# Patient Record
Sex: MALE | Race: WHITE | NOT HISPANIC OR LATINO | Employment: FULL TIME | URBAN - METROPOLITAN AREA
[De-identification: names, ages, dates, MRNs, and addresses within clinical notes are randomized per-mention and may not be internally consistent; named-entity substitution may affect disease eponyms.]

---

## 2017-01-05 ENCOUNTER — HOSPITAL ENCOUNTER (EMERGENCY)
Facility: HOSPITAL | Age: 52
Discharge: HOME | End: 2017-01-05
Attending: EMERGENCY MEDICINE
Payer: SUBSIDIZED

## 2017-01-05 VITALS
HEART RATE: 88 BPM | TEMPERATURE: 97.7 F | DIASTOLIC BLOOD PRESSURE: 88 MMHG | WEIGHT: 175 LBS | RESPIRATION RATE: 18 BRPM | HEIGHT: 75 IN | OXYGEN SATURATION: 99 % | BODY MASS INDEX: 21.76 KG/M2 | SYSTOLIC BLOOD PRESSURE: 160 MMHG

## 2017-01-05 DIAGNOSIS — F10.920 ALCOHOL INTOXICATION, UNCOMPLICATED (HCC): Primary | ICD-10-CM

## 2017-01-05 LAB
ALBUMIN SERPL BCP-MCNC: 4 G/DL (ref 3.5–5)
ALP SERPL-CCNC: 72 U/L (ref 46–116)
ALT SERPL W P-5'-P-CCNC: 27 U/L (ref 12–78)
AMPHETAMINES SERPL QL SCN: NEGATIVE
ANION GAP SERPL CALCULATED.3IONS-SCNC: 11 MMOL/L (ref 4–13)
AST SERPL W P-5'-P-CCNC: 23 U/L (ref 5–45)
BARBITURATES UR QL: NEGATIVE
BASOPHILS # BLD AUTO: 0 THOUSANDS/ΜL (ref 0–0.1)
BASOPHILS NFR BLD AUTO: 0 % (ref 0–1)
BENZODIAZ UR QL: NEGATIVE
BILIRUB SERPL-MCNC: 0.3 MG/DL (ref 0.2–1)
BUN SERPL-MCNC: 5 MG/DL (ref 5–25)
CALCIUM SERPL-MCNC: 9.2 MG/DL (ref 8.3–10.1)
CHLORIDE SERPL-SCNC: 105 MMOL/L (ref 100–108)
CO2 SERPL-SCNC: 31 MMOL/L (ref 21–32)
COCAINE UR QL: NEGATIVE
CREAT SERPL-MCNC: 0.72 MG/DL (ref 0.6–1.3)
EOSINOPHIL # BLD AUTO: 0 THOUSAND/ΜL (ref 0–0.61)
EOSINOPHIL NFR BLD AUTO: 1 % (ref 0–6)
ERYTHROCYTE [DISTWIDTH] IN BLOOD BY AUTOMATED COUNT: 14 % (ref 11.6–15.1)
ETHANOL SERPL-MCNC: 256 MG/DL (ref 0–3)
GFR SERPL CREATININE-BSD FRML MDRD: >60 ML/MIN/1.73SQ M
GLUCOSE SERPL-MCNC: 87 MG/DL (ref 65–140)
HCT VFR BLD AUTO: 44.1 % (ref 42–52)
HGB BLD-MCNC: 14.7 G/DL (ref 14–18)
LYMPHOCYTES # BLD AUTO: 2.2 THOUSANDS/ΜL (ref 0.6–4.47)
LYMPHOCYTES NFR BLD AUTO: 26 % (ref 14–44)
MCH RBC QN AUTO: 33.6 PG (ref 27–31)
MCHC RBC AUTO-ENTMCNC: 33.4 G/DL (ref 31.4–37.4)
MCV RBC AUTO: 101 FL (ref 82–98)
METHADONE UR QL: NEGATIVE
MONOCYTES # BLD AUTO: 0.9 THOUSAND/ΜL (ref 0.17–1.22)
MONOCYTES NFR BLD AUTO: 10 % (ref 4–12)
NEUTROPHILS # BLD AUTO: 5.5 THOUSANDS/ΜL (ref 1.85–7.62)
NEUTS SEG NFR BLD AUTO: 64 % (ref 43–75)
NRBC BLD AUTO-RTO: 0 /100 WBCS
OPIATES UR QL SCN: NEGATIVE
PCP UR QL: NEGATIVE
PLATELET # BLD AUTO: 291 THOUSANDS/UL (ref 130–400)
PMV BLD AUTO: 7.7 FL (ref 8.9–12.7)
POTASSIUM SERPL-SCNC: 3.9 MMOL/L (ref 3.5–5.3)
PROT SERPL-MCNC: 7.9 G/DL (ref 6.4–8.2)
RBC # BLD AUTO: 4.39 MILLION/UL (ref 4.7–6.1)
SODIUM SERPL-SCNC: 147 MMOL/L (ref 136–145)
THC UR QL: NEGATIVE
WBC # BLD AUTO: 8.6 THOUSAND/UL (ref 4.8–10.8)

## 2017-01-05 PROCEDURE — 85025 COMPLETE CBC W/AUTO DIFF WBC: CPT | Performed by: EMERGENCY MEDICINE

## 2017-01-05 PROCEDURE — 36415 COLL VENOUS BLD VENIPUNCTURE: CPT | Performed by: EMERGENCY MEDICINE

## 2017-01-05 PROCEDURE — 96365 THER/PROPH/DIAG IV INF INIT: CPT

## 2017-01-05 PROCEDURE — 80307 DRUG TEST PRSMV CHEM ANLYZR: CPT | Performed by: EMERGENCY MEDICINE

## 2017-01-05 PROCEDURE — 80320 DRUG SCREEN QUANTALCOHOLS: CPT | Performed by: EMERGENCY MEDICINE

## 2017-01-05 PROCEDURE — 96361 HYDRATE IV INFUSION ADD-ON: CPT

## 2017-01-05 PROCEDURE — 80053 COMPREHEN METABOLIC PANEL: CPT | Performed by: EMERGENCY MEDICINE

## 2017-01-05 PROCEDURE — 93005 ELECTROCARDIOGRAM TRACING: CPT | Performed by: EMERGENCY MEDICINE

## 2017-01-05 PROCEDURE — 99283 EMERGENCY DEPT VISIT LOW MDM: CPT

## 2017-01-05 RX ORDER — THIAMINE MONONITRATE (VIT B1) 100 MG
100 TABLET ORAL ONCE
Status: COMPLETED | OUTPATIENT
Start: 2017-01-05 | End: 2017-01-05

## 2017-01-05 RX ORDER — CHLORDIAZEPOXIDE HYDROCHLORIDE 5 MG/1
10 CAPSULE, GELATIN COATED ORAL ONCE
Status: COMPLETED | OUTPATIENT
Start: 2017-01-05 | End: 2017-01-05

## 2017-01-05 RX ADMIN — SODIUM CHLORIDE 1000 ML: 0.9 INJECTION, SOLUTION INTRAVENOUS at 17:19

## 2017-01-05 RX ADMIN — Medication 100 MG: at 17:51

## 2017-01-05 RX ADMIN — FOLIC ACID 1 MG: 5 INJECTION, SOLUTION INTRAMUSCULAR; INTRAVENOUS; SUBCUTANEOUS at 17:51

## 2017-01-05 RX ADMIN — CHLORDIAZEPOXIDE HYDROCHLORIDE 10 MG: 5 CAPSULE ORAL at 17:51

## 2017-01-09 LAB
ATRIAL RATE: 78 BPM
P AXIS: 60 DEGREES
PR INTERVAL: 124 MS
QRS AXIS: 69 DEGREES
QRSD INTERVAL: 84 MS
QT INTERVAL: 400 MS
QTC INTERVAL: 456 MS
T WAVE AXIS: 63 DEGREES
VENTRICULAR RATE: 78 BPM

## 2018-01-12 NOTE — PROCEDURES
Procedures by Aura Epstein MD at  12/11/2016  7:14 PM      Author:  Aura Epstein MD Service:  Trauma Author Type:  Resident    Filed:  12/11/2016  7:17 PM Date of Service:  12/11/2016  7:14 PM Status:  Signed    : Aura Epstein MD (Resident)  Cosigner:  Ivan Hughes MD at 12/11/2016  9:39 PM      Procedures:       1  LACERATION REPAIR [id number] (Custom)]                   3 cm laceration on L forehead/scalp junction  Horizontal/vertical orientation  Laceration extended to skull  Irrigated w/500 cc normal saline and terminated irrigation at pts demand  3 sutures of  5-0 vicryl subcutaneous  Running 5-0 ethalon sutures  On wiping off dried blood small laceration was discovered at L lateral eye  1 5 cm horizontal laceration closed w/5-0 ethalon running sutures               Received for:Provider  EPIC   Dec 11 2016  9:39PM [facility] Standard Time

## 2019-06-12 ENCOUNTER — HOSPITAL ENCOUNTER (EMERGENCY)
Facility: HOSPITAL | Age: 54
Discharge: HOME | End: 2019-06-12
Attending: EMERGENCY MEDICINE | Admitting: EMERGENCY MEDICINE

## 2019-06-12 VITALS
TEMPERATURE: 97.7 F | DIASTOLIC BLOOD PRESSURE: 85 MMHG | OXYGEN SATURATION: 97 % | RESPIRATION RATE: 16 BRPM | HEART RATE: 83 BPM | SYSTOLIC BLOOD PRESSURE: 130 MMHG

## 2019-06-12 DIAGNOSIS — S05.00XA CORNEAL ABRASION: Primary | ICD-10-CM

## 2019-06-12 DIAGNOSIS — H10.9 CONJUNCTIVITIS, LEFT EYE: ICD-10-CM

## 2019-06-12 DIAGNOSIS — H10.9 CONJUNCTIVITIS, RIGHT EYE: ICD-10-CM

## 2019-06-12 PROCEDURE — 99283 EMERGENCY DEPT VISIT LOW MDM: CPT

## 2019-06-12 RX ORDER — OFLOXACIN 3 MG/ML
1 SOLUTION/ DROPS OPHTHALMIC
Qty: 10 ML | Refills: 0 | Status: SHIPPED | OUTPATIENT
Start: 2019-06-12 | End: 2019-06-18

## 2019-06-12 RX ORDER — TOBRAMYCIN 3 MG/ML
1 SOLUTION/ DROPS OPHTHALMIC ONCE
Status: COMPLETED | OUTPATIENT
Start: 2019-06-12 | End: 2019-06-12

## 2019-06-12 RX ORDER — TETRACAINE HYDROCHLORIDE 5 MG/ML
2 SOLUTION OPHTHALMIC ONCE
Status: COMPLETED | OUTPATIENT
Start: 2019-06-12 | End: 2019-06-12

## 2019-06-12 RX ADMIN — TOBRAMYCIN 1 DROP: 3 SOLUTION OPHTHALMIC at 08:03

## 2019-06-12 RX ADMIN — FLUORESCEIN SODIUM 1 STRIP: 1 STRIP OPHTHALMIC at 06:34

## 2019-06-12 RX ADMIN — TETRACAINE HYDROCHLORIDE 2 DROP: 5 SOLUTION OPHTHALMIC at 06:34

## 2019-06-18 ENCOUNTER — HOSPITAL ENCOUNTER (EMERGENCY)
Facility: HOSPITAL | Age: 54
Discharge: HOME | End: 2019-06-18
Attending: EMERGENCY MEDICINE | Admitting: EMERGENCY MEDICINE

## 2019-06-18 VITALS
DIASTOLIC BLOOD PRESSURE: 71 MMHG | WEIGHT: 147 LBS | RESPIRATION RATE: 18 BRPM | TEMPERATURE: 96.9 F | OXYGEN SATURATION: 99 % | HEART RATE: 71 BPM | SYSTOLIC BLOOD PRESSURE: 114 MMHG | BODY MASS INDEX: 18.37 KG/M2

## 2019-06-18 DIAGNOSIS — S05.02XA ABRASION OF LEFT CORNEA, INITIAL ENCOUNTER: Primary | ICD-10-CM

## 2019-06-18 PROCEDURE — 99283 EMERGENCY DEPT VISIT LOW MDM: CPT

## 2019-06-18 RX ORDER — TETRACAINE HYDROCHLORIDE 5 MG/ML
1 SOLUTION OPHTHALMIC ONCE
Status: COMPLETED | OUTPATIENT
Start: 2019-06-18 | End: 2019-06-18

## 2019-06-18 RX ADMIN — TETRACAINE HYDROCHLORIDE 1 DROP: 5 SOLUTION OPHTHALMIC at 07:17

## 2019-06-18 RX ADMIN — FLUORESCEIN SODIUM 1 STRIP: 1 STRIP OPHTHALMIC at 07:17

## 2021-12-10 ENCOUNTER — IMMUNIZATIONS (OUTPATIENT)
Dept: FAMILY MEDICINE CLINIC | Facility: HOSPITAL | Age: 56
End: 2021-12-10

## 2021-12-10 DIAGNOSIS — Z23 ENCOUNTER FOR IMMUNIZATION: Primary | ICD-10-CM

## 2021-12-10 PROCEDURE — 91306 COVID-19 MODERNA VACC 0.25 ML BOOSTER: CPT

## 2021-12-10 PROCEDURE — 0064A COVID-19 MODERNA VACC 0.25 ML BOOSTER: CPT

## 2023-02-22 ENCOUNTER — OFFICE VISIT (OUTPATIENT)
Dept: FAMILY MEDICINE CLINIC | Facility: CLINIC | Age: 58
End: 2023-02-22

## 2023-02-22 VITALS
SYSTOLIC BLOOD PRESSURE: 152 MMHG | RESPIRATION RATE: 16 BRPM | BODY MASS INDEX: 19.37 KG/M2 | HEART RATE: 100 BPM | DIASTOLIC BLOOD PRESSURE: 94 MMHG | WEIGHT: 155 LBS | TEMPERATURE: 98.7 F | OXYGEN SATURATION: 97 %

## 2023-02-22 DIAGNOSIS — R59.0 CERVICAL LYMPHADENOPATHY: ICD-10-CM

## 2023-02-22 DIAGNOSIS — K02.9 DENTAL CARIES: Primary | ICD-10-CM

## 2023-02-22 RX ORDER — AMOXICILLIN AND CLAVULANATE POTASSIUM 875; 125 MG/1; MG/1
1 TABLET, FILM COATED ORAL EVERY 12 HOURS SCHEDULED
Qty: 20 TABLET | Refills: 0 | Status: SHIPPED | OUTPATIENT
Start: 2023-02-22 | End: 2023-03-04

## 2023-02-22 RX ORDER — DEXAMETHASONE 6 MG/1
6 TABLET ORAL
Qty: 5 TABLET | Refills: 0 | Status: SHIPPED | OUTPATIENT
Start: 2023-02-22

## 2023-02-22 NOTE — ASSESSMENT & PLAN NOTE
Patient believes it is new and started about 2 weeks ago  Non tender  Possibly secondary to dental infection  Recommend warm compresses  ER if symptoms worsen or any difficulty swallowing or fever starts

## 2023-02-22 NOTE — ASSESSMENT & PLAN NOTE
Will cover with course of Augmentin  Finish course completely  Recommend probiotic  States he can not take Tylenol, Motrin, or Aleve as it upsets his stomach  Will give 5 day course of Decadron for symptomatic relief  Take with food  Reviewed oral hygiene  Patient states he has an appointment with a dentist 4/18, unsure where  Discussed calling other dentists to see where he may be able to get in more quickly  ER precautions given  Advised patient to schedule an establish care visit/physical to establish with a PCP here  Patient agreed

## 2023-02-22 NOTE — PROGRESS NOTES
Name: Ge Sheppard      : 1965      MRN: [id number]  Encounter Provider: Adriana Vasquez  Encounter Date: 2023   Encounter department: [facility]     1  Dental caries  Assessment & Plan: Will cover with course of Augmentin  Finish course completely  Recommend probiotic  States he can not take Tylenol, Motrin, or Aleve as it upsets his stomach  Will give 5 day course of Decadron for symptomatic relief  Take with food  Reviewed oral hygiene  Patient states he has an appointment with a dentist , unsure where  Discussed calling other dentists to see where he may be able to get in more quickly  ER precautions given  Advised patient to schedule an establish care visit/physical to establish with a PCP here  Patient agreed  Orders:  -     amoxicillin-clavulanate (Augmentin) 875-125 mg per tablet; Take 1 tablet by mouth every 12 (twelve) hours for 10 days  -     dexamethasone (DECADRON) 6 mg tablet; Take 1 tablet (6 mg total) by mouth daily with breakfast    2  Cervical lymphadenopathy  Assessment & Plan:  Patient believes it is new and started about 2 weeks ago  Non tender  Possibly secondary to dental infection  Recommend warm compresses  ER if symptoms worsen or any difficulty swallowing or fever starts  Subjective      Patient is a 59year old male presenting for a same day appointment  States he is having teeth and mouth pain for 2 months  States it is all of them, no specific location  He denies fever  Admits to headaches  States it is from the pain, it radiates into his head and ears  States he does have a difficult time hearing  Difficult to get a full history as patient keeps going back to the severe pain  He states he is "drinking again" when asked if he consumes alcohol  Would not say how much or what he drinks       Review of Systems   Constitutional: Negative for appetite change, chills, diaphoresis, fatigue, fever and unexpected weight change  HENT: Positive for dental problem and hearing loss  Negative for congestion, sore throat, tinnitus and trouble swallowing  Gastrointestinal: Negative for abdominal pain, diarrhea, nausea and vomiting  Skin: Negative for rash  Neurological: Positive for headaches  Negative for dizziness and light-headedness  Hematological: Negative for adenopathy  No current outpatient medications on file prior to visit  Objective     /94   Pulse 100   Temp 98 7 °F (37 1 °C) (Temporal)   Resp 16   Wt 70 3 kg (155 lb)   SpO2 97%   BMI 19 37 kg/m²     Physical Exam  Vitals and nursing note reviewed  Constitutional:       General: He is awake  He is not in acute distress  Appearance: Normal appearance  He is well-developed, well-groomed and normal weight  He is not ill-appearing  HENT:      Head: Normocephalic and atraumatic  Right Ear: Tympanic membrane, ear canal and external ear normal  Decreased hearing noted  Left Ear: Tympanic membrane, ear canal and external ear normal  Decreased hearing noted  Nose: Nose normal       Mouth/Throat:      Mouth: Mucous membranes are moist       Dentition: Abnormal dentition  Does not have dentures  Dental tenderness and dental caries present  No gingival swelling, dental abscesses or gum lesions  Pharynx: Oropharynx is clear  No oropharyngeal exudate or posterior oropharyngeal erythema  Tonsils: No tonsillar exudate or tonsillar abscesses  Comments: Missing teeth  Eyes:      General: No scleral icterus  Conjunctiva/sclera: Conjunctivae normal    Neck:     Cardiovascular:      Rate and Rhythm: Normal rate and regular rhythm  Pulses: Normal pulses  Radial pulses are 2+ on the right side and 2+ on the left side  Heart sounds: Normal heart sounds  No murmur heard  Pulmonary:      Effort: Pulmonary effort is normal  No respiratory distress        Breath sounds: Normal breath sounds and air entry  No decreased air movement  No decreased breath sounds, wheezing, rhonchi or rales  Musculoskeletal:         General: Normal range of motion  Cervical back: Neck supple  Lymphadenopathy:      Cervical: Cervical adenopathy present  Skin:     General: Skin is warm  Coloration: Skin is not jaundiced  Findings: No rash  Neurological:      General: No focal deficit present  Mental Status: He is alert and oriented to person, place, and time  Mental status is at baseline  Motor: Motor function is intact  Coordination: Coordination is intact  Gait: Gait is intact  Psychiatric:         Attention and Perception: Attention normal          Mood and Affect: Mood and affect normal          Speech: Speech normal          Behavior: Behavior normal  Behavior is cooperative           Cognition and Memory: Cognition normal        Marcela Merrill PA-C

## 2023-02-24 ENCOUNTER — OFFICE VISIT (OUTPATIENT)
Dept: DENTISTRY | Facility: CLINIC | Age: 58
End: 2023-02-24

## 2023-02-24 VITALS — DIASTOLIC BLOOD PRESSURE: 91 MMHG | SYSTOLIC BLOOD PRESSURE: 141 MMHG | HEART RATE: 88 BPM | TEMPERATURE: 97.6 F

## 2023-02-24 DIAGNOSIS — Z01.20 ENCOUNTER FOR DENTAL EXAMINATION: Primary | ICD-10-CM

## 2023-02-24 PROBLEM — K05.30 PERIODONTITIS: Status: ACTIVE | Noted: 2023-02-24

## 2023-02-24 NOTE — DENTAL PROCEDURE DETAILS
Cynthiathomas Nickerson presents for a Comprehensive exam  Verbal consent for treatment given in addition to the forms  CC:  pt in a lot of pain - has been on antibiotics twice - wanted a stronger antibiotic but has stomach issues with taking the Amoxicillin  Explained to pt it will get worse with a stronger antibiotic  Explained to pt he has a big infection in his UR molar  ---Dr Hector Babin gave 0 7 Carp of 4 % Articaine PSA, MSA block to help w/ the pain temporarily  Pt left satisfied and ambulatory  Reviewed health history - Patient is ASA II  Consents signed: Yes     Perio: Generalized, Moderate bleeding, and Gingivitis; Stage 4, Grade C  Pain Scale: 8  Caries Assessment: High  Radiographs: Panorex  Periapical teeth #20  24  29     Oral Hygiene instruction reviewed and given  OHI:  Poor  ---Heavy plaque and calc  ---Severe bone loss generalized,  Mobility    Treatment Plan:  1  Extract all remaining upper and lower teeth w/ alveoplasty  2    CUD and CLD to be made   3  Case Difficulty Type 3     Referrals needed: OS - will try to do the extractions here but gave referral also just in case  Exam:  Dr Hector Babin and Dr Luzma Campbell    NV1:  Ext's 2, 3, 4, 5 and alveoplasty - 75 min  NV2;  Ext's 14,15, 16 and alveoplasty - 75 min  NV3:  Ext's 17, 18, 20, 21, 22, 23, 24, w/ alveoplasty- 75 min  NV4:  Ext's 25, 26, 27, 29, 32 w/ alveoplasty - 75 min

## 2023-03-05 ENCOUNTER — HOSPITAL ENCOUNTER (EMERGENCY)
Facility: HOSPITAL | Age: 58
Discharge: HOME | End: 2023-03-05
Attending: INTERNAL MEDICINE

## 2023-03-05 VITALS
DIASTOLIC BLOOD PRESSURE: 89 MMHG | SYSTOLIC BLOOD PRESSURE: 139 MMHG | RESPIRATION RATE: 20 BRPM | WEIGHT: 157.19 LBS | BODY MASS INDEX: 19.65 KG/M2 | TEMPERATURE: 97.1 F | HEART RATE: 92 BPM | OXYGEN SATURATION: 98 %

## 2023-03-05 DIAGNOSIS — K04.7 DENTAL INFECTION: Primary | ICD-10-CM

## 2023-03-05 DIAGNOSIS — K08.89 DENTALGIA: ICD-10-CM

## 2023-03-05 RX ORDER — CHLORHEXIDINE GLUCONATE 0.12 MG/ML
15 RINSE ORAL 2 TIMES DAILY
Qty: 120 ML | Refills: 0 | Status: SHIPPED | OUTPATIENT
Start: 2023-03-05

## 2023-03-05 RX ORDER — SENNOSIDES 8.6 MG
650 CAPSULE ORAL EVERY 8 HOURS PRN
Qty: 30 TABLET | Refills: 0 | Status: SHIPPED | OUTPATIENT
Start: 2023-03-05

## 2023-03-05 RX ORDER — PENICILLIN V POTASSIUM 500 MG/1
500 TABLET ORAL 4 TIMES DAILY
Qty: 40 TABLET | Refills: 0 | Status: SHIPPED | OUTPATIENT
Start: 2023-03-05 | End: 2023-03-15

## 2023-03-05 RX ORDER — BENZOCAINE 200 MG/G
30 GEL ORAL 2 TIMES DAILY PRN
Qty: 30 G | Refills: 0 | Status: SHIPPED | OUTPATIENT
Start: 2023-03-05

## 2023-03-05 NOTE — ED PROVIDER NOTES
History  Chief Complaint   Patient presents with   • Dental Pain     Pt reports all over dental pain, states that he has appt 4/18/23 for multiple extractions and dentures  Pt with unsteady gait during triage, stated, "I'm not drunk"  Pt later admitted to having 1 beer about an hr ago  HPI  76-year-old man presents to the ER for evaluation of dentalgia  Patient reports dentalgia for the last few months  He reports he has been taking amoxicillin but stopped immediatly as it caused diarrhea  He states he will not take Tylenol or Motrin because "they will destroy my stomach "  He has not tried any gels or mouth rinses  He is requesting "strong pain medicine" and a new antibiotic  We discussed that we should try Tylenol first  We discussed the new antibiotic could also cause diarrhea, he states "I don't care, anything but amoxicillin "  Patient has many complaints regarding the various medications for dental pain and infection  He has an appointment on April 18 for full extraction of his teeth with plan for future dentures  Patient denies headache, visual changes, dizziness, fevers, chills, shortness of breath, chest pain, palpitations, abdominal pain  He denies difficulty tolerating secretions or difficulty eating  Patient reports drinking alcohol prior to ER visit  He states his girlfriend is waiting in the waiting room to get home  Prior to Admission Medications   Prescriptions Last Dose Informant Patient Reported? Taking?   amoxicillin-clavulanate (Augmentin) 875-125 mg per tablet   No No   Sig: Take 1 tablet by mouth every 12 (twelve) hours for 10 days   dexamethasone (DECADRON) 6 mg tablet   No No   Sig: Take 1 tablet (6 mg total) by mouth daily with breakfast      Facility-Administered Medications: None       Past Medical History:   Diagnosis Date   • Hypertension        History reviewed  No pertinent surgical history  History reviewed  No pertinent family history    I have reviewed and agree with the history as documented  E-Cigarette/Vaping     E-Cigarette/Vaping Substances     Social History     Tobacco Use   • Smoking status: Every Day     Packs/day: 2 00     Types: Cigarettes   • Smokeless tobacco: Never   Substance Use Topics   • Alcohol use: Yes     Comment: vodka and beer daily, alcoholic   • Drug use: No       Review of Systems   All other systems reviewed and are negative  Physical Exam  Physical Exam  HENT:      Head: Normocephalic  Jaw: No trismus or swelling  Right Ear: Ear canal normal       Nose: Nose normal       Mouth/Throat:      Mouth: Mucous membranes are moist       Dentition: Abnormal dentition  Dental tenderness and dental caries present  No gingival swelling, dental abscesses or gum lesions  Tongue: Tongue does not deviate from midline  Pharynx: Oropharynx is clear  Comments: Extremely poor dentition, several missing teeth, several necrotic teeth  Eyes:      Extraocular Movements: Extraocular movements intact  Cardiovascular:      Rate and Rhythm: Normal rate  Pulses: Normal pulses  Pulmonary:      Effort: Pulmonary effort is normal    Abdominal:      General: Abdomen is flat             Vital Signs  ED Triage Vitals [03/05/23 1709]   Temperature Pulse Respirations Blood Pressure SpO2   (!) 97 1 °F (36 2 °C) 92 20 139/89 98 %      Temp Source Heart Rate Source Patient Position - Orthostatic VS BP Location FiO2 (%)   Tympanic Monitor Sitting Left arm --      Pain Score       --           Vitals:    03/05/23 1709   BP: 139/89   Pulse: 92   Patient Position - Orthostatic VS: Sitting         Visual Acuity      ED Medications  Medications - No data to display    Diagnostic Studies  Results Reviewed     None                 No orders to display              Procedures  Procedures         ED Course                               SBIRT 20yo+    Flowsheet Row Most Recent Value   SBIRT (23 yo +)    In order to provide better care to our patients, we are screening all of our patients for alcohol and drug use  Would it be okay to ask you these screening questions? No Filed at: 03/05/2023 0587                    Medical Decision Making  He appears intoxicated and appears distracted, he has many questions about my personal life and made many inappropriate comments  When I try to steer the conversation to his dental complaint, he appears agitated  Exam is concerning for abnormal dentition, dental tenderness throughout, several dental caries and missing teeth and necrotic teeth  No signs of abscess, severe dental infection or Bashir's angina  We will try penicillin as patient used to take amoxicillin for more than a day  We will also prescribe chlorhexidine, Tylenol and oral gel to the patient  A list of dental clinics were given to the patient including walk-in clinics  Patient was advised to go to these if he continues to have dental pain or discomfort  After patient was discharged home, the nurse was called back to the waiting room to talk to the patient's girlfriend  Patient's girlfriend wanted to know which antibiotics he could drink with  The nurse discussed that daily drinking is not healthy and that antibiotics and alcohol were not recommended  The plan was discussed with the girlfriend and she reported understanding  Risk  OTC drugs  Prescription drug management  Disposition  Final diagnoses:   Dental infection   Dentalgia     Time reflects when diagnosis was documented in both MDM as applicable and the Disposition within this note     Time User Action Codes Description Comment    3/5/2023  5:25 PM Gulshan Miller Add [K04 7] Dental infection     3/5/2023  5:26  [facility] [K08 89] KRIS SIDDIQUI  [facility]       ED Disposition     ED Disposition   Discharge    Condition   Stable    Date/Time   Sun Mar 5, 2023  5:25 PM    [address] [place] [address] discharge to home/self care                 Follow-up Information    None         Discharge Medication List as of 3/5/2023  5:32 PM      START taking these medications    Details   acetaminophen (TYLENOL) 650 mg CR tablet Take 1 tablet (650 mg total) by mouth every 8 (eight) hours as needed for mild pain, Starting Sun 3/5/2023, Normal      benzocaine (Instant Oral Pain Relief Max) 20 % 30 application  by Mucosal route 2 (two) times a day as needed for mucositis, Starting Sun 3/5/2023, Normal      chlorhexidine (PERIDEX) 0 12 % solution Apply 15 mL to the mouth or throat 2 (two) times a day, Starting Sun 3/5/2023, Normal      penicillin V potassium (VEETID) 500 mg tablet Take 1 tablet (500 mg total) by mouth 4 (four) times a day for 10 days, Starting Sun 3/5/2023, Until Wed 3/15/2023, Normal         CONTINUE these medications which have NOT CHANGED    Details   dexamethasone (DECADRON) 6 mg tablet Take 1 tablet (6 mg total) by mouth daily with breakfast, Starting Wed 2/22/2023, Normal         STOP taking these medications       amoxicillin-clavulanate (Augmentin) 875-125 mg per tablet Comments:   Reason for Stopping:               No discharge procedures on file      PDMP Review     None          ED Provider  Electronically Signed by           Sharee Medel MD  03/05/23 1921

## 2023-03-05 NOTE — DISCHARGE INSTRUCTIONS
[address] [facility]  [facility], [address] [facility]    Walk in house M-F [address]  [address], [address] floor  OS, [address] [facility]        Dr Ferny Green [address], [address]  Takes adults & children on a waiting list      [facility] SPECIALTY [facility] [address], [address] [facility]    Walk in scheduled only M-F 8a-noon & 1p-4p  Uninsured received 40% discount & payments  Payment must be made upfront before the service  Emergency [address] [place] [address]  [address] [facility], [address] [facility]  [address] [facility]  [address], [place] [address]  [address] [facility] in [facility], [place]

## 2023-03-05 NOTE — ED NOTES
Dr Yolande Christensen at bedside  Pt started to become agitated when asked questions about dental pain  Pt asked if used medication at home for pain before coming to the ER  Pt stated that he does not take Tylenol because it bothers his stomach  Pt was told that Motrin causes GI discomfort, not Tylenol by Dr Yolande Christensen  Pt became argumentative  When asked if he is taking antibiotics that were prescribed he stated he stopped taking them due to diarrhea  Pt was asked if he would like to try a different antibiotic  Pt asked Dr Yolande Christensen "Why do you give people medication if they cause so many side effects " Pt education given by Dr Yolande Christensen regarding diarrhea as a side effect of the antibiotic  Pt questioned Dr Yolande Christensen on whether she is a doctor  Pt was told that she is a physician  Pt asked Dr Yolande Christensen her age  When told her age pt stated "Doctors your age are usually bitches  You are pretty  Are you ?" Pt was redirected back to talking about dental pain  Pt reported that he did drink before coming to the ER but he has a ride home        Aamir Stone RN  03/05/23 2757

## 2023-03-27 ENCOUNTER — HOSPITAL ENCOUNTER (EMERGENCY)
Facility: HOSPITAL | Age: 58
End: 2023-03-27
Attending: EMERGENCY MEDICINE

## 2023-03-27 ENCOUNTER — HOSPITAL ENCOUNTER (INPATIENT)
Facility: HOSPITAL | Age: 58
LOS: 3 days | Discharge: HOME | End: 2023-03-30
Attending: EMERGENCY MEDICINE | Admitting: EMERGENCY MEDICINE

## 2023-03-27 VITALS
RESPIRATION RATE: 18 BRPM | DIASTOLIC BLOOD PRESSURE: 61 MMHG | TEMPERATURE: 97.4 F | OXYGEN SATURATION: 95 % | SYSTOLIC BLOOD PRESSURE: 102 MMHG | HEART RATE: 84 BPM

## 2023-03-27 DIAGNOSIS — E83.42 HYPOMAGNESEMIA: ICD-10-CM

## 2023-03-27 DIAGNOSIS — E87.1 HYPONATREMIA: ICD-10-CM

## 2023-03-27 DIAGNOSIS — E87.6 HYPOKALEMIA: ICD-10-CM

## 2023-03-27 DIAGNOSIS — F10.20 ALCOHOL USE DISORDER, SEVERE, DEPENDENCE (HCC): Primary | ICD-10-CM

## 2023-03-27 DIAGNOSIS — F10.929 ALCOHOL INTOXICATION (HCC): Primary | ICD-10-CM

## 2023-03-27 DIAGNOSIS — I10 HTN (HYPERTENSION): ICD-10-CM

## 2023-03-27 PROBLEM — F10.939 ALCOHOL WITHDRAWAL SYNDROME WITH COMPLICATION (HCC): Status: ACTIVE | Noted: 2023-03-27

## 2023-03-27 LAB
ANION GAP SERPL CALCULATED.3IONS-SCNC: 14 MMOL/L (ref 4–13)
BASOPHILS # BLD AUTO: 0.05 THOUSANDS/ÂΜL (ref 0–0.1)
BASOPHILS NFR BLD AUTO: 1 % (ref 0–1)
BUN SERPL-MCNC: 6 MG/DL (ref 5–25)
CALCIUM SERPL-MCNC: 9 MG/DL (ref 8.4–10.2)
CHLORIDE SERPL-SCNC: 96 MMOL/L (ref 96–108)
CO2 SERPL-SCNC: 22 MMOL/L (ref 21–32)
CREAT SERPL-MCNC: 0.79 MG/DL (ref 0.6–1.3)
EOSINOPHIL # BLD AUTO: 0.04 THOUSAND/ÂΜL (ref 0–0.61)
EOSINOPHIL NFR BLD AUTO: 1 % (ref 0–6)
ERYTHROCYTE [DISTWIDTH] IN BLOOD BY AUTOMATED COUNT: 13.1 % (ref 11.6–15.1)
ETHANOL EXG-MCNC: 0.31 MG/DL
GFR SERPL CREATININE-BSD FRML MDRD: 98 ML/MIN/1.73SQ M
GLUCOSE SERPL-MCNC: 108 MG/DL (ref 65–140)
HCT VFR BLD AUTO: 40.5 % (ref 36.5–49.3)
HGB BLD-MCNC: 14.3 G/DL (ref 12–17)
IMM GRANULOCYTES # BLD AUTO: 0.03 THOUSAND/UL (ref 0–0.2)
IMM GRANULOCYTES NFR BLD AUTO: 1 % (ref 0–2)
LYMPHOCYTES # BLD AUTO: 2.03 THOUSANDS/ÂΜL (ref 0.6–4.47)
LYMPHOCYTES NFR BLD AUTO: 40 % (ref 14–44)
MAGNESIUM SERPL-MCNC: 1.9 MG/DL (ref 1.9–2.7)
MCH RBC QN AUTO: 33.1 PG (ref 26.8–34.3)
MCHC RBC AUTO-ENTMCNC: 35.3 G/DL (ref 31.4–37.4)
MCV RBC AUTO: 94 FL (ref 82–98)
MONOCYTES # BLD AUTO: 0.62 THOUSAND/ÂΜL (ref 0.17–1.22)
MONOCYTES NFR BLD AUTO: 12 % (ref 4–12)
NEUTROPHILS # BLD AUTO: 2.33 THOUSANDS/ÂΜL (ref 1.85–7.62)
NEUTS SEG NFR BLD AUTO: 45 % (ref 43–75)
NRBC BLD AUTO-RTO: 0 /100 WBCS
PLATELET # BLD AUTO: 182 THOUSANDS/UL (ref 149–390)
PMV BLD AUTO: 8.9 FL (ref 8.9–12.7)
POTASSIUM SERPL-SCNC: 3.6 MMOL/L (ref 3.5–5.3)
RBC # BLD AUTO: 4.32 MILLION/UL (ref 3.88–5.62)
SODIUM SERPL-SCNC: 132 MMOL/L (ref 135–147)
WBC # BLD AUTO: 5.1 THOUSAND/UL (ref 4.31–10.16)

## 2023-03-27 PROCEDURE — HZ2ZZZZ DETOXIFICATION SERVICES FOR SUBSTANCE ABUSE TREATMENT: ICD-10-PCS | Performed by: EMERGENCY MEDICINE

## 2023-03-27 RX ORDER — NICOTINE 21 MG/24HR
1 PATCH, TRANSDERMAL 24 HOURS TRANSDERMAL DAILY
Status: DISCONTINUED | OUTPATIENT
Start: 2023-03-28 | End: 2023-03-30 | Stop reason: HOSPADM

## 2023-03-27 RX ORDER — ACETAMINOPHEN 325 MG/1
650 TABLET ORAL EVERY 6 HOURS PRN
Status: DISCONTINUED | OUTPATIENT
Start: 2023-03-27 | End: 2023-03-30 | Stop reason: HOSPADM

## 2023-03-27 RX ORDER — ONDANSETRON 4 MG/1
4 TABLET, ORALLY DISINTEGRATING ORAL EVERY 6 HOURS PRN
Status: DISCONTINUED | OUTPATIENT
Start: 2023-03-27 | End: 2023-03-30 | Stop reason: HOSPADM

## 2023-03-27 RX ORDER — ENOXAPARIN SODIUM 100 MG/ML
40 INJECTION SUBCUTANEOUS DAILY
Status: DISCONTINUED | OUTPATIENT
Start: 2023-03-28 | End: 2023-03-30 | Stop reason: HOSPADM

## 2023-03-27 RX ORDER — FOLIC ACID 1 MG/1
1 TABLET ORAL ONCE
Status: COMPLETED | OUTPATIENT
Start: 2023-03-27 | End: 2023-03-27

## 2023-03-27 RX ORDER — LANOLIN ALCOHOL/MO/W.PET/CERES
100 CREAM (GRAM) TOPICAL ONCE
Status: COMPLETED | OUTPATIENT
Start: 2023-03-27 | End: 2023-03-27

## 2023-03-27 RX ORDER — SODIUM CHLORIDE 9 MG/ML
75 INJECTION, SOLUTION INTRAVENOUS CONTINUOUS
Status: DISCONTINUED | OUTPATIENT
Start: 2023-03-27 | End: 2023-03-29

## 2023-03-27 RX ORDER — CHLORHEXIDINE GLUCONATE 0.12 MG/ML
15 RINSE ORAL 2 TIMES DAILY
Status: DISCONTINUED | OUTPATIENT
Start: 2023-03-27 | End: 2023-03-30 | Stop reason: HOSPADM

## 2023-03-27 RX ADMIN — THIAMINE HYDROCHLORIDE 500 MG: 100 INJECTION, SOLUTION INTRAMUSCULAR; INTRAVENOUS at 23:03

## 2023-03-27 RX ADMIN — SODIUM CHLORIDE 1000 ML: 0.9 INJECTION, SOLUTION INTRAVENOUS at 16:04

## 2023-03-27 RX ADMIN — SODIUM CHLORIDE 75 ML/HR: 0.9 INJECTION, SOLUTION INTRAVENOUS at 20:47

## 2023-03-27 RX ADMIN — THIAMINE HCL TAB 100 MG 100 MG: 100 TAB at 15:31

## 2023-03-27 RX ADMIN — CHLORHEXIDINE GLUCONATE 0.12% ORAL RINSE 15 ML: 1.2 LIQUID ORAL at 22:18

## 2023-03-27 RX ADMIN — FOLIC ACID 1 MG: 1 TABLET ORAL at 15:31

## 2023-03-27 NOTE — ASSESSMENT & PLAN NOTE
Patient with h/o chronic alcohol use  Currently consumes 12-14 beers (24 oz) daily  Last drink earlier today  Breath alcohol level 0 312 (3/27/2023 1538)  Manage withdrawal as above   Initiate daily thiamine and folic acid   Consult case management for assistance with disposition planning

## 2023-03-27 NOTE — ED PROVIDER NOTES
History  Chief Complaint   Patient presents with   • Detox Evaluation     Pt presents from [facility] for detox evaluation  Pt states drinks daily, last drink moments ago  Pt personal water bottle confiscated during triage  59-year-old male with history of alcohol abuse presents to the emergency department requesting detox from alcohol  Patient reports that he has a long history of alcohol abuse and drinks approximately a sixpack of beer per day  States that he has been to a detox facility in the past most recently 3 years ago  Denies prior history of withdrawal seizures  States that his last drink was just prior to arrival   Per nursing the patient stated that he brought beer with him here in a thermos which was poured down the drain  Patient denies having any other complaints at this time  Denies SI, HI and AVH  Prior to Admission Medications   Prescriptions Last Dose Informant Patient Reported? Taking?   acetaminophen (TYLENOL) 650 mg CR tablet   No No   Sig: Take 1 tablet (650 mg total) by mouth every 8 (eight) hours as needed for mild pain   benzocaine (Instant Oral Pain Relief Max) 20 %   No No   Si application  by Mucosal route 2 (two) times a day as needed for mucositis   chlorhexidine (PERIDEX) 0 12 % solution   No No   Sig: Apply 15 mL to the mouth or throat 2 (two) times a day   dexamethasone (DECADRON) 6 mg tablet   No No   Sig: Take 1 tablet (6 mg total) by mouth daily with breakfast      Facility-Administered Medications: None       Past Medical History:   Diagnosis Date   • Hypertension        History reviewed  No pertinent surgical history  History reviewed  No pertinent family history  I have reviewed and agree with the history as documented  E-Cigarette/Vaping     E-Cigarette/Vaping Substances     Social History     Tobacco Use   • Smoking status: Every Day     Packs/day: 2 00     Types: Cigarettes   • Smokeless tobacco: Never   Substance Use Topics   • Alcohol use:  Yes Comment: vodka and beer daily, alcoholic   • Drug use: No       Review of Systems   Constitutional: Negative for chills and fever  HENT: Negative for ear pain and sore throat  Eyes: Negative for pain and visual disturbance  Respiratory: Negative for cough and shortness of breath  Cardiovascular: Negative for chest pain and palpitations  Gastrointestinal: Negative for abdominal pain and vomiting  Genitourinary: Negative for dysuria and hematuria  Musculoskeletal: Negative for arthralgias and back pain  Skin: Negative for color change and rash  Neurological: Negative for seizures and syncope  Psychiatric/Behavioral: Negative for suicidal ideas  All other systems reviewed and are negative  Physical Exam  Physical Exam  Vitals and nursing note reviewed  Constitutional:       General: He is not in acute distress  Appearance: He is well-developed  HENT:      Head: Normocephalic and atraumatic  Right Ear: External ear normal       Left Ear: External ear normal       Nose: Nose normal    Eyes:      Conjunctiva/sclera: Conjunctivae normal    Cardiovascular:      Rate and Rhythm: Normal rate and regular rhythm  Heart sounds: No murmur heard  Pulmonary:      Effort: Pulmonary effort is normal  No respiratory distress  Breath sounds: Normal breath sounds  Abdominal:      Palpations: Abdomen is soft  Tenderness: There is no abdominal tenderness  Musculoskeletal:         General: No swelling  Cervical back: Normal range of motion and neck supple  Skin:     General: Skin is warm and dry  Capillary Refill: Capillary refill takes less than 2 seconds  Neurological:      Mental Status: He is alert and oriented to person, place, and time  Sensory: Sensation is intact  Motor: Motor function is intact  Gait: Gait is intact     Psychiatric:         Mood and Affect: Mood normal          Vital Signs  ED Triage Vitals   Temperature Pulse Respirations Blood Pressure SpO2   03/27/23 1457 03/27/23 1454 03/27/23 1454 03/27/23 1454 03/27/23 1454   (!) 97 4 °F (36 3 °C) 91 20 155/93 98 %      Temp Source Heart Rate Source Patient Position - Orthostatic VS BP Location FiO2 (%)   03/27/23 1457 03/27/23 1454 03/27/23 1454 03/27/23 1454 --   Oral Monitor Lying Right arm       Pain Score       --                  Vitals:    03/27/23 1454 03/27/23 1749   BP: 155/93 102/61   Pulse: 91 84   Patient Position - Orthostatic VS: Lying Lying         Visual Acuity      ED Medications  Medications   folic acid (FOLVITE) tablet 1 mg (1 mg Oral Given 3/27/23 1531)   thiamine tablet 100 mg (100 mg Oral Given 3/27/23 1531)   sodium chloride 0 9 % bolus 1,000 mL (0 mL Intravenous Stopped 3/27/23 1749)       Diagnostic Studies  Results Reviewed     Procedure Component Value Units Date/Time    Basic metabolic panel [[id number]]  (Abnormal) Collected: 03/27/23 1529    Lab Status: Final result Specimen: Blood from Arm, Right Updated: 03/27/23 1550     Sodium 132 mmol/L      Potassium 3 6 mmol/L      Chloride 96 mmol/L      CO2 22 mmol/L      ANION GAP 14 mmol/L      BUN 6 mg/dL      Creatinine 0 79 mg/dL      Glucose 108 mg/dL      Calcium 9 0 mg/dL      eGFR 98 ml/min/1 73sq m     Narrative:      [place] guidelines for Chronic Kidney Disease (CKD):   •  Stage 1 with normal or high GFR (GFR > 90 mL/min/1 73 square meters)  •  Stage 2 Mild CKD (GFR = 60-89 mL/min/1 73 square meters)  •  Stage 3A Moderate CKD (GFR = 45-59 mL/min/1 73 square meters)  •  Stage 3B Moderate CKD (GFR = 30-44 mL/min/1 73 square meters)  •  Stage 4 Severe CKD (GFR = 15-29 mL/min/1 73 square meters)  •  Stage 5 End Stage CKD (GFR <15 mL/min/1 73 square meters)  Note: GFR calculation is accurate only with a steady state creatinine    Magnesium [[id number]]  (Normal) Collected: 03/27/23 1529    Lab Status: Final result Specimen: Blood from Arm, Right Updated: 03/27/23 1550     Magnesium 1 9 mg/dL     POCT alcohol breath test [[id number]]  (Normal) Resulted: 03/27/23 1538    Lab Status: Final result Updated: 03/27/23 1538     EXTBreath Alcohol 0 312    CBC and differential [[id number]] Collected: 03/27/23 1529    Lab Status: Final result Specimen: Blood from Arm, Right Updated: 03/27/23 1535     WBC 5 10 Thousand/uL      RBC 4 32 Million/uL      Hemoglobin 14 3 g/dL      Hematocrit 40 5 %      MCV 94 fL      MCH 33 1 pg      MCHC 35 3 g/dL      RDW 13 1 %      MPV 8 9 fL      Platelets 605 Thousands/uL      nRBC 0 /100 WBCs      Neutrophils Relative 45 %      Immat GRANS % 1 %      Lymphocytes Relative 40 %      Monocytes Relative 12 %      Eosinophils Relative 1 %      Basophils Relative 1 %      Neutrophils Absolute 2 33 Thousands/µL      Immature Grans Absolute 0 03 Thousand/uL      Lymphocytes Absolute 2 03 Thousands/µL      Monocytes Absolute 0 62 Thousand/µL      Eosinophils Absolute 0 04 Thousand/µL      Basophils Absolute 0 05 Thousands/µL                  No orders to display              Procedures  ECG 12 Lead Documentation Only    Date/Time: 3/27/2023 3:58 PM  Performed by: Sarah Jones MD  Authorized by: Sarah Jones MD     ECG reviewed by me, the ED Provider: yes    Patient location:  ED  Previous ECG:     Previous ECG:  Unavailable    Comparison to cardiac monitor: Yes    Interpretation:     Interpretation: normal    Rate:     ECG rate assessment: normal    Rhythm:     Rhythm: sinus rhythm    Ectopy:     Ectopy: none    QRS:     QRS axis:  Normal  Conduction:     Conduction: normal    ST segments:     ST segments:  Normal  T waves:     T waves: normal    Other findings:     Other findings: LVH               ED Course                               SBIRT 20yo+    Flowsheet Row Most Recent Value   SBIRT (25 yo +)    In order to provide better care to our patients, we are screening all of our patients for alcohol and drug use   Would it be okay to ask you these screening questions? No Filed at: 03/27/2023 1455                    Medical Decision Making  53-year-old male presented to the emergency department requesting detox from alcohol  On arrival patient was awake, alert, oriented and in no acute distress  Initial vital signs within normal limits  Patient had no other medical complaints  Work-up showed that the patient had an elevated alcohol level and was also mildly hyponatremic  The patient was treated with vitamins and a fluid bolus  The case was discussed with the ANNETTE on-call for the detox unit who accepted the patient for transfer  The patient was updated and was agreeable with the plan for transfer to the detox unit  Patient was signed out at change of shift to a different provider pending the patient's transfer  Alcohol intoxication ([facility] [address] ): acute illness or injury  Amount and/or Complexity of Data Reviewed  Labs: ordered  ECG/medicine tests: ordered and independent interpretation performed  Discussion of management or test interpretation with external provider(s): Discussed the case with the on-call AP from the detox unit  Risk  OTC drugs  Decision regarding hospitalization  Disposition  Final diagnoses:   Alcohol intoxication ([facility] [address] )     Time reflects when diagnosis was documented in both MDM as applicable and the Disposition within this note     Time User Action Codes Description Comment    3/27/2023  4:11 PM Anish Manriquez Add [F10 929] Alcohol intoxication [facility])       ED Disposition     ED Disposition   Transfer to Another Facility-In Network    Condition   --    Date/Time   Mon Mar 27, 2023  4:11 PM    946 Kobe Silva should be transferred out to [facility]             MD Documentation    Karri Click Most Recent Value   Patient Condition The patient has been stabilized such that within reasonable medical probability, no material deterioration of the patient condition or the condition of the unborn child(anna) is likely to result from the transfer   Reason for Transfer Level of Care needed not available at this facility   Benefits of Transfer Specialized equipment and/or services available at the receiving facility (Include comment)________________________  [Detox]   Accepting Physician Dr Akilah Smallwood Name, [place], [place]   Sending MD Dr Sumaya Sanchez   Provider Certification General risk, such as traffic hazards, adverse weather conditions, rough terrain or turbulence, possible failure of equipment (including vehicle or aircraft), or consequences of actions of persons outside the control of the transport personnel, Unanticipated needs of medical equipment and personnel during transport, The possibility of a transport vehicle being unavailable, Risk of worsening condition      RN Documentation    72 Amira Robbins Name, [place], [place]      Follow-up Information    None         Patient's Medications   Discharge Prescriptions    No medications on file       No discharge procedures on file      PDMP Review     None          ED Provider  Electronically Signed by           Sheree Dick MD  03/27/23 0113

## 2023-03-27 NOTE — ED CARE HANDOFF
Emergency Department Sign Out Note        Sign out and transfer of care from Dr Rickie Olivares  See Separate Emergency Department note  The patient, Naida Lai, was evaluated by the previous provider for alcohol abuse      Workup Completed:  Results Reviewed     Procedure Component Value Units Date/Time    Basic metabolic panel [[id number]]  (Abnormal) Collected: 03/27/23 1529    Lab Status: Final result Specimen: Blood from Arm, Right Updated: 03/27/23 1550     Sodium 132 mmol/L      Potassium 3 6 mmol/L      Chloride 96 mmol/L      CO2 22 mmol/L      ANION GAP 14 mmol/L      BUN 6 mg/dL      Creatinine 0 79 mg/dL      Glucose 108 mg/dL      Calcium 9 0 mg/dL      eGFR 98 ml/min/1 73sq m     Narrative:      [place] guidelines for Chronic Kidney Disease (CKD):   •  Stage 1 with normal or high GFR (GFR > 90 mL/min/1 73 square meters)  •  Stage 2 Mild CKD (GFR = 60-89 mL/min/1 73 square meters)  •  Stage 3A Moderate CKD (GFR = 45-59 mL/min/1 73 square meters)  •  Stage 3B Moderate CKD (GFR = 30-44 mL/min/1 73 square meters)  •  Stage 4 Severe CKD (GFR = 15-29 mL/min/1 73 square meters)  •  Stage 5 End Stage CKD (GFR <15 mL/min/1 73 square meters)  Note: GFR calculation is accurate only with a steady state creatinine    Magnesium [[id number]]  (Normal) Collected: 03/27/23 1529    Lab Status: Final result Specimen: Blood from Arm, Right Updated: 03/27/23 1550     Magnesium 1 9 mg/dL     POCT alcohol breath test [[id number]]  (Normal) Resulted: 03/27/23 1538    Lab Status: Final result Updated: 03/27/23 1538     EXTBreath Alcohol 0 312    CBC and differential [[id number]] Collected: 03/27/23 1529    Lab Status: Final result Specimen: Blood from Arm, Right Updated: 03/27/23 1535     WBC 5 10 Thousand/uL      RBC 4 32 Million/uL      Hemoglobin 14 3 g/dL      Hematocrit 40 5 %      MCV 94 fL      MCH 33 1 pg      MCHC 35 3 g/dL      RDW 13 1 %      MPV 8 9 fL      Platelets 607 Thousands/uL nRBC 0 /100 WBCs      Neutrophils Relative 45 %      Immat GRANS % 1 %      Lymphocytes Relative 40 %      Monocytes Relative 12 %      Eosinophils Relative 1 %      Basophils Relative 1 %      Neutrophils Absolute 2 33 Thousands/µL      Immature Grans Absolute 0 03 Thousand/uL      Lymphocytes Absolute 2 03 Thousands/µL      Monocytes Absolute 0 62 Thousand/µL      Eosinophils Absolute 0 04 Thousand/µL      Basophils Absolute 0 05 Thousands/µL           ED Course / Workup Pending (followup):        ED Course as of 03/27/23 1858   Mon Mar 27, 2023   1619 SO: transfer to [address] [place] [address] to detox     Procedures  Medical Decision Making  78-year-old male who was previously evaluated for alcohol abuse  Patient desiring detox and accepted for transfer to [facility] detox unit  Signed out to me pending transport  Patient remained stable on the emergency department with no acute issues  Patient transported in stable condition  Alcohol intoxication ([facility] [address] ): acute illness or injury  Amount and/or Complexity of Data Reviewed  Labs: ordered  Risk  OTC drugs  Disposition  Final diagnoses:   Alcohol intoxication ([address] )     Time reflects when diagnosis was documented in both MDM as applicable and the Disposition within this note     Time User Action Codes Description Comment    3/27/2023  4:11 PM Ursula Warren Add [F10 929] Alcohol intoxication [facility])       ED Disposition     ED Disposition   Transfer to Another Facility-In Network    Condition   --    Date/Time   Mon Mar 27, 2023  4:11 PM    Jinny6 Kobe Silva should be transferred out to [facility]             MD Sabiha Pastor Most Recent Value   Patient Condition The patient has been stabilized such that within reasonable medical probability, no material deterioration of the patient condition or the condition of the unborn child(anna) is likely to result from the transfer   Reason for Transfer Level of Care needed not available at this facility   Benefits of Transfer Specialized equipment and/or services available at the receiving facility (Include comment)________________________  [Detox]   Accepting Physician Dr Luis Cole Name, Meghna Frias   Sending MD Dr Kory Magallon   Provider Certification General risk, such as traffic hazards, adverse weather conditions, rough terrain or turbulence, possible failure of equipment (including vehicle or aircraft), or consequences of actions of persons outside the control of the transport personnel, Unanticipated needs of medical equipment and personnel during transport, The possibility of a transport vehicle being unavailable, Risk of worsening condition      RN Documentation    72 Amira Robbins Name, Meghna Frias      Follow-up Information    None       Discharge Medication List as of 3/27/2023  6:53 PM      CONTINUE these medications which have NOT CHANGED    Details   acetaminophen (TYLENOL) 650 mg CR tablet Take 1 tablet (650 mg total) by mouth every 8 (eight) hours as needed for mild pain, Starting Sun 3/5/2023, Normal      benzocaine (Instant Oral Pain Relief Max) 20 % 30 application  by Mucosal route 2 (two) times a day as needed for mucositis, Starting Sun 3/5/2023, Normal      chlorhexidine (PERIDEX) 0 12 % solution Apply 15 mL to the mouth or throat 2 (two) times a day, Starting Sun 3/5/2023, Normal      dexamethasone (DECADRON) 6 mg tablet Take 1 tablet (6 mg total) by mouth daily with breakfast, Starting Wed 2/22/2023, Normal           No discharge procedures on file         ED Provider  Electronically Signed by     Britney Dover MD  03/27/23 5866

## 2023-03-27 NOTE — EMTALA/ACUTE CARE TRANSFER
[facility] EMERGENCY DEPARTMENT  [address] [facility] [address]  Dept: [phone]      EMTALA TRANSFER CONSENT    NAME Perla Olivo                                         1965                              MRN [id number]    I have been informed of my rights regarding examination, treatment, and transfer   by Dr Juliet White MD    Benefits: Specialized equipment and/or services available at the receiving facility (Include comment)________________________ (Detox)    Risks:        Consent for Transfer:  I acknowledge that my medical condition has been evaluated and explained to me by the emergency department physician or other qualified medical person and/or my attending physician, who has recommended that I be transferred to the service of  Accepting Physician: Dr Ericka Viveros at [address] Name, [address] : [place], [place]  The above potential benefits of such transfer, the potential risks associated with such transfer, and the probable risks of not being transferred have been explained to me, and I fully understand them  The doctor has explained that, in my case, the benefits of transfer outweigh the risks  I agree to be transferred  I authorize the performance of emergency medical procedures and treatments upon me in both transit and upon arrival at the receiving facility  Additionally, I authorize the release of any and all medical records to the receiving facility and request they be transported with me, if possible  I understand that the safest mode of transportation during a medical emergency is an ambulance and that the Hospital advocates the use of this mode of transport  Risks of traveling to the receiving facility by car, including absence of medical control, life sustaining equipment, such as oxygen, and medical personnel has been explained to me and I fully understand them      (EVIN CORRECT BOX BELOW)  [  ]  I consent to the stated transfer and to be transported by ambulance/helicopter  [  ]  I consent to the stated transfer, but refuse transportation by ambulance and accept full responsibility for my transportation by car  I understand the risks of non-ambulance transfers and I exonerate the Hospital and its staff from any deterioration in my condition that results from this refusal     X___________________________________________    DATE  23  TIME________  Signature of patient or legally responsible individual signing on patient behalf           RELATIONSHIP TO PATIENT_________________________          Provider JULITO [address]                                         1965                              MRN [id number]    A medical screening exam was performed on the above named patient  Based on the examination:    Condition Necessitating Transfer The encounter diagnosis was Alcohol intoxication ([address] )  Patient Condition: The patient has been stabilized such that within reasonable medical probability, no material deterioration of the patient condition or the condition of the unborn child(anna) is likely to result from the transfer    Reason for Transfer: Level of Care needed not available at this facility    Transfer Requirements: [id number] [facility], [facility], [address] [place] [address]   · Space available and qualified personnel available for treatment as acknowledged by    · Agreed to accept transfer and to provide appropriate medical treatment as acknowledged by       Dr Paramjit Daniel  · Appropriate medical records of the examination and treatment of the patient are provided at the time of transfer   [address], [address] _______  · Transfer will be performed by qualified personnel from    and appropriate transfer equipment as required, including the use of necessary and appropriate life support measures      Provider Certification: I have examined the patient and explained the following risks and benefits of being transferred/refusing transfer to the patient/family:  General risk, such as traffic hazards, adverse weather conditions, rough terrain or turbulence, possible failure of equipment (including vehicle or aircraft), or consequences of actions of persons outside the control of the transport personnel, Unanticipated needs of medical equipment and personnel during transport, The possibility of a transport vehicle being unavailable, Risk of worsening condition      Based on these reasonable risks and benefits to the patient and/or the unborn child(anna), and based upon the information available at the time of the patient’s examination, I certify that the medical benefits reasonably to be expected from the provision of appropriate medical treatments at another medical facility outweigh the increasing risks, if any, to the individual’s medical condition, and in the case of labor to the unborn child, from effecting the transfer      X____________________________________________ DATE 03/27/23        TIME_______      ORIGINAL - SEND TO MEDICAL RECORDS   COPY - SEND WITH PATIENT DURING TRANSFER

## 2023-03-27 NOTE — ASSESSMENT & PLAN NOTE
· H/o HTN per chart review  · No current home meds  · On admission: Blood Pressure: 123/87    · Continue to monitor vitals, BP  · Consider initiation of anti-hypertensive if BP elevated once acute withdrawal resolved

## 2023-03-27 NOTE — ASSESSMENT & PLAN NOTE
· H/o chronic daily alcohol consumption, denies h/o withdrawal seizures  · [facility]  · Current symptoms: none as pt presents intoxicated  · Holding phenobarbital at this time  · Continue monitoring under protocol and administer phenobarbital as indicated  · Telemetry and continuous pulse-ox monitoring

## 2023-03-27 NOTE — H&P
"[facility]  H&P  Name: Antoni Fuentes  MRN: [id number]  Unit/Bed#: 5T DETOX 136-39 I Date of Admission: 3/27/2023   Date of Service: 3/27/2023 I Hospital Day: 0    HISTORY & PHYSICAL EXAM  DEPARTMENT OF MEDICAL TOXICOLOGY  LEVEL 4 MEDICAL DETOX UNIT  Jonah Beebe 59 y o  male MRN: [id number]  Unit/Bed#: 5T DETOX 506-01 Encounter: [id number]      Reason for Admission/Principal Problem: Ethanol withdrawal, Ethanol use disorder  Admitting Provider: Zina Del Rosario PA-C  Attending Provider: Oral Roldan MD   3/27/2023  7:27 PM        * Alcohol withdrawal syndrome with complication [facility])  Assessment & Plan  · H/o chronic daily alcohol consumption, denies h/o withdrawal seizures  · Follow [facility] protocol for medically-assisted alcohol withdrawal  · Current symptoms: none as pt presents intoxicated  · Holding phenobarbital at this time  · Continue monitoring under protocol and administer phenobarbital as indicated  · Telemetry and continuous pulse-ox monitoring     Alcohol use disorder, severe, dependence ([facility] [address] )  Assessment & Plan  Patient with h/o chronic alcohol use  Currently consumes 12-14 beers (24 oz) daily  Last drink earlier today  Breath alcohol level 0 312 (3/27/2023 1538)  Manage withdrawal as above   Initiate daily thiamine and folic acid   Consult case management for assistance with disposition planning    Hypertension  Assessment & Plan  · H/o HTN per chart review  · No current home meds  · On admission: Blood Pressure: 123/87    · Continue to monitor vitals, BP  · Consider initiation of anti-hypertensive if BP elevated once acute withdrawal resolved     Periodontitis  Assessment & Plan  · Pt with a h/o poor dentition, dental caries, and periodontitis - states he is scheduled to have his remaining upper teeth removed   · Endorses ongoing issues with dental pain, R-sided hearing loss and \"vision problems\" 2/2 dental infection which pt reports has \"spread into my " "blood\"  · Home meds: chlorhexidine, PRN benzocaine, Tylenol  · No current evidence of sepsis, pt afebrile, VSS; poor dentition on exam  · Continue home medications and monitoring VS  · Recommend continued OP f/u with dentistry/OMFS    Cigarette nicotine dependence without complication  Assessment & Plan  Current every day smoker  Encourage cessation  Offer nicotine replacement    Hyponatremia  Assessment & Plan  · Mild, Na 132 on admission  · Likely 2/2 ongoing alcohol use, possibly beer potomania  · No focal neurologic deficits on exam  · Gentle IVF hydration with NS at 75 mL/hr  · Continue monitoring CMP  · May consider Nephrology consult if sodium decreases to <130             VTE Prophylaxis: Enoxaparin (Lovenox)  / sequential compression device   Code Status: full code      Anticipated Length of Stay:  Patient will be admitted on an Inpatient basis with an anticipated length of stay of  >2  midnights  Justification for Hospital Stay: alcohol withdrawal, alcohol use disorder     For any questions or concerns, please Tiger Text the advanced practitioner in the role of [facility]-DETOX-AP On Call      This patient qualifies for Level IV medically managed intensive inpatient services under the criteria set by the American Society of Addiction Medicine, including dimensions 1-3  The patient is in withdrawal (or is intoxicated with high risk of withdrawal), with severe and unstable medical and/or psychiatric (dual diagnosis) problems, requiring requires 24-hour medical and nursing care and the full resources of a [address] patient to medical detox unit and continue supportive care and stabilization of acute ethanol withdrawal per medical toxicology/detox treatment pathway  Monitor ethanol withdrawal severity via the Severity of Ethanol Withdrawal Scale (SEWS) Q4 hours and then hourly if/when SEWS > 6   Treat withdrawal per pathway and reassess " Q30-60 minutes  Mild SEWS Score 1-6  Administer medications* (IV or PO; PO preferred):  • If initial SEWS score: diazepam 10mg PO/IV x 1 AND phenobarbital 65 mg PO/IV x 1  • If repeat SEWS score 1-6: phenobarbital 65 mg PO/IV q1 hour x 5 doses maximum   Reassessment:   • SEWS q1 hour after each dose until SEWS 0 x 2 hours  • VS q1 hours (until SEWS 0, then q4 hours)  • Notify provider for bedside evaluation if 5-dose maximum is reached, RASS of -3 to -5, or SEWS score escalates to moderate or severe  Moderate SEWS Score 7-12  Administer medications* (IV):  • If initial SEWS score: diazepam 10mg IV x 1 AND phenobarbital 260 mg IV x 1  • If repeat SEWS score 7-12 or score escalated from mild: phenobarbital 130 mg IV q30 minutes x 5 doses maximum   Reassessment:  • SEWS q30 minutes after each dose until SEWS < 7 (then hourly until SEWS 0 x 2 hours)  • VS q30 minutes until SEWS < 7 (then hourly until SEWS 0, then q4 hours)  • Notify provider for bedside evaluation if 5-dose maximum is reached, RASS of -3 to -5, or SEWS score escalates to severe  Severe SEWS Score ? 13  Administer medications* (IV):  • If initial SEWS score: Diazepam 10 mg IV x 1 AND phenobarbital 650 mg IV piggyback x 1 over 15-30 minutes  • If repeat SEWS score ? 13 or score escalated from mild or moderate: phenobarbital 130 mg IV q30 minutes x 5 doses maximum   Reassessment:  • SEWS q30 minutes after each dose until SEWS < 7 (then hourly until SEWS 0 x 2 hours)   • VS q30 minutes until SEWS < 7 (then hourly until SEWS 0, then q4 hours)  • Notify provider for bedside evaluation if 5-dose maximum is reached or RASS of -3 to -5   *Hold medications and notify provider if CNS depression, respirations < 10/min, or RASS of -3 to -5           Medications to be administered adjunctively if more than 2 grams of phenobarbital is needed for stabilization of withdrawal; require attending approval    • Dexmedetomidine infusion 0 1-1mcg/kg/hr IV infusion, titratable to reduced agitation (Goal: RASS -2)  • Ketamine   o Acute agitated delirium: 1-2 mg/kg IV or 4-5 mg/kg IM  o Refractory withdrawal: 0 1-1mg/kg/hr IV infusion, titratable to reduced agitation (Goal: RASS -2)    Further evaluation, screening and treatment:  Evaluate complete metabolic panel, transaminases, INR, and lipase  Assess hepatic ultrasound for any sign of alcoholic liver disease or cirrhosis, and ultimately refer for further hepatic evaluation and care as/if indicated  Additional medications for ethanol associated malnutrition: Thiamine 100 mg IV daily, increase to 500 mg TID for signs/symptoms of Wernicke's Encephalopathy or Wernicke Korsakoff Syndrome   Folic acid 1 mg IV daily   Multivitamin PO daily      Will offer first monthly injection of Naltrexone 380 mg IM, once patient is stabilized, as it has been shown to assist in decreasing cravings for ethanol  Evaluate and treat for coexisting substance use, such as opioids and nicotine  Discuss risk factors for infectious disease, such as history of intravenous drug abuse, and offer hepatitis and HIV screening if indicated  Case management consultation to assist with coordination of subsequent treatment after discharge  Hx and PE limited by: pt largely uncooperative with hx/exam    HPI: Fox Bailey is a 59y o  year old male with a PMH of AUD, HTN, and periodontitis/dental caries who presents with alcohol withdrawal seeking detoxification  Patient initially presented to the [facility] ED requesting alcohol detox and was subsequently transferred to the [facility] medical detox unit for medical management of alcohol withdrawal  Pt reports drinking 12-14 beers (24 oz) daily and his last drink was earlier this evening  Breath alcohol was 0 312 in the ED at 1538  He denies current alcohol withdrawal sx  Patient has no known h/o of withdrawal seizures    He denies any other Co-substance use or prior MAT for alcohol use "disorder  Patient reports interest solely in inpatient detox, declines inpatient rehabilitation after discharge  Shows little motivation for change as he is repeatedly asking for cigarettes and beer despite education  Preferred alcoholic beverage(s): beer  Quantity and frequency of alcohol intake: 12-14 beers (24 oz) daily  Use of any ethanol substitutes (toxic alcohols): no  Date/Time of last alcohol intake: earlier today  Current signs and symptoms of ethanol withdrawal: none - pt reports feeling \"a little shaky\" with no objective tremor on exam  Denies other sx  SEWS score: 0 (3/27/23, 2000)      Ethanol Withdrawal History  Previous ethanol withdrawal? yes  Prior inpatient treatment for ethanol withdrawal? Yes, prior inpatient detox  Prior outpatient treatment for ethanol withdrawal? no  History of seizures with prior ethanol withdrawal? no  Prior treatment with naltrexone (Vivitrol)? no  Current treatment with naltrexone (Vivitrol)? no  Other current treatment for ethanol use disorder? no  Co-existing substance use? no    Review of PDMP: yes, 1 prescription for Percocet 5-325 mg filled 1/15/23 (7 tabs/3 days)    Social History     Substance and Sexual Activity   Alcohol Use Yes    Comment: 12+ beers/day 24 oz beer     Social History     Substance and Sexual Activity   Drug Use No     Social History     Tobacco Use   Smoking Status Every Day   • Packs/day: 0 50   • Types: Cigarettes   Smokeless Tobacco Never       Review of Systems   Reason unable to perform ROS: limited ROS as pt uncooperative  Constitutional: Negative for appetite change, chills, diaphoresis and fever  HENT: Positive for dental problem (chronic pain 2/2 poor dentition/infx, pt reports unchanged) and hearing loss (ongoing, 2/2 dental infection, worse in R ear compared to L ear)  Negative for ear pain      Eyes: Positive for visual disturbance (pt reports vague, chronic vision changes with dental infection which pt states spread into " his bloodstream)  Respiratory: Negative for shortness of breath  Cardiovascular: Negative for chest pain  Gastrointestinal: Negative for abdominal pain, nausea and vomiting  Musculoskeletal: Negative for arthralgias and myalgias  Neurological: Positive for tremors (pt reports shakiness)  Psychiatric/Behavioral: Negative for hallucinations and suicidal ideas  The patient is not nervous/anxious  Historical Information   Past Medical History:   Diagnosis Date   • Hypertension      History reviewed  No pertinent surgical history  History reviewed  No pertinent family history  Social History   Marital Status: Single   Occupation: full-time   Patient Pre-hospital Living Situation: lives with SO  Patient Pre-hospital Level of Mobility: independent  Patient Pre-hospital Diet Restrictions: none    Allergies   Allergen Reactions   • Bee Venom Hives   • Other      bees       Prior to Admission medications    Medication Sig Start Date End Date Taking? Authorizing Provider   benzocaine (Instant Oral Pain Relief Max) 20 % 30 application  by Mucosal route 2 (two) times a day as needed for mucositis 3/5/23  Yes Cliff Nicholas MD   chlorhexidine (PERIDEX) 0 12 % solution Apply 15 mL to the mouth or throat 2 (two) times a day 3/5/23  Yes Cliff Nicholas MD   acetaminophen (TYLENOL) 650 mg CR tablet Take 1 tablet (650 mg total) by mouth every 8 (eight) hours as needed for mild pain  Patient not taking: Reported on 3/27/2023 3/5/23   Cliff Nicholas MD   dexamethasone (DECADRON) 6 mg tablet Take 1 tablet (6 mg total) by mouth daily with breakfast  Patient not taking: Reported on 3/27/2023 2/22/23   Umberto Bergeron PA-C       Current Facility-Administered Medications   Medication Dose Route Frequency   • acetaminophen (TYLENOL) tablet 650 mg  650 mg Oral Q6H PRN   • benzocaine (HURRICAINE) 20 % mucosal gel 30 application  30 application   Mucosal BID PRN   • chlorhexidine (PERIDEX) 0 12 % oral rinse 15 mL  15 mL Mouth/Throat BID   • [START ON 3/28/2023] enoxaparin (LOVENOX) subcutaneous injection 40 mg  40 mg Subcutaneous Daily   • [START ON 5/59/8521] folic acid 1 mg, thiamine (VITAMIN B1) 100 mg in sodium chloride 0 9 % 100 mL IV piggyback   Intravenous Daily   • [START ON 3/28/2023] multivitamin-minerals (CENTRUM) tablet 1 tablet  1 tablet Oral Daily   • [START ON 3/28/2023] nicotine (NICODERM CQ) 21 mg/24 hr TD 24 hr patch 1 patch  1 patch Transdermal Daily   • nicotine polacrilex (NICORETTE) gum 4 mg  4 mg Oral Q2H PRN   • ondansetron (ZOFRAN-ODT) dispersible tablet 4 mg  4 mg Oral Q6H PRN   • sodium chloride 0 9 % infusion  75 mL/hr Intravenous Continuous   • thiamine (VITAMIN B1) 500 mg in sodium chloride 0 9 % 50 mL IVPB  500 mg Intravenous Once       Continuous Infusions:sodium chloride, 75 mL/hr, Last Rate: 75 mL/hr (03/27/23 2047)             Objective     No intake or output data in the 24 hours ending 03/27/23 2113    Invasive Devices:   Peripheral IV 03/27/23 Right Forearm (Active)   Site Assessment WDL; Clean;Dry; Intact 03/27/23 1530   Dressing Type Transparent 03/27/23 1530   Line Status Blood return noted; Flushed 03/27/23 1530   Dressing Status Clean;Dry; Intact 03/27/23 1530       Vitals   Vitals:    03/27/23 2000   BP: 123/87   TempSrc: Temporal   Pulse: 78   Resp: 18   Patient Position - Orthostatic VS: Lying   Temp: (!) 97 2 °F (36 2 °C)       Physical Exam  Vitals and nursing note reviewed  Constitutional:       General: He is not in acute distress  Appearance: He is not diaphoretic  HENT:      Head: Normocephalic and atraumatic  Right Ear: External ear normal       Left Ear: External ear normal       Ears:      Comments: Grossly Decreased hearing b/l, worse in R ear compared to L ear     Nose: Nose normal       Mouth/Throat:      Mouth: Mucous membranes are moist       Comments: No tongue fasciculations present    Eyes:      Extraocular Movements: Extraocular movements intact  Right eye: No nystagmus  Left eye: No nystagmus  Conjunctiva/sclera: Conjunctivae normal       Pupils: Pupils are equal, round, and reactive to light  Cardiovascular:      Rate and Rhythm: Normal rate and regular rhythm  Pulses:           Dorsalis pedis pulses are 1+ on the right side and 1+ on the left side  Posterior tibial pulses are 2+ on the right side and 2+ on the left side  Heart sounds: Normal heart sounds  No murmur heard  No friction rub  No gallop  Pulmonary:      Effort: Pulmonary effort is normal  No respiratory distress  Breath sounds: Decreased breath sounds (b/l) present  No wheezing, rhonchi or rales  Abdominal:      General: Bowel sounds are normal  There is no distension  Palpations: Abdomen is soft  Tenderness: There is abdominal tenderness (LUQ, suprapubic)  There is no guarding or rebound  Musculoskeletal:         General: No swelling  Cervical back: Neck supple  Right lower leg: No edema  Left lower leg: No edema  Skin:     General: Skin is warm and dry  Findings: No rash  Neurological:      General: No focal deficit present  Mental Status: He is alert and oriented to person, place, and time  GCS: GCS eye subscore is 4  GCS verbal subscore is 5  GCS motor subscore is 6  Cranial Nerves: No dysarthria or facial asymmetry  Motor: No tremor  Coordination: Coordination normal       Comments: No BUE intention tremor present  Psychiatric:         Attention and Perception: Attention normal  He does not perceive auditory or visual hallucinations  Mood and Affect: Mood is not anxious  Speech: Speech normal          Behavior: Behavior is uncooperative  Thought Content: Thought content does not include homicidal or suicidal ideation  Thought content does not include homicidal or suicidal plan               Data:    EKG, Pathology, and Other Studies: I have personally reviewed pertinent reports  EKG: NSR at 82 bpm, minimal voltage criteria for LVH, no acute ST segment/T wave changes, normal axis and intervals, QTc 450       Lab Results:  CBC ETOH     Lab Results   Component Value Date    WBC 5 10 03/27/2023    RBC 4 32 03/27/2023    HGB 14 3 03/27/2023    HCT 40 5 03/27/2023    MCV 94 03/27/2023    MCH 33 1 03/27/2023    MCHC 35 3 03/27/2023    RDW 13 1 03/27/2023     03/27/2023    MPV 8 9 03/27/2023      No results found for: LACTICACID   CMP UA         Component Value Date/Time    K 3 6 03/27/2023 1529    CL 96 03/27/2023 1529    CO2 22 03/27/2023 1529    CO2 30 12/11/2016 1740    BUN 6 03/27/2023 1529    CREATININE 0 79 03/27/2023 1529         Component Value Date/Time    CALCIUM 9 0 03/27/2023 1529    ALKPHOS 72 01/05/2017 1708    AST 23 01/05/2017 1708    ALT 27 01/05/2017 1708      No results found for: CLARITYU, COLORU, SPECGRAV, PHUR, GLUCOSEU, KETONESU, BLOODU, PROTEIN UA, NITRITE, BILIRUBINUR, UROBILINOGEN, LEUKOCYTESUR, WBCUA, RBCUA, HYALINE, BACTERIA, EPIS     Liver Function Test: ASA     Lab Results   Component Value Date    TBILI 0 30 01/05/2017    ALKPHOS 72 01/05/2017    AST 23 01/05/2017    ALT 27 01/05/2017    TP 7 9 01/05/2017    ALB 4 0 01/05/2017      No results found for: SALICYLATE   Troponin APAP     No results found for: TROPONINI   No results found for: ACTMNPHEN   VBG HCG     No results found for: PHVEN, RRN4XJS, PO2VEN, GKI4JXA, BEVEN, Q2INLYRDV, W3PPYHJ   No results found for: HCGQUANT   ABG Urine Drug Screen     No results found for: PHART, VVZ9QQN, PO2ART, SIF1URY, BEART, [id number], O2HGB, SOURC, JOSE, VTAC, ACRATE, INSPIREDAIR, PEEP   Lab Results   Component Value Date    AMPMETHUR Negative 01/05/2017    BARBTUR Negative 01/05/2017    BDZUR Negative 01/05/2017    COCAINEUR Negative 01/05/2017    METHADONEUR Negative 01/05/2017    OPIATEUR Negative 01/05/2017    PCPUR Negative 01/05/2017    THCUR Negative 01/05/2017      Lactate INR     No results found for: LACTICACID   No results found for: INR   PTT Protime     No results found for: PTT     No results found for: PROTIME           Imaging Studies: I have personally reviewed pertinent reports  Counseling / Coordination of Care  Total floor / unit time spent today 50 minutes  Greater than 50% of total time was spent with the patient and / or family counseling and / or coordination of care  Minutes of critical care time 28  -Critical care time was exclusive of separately billable procedures and teaching time    -Critical care was necessary to treat or prevent imminent or life-threatening deterioration of the following condition: CNS failure/compromise, toxidrome (ethanol withdrawal),  toxidrome, withdrawal and CNS failure/compromise  -Critical care time was spent personally by me on the following activities as well as the above as per the course and rest of chart: obtaining history from patient/surrogate, development of a treatment plan, discussions with referring provider(s), evaluation of patient's response to the treatment, examination of the patient, performing treatments and interventions, re-evaluation of the patient's condition, review of old charts, ordering/interpreting laboratory studies, ordering/interpreting of radiographic studies  ** Please Note: This note has been constructed using a voice recognition system   **

## 2023-03-28 LAB
ALBUMIN SERPL BCP-MCNC: 4 G/DL (ref 3.5–5)
ALP SERPL-CCNC: 67 U/L (ref 34–104)
ALT SERPL W P-5'-P-CCNC: 73 U/L (ref 7–52)
ANION GAP SERPL CALCULATED.3IONS-SCNC: 11 MMOL/L (ref 4–13)
AST SERPL W P-5'-P-CCNC: 79 U/L (ref 13–39)
BILIRUB SERPL-MCNC: 0.36 MG/DL (ref 0.2–1)
BUN SERPL-MCNC: 5 MG/DL (ref 5–25)
CALCIUM SERPL-MCNC: 8.5 MG/DL (ref 8.4–10.2)
CHLORIDE SERPL-SCNC: 105 MMOL/L (ref 96–108)
CO2 SERPL-SCNC: 25 MMOL/L (ref 21–32)
CREAT SERPL-MCNC: 0.8 MG/DL (ref 0.6–1.3)
ERYTHROCYTE [DISTWIDTH] IN BLOOD BY AUTOMATED COUNT: 13.3 % (ref 11.6–15.1)
GFR SERPL CREATININE-BSD FRML MDRD: 97 ML/MIN/1.73SQ M
GLUCOSE SERPL-MCNC: 78 MG/DL (ref 65–140)
HCT VFR BLD AUTO: 43.6 % (ref 36.5–49.3)
HGB BLD-MCNC: 14.6 G/DL (ref 12–17)
MAGNESIUM SERPL-MCNC: 2 MG/DL (ref 1.9–2.7)
MCH RBC QN AUTO: 32.4 PG (ref 26.8–34.3)
MCHC RBC AUTO-ENTMCNC: 33.5 G/DL (ref 31.4–37.4)
MCV RBC AUTO: 97 FL (ref 82–98)
PLATELET # BLD AUTO: 183 THOUSANDS/UL (ref 149–390)
PMV BLD AUTO: 9.1 FL (ref 8.9–12.7)
POTASSIUM SERPL-SCNC: 4.1 MMOL/L (ref 3.5–5.3)
PROT SERPL-MCNC: 6.8 G/DL (ref 6.4–8.4)
RBC # BLD AUTO: 4.5 MILLION/UL (ref 3.88–5.62)
SODIUM SERPL-SCNC: 141 MMOL/L (ref 135–147)
WBC # BLD AUTO: 4.71 THOUSAND/UL (ref 4.31–10.16)

## 2023-03-28 RX ORDER — PHENOBARBITAL 64.8 MG/1
64.8 TABLET ORAL ONCE
Status: COMPLETED | OUTPATIENT
Start: 2023-03-28 | End: 2023-03-28

## 2023-03-28 RX ORDER — PHENOBARBITAL SODIUM 130 MG/ML
260 INJECTION INTRAMUSCULAR ONCE
Status: COMPLETED | OUTPATIENT
Start: 2023-03-28 | End: 2023-03-28

## 2023-03-28 RX ORDER — IBUPROFEN 600 MG/1
600 TABLET ORAL EVERY 6 HOURS PRN
Status: DISCONTINUED | OUTPATIENT
Start: 2023-03-28 | End: 2023-03-30 | Stop reason: HOSPADM

## 2023-03-28 RX ADMIN — FOLIC ACID: 5 INJECTION, SOLUTION INTRAMUSCULAR; INTRAVENOUS; SUBCUTANEOUS at 09:25

## 2023-03-28 RX ADMIN — NICOTINE POLACRILEX 4 MG: 4 GUM, CHEWING BUCCAL at 06:07

## 2023-03-28 RX ADMIN — ENOXAPARIN SODIUM 40 MG: 100 INJECTION SUBCUTANEOUS at 09:24

## 2023-03-28 RX ADMIN — SODIUM CHLORIDE 75 ML/HR: 0.9 INJECTION, SOLUTION INTRAVENOUS at 06:12

## 2023-03-28 RX ADMIN — Medication 650 MG: at 16:56

## 2023-03-28 RX ADMIN — IBUPROFEN 600 MG: 600 TABLET, FILM COATED ORAL at 21:56

## 2023-03-28 RX ADMIN — SODIUM CHLORIDE 75 ML/HR: 0.9 INJECTION, SOLUTION INTRAVENOUS at 05:02

## 2023-03-28 RX ADMIN — PHENOBARBITAL SODIUM 260 MG: 130 INJECTION INTRAMUSCULAR at 09:25

## 2023-03-28 RX ADMIN — PHENOBARBITAL SODIUM 260 MG: 130 INJECTION INTRAMUSCULAR at 12:13

## 2023-03-28 RX ADMIN — MULTIPLE VITAMINS W/ MINERALS TAB 1 TABLET: TAB ORAL at 09:43

## 2023-03-28 RX ADMIN — ACETAMINOPHEN 650 MG: 325 TABLET ORAL at 17:05

## 2023-03-28 RX ADMIN — PHENOBARBITAL 64.8 MG: 64.8 TABLET ORAL at 21:56

## 2023-03-28 RX ADMIN — SODIUM CHLORIDE 75 ML/HR: 0.9 INJECTION, SOLUTION INTRAVENOUS at 20:50

## 2023-03-28 RX ADMIN — NICOTINE 1 PATCH: 21 PATCH, EXTENDED RELEASE TRANSDERMAL at 09:26

## 2023-03-28 NOTE — UTILIZATION REVIEW
Initial Clinical Review    Pt initially presented to [address] [place] [address] ED  Pt was transferred by EMS to [address] [place] [address] for its Level IV medically managed intensive inpatient detox unit, not available at [facility]  Admission: Date/Time/Statement:   Admission Orders (From admission, onward)     Ordered        03/27/23 2021  Inpatient Admission  Once                      Orders Placed This Encounter   Procedures   • Inpatient Admission     Standing Status:   Standing     Number of Occurrences:   1     Order Specific Question:   Level of Care     Answer:   Med Surg [16]     Order Specific Question:   Estimated length of stay     Answer:   More than 2 Midnights     Order Specific Question:   Certification     Answer:   I certify that inpatient services are medically necessary for this patient for a duration of greater than two midnights  See H&P and MD Progress Notes for additional information about the patient's course of treatment  Initial Presentation: 59 y o  male who presented to medical detox  Inpatient admission for evaluation and treatment of alcohol withdrawal syndrome  Presented w/ need for detox from alcohol  Breath alcohol: 0 312  Reports 12-14 24 oz beers daily, last drink on 3/27  Has prior rehab treatment for withdrawal  Reports no hx of withdrawal seizures  On exam, decreased breath sounds, LUQ and suprapubic tenderness, uncooperative, appears acutely intoxicated  Plan: SEWS monitoring w/ phenobarbital management, IV thiamine/folic acid supplement, IVF, telemetry, continuous pulse ox, continue PTA meds not on any anti-HTN, trend labs, replete electrolytes as needed  Date: 03/28/23       Day 2: Pt reports he believed he was going to a rehab facility not detox  On exam, unremarkable   SEWS 11  Plan: continue SEWS monitoring w/ phenobarbital management, IV thiamine/folic acid supplement, telemetry, continuous pulse ox, continue PTA meds, trend labs, replete electrolytes as needed  ED Triage Vitals   Temperature Pulse Respirations Blood Pressure SpO2   03/27/23 2000 03/27/23 2000 03/27/23 2000 03/27/23 2000 03/27/23 2000   (!) 97 2 °F (36 2 °C) 78 18 123/87 93 %      Temp Source Heart Rate Source Patient Position - Orthostatic VS BP Location FiO2 (%)   03/27/23 2000 03/27/23 2000 03/27/23 2000 03/27/23 2000 --   Temporal Monitor Lying Right arm       Pain Score       03/27/23 1929       7          Wt Readings from Last 1 Encounters:   03/28/23 70 3 kg (155 lb)     Vital Signs:   Date/Time Temp Pulse Resp BP MAP (mmHg) SpO2 O2 Device   03/28/23 1124 98 7 °F (37 1 °C) 101 16 195/109 Abnormal  137 96 % None (Room air)   03/28/23 0745 98 2 °F (36 8 °C) 84 16 155/97 -- 95 % None (Room air)   03/28/23 0608 98 7 °F (37 1 °C) 77 18 143/90 -- -- --   03/27/23 2000 97 2 °F (36 2 °C) 78 18 123/87 -- 93 % None (Room air)       Severity of Ethanol Withdrawal Scale (SEWS):    03/28/23 1141 03/28/23 1100 03/28/23 0807     Severity of Ethanol Withdrawal Scale (SEWS)   ANXIETY: Do you feel that something bad is about to happen to you right now? 3  -KELLI --  -KELLI 3  -KELLI     NAUSEA and DRY HEAVES or VOMITING? 0  -KELLI --  -KELLI 0  -KELLI     SWEATING: (includes moist palms, sweating now)? Score 0 or 2 0  -KELLI --  -KELLI 0  -KELLI     TREMOR: with arms extended eyes closed? 2  -KELLI --  -KELLI 2  -KELLI     AGITATION: Fidgety, restless, pacing?  3  -KELLI --  -KELLI 3  -KELLI     DISORIENTATION: 0  -KELLI --  -KELLI 0  -KELLI     HALLUCINATIONS: 0  -KELLI --  -KELLI 0  -KELLI     VITAL SIGNS: ANY (Pulse >245, Diastolic BP >48, Temp >50 9) 3  -KELLI --  -KELLI 3  -KELLI     SEWS Total Score 11  -KELLI --  -KELLI 11  -KELLI           Pertinent Labs/Diagnostic Test Results:   Results from last 7 days   Lab Units 03/28/23  0609 03/27/23  1529   WBC Thousand/uL 4 71 5 10   HEMOGLOBIN g/dL 14 6 14 3   HEMATOCRIT % 43 6 40 5   PLATELETS Thousands/uL 183 182   NEUTROS ABS Thousands/µL  --  2 33         Results from last 7 days   Lab Units 03/28/23  0609 03/27/23  1529 SODIUM mmol/L 141 132*   POTASSIUM mmol/L 4 1 3 6   CHLORIDE mmol/L 105 96   CO2 mmol/L 25 22   ANION GAP mmol/L 11 14*   BUN mg/dL 5 6   CREATININE mg/dL 0 80 0 79   EGFR ml/min/1 73sq m 97 98   CALCIUM mg/dL 8 5 9 0   MAGNESIUM mg/dL 2 0 1 9     Results from last 7 days   Lab Units 03/28/23  0609   AST U/L 79*   ALT U/L 73*   ALK PHOS U/L 67   TOTAL PROTEIN g/dL 6 8   ALBUMIN g/dL 4 0   TOTAL BILIRUBIN mg/dL 0 36         Results from last 7 days   Lab Units 03/28/23  0609 03/27/23  1529   GLUCOSE RANDOM mg/dL 78 108         Past Medical History:   Diagnosis Date   • Hypertension      Present on Admission:  • Alcohol use disorder, severe, dependence (HCC)  • Cigarette nicotine dependence without complication  • Periodontitis  • Hyponatremia      Admitting Diagnosis: ETOH abuse  Age/Sex: 59 y o  male  Admission Orders:  Regular Diet  SCDs  Fall & Seizure Precautions  SEWS monitoring  Telemetry & Continuous Pulse Ox  Scheduled Medications:  chlorhexidine, 15 mL, Mouth/Throat, BID  enoxaparin, 40 mg, Subcutaneous, Daily  folic acid 1 mg, thiamine 100 mg in 0 9% sodium chloride 100 mL IVPB, , Intravenous, Daily  multivitamin-minerals, 1 tablet, Oral, Daily  nicotine, 1 patch, Transdermal, Daily    Continuous IV Infusions:  sodium chloride, 75 mL/hr, Intravenous, Continuous    PRN Meds:  acetaminophen, 650 mg, Oral, Q6H PRN  benzocaine, 30 application  , Mucosal, BID PRN  nicotine polacrilex, 4 mg, Oral, Q2H PRN; 3/28 x1  ondansetron, 4 mg, Oral, Q6H PRN  phenobarbital, 260 mg, Intravenous; 3/28 x2        IP CONSULT TO CASE MANAGEMENT    Network Utilization Review Department  ATTENTION: Please call with any questions or concerns to [phone] and carefully listen to the prompts so that you are directed to the right person   All voicemails are confidential   Page Cower all requests for admission clinical reviews, approved or denied determinations and any other requests to dedicated fax number below belonging to the [place] where the patient is receiving treatment   List of dedicated fax numbers for the Facilities:  [address] [place] [address] DENIALS (Administrative/Medical Necessity) [phone]   [address]  (Maternity/NICU/Pediatrics) [phone]   [address] [phone]   [address] [phone]   [address] [address] [place] [address] [phone]   [facility] [address] 8[address] [facility] [phone]

## 2023-03-28 NOTE — ASSESSMENT & PLAN NOTE
Patient with h/o chronic alcohol use  Currently consumes 8-14 beers (24 oz) daily  Last drink evening of 3/27/23  Breath alcohol level 0 312 (3/27/2023 1538)  Manage withdrawal as above   Continue daily thiamine and folic acid   Naltrexone started on discharge   Consult case management for assistance with disposition planning- resources provided on d/c

## 2023-03-28 NOTE — DISCHARGE INSTR - OTHER ORDERS
Drug and Alcohol Resources in [facility]    If you have health insurance, including medical assistance, there should be a phone number on your insurance card that you can call to find out how to access services  The card may say, “For [address] or “For Drug and Alcohol Services” or “For Substance Abuse Services” call the number provided  Or contact [phone]    [facility] ([facility])  [facility] provides care management for adults with Opioid Use Disorder  [facility] has a team of care managers who will help individuals get into treatment, coordinate behavioral and physical health care, and provide recovery support and guidance  Care managers will also provide information about Medication-Assisted Treatment  Contact [phone]    [facility] Drug and Alcohol Administration [phone] For additional information, contact the Department of Human Services Information and Referral Unit at [phone] between the hours of 8:30 a m  and 4:30 p m , Monday through Friday  Recovery Centers  These centers offer a safe, sober environment to those in recovery  A variety of programming including 12-Step Meetings, Dorean , Life Skills Workshops, etc  is offered at each location  Recovery Centers  These centers offer a safe, sober environment to those in recovery  A variety of programming including 12-Step Meetings, Dorean , Life Skills Workshops, etc  is offered at each location  [address]  [phone]  [email or web address]  cleanslatebangor  org Change on Main  Lucio Ye, [address] [facility]  [phone]  klbdca-ua-wdhn  Jamari Crowell [address], [address] [facility]  [phone]   [address] [place] [address]  [phone]  www  [facility], [address] [place] [address]  [phone]  [facility]  MILI AGUERO [facility] is [phone] [facility] [place]  Open [place], Paco Shi, Thursday from 1pm-5pm and Friday, Saturday from 11am-3pm    [employer]  Confidential free help, from public health agencies, to find substance use treatment and information  [phone]    Link for Zoom Codes for Virtual 12 step Meetings Lux cano  aspx    [facility]  If you feel you have a problem with alcohol, or if you simply want to know more about AA, call our 24-hour hotline at: [address]: [address] Meetings can be found at http://www sanchez-wood biz/  MARYANN Meeting list [email or web address]

## 2023-03-28 NOTE — ASSESSMENT & PLAN NOTE
"· Pt with a h/o poor dentition, dental caries, and periodontitis - states he is scheduled to have his remaining upper teeth removed   · Endorses ongoing issues with dental pain, R-sided hearing loss and \"vision problems\" 2/2 dental infection which pt reports has \"spread into my blood\"  · Home meds: chlorhexidine, PRN benzocaine, Tylenol  · No current evidence of sepsis, pt afebrile, VSS; poor dentition on exam  · Continue home medications and monitoring VS  · Recommend continued OP f/u with dentistry/OMFS  "

## 2023-03-28 NOTE — NURSING NOTE
"Pt states that his last drink of beer was 15 mins ago  States he \"drank in here  It was in my stuff  \"  Of note, pt was never left with his personal belongings in room  In fact, at present, pt's belongings are sitting behind the   RN made decision not to leave belongings in room as pt mentioned several times that he would figure out how to get to his cigarettes  Pt is very condescending during questioning  After every question asked by RN or Terri Sher PA-C pt states 'come on, man\" or \"you should know this already  \"  Pt reminded frequently that these are standard admission questions asked of every pt  Pt repeatedly tells FRANCESCO that she's \"just a kid\" and repeatedly questions credentials/experience  Pt complains about every aspect of care  Pt annoyed that he cannot go outside to have cigarettes, cannot have visitors, has to change into hospital clothing  Pt annoyed that we do not have activity room, cannot \"hang out with people  \"  Pt frequently asks RN if she can just take him outside to have just one cigarette    "

## 2023-03-28 NOTE — ASSESSMENT & PLAN NOTE
· H/o chronic daily alcohol consumption, denies h/o withdrawal seizures  · SEWS protocol with symptom-triggered phenobarbital followed for medically-assisted alcohol withdrawal  · Received 1884 8 mg total phenobarbital, last dose administered 3/29/2023 0944  · Patient reports he still feels shaky this morning, states he feels Librium is better for withdrawal management   · Appears stable overall- VSS, subtle head jason noted on exam however no intention tremors or tongue fasciculations noted; ROS otherwise negative   · Educated patient regarding role of phenobarbital in withdrawal management and that his acute withdrawal symptoms have been stabilized   · Acute withdrawal resolved

## 2023-03-28 NOTE — NURSING NOTE
"Pt provided with food upon request   Pt dissatisfied with food available on unit  Pt states \"I might just have to leave to walk and get something then come back  \"  Pt reminded for the fourth time that no outside food or drink is allowed on the unit  Pt educated that if he leaves the hospital he would have to go back to an ED to seek admission and that readmission is not guaranteed    "

## 2023-03-28 NOTE — ASSESSMENT & PLAN NOTE
· Mild- sodium 131 this AM, was 132 on admission; overall stable  · Likely 2/2 ongoing alcohol use, possibly beer potomania  · No focal neurologic deficits on exam  · Encourage adequate PO intake and alcohol cessation  · Routine outpatient monitoring and outpatient f/u PCP

## 2023-03-28 NOTE — PROGRESS NOTES
03/28/23 1130   Referral Data   Referral Source Patient   Referral Name [address] [place] [address] ED   Referral Reason Drug/Alcohol [place] [address]   Readmission Root Cause   30 Day Readmission No   Patient Information   Mental Status Alert   Primary Caregiver Self   Support System Immediate family; Other (Comment)  (girlfriend)   Rastafarian/Cultural Requests n/a   Legal Information   Legal Issues pt denies   Activities of Daily Living Prior to Admission   Functional Status Independent   Assistive Device No device needed   Living Arrangement Lives with someone; Apartment   Ambulation Independent   Access to Firearms   Access to Firearms Yes   Describe Access to Weapons pt owns a pistol and reports the weapon is secured   Is there someone that can secure the firearms? No   Income Information   Income Source Employed   [employer] [facility] [place]: License Suspended/Revoked     Pt is a 59year old male who was admitted to detox for alcohol withdrawal  Pt had presented to the [address] [place] [address] ED requesting detox  Pt's name, date of birth, home address, and telephone number were verified  Pt was informed of case management role and the purpose of the completion of intake with case management  Pt presented as cooperative during intake  Pt reports daily alcohol use of 12-14 25 oz beers daily for the last 3 years  Pt reports first use at age 4 and last use on 3/27/2022 pf 8- 25 oz beers and 5 shots of [address] liquor  Pt reports daily nicotine use of  5-1 PPD cigarette smoking  Cm offered referral to smoking/nicotine cessation program  Pt states no interest in stopping cigarette smoking  Pt reports a period of abstinence of 1 year starting about 4 years ago after an inpatient [facility] rehab  Pt reports at least 3 previous IP [facility] tx, last 4 years ago, but denied any hx of outpatient [facility] treatment  Pt reports attending 1- 12 step meeting    Pt reports current withdrawal symptoms of tremors  Pt denied hx of withdrawal seizures, hallucinations, or Dt's  Pt denied black out alcohol use  Pt denied a family hx of alcohol or Substance abuse  AUDIT: no score  PAWSS: 2  Breath alcohol in ED: 0 312  UDS: not completed    Pt denied any mental health diagnosis or treatment  Pt denied any SI or HI, denied any AH/VH hallucinations, denied any hx of abuse or trauma, and denied any recent losses  Pt reports he has a firearm and states this firearm is secured  Pt stated he did not have anyone to contact to ensure this firearm is secured  Pt denied a family hx of mental health needs  Pt has current medical conditions of hypertension, peridontitis, and hearing loss  Pt has current PCP of [address], TRAVIS signed, but states he had only attended an appointment for a referral to dental  Pt reports he has an upcoming dental appointment at this location on 4/18/2023  Pt has current medical insurance of [address] for [employer], TRAVIS signed, and preferred pharmacy of [address]  Pt denied any legal issues  Pt reports he is employed full time  Pt reports graduating high school  Pt reports his license is suspended but he will drive his [employer] vehicle as needed  Pt denied any  service and denied any religous or cultural request     Pt reports he resides with his SO and his [de-identified] adult son  Pt stated he can return to this home and will walk home upon discharge  Pt denied any housing or food insecurities  Pt stated he did not want to sign any TRAVIS's for supportive contacts  Pt stated he was not interested in completing a relapse prevention plan and intended an stopping his alcohol use for a period of time but not maintaining this abstinence  Cm discussed with pt the importance of support to maintain some abstinence  Pt and cm discussed MAT treatment  Pt stated he was receptive to considering naltrexone  Pt stated he did not want referral to SHARE program to continue this natrexone    Pt's clinical presentation indicates pt struggles with understanding his relapse potential and severity of his use  Pt informed pt that he felt his friends drinking was worse than his because he normally drank beer and they drank vodka  Pt's barriers to treatment appear to be pt's lack of community support and difficulty with accepting his alcohol use problem  Pt's goals for detox are to successfully complete medical withdrawal and to develop a discharge plan that includes addiction education and relapse prevention  Pt presents in the pre-contemplation stage of change

## 2023-03-28 NOTE — ASSESSMENT & PLAN NOTE
· Mild, Na 132 on admission  · Likely 2/2 ongoing alcohol use, possibly beer potomania  · No focal neurologic deficits on exam  · Gentle IVF hydration with NS at 75 mL/hr  · Continue monitoring CMP  · May consider Nephrology consult if sodium decreases to <130

## 2023-03-28 NOTE — NURSING NOTE
1140 Provider aware of vs; orders rec'd for repeat pheno 260 ivp; medicated with same, see mar; continuing to monitor; call bell in reach

## 2023-03-28 NOTE — PROGRESS NOTES
"PROGRESS NOTE  DEPARTMENT OF MEDICAL TOXICOLOGY  LEVEL 4 MEDICAL DETOX UNIT  Mandi Hoffman 59 y o  male MRN: [id number]  Unit/Bed#: 5T DETOX 506-01 Encounter: [id number]      Reason for Admission/Principal Problem: Alcohol Withdrawal  Rounding Provider: Olivier Campos MD  Attending Provider: Jovani Drummond MD   3/27/2023  7:27 PM           * Alcohol withdrawal syndrome with complication [facility])  Assessment & Plan  · H/o chronic daily alcohol consumption, denies h/o withdrawal seizures  · Follow SEWS protocol for medically-assisted alcohol withdrawal  · Current symptoms: endorses feeling slightly shaky, SEWS- 5  · Phenobarbitol 260 mg given 0925  · Continue monitoring under protocol and administer phenobarbital as indicated  · Telemetry and continuous pulse-ox monitoring     Alcohol use disorder, severe, dependence ([facility] [address] )  Assessment & Plan  Patient with h/o chronic alcohol use  Currently consumes 8-14 beers (24 oz) daily  Last drink evening of 3/27/23  Breath alcohol level 0 312 (3/27/2023 1538)  Manage withdrawal as above   Initiate daily thiamine and folic acid   Consult case management for assistance with disposition planning, thought he was going to a rehab facility      Periodontitis  Assessment & Plan  · Pt with a h/o poor dentition, dental caries, and periodontitis - states he is scheduled to have his remaining upper teeth removed 4/18/23  · Endorses ongoing issues with dental pain, R-sided hearing loss and \"vision problems\" 2/2 dental infection which pt reports has \"spread into my blood\"  · Home meds: chlorhexidine, PRN benzocaine, Tylenol  · No current evidence of sepsis, pt afebrile, VSS; poor dentition on exam  · Continue home medications and monitoring VS  · Recommend continued OP f/u with dentistry/OMFS    Hyponatremia  Assessment & Plan  · Mild, Na 132 on admission, currently 141, resolved  · Likely 2/2 ongoing alcohol use, possibly beer potomania  · No focal neurologic deficits on exam  · Gentle IVF " hydration with NS at 75 mL/hr  · Continue monitoring CMP  · May consider Nephrology consult if sodium decreases to <130    Cigarette nicotine dependence without complication  Assessment & Plan  Currently endorsing cravings, did not like the gum, Pt given patch  Current every day smoker  Encourage cessation  Offer nicotine replacement    Hypertension  Assessment & Plan  · H/o HTN per chart review  · No current home meds  ·  Blood Pressure: 143/90   · Continue to monitor vitals, BP  · Consider initiation of anti-hypertensive if BP elevated once acute withdrawal resolved           VTE Pharmacologic Prophylaxis:   Pharmacologic: Enoxaparin (Lovenox)  Mechanical VTE Prophylaxis in Place: no    Code Status: Level 1 - Full Code    Patient Centered Rounds: I have performed bedside rounds with nursing staff today  Time Spent for Care: 15 minutes  More than 50% of total time spent on counseling and coordination of care as described above  Current Length of Stay: 1 day(s)    Current Patient Status: Inpatient     Certification Statement: The patient will continue to require additional inpatient hospital stay due to alcohol withdrawal and medical management  Discharge Plan: Inpatient    Subjective:   Pt 59 yoM presenting [facility] for alcohol intoxication and concern for withdrawal   Pt endorses drinking 8-14 24 oz beers a day and denies every having seizures after not drinking  Pt was still intoxicated on arrival to the detox unit and states he thought he was going to a rehab facility not detox  Pt also endorses oral pain for which he is scheduled to have his top teeth removed on 4/18/23  Currently pt only endorses feeling slightly shaky and craving a cigarette  He did not like the nicorette gum he was provided  Currently has not craving for alcohol  Denies fever, chills, abd pain, headache, N/V, diarrhea      Objective:     Clinical Opiate Withdrawal Scale  Pulse: 84    SEWS Total Score: 11 (3/28/2023  8:07 AM)        Last 24 Hours Medication List:   Current Facility-Administered Medications   Medication Dose Route Frequency Provider Last Rate   • acetaminophen  650 mg Oral Q6H PRN Marilynn Chu PA-C     • benzocaine  30 application  Mucosal BID PRN Marilynn Chu PA-C     • chlorhexidine  15 mL Mouth/Throat BID Marilynn Chu PA-C     • enoxaparin  40 mg Subcutaneous Daily Phuong Anthony PA-C     • folic acid 1 mg, thiamine 100 mg in 0 9% sodium chloride 100 mL IVPB   Intravenous Daily Phuong FRANCESCO Anthony     • multivitamin-minerals  1 tablet Oral Daily Phuong FRANCESCO Anthony     • nicotine  1 patch Transdermal Daily Phuong Anthony PA-C     • nicotine polacrilex  4 mg Oral Q2H PRN Marilynn Chu PA-C     • ondansetron  4 mg Oral Q6H PRN Marilynn Chu PA-C     • sodium chloride  75 mL/hr Intravenous Continuous Phuong Anthony PA-C 75 mL/hr (23 0612)         Vitals:   Temp (24hrs), Av 9 °F (36 6 °C), Min:97 2 °F (36 2 °C), Max:98 7 °F (37 1 °C)    Temp:  [97 2 °F (36 2 °C)-98 7 °F (37 1 °C)] 98 2 °F (36 8 °C)  HR:  [77-91] 84  Resp:  [16-20] 16  BP: (102-155)/(61-97) 155/97  SpO2:  [93 %-98 %] 95 %  Body mass index is 19 37 kg/m²  Input and Output Summary (last 24 hours):No intake or output data in the 24 hours ending 23 1000    Physical Exam:   Physical Exam  Vitals and nursing note reviewed  Constitutional:       General: He is not in acute distress  Appearance: He is well-developed  HENT:      Head: Normocephalic and atraumatic  Nose: Nose normal  No congestion  Mouth/Throat:      Pharynx: Oropharynx is clear  Eyes:      Extraocular Movements: Extraocular movements intact  Conjunctiva/sclera: Conjunctivae normal       Pupils: Pupils are equal, round, and reactive to light  Cardiovascular:      Rate and Rhythm: Normal rate and regular rhythm  Pulses: Normal pulses        Heart sounds: Normal heart sounds  No murmur heard  Pulmonary:      Effort: Pulmonary effort is normal  No respiratory distress  Breath sounds: Normal breath sounds  Chest:      Chest wall: No tenderness  Abdominal:      General: Abdomen is flat  Bowel sounds are normal  There is no distension  Palpations: Abdomen is soft  Tenderness: There is no abdominal tenderness  There is no right CVA tenderness or left CVA tenderness  Musculoskeletal:         General: No tenderness, deformity or signs of injury  Normal range of motion  Cervical back: Normal range of motion and neck supple  No rigidity or tenderness  Skin:     General: Skin is warm and dry  Findings: No bruising or rash  Neurological:      General: No focal deficit present  Mental Status: He is alert and oriented to person, place, and time  Additional Data:     Labs:   Results from last 7 days   Lab Units 03/28/23  0609 03/27/23  1529   WBC Thousand/uL 4 71 5 10   HEMOGLOBIN g/dL 14 6 14 3   HEMATOCRIT % 43 6 40 5   PLATELETS Thousands/uL 183 182   NEUTROS PCT %  --  45   LYMPHS PCT %  --  40   MONOS PCT %  --  12   EOS PCT %  --  1      Results from last 7 days   Lab Units 03/28/23  0609   SODIUM mmol/L 141   POTASSIUM mmol/L 4 1   CHLORIDE mmol/L 105   CO2 mmol/L 25   BUN mg/dL 5   CREATININE mg/dL 0 80   ANION GAP mmol/L 11   CALCIUM mg/dL 8 5   ALBUMIN g/dL 4 0   TOTAL BILIRUBIN mg/dL 0 36   ALK PHOS U/L 67   ALT U/L 73*   AST U/L 79*   GLUCOSE RANDOM mg/dL 78                              * I Have Reviewed All Lab Data Listed Above  * Additional Pertinent Lab Tests Reviewed: [address] Admission Reviewed      Imaging Studies: I have personally reviewed pertinent reports  Recent Cultures (last 7 days): Today, Patient Was Seen By: Sally Coello MD    ** Please Note: Dictation voice to text software may have been used in the creation of this document   **

## 2023-03-28 NOTE — ASSESSMENT & PLAN NOTE
· Pt with a h/o poor dentition, dental caries, and periodontitis - states he is scheduled to have his remaining upper teeth removed 4/18/23  · Home meds: chlorhexidine, PRN benzocaine, Tylenol  · No current evidence of sepsis, pt afebrile, VSS; poor dentition on exam  · Continue home medications  · Recommend continued OP f/u with dentistry/OMFS

## 2023-03-28 NOTE — ASSESSMENT & PLAN NOTE
· H/o HTN per chart review  · No current home meds  · Most recent Blood Pressure: 143/89 ; preceding 24 hr range 136//107  · Will initiate amlodipine 5 mg daily  · Recommend outpatient f/u PCP

## 2023-03-29 LAB
ALBUMIN SERPL BCP-MCNC: 4 G/DL (ref 3.5–5)
ALP SERPL-CCNC: 70 U/L (ref 34–104)
ALT SERPL W P-5'-P-CCNC: 53 U/L (ref 7–52)
ANION GAP SERPL CALCULATED.3IONS-SCNC: 11 MMOL/L (ref 4–13)
AST SERPL W P-5'-P-CCNC: 44 U/L (ref 13–39)
BILIRUB SERPL-MCNC: 0.85 MG/DL (ref 0.2–1)
BUN SERPL-MCNC: 6 MG/DL (ref 5–25)
CALCIUM SERPL-MCNC: 9 MG/DL (ref 8.4–10.2)
CHLORIDE SERPL-SCNC: 94 MMOL/L (ref 96–108)
CO2 SERPL-SCNC: 28 MMOL/L (ref 21–32)
CREAT SERPL-MCNC: 0.74 MG/DL (ref 0.6–1.3)
GFR SERPL CREATININE-BSD FRML MDRD: 100 ML/MIN/1.73SQ M
GLUCOSE SERPL-MCNC: 106 MG/DL (ref 65–140)
POTASSIUM SERPL-SCNC: 3.5 MMOL/L (ref 3.5–5.3)
PROT SERPL-MCNC: 6.9 G/DL (ref 6.4–8.4)
SODIUM SERPL-SCNC: 133 MMOL/L (ref 135–147)

## 2023-03-29 RX ORDER — ACETAMINOPHEN 325 MG/1
975 TABLET ORAL ONCE
Status: COMPLETED | OUTPATIENT
Start: 2023-03-29 | End: 2023-03-29

## 2023-03-29 RX ORDER — NALTREXONE HYDROCHLORIDE 50 MG/1
50 TABLET, FILM COATED ORAL DAILY
Status: DISCONTINUED | OUTPATIENT
Start: 2023-03-29 | End: 2023-03-30 | Stop reason: HOSPADM

## 2023-03-29 RX ADMIN — ACETAMINOPHEN 975 MG: 325 TABLET ORAL at 09:39

## 2023-03-29 RX ADMIN — FOLIC ACID: 5 INJECTION, SOLUTION INTRAMUSCULAR; INTRAVENOUS; SUBCUTANEOUS at 10:49

## 2023-03-29 RX ADMIN — CHLORHEXIDINE GLUCONATE 0.12% ORAL RINSE 15 ML: 1.2 LIQUID ORAL at 17:26

## 2023-03-29 RX ADMIN — IBUPROFEN 600 MG: 600 TABLET, FILM COATED ORAL at 17:50

## 2023-03-29 RX ADMIN — NICOTINE 1 PATCH: 21 PATCH, EXTENDED RELEASE TRANSDERMAL at 09:40

## 2023-03-29 RX ADMIN — ENOXAPARIN SODIUM 40 MG: 100 INJECTION SUBCUTANEOUS at 08:55

## 2023-03-29 RX ADMIN — CHLORHEXIDINE GLUCONATE 0.12% ORAL RINSE 15 ML: 1.2 LIQUID ORAL at 08:55

## 2023-03-29 RX ADMIN — NALTREXONE HYDROCHLORIDE 50 MG: 50 TABLET, FILM COATED ORAL at 09:39

## 2023-03-29 RX ADMIN — MULTIPLE VITAMINS W/ MINERALS TAB 1 TABLET: TAB ORAL at 08:55

## 2023-03-29 RX ADMIN — Medication 650 MG: at 09:44

## 2023-03-29 NOTE — PROGRESS NOTES
"PROGRESS NOTE  DEPARTMENT OF MEDICAL TOXICOLOGY  LEVEL 4 MEDICAL DETOX UNIT  Patricio Salvage 59 y o  male MRN: [id number]  Unit/Bed#: 5T DETOX 506-01 Encounter: [id number]      Reason for Admission/Principal Problem: Alcohol Withdrawal  Rounding Provider: Laura Gorman MD  Attending Provider: More Bernal MD   3/27/2023  7:27 PM           * Alcohol withdrawal syndrome with complication [facility])  Assessment & Plan  · H/o chronic daily alcohol consumption, denies h/o withdrawal seizures  · Follow SEWS protocol for medically-assisted alcohol withdrawal  · Current symptoms: endorses feeling slightly shaky, headache and dizziness  SEWS- 5  · Phenobarbitol 64 8 mg given 2200 3/28/23  · Continue monitoring under protocol and administer phenobarbital as indicated  · Telemetry and continuous pulse-ox monitoring   · Naltrexone- pt feels indifferent about starting naltrexone  Will start 50 mg today, pt tolerated previously  · Rehab- pt does not think he needs any rehab after leaving here  Alcohol use disorder, severe, dependence ([facility] [address] )  Assessment & Plan  Patient with h/o chronic alcohol use  Currently consumes 8-14 beers (24 oz) daily  Last drink evening of 3/27/23  Breath alcohol level 0 312 (3/27/2023 1538)  Manage withdrawal as above   Continue daily thiamine and folic acid   Consult case management for assistance with disposition planning  Pt believes that he will be able to stop drinking again when he leaves Detox  States 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 drink so he thinks he will be able to quit again  Pt endorses stopping for a year and a half but started again when his wife       Periodontitis  Assessment & Plan  · Pt with a h/o poor dentition, dental caries, and periodontitis - states he is scheduled to have his remaining upper teeth removed 23  · Endorses ongoing issues with dental pain, R-sided hearing loss and \"vision problems\" 2/2 dental infection which pt reports has \"spread into my " "blood\"  · Home meds: chlorhexidine, PRN benzocaine, Tylenol  · No current evidence of sepsis, pt afebrile, VSS; poor dentition on exam  · Continue home medications and monitoring VS  · Recommend continued OP f/u with dentistry/OMFS    Hyponatremia  Assessment & Plan  · Mild, Na 132 on admission, currently 141, resolved  · Likely 2/2 ongoing alcohol use, possibly beer potomania  · No focal neurologic deficits on exam  · Gentle IVF hydration with NS at 75 mL/hr  · Continue monitoring CMP  · May consider Nephrology consult if sodium decreases to <130    Cigarette nicotine dependence without complication  Assessment & Plan  Currently endorsing cravings, pt given patch  Current every day smoker  Encourage cessation  Offer nicotine replacement    Hypertension  Assessment & Plan  · H/o HTN per chart review  · No current home meds  ·  Blood Pressure: 152/92   · Continue to monitor vitals, BP have been high over the last 24 hours  · Consider initiation of anti-hypertensive if BP elevated once acute withdrawal resolved           VTE Pharmacologic Prophylaxis:   Pharmacologic: Enoxaparin (Lovenox)  Mechanical VTE Prophylaxis in Place: no    Code Status: Level 1 - Full Code    Patient Centered Rounds: I have performed bedside rounds with nursing staff today  Education and Discussions with Family / Patient: Discussed rehab after detox, pt states he does not think he will need it after current treatment  Time Spent for Care: 15 minutes  More than 50% of total time spent on counseling and coordination of care as described above      Current Length of Stay: 2 day(s)    Current Patient Status: Inpatient     Certification Statement: The patient will continue to require additional inpatient hospital stay due to medical management of alcohol withdrawal  Discharge Plan: Inpatient        Subjective:   Pt 59 yoM presenting [facility] for alcohol intoxication and concern for withdrawal   Pt endorses drinking 8-14 24 oz beers a " day and denies every having seizures after not drinking  Pt was still intoxicated on arrival to the detox unit and states he thought he was going to a rehab facility not detox  Pt also endorses oral pain for which he is scheduled to have his top teeth removed on 23      Currently pt endorses feeling shaky, headache and dizziness  Currently has no craving for alcohol  Denies fever, chills, abd pain, N/V, diarrhea  Pt also states he has not been eating here because he does not like the food, not because he has no appetite  Pt does not think he needs further treatment in detox or rehab after current treatment  States he was alcohol free for 1 5 yrs but relapsed when his wife  but is ready to stop drinking again  Pt seems open to possibly starting naltrexone      Objective:     Clinical Opiate Withdrawal Scale  Pulse: 75  Resting Pulse Rate: Measured After Patient is Sitting or Lying for One Minute: Pulse rate 80 or below  GI Upset: Over Last Half Hour: No GI symptoms  Sweating: Over Past Half Hour Not Accounted for by Room Temperature of Patient Activity: No report of chills or flushing  Tremor: Observation of Outstretched Hands: No tremor  Restlessness: Observation During Assessment: Able to sit still  Yawning: Observation During Assessment: No yawning  Pupil Size: Pupils pinned or normal size for room light  Anxiety and Irritability: None  Bone or Joint Aches: If Patient was Having Pain Previously, Only the Additional Component Attributed to Opiate Withdrawal is Scored: Not present  Gooseflesh Skin: Skin is smooth  Runny Nose or Tearing: Not Accounted for by Cold Symptoms or Allergies: Not present  Clinical Opiate Withdrawal Scale Total Score: 0    SEWS Total Score: 5 (3/29/2023  5:08 AM)        Last 24 Hours Medication List:   Current Facility-Administered Medications   Medication Dose Route Frequency Provider Last Rate   • acetaminophen  650 mg Oral Q6H PRN Marilynn Singleton PA-C     • acetaminophen  975 mg Oral Once Karma Velásquez MD     • benzocaine  30 application  Mucosal BID PRN Daniel Nunez PA-C     • chlorhexidine  15 mL Mouth/Throat BID Marilynn Singleton PA-C     • enoxaparin  40 mg Subcutaneous Daily Phuong Anthony PA-C     • folic acid 1 mg, thiamine 100 mg in 0 9% sodium chloride 100 mL IVPB   Intravenous Daily Phuong Anthony PA-C     • ibuprofen  600 mg Oral Q6H PRN Marilynn Singleton PA-C     • multivitamin-minerals  1 tablet Oral Daily Phuong Anthony PA-C     • naltrexone  50 mg Oral Daily Karma Velásquez MD     • nicotine  1 patch Transdermal Daily Phuong Anthony PA-C     • nicotine polacrilex  4 mg Oral Q2H PRN Marilynn Singleton PA-C     • ondansetron  4 mg Oral Q6H PRN Marilynn Singleton PA-C     • sodium chloride  75 mL/hr Intravenous Continuous Phuong Anthony PA-C 75 mL/hr (23)         Vitals:   Temp (24hrs), Av 1 °F (36 7 °C), Min:97 2 °F (36 2 °C), Max:99 °F (37 2 °C)    Temp:  [97 2 °F (36 2 °C)-99 °F (37 2 °C)] 97 2 °F (36 2 °C)  HR:  [] 75  Resp:  [16-18] 18  BP: (152-195)/() 152/92  SpO2:  [96 %-100 %] 99 %  Body mass index is 19 37 kg/m²  Input and Output Summary (last 24 hours):     Intake/Output Summary (Last 24 hours) at 3/29/2023 0855  Last data filed at 3/28/2023 1800  Gross per 24 hour   Intake 1440 ml   Output --   Net 1440 ml       Physical Exam:   Physical Exam    Additional Data:     Labs:   Results from last 7 days   Lab Units 23  0609 23  1529   WBC Thousand/uL 4 71 5 10   HEMOGLOBIN g/dL 14 6 14 3   HEMATOCRIT % 43 6 40 5   PLATELETS Thousands/uL 183 182   NEUTROS PCT %  --  45   LYMPHS PCT %  --  40   MONOS PCT %  --  12   EOS PCT %  --  1      Results from last 7 days   Lab Units 23  0513   SODIUM mmol/L 133*   POTASSIUM mmol/L 3 5   CHLORIDE mmol/L 94*   CO2 mmol/L 28   BUN mg/dL 6   CREATININE mg/dL 0 74   ANION GAP mmol/L 11   CALCIUM mg/dL 9 0 ALBUMIN g/dL 4 0   TOTAL BILIRUBIN mg/dL 0 85   ALK PHOS U/L 70   ALT U/L 53*   AST U/L 44*   GLUCOSE RANDOM mg/dL 106                              * I Have Reviewed All Lab Data Listed Above  * Additional Pertinent Lab Tests Reviewed: [address] Admission Reviewed      Imaging Studies: I have personally reviewed pertinent reports  Recent Cultures (last 7 days): Today, Patient Was Seen By: Tawanda Pérez MD    ** Please Note: Dictation voice to text software may have been used in the creation of this document   **

## 2023-03-30 ENCOUNTER — HOSPITAL ENCOUNTER (EMERGENCY)
Facility: HOSPITAL | Age: 58
Discharge: HOME | End: 2023-03-30
Attending: EMERGENCY MEDICINE

## 2023-03-30 VITALS
HEART RATE: 71 BPM | OXYGEN SATURATION: 99 % | BODY MASS INDEX: 19.27 KG/M2 | TEMPERATURE: 97.8 F | SYSTOLIC BLOOD PRESSURE: 143 MMHG | WEIGHT: 155 LBS | HEIGHT: 75 IN | RESPIRATION RATE: 18 BRPM | DIASTOLIC BLOOD PRESSURE: 89 MMHG

## 2023-03-30 VITALS
RESPIRATION RATE: 18 BRPM | HEART RATE: 88 BPM | OXYGEN SATURATION: 96 % | SYSTOLIC BLOOD PRESSURE: 143 MMHG | DIASTOLIC BLOOD PRESSURE: 99 MMHG | TEMPERATURE: 97.8 F

## 2023-03-30 DIAGNOSIS — F10.10 ALCOHOL ABUSE: Primary | ICD-10-CM

## 2023-03-30 PROBLEM — E83.42 HYPOMAGNESEMIA: Status: ACTIVE | Noted: 2023-03-30

## 2023-03-30 PROBLEM — F10.939 ALCOHOL WITHDRAWAL SYNDROME WITH COMPLICATION (HCC): Status: RESOLVED | Noted: 2023-03-27 | Resolved: 2023-03-30

## 2023-03-30 PROBLEM — E87.6 HYPOKALEMIA: Status: ACTIVE | Noted: 2023-03-30

## 2023-03-30 LAB
ALBUMIN SERPL BCP-MCNC: 3.9 G/DL (ref 3.5–5)
ALP SERPL-CCNC: 64 U/L (ref 34–104)
ALT SERPL W P-5'-P-CCNC: 40 U/L (ref 7–52)
ANION GAP SERPL CALCULATED.3IONS-SCNC: 9 MMOL/L (ref 4–13)
AST SERPL W P-5'-P-CCNC: 27 U/L (ref 13–39)
BILIRUB SERPL-MCNC: 0.62 MG/DL (ref 0.2–1)
BUN SERPL-MCNC: 7 MG/DL (ref 5–25)
CALCIUM SERPL-MCNC: 9.3 MG/DL (ref 8.4–10.2)
CHLORIDE SERPL-SCNC: 94 MMOL/L (ref 96–108)
CO2 SERPL-SCNC: 28 MMOL/L (ref 21–32)
CREAT SERPL-MCNC: 0.8 MG/DL (ref 0.6–1.3)
ERYTHROCYTE [DISTWIDTH] IN BLOOD BY AUTOMATED COUNT: 12.6 % (ref 11.6–15.1)
ETHANOL EXG-MCNC: 0.1 MG/DL
GFR SERPL CREATININE-BSD FRML MDRD: 97 ML/MIN/1.73SQ M
GLUCOSE SERPL-MCNC: 132 MG/DL (ref 65–140)
GLUCOSE SERPL-MCNC: 95 MG/DL (ref 65–140)
HCT VFR BLD AUTO: 45.2 % (ref 36.5–49.3)
HGB BLD-MCNC: 15.3 G/DL (ref 12–17)
MAGNESIUM SERPL-MCNC: 1.6 MG/DL (ref 1.9–2.7)
MCH RBC QN AUTO: 32.2 PG (ref 26.8–34.3)
MCHC RBC AUTO-ENTMCNC: 33.8 G/DL (ref 31.4–37.4)
MCV RBC AUTO: 95 FL (ref 82–98)
PLATELET # BLD AUTO: 169 THOUSANDS/UL (ref 149–390)
PMV BLD AUTO: 10.1 FL (ref 8.9–12.7)
POTASSIUM SERPL-SCNC: 3.2 MMOL/L (ref 3.5–5.3)
PROT SERPL-MCNC: 6.8 G/DL (ref 6.4–8.4)
RBC # BLD AUTO: 4.75 MILLION/UL (ref 3.88–5.62)
SODIUM SERPL-SCNC: 131 MMOL/L (ref 135–147)
WBC # BLD AUTO: 4.66 THOUSAND/UL (ref 4.31–10.16)

## 2023-03-30 RX ORDER — UREA 10 %
500 LOTION (ML) TOPICAL DAILY
Qty: 7 TABLET | Refills: 0 | Status: SHIPPED | OUTPATIENT
Start: 2023-03-30 | End: 2023-04-06

## 2023-03-30 RX ORDER — POTASSIUM CHLORIDE 20 MEQ/1
40 TABLET, EXTENDED RELEASE ORAL ONCE
Status: COMPLETED | OUTPATIENT
Start: 2023-03-30 | End: 2023-03-30

## 2023-03-30 RX ORDER — UREA 10 %
500 LOTION (ML) TOPICAL ONCE
Status: DISCONTINUED | OUTPATIENT
Start: 2023-03-30 | End: 2023-03-30 | Stop reason: HOSPADM

## 2023-03-30 RX ORDER — MAGNESIUM SULFATE HEPTAHYDRATE 40 MG/ML
2 INJECTION, SOLUTION INTRAVENOUS ONCE
Status: DISCONTINUED | OUTPATIENT
Start: 2023-03-30 | End: 2023-03-30

## 2023-03-30 RX ORDER — LANOLIN ALCOHOL/MO/W.PET/CERES
100 CREAM (GRAM) TOPICAL DAILY
Status: DISCONTINUED | OUTPATIENT
Start: 2023-03-31 | End: 2023-03-30 | Stop reason: HOSPADM

## 2023-03-30 RX ORDER — FOLIC ACID 1 MG/1
1 TABLET ORAL DAILY
Status: DISCONTINUED | OUTPATIENT
Start: 2023-03-31 | End: 2023-03-30 | Stop reason: HOSPADM

## 2023-03-30 RX ORDER — FOLIC ACID 1 MG/1
1 TABLET ORAL DAILY
Refills: 0
Start: 2023-03-31

## 2023-03-30 RX ORDER — POTASSIUM CHLORIDE 20 MEQ/1
20 TABLET, EXTENDED RELEASE ORAL DAILY
Qty: 7 TABLET | Refills: 0 | Status: SHIPPED | OUTPATIENT
Start: 2023-03-30 | End: 2023-04-06

## 2023-03-30 RX ORDER — LANOLIN ALCOHOL/MO/W.PET/CERES
100 CREAM (GRAM) TOPICAL DAILY
Refills: 0
Start: 2023-03-31

## 2023-03-30 RX ORDER — AMLODIPINE BESYLATE 5 MG/1
5 TABLET ORAL DAILY
Qty: 30 TABLET | Refills: 0 | Status: SHIPPED | OUTPATIENT
Start: 2023-03-30

## 2023-03-30 RX ORDER — NALTREXONE HYDROCHLORIDE 50 MG/1
50 TABLET, FILM COATED ORAL DAILY
Qty: 30 TABLET | Refills: 0 | Status: SHIPPED | OUTPATIENT
Start: 2023-03-30

## 2023-03-30 RX ADMIN — MULTIPLE VITAMINS W/ MINERALS TAB 1 TABLET: TAB ORAL at 08:55

## 2023-03-30 RX ADMIN — POTASSIUM CHLORIDE 40 MEQ: 1500 TABLET, EXTENDED RELEASE ORAL at 08:54

## 2023-03-30 RX ADMIN — NALTREXONE HYDROCHLORIDE 50 MG: 50 TABLET, FILM COATED ORAL at 08:55

## 2023-03-30 NOTE — NURSING NOTE
Discharge instructions given both written and verbally with details regarding medications, next dose due, and where he can  medications  Reminded patient to f/u with dental for his tooth  Security returned envelopes with RN present  IV was d/c earlier  Dressed in street clothes and escorted to the lobby   Denied questions regarding AVS

## 2023-03-30 NOTE — NURSING NOTE
RN received phone call from woman identifying herself as Jacinta Trujillo  RN verbally confirmed with pt that RN may speak to Karine  RN confirmed that pt is here for [place]  Karine states that pt has been calling her, leaving her voicemails, and harassing her  Karine states that she has reported the pt several times in the past for harassment  RN advised that if pt is harassing her, she should contact her local PD  Karine states that she lives in fear of this pt  RN again advised that she should contact her local PD

## 2023-03-30 NOTE — ASSESSMENT & PLAN NOTE
· Mild   · Administer supplementation  · Continue daily supplementation x 7 days on discharge  · Recommend ongoing outpatient monitoring- provided with lab script for outpatient magnesium

## 2023-03-30 NOTE — PROGRESS NOTES
Renuka Harrington PA-C notified of elevated BP via face-to-face conversation  Per FRANCESCO, we will not use elevated BP to score for SEWS

## 2023-03-30 NOTE — DISCHARGE SUMMARY
MEDICAL DETOX UNIT, LEVEL 4  Department of Medical Toxicology  Reason for Admission/Principal Problem: Alcohol withdrawal   Admitting provider: [facility] FRANCESCO Anthony DO  3/27/2023  7:27 PM     Discharging Physician / Practitioner: Miquel Granger PA-C  PCP: No primary care provider on file    Admission Date:   Admission Orders (From admission, onward)     Ordered        03/27/23 2021  Inpatient Admission  Once                      Discharge Date: 03/30/23    Medical Problems     Resolved Problems  Date Reviewed: 3/27/2023          Resolved    * (Principal) Alcohol withdrawal syndrome with complication ([facility] [address] ) 9/68/5913     Resolved by  [facility] FRANCESCO Anthony          * Alcohol withdrawal syndrome with complication (HCC)-resolved as of 3/30/2023  Assessment & Plan  · H/o chronic daily alcohol consumption, denies h/o withdrawal seizures  · SEWS protocol with symptom-triggered phenobarbital followed for medically-assisted alcohol withdrawal  · Received 1884 8 mg total phenobarbital, last dose administered 3/29/2023 0944  · Patient reports he still feels shaky this morning, states he feels Librium is better for withdrawal management   · Appears stable overall- VSS, subtle head jason noted on exam however no intention tremors or tongue fasciculations noted; ROS otherwise negative   · Educated patient regarding role of phenobarbital in withdrawal management and that his acute withdrawal symptoms have been stabilized   · Acute withdrawal resolved     Hyponatremia  Assessment & Plan  · Mild- sodium 131 this AM, was 132 on admission; overall stable  · Likely 2/2 ongoing alcohol use, possibly beer potomania  · No focal neurologic deficits on exam  · Encourage adequate PO intake and alcohol cessation  · Routine outpatient monitoring and outpatient f/u PCP    Hypomagnesemia  Assessment & Plan  · Mild   · Administer supplementation  · Continue daily supplementation x 7 days on discharge  · Recommend ongoing outpatient monitoring- provided with lab script for outpatient magnesium    Hypokalemia  Assessment & Plan  · Mild   · Administer supplementation  · Continue daily supplementation x 7 days on discharge  · Recommend ongoing outpatient monitoring- provided with lab script for outpatient BMP     Alcohol use disorder, severe, dependence ([facility] [address] )  Assessment & Plan  Patient with h/o chronic alcohol use  Currently consumes 8-14 beers (24 oz) daily  Last drink evening of 3/27/23  Breath alcohol level 0 312 (3/27/2023 1538)  Manage withdrawal as above   Continue daily thiamine and folic acid   Naltrexone started on discharge   Consult case management for assistance with disposition planning- resources provided on d/c    Cigarette nicotine dependence without complication  Assessment & Plan  · Current every day smoker  Encourage cessation  Offer nicotine replacement    Hypertension  Assessment & Plan  · H/o HTN per chart review  · No current home meds  · Most recent Blood Pressure: 143/89 ; preceding 24 hr range 136//107  · Will initiate amlodipine 5 mg daily  · Recommend outpatient f/u PCP    Periodontitis  Assessment & Plan  · Pt with a h/o poor dentition, dental caries, and periodontitis - states he is scheduled to have his remaining upper teeth removed 4/18/23  · Home meds: chlorhexidine, PRN benzocaine, Tylenol  · No current evidence of sepsis, pt afebrile, VSS; poor dentition on exam  · Continue home medications  · Recommend continued OP f/u with dentistry/OMFS      Consultations During Hospital Stay:  · Case Management     Procedures Performed:   · None     Significant Findings / Test Results:   · Hyponatremia- stable   · Hypokalemia  · Hypomagnesemia   · Transaminitis- resolved   · Alcoholic ketoacidosis- resolved     Incidental Findings:   · None      Test Results Pending at Discharge (will require follow up):    · None     Outpatient Tests/Follow-ups Requested:  · PCP follow up   · Dentistry follow up "    Complications:  None    Reason for Admission: Alcohol withdrawal     Hospital Course:     Sameer Arias is a 59 y o  male patient PMH AUD, periodontitis who originally presented to the hospital on 3/27/2023 due to alcohol withdrawal  Patient initially presented to the [place] [address] ED 3/27/2023 requesting detoxification from alcohol  Patient was admitted to the [facility] medical detox unit under [facility] protocol for medically assisted alcohol withdrawal and received a total of 1885 mg phenobarbital without complication  Patient's alcohol withdrawal symptoms subsequently resolved, and he has remained without objective evidence of alcohol withdrawal at this time  During this hospitalization, patient was found to have hyponatremia, which was stable on discharge  Patient was also noted to have residual HTN despite acute withdrawal resolution, thus amlodipine was initiated  Naltrexone started on discharge  Case management was consulted for assistance with aftercare resources, and patient will be discharged to home with outpatient resources  Please see above list of diagnoses and related plan for additional information  Condition at Discharge: good     Discharge Day Visit / Exam:     Subjective:  Patient seen and examined bedside this morning  Patient states he wants to leave ASA, noting that he still feels \"shaky\" this morning and state that Librium works better for his withdrawal symptoms and states he does not like phenobarbital  Also notes displeasure with food options  Currently denies headaches, lightheadedness/dizziness, coughing/sneezing/congestion/rhinorrhea, chest pain, SOB/dyspnea, abdominal pain, N/V/C/D, dysuria/hematuria, hallucinations       Vitals: Blood Pressure: 143/89 (03/30/23 0706)  Pulse: 71 (03/30/23 0706)  Temperature: 97 8 °F (36 6 °C) (03/30/23 0706)  Temp Source: Temporal (03/30/23 0706)  Respirations: 18 (03/30/23 0706)  Height: 6' 3\" (190 5 cm) (03/27/23 2000)  Weight - Scale: 70 3 kg (155 lb) " (03/28/23 5697)  SpO2: 99 % (03/30/23 0706)  Exam:   Physical Exam  Vitals and nursing note reviewed  Constitutional:       General: He is not in acute distress  Appearance: Normal appearance  He is well-developed and normal weight  He is not ill-appearing or diaphoretic  HENT:      Head: Normocephalic and atraumatic  Eyes:      General: No scleral icterus  Extraocular Movements: Extraocular movements intact  Conjunctiva/sclera: Conjunctivae normal       Pupils: Pupils are equal, round, and reactive to light  Cardiovascular:      Rate and Rhythm: Normal rate and regular rhythm  Pulses: Normal pulses  Heart sounds: Normal heart sounds  No murmur heard  No friction rub  No gallop  Pulmonary:      Effort: Pulmonary effort is normal  No respiratory distress  Breath sounds: Normal breath sounds  No wheezing, rhonchi or rales  Abdominal:      General: Abdomen is flat  Bowel sounds are normal  There is no distension  Palpations: Abdomen is soft  Tenderness: There is no abdominal tenderness  There is no guarding  Musculoskeletal:         General: No swelling  Normal range of motion  Cervical back: Normal range of motion  Right lower leg: No edema  Left lower leg: No edema  Skin:     General: Skin is warm and dry  Neurological:      General: No focal deficit present  Mental Status: He is alert and oriented to person, place, and time  Mental status is at baseline  Motor: No tremor (no intention tremor noted)  Coordination: Finger-Nose-Finger Test normal       Gait: Gait normal       Comments: Mild head jason at rest; baseline? No tongue fasciculations    Psychiatric:         Attention and Perception: Attention normal          Mood and Affect: Mood normal  Affect is blunt and angry  Speech: Speech normal          Behavior: Behavior is cooperative       Discussion with Family: Spoke with patient regarding treatment plan     Discharge instructions/Information to patient and family:   See after visit summary for information provided to patient and family  Provisions for Follow-Up Care:  See after visit summary for information related to follow-up care and any pertinent home health orders  Disposition:     Home    For Discharges to [facility] SNF:   · Not Applicable to this Patient - Not Applicable to this Patient    Planned Readmission: None     Discharge Statement:  I spent 37 minutes discharging the patient  This time was spent on the day of discharge  I had direct contact with the patient on the day of discharge  Greater than 50% of the total time was spent examining patient, answering all patient questions, arranging and discussing plan of care with patient as well as directly providing post-discharge instructions  Additional time then spent on discharge activities  Discharge Medications:  See after visit summary for reconciled discharge medications provided to patient and family        ** Please Note: This note has been constructed using a voice recognition system **

## 2023-03-30 NOTE — ED PROVIDER NOTES
"History  Chief Complaint   Patient presents with   • Detox Evaluation     Pt presents after d/c from [facility] detox this morning, pt states \"they gave me calcium chloride or something, you don't give that for what I have, I need librium  \" Pt states he had 2 beers and would like detox     PMH: Alcohol withdrawal syndrome tx with phenobarbital, chronic daily alcohol consumption, denies h/o withdrawal seizures, consumes 8-14 beers (24 oz) daily, HTN, periodontitis, just dc from detox TODAY, folic acid, thiamine, Naltrexone    Pt presents to ED c/o \"shakes\", trying to get back into detox, looking for librium, bc that's what helped him before  Pt was just dc from our detox until today, got all his meds this am, didn't  Rxs and didn't make any FU plans yet  Pt states he drove home, parked and walked few blocks to his apartment  Rested, then walked to his friends  Then took a bus to [place], then a bus here bc it was closer for him to come to [place] then [facility]  Pt admits to drinking \"2 beers about 2 hours ago\"  Pt denies other physical complaints, only c/o \"shakes\", denies headaches, cp, sob, cough/sneezing/congestion/rhinorrhea, abdominal pain, dysuria/hematuria, hallucinations  Offered food, pt states he doesn't want any food in ED, hospital food is terrible, offered pudding, juice, pt states he's going to meet up with his friend and go have some pizza             Prior to Admission Medications   Prescriptions Last Dose Informant Patient Reported? Taking?   acetaminophen (TYLENOL) 650 mg CR tablet   No No   Sig: Take 1 tablet (650 mg total) by mouth every 8 (eight) hours as needed for mild pain   amLODIPine (NORVASC) 5 mg tablet   No No   Sig: Take 1 tablet (5 mg total) by mouth daily   benzocaine (Instant Oral Pain Relief Max) 20 %   No No   Si application   by Mucosal route 2 (two) times a day as needed for mucositis   chlorhexidine (PERIDEX) 0 12 % solution   No No   Sig: Apply 15 mL to " the mouth or throat 2 (two) times a day   folic acid (FOLVITE) 1 mg tablet   No No   Sig: Take 1 tablet (1 mg total) by mouth daily Do not start before March 31, 2023  magnesium gluconate (MAGONATE) 500 mg tablet   No No   Sig: Take 1 tablet (500 mg total) by mouth in the morning for 7 days   naltrexone (REVIA) 50 mg tablet   No No   Sig: Take 1 tablet (50 mg total) by mouth daily   potassium chloride (K-DUR,KLOR-CON) 20 mEq tablet   No No   Sig: Take 1 tablet (20 mEq total) by mouth daily for 7 doses   thiamine 100 MG tablet   No No   Sig: Take 1 tablet (100 mg total) by mouth daily Do not start before March 31, 2023  Facility-Administered Medications: None       Past Medical History:   Diagnosis Date   • Hypertension        History reviewed  No pertinent surgical history  History reviewed  No pertinent family history  I have reviewed and agree with the history as documented  E-Cigarette/Vaping     E-Cigarette/Vaping Substances     Social History     Tobacco Use   • Smoking status: Every Day     Packs/day: 0 50     Types: Cigarettes   • Smokeless tobacco: Never   Substance Use Topics   • Alcohol use: Yes     Comment: 12+ beers/day 24 oz beer   • Drug use: No       Review of Systems   Constitutional: Negative for fever  HENT: Negative for trouble swallowing  Respiratory: Negative for shortness of breath  Cardiovascular: Negative for chest pain  Gastrointestinal: Negative for diarrhea and vomiting  Musculoskeletal: Negative for myalgias  Skin: Negative for wound  Neurological: Negative for headaches  All other systems reviewed and are negative  Physical Exam  Physical Exam  Vitals and nursing note reviewed  Constitutional:       General: He is not in acute distress  Appearance: He is well-developed  HENT:      Head: Normocephalic and atraumatic        Right Ear: External ear normal       Left Ear: External ear normal       Nose: Nose normal       Mouth/Throat:      Mouth: Mucous membranes are moist       Pharynx: Oropharynx is clear  Eyes:      Conjunctiva/sclera: Conjunctivae normal    Cardiovascular:      Rate and Rhythm: Normal rate and regular rhythm  Pulmonary:      Effort: Pulmonary effort is normal       Breath sounds: Normal breath sounds  Abdominal:      General: Bowel sounds are normal       Palpations: Abdomen is soft  Tenderness: There is no abdominal tenderness  Musculoskeletal:         General: No tenderness or signs of injury  Normal range of motion  Cervical back: Normal range of motion  Skin:     General: Skin is warm and dry  Neurological:      General: No focal deficit present  Mental Status: He is alert and oriented to person, place, and time  Mental status is at baseline  Cranial Nerves: No cranial nerve deficit        Gait: Gait normal       Comments: Resting tremors   Psychiatric:         Behavior: Behavior normal          Vital Signs  ED Triage Vitals [03/30/23 1843]   Temperature Pulse Respirations Blood Pressure SpO2   97 8 °F (36 6 °C) 88 18 143/99 96 %      Temp Source Heart Rate Source Patient Position - Orthostatic VS BP Location FiO2 (%)   Oral Monitor Lying Right arm --      Pain Score       --           Vitals:    03/30/23 1843   BP: 143/99   Pulse: 88   Patient Position - Orthostatic VS: Lying         Visual Acuity      ED Medications  Medications - No data to display    Diagnostic Studies  Results Reviewed     Procedure Component Value Units Date/Time    Fingerstick Glucose (POCT) [[id number]]  (Normal) Collected: 03/30/23 1904    Lab Status: Final result Updated: 03/30/23 1906     POC Glucose 95 mg/dl     POCT alcohol breath test [[id number]]  (Abnormal) Resulted: 03/30/23 1905    Lab Status: Final result Updated: 03/30/23 1905     EXTBreath Alcohol 0 096                 No orders to display              Procedures  Procedures         ED Course  ED Course as of 03/30/23 1926   Thu Mar 30, 2023   1913 POC Glucose: 95 1913 EXTBreath Alcohol: 0 096                               SBIRT 20yo+    Flowsheet Row Most Recent Value   SBIRT (23 yo +)    In order to provide better care to our patients, we are screening all of our patients for alcohol and drug use  Would it be okay to ask you these screening questions? No Filed at: 03/30/2023 1856                    Medical Decision Making  Patient states he lost his discharge paperwork from earlier, so did not know where to go  I was able to print out the discharge paperwork from earlier today,  highlighting the resources for patient including sober living facilities, hotlines to call, prescriptions that he needs to  tomorrow at his pharmacy as he has gotten all of his medications for today  Patient refusing rehab states he has to work , does not have the money for rehab and understands that he was just released from detox today so needs to continue his sober living  Pt ambulatory in ED without assistance    Amount and/or Complexity of Data Reviewed  External Data Reviewed: labs and notes  Details: Admitted from detox 3/27-3/30, dc today  Labs: ordered  Decision-making details documented in ED Course  Disposition  Final diagnoses:   Alcohol abuse     Time reflects when diagnosis was documented in both MDM as applicable and the Disposition within this note     Time User Action Codes Description Comment    3/30/2023  7:18 PM Pat Bang Add [F10 10] Alcohol abuse       ED Disposition     ED Disposition   Discharge    Condition   Stable    Date/Time   u Mar 30, 2023  7:18 PM    Jinny6 Kobe Silva discharge to home/self care                 Follow-up Information    None         Discharge Medication List as of 3/30/2023  7:20 PM      CONTINUE these medications which have NOT CHANGED    Details   acetaminophen (TYLENOL) 650 mg CR tablet Take 1 tablet (650 mg total) by mouth every 8 (eight) hours as needed for mild pain, Starting Sun 3/5/2023, Normal amLODIPine (NORVASC) 5 mg tablet Take 1 tablet (5 mg total) by mouth daily, Starting Thu 3/30/2023, Normal      benzocaine (Instant Oral Pain Relief Max) 20 % 30 application  by Mucosal route 2 (two) times a day as needed for mucositis, Starting Sun 3/5/2023, Normal      chlorhexidine (PERIDEX) 0 12 % solution Apply 15 mL to the mouth or throat 2 (two) times a day, Starting Sun 4/5/9855, Normal      folic acid (FOLVITE) 1 mg tablet Take 1 tablet (1 mg total) by mouth daily Do not start before March 31, 2023 , Starting Fri 3/31/2023, No Print      magnesium gluconate (MAGONATE) 500 mg tablet Take 1 tablet (500 mg total) by mouth in the morning for 7 days, Starting Thu 3/30/2023, Until Thu 4/6/2023, Normal      naltrexone (REVIA) 50 mg tablet Take 1 tablet (50 mg total) by mouth daily, Starting Thu 3/30/2023, Normal      potassium chloride (K-DUR,KLOR-CON) 20 mEq tablet Take 1 tablet (20 mEq total) by mouth daily for 7 doses, Starting Thu 3/30/2023, Until u 4/6/2023, Normal      thiamine 100 MG tablet Take 1 tablet (100 mg total) by mouth daily Do not start before March 31, 2023 , Starting Fri 3/31/2023, No Print             No discharge procedures on file      PDMP Review       Value Time User    PDMP Reviewed  Yes 3/27/2023  8:08 PM Sotero Vo PA-C          ED Provider  Electronically Signed by           Papa Olivas PA-C  03/30/23 1926

## 2023-03-30 NOTE — CASE MANAGEMENT
Case Management Discharge Planning Note    Patient name Saritha Swan  Location 5T DETOX 506/5T DETOX 50* MRN [id number]  : 1965 Date 3/30/2023       Current Admission Date: 3/27/2023  Current Admission Diagnosis:Alcohol withdrawal syndrome with complication [facility])   Patient Active Problem List    Diagnosis Date Noted   • Alcohol use disorder, severe, dependence ([facility] [address] ) 2023   • [facility]    • Hyponatremia 2023   • Hypertension    • Cigarette nicotine dependence without complication    • Periodontitis 2023   • Dental caries 2023   • Cervical lymphadenopathy 2023      LOS (days): 3  Geometric Mean LOS (GMLOS) (days):   Days to GMLOS:     OBJECTIVE:  Risk of Unplanned Readmission Score: 6 98         Current admission status: Inpatient   Preferred Pharmacy:   [id number], [address]  [address],[place] [address]  Phone: [phone] Fax: [phone]    Primary Care Provider: No primary care provider on file  Primary Insurance: [employer] FOR YOU  Secondary Insurance:     DISCHARGE DETAILS: Cm informed by medical staff pt discharging today  Cm met with pt and pt stated he would drive himself home after his SO left his car in the parking garage  Pt maintained he did not want any follow up scheduled  Resources were placed on AVS and pt PCP will be notified of pt discharge  Pt discharging home today      Discharge planning discussed with[de-identified] patient  Freedom of Choice: Yes                   Contacts  Patient Contacts: [facility]  Relationship to Patient[de-identified] Treatment Provider  Reason/Outcome: Continuity of Care, Discharge Planning              Other Referral/Resources/Interventions Provided:  Referrals Provided[de-identified] Support Group, Therapist, IOP, Crisis Hotline    Would you like to participate in our [address] service program?  : No - Declined Family notified[de-identified] No supportive TRAVIS's signed

## 2023-03-30 NOTE — ASSESSMENT & PLAN NOTE
· Mild   · Administer supplementation  · Continue daily supplementation x 7 days on discharge  · Recommend ongoing outpatient monitoring- provided with lab script for outpatient BMP

## 2023-03-31 NOTE — UTILIZATION REVIEW
NOTIFICATION OF ADMISSION DISCHARGE   This is a Notification of Discharge from [facility]  Please be advised that this patient has been discharge from our facility  Below you will find the admission and discharge date and time including the patient’s disposition  UTILIZATION REVIEW CONTACT:  Bernerd Boast, MA  Utilization   Network Utilization Review Department  Phone: [phone] x carefully listen to the prompts  All voicemails are confidential   Email: [email or web address]     ADMISSION INFORMATION  PRESENTATION DATE: 3/27/2023  7:27 PM  OBERVATION ADMISSION DATE:   INPATIENT ADMISSION DATE: 3/27/23  7:27 PM   DISCHARGE DATE: 3/30/2023  9:19 AM   DISPOSITION:Home/Self Care    IMPORTANT INFORMATION:  Send all requests for admission clinical reviews, approved or denied determinations and any other requests to dedicated fax number below belonging to the campus where the patient is receiving treatment   List of dedicated fax numbers:  [address] DENIALS (Administrative/Medical Necessity) [phone]   [address] (Maternity/NICU/Pediatrics) [phone]   [facility] [phone]   [place] [address] [phone]   [address] [phone]   220 [facility] [phone] 90 [facility] [phone]   [address] [place] [address] 119 [phone]   [facility]  [phone] 405[address] [facility] [phone]   [address] [facility] [address] [facility] [phone]

## 2023-05-16 ENCOUNTER — OFFICE VISIT (OUTPATIENT)
Dept: DENTISTRY | Facility: CLINIC | Age: 58
End: 2023-05-16

## 2023-05-16 ENCOUNTER — TELEPHONE (OUTPATIENT)
Dept: HEMATOLOGY ONCOLOGY | Facility: CLINIC | Age: 58
End: 2023-05-16

## 2023-05-16 VITALS — SYSTOLIC BLOOD PRESSURE: 132 MMHG | TEMPERATURE: 97.8 F | DIASTOLIC BLOOD PRESSURE: 87 MMHG | HEART RATE: 79 BPM

## 2023-05-16 DIAGNOSIS — R46.5: ICD-10-CM

## 2023-05-16 DIAGNOSIS — K04.4 ACUTE APICAL PERIODONTITIS OF PULPAL ORIGIN: Primary | ICD-10-CM

## 2023-05-16 RX ORDER — AMOXICILLIN 500 MG/1
500 CAPSULE ORAL EVERY 8 HOURS SCHEDULED
Qty: 21 CAPSULE | Refills: 0 | Status: SHIPPED | OUTPATIENT
Start: 2023-05-16 | End: 2023-05-23

## 2023-05-16 NOTE — TELEPHONE ENCOUNTER
I called Elice[facility] to schedule a new patient consultation with [facility] in response to a referral sent to our department  I left a voicemail making patient aware of their referral and instructing them to call [facility] at [phone] to schedule  Another attempt will be made to schedule patient

## 2023-05-16 NOTE — PROGRESS NOTES
"Oral Surgery of UR side and oncology referral    Bibiana Handley presents for Ext #2,3,4 and 5  [address]  Currently he is not taking any of his medications \"because he ran out\"  He has smoked for approximately 45 years  ASA Type 2 significant for alcoholism and smoking  Pt is states he has pain everywhere but the lower right side is worst      Clinical exam showed large erythematous, abnormal, open sore under tongue involving floor of the mouth  Lesion bleeds when touched  Borders undefined and are crusty, (See photo attached)  Enlarged, palpable, tender, non-movable swollen submandibular and cervical lymph-nodes of right side  Submandibular and cervical lymph-nodes of left side are palpable but not enlarged  Explained findings with patient and let him know we were going to refer him to oncology for a biopsy  Patient consented to have teeth 2,3,4 and 5 extracted today  Radiograph (pano) is current from 2/24/23  #2 root tip with grade 3 mobility  #3 grade 3 mobility  #4 grade 2 mobility  #5 root tip   Dx: Periapical abscess around teeth 2,3,4 and 5  Obtained a direct and personal consent  Risks and complications were explained  Pt agreed and consented  Consent scanned in [facility]  [place] Protocol    Other Assisting Provider: Yes, Lindsey (assistant)  Verbal consent obtained? YES  Written consent obtained? YES  Risks, benefits and alternatives discussed?: YES  Consent given by: Patient Rachel Neri)    Time Out  Immediately prior to the procedure a time out was called: YES  Time Out: 10 am   A time out verifies correct patient, procedure, equipment, support staff and site/side marked as required    Patient states understanding of procedure being performed: YES  Patient's understanding of procedure matches consent: YES  Procedure consent matches procedure scheduled: YES  Test results available and properly labeled: N/A  Site  Verified with the patient  YES  Radiology Images displayed and " confirmed  If images not available, report reviewed:  YES  Required items - Required blood products, implants, devices and special equipment available: YES  Patient identity confirmed:  YES      Pre-Op BP WNL  Administered 1 carpule of 2 % Lidocaine w/ 1:100,000 epi via PSA of right side and 0 5 carpules of 4% septocaine w/ 1:100,000 epi via buccal and palatal infiltration  Adequate anesthesia obtained, #2 was extracted with forceps, elevated 3, and 4 and extracted with forceps  Used periosteal elevator around #5 root tip, elevated with spade elevator and extracted with forceps  Socket irrigated with sterile sailine, and 4 0 chromic gut sutures placed over sites 4 and 5  Upon dismissal, patient received POI, ice, gauze, and RX: Amoxicillin 500 mg TID for 7 days  Oncology/ hematology STAT refferal was given to him today with phone number on how to make an appointment       NV: Extractions of #14,15 and 16

## 2023-05-19 ENCOUNTER — TELEPHONE (OUTPATIENT)
Dept: HEMATOLOGY ONCOLOGY | Facility: CLINIC | Age: 58
End: 2023-05-19

## 2023-05-19 NOTE — TELEPHONE ENCOUNTER
I called [facility] to schedule a new patient consultation with [facility] in response to a referral sent to our department       I left a voicemail making patient aware of their referral and instructing them to call [facility] at [phone] to schedule      Another attempt will be made to schedule patient

## 2023-05-23 ENCOUNTER — CONSULT (OUTPATIENT)
Dept: HEMATOLOGY ONCOLOGY | Facility: CLINIC | Age: 58
End: 2023-05-23

## 2023-05-23 VITALS
DIASTOLIC BLOOD PRESSURE: 78 MMHG | OXYGEN SATURATION: 98 % | SYSTOLIC BLOOD PRESSURE: 128 MMHG | WEIGHT: 146 LBS | HEART RATE: 75 BPM | RESPIRATION RATE: 16 BRPM | TEMPERATURE: 98.7 F | HEIGHT: 75 IN | BODY MASS INDEX: 18.15 KG/M2

## 2023-05-23 DIAGNOSIS — R59.0 CERVICAL LYMPHADENOPATHY: Primary | ICD-10-CM

## 2023-05-23 DIAGNOSIS — R46.5: ICD-10-CM

## 2023-05-23 DIAGNOSIS — C06.9 MALIGNANT NEOPLASM OF ORAL CAVITY (HCC): ICD-10-CM

## 2023-05-23 NOTE — PROGRESS NOTES
Hematology Outpatient Office Note    Date of Service: 5/23/2023    [facility] HEMATOLOGY/MEDICAL ONCOLOGY [facility]  Kip Carmencraig   [phone]    Reason for Consultation:   Chief Complaint   Patient presents with   • Consult       Referral Physician: Osman López*    Primary Care Physician:  No primary care provider on file  ASSESSMENT & PLAN      Diagnosis ICD-10-CM Associated Orders   1  Cervical lymphadenopathy  R59 0       2  Suspiciousness and marked evasiveness  R46 5 Ambulatory Referral to Hematology / Oncology      3  Malignant neoplasm of oral cavity (HCC)  C06 9 Ambulatory Referral to Otolaryngology     NM PET CT skull base to mid thigh            This is a 59 y o  c PMHx notable for HTN, nicotine abuse, alcohol abuse being seen in consultation for an oral mouth lesion detected by his general dentist     6/2/22022 CT Head w/o c: No acute hemorrhage, mass or ischemia identified  Neck w/o c: Scattered cervical chain lymph nodes  None of the visualized lymph nodes appear enlarged in size  No suspicious mass visualized on noncontrast imaging  · Discussion of decision making    I personally reviewed the following lab results, the image studies, pathology, other specialty/physicians consult notes and recommendations, and outside medical records  I had a lengthy discussion with the patient and shared the work-up findings  We discussed the diagnosis and management plan as below  I spent 46 minutes reviewing the records (labs, clinician notes, outside records, medical history, ordering medicine/tests/procedures, interpreting the imaging/labs previously done) and coordination of care as well as direct time with the patient today, of which greater than 50% of the time was spent in counseling and coordination of care with the patient/family      · Plan/Labs  · ENT referral for biopsy of his tongue mass and further work-up  · I am getting a PET/CT for full staging        Follow Up: 8 weeks    All questions were answered to the patient's satisfaction during this encounter  The patient knows the contact information for our office and knows to reach out for any relevant concerns related to this encounter  They are to call for any temperature 100 4 or higher, new symptoms including but not restricted to shaking chills, decreased appetite, nausea, vomiting, diarrhea, increased fatigue, shortness of breath or chest pain, confusion, and not feeling the strength to come to the clinic  For all other listed problems and medical diagnosis in their chart - they are managed by PCP and/or other specialists, which the patient acknowledges  Thank you very much for your consultation and making us a part of this patient's care  We are continuing to follow closely with you  Please do not hesitate to reach out to me with any additional questions or concerns  Roosevelt Fernandes MD  Hematology & Medical Oncology Staff Physician             Disclaimer: This document was prepared using [employer] Direct technology  If a word or phrase is confusing, or does not make sense, this is likely due to recognition error which was not discovered during this clinician's review  If you believe an error has occurred, please contact me through [address] [place] line for yamini? cation  ONCOLOGIC HISTORY OF PRESENT ILLNESS     His general dentist saw him on May 16 and appreciated a large oral cavity lesion which was concerning for malignancy and he is thus referred to us for further management      Clotting History None   Bleeding History None   Cancer History Oral cavity suspected   Family Cancer History Dad (skin), Mom (breast)   H/O Blood/Plt Transfusion None   Tobacco/etoh/drug abuse 1 PPD x  45 years, beer (6 pack a day), no rec drug use       Cancer Screening history No colon cancer screening   Occupation Landscape work         SUBJECTIVE  (INTERVAL HISTORY)        I have reviewed the relevant past medical, surgical, social and family history  I have also reviewed allergies and medications for this patient  Review of Systems    Baseline weight: 145-150 lbs    Denies F/C, N/V, SOB, CP, LH, HA, rash, itching, gen weakness, melena, hematuria, hematochezia, falls, diarrhea, or constipation       A 10-point review of system was performed, pertinent positive and negative were detailed as above  Otherwise, the 10-point review of system was negative  Past Medical History:   Diagnosis Date   • Hypertension        No past surgical history on file  No family history on file      Social History     Socioeconomic History   • Marital status: Single     Spouse name: Not on file   • Number of children: Not on file   • Years of education: Not on file   • Highest education level: Not on file   Occupational History   • Not on file   Tobacco Use   • Smoking status: Every Day     Packs/day: 0 50     Types: Cigarettes   • Smokeless tobacco: Never   Substance and Sexual Activity   • Alcohol use: Yes     Comment: 12+ beers/day 24 oz beer   • Drug use: No   • Sexual activity: Not on file   Other Topics Concern   • Not on file   Social History Narrative   • Not on file     Social Determinants of Health     Financial Resource Strain: Not on file   Food Insecurity: Not on file   Transportation Needs: Not on file   Physical Activity: Not on file   Stress: Not on file   Social Connections: Not on file   Intimate Partner Violence: Not on file   Housing Stability: Not on file       Allergies   Allergen Reactions   • Bee Venom Hives   • Other      bees       Current Outpatient Medications   Medication Sig Dispense Refill   • acetaminophen (TYLENOL) 650 mg CR tablet Take 1 tablet (650 mg total) by mouth every 8 (eight) hours as needed for mild pain 30 tablet 0   • amLODIPine (NORVASC) 5 mg tablet Take 1 tablet (5 mg total) by mouth daily 30 tablet 0   • amoxicillin (AMOXIL) 500 mg capsule Take 1 capsule (500 mg total) by mouth every 8 (eight) hours for 7 days 21 capsule 0   • benzocaine (Instant Oral Pain Relief Max) 20 % 30 application  by Mucosal route 2 (two) times a day as needed for mucositis 30 g 0   • chlorhexidine (PERIDEX) 0 12 % solution Apply 15 mL to the mouth or throat 2 (two) times a day 330 mL 0   • folic acid (FOLVITE) 1 mg tablet Take 1 tablet (1 mg total) by mouth daily Do not start before March 31, 2023   0   • magnesium gluconate (MAGONATE) 500 mg tablet Take 1 tablet (500 mg total) by mouth in the morning for 7 days 7 tablet 0   • naltrexone (REVIA) 50 mg tablet Take 1 tablet (50 mg total) by mouth daily 30 tablet 0   • potassium chloride (K-DUR,KLOR-CON) 20 mEq tablet Take 1 tablet (20 mEq total) by mouth daily for 7 doses 7 tablet 0   • thiamine 100 MG tablet Take 1 tablet (100 mg total) by mouth daily Do not start before March 31, 2023   0     No current facility-administered medications for this visit  (Not in a hospital admission)        Objective:     24 Hour Vitals Assessment:     Vitals:    05/23/23 0951   BP: 128/78   Pulse: 75   Resp: 16   Temp: 98 7 °F (37 1 °C)   SpO2: 98%       PHYSICIAN EXAM:    General: Appearance: alert, cooperative, no distress  HEENT: Normocephalic, atraumatic  No scleral icterus  conjunctivae clear  EOMI  Posterior tongue mass noted, poor dentition  Chest: No tenderness to palpation  No open wound noted  Lungs: Clear to auscultation bilaterally, Respirations unlabored  Cardiac: Regular rate and rhythm, +S1and S2  Abdomen: Soft, non-tender, non-distended  Bowel sounds are normal  Extremities:  No edema, cyanosis, clubbing  Skin: Skin color, turgor are normal  No rashes  Lymphatics: no palpable axillary, or inguinal adenopathy, palpable R sub-mandibular LN noted  Neurologic: Awake, Alert, and oriented, no gross focal deficits noted b/l         DATA REVIEW:    Pathology Result:    No results found for: [facility]       Image Results:   Image result are reviewed and documented in Hematology/Oncology history  I personally reviewed these images  XR chest pa & lateral  1 2 [id number] [id number] 1 [id number] 260 [id number] 2      LABS:  [facility]  Lab Results   Component Value Date    HGB 15 3 03/30/2023    HCT 45 2 03/30/2023    MCV 95 03/30/2023     03/30/2023    WBC 4 66 03/30/2023    NRBC 0 03/27/2023     Lab Results   Component Value Date    K 3 2 (L) 03/30/2023    CL 94 (L) 03/30/2023    CO2 28 03/30/2023    BUN 7 03/30/2023    CREATININE 0 80 03/30/2023    GLUCOSE 93 12/11/2016    CALCIUM 9 3 03/30/2023    AST 27 03/30/2023    ALT 40 03/30/2023    ALKPHOS 64 03/30/2023    EGFR 97 03/30/2023       No results found for: IRON, TIBC, FERRITIN    No results found for: [id number]    No results for input(s): WBC, CREAT in the last 72 hours      Invalid input(s):  PLT     By:  Regulo Martinez MD, 5/23/2023, 10:01 AM

## 2023-06-19 ENCOUNTER — HOSPITAL ENCOUNTER (OUTPATIENT)
Dept: NUCLEAR MEDICINE | Facility: HOSPITAL | Age: 58
Discharge: HOME | End: 2023-06-19
Attending: INTERNAL MEDICINE
Payer: COMMERCIAL

## 2023-06-19 DIAGNOSIS — C06.9 MALIGNANT NEOPLASM OF ORAL CAVITY (HCC): ICD-10-CM

## 2023-06-19 LAB — GLUCOSE SERPL-MCNC: 82 MG/DL (ref 65–140)

## 2023-06-19 PROCEDURE — G1004 CDSM NDSC: HCPCS

## 2023-06-19 PROCEDURE — 78815 PET IMAGE W/CT SKULL-THIGH: CPT

## 2023-06-19 PROCEDURE — 82948 REAGENT STRIP/BLOOD GLUCOSE: CPT

## 2023-06-19 PROCEDURE — A9552 F18 FDG: HCPCS

## 2023-06-22 ENCOUNTER — PATIENT OUTREACH (OUTPATIENT)
Dept: HEMATOLOGY ONCOLOGY | Facility: CLINIC | Age: 58
End: 2023-06-22

## 2023-06-22 DIAGNOSIS — K02.61 DENTAL CARIES ON SMOOTH SURFACE LIMITED TO ENAMEL: Primary | ICD-10-CM

## 2023-06-22 DIAGNOSIS — S02.5XXA BROKEN TEETH: ICD-10-CM

## 2023-06-22 NOTE — PROGRESS NOTES
Vijay Whalen RN sent to Michael Hubbard RN  Cc: Francine Barragan MD; Nestor Garcia  The [facility] team will work on getting him seen ASAP            Previous Messages       ----- Message -----   From: Michael Hubbard RN   Sent: 6/21/2023   8:19 AM EDT   To: Vijay Whalen RN; Francine Barragan MD     Good morning,      Dr Violetta Ambrose is recommending to see if you are able to assist with having this patient scheduled ASAP with ENT?      Thank you   [place] RN   ----- Message -----   From: Francine Barragan MD   Sent: 6/21/2023   8:13 AM EDT   To: SAQIB Cano please tag the ENT nurse navigator as a 5/23/2023 ENT referral was placed and pt has still not been scheduled with them!!! Urgent

## 2023-06-22 NOTE — PROGRESS NOTES
I obtained an appnt with Dr Donovan Degree at his [place] office   6/26/23 at [address] [place] [address]     I contacted [facility] and provided this information including the direct phone number and address to this office  He was appreciative

## 2023-06-27 ENCOUNTER — PATIENT OUTREACH (OUTPATIENT)
Dept: HEMATOLOGY ONCOLOGY | Facility: CLINIC | Age: 58
End: 2023-06-27

## 2023-06-27 NOTE — PROGRESS NOTES
I reached out to [place] after he missed his consult with the surgeon , yesterday 6/26/23, to assist with any needs rescheduling and any barriers that may exist to help him obtain the care he needs  I left VM with reason for my call including my direct phone number

## 2023-06-27 NOTE — PROGRESS NOTES
After verification that Karine David is listed on [facility] communication consent I reached out and left VM  I asked that they have Dexter Suazo contact the office to reschedule the appnt he missed at [facility] [phone]

## 2023-06-28 ENCOUNTER — PATIENT OUTREACH (OUTPATIENT)
Dept: HEMATOLOGY ONCOLOGY | Facility: CLINIC | Age: 58
End: 2023-06-28

## 2023-06-28 NOTE — PROGRESS NOTES
2nd attempt to reach [facility] to assist with missed surgical consultation appnt and offer assistance with any barriers to care  My call was declined x2

## 2023-06-29 ENCOUNTER — PATIENT OUTREACH (OUTPATIENT)
Dept: HEMATOLOGY ONCOLOGY | Facility: CLINIC | Age: 58
End: 2023-06-29

## 2023-06-29 NOTE — PROGRESS NOTES
3rd attempt to outreach [place] regarding his missed surgical consult to offer assistance with any barriers to care  No answer, no VM available  Previously outreached his father Annabelle Chi as well with no response  I have previously provided [place] and his father with my direct phone number  I will remain available if they return my call

## 2023-07-18 ENCOUNTER — TELEMEDICINE (OUTPATIENT)
Dept: HEMATOLOGY ONCOLOGY | Facility: CLINIC | Age: 58
End: 2023-07-18
Payer: COMMERCIAL

## 2023-07-18 ENCOUNTER — PATIENT OUTREACH (OUTPATIENT)
Dept: HEMATOLOGY ONCOLOGY | Facility: CLINIC | Age: 58
End: 2023-07-18

## 2023-07-18 DIAGNOSIS — C06.9 MALIGNANT NEOPLASM OF ORAL CAVITY (HCC): Primary | ICD-10-CM

## 2023-07-18 PROCEDURE — 99443 PR PHYS/QHP TELEPHONE EVALUATION 21-30 MIN: CPT | Performed by: INTERNAL MEDICINE

## 2023-07-18 NOTE — PROGRESS NOTES
Telemedicine consent    Patient: Bryan Winter  Provider: Marco Valencia MD  Provider located at [address] [place] [id number]    The patient was identified by name and date of birth. Bryan Winter was informed that this is a telemedicine visit and that the visit is being conducted through Telephone. My office door was closed. No one else was in the room. He acknowledged consent and understanding of privacy and security of the video platform. The patient has agreed to participate and understands they can discontinue the visit at any time. Patient is aware this is a billable service. Hematology Outpatient Office Note    Date of Service: 7/18/2023    [facility]EMATOLOG[facility]COL[facility] [address]  [phone]    Reason for Consultation:   Chief Complaint   Patient presents with   • Follow-up       Referral Physician: No ref. provider found    Primary Care Physician:  No primary care provider on file. ASSESSMENT & PLAN      Diagnosis ICD-10-CM Associated Orders   1. 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 c PMHx notable for HTN, nicotine abuse, alcohol abuse being seen in consultation for an oral mouth lesion detected by his general dentist     6/2/22022 CT Head w/o c: No acute hemorrhage, mass or ischemia identified  Neck w/o c: Scattered cervical chain lymph nodes. None of the visualized lymph nodes appear enlarged in size. No suspicious mass visualized on noncontrast imaging. 6/19/2023 PET/CT: Hypermetabolic right anterior oral cavity mass most concerning for malignancy. Bilateral hypermetabolic cervical adenopathy compatible with metastases. No hypermetabolic metastases in the chest abdomen pelvis. Multiple healing left rib fractures.       · Discussion of decision making    I personally reviewed the following lab results, the image studies, pathology, other specialty/physicians consult notes and recommendations, and outside medical records. I had a lengthy discussion with the patient and shared the work-up findings. We discussed the diagnosis and management plan as below. I spent 34 minutes reviewing the records (labs, clinician notes, outside records, medical history, ordering medicine/tests/procedures, interpreting the imaging/labs previously done) and coordination of care as well as direct time with the patient today, of which greater than 50% of the time was spent in counseling and coordination of care with the patient/family. · Plan/Labs  · ENT referral for biopsy of his tongue mass and further work- made and he no showed  · I emphasized that he needs to answer the phone call and get the above scheduled. He is in agreement and will do his best          Follow Up: 4 weeks    All questions were answered to the patient's satisfaction during this encounter. The patient knows the contact information for our office and knows to reach out for any relevant concerns related to this encounter. They are to call for any temperature 100.4 or higher, new symptoms including but not restricted to shaking chills, decreased appetite, nausea, vomiting, diarrhea, increased fatigue, shortness of breath or chest pain, confusion, and not feeling the strength to come to the clinic. For all other listed problems and medical diagnosis in their chart - they are managed by PCP and/or other specialists, which the patient acknowledges. Thank you very much for your consultation and making us a part of this patient's care. We are continuing to follow closely with you. Please do not hesitate to reach out to me with any additional questions or concerns. Roosevelt Bishop MD  Hematology & Medical Oncology Staff Physician             Disclaimer: This document was prepared using [employer] technology.  If a word or phrase is confusing, or does not make sense, this is likely due to recognition error which was not discovered during this clinician's review. If you believe an error has occurred, please contact me through [address] line for leslie mcrae. ONCOLOGIC HISTORY OF PRESENT ILLNESS     His general dentist saw him on May 16 and appreciated a large oral cavity lesion which was concerning for malignancy and he is thus referred to us for further management. Clotting History None   Bleeding History None   Cancer History Oral cavity suspected   Family Cancer History Dad (skin), Mom (breast)   H/O Blood/Plt Transfusion None   Tobacco/etoh/drug abuse 1 PPD x  45 years, beer (6 pack a day), no rec drug use       Cancer Screening history No colon cancer screening   Occupation Landscape work         SUBJECTIVE  (INTERVAL HISTORY)        I have reviewed the relevant past medical, surgical, social and family history. I have also reviewed allergies and medications for this patient. No new changes, doing fine. Wants to get seen now to get his cancer care under control. Review of Systems    Baseline weight: 145-150 lbs    Denies F/C, N/V, SOB, CP, LH, HA, rash, itching, gen weakness, melena, hematuria, hematochezia, falls, diarrhea, or constipation       A 10-point review of system was performed, pertinent positive and negative were detailed as above. Otherwise, the 10-point review of system was negative. Past Medical History:   Diagnosis Date   • Hypertension        No past surgical history on file. No family history on file.     Social History     Socioeconomic History   • Marital status: Single     Spouse name: Not on file   • Number of children: Not on file   • Years of education: Not on file   • Highest education level: Not on file   Occupational History   • Not on file   Tobacco Use   • Smoking status: Every Day     Packs/day: 0.50     Types: Cigarettes   • Smokeless tobacco: Never   Substance and Sexual Activity   • Alcohol use: Yes     Comment: 12+ beers/day 24 oz beer   • Drug use: No   • Sexual activity: Not on file   Other Topics Concern   • Not on file   Social History Narrative   • Not on file     Social Determinants of Health     Financial Resource Strain: Not on file   Food Insecurity: Not on file   Transportation Needs: Not on file   Physical Activity: Not on file   Stress: Not on file   Social Connections: Not on file   Intimate Partner Violence: Not on file   Housing Stability: Not on file       Allergies   Allergen Reactions   • Bee Venom Hives   • Other      bees       Current Outpatient Medications   Medication Sig Dispense Refill   • acetaminophen (TYLENOL) 650 mg CR tablet Take 1 tablet (650 mg total) by mouth every 8 (eight) hours as needed for mild pain 30 tablet 0   • amLODIPine (NORVASC) 5 mg tablet Take 1 tablet (5 mg total) by mouth daily 30 tablet 0   • benzocaine (Instant Oral Pain Relief Max) 20 % 30 application. by Mucosal route 2 (two) times a day as needed for mucositis 30 g 0   • chlorhexidine (PERIDEX) 0.12 % solution Apply 15 mL to the mouth or throat 2 (two) times a day 593 mL 0   • folic acid (FOLVITE) 1 mg tablet Take 1 tablet (1 mg total) by mouth daily Do not start before March 31, 2023.  0   • magnesium gluconate (MAGONATE) 500 mg tablet Take 1 tablet (500 mg total) by mouth in the morning for 7 days 7 tablet 0   • naltrexone (REVIA) 50 mg tablet Take 1 tablet (50 mg total) by mouth daily 30 tablet 0   • potassium chloride (K-DUR,KLOR-CON) 20 mEq tablet Take 1 tablet (20 mEq total) by mouth daily for 7 doses 7 tablet 0   • thiamine 100 MG tablet Take 1 tablet (100 mg total) by mouth daily Do not start before March 31, 2023.  0     No current facility-administered medications for this visit. (Not in a hospital admission)        Objective:     24 Hour Vitals Assessment:     There were no vitals filed for this visit.      Below not updated as this was a tele visit    PHYSICIAN EXAM:    General: Appearance: alert, cooperative, no distress. HEENT: Normocephalic, atraumatic. No scleral icterus. conjunctivae clear. EOMI. Posterior tongue mass noted, poor dentition  Chest: No tenderness to palpation. No open wound noted. Lungs: Clear to auscultation bilaterally, Respirations unlabored. Cardiac: Regular rate and rhythm, +S1and S2  Abdomen: Soft, non-tender, non-distended. Bowel sounds are normal  Extremities:  No edema, cyanosis, clubbing. Skin: Skin color, turgor are normal. No rashes. Lymphatics: no palpable axillary, or inguinal adenopathy, palpable R sub-mandibular LN noted  Neurologic: Awake, Alert, and oriented, no gross focal deficits noted b/l. DATA REVIEW:    Pathology Result:    No results found for: "FINALDX"       Image Results:   Image result are reviewed and documented in Hematology/Oncology history. I personally reviewed these images. NM PET CT skull base to mid thigh  Narrative: WHOLE-BODY PET/CT SCAN    INDICATION: C06.9: Malignant neoplasm of mouth, unspecified, tongue mass    MODIFIER: PI    COMPARISON: CT 3/3/2020 and priors    CELL TYPE: None    TECHNIQUE:  Following the intravenous administration of 8.4 mCi F-18-FDG, dedicated head and neck images were obtained from the skull vertex to the thoracic inlet with the patient's arms at their side 60 minutes post injection. Subsequently, whole body images were   obtained from the thoracic inlet through the proximal femurs with the patients arms above their head. Multiplanar attenuation corrected and non attenuation corrected PET images are available for interpretation. Contiguous, low dose, axial CT sections   were also obtained from the head through the thoracic inlet and from the thoracic inlet through the proximal femurs. Intravenous contrast material was not utilized. Additional coronal and sagittal images are available as well as fused PET/CT images.     This examination, like all CT scans performed in the [facility], was performed utilizing techniques to minimize radiation dose exposure, including the use of iterative reconstruction and automated exposure control. Fasting serum glucose: 82 mg/dl    FINDINGS:    VISUALIZED BRAIN:  No acute abnormalities are seen. HEAD/NECK:  There is a hypermetabolic mass in the right anterior oral cavity, SUV 25, most compatible with malignancy. This measures approximately 3.2 x 2.1 x 1.7 cm based on the PET images. Involvement of the adjacent anterior right mandible is not excluded. Additional bilateral oral cavity FDG activity may be physiologic, though this may be correlated clinically. There are multiple hypermetabolic bilateral cervical nodes, compatible with metastases. On the right, these extend from just below the level of the hyoid to the level of the parotid. On the left, hypermetabolic nodes are visualized anterior and posterior   to the submandibular gland. Example right upper cervical node posterior to the submandibular gland has an SUV of 16. This measures 2.2 x 1.6 cm on image 9/57. There is central photopenia compatible with necrosis. Right cervical node along the anterior sternocleidomastoid muscle at the level of the hyoid image 9/63 measures 1.1 x 1.1 cm, SUV 15. Hypermetabolic node anterior to the left submandibular gland image 9/61 measures 1.5 x 0.9 cm, SUV 19. CT images: Otherwise unremarkable. CHEST:    Calcified mediastinal and right hilar nodes with mild FDG activity are likely reactive/inflammatory. Right hilar SUV measures 4.1. Left hilar SUV measures 3.2. Example subcentimeter precarinal nodes have an SUV of 3.6. Small AP window nodes have an SUV of 2.7. CT images: Coronary atherosclerosis. Severe emphysema. Right apical pleural-parenchymal scarring. ABDOMEN:  No FDG avid soft tissue lesions are seen. CT images: Splenic calcifications/granulomas. Parapelvic right renal cysts. Hepatic granuloma. PELVIS:  No FDG avid soft tissue lesions are seen. CT images: Unremarkable. OSSEOUS STRUCTURES:  There are multiple healing left lateral rib fractures, including the left third, sixth, seventh, eighth, and ninth ribs. Increased [facility] is likely inflammatory. CT images: Otherwise unremarkable. Impression: 1. Hypermetabolic right anterior oral cavity mass most concerning for malignancy. 2. Bilateral hypermetabolic cervical adenopathy compatible with metastases. 3. No hypermetabolic metastases in the chest abdomen pelvis. 4. Multiple healing left rib fractures. Workstation performed: [id number]      LABS:  Lab data are reviewed and documented in HemOn history. Lab Results   Component Value Date    HGB 15.3 03/30/2023    HCT 45.2 03/30/2023    MCV 95 03/30/2023     03/30/2023    WBC 4.66 03/30/2023    NRBC 0 03/27/2023     Lab Results   Component Value Date    K 3.2 (L) 03/30/2023    CL 94 (L) 03/30/2023    CO2 28 03/30/2023    BUN 7 03/30/2023    CREATININE 0.80 03/30/2023    GLUCOSE 93 12/11/2016    CALCIUM 9.3 03/30/2023    AST 27 03/30/2023    ALT 40 03/30/2023    ALKPHOS 64 03/30/2023    EGFR 97 03/30/2023       No results found for: "IRON", "TIBC", "FERRITIN"    No results found for: "Jacinta Elke"    No results for input(s): "WBC", "CREAT", "PLT" in the last 72 hours.      By:  Slime Pleitez MD, 7/18/2023, 1:42 PM

## 2023-07-18 NOTE — Clinical Note
Miriam Steve is able to be reached now at his cell number and is hoping to get set up with ENT now. He needs a biopsy.   meir

## 2023-07-18 NOTE — PROGRESS NOTES
Initial outreach. Called and spoke to patient. Introduced myself and explained the role of a Nurse Navigator. Reviewed upcoming appointments including date, time and location. Assessed for barriers of care. Patient missed his appointment with Zana Fitzpatrick on 6/26/23. I informed patient I was asked to help with rescheduling. Patient stated he received a call today to reschedule but he was unavailable to take the call. He said he tried to call back but the number was disconnected. I asked the patient if he would like me to assist with getting rescheduled. He stated he would appreciate my help. Called and spoke to surgery scheduler with ENT. Patient now scheduled with Dr. Zana Fitzpatrick on Tuesday, 7/25/23 at 5:30 in [facility]. Patient made aware of new appointment. Provided date, time and address. Patient denies transportation issues at this time. He has cut back to smoking a half pack/day but is not interested in quitting completely. He notes pain in his bottom teeth. Patient stated he was told they need to be pulled but he has not heard from anyone about this. He denies weight loss but stated he is eating softer foods. I provided my direct contact information for questions or concerns. I informed him either myself or Oncology Care Coordinator, Parrish Cowart would be reaching out periodically.

## 2023-07-20 ENCOUNTER — TELEPHONE (OUTPATIENT)
Dept: NUTRITION | Facility: CLINIC | Age: 58
End: 2023-07-20

## 2023-07-20 ENCOUNTER — PATIENT OUTREACH (OUTPATIENT)
Dept: CASE MANAGEMENT | Facility: HOSPITAL | Age: 58
End: 2023-07-20

## 2023-07-20 NOTE — TELEPHONE ENCOUNTER
Received notification by GILDA Keita., on 7/18/23 that pt has triggered for oncology nutrition care (reason for referral: HNC dx and Malnutrition Screening Tool (MST) Triggers: scored a 0 indicating No recent wt loss and is not eating poorly due to a decreased appetite. (Date of MST: 7/18/23)). [facility] today to establish care. No answer. Left voice message with the reason for today's call and this RD’s contact information asking that Colin call back as able/desired.

## 2023-07-20 NOTE — PROGRESS NOTES
OSW received referral for patient. Patient saw Dr. Makenna Vaca for consult on 5/23/23 and follow up on 7/18/23. Patient had scheduled appointment with ENT for biopsy however missed his appointments. Appointment has been rescheduled for 7/25/23, LSW will follow for pathology.

## 2023-07-26 ENCOUNTER — PATIENT OUTREACH (OUTPATIENT)
Dept: HEMATOLOGY ONCOLOGY | Facility: CLINIC | Age: 58
End: 2023-07-26

## 2023-07-26 ENCOUNTER — PATIENT OUTREACH (OUTPATIENT)
Dept: CASE MANAGEMENT | Facility: HOSPITAL | Age: 58
End: 2023-07-26

## 2023-07-26 NOTE — PROGRESS NOTES
OSW completed chart review. Patient appointment with ENT on 7/25/23 needed to be rescheduled. New appointment not set up at this time. LSW will continue to follow.

## 2023-07-26 NOTE — PROGRESS NOTES
Received notification that Andrae Beltran "no showed" his appnt to consult with ENT surgeon, to obtain tissue for pathology, that was rescheduled for yesterday 7/25/23 . The original appnt was scheduled 6/26/23 which he also "no showed". The office attempted to contact him to reschedule these appnts. I reached out to Andrae Beltran today to assess for barriers and offer assistance as needed to obtain surgical consult. I left  requesting return call to discuss.

## 2023-07-31 ENCOUNTER — PATIENT OUTREACH (OUTPATIENT)
Dept: CASE MANAGEMENT | Facility: HOSPITAL | Age: 58
End: 2023-07-31

## 2023-07-31 NOTE — PROGRESS NOTES
OSW completed chart review for patient. Patient has appointment scheduled with Dr. Jacklyn Donnelly on 8/15/23. LSW will follow.

## 2023-08-01 ENCOUNTER — PATIENT OUTREACH (OUTPATIENT)
Dept: HEMATOLOGY ONCOLOGY | Facility: CLINIC | Age: 58
End: 2023-08-01

## 2023-08-01 NOTE — PROGRESS NOTES
I reached out as second attempt. Pt "no showed" ENT consult for surgical eval x2. This is not yet rescheduled. I left  for [facility] requesting a return call so that I can assist in rescheduling and offer any assistance he may need.

## 2023-08-02 ENCOUNTER — PATIENT OUTREACH (OUTPATIENT)
Dept: HEMATOLOGY ONCOLOGY | Facility: CLINIC | Age: 58
End: 2023-08-02

## 2023-08-02 NOTE — PROGRESS NOTES
Third attempt made to reach Dixie regarding his second missed appnt (no show")  for consult with ENT surgery. I left  urging him to contact [facility] where he was previously scheduled and provided their direct number. I included my direct number as well if he has questions or needs assistance.

## 2023-08-04 ENCOUNTER — APPOINTMENT (EMERGENCY)
Dept: CT IMAGING | Facility: HOSPITAL | Age: 58
End: 2023-08-04
Payer: COMMERCIAL

## 2023-08-04 ENCOUNTER — HOSPITAL ENCOUNTER (EMERGENCY)
Facility: HOSPITAL | Age: 58
Discharge: HOME | End: 2023-08-04
Attending: EMERGENCY MEDICINE
Payer: COMMERCIAL

## 2023-08-04 VITALS
TEMPERATURE: 97.6 F | OXYGEN SATURATION: 99 % | RESPIRATION RATE: 16 BRPM | HEART RATE: 74 BPM | HEIGHT: 75 IN | WEIGHT: 160 LBS | DIASTOLIC BLOOD PRESSURE: 87 MMHG | SYSTOLIC BLOOD PRESSURE: 124 MMHG | BODY MASS INDEX: 19.89 KG/M2

## 2023-08-04 DIAGNOSIS — F10.929 ALCOHOL INTOXICATION (HCC): Primary | ICD-10-CM

## 2023-08-04 DIAGNOSIS — R42 LIGHTHEADEDNESS: ICD-10-CM

## 2023-08-04 LAB
2HR DELTA HS TROPONIN: 1 NG/L
ANION GAP SERPL CALCULATED.3IONS-SCNC: 10 MMOL/L
ATRIAL RATE: 73 BPM
B BURGDOR IGG SERPL QL IA: NEGATIVE
B BURGDOR IGG+IGM SER QL IA: POSITIVE
B BURGDOR IGM SERPL QL IA: NEGATIVE
BASOPHILS # BLD AUTO: 0.05 THOUSANDS/ÂΜL (ref 0–0.1)
BASOPHILS NFR BLD AUTO: 1 % (ref 0–1)
BUN SERPL-MCNC: 6 MG/DL (ref 5–25)
CALCIUM SERPL-MCNC: 9.3 MG/DL (ref 8.4–10.2)
CARDIAC TROPONIN I PNL SERPL HS: 8 NG/L
CARDIAC TROPONIN I PNL SERPL HS: 9 NG/L
CHLORIDE SERPL-SCNC: 92 MMOL/L (ref 96–108)
CO2 SERPL-SCNC: 27 MMOL/L (ref 21–32)
CREAT SERPL-MCNC: 0.73 MG/DL (ref 0.6–1.3)
EOSINOPHIL # BLD AUTO: 0.07 THOUSAND/ÂΜL (ref 0–0.61)
EOSINOPHIL NFR BLD AUTO: 1 % (ref 0–6)
ERYTHROCYTE [DISTWIDTH] IN BLOOD BY AUTOMATED COUNT: 12.2 % (ref 11.6–15.1)
ETHANOL SERPL-MCNC: 433 MG/DL
GFR SERPL CREATININE-BSD FRML MDRD: 100 ML/MIN/1.73SQ M
GLUCOSE SERPL-MCNC: 96 MG/DL (ref 65–140)
HCT VFR BLD AUTO: 41.1 % (ref 36.5–49.3)
HGB BLD-MCNC: 14.5 G/DL (ref 12–17)
IMM GRANULOCYTES # BLD AUTO: 0.02 THOUSAND/UL (ref 0–0.2)
IMM GRANULOCYTES NFR BLD AUTO: 0 % (ref 0–2)
LYMPHOCYTES # BLD AUTO: 2.36 THOUSANDS/ÂΜL (ref 0.6–4.47)
LYMPHOCYTES NFR BLD AUTO: 36 % (ref 14–44)
MAGNESIUM SERPL-MCNC: 1.8 MG/DL (ref 1.9–2.7)
MCH RBC QN AUTO: 33.6 PG (ref 26.8–34.3)
MCHC RBC AUTO-ENTMCNC: 35.3 G/DL (ref 31.4–37.4)
MCV RBC AUTO: 95 FL (ref 82–98)
MONOCYTES # BLD AUTO: 0.9 THOUSAND/ÂΜL (ref 0.17–1.22)
MONOCYTES NFR BLD AUTO: 14 % (ref 4–12)
NEUTROPHILS # BLD AUTO: 3.17 THOUSANDS/ÂΜL (ref 1.85–7.62)
NEUTS SEG NFR BLD AUTO: 48 % (ref 43–75)
NRBC BLD AUTO-RTO: 0 /100 WBCS
P AXIS: 79 DEGREES
PLATELET # BLD AUTO: 256 THOUSANDS/UL (ref 149–390)
PMV BLD AUTO: 9 FL (ref 8.9–12.7)
POTASSIUM SERPL-SCNC: 3.8 MMOL/L (ref 3.5–5.3)
PR INTERVAL: 138 MS
QRS AXIS: 76 DEGREES
QRSD INTERVAL: 94 MS
QT INTERVAL: 404 MS
QTC INTERVAL: 445 MS
RBC # BLD AUTO: 4.32 MILLION/UL (ref 3.88–5.62)
SODIUM SERPL-SCNC: 129 MMOL/L (ref 135–147)
T WAVE AXIS: 76 DEGREES
VENTRICULAR RATE: 73 BPM
WBC # BLD AUTO: 6.57 THOUSAND/UL (ref 4.31–10.16)

## 2023-08-04 PROCEDURE — 93010 ELECTROCARDIOGRAM REPORT: CPT | Performed by: INTERNAL MEDICINE

## 2023-08-04 PROCEDURE — 99285 EMERGENCY DEPT VISIT HI MDM: CPT | Performed by: EMERGENCY MEDICINE

## 2023-08-04 PROCEDURE — 86618 LYME DISEASE ANTIBODY: CPT | Performed by: EMERGENCY MEDICINE

## 2023-08-04 PROCEDURE — 86617 LYME DISEASE ANTIBODY: CPT | Performed by: EMERGENCY MEDICINE

## 2023-08-04 PROCEDURE — 82077 ASSAY SPEC XCP UR&BREATH IA: CPT | Performed by: EMERGENCY MEDICINE

## 2023-08-04 PROCEDURE — G1004 CDSM NDSC: HCPCS

## 2023-08-04 PROCEDURE — 93005 ELECTROCARDIOGRAM TRACING: CPT

## 2023-08-04 PROCEDURE — 84484 ASSAY OF TROPONIN QUANT: CPT | Performed by: EMERGENCY MEDICINE

## 2023-08-04 PROCEDURE — 36415 COLL VENOUS BLD VENIPUNCTURE: CPT | Performed by: EMERGENCY MEDICINE

## 2023-08-04 PROCEDURE — 80048 BASIC METABOLIC PNL TOTAL CA: CPT | Performed by: EMERGENCY MEDICINE

## 2023-08-04 PROCEDURE — 96361 HYDRATE IV INFUSION ADD-ON: CPT

## 2023-08-04 PROCEDURE — 85025 COMPLETE CBC W/AUTO DIFF WBC: CPT | Performed by: EMERGENCY MEDICINE

## 2023-08-04 PROCEDURE — 83735 ASSAY OF MAGNESIUM: CPT | Performed by: EMERGENCY MEDICINE

## 2023-08-04 PROCEDURE — 70450 CT HEAD/BRAIN W/O DYE: CPT

## 2023-08-04 PROCEDURE — 96360 HYDRATION IV INFUSION INIT: CPT

## 2023-08-04 PROCEDURE — 99284 EMERGENCY DEPT VISIT MOD MDM: CPT

## 2023-08-04 RX ORDER — FOLIC ACID 1 MG/1
1 TABLET ORAL ONCE
Status: COMPLETED | OUTPATIENT
Start: 2023-08-04 | End: 2023-08-04

## 2023-08-04 RX ORDER — LANOLIN ALCOHOL/MO/W.PET/CERES
100 CREAM (GRAM) TOPICAL ONCE
Status: COMPLETED | OUTPATIENT
Start: 2023-08-04 | End: 2023-08-04

## 2023-08-04 RX ADMIN — SODIUM CHLORIDE 1000 ML: 0.9 INJECTION, SOLUTION INTRAVENOUS at 11:47

## 2023-08-04 RX ADMIN — THIAMINE HCL TAB 100 MG 100 MG: 100 TAB at 11:52

## 2023-08-04 RX ADMIN — FOLIC ACID 1 MG: 1 TABLET ORAL at 11:52

## 2023-08-04 NOTE — ED NOTES
Pt requesting something to eat and his IV taken out. When told no, pt requesting to leave. Educated on inability to leave at this time d/t ethanol. Pt verbalized understanding. MD aware.       Ori Blevins RN  08/04/23 [id number]

## 2023-08-04 NOTE — ED NOTES
Patient transported to [address] [place] [address], [address] [place] [address]  08/04/23 [id number]

## 2023-08-04 NOTE — ED NOTES
Pt observed resting with eyes closed in the bed with rhythmic breathing.       Daphne Qureshi RN  08/04/23 7865

## 2023-08-04 NOTE — ED NOTES
Pt requesting to leave, denies having any sober ride home. Pt educated on hospital policy and requirement to stay until sober. Pt agitated, de-escalated and reduced stimulation. Pt educated on result of attempt to elope.       Randy Howe RN  08/04/23 8147

## 2023-08-04 NOTE — ED NOTES
Problem: Pain:  Goal: Pain level will decrease  Description: Pain level will decrease  11/25/2021 1016 by Beau Miranda RN  Outcome: Completed  11/25/2021 0237 by Wilner Lopez RN  Outcome: Ongoing  Goal: Control of acute pain  Description: Control of acute pain  11/25/2021 1016 by Beau Miranda RN  Outcome: Completed  11/25/2021 0237 by Wilner Lopez RN  Outcome: Ongoing  Goal: Control of chronic pain  Description: Control of chronic pain  11/25/2021 1016 by Beau Miranda RN  Outcome: Completed  11/25/2021 0237 by Wilner Lopez RN  Outcome: Ongoing  Goal: Patient's pain/discomfort is manageable  Description: Patient's pain/discomfort is manageable  11/25/2021 1016 by Beau Miranda RN  Outcome: Completed  11/25/2021 0237 by Wilner Lopez RN  Outcome: Ongoing     Problem: Infection:  Goal: Will remain free from infection  Description: Will remain free from infection  11/25/2021 1016 by Beau Miranda RN  Outcome: Completed  11/25/2021 0237 by Wilner Lopez RN  Outcome: Ongoing     Problem: Safety:  Goal: Free from accidental physical injury  Description: Free from accidental physical injury  11/25/2021 1016 by Beau Miranda RN  Outcome: Completed  11/25/2021 0237 by Wilner Lopez RN  Outcome: Ongoing  Goal: Free from intentional harm  Description: Free from intentional harm  11/25/2021 1016 by Beau Miranda RN  Outcome: Completed  11/25/2021 0237 by Wilner Lopez RN  Outcome: Ongoing     Problem: Daily Care:  Goal: Daily care needs are met  Description: Daily care needs are met  11/25/2021 1016 by Beau Miranda RN  Outcome: Completed  11/25/2021 0237 by Wilner Lopez RN  Outcome: Ongoing     Problem: Skin Integrity:  Goal: Skin integrity will stabilize  Description: Skin integrity will stabilize  11/25/2021 1016 by Beau Miranda RN  Outcome: Completed  11/25/2021 0237 by Wilner Lopez RN  Outcome: Ongoing     Problem: Discharge Planning:  Goal: Patients continuum of care needs are met  Description: Patients continuum of care Pt awake and angry that they are still in the ED. Asked if the pt has means of a ride home, pt "Yeah, I am going to take the bus. I need food and I need to get out of here." Offered the pt a boxed lunch, pt "I aint eating that shit". Updated the attending on the pt's desire to leave.       David West RN  08/04/23 6213 needs are met  11/25/2021 1016 by Marlyn Howard RN  Outcome: Completed  11/25/2021 0237 by Anh Lynch RN  Outcome: Ongoing

## 2023-08-04 NOTE — ED NOTES
Attending at the bedside speaking with the pt. PT provided with papers by the attending.  Pt left with all personal belongings and ambulated without difficulty      Dax Keen RN  08/04/23 4116

## 2023-08-06 NOTE — PROGRESS NOTES
Telemedicine consent    Patient: Lien Mederos  Provider: Shagufta Bermeo MD  Provider located at [facility] [address] [address]    The patient was identified by name and date of birth. Lien Mederos was informed that this is a telemedicine visit and that the visit is being conducted through Telephone. My office door was closed. No one else was in the room. He acknowledged consent and understanding of privacy and security of the video platform. The patient has agreed to participate and understands they can discontinue the visit at any time. Hematology Outpatient Office Note    Date of Service: 8/15/2023    [facility] HEMATOLOGY/MEDICAL ONCOLOGY SPECIALISTS [place] [address]  [phone]    Reason for Consultation:   No chief complaint on file. Referral Physician: No ref. provider found    Primary Care Physician:  No primary care provider on file. ASSESSMENT & PLAN      Diagnosis ICD-10-CM Associated Orders   1. 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 c PMHx notable for HTN, nicotine abuse, alcohol abuse being seen in consultation for an oral mouth lesion detected by his general dentist     6/2/22022 CT Head w/o c: No acute hemorrhage, mass or ischemia identified  Neck w/o c: Scattered cervical chain lymph nodes. None of the visualized lymph nodes appear enlarged in size. No suspicious mass visualized on noncontrast imaging. 6/19/2023 PET/CT: Hypermetabolic right anterior oral cavity mass most concerning for malignancy. Bilateral hypermetabolic cervical adenopathy compatible with metastases. No hypermetabolic metastases in the chest abdomen pelvis. Multiple healing left rib fractures.       · Discussion of decision making    I personally reviewed the following lab results, the image studies, pathology, other specialty/physicians consult notes and recommendations, and outside medical records. I had a lengthy discussion with the patient and shared the work-up findings. We discussed the diagnosis and management plan as below. I spent 34 minutes reviewing the records (labs, clinician notes, outside records, medical history, ordering medicine/tests/procedures, interpreting the imaging/labs previously done) and coordination of care as well as direct time with the patient today, of which greater than 50% of the time was spent in counseling and coordination of care with the patient/family. · Plan/Labs  · ENT referral for biopsy of his tongue mass and further work- made and he no showed  · I emphasized that he needs to answer the phone call and get the above scheduled. He is in agreement and will do his best          Follow Up: 6 weeks    All questions were answered to the patient's satisfaction during this encounter. The patient knows the contact information for our office and knows to reach out for any relevant concerns related to this encounter. They are to call for any temperature 100.4 or higher, new symptoms including but not restricted to shaking chills, decreased appetite, nausea, vomiting, diarrhea, increased fatigue, shortness of breath or chest pain, confusion, and not feeling the strength to come to the clinic. For all other listed problems and medical diagnosis in their chart - they are managed by PCP and/or other specialists, which the patient acknowledges. Thank you very much for your consultation and making us a part of this patient's care. We are continuing to follow closely with you. Please do not hesitate to reach out to me with any additional questions or concerns. Roosevelt Baum MD  Hematology & Medical Oncology Staff Physician             Disclaimer: This document was prepared using [employer] Fluency Direct technology.  If a word or phrase is confusing, or does not make sense, this is likely due to recognition error which was not discovered during this clinician's review. If you believe an error has occurred, please contact me through [address] line for [email or web address]. ONCOLOGIC HISTORY OF PRESENT ILLNESS     His general dentist saw him on May 16 and appreciated a large oral cavity lesion which was concerning for malignancy and he is thus referred to us for further management. Clotting History None   Bleeding History None   Cancer History Oral cavity suspected   Family Cancer History Dad (skin), Mom (breast)   H/O Blood/Plt Transfusion None   Tobacco/etoh/drug abuse 1 PPD x  45 years, beer (6 pack a day), no rec drug use       Cancer Screening history No colon cancer screening   Occupation Landscape work         SUBJECTIVE  (INTERVAL HISTORY)        I have reviewed the relevant past medical, surgical, social and family history. I have also reviewed allergies and medications for this patient. No new changes, doing fine. Wants to get seen now to get his cancer care under control. He has been working so much that he has been missing appts. Review of Systems    Baseline weight: 145-150 lbs    Denies F/C, N/V, SOB, CP, LH, HA, rash, itching, gen weakness, melena, hematuria, hematochezia, falls, diarrhea, or constipation       A 10-point review of system was performed, pertinent positive and negative were detailed as above. Otherwise, the 10-point review of system was negative. Past Medical History:   Diagnosis Date   • Alcohol abuse    • Hypertension        No past surgical history on file. No family history on file.     Social History     Socioeconomic History   • Marital status: Single     Spouse name: Not on file   • Number of children: Not on file   • Years of education: Not on file   • Highest education level: Not on file   Occupational History   • Not on file   Tobacco Use   • Smoking status: Every Day     Packs/day: 0.50     Types: Cigarettes   • Smokeless tobacco: Never   Vaping Use • Vaping Use: Never used   Substance and Sexual Activity   • Alcohol use: Yes     Comment: 12+ beers/day 24 oz beer   • Drug use: No   • Sexual activity: Not on file   Other Topics Concern   • Not on file   Social History Narrative   • Not on file     Social Determinants of Health     Financial Resource Strain: Not on file   Food Insecurity: Not on file   Transportation Needs: Not on file   Physical Activity: Not on file   Stress: Not on file   Social Connections: Not on file   Intimate Partner Violence: Not on file   Housing Stability: Not on file       Allergies   Allergen Reactions   • Bee Venom Hives   • Other      bees       Current Outpatient Medications   Medication Sig Dispense Refill   • acetaminophen (TYLENOL) 650 mg CR tablet Take 1 tablet (650 mg total) by mouth every 8 (eight) hours as needed for mild pain 30 tablet 0   • amLODIPine (NORVASC) 5 mg tablet Take 1 tablet (5 mg total) by mouth daily (Patient not taking: Reported on 8/4/2023) 30 tablet 0   • benzocaine (Instant Oral Pain Relief Max) 20 % 30 application. by Mucosal route 2 (two) times a day as needed for mucositis 30 g 0   • chlorhexidine (PERIDEX) 0.12 % solution Apply 15 mL to the mouth or throat 2 (two) times a day 206 mL 0   • folic acid (FOLVITE) 1 mg tablet Take 1 tablet (1 mg total) by mouth daily Do not start before March 31, 2023.  0   • magnesium gluconate (MAGONATE) 500 mg tablet Take 1 tablet (500 mg total) by mouth in the morning for 7 days 7 tablet 0   • naltrexone (REVIA) 50 mg tablet Take 1 tablet (50 mg total) by mouth daily 30 tablet 0   • potassium chloride (K-DUR,KLOR-CON) 20 mEq tablet Take 1 tablet (20 mEq total) by mouth daily for 7 doses 7 tablet 0   • thiamine 100 MG tablet Take 1 tablet (100 mg total) by mouth daily Do not start before March 31, 2023.  0     No current facility-administered medications for this visit.        (Not in a hospital admission)        Objective:     24 Hour Vitals Assessment:     There were no vitals filed for this visit. Below not updated as this was a tele visit    PHYSICIAN EXAM:    General: Appearance: alert, cooperative, no distress. HEENT: Normocephalic, atraumatic. No scleral icterus. conjunctivae clear. EOMI. Posterior tongue mass noted, poor dentition  Chest: No tenderness to palpation. No open wound noted. Lungs: Clear to auscultation bilaterally, Respirations unlabored. Cardiac: Regular rate and rhythm, +S1and S2  Abdomen: Soft, non-tender, non-distended. Bowel sounds are normal  Extremities:  No edema, cyanosis, clubbing. Skin: Skin color, turgor are normal. No rashes. Lymphatics: no palpable axillary, or inguinal adenopathy, palpable R sub-mandibular LN noted  Neurologic: Awake, Alert, and oriented, no gross focal deficits noted b/l. DATA REVIEW:    Pathology Result:    No results found for: "FINALDX"       Image Results:   Image result are reviewed and documented in Hematology/Oncology history. I personally reviewed these images. CT head without contrast  Narrative: CT BRAIN - WITHOUT CONTRAST    INDICATION:   Mental status change, alcohol/drug use  AMS, alchohol intoxication, dizziness. COMPARISON: CT head 3/16/2018    TECHNIQUE:  CT examination of the brain was performed. Multiplanar 2D reformatted images were created from the source data. Radiation dose length product (DLP) for this visit:  724 mGy-cm . This examination, like all CT scans performed in the [facility], was performed utilizing techniques to minimize radiation dose exposure, including the use of iterative   reconstruction and automated exposure control. IMAGE QUALITY:  Diagnostic. FINDINGS:    PARENCHYMA:  No intracranial mass, mass effect or midline shift. No CT signs of acute infarction. No acute parenchymal hemorrhage. VENTRICLES AND EXTRA-AXIAL SPACES:  Normal for the patient's age. VISUALIZED ORBITS: Normal visualized orbits.     PARANASAL SINUSES: Normal visualized paranasal sinuses. CALVARIUM AND EXTRACRANIAL SOFT TISSUES:  Normal.  Impression: No acute intracranial abnormality. Workstation performed: [id number]      LABS:  Lab data are reviewed and documented in HemOn history. Lab Results   Component Value Date    HGB 14.5 08/04/2023    HCT 41.1 08/04/2023    MCV 95 08/04/2023     08/04/2023    WBC 6.57 08/04/2023    NRBC 0 08/04/2023     Lab Results   Component Value Date    K 3.8 08/04/2023    CL 92 (L) 08/04/2023    CO2 27 08/04/2023    BUN 6 08/04/2023    CREATININE 0.73 08/04/2023    GLUCOSE 93 12/11/2016    CALCIUM 9.3 08/04/2023    AST 27 03/30/2023    ALT 40 03/30/2023    ALKPHOS 64 03/30/2023    EGFR 100 08/04/2023       No results found for: "IRON", "TIBC", "FERRITIN"    No results found for: "Janna Slate"    No results for input(s): "WBC", "CREAT", "PLT" in the last 72 hours.      By:  Сергей Maher MD, 8/15/2023, 1:37 PM

## 2023-08-15 ENCOUNTER — PATIENT OUTREACH (OUTPATIENT)
Dept: HEMATOLOGY ONCOLOGY | Facility: CLINIC | Age: 58
End: 2023-08-15

## 2023-08-15 ENCOUNTER — TELEMEDICINE (OUTPATIENT)
Dept: HEMATOLOGY ONCOLOGY | Facility: CLINIC | Age: 58
End: 2023-08-15
Payer: COMMERCIAL

## 2023-08-15 DIAGNOSIS — C06.9 MALIGNANT NEOPLASM OF ORAL CAVITY (HCC): Primary | ICD-10-CM

## 2023-08-15 PROCEDURE — G2012 BRIEF CHECK IN BY MD/QHP: HCPCS | Performed by: INTERNAL MEDICINE

## 2023-08-15 NOTE — PROGRESS NOTES
Received message from Dr. Jesus Bradford stating he met with the patient today via virtual appointment and emphasized with the patient that he needs to keep his appointments.   He stated the patient promises to now take a day off from work in order to meet with the ENT team.

## 2023-08-15 NOTE — PROGRESS NOTES
Called and spoke to the patient. Informed him I was asked by Dr. Stevie Donnelly to assist with getting him scheduled with ENT. Patient stated he needs at least a weeks notice in order to take the day off. I reviewed with the patient this will be the third consult with ENT and he needs to keep it as he no showed the other two. He voiced understanding. Informed the patient I would call him back with an ENT appointment. He was appreciative.

## 2023-08-15 NOTE — Clinical Note
Darell Carvajal I met with this patient today via virtual appointment.   I emphasized he needs to keep his appointments and he promises to now take a day off from work in order to meet with the ENT team.  Would they be willing to meet with him and schedule an appointment again?  meir

## 2023-08-17 ENCOUNTER — PATIENT OUTREACH (OUTPATIENT)
Dept: HEMATOLOGY ONCOLOGY | Facility: CLINIC | Age: 58
End: 2023-08-17

## 2023-08-17 ENCOUNTER — PATIENT OUTREACH (OUTPATIENT)
Dept: CASE MANAGEMENT | Facility: HOSPITAL | Age: 58
End: 2023-08-17

## 2023-08-17 NOTE — PROGRESS NOTES
OSW completed chart review. Patient has appointment with ENT on 8/29/23. LSW will follow. Patient does not have dx at this time.

## 2023-08-17 NOTE — PROGRESS NOTES
Called patient and informed him he is scheduled with Dr. Elaine Tavera on Tuesday, 8/29/23 at 10:00 in [facility]. He confirmed he will be able to make the appointment.

## 2023-08-28 ENCOUNTER — PATIENT OUTREACH (OUTPATIENT)
Dept: HEMATOLOGY ONCOLOGY | Facility: CLINIC | Age: 58
End: 2023-08-28

## 2023-08-28 NOTE — PROGRESS NOTES
Received message from patient requesting call back. Returned call and spoke to patient. Provided the address and directions for Dr. Johnny Bryant office in [facility]. Informed him I would call later this week to check in following his consult with Dr. Garcia Sharif. He was appreciative.

## 2023-08-29 ENCOUNTER — DOCUMENTATION (OUTPATIENT)
Dept: HEMATOLOGY ONCOLOGY | Facility: CLINIC | Age: 58
End: 2023-08-29

## 2023-08-29 ENCOUNTER — PATIENT OUTREACH (OUTPATIENT)
Dept: HEMATOLOGY ONCOLOGY | Facility: CLINIC | Age: 58
End: 2023-08-29

## 2023-08-29 PROCEDURE — 88305 TISSUE EXAM BY PATHOLOGIST: CPT | Performed by: PATHOLOGY

## 2023-08-29 PROCEDURE — 88342 IMHCHEM/IMCYTCHM 1ST ANTB: CPT | Performed by: PATHOLOGY

## 2023-08-29 PROCEDURE — 88365 INSITU HYBRIDIZATION (FISH): CPT | Performed by: PATHOLOGY

## 2023-08-29 NOTE — PROGRESS NOTES
Patient called stating he saw Dr. Zhou Almazan today and was ordered medication. He stated he told them the [facility] closed so they should send the scripts to the [address] [place] [address].  Patient stated he has been trying to get through to the office to see where his script was sent. I informed him I would look into it and call him back.

## 2023-08-29 NOTE — PROGRESS NOTES
Received In-basket request for oncology care coordination from Dr. Zhou Almazan, ENT. Updated CT neck and chest 9/09/23 1pm  Med/onc F/U rescheduled sooner 9/13/23  Rad/onc 9/14/23  Palliative care 9/14/23    NN will be contacting [facility] tomorrow 8/30 to review new updated schedule.

## 2023-08-29 NOTE — PROGRESS NOTES
Called the [facility] and spoke to the pharmacy department. They stated they will be closing 9/27/23 and confirmed they received the patient's scripts and both were available for .

## 2023-08-29 NOTE — PROGRESS NOTES
In-basket message received from Dr. Mandi Boss to add patient to head and neck [address] [place] [address] Loop on 9/11/2023. Chart reviewed and prep completed.

## 2023-08-29 NOTE — PROGRESS NOTES
Called patient and informed him his medication was available for  at the [address] [place] [address].  Patient stated he received a call from someone giving him appointment dates. He said he didn't understand what the caller was saying. I asked him if he would like to go over his scheduled today or if I should call him back tomorrow. He requested a call tomorrow. Informed him I would call tomorrow to review upcoming appointments and answer any questions. He was appreciative.

## 2023-08-30 ENCOUNTER — PATIENT OUTREACH (OUTPATIENT)
Dept: CASE MANAGEMENT | Facility: HOSPITAL | Age: 58
End: 2023-08-30

## 2023-08-30 NOTE — PROGRESS NOTES
OSW completed chart review. Patient had biopsy yesterday, waiting on results of pathology. Patient was scheduled for     CT neck and Chest on 9/9/23  Med/onc 9/13  Rad on 9/14  [facility] 9/14      LSW will follow.

## 2023-08-31 ENCOUNTER — PATIENT OUTREACH (OUTPATIENT)
Dept: HEMATOLOGY ONCOLOGY | Facility: CLINIC | Age: 58
End: 2023-08-31

## 2023-08-31 NOTE — PROGRESS NOTES
Called and left message on patient's answering machine requesting call back to discuss upcoming appointments.

## 2023-09-01 ENCOUNTER — DOCUMENTATION (OUTPATIENT)
Dept: HEMATOLOGY ONCOLOGY | Facility: CLINIC | Age: 58
End: 2023-09-01

## 2023-09-01 ENCOUNTER — PATIENT OUTREACH (OUTPATIENT)
Dept: HEMATOLOGY ONCOLOGY | Facility: CLINIC | Age: 58
End: 2023-09-01

## 2023-09-01 PROCEDURE — 88342 IMHCHEM/IMCYTCHM 1ST ANTB: CPT | Performed by: PATHOLOGY

## 2023-09-01 PROCEDURE — 88305 TISSUE EXAM BY PATHOLOGIST: CPT | Performed by: PATHOLOGY

## 2023-09-01 PROCEDURE — 88365 INSITU HYBRIDIZATION (FISH): CPT | Performed by: PATHOLOGY

## 2023-09-01 NOTE — PROGRESS NOTES
Chart reviewed in preparation of Multidisciplinary Head and Neck Tumor Conference presentation by Dr. Gerry Marie on 9/11/23.

## 2023-09-01 NOTE — PROGRESS NOTES
Called and left message on patient's answering machine informing him I would text him his upcoming appointments. Requested he confirm he received them.

## 2023-09-04 NOTE — PROGRESS NOTES
Telemedicine consent    Patient: Teetee Zacarias  Provider: Issac Holter, MD  Provider located at [facility] [address] [id number]    The patient was identified by name and date of birth. Teetee Zacarias was informed that this is a telemedicine visit and that the visit is being conducted through Telephone. My office door was closed. No one else was in the room. He acknowledged consent and understanding of privacy and security of the video platform. The patient has agreed to participate and understands they can discontinue the visit at any time. Hematology Outpatient Office Note    Date of Service: 9/13/2023    [facility] HEMATOLOGY/MEDICAL ONCOLOGY SPECIALISTS [address]  [phone]    Reason for Consultation:   Chief Complaint   Patient presents with   • Follow-up     Stage of cancer: CHETAN  Treatment goal is cure    Referral Physician: No ref. provider found    Primary Care Physician:  No primary care provider on file. ASSESSMENT & PLAN      Diagnosis ICD-10-CM Associated Orders   1. Cervical lymphadenopathy  R59.0       2.  Oral cancer ([address])  C06.9 Infusion Calculated Appointment Request     CBC and differential     Comprehensive metabolic panel     Magnesium     Infusion Calculated Appointment Request     CBC and differential     Comprehensive metabolic panel     Magnesium     Infusion Calculated Appointment Request     CBC and differential     Comprehensive metabolic panel     Magnesium     Infusion Calculated Appointment Request     CBC and differential     Comprehensive metabolic panel     Magnesium     Infusion Calculated Appointment Request     CBC and differential     Comprehensive metabolic panel     Magnesium     Infusion Calculated Appointment Request     CBC and differential     Comprehensive metabolic panel     Magnesium     Infusion Calculated Appointment Request     CBC and differential     Comprehensive metabolic panel     Magnesium            This is a 59 y.o. 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 None of the visualized lymph nodes appear enlarged in size. No suspicious mass visualized on noncontrast imaging. 6/19/2023 PET/CT: Hypermetabolic right anterior oral cavity mass most concerning for malignancy. Bilateral hypermetabolic cervical adenopathy compatible with metastases. No hypermetabolic metastases in the chest abdomen pelvis. Multiple healing left rib fractures. · Discussion of decision making    I personally reviewed the following lab results, the image studies, pathology, other specialty/physicians consult notes and recommendations, and outside medical records. I had a lengthy discussion with the patient and shared the work-up findings. We discussed the diagnosis and management plan as below. I spent 34 minutes reviewing the records (labs, clinician notes, outside records, medical history, ordering medicine/tests/procedures, interpreting the imaging/labs previously done) and coordination of care as well as direct time with the patient today, of which greater than 50% of the time was spent in counseling and coordination of care with the patient/family. · Plan/Labs  · F/u ENT, surgery not being pursued upfront  · F/u Rad Onc  · IR referral port  · Zofran 8mg ODT  · EMLA cream  · Infusion chairs for weekly Cisplatin 40mg/m2, C1 coming up in 2 weeks (7 treatments planned)          Follow Up: q week for systemic chemo    All questions were answered to the patient's satisfaction during this encounter.  The patient knows the contact information for our office and knows to reach out for any relevant concerns related to this encounter. They are to call for any temperature 100.4 or higher, new symptoms including but not restricted to shaking chills, decreased appetite, nausea, vomiting, diarrhea, increased fatigue, shortness of breath or chest pain, confusion, and not feeling the strength to come to the clinic. For all other listed problems and medical diagnosis in their chart - they are managed by PCP and/or other specialists, which the patient acknowledges. Thank you very much for your consultation and making us a part of this patient's care. We are continuing to follow closely with you. Please do not hesitate to reach out to me with any additional questions or concerns. Roosevelt Goode MD  Hematology & Medical Oncology Staff Physician             Disclaimer: This document was prepared using [employer] Direct technology. If a word or phrase is confusing, or does not make sense, this is likely due to recognition error which was not discovered during this clinician's review. If you believe an error has occurred, please contact me through [phone] line for yamini? cation. ONCOLOGIC HISTORY OF PRESENT ILLNESS     His general dentist saw him on May 16 and appreciated a large oral cavity lesion which was concerning for malignancy and he is thus referred to us for further management. Clotting History None   Bleeding History None   Cancer History Oral cavity suspected   Family Cancer History Dad (skin), Mom (breast)   H/O Blood/Plt Transfusion None   Tobacco/etoh/drug abuse 1 PPD x  45 years, beer (6 pack a day), no rec drug use       Cancer Screening history No colon cancer screening   Occupation Landscape work         SUBJECTIVE  (INTERVAL HISTORY)        I have reviewed the relevant past medical, surgical, social and family history. I have also reviewed allergies and medications for this patient. No new changes, doing fine. Wants to get seen now to get his cancer care under control. He has been working a lot. Review of Systems    Baseline weight: 145-150 lbs    Denies F/C, N/V, SOB, CP, LH, HA, rash, itching, gen weakness, melena, hematuria, hematochezia, falls, diarrhea, or constipation       A 10-point review of system was performed, pertinent positive and negative were detailed as above. Otherwise, the 10-point review of system was negative. Past Medical History:   Diagnosis Date   • Alcohol abuse    • Hypertension        No past surgical history on file. No family history on file.     Social History     Socioeconomic History   • Marital status: Single     Spouse name: Not on file   • Number of children: Not on file   • Years of education: Not on file   • Highest education level: Not on file   Occupational History   • Not on file   Tobacco Use   • Smoking status: Every Day     Packs/day: 0.50     Types: Cigarettes   • Smokeless tobacco: Never   Vaping Use   • Vaping Use: Every day   • Substances: Nicotine   Substance and Sexual Activity   • Alcohol use: Yes     Comment: 12+ beers/day 24 oz beer   • Drug use: No   • Sexual activity: Not on file   Other Topics Concern   • Not on file   Social History Narrative   • Not on file     Social Determinants of Health     Financial Resource Strain: Not on file   Food Insecurity: Not on file   Transportation Needs: Not on file   Physical Activity: Not on file   Stress: Not on file   Social Connections: Not on file   Intimate Partner Violence: Not on file   Housing Stability: Not on file       Allergies   Allergen Reactions   • Bee Venom Hives   • Other      bees       Current Outpatient Medications   Medication Sig Dispense Refill   • gabapentin (Neurontin) 100 mg capsule Take 1 capsule (100 mg total) by mouth 3 (three) times a day 90 capsule 2   • oxyCODONE (Roxicodone) 5 immediate release tablet Take 1 tablet (5 mg total) by mouth every 6 (six) hours as needed for severe pain Max Daily Amount: 20 mg 20 tablet 0   • acetaminophen (TYLENOL) 650 mg CR tablet Take 1 tablet (650 mg total) by mouth every 8 (eight) hours as needed for mild pain (Patient not taking: Reported on 8/29/2023) 30 tablet 0   • amLODIPine (NORVASC) 5 mg tablet Take 1 tablet (5 mg total) by mouth daily (Patient not taking: Reported on 8/4/2023) 30 tablet 0   • benzocaine (Instant Oral Pain Relief Max) 20 % 30 application. by Mucosal route 2 (two) times a day as needed for mucositis (Patient not taking: Reported on 8/29/2023) 30 g 0   • chlorhexidine (PERIDEX) 0.12 % solution Apply 15 mL to the mouth or throat 2 (two) times a day (Patient not taking: Reported on 8/29/2023) 709 mL 0   • folic acid (FOLVITE) 1 mg tablet Take 1 tablet (1 mg total) by mouth daily Do not start before March 31, 2023. (Patient not taking: Reported on 8/29/2023)  0   • magnesium gluconate (MAGONATE) 500 mg tablet Take 1 tablet (500 mg total) by mouth in the morning for 7 days 7 tablet 0   • naltrexone (REVIA) 50 mg tablet Take 1 tablet (50 mg total) by mouth daily (Patient not taking: Reported on 8/29/2023) 30 tablet 0   • potassium chloride (K-DUR,KLOR-CON) 20 mEq tablet Take 1 tablet (20 mEq total) by mouth daily for 7 doses 7 tablet 0   • thiamine 100 MG tablet Take 1 tablet (100 mg total) by mouth daily Do not start before March 31, 2023. (Patient not taking: Reported on 8/29/2023)  0     No current facility-administered medications for this visit. (Not in a hospital admission)        Objective:     24 Hour Vitals Assessment:     Vitals:        Below not updated as this was a tele visit    PHYSICIAN EXAM:    General: Appearance: alert, cooperative, no distress. HEENT: Normocephalic, atraumatic. No scleral icterus. conjunctivae clear. EOMI. Posterior tongue mass noted, poor dentition  Chest: No tenderness to palpation. No open wound noted. Lungs: Clear to auscultation bilaterally, Respirations unlabored.   Cardiac: Regular rate and rhythm, +S1and S2  Abdomen: Soft, non-tender, non-distended. Bowel sounds are normal  Extremities:  No edema, cyanosis, clubbing. Skin: Skin color, turgor are normal. No rashes. Lymphatics: no palpable axillary, or inguinal adenopathy, palpable R sub-mandibular LN noted  Neurologic: Awake, Alert, and oriented, no gross focal deficits noted b/l. DATA REVIEW:    Pathology Result:    Final Diagnosis   Date Value Ref Range Status   08/29/2023   Final    A. 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, ulcerated  - Carcinoma present at tissue edges    Comment:  - HPV CISH and p16 ordered.   - Dr. Diana Butler was notified of the diagnosis via Epic messaging on 9/1/23 at 9:02 am.     Interpretation performed at [facility], [address] [place] [address], [facility], [address] [place] [address]              Image Results:   Image result are reviewed and documented in Hematology/Oncology history. I personally reviewed these images. CT chest w contrast  Narrative: CT CHEST WITH IV CONTRAST    INDICATION:   C06.9: Malignant neoplasm of mouth, unspecified. Oral cavity cancer. COMPARISON: PET/CT 6/19/2023, CXR 12/11/2016, chest CT 7/2/2008. TECHNIQUE: Chest CT with intravenous contrast.  Axial, sagittal, coronal 2D reformats and coronal MIPS from source data. Radiation dose length product (DLP):  324 mGy-cm . Radiation dose exposure minimized using iterative reconstruction and automated exposure control. IV Contrast:  85 mL of iohexol (OMNIPAQUE)    FINDINGS:    LUNGS: No metastases. Severe upper lobe predominant panlobular emphysema. Stable benign calcified right greater than left bilateral upper lobe scar since 2008. Benign calcified granulomas. AIRWAYS: No significant filling defects. PLEURA:  Unremarkable. HEART/GREAT VESSELS: Normal heart size. Mild coronary artery calcification indicating atherosclerotic heart disease.     MEDIASTINUM AND COLE: Calcified mediastinal and right hilar nodes indicating benign granulomatous disease. CHEST WALL AND LOWER NECK: Unremarkable. UPPER ABDOMEN: Right renal parapelvic cysts. Mild bilateral adrenal hyperplasia. Benign punctate calcified granuloma in the liver. Benign nodular calcification in the spleen. OSSEOUS STRUCTURES: Mild degenerative disease in the spine. Healed bilateral rib fractures. Mild compression deformity of T3. Impression: No metastases. Emphysema. Benign granulomatous disease. Workstation performed: [id number]  CT soft tissue neck with contrast  Narrative: CT NECK WITH CONTRAST    INDICATION:   C06.9: Malignant neoplasm of mouth, unspecified. COMPARISON: CT cervical spine dated 12/11/2016. PET/CT dated 6/19/2020. TECHNIQUE:  Axial, sagittal, and coronal 2D reformatted images were created from the axial source data and submitted for interpretation. Radiation dose length product (DLP) for this visit:  706 mGy-cm . This examination, like all CT scans performed in the [facility], was performed utilizing techniques to minimize radiation dose exposure, including the use of iterative   reconstruction and automated exposure control. IV Contrast:  85 mL of iohexol (OMNIPAQUE)    IMAGE QUALITY:  Diagnostic. FINDINGS:    VISUALIZED BRAIN PARENCHYMA:  No acute intracranial pathology of the visualized brain parenchyma. VISUALIZED ORBITS: Normal visualized orbits. PARANASAL SINUSES: Normal visualized paranasal sinuses. NASAL CAVITY AND NASOPHARYNX:  Normal.    SUPRAGLOTTIC NECK: There is an ill-defined enhancing mass identified within the anterior aspect of the tongue, primarily right-sided but extending across the midline into the left anterior tongue. This measures approximately 3 cm in maximum diameter. Additional smaller nodule noted within the right posterior lateral tongue on image 25. Tongue is enlarged superiorly projecting into the oral cavity.     Unremarkable lingual tonsils, vallecula and epiglottis. Normal aryepiglottic folds. Normal prevertebral space. Mild asymmetry of the palatine tonsils, right slightly larger than left without a discrete tonsillar mass. INFRAGLOTTIC NECK: Normal glottis and subglottic airway. THYROID GLAND:  Unremarkable. PAROTID AND SUBMANDIBULAR GLANDS: Normal parotid glands. Normal left submandibular gland. The right submandibular gland is difficult to visualize and appears decreased in density with mild heterogeneity possibly a result of tumor infiltration. LYMPH NODES: Multiple mildly enlarged, pathologic heterogeneously enhancing right-sided lymph nodes are identified within the neck. Right-sided neck nodes are seen within the level 1B, level 2 and level 3 guru regions. Small posterior jugular chain   nodes are seen, less than 1 cm in size. There is a pathologic node identified within the left level 1B guru region anterior to the submandibular gland. Small pathologic node within the subcutaneous fat anterior to the strap muscles just below the level   of the hyoid bone. VASCULAR STRUCTURES: Mild atherosclerotic disease of the aortic arch and proximal great vessels. There is atherosclerotic disease of both carotid bifurcations the proximal internal carotid artery, left greater than right. The right jugular vein is   dominant and markedly compressed compressed by the extensive right neck adenopathy but patent. THORACIC INLET: Several small mediastinal nodes are present, the largest of which are in the precarinal region. Several of these demonstrate punctate calcifications. Severe emphysema within the lung apices. Multiple calcified nodules are identified   within the upper lobes. BONY STRUCTURES: Osteomalacia of the maxilla and mandible with poor dentition. There are cavities present within maxillary and mandibular molars. Mild cervical degenerative change.   Impression: There is na approximately 3 cm mass within the anterior roof of the tongue primarily right-sided but extending across the midline to the anterior left tongue. There is an additional smaller nodule identified within the right posterior lateral tongue. Multiple pathologic lymph nodes are identified within the neck primarily right-sided involving levels 1B, 2 and 3. Marked compression of the dominant right jugular vein at the level of the level 2 lymph nodes. The vein does remain patent. Workstation performed: [id number]      LABS:  Lab data are reviewed and documented in HemOn history. Lab Results   Component Value Date    HGB 14.5 08/04/2023    HCT 41.1 08/04/2023    MCV 95 08/04/2023     08/04/2023    WBC 6.57 08/04/2023    NRBC 0 08/04/2023     Lab Results   Component Value Date    K 3.8 08/04/2023    CL 92 (L) 08/04/2023    CO2 27 08/04/2023    BUN 6 08/04/2023    CREATININE 0.73 08/04/2023    GLUCOSE 93 12/11/2016    CALCIUM 9.3 08/04/2023    AST 27 03/30/2023    ALT 40 03/30/2023    ALKPHOS 64 03/30/2023    EGFR 100 08/04/2023       No results found for: "IRON", "TIBC", "FERRITIN"    No results found for: "Allean Chaparro"    No results for input(s): "WBC", "CREAT", "PLT" in the last 72 hours.      By:  Leif Doherty MD, 9/13/2023, 10:49 AM

## 2023-09-08 ENCOUNTER — PATIENT OUTREACH (OUTPATIENT)
Dept: HEMATOLOGY ONCOLOGY | Facility: CLINIC | Age: 58
End: 2023-09-08

## 2023-09-08 NOTE — PROGRESS NOTES
Called patient to remind him of his CT tomorrow in [facility]. Patient confirmed he will be going to the appointment. Reviewed time and address. Informed him I would call next week to check in.

## 2023-09-09 ENCOUNTER — HOSPITAL ENCOUNTER (OUTPATIENT)
Dept: CT IMAGING | Facility: HOSPITAL | Age: 58
Discharge: HOME | End: 2023-09-09
Attending: OTOLARYNGOLOGY
Payer: COMMERCIAL

## 2023-09-09 DIAGNOSIS — C06.9 CANCER OF ORAL CAVITY (HCC): ICD-10-CM

## 2023-09-09 PROCEDURE — G1004 CDSM NDSC: HCPCS

## 2023-09-09 PROCEDURE — 70491 CT SOFT TISSUE NECK W/DYE: CPT

## 2023-09-09 PROCEDURE — 71260 CT THORAX DX C+: CPT

## 2023-09-09 RX ADMIN — IOHEXOL 85 ML: 350 INJECTION, SOLUTION INTRAVENOUS at 13:27

## 2023-09-11 ENCOUNTER — DOCUMENTATION (OUTPATIENT)
Dept: HEMATOLOGY ONCOLOGY | Facility: CLINIC | Age: 58
End: 2023-09-11

## 2023-09-11 ENCOUNTER — PATIENT OUTREACH (OUTPATIENT)
Dept: HEMATOLOGY ONCOLOGY | Facility: CLINIC | Age: 58
End: 2023-09-11

## 2023-09-11 NOTE — PROGRESS NOTES
HEAD AND NECK MULTIDISCIPLINARY CASE REVIEW    DATE:  9/11/23      PRESENTING DOCTOR:  Dr. Bi Cole    DIAGNOSIS:  Squamous cell carcinoma of oral cavity    STAGING:     Brendan Bhardwaj is a 59 y.o. male who was presented at the [facility] today. He has a history of progressively worsening oral cavity mass causing discomfort. PET/CT on 6/19/23 demonstrated a hypermetabolic right anterior oral cavity mass most concerning for malignancy and bilateral hypermetabolic cervical adenopathy compatible with metastases. He underwent oral cavity biopsy on 8/29/23 with pathology positive for HPV-associated squamous cell carcinoma. PHYSICIAN RECOMMENDED PLAN:  Definitive chemo/RT. Imaging reviewed:  9/9/23 CT soft tissue neck    6/19/23 PET/CT-Hypermetabolic right anterior oral cavity mass most concerning for malignancy. Bilateral hypermetabolic cervical adenopathy compatible with metastases. No hypermetabolic metastases in the chest abdomen pelvis. Pathology reviewed:  No     Referrals:  None needed at this time. Procedures:   N/A    Team agreed to plan. NCCN guidelines were readily available for review at this discussion    The final treatment plan will be left to the discretion of the patient and the treating physician. DISCLAIMERS:  TO THE TREATING PHYSICIAN:  This conference is a meeting of clinicians from various specialty areas who evaluate and discuss patients for whom a multidisciplinary treatment approach is being considered. Please note that the above opinion was a consensus of the conference attendees and is intended only to assist in quality care of the discussed patient. The responsibility for follow up on the input given during the conference, along with any final decisions regarding plan of care, is that of the patient and the patient's provider.  Accordingly, appointments have only been recommended based on this information and have NOT been scheduled unless otherwise noted. TO THE PATIENT:  This summary is a brief record of major aspects of your cancer treatment. You may choose to share a copy with any of your doctors or nurses. However, this is not a detailed or comprehensive record of your care.

## 2023-09-11 NOTE — PROGRESS NOTES
Patient called asking about his upcoming virtual visit with Dr. Paulina Gallo. He stated he does not have the capabilities to do a virtual visit and inquired if the visit could be done over his cell phone. Informed the patient I would reach out to Dr. Coreas Has team to make them aware. He also stated he is having a great deal of pain in his right bottom teeth. He is on a soft diet at this time. He stated he had dental extractions on the right upper side of his mouth and was told he needed further extractions and full dentures. He said he has not heard back from anyone regarding this. He requested assistance moving forward. I informed him I would see would I could find out about his former extractions and get him back in for completion work. He was appreciative.

## 2023-09-12 ENCOUNTER — PATIENT OUTREACH (OUTPATIENT)
Dept: HEMATOLOGY ONCOLOGY | Facility: CLINIC | Age: 58
End: 2023-09-12

## 2023-09-12 NOTE — PROGRESS NOTES
Received message from patient requesting call back. Returned call and left message on patient's answering machine informing him I was able to locate the dentist that performed his extractions and that I would assist with getting him back in for the additional work needed prior to starting RT.

## 2023-09-13 ENCOUNTER — TELEMEDICINE (OUTPATIENT)
Dept: HEMATOLOGY ONCOLOGY | Facility: CLINIC | Age: 58
End: 2023-09-13
Payer: COMMERCIAL

## 2023-09-13 ENCOUNTER — TELEPHONE (OUTPATIENT)
Dept: RADIATION ONCOLOGY | Facility: CLINIC | Age: 58
End: 2023-09-13

## 2023-09-13 ENCOUNTER — TELEPHONE (OUTPATIENT)
Dept: HEMATOLOGY ONCOLOGY | Facility: CLINIC | Age: 58
End: 2023-09-13

## 2023-09-13 DIAGNOSIS — C06.9 ORAL CANCER (HCC): ICD-10-CM

## 2023-09-13 DIAGNOSIS — R11.2 CHEMOTHERAPY INDUCED NAUSEA AND VOMITING: ICD-10-CM

## 2023-09-13 DIAGNOSIS — R59.0 CERVICAL LYMPHADENOPATHY: Primary | ICD-10-CM

## 2023-09-13 DIAGNOSIS — T45.1X5A CHEMOTHERAPY INDUCED NAUSEA AND VOMITING: ICD-10-CM

## 2023-09-13 DIAGNOSIS — C06.9 MALIGNANT NEOPLASM OF ORAL CAVITY (HCC): ICD-10-CM

## 2023-09-13 PROCEDURE — G2012 BRIEF CHECK IN BY MD/QHP: HCPCS | Performed by: INTERNAL MEDICINE

## 2023-09-13 RX ORDER — ONDANSETRON 8 MG/1
8 TABLET, ORALLY DISINTEGRATING ORAL EVERY 8 HOURS PRN
Qty: 20 TABLET | Refills: 0 | Status: SHIPPED | OUTPATIENT
Start: 2023-09-13

## 2023-09-13 RX ORDER — LIDOCAINE AND PRILOCAINE 25; 25 MG/G; MG/G
CREAM TOPICAL AS NEEDED
Qty: 50 G | Refills: 1 | Status: SHIPPED | OUTPATIENT
Start: 2023-09-13

## 2023-09-13 NOTE — TELEPHONE ENCOUNTER
Please schedule infusion for patient  Follow-up disposition: Return in about 2 weeks (around 9/27/2023) for Labs - See Treatment Plan, Infusion - See Treatment Plan, Office Visit.    Check out comments: I need a visit every week for 7 weeks on day of chemo please

## 2023-09-13 NOTE — TELEPHONE ENCOUNTER
Unable to schedule patient for chemotherapy due to not knowing the start date for RT. Patient has a consult tomorrow to discuss, I called patient to have preferences ready when its time to schedule. Patient requested I call back at [facility] to go over preferences. IR will be calling patient to schedule port placement.

## 2023-09-14 ENCOUNTER — TELEPHONE (OUTPATIENT)
Dept: HEMATOLOGY ONCOLOGY | Facility: CLINIC | Age: 58
End: 2023-09-14

## 2023-09-14 NOTE — TELEPHONE ENCOUNTER
Call out to patient, left vm for patient with RN teams number to further discuss treatment plan and Cisplatin.

## 2023-09-15 ENCOUNTER — TELEPHONE (OUTPATIENT)
Dept: HEMATOLOGY ONCOLOGY | Facility: CLINIC | Age: 58
End: 2023-09-15

## 2023-09-15 ENCOUNTER — PATIENT OUTREACH (OUTPATIENT)
Dept: HEMATOLOGY ONCOLOGY | Facility: CLINIC | Age: 58
End: 2023-09-15

## 2023-09-15 ENCOUNTER — PATIENT OUTREACH (OUTPATIENT)
Dept: CASE MANAGEMENT | Facility: HOSPITAL | Age: 58
End: 2023-09-15

## 2023-09-15 NOTE — PROGRESS NOTES
OSW completed chart review. Patient had his appointment with Dr. Susanna Pereira on 9/13/23, patient will be scheduled for [facility] placement and infusions. OSW will outreach for assessment and DT.

## 2023-09-15 NOTE — TELEPHONE ENCOUNTER
Call out to patient post message received from IR department reaching out in regards to no response or return call from patient to schedule Port placement. Call out to patient and left a vm in regards to above and recommended patient to call RN at teams number provided to further discuss questions and concerns. Office hours reviewed as well. Message sent to Patient Nurse Navigator.

## 2023-09-19 ENCOUNTER — PATIENT OUTREACH (OUTPATIENT)
Dept: HEMATOLOGY ONCOLOGY | Facility: CLINIC | Age: 58
End: 2023-09-19

## 2023-09-19 NOTE — PROGRESS NOTES
Patient called stating he had a Radiation Oncology consult he didn't know about and had to cancel but has not heard back about rescheduling. I confirmed the patient does plan on moving forward with chemo/RT. He stated he does have transportation issues during the week as his girlfriend works as a teacher and they share a car. We discussed [facility]. Patient would like to complete the paperwork to have on file should he need to utilize them. I told the patient I would reach out to Radiation Oncology to reschedule his appointment. I also informed him I am waiting to hear back from [facility] regarding his dental extractions. Informed the patient I would call him back with details. He was appreciative.

## 2023-09-19 NOTE — PROGRESS NOTES
Patients insurance prefers Ples Ax over Emend    Date patient scheduled: 9/27/23    Original medication ordered: Emend    New Medication ordered: Ples Ax    Is the patient scheduled within 24 hours? ? If yes, follow up with verbal telephone call. No.     Office RN to route to INF  pool. Infusion  pool routes to [facility]. Infusion tech to receive message, confirm scheduled treatment duration matches ordered treatment duration or adjust accordingly, and re-link appointment request orders. Infusion tech to notify pharmacy and finance.

## 2023-09-22 ENCOUNTER — PATIENT OUTREACH (OUTPATIENT)
Dept: HEMATOLOGY ONCOLOGY | Facility: CLINIC | Age: 58
End: 2023-09-22

## 2023-09-22 NOTE — PROGRESS NOTES
Patient returned my call. He confirmed he would be able to make his dental appointment on 9/27/23. I also provided a new Radiation Oncology consult on 10/6/23 in [facility] at 10:30. Patient confirmed he will be able to get to that appointment as well. He did request I text him the appointment information including date, time and address. Information sent to his phone on 9/22/23.

## 2023-09-22 NOTE — PROGRESS NOTES
Called [facility] [phone]. Patient is scheduled for dental extractions on Wednesday, 9/27/23 at 8:30 in [facility]. Fax number to send dental clearance forms is [phone].

## 2023-09-22 NOTE — PROGRESS NOTES
Called patient and left message on his answering machine informing him of the dental appointment. Requested he call back to confirm he received the message.

## 2023-09-26 ENCOUNTER — PATIENT OUTREACH (OUTPATIENT)
Dept: HEMATOLOGY ONCOLOGY | Facility: CLINIC | Age: 58
End: 2023-09-26

## 2023-09-26 NOTE — PROGRESS NOTES
Received message from patient requesting call back. Returned call and spoke to patient. He stated he received a message from [address] [address] stating he must call back by 2:00 today to confirm his appointment for tomorrow or it will be cancelled. He said the number he calls says it is not in service. I informed the patient I would try and reach out to [facility] to confirm his appointment.

## 2023-09-26 NOTE — PROGRESS NOTES
[address] [facility] at [phone] and spoke to [place]. Confirmed patient's appointment for tomorrow, 9/27/23 at 8:30.

## 2023-09-26 NOTE — PROGRESS NOTES
Called patient and informed him I was able to confirm his appointment with [address] [place] [address] for tomorrow. He was appreciative.

## 2023-09-27 ENCOUNTER — PATIENT OUTREACH (OUTPATIENT)
Dept: HEMATOLOGY ONCOLOGY | Facility: CLINIC | Age: 58
End: 2023-09-27

## 2023-09-27 ENCOUNTER — OFFICE VISIT (OUTPATIENT)
Dept: DENTISTRY | Facility: CLINIC | Age: 58
End: 2023-09-27

## 2023-09-27 VITALS — TEMPERATURE: 97.3 F | DIASTOLIC BLOOD PRESSURE: 91 MMHG | HEART RATE: 79 BPM | SYSTOLIC BLOOD PRESSURE: 150 MMHG

## 2023-09-27 DIAGNOSIS — C06.9 ORAL CANCER (HCC): ICD-10-CM

## 2023-09-27 DIAGNOSIS — K02.9 DENTAL CARIES: Primary | ICD-10-CM

## 2023-09-27 PROCEDURE — D0191 ASSESSMENT OF A PATIENT: HCPCS

## 2023-09-27 RX ORDER — LIDOCAINE HYDROCHLORIDE 20 MG/ML
15 SOLUTION OROPHARYNGEAL 4 TIMES DAILY PRN
Qty: 100 ML | Refills: 0 | Status: SHIPPED | OUTPATIENT
Start: 2023-09-27

## 2023-09-27 NOTE — PROGRESS NOTES
Patient was seen in [address] [facility] today. Per Dr. Lily Daley, patient requires full mouth extraction prior to clearance. He was provided a STAT referral to oral surgery and the phone number of Dental Referral Specialist, Chidi Mckay. Dr. Lily Daley requested dental clearance forms so he can complete when appropriate. The fax number is [phone].

## 2023-09-27 NOTE — LETTER
To whom it may concern,    I saw a mutual patient Meli Alvares, [id number], for dental clearance prior to oral cancer treatment. He requires full mouth extraction prior to clearance. I provided him with a STAT referral to oral surgery, gave him the phone number of our Dental Referral Specialist, Talisha Lauren, and reached out to her to help coordinate referral to oral surgery. Could you send me a copy of your dental clearance form so I can complete it when appropriate? Current guidelines after tooth extraction recommend allowing at least 14 days of healing before head and neck radiation and/or 7-10 days before myelosuppressive therapy. Please let me know if you have any question.     Sincerely,    Donya Pedraza, DMD

## 2023-09-27 NOTE — PROGRESS NOTES
Dental procedures in this visit   •  - ASSESSMENT OF A PATIENT (Completed)     Service provider: Shoaib Romo DMD     Billing provider: Shoaib Romo DMD     Subjective   Patient ID: Lore Connor is a 59 y.o. male. 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 Findings include: gross caries  Hard Tissue Exam:  Gross/rampant decay, Decay noted - see charting and treatment plan and Fractured restorations/teeth  Reference tooth chart for additional findings. Assessment/Plan   Problem List Items Addressed This Visit        Digestive    Dental caries - Primary    Relevant Medications    Lidocaine Viscous HCl (XYLOCAINE) 2 % mucosal solution    Other Relevant Orders    Ambulatory referral to Oral Maxillofacial Surgery    Oral cancer [facility])    Relevant Orders    ASSESSMENT OF A PATIENT (Completed)     Patient presented for dental clearance prior to the start of treatment for Oral Cancer. Patient has a poor dentition warranting full extraction. I provided the patient a STAT referral to oral surgery, PAN and messaged the dental referral specialist to assist with coordinating care. Patient states he is having mouth pain from the area the biopsy was taken. Upon clinical inspection, significant ulcerated neoplastic tissue present on right border of tongue. I sent an Rx for Viscous Lidocaine to the patients pharmacy.     Current guidelines after tooth extraction recommend allowing at least 14 days of healing before head and neck radiation and/or 7-10 days before myelosuppressive therapy. Dental clearance will be provided once extractions completed.     NV: STAT referral to OS for full mouth extraction

## 2023-09-29 ENCOUNTER — PATIENT OUTREACH (OUTPATIENT)
Dept: HEMATOLOGY ONCOLOGY | Facility: CLINIC | Age: 58
End: 2023-09-29

## 2023-09-29 NOTE — PROGRESS NOTES
Reached out to [facility] via [place] requesting assistance with getting patient scheduled for dental extractions.

## 2023-09-29 NOTE — PROGRESS NOTES
Called and spoke to patient. Informed him Dr. Tr Baron routed his office note to me recommending full mouth extraction prior to clearance. I inquired if the patient scheduled an appointment for the extractions. Patient stated he was frustrated that they did not extract his bottom teeth at his visit with Dr. Tr Baron and that he wasted a day off from work. I apologized for his inconvenience. He is not scheduled with anyone at this point. I informed him I would reach out to the Dental Referral Specialist, Jolynn Inman to help coordinate referral to oral surgery.

## 2023-10-02 ENCOUNTER — TELEPHONE (OUTPATIENT)
Dept: DENTISTRY | Facility: CLINIC | Age: 58
End: 2023-10-02

## 2023-10-02 ENCOUNTER — DOCUMENTATION (OUTPATIENT)
Dept: RADIATION ONCOLOGY | Facility: CLINIC | Age: 58
End: 2023-10-02

## 2023-10-02 NOTE — PROGRESS NOTES
Received dental clearance forms from Dr. Dez Wilks. Patient has not been cleared as he needs full mouth  extractions.   Has appointment with [facility] on 10/16/23 at 1140am.  New dental clearance faxed to [facility]

## 2023-10-02 NOTE — TELEPHONE ENCOUNTER
Liv Lazo is scheduled for this Friday 10-6-23 with [facility] office  @11:40 am for the consult. The  stated she will put a note stating pt needs clearance for Oncology.

## 2023-10-05 ENCOUNTER — PATIENT OUTREACH (OUTPATIENT)
Dept: HEMATOLOGY ONCOLOGY | Facility: CLINIC | Age: 58
End: 2023-10-05

## 2023-10-05 NOTE — PROGRESS NOTES
Called and left message on patient's answering machine reviewing his upcoming appointments and informing him his appointment with [facility] on 10/16 is just a consult and they will not be doing any procedures at that time. Request call back to confirm.

## 2023-10-06 ENCOUNTER — DOCUMENTATION (OUTPATIENT)
Dept: HEMATOLOGY ONCOLOGY | Facility: CLINIC | Age: 58
End: 2023-10-06

## 2023-10-06 NOTE — PROGRESS NOTES
I attempted to reach the practice supervisor of S today 10/06/23 to follow up on obtaining a sooner appnt. I then contacted the  as I had not had a return call yet. I was notified that She is out of the office until Monday 10/09/23. I will work with GILDA who has been working with the patient directly and follow up.

## 2023-10-06 NOTE — PROGRESS NOTES
10/03/23:   I was notified by GILDA that Eliceo's appnt to consult with [facility] is not on 10/06/23 but is in fact scheduled on 10/16/23 which was their first available. I reached out to Nash Nelson the practice supervisor to assist with expediting this appnt as it is necessary to have these dental extractions completed before Francisco Farrell can start radiation treatment. I left  for her with the above info requesting a return call.

## 2023-10-09 ENCOUNTER — PATIENT OUTREACH (OUTPATIENT)
Dept: HEMATOLOGY ONCOLOGY | Facility: CLINIC | Age: 58
End: 2023-10-09

## 2023-10-09 NOTE — PROGRESS NOTES
Patient returned my call and confirmed he would be able to make the appointment with [facility] tomorrow, 10/10/23. Address provided. He was appreciative.

## 2023-10-09 NOTE — PROGRESS NOTES
Received a return call from Alcira Marin at [facility]. She has a spot available tomorrow 10/10/23 at 2pm in the [facility] office. He had previously been avoidant in scheduling this consult. GILDA Rice will reach out to provide this appnt information to get him in sooner. 102.5

## 2023-10-09 NOTE — PROGRESS NOTES
Received message stating [facility] has an opening tomorrow at 2:00 in the [facility] office. Called and left a message on the patient's answering machine informing him of the appointment. Requested call back to confirm. Also texted patient the information to his cell phone including date, time and location.

## 2023-10-10 ENCOUNTER — PATIENT OUTREACH (OUTPATIENT)
Dept: HEMATOLOGY ONCOLOGY | Facility: CLINIC | Age: 58
End: 2023-10-10

## 2023-10-10 NOTE — PROGRESS NOTES
Patient called from the S waiting room agitated that the office is at lunch until 2:00. He voiced his frustration about his treatment delay and stated he should go to another network. Patient's appointment scheduled for 2:00. I questioned if the patient intended to stay for the appointment. He said he would. I requested he call me later today with the scheduled date for his extractions. He agreed to call me back.

## 2023-10-10 NOTE — PROGRESS NOTES
I was notified by  that Lincoln Garcia is scheduled for full dental extractions 10/16/23 prior to radiation Tx for H+N cancer. He requires clearance prior to full extractions but does not have a PCP. I reached out to [facility] in [address] [place] [address] and left  requesting return call for assistance in scheduling ASAP. I will follow up tomorrow 10/11/23 if I do not hear back.

## 2023-10-11 ENCOUNTER — OFFICE VISIT (OUTPATIENT)
Dept: FAMILY MEDICINE CLINIC | Facility: CLINIC | Age: 58
End: 2023-10-11

## 2023-10-11 ENCOUNTER — PATIENT OUTREACH (OUTPATIENT)
Dept: HEMATOLOGY ONCOLOGY | Facility: CLINIC | Age: 58
End: 2023-10-11

## 2023-10-11 ENCOUNTER — TELEPHONE (OUTPATIENT)
Dept: RADIATION ONCOLOGY | Facility: HOSPITAL | Age: 58
End: 2023-10-11

## 2023-10-11 VITALS
BODY MASS INDEX: 17.78 KG/M2 | HEART RATE: 99 BPM | TEMPERATURE: 98 F | DIASTOLIC BLOOD PRESSURE: 90 MMHG | SYSTOLIC BLOOD PRESSURE: 122 MMHG | WEIGHT: 143 LBS | RESPIRATION RATE: 18 BRPM | OXYGEN SATURATION: 98 % | HEIGHT: 75 IN

## 2023-10-11 DIAGNOSIS — Z01.818 PREOPERATIVE CLEARANCE: Primary | ICD-10-CM

## 2023-10-11 DIAGNOSIS — K02.9 DENTAL CARIES: ICD-10-CM

## 2023-10-11 DIAGNOSIS — K05.30 PERIODONTITIS: ICD-10-CM

## 2023-10-11 DIAGNOSIS — C06.9 ORAL CANCER (HCC): ICD-10-CM

## 2023-10-11 PROCEDURE — 99244 OFF/OP CNSLTJ NEW/EST MOD 40: CPT | Performed by: FAMILY MEDICINE

## 2023-10-11 RX ORDER — TRAMADOL HYDROCHLORIDE 50 MG/1
50 TABLET ORAL EVERY 8 HOURS PRN
Qty: 12 TABLET | Refills: 0 | Status: SHIPPED | OUTPATIENT
Start: 2023-10-11 | End: 2023-10-16

## 2023-10-11 NOTE — PROGRESS NOTES
F/U call to [facility] to obtain an appnt to establish care and clearance for dental.     Left another  requesting return call.

## 2023-10-11 NOTE — PRE-PROCEDURE INSTRUCTIONS
Pre-Surgery Instructions:   Medication Instructions    acetaminophen (TYLENOL) 650 mg CR tablet Uses PRN- OK to take day of surgery    ondansetron (ZOFRAN-ODT) 8 mg disintegrating tablet Uses PRN- OK to take day of surgery    oxyCODONE (Roxicodone) 5 immediate release tablet Uses PRN- OK to take day of surgery      Spoke with pt via phone. Medication instructions for day surgery reviewed. Please use only a sip of water to take your instructed medications. Avoid all over the counter vitamins, supplements and NSAIDS for one week prior to surgery per anesthesia guidelines. Tylenol is ok to take as needed. You will receive a call one business day prior to surgery with an arrival time and hospital directions. If your surgery is scheduled on a Monday, the hospital will be calling you on the Friday prior to your surgery. If you have not heard from anyone by 8pm, please call the hospital supervisor through the hospital  at [phone]. Wilfred Fraser [phone]). Do not eat or drink anything after midnight the night before your surgery, including candy, mints, lifesavers, or chewing gum. Do not drink alcohol 24hrs before your surgery. Try not to smoke at least 24hrs before your surgery. Follow the pre surgery showering instructions as listed in the [facility] Surgical Experience Booklet” or otherwise provided by your surgeon's office. Do not shave the surgical area 24 hours before surgery. Do not apply any lotions, creams, including makeup, cologne, deodorant, or perfumes after showering on the day of your surgery. No contact lenses, eye make-up, or artificial eyelashes. Remove nail polish, including gel polish, and any artificial, gel, or acrylic nails if possible. Remove all jewelry including rings and body piercing jewelry. Wear causal clothing that is easy to take on and off. Consider your type of surgery. Keep any valuables, jewelry, piercings at home.  Please bring any specially ordered equipment (sling, braces) if indicated. Arrange for a responsible person to drive you to and from the hospital on the day of your surgery. Visitor Guidelines discussed. Call the surgeon's office with any new illnesses, exposures, or additional questions prior to surgery. Please reference your [facility] Surgical Experience Booklet” for additional information to prepare for your upcoming surgery.

## 2023-10-11 NOTE — PROGRESS NOTES
Presurgical Evaluation    Subjective:      Patient ID: Arlyn Santiago is a 59 y.o. male. Chief Complaint   Patient presents with    New Patient Visit     Clearance for tooth pulling        Patient is a 59-year-old male here to establish care and for preop examination. 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 Patient reports significant pain after tongue biopsy. He is drinking more to alleviate the symptoms, he was drinking approximately 3 beers a day he is up to 8 beers per day. He was taking oxycodone 1 pill daily, but has run out. Patient is still smoking cigarettes, down to 5 cigarettes/day from 2 packs/day before he was diagnosed. The following portions of the patient's history were reviewed and updated as appropriate: allergies, current medications, past family history, past medical history, past social history, past surgical history, and problem list.    Procedure date: 10/16    Surgeon:  Brooke Anderson  Planned procedure:  pulling all teeth  Diagnosis for procedure:  throat cancer/jaw cancer    Prior anesthesia: Yes   General; Complications:  None / Tolerated well    CAD History: None   NOTE: Patient should see Cardiology if time available before surgery, and if appropriate. Pulmonary History: None    Renal history: None    Diabetes History:  None     Neurological History: None     On Immunosuppressant meds/biologics: No      Review of Systems   Constitutional:  Negative for fatigue and fever. Respiratory:  Negative for shortness of breath. Gastrointestinal:  Negative for abdominal pain, constipation and diarrhea. Genitourinary:  Negative for dysuria. Skin:  Negative for rash. Neurological:  Negative for dizziness.          Current Outpatient Medications   Medication Sig Dispense Refill    oxyCODONE (Roxicodone) 5 immediate release tablet Take 1 tablet (5 mg total) by mouth every 6 (six) hours as needed for severe pain Max Daily Amount: 20 mg 20 tablet 0     No current facility-administered medications for this visit. Allergies on file:   Bee venom and Other    Patient Active Problem List   Diagnosis    Dental caries    Cervical lymphadenopathy    Periodontitis    Alcohol use disorder, severe, dependence (HCC)    Hypertension    Cigarette nicotine dependence without complication    Hyponatremia    Hypokalemia    Hypomagnesemia    Oral cancer (HCC)        Past Medical History:   Diagnosis Date    Alcohol abuse     Hypertension        Past Surgical History:   Procedure Laterality Date    EGD      KNEE ARTHROSCOPY Left        No family history on file. Social History     Tobacco Use    Smoking status: Every Day     Packs/day: 0.50     Types: Cigarettes    Smokeless tobacco: Never   Vaping Use    Vaping Use: Never used   Substance Use Topics    Alcohol use: Yes     Comment: 3-4 beers/day 24 oz beer    Drug use: No       Objective:    Vitals:    10/11/23 1605   BP: 122/90   BP Location: Right arm   Patient Position: Sitting   Cuff Size: Standard   Pulse: 99   Resp: 18   Temp: 98 °F (36.7 °C)   TempSrc: Temporal   SpO2: 98%   Weight: 64.9 kg (143 lb)   Height: 6' 3" (1.905 m)        Physical Exam  Vitals reviewed. Constitutional:       Appearance: Normal appearance. HENT:      Head: Normocephalic. Nose: Nose normal.      Mouth/Throat:      Mouth: Oral lesions present. Dentition: Dental caries present. Eyes:      Extraocular Movements: Extraocular movements intact. Pupils: Pupils are equal, round, and reactive to light. Cardiovascular:      Rate and Rhythm: Normal rate. Heart sounds: Normal heart sounds. Pulmonary:      Effort: Pulmonary effort is normal. No respiratory distress. Breath sounds: Normal breath sounds. Musculoskeletal:         General: Normal range of motion. Cervical back: Normal range of motion.    Neurological: General: No focal deficit present. Mental Status: He is alert and oriented to person, place, and time. Psychiatric:         Mood and Affect: Mood normal.             Preop labs/testing available and reviewed: no               EKG no    Echo no    Stress test/cath no    PFT/Mitchell no    Functional capacity: Shovel snow                          6 Mets   Pick the highest level patient can comfortably perform   4 mets or greater for surgery    RCRI  High Risk surgery? 1 Point  CAD History:         1 Point   MI; Positive Stress Test; CP due to Mi;  Nitrate Usage to control Angina; Pathologic Q wave on EKG  CHF Active:         1 Point   Pulm Edema; Paroxysmal Nocturnal Dyspnea;  Bibasilar Rales (crackles);S3; CHF on CXR  Cerebrovascular Disease (TIA or CVA):     1 Point  DM on Insulin:        1 Point  Serum Creat >2.0 mg/dl:       1 Point          Total Points: 0     Scorin: Class I, Very Low Risk (0.4%)     1: Class II, Low risk (0.9%)     2: Class III Moderate (6.6%)     3: Class IV High (>11%)      GRANT Risk:  GFR:        For PCP:  If GFR>60, Hold ACE/ARB/Diuretic on the day of surgery, and NSAIDS 10 days before. If GFR<45, Consider PRE and POST op Nephrology Consult. If 46 <GFR> 59 : Has Patient had GRANT in last 6 Months? no   If YES: Preop Nephrology consult   If No:  [address] [facility] Nephrology consult. Assessment/Plan:    Patient is medically optimized (cleared) for the planned procedure. Further testing/evaluation is not required. Postop concerns: no    Problem List Items Addressed This Visit       Dental caries    Periodontitis    Oral cancer ([address])    Relevant Medications    traMADol (Ultram) 50 mg tablet    Preoperative clearance - Primary     Patient is low risk for low risk procedure  -patient encouraged to continue to cut back on smoking/quit, declines nicotine replacement therapy or alternative options such as Chantix at this time.   - Encourage patient to continue to cut back on alcohol consumption, patient agreeable especially with alternate pain management options             Diagnoses and all orders for this visit:    Preoperative clearance    Periodontitis    Oral cancer (HCC)  -     traMADol (Ultram) 50 mg tablet; Take 1 tablet (50 mg total) by mouth every 8 (eight) hours as needed for moderate pain for up to 4 days    Dental caries          No outpatient medications have been marked as taking for the 10/11/23 encounter (Office Visit) with Armida Dalal MD.        NOTE: Please use the above to review important meds for your specialty, the remainder "per anesthesia Guidelines."    NOTE: Please place an Inbasket message for "[facility]" pool for complicated patients.

## 2023-10-11 NOTE — TELEPHONE ENCOUNTER
Ramin Nunez from [facility] called yesterday, pt is scheduled for full mouth extractions @ [facility] on 10/16 and he will need an H & P for the anesthesia.

## 2023-10-11 NOTE — PROGRESS NOTES
3rd F/U call to [employer]. Left VM on the clinical line this time also with detailed info regarding need for office visit within the next couple of days to obtain clearance for dental extraction on Monday 10/16 in order to start radiation Tx. I provided my direct number [phone] requesting return call.

## 2023-10-11 NOTE — PROGRESS NOTES
I reached out to Dixie after obtaining an appnt for him with  [facility] for this afternoon at 2:30pm. He declined to accept stating that he was in [place] working and would not be able to make it to this appnt. I will continue to try to obtain an appnt with [facility].

## 2023-10-11 NOTE — PROGRESS NOTES
I reached out to Dixie to offer him an appnt today at 2:30pm at [facility] AT [facility]. He states that he is in [place] on a job and can not make that appnt today, he states that his boss will not accommodate. I will continue to try to work with Mary gutierrez to obtain something for tomorrow 10/12/23 as he took the day off to attend his radiation appnt.

## 2023-10-11 NOTE — PROGRESS NOTES
I reached out to the director of [employer] for assistance in obtaining an appnt tomorrow. They were able to schedule him to be seen at 10:40am after his radiation appnt. Our NN will be reaching out to him today to provide appnt info and further education.

## 2023-10-12 ENCOUNTER — PATIENT OUTREACH (OUTPATIENT)
Dept: CASE MANAGEMENT | Facility: HOSPITAL | Age: 58
End: 2023-10-12

## 2023-10-12 ENCOUNTER — RADIATION ONCOLOGY CONSULT (OUTPATIENT)
Dept: RADIATION ONCOLOGY | Facility: HOSPITAL | Age: 58
End: 2023-10-12
Attending: INTERNAL MEDICINE
Payer: COMMERCIAL

## 2023-10-12 ENCOUNTER — PATIENT OUTREACH (OUTPATIENT)
Dept: HEMATOLOGY ONCOLOGY | Facility: CLINIC | Age: 58
End: 2023-10-12

## 2023-10-12 VITALS
HEART RATE: 97 BPM | TEMPERATURE: 97.7 F | OXYGEN SATURATION: 98 % | BODY MASS INDEX: 18.37 KG/M2 | RESPIRATION RATE: 18 BRPM | SYSTOLIC BLOOD PRESSURE: 138 MMHG | WEIGHT: 147 LBS | DIASTOLIC BLOOD PRESSURE: 90 MMHG

## 2023-10-12 DIAGNOSIS — C06.9 ORAL CANCER (HCC): Primary | ICD-10-CM

## 2023-10-12 PROCEDURE — 99245 OFF/OP CONSLTJ NEW/EST HI 55: CPT | Performed by: INTERNAL MEDICINE

## 2023-10-12 PROCEDURE — 99211 OFF/OP EST MAY X REQ PHY/QHP: CPT | Performed by: INTERNAL MEDICINE

## 2023-10-12 RX ORDER — DIPHENHYDRAMINE HYDROCHLORIDE AND LIDOCAINE HYDROCHLORIDE AND ALUMINUM HYDROXIDE AND MAGNESIUM HYDRO
10 KIT EVERY 4 HOURS PRN
Qty: 237 ML | Refills: 2 | Status: SHIPPED | OUTPATIENT
Start: 2023-10-12

## 2023-10-12 NOTE — PROGRESS NOTES
Patient called stating he just received a call from [facility] informing him he needs clearance from his PCP prior to the extractions on 10/16. Patient does not have a PCP. I informed him I would see if I could find a physician that could see him before Monday to provide clearance.

## 2023-10-12 NOTE — PROGRESS NOTES
Per the  at [facility], they are able to see the patient today at 4:00. Called the patient who confirmed he would be available for the appointment. He was very grateful.

## 2023-10-12 NOTE — PROGRESS NOTES
Notified by NN that [facility] transport forms need to be completed. Patient previously had transportation set up with his girlfriend for this Monday 10/16/23 [facility] dental extractions. He notified NN today that he no longer has a ride to get there but his girlfriend will be able to pick him up post procedure as she will be finished working. I contacted Kristan Maldonado to complete the [facility] forms via phone. We reviewed all forms and I reviewed verbal consent and agreement to all. I will upload copies into media and email to [facility] dispatch requesting transport to drop him off on Monday  10/16/23 to his procedure. Kristan Maldonado does not drive but should be able to access local transport for other appnts if within the range near his home in [place] in the future. During our conversation he expressed concerns for financial needs. 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 Noted that he has met with the  and  discussed this topic. He was provided with all of these resources and instructions. He stated that he will try to get down to the social security office to apply for benefits so he can "keep his mortgage paid" when he starts his daily radiation treatment.

## 2023-10-12 NOTE — LETTER
2023     Ivan Babcockman, [address]    Patient: Marcus Rico   YOB: 1965   Date of Visit: 10/12/2023       Dear Dr. Tanner Parker:    Thank you for referring Leonora Miller to me for evaluation. Below are my notes for this consultation. If you have questions, please do not hesitate to call me. I look forward to following your patient along with you. Sincerely,        Natalia Martins MD        CC: No Recipients    Natalia Martins MD  10/12/2023 10:10 AM  Sign when Signing Visit  Consultation - Radiation Oncology      Patient Name: Marcus Rico [id number]:[id number] : 1965  Encounter: [id number]  Referring Provider: Ivan Truong*    Cancer Staging   Oral cancer [facility])  Staging form: Oral Cavity, AJCC 8th Edition  - Clinical stage from 2023: Stage CHETAN (cT4a, cN2c, cM0) - Signed by Juanita Orellana MD on 2023  Stage prefix: Initial diagnosis    ASSESSMENT & [address] R Terry Bueno is a 59 y.o. male current smoker and current alcohol user with [id number], Stage CHETAN oral cavity cancer, s/p oral biopsy on 23 with Dr. 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 Oral exam at the time of biopsy notable for limited ROM, some trismus, invasion along right mandible. Given extent of disease, he was not deemed surgically resectable and was discussed at MDT, with consensus for definitive chemoRT. PET/CT on 23 notable for a 3.2cm right anterior oral cavity mass with bilateral avid lymph nodes without evidence of distant disease. CT Neck on 23 showed a 3cm mass in the anterior tongue crossing midline with additional smaller nodule in the right posterior lateral tongue, with multiple pathologic nodes in the right level IB-III and left level IB.       I had a long and extensive discussion regarding the patient's cancer diagnosis and recommend definitive combined modality therapy. Treatment would involve 70Gy in 35 fx with IMRT dosepainting to the bilateral neck. Risks, benefits and side effects were discussed in extensive detail and the patient's questions were answered. Acute and late side effects discussed include, but are not limited to, mucositis, dermatitis, pain, xerostomia, dysgeusia, thick mucus, fatigue, hair loss, weight loss, short term or long term dysphagia ± feeding tube requirement, trismus, fibrosis of the neck muscles, hypothyroidism, xerostomia, nerve injury, risk for osteoradionecrosis, risk of dental problems, skin atrophy, lymphedema or swelling in the head and neck, and vascular cartilage injury. We reviewed that even with appropriate therapy there is a risk of recurrence. Veronica Boyd demonstrated a good understanding of our discussion and all questions were answered to the patient's apparent satisfaction. The patient agreed to proceed with radiation therapy, and informed consent was signed. -CT simulation to be scheduled at [facility] per patient request  -[facility] transport arranged today  -IV Contrast form signed  -Magic Mouthwash Prescribed  -Dental evaluation prior to RT, requires full mouth extraction on 10/16/23  -Speech & Swallow Referral placed  -Social Work referral placed, requested to be seen today if possible  -Patient no-showed for palliative care, will request reschedule  -Nutrition Referral underway  -Discussed possible need for PEG tube, he is not interested at this time. Thank you for the opportunity to participate in the care of this patient. Blaze Hannon MD  Department of [address] [place] [address] Placed This Encounter   Procedures   • Ambulatory Referral to Speech Therapy   • Ambulatory Referral to Oncology Social Worker   • Radiation Simulation Treatment     1.  Oral cancer [facility])  Ambulatory Referral to Speech Therapy    Ambulatory Referral to Oncology Social Worker diphenhydramine, lidocaine, Al/Mg hydroxide, simethicone (Magic Mouthwash) SUSP    Radiation Simulation Treatment        Total Time Spent  60 minutes spent reviewing EMR in preparation for visit, with the patient, coordination with other providers, review of radiology, and documentation. CHIEF COMPLAINT  Chief Complaint   Patient presents with   • Consult     Radiation Oncology      History of Present Illness  Presents for discussion of RT for recently diagnosed  HPV positive SCC of oral cavity. Referred by Dr. Mandi Boss. Additional medical problems include HTN, nicotine and ETOH dependence. Presented to dentist for dental extractions. On clinical exam was found to have a large erythematous open sore under tongue along with enlarged, tender, palpable lymph nodes on right side. He was referred to oncology. 5/23/23  Dr. Janelle Martines is to complete PET/CT for staging and refer to ENT for biopsy    6/19/23  PET CT  IMPRESSION:  1. Hypermetabolic right anterior oral cavity mass most concerning for malignancy. 2. Bilateral hypermetabolic cervical adenopathy compatible with metastases. 3. No hypermetabolic metastases in the chest abdomen pelvis. 4. Multiple healing left rib fractures. 8/29/23  Dr. Mandi Boss  Oral cavity:  Mild trismus, limited tongue ROM with gross invasion along root of tongue, there is extension along the entire Right FOM crossing over to the Left, there is gross invasion along the Right mandible, poor dentition  Neck: Trachea is midline; no thyroid nodules, Salivary glands symmetrical  Lymph: 3-4cm fixed firmed LAD at R level I,  Left level II LAD    8/29/23  A. 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, ulcerated  - Carcinoma present at tissue edges  Addendum   P16 is diffusely positive in tumor cells while ELIE CISH is negative. HPV CISH is pending.          Addendum 2   HR HPV CISH (performed at [facility] and interpreted by  [facility]) is: Positive. 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 9/9/23  CT Soft Tissue Neck  IMPRESSION:  There is approximately 3 cm mass within the anterior roof of the tongue primarily right-sided but extending across the midline to the anterior left tongue. There is an additional smaller nodule identified within the right posterior lateral tongue. Multiple pathologic lymph nodes are identified within the neck primarily right-sided involving levels 1B, 2 and 3. Marked compression of the dominant right jugular vein at the level of the level 2 lymph nodes. The vein does remain patent. 9/9/23  CT chest w contrast  IMPRESSION:   No metastases. Emphysema. Benign granulomatous disease. 9/11/23  Head and Neck Multidisciplinary Case Review  PHYSICIAN RECOMMENDED PLAN:  Definitive chemo/RT. 9/13/23  Dr. Susanna Pereira  F/u ENT, surgery not being pursued upfront, IR referral port placement. Weekly Cisplatin C1 coming up in 2 weeks (7 treatments planned) to be concurrent with RT. F/u with radiation oncology. 9/14/23 Palliative-No show    10/2/23 Dr. Susanna Pereira (telemedicine visit)  Plan is for 7 cycles of cisplatin. Needs f/u with ENT      Upcoming:  10/10/23 OMS dental (not cleared, needs full extractions)    Today, the patient has no dysphagia. He has right sided mouth pain, 9/10. He was given tramadol recently but hasn't picked this up yet. He presents alone today. He currently drinks due to stress. He continues smoking. Oncology History   Oral cancer ([address] )   5/23/2023 Initial Diagnosis    Oral cancer ([address] )     8/29/2023 -  Cancer Staged    Staging form: Oral Cavity, AJCC 8th Edition  - Clinical stage from 8/29/2023: Stage CHETAN (cT4a, cN2c, cM0) - Signed by Shboha Seay MD on 9/13/2023  Stage prefix: Initial diagnosis       8/29/2023 Biopsy    A.  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, p16 positive      Chemotherapy    alteplase (CATHFLO), 2 mg, Intracatheter, Every 1 Minute as needed, 0 of 7 cycles  CISplatin (PLATINOL) infusion, 40 mg/m2, Intravenous, Once, 0 of 7 cycles  aprepitant (CINVANTI) in  mL IVPB, 130 mg, Intravenous, Once, 0 of 7 cycles         Historical Information  Past Medical History:   Diagnosis Date   • Alcohol abuse    • Hypertension    • 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     Past Surgical History:   Procedure Laterality Date   • EGD     • KNEE ARTHROSCOPY Left      Family History   Problem Relation Age of Onset   • Breast cancer Mother    • Cancer Father      Social History  Social History     Substance and Sexual Activity   Alcohol Use Yes    Comment: 3-4 beers/day 24 oz beer     Social History     Substance and Sexual Activity   Drug Use No     Social History     Tobacco Use   Smoking Status Every Day   • Packs/day: 0.25   • Types: Cigarettes   Smokeless Tobacco Never     Meds/Allergies    Current Outpatient Medications:   •  diphenhydramine, lidocaine, Al/Mg hydroxide, simethicone (Magic Mouthwash) SUSP, Swish and swallow 10 mL every 4 (four) hours as needed for mouth pain or discomfort, Disp: 237 mL, Rfl: 2  •  traMADol (Ultram) 50 mg tablet, Take 1 tablet (50 mg total) by mouth every 8 (eight) hours as needed for moderate pain for up to 4 days, Disp: 12 tablet, Rfl: 0  •  oxyCODONE (Roxicodone) 5 immediate release tablet, Take 1 tablet (5 mg total) by mouth every 6 (six) hours as needed for severe pain Max Daily Amount: 20 mg (Patient not taking: Reported on 10/12/2023), Disp: 20 tablet, Rfl: 0  Allergies   Allergen Reactions   • Bee Venom Hives   • Other      bees      Pathology:  See above.     Review of Systems  Refer to nursing note    OBJECTIVE:   /90 (BP Location: Left arm)   Pulse 97   Temp 97.7 °F (36.5 °C) (Temporal)   Resp 18   Wt 66.7 kg (147 lb)   SpO2 98%   BMI 18.37 kg/m²   Pain Assessment: 9  Performance Status: ECO - Symptomatic but completely ambulatory    Physical Exam  General Appearance:  Alert, cooperative, no distress, appears stated age  HEENT: Trismus. Tongue fixed with limited ROM, with bulky tumor along right aspect crossing midline. Poor dentition with tumor involving lower right alveolar ridge. Palpable bilateral lymphadenopathy. Cardiovascular:  Extremities warm and well perfused  Lungs: Respirations unlabored, no cyanosis, able to speak in complete sentences without dyspnea. Abdomen: Non-distended  Skin: No generalized rash or dermatitis  Neurologic: ANOx3, speech and cognition intact. Portions of the record may have been created with voice recognition software. Occasional wrong word or "sound a like" substitutions may have occurred due to the inherent limitations of voice recognition software. Read the chart carefully and recognize, using context, where substitutions have occurred.

## 2023-10-12 NOTE — PROGRESS NOTES
Received call from Dr. Maria D Szymanski from [facility] during the patient's consult. She stated the patient did not know the names of any of his Oncology physicians and provided my telephone number. Dr. Stefani Turner is planning on doing total dental extractions on 10/16 but the patient informed her he was starting RT on 10/12. I explained the patient is scheduled for consult with Radiation Oncology on 10/12 but they will not start his treatment without dental clearance.

## 2023-10-12 NOTE — ASSESSMENT & PLAN NOTE
Patient is low risk for low risk procedure  -patient encouraged to continue to cut back on smoking/quit, declines nicotine replacement therapy or alternative options such as Chantix at this time.   - Encourage patient to continue to cut back on alcohol consumption, patient agreeable especially with alternate pain management options

## 2023-10-12 NOTE — PROGRESS NOTES
Called patient to inform him [facility] would be able to see him tomorrow, 10/12/23 at 10:40 after his Radiation Oncology consult. Patient stated he would not be able to be back in [facility] by the appointment time since his consult is at 9 at the [facility]. He inquired if there was anyway he could be seen today since he is on his way home from work. Informed him I would reach out to [facility].

## 2023-10-12 NOTE — PROGRESS NOTES
Consultation - Radiation Oncology      Patient Name: Tanvi Pool [id number]:[id number] : 1965  Encounter: [id number]  Referring Provider: Brayden Mcguire*    Cancer Staging   Oral cancer [facility])  Staging form: Oral Cavity, AJCC 8th Edition  - Clinical stage from 2023: Stage CHETAN (cT4a, cN2c, cM0) - Signed by Gallito Sam MD on 2023  Stage prefix: Initial diagnosis    ASSESSMENT & [address] [facility] CHEN Vaughn is a 59 y.o. male current smoker and current alcohol user with [id number], Stage CHETAN oral cavity cancer, s/p oral biopsy on 23 with Dr. 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 Oral exam at the time of biopsy notable for limited ROM, some trismus, invasion along right mandible. Given extent of disease, he was not deemed surgically resectable and was discussed at MDT, with consensus for definitive chemoRT. PET/CT on 23 notable for a 3.2cm right anterior oral cavity mass with bilateral avid lymph nodes without evidence of distant disease. CT Neck on 23 showed a 3cm mass in the anterior tongue crossing midline with additional smaller nodule in the right posterior lateral tongue, with multiple pathologic nodes in the right level IB-III and left level IB. I had a long and extensive discussion regarding the patient's cancer diagnosis and recommend definitive combined modality therapy. Treatment would involve 70Gy in 35 fx with IMRT dosepainting to the bilateral neck. Risks, benefits and side effects were discussed in extensive detail and the patient's questions were answered.  Acute and late side effects discussed include, but are not limited to, mucositis, dermatitis, pain, xerostomia, dysgeusia, thick mucus, fatigue, hair loss, weight loss, short term or long term dysphagia ± feeding tube requirement, trismus, fibrosis of the neck muscles, hypothyroidism, xerostomia, nerve injury, risk for osteoradionecrosis, risk of dental problems, skin atrophy, lymphedema or swelling in the head and neck, and vascular cartilage injury. We reviewed that even with appropriate therapy there is a risk of recurrence. Christiano Tello demonstrated a good understanding of our discussion and all questions were answered to the patient's apparent satisfaction. The patient agreed to proceed with radiation therapy, and informed consent was signed. -CT simulation to be scheduled at [facility] per patient request  -[facility] transport arranged today  -IV Contrast form signed  -Magic Mouthwash Prescribed  -Dental evaluation prior to RT, requires full mouth extraction on 10/16/23  -Speech & Swallow Referral placed  -Social Work referral placed, requested to be seen today if possible  -Patient no-showed for palliative care, will request reschedule  -Nutrition Referral underway  -Discussed possible need for PEG tube, he is not interested at this time. Thank you for the opportunity to participate in the care of this patient. Wilver Brown MD  Department of [address] [place] [address]    Orders Placed This Encounter   Procedures   • Ambulatory Referral to Speech Therapy   • Ambulatory Referral to Oncology Social Worker   • Radiation Simulation Treatment     1. Oral cancer [facility])  Ambulatory Referral to Speech Therapy    Ambulatory Referral to Oncology Social Worker    diphenhydramine, lidocaine, Al/Mg hydroxide, simethicone (Magic Mouthwash) SUSP    Radiation Simulation Treatment        Total Time Spent  60 minutes spent reviewing EMR in preparation for visit, with the patient, coordination with other providers, review of radiology, and documentation. CHIEF COMPLAINT  Chief Complaint   Patient presents with   • Consult     Radiation Oncology      History of Present Illness  Presents for discussion of RT for recently diagnosed  HPV positive SCC of oral cavity. Referred by Dr. Kadeem Winter.   Additional medical problems include HTN, nicotine and ETOH dependence. Presented to dentist for dental extractions. On clinical exam was found to have a large erythematous open sore under tongue along with enlarged, tender, palpable lymph nodes on right side. He was referred to oncology. 5/23/23  Dr. Ashley Ruffin is to complete PET/CT for staging and refer to ENT for biopsy    6/19/23  PET CT  IMPRESSION:  1. Hypermetabolic right anterior oral cavity mass most concerning for malignancy. 2. Bilateral hypermetabolic cervical adenopathy compatible with metastases. 3. No hypermetabolic metastases in the chest abdomen pelvis. 4. Multiple healing left rib fractures. 8/29/23  Dr. Yuan Robbins  Oral cavity:  Mild trismus, limited tongue ROM with gross invasion along root of tongue, there is extension along the entire Right FOM crossing over to the Left, there is gross invasion along the Right mandible, poor dentition  Neck: Trachea is midline; no thyroid nodules, Salivary glands symmetrical  Lymph: 3-4cm fixed firmed LAD at R level I,  Left level II LAD    8/29/23  A. 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, ulcerated  - Carcinoma present at tissue edges  Addendum   P16 is diffusely positive in tumor cells while ELIE CISH is negative. HPV CISH is pending. Addendum 2   HR HPV CISH (performed at [facility] and interpreted by Dr. Jamaica Arthur) is: Positive. 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 9/9/23  CT Soft Tissue Neck  IMPRESSION:  There is approximately 3 cm mass within the anterior roof of the tongue primarily right-sided but extending across the midline to the anterior left tongue. There is an additional smaller nodule identified within the right posterior lateral tongue. Multiple pathologic lymph nodes are identified within the neck primarily right-sided involving levels 1B, 2 and 3.  Marked compression of the dominant right jugular vein at the level of the level 2 lymph nodes. The vein does remain patent. 9/9/23  CT chest w contrast  IMPRESSION:   No metastases. Emphysema. Benign granulomatous disease. 9/11/23  Head and Neck Multidisciplinary Case Review  PHYSICIAN RECOMMENDED PLAN:  Definitive chemo/RT. 9/13/23  Dr. Susanna Pereira  F/u ENT, surgery not being pursued upfront, IR referral port placement. Weekly Cisplatin C1 coming up in 2 weeks (7 treatments planned) to be concurrent with RT. F/u with radiation oncology. 9/14/23 Palliative-No show    10/2/23 Dr. Susanna Pereira (telemedicine visit)  Plan is for 7 cycles of cisplatin. Needs f/u with ENT      Upcoming:  10/10/23 OMS dental (not cleared, needs full extractions)    Today, the patient has no dysphagia. He has right sided mouth pain, 9/10. He was given tramadol recently but hasn't picked this up yet. He presents alone today. He currently drinks due to stress. He continues smoking. Oncology History   Oral cancer ([address])   5/23/2023 Initial Diagnosis    Oral cancer ([address])     8/29/2023 -  Cancer Staged    Staging form: Oral Cavity, AJCC 8th Edition  - Clinical stage from 8/29/2023: Stage CHETAN (cT4a, cN2c, cM0) - Signed by Shobha Seay MD on 9/13/2023  Stage prefix: Initial diagnosis       8/29/2023 Biopsy    A.  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, p16 positive      Chemotherapy    alteplase (CATHFLO), 2 mg, Intracatheter, Every 1 Minute as needed, 0 of 7 cycles  CISplatin (PLATINOL) infusion, 40 mg/m2, Intravenous, Once, 0 of 7 cycles  aprepitant (CINVANTI) in  mL IVPB, 130 mg, Intravenous, Once, 0 of 7 cycles         Historical Information   Past Medical History:   Diagnosis Date   • Alcohol abuse    • Hypertension    • 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     Past Surgical History:   Procedure Laterality Date   • EGD     • KNEE ARTHROSCOPY Left      Family History   Problem Relation Age of Onset   • Breast cancer Mother    • Cancer Father      Social History   Social History     Substance and Sexual Activity   Alcohol Use Yes    Comment: 3-4 beers/day 24 oz beer     Social History     Substance and Sexual Activity   Drug Use No     Social History     Tobacco Use   Smoking Status Every Day   • Packs/day: 0.25   • Types: Cigarettes   Smokeless Tobacco Never     Meds/Allergies     Current Outpatient Medications:   •  diphenhydramine, lidocaine, Al/Mg hydroxide, simethicone (Magic Mouthwash) SUSP, Swish and swallow 10 mL every 4 (four) hours as needed for mouth pain or discomfort, Disp: 237 mL, Rfl: 2  •  traMADol (Ultram) 50 mg tablet, Take 1 tablet (50 mg total) by mouth every 8 (eight) hours as needed for moderate pain for up to 4 days, Disp: 12 tablet, Rfl: 0  •  oxyCODONE (Roxicodone) 5 immediate release tablet, Take 1 tablet (5 mg total) by mouth every 6 (six) hours as needed for severe pain Max Daily Amount: 20 mg (Patient not taking: Reported on 10/12/2023), Disp: 20 tablet, Rfl: 0  Allergies   Allergen Reactions   • Bee Venom Hives   • Other      bees       Pathology:  See above. Review of Systems  Refer to nursing note    OBJECTIVE:   /90 (BP Location: Left arm)   Pulse 97   Temp 97.7 °F (36.5 °C) (Temporal)   Resp 18   Wt 66.7 kg (147 lb)   SpO2 98%   BMI 18.37 kg/m²   Pain Assessment:  9  Performance Status: ECO - Symptomatic but completely ambulatory    Physical Exam  General Appearance:  Alert, cooperative, no distress, appears stated age  HEENT: Trismus. Tongue fixed with limited ROM, with bulky tumor along right aspect crossing midline. Poor dentition with tumor involving lower right alveolar ridge. Palpable bilateral lymphadenopathy. Cardiovascular:  Extremities warm and well perfused  Lungs: Respirations unlabored, no cyanosis, able to speak in complete sentences without dyspnea.   Abdomen: Non-distended  Skin: No generalized rash or dermatitis  Neurologic: ANOx3, speech and cognition intact. Portions of the record may have been created with voice recognition software. Occasional wrong word or "sound a like" substitutions may have occurred due to the inherent limitations of voice recognition software. Read the chart carefully and recognize, using context, where substitutions have occurred.

## 2023-10-12 NOTE — PROGRESS NOTES
Tayo Byrd 1965 is a 59 y.o. 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 Referred by Dr. Garcia Sharif. Additional medical problems include HTN, nicotine and ETOH dependence. Presented to dentist for dental extractions. On clinical exam was found to have a large erythematous open sore under tongue along with enlarged, tender, palpable lymph nodes on right side. He was referred to oncology. 5/23/23  Dr. Heidi Beckman is to complete PET/CT for staging and refer to ENT for biopsy    6/19/23  PET CT  IMPRESSION:  1. Hypermetabolic right anterior oral cavity mass most concerning for malignancy. 2. Bilateral hypermetabolic cervical adenopathy compatible with metastases. 3. No hypermetabolic metastases in the chest abdomen pelvis. 4. Multiple healing left rib fractures. 8/29/23  Dr. Garcia Sharif  Oral cavity:  Mild trismus, limited tongue ROM with gross invasion along root of tongue, there is extension along the entire Right FOM crossing over to the Left, there is gross invasion along the Right mandible, poor dentition  Neck: Trachea is midline; no thyroid nodules, Salivary glands symmetrical  Lymph: 3-4cm fixed firmed LAD at R level I,  Left level II LAD    8/29/23  A. 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, ulcerated  - Carcinoma present at tissue edges  Addendum   P16 is diffusely positive in tumor cells while ELIE CISH is negative. HPV CISH is pending. Addendum 2   HR HPV CISH (performed at [facility] and interpreted by Dr. Sai Mcgraw) is: Positive. 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 9/9/23  CT Soft Tissue Neck  IMPRESSION:  There is approximately 3 cm mass within the anterior roof of the tongue primarily right-sided but extending across the midline to the anterior left tongue.  There is an additional smaller nodule identified within the right posterior lateral tongue. Multiple pathologic lymph nodes are identified within the neck primarily right-sided involving levels 1B, 2 and 3. Marked compression of the dominant right jugular vein at the level of the level 2 lymph nodes. The vein does remain patent. 9/9/23  CT chest w contrast  IMPRESSION:   No metastases. Emphysema. Benign granulomatous disease. 9/11/23  Head and Neck Multidisciplinary Case Review  PHYSICIAN RECOMMENDED PLAN:  Definitive chemo/RT. 9/13/23  Dr. Lewis Benitez  F/u ENT, surgery not being pursued upfront, IR referral port placement. Weekly Cisplatin C1 coming up in 2 weeks (7 treatments planned) to be concurrent with RT. F/u with radiation oncology. 9/14/23 Palliative-No show    10/2/23 Dr. Lewis Benitez (telemedicine visit)  Plan is for 7 cycles of cisplatin. Needs f/u with ENT      10/10/23 [facility] dental (not cleared, needs full extractions)        Upcoming:  10/16/23 Dental extractions         Oncology History   Oral cancer ([address])   5/23/2023 Initial Diagnosis    Oral cancer ([address])     8/29/2023 -  Cancer Staged    Staging form: Oral Cavity, AJCC 8th Edition  - Clinical stage from 8/29/2023: Stage CHETAN (cT4a, cN2c, cM0) - Signed by Nadira Bosch MD on 9/13/2023  Stage prefix: Initial diagnosis       8/29/2023 Biopsy    A. 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, p16 positive      Chemotherapy    alteplase (CATHFLO), 2 mg, Intracatheter, Every 1 Minute as needed, 0 of 7 cycles  CISplatin (PLATINOL) infusion, 40 mg/m2, Intravenous, Once, 0 of 7 cycles  aprepitant (CINVANTI) in  mL IVPB, 130 mg, Intravenous, Once, 0 of 7 cycles           Review of Systems:  Review of Systems   Constitutional:  Positive for unexpected weight change (25 lbs over the last 4 months). Negative for appetite change and fatigue.    HENT:  Positive for dental problem (getting full extractions on 10/16) and hearing loss (right sided). Negative for sore throat and trouble swallowing. Eyes: Negative. Respiratory:  Negative for cough and shortness of breath. Smokes about 5 cigarettes a day   Cardiovascular: Negative. Gastrointestinal: Negative. Endocrine: Negative. Genitourinary: Negative. 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 Skin: Negative. Allergic/Immunologic: Negative. Neurological: Negative. Hematological: Negative. Psychiatric/Behavioral:  The patient is nervous/anxious.         Clinical Trial: no    Pain assessment: 9/10 oral/right side of mouth    PFT: n/a    Prior Radiation: no    Teaching: NCI radiation packet, H&N    MST completed     Implantable Devices (Port, pacemaker, pain stimulator): no    Hip Replacement: no    Health Maintenance   Topic Date Due    Hepatitis C Screening  Never done    Pneumococcal Vaccine: Pediatrics (0 to 5 Years) and At-Risk Patients (6 to 59 Years) (1 - PCV) Never done    HIV Screening  Never done    BMI: Followup Plan  Never done    Annual Physical  Never done    Hepatitis A Vaccine (1 of 2 - Risk 2-dose series) Never done    Colorectal Cancer Screening  Never done    COVID-19 Vaccine (4 - Pfizer series) 02/04/2022    Hepatitis B Vaccine (1 of 3 - Risk 3-dose series) Never done    Influenza Vaccine (1) Never done    BMI: Adult  10/11/2024    Depression Screening  10/12/2024    DTaP,Tdap,and Td Vaccines (2 - Td or Tdap) 04/25/2026    HIB Vaccine  Aged Out    IPV Vaccine  Aged Out    Meningococcal ACWY Vaccine  Aged Out    HPV Vaccine  Aged Out       Past Medical History:   Diagnosis Date    Alcohol abuse     Hypertension     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       Past Surgical History:   Procedure Laterality Date    EGD      KNEE ARTHROSCOPY Left        Family History   Problem Relation Age of Onset    Breast cancer Mother     Cancer Father        Social History     Tobacco Use    Smoking status: Every Day     Packs/day: 0.25     Types: Cigarettes    Smokeless tobacco: Never   Vaping Use    Vaping Use: Never used   Substance Use Topics    Alcohol use: Yes     Comment: 3-4 beers/day 24 oz beer    Drug use: No          Current Outpatient Medications:     traMADol (Ultram) 50 mg tablet, Take 1 tablet (50 mg total) by mouth every 8 (eight) hours as needed for moderate pain for up to 4 days, Disp: 12 tablet, Rfl: 0    oxyCODONE (Roxicodone) 5 immediate release tablet, Take 1 tablet (5 mg total) by mouth every 6 (six) hours as needed for severe pain Max Daily Amount: 20 mg (Patient not taking: Reported on 10/12/2023), Disp: 20 tablet, Rfl: 0    Allergies   Allergen Reactions    Bee Venom Hives    Other      bees        Vitals:    10/12/23 0837   BP: 138/90   BP Location: Left arm   Pulse: 97   Resp: 18   Temp: 97.7 °F (36.5 °C)   TempSrc: Temporal   SpO2: 98%   Weight: 66.7 kg (147 lb)

## 2023-10-13 ENCOUNTER — DOCUMENTATION (OUTPATIENT)
Dept: HEMATOLOGY ONCOLOGY | Facility: CLINIC | Age: 58
End: 2023-10-13

## 2023-10-13 ENCOUNTER — PATIENT OUTREACH (OUTPATIENT)
Dept: HEMATOLOGY ONCOLOGY | Facility: CLINIC | Age: 58
End: 2023-10-13

## 2023-10-13 ENCOUNTER — TELEPHONE (OUTPATIENT)
Dept: NUTRITION | Facility: CLINIC | Age: 58
End: 2023-10-13

## 2023-10-13 NOTE — TELEPHONE ENCOUNTER
Received notification by SAQIB Almendarez., on 7/18/23 that pt has triggered for oncology nutrition care (reason for referral: HNC dx and Malnutrition Screening Tool (MST) Triggers: scored a 0 indicating No recent wt loss and is not eating poorly due to a decreased appetite. (Date of MST: 7/18/23)). Received notification by Lolis Calero., on 10/12/23 that pt has triggered for oncology nutrition care (reason for referral: HNC dx and Malnutrition Screening Tool (MST) Triggers: scored a 3 indicating 24-33# (11-15 kg) recent wt loss and is not eating poorly due to a decreased appetite. (Date of MST: 10/12/23) MST Note: "over the last 4 months "). [facility] and introduced self and explained the reason for today's call. Spoke with Dixie today who reports that he lost lost ~30# in the past 4 months d/t difficulty chewing d/t his dentition. He says he will be having all of his teeth extracted on Monday. Discussed oncology nutrition services available (options for in-person and phone consultation) and the benefits of meeting for a consultation. Pt requested that I call him at the end of next week to set up appt and hopefully by that time he will have more appts scheduled so we can coordinate something that works for him. Provided this RD’s contact information asking that Colin reach out prn. All questions/concerns addressed at this time.

## 2023-10-13 NOTE — PROGRESS NOTES
I contacted surg scheduling at 3:20pm today to obtain an arrival time for [facility] procedure this coming Mon 10/16/23 so that I can set the time for his transport .  I left  with reason for call and return number [phone]

## 2023-10-13 NOTE — PROGRESS NOTES
Called and spoke to [address] [place] [address],[address]. They confirmed the patient needs to be there by 12:15 for his scheduled procedure. [facility] notified. [address] [place] [address],[address] will call patient with instructions.

## 2023-10-16 ENCOUNTER — ANESTHESIA (OUTPATIENT)
Dept: PERIOP | Facility: HOSPITAL | Age: 58
End: 2023-10-16
Payer: COMMERCIAL

## 2023-10-16 ENCOUNTER — PATIENT OUTREACH (OUTPATIENT)
Dept: HEMATOLOGY ONCOLOGY | Facility: CLINIC | Age: 58
End: 2023-10-16

## 2023-10-16 ENCOUNTER — ANESTHESIA EVENT (OUTPATIENT)
Dept: PERIOP | Facility: HOSPITAL | Age: 58
End: 2023-10-16
Payer: COMMERCIAL

## 2023-10-16 ENCOUNTER — HOSPITAL ENCOUNTER (OUTPATIENT)
Facility: HOSPITAL | Age: 58
Setting detail: OUTPATIENT SURGERY
Discharge: HOME | End: 2023-10-16
Attending: DENTIST | Admitting: DENTIST
Payer: COMMERCIAL

## 2023-10-16 VITALS
OXYGEN SATURATION: 97 % | WEIGHT: 147 LBS | TEMPERATURE: 97 F | BODY MASS INDEX: 18.28 KG/M2 | RESPIRATION RATE: 17 BRPM | SYSTOLIC BLOOD PRESSURE: 158 MMHG | HEART RATE: 75 BPM | HEIGHT: 75 IN | DIASTOLIC BLOOD PRESSURE: 91 MMHG

## 2023-10-16 DIAGNOSIS — K02.9 DENTAL CARIES: Primary | ICD-10-CM

## 2023-10-16 LAB
ANION GAP SERPL CALCULATED.3IONS-SCNC: 10 MMOL/L
BUN SERPL-MCNC: 8 MG/DL (ref 5–25)
CALCIUM SERPL-MCNC: 9.9 MG/DL (ref 8.4–10.2)
CHLORIDE SERPL-SCNC: 94 MMOL/L (ref 96–108)
CO2 SERPL-SCNC: 25 MMOL/L (ref 21–32)
CREAT SERPL-MCNC: 0.65 MG/DL (ref 0.6–1.3)
GFR SERPL CREATININE-BSD FRML MDRD: 105 ML/MIN/1.73SQ M
GLUCOSE P FAST SERPL-MCNC: 87 MG/DL (ref 65–99)
GLUCOSE SERPL-MCNC: 87 MG/DL (ref 65–140)
POTASSIUM SERPL-SCNC: 4.1 MMOL/L (ref 3.5–5.3)
SODIUM SERPL-SCNC: 129 MMOL/L (ref 135–147)

## 2023-10-16 PROCEDURE — 80048 BASIC METABOLIC PNL TOTAL CA: CPT | Performed by: STUDENT IN AN ORGANIZED HEALTH CARE EDUCATION/TRAINING PROGRAM

## 2023-10-16 RX ORDER — FENTANYL CITRATE 50 UG/ML
INJECTION, SOLUTION INTRAMUSCULAR; INTRAVENOUS AS NEEDED
Status: DISCONTINUED | OUTPATIENT
Start: 2023-10-16 | End: 2023-10-16

## 2023-10-16 RX ORDER — LIDOCAINE HYDROCHLORIDE 10 MG/ML
INJECTION, SOLUTION EPIDURAL; INFILTRATION; INTRACAUDAL; PERINEURAL AS NEEDED
Status: DISCONTINUED | OUTPATIENT
Start: 2023-10-16 | End: 2023-10-16

## 2023-10-16 RX ORDER — HYDROCODONE BITARTRATE AND ACETAMINOPHEN 5; 325 MG/1; MG/1
1 TABLET ORAL EVERY 6 HOURS PRN
Status: DISCONTINUED | OUTPATIENT
Start: 2023-10-16 | End: 2023-10-16 | Stop reason: HOSPADM

## 2023-10-16 RX ORDER — METOCLOPRAMIDE HYDROCHLORIDE 5 MG/ML
10 INJECTION INTRAMUSCULAR; INTRAVENOUS ONCE AS NEEDED
Status: DISCONTINUED | OUTPATIENT
Start: 2023-10-16 | End: 2023-10-16 | Stop reason: HOSPADM

## 2023-10-16 RX ORDER — ACETAMINOPHEN 10 MG/ML
1000 INJECTION, SOLUTION INTRAVENOUS ONCE
Status: COMPLETED | OUTPATIENT
Start: 2023-10-16 | End: 2023-10-16

## 2023-10-16 RX ORDER — MIDAZOLAM HYDROCHLORIDE 2 MG/2ML
INJECTION, SOLUTION INTRAMUSCULAR; INTRAVENOUS AS NEEDED
Status: DISCONTINUED | OUTPATIENT
Start: 2023-10-16 | End: 2023-10-16

## 2023-10-16 RX ORDER — BUPIVACAINE HYDROCHLORIDE 5 MG/ML
INJECTION, SOLUTION EPIDURAL; INTRACAUDAL AS NEEDED
Status: DISCONTINUED | OUTPATIENT
Start: 2023-10-16 | End: 2023-10-16 | Stop reason: HOSPADM

## 2023-10-16 RX ORDER — AMOXICILLIN 500 MG/1
500 CAPSULE ORAL EVERY 8 HOURS SCHEDULED
Qty: 15 CAPSULE | Refills: 0 | Status: SHIPPED | OUTPATIENT
Start: 2023-10-16 | End: 2023-10-21

## 2023-10-16 RX ORDER — LIDOCAINE HYDROCHLORIDE AND EPINEPHRINE 10; 10 MG/ML; UG/ML
INJECTION, SOLUTION INFILTRATION; PERINEURAL AS NEEDED
Status: DISCONTINUED | OUTPATIENT
Start: 2023-10-16 | End: 2023-10-16 | Stop reason: HOSPADM

## 2023-10-16 RX ORDER — DEXAMETHASONE SODIUM PHOSPHATE 10 MG/ML
INJECTION, SOLUTION INTRAMUSCULAR; INTRAVENOUS AS NEEDED
Status: DISCONTINUED | OUTPATIENT
Start: 2023-10-16 | End: 2023-10-16

## 2023-10-16 RX ORDER — IBUPROFEN 600 MG/1
600 TABLET ORAL EVERY 6 HOURS PRN
Qty: 30 TABLET | Refills: 0 | Status: SHIPPED | OUTPATIENT
Start: 2023-10-16

## 2023-10-16 RX ORDER — HYDROMORPHONE HCL IN WATER/PF 6 MG/30 ML
0.2 PATIENT CONTROLLED ANALGESIA SYRINGE INTRAVENOUS
Status: DISCONTINUED | OUTPATIENT
Start: 2023-10-16 | End: 2023-10-16 | Stop reason: HOSPADM

## 2023-10-16 RX ORDER — ONDANSETRON 2 MG/ML
INJECTION INTRAMUSCULAR; INTRAVENOUS AS NEEDED
Status: DISCONTINUED | OUTPATIENT
Start: 2023-10-16 | End: 2023-10-16

## 2023-10-16 RX ORDER — MORPHINE SULFATE 10 MG/ML
2 INJECTION, SOLUTION INTRAMUSCULAR; INTRAVENOUS EVERY 4 HOURS PRN
Status: DISCONTINUED | OUTPATIENT
Start: 2023-10-16 | End: 2023-10-16 | Stop reason: HOSPADM

## 2023-10-16 RX ORDER — SODIUM CHLORIDE, SODIUM LACTATE, POTASSIUM CHLORIDE, CALCIUM CHLORIDE 600; 310; 30; 20 MG/100ML; MG/100ML; MG/100ML; MG/100ML
125 INJECTION, SOLUTION INTRAVENOUS CONTINUOUS
Status: DISCONTINUED | OUTPATIENT
Start: 2023-10-16 | End: 2023-10-16 | Stop reason: HOSPADM

## 2023-10-16 RX ORDER — HYDROCODONE BITARTRATE AND ACETAMINOPHEN 5; 325 MG/1; MG/1
1 TABLET ORAL EVERY 6 HOURS PRN
Qty: 15 TABLET | Refills: 0 | Status: SHIPPED | OUTPATIENT
Start: 2023-10-16 | End: 2023-10-26

## 2023-10-16 RX ORDER — FENTANYL CITRATE/PF 50 MCG/ML
25 SYRINGE (ML) INJECTION
Status: DISCONTINUED | OUTPATIENT
Start: 2023-10-16 | End: 2023-10-16 | Stop reason: HOSPADM

## 2023-10-16 RX ORDER — ONDANSETRON 2 MG/ML
4 INJECTION INTRAMUSCULAR; INTRAVENOUS ONCE AS NEEDED
Status: DISCONTINUED | OUTPATIENT
Start: 2023-10-16 | End: 2023-10-16 | Stop reason: HOSPADM

## 2023-10-16 RX ORDER — SUCCINYLCHOLINE/SOD CL,ISO/PF 100 MG/5ML
SYRINGE (ML) INTRAVENOUS AS NEEDED
Status: DISCONTINUED | OUTPATIENT
Start: 2023-10-16 | End: 2023-10-16

## 2023-10-16 RX ORDER — PROPOFOL 10 MG/ML
INJECTION, EMULSION INTRAVENOUS AS NEEDED
Status: DISCONTINUED | OUTPATIENT
Start: 2023-10-16 | End: 2023-10-16

## 2023-10-16 RX ORDER — ALBUTEROL SULFATE 2.5 MG/3ML
2.5 SOLUTION RESPIRATORY (INHALATION) ONCE AS NEEDED
Status: DISCONTINUED | OUTPATIENT
Start: 2023-10-16 | End: 2023-10-16 | Stop reason: HOSPADM

## 2023-10-16 RX ADMIN — PROPOFOL 150 MG: 10 INJECTION, EMULSION INTRAVENOUS at 14:48

## 2023-10-16 RX ADMIN — FENTANYL CITRATE 100 MCG: 50 INJECTION, SOLUTION INTRAMUSCULAR; INTRAVENOUS at 14:46

## 2023-10-16 RX ADMIN — FENTANYL CITRATE 50 MCG: 50 INJECTION, SOLUTION INTRAMUSCULAR; INTRAVENOUS at 15:10

## 2023-10-16 RX ADMIN — HYDROCODONE BITARTRATE AND ACETAMINOPHEN 1 TABLET: 5; 325 TABLET ORAL at 19:24

## 2023-10-16 RX ADMIN — LIDOCAINE HYDROCHLORIDE 50 MG: 10 INJECTION, SOLUTION EPIDURAL; INFILTRATION; INTRACAUDAL; PERINEURAL at 14:48

## 2023-10-16 RX ADMIN — MIDAZOLAM HYDROCHLORIDE 2 MG: 2 INJECTION, SOLUTION INTRAMUSCULAR; INTRAVENOUS at 14:46

## 2023-10-16 RX ADMIN — FENTANYL CITRATE 25 MCG: 50 INJECTION INTRAMUSCULAR; INTRAVENOUS at 16:17

## 2023-10-16 RX ADMIN — Medication 100 MG: at 14:50

## 2023-10-16 RX ADMIN — SODIUM CHLORIDE, SODIUM LACTATE, POTASSIUM CHLORIDE, AND CALCIUM CHLORIDE: .6; .31; .03; .02 INJECTION, SOLUTION INTRAVENOUS at 14:41

## 2023-10-16 RX ADMIN — DEXAMETHASONE SODIUM PHOSPHATE 10 MG: 10 INJECTION, SOLUTION INTRAMUSCULAR; INTRAVENOUS at 14:55

## 2023-10-16 RX ADMIN — FENTANYL CITRATE 25 MCG: 50 INJECTION INTRAMUSCULAR; INTRAVENOUS at 15:57

## 2023-10-16 RX ADMIN — ONDANSETRON 4 MG: 2 INJECTION INTRAMUSCULAR; INTRAVENOUS at 14:55

## 2023-10-16 RX ADMIN — ACETAMINOPHEN 1000 MG: 10 INJECTION INTRAVENOUS at 15:29

## 2023-10-16 RX ADMIN — FENTANYL CITRATE 50 MCG: 50 INJECTION, SOLUTION INTRAMUSCULAR; INTRAVENOUS at 15:25

## 2023-10-16 NOTE — OP NOTE
OPERATIVE REPORT  PATIENT NAME: Natalya Lucero    :  1965  MRN: [id number]  Pt Location:  OR ROOM 08    SURGERY DATE: 10/16/2023    Surgeon(s) and Role:     Wendy Gutierrez, SKIP - Primary    Preop Diagnosis:  Dental caries, unspecified [K02.9]  Impacted teeth [K01.1]    Post-Op Diagnosis Codes:     * Dental caries, unspecified [K02.9]     * Impacted teeth [K01.1]    Procedure(s):  Removal of complete bony impacted teeth numbers 16 and 17  Surgical extraction of teeth numbers 14, 21, 21, 25, 23, 24, 25, 26, 29  Removal of root tips numbers 15, 18, 31  Alveoloplasty maxillary right and left quadrant    Specimen(s):  No specimen    Estimated Blood Loss:   Minimal    Drains:  No drains    Anesthesia Type:   General    Operative Indications:  Dental caries, unspecified [K02.9]  Impacted teeth [K01.1]      Operative Findings:  Terminal dentition secondary to caries  Healthy bleeding bone encountered at all extraction sites  What appears to be squamous cell carcinoma right tongue floor of mouth and mandible, this carcinoma is being managed by another service. I was asked to remove teeth prior to any additional radiation or chemotherapy    Complications:   None    Procedure and Technique:  The patient was greeted in the preoperative area. All the risks and benefits of the procedure were once again explained and the risks of sinus communication as well as lower chin and lip numbness were explained in detail all questions were answered. Consent had already been signed. Care was then handed back to the anesthesia team.    The patient was brought into the operating room by the anesthesia team and the patient was placed in a supine position where the patient remained for the rest of the case. Anesthesia was able to establish an orotracheal intubation without any complications.  Care was then handed back to the FS team.    Patient was draped in sterile manner timeout was performed in which the patient was correctly identified by name medical record number as well as a site of the procedure be performed. Once a timeout was completed oral cavity was thoroughly suctioned with the DailyLookkauer suction the moist  packing was used it as a throat pack. Local anesthesia per OR record. The needle was changed multiple times in order to prevent any seeding of the cancer cells. A periosteal elevator was used to separate the gingiva from the teeth. Full thickness mucoperiosteal flaps elevated at all extraction sites. Bone was removed around the teeth. The teeth were sectioned. Teeth#14, 20, 21, 22, 23, 24, 25, 26, 29 were then extracted without complication. 15 blade was used to make incision in the areas of teeth numbers 16 and 17. Bone was then removed around teeth numbers 16 and 17. These teeth were sectioned. These teeth extracted. Bone was removed around root tips numbers 15, 18, 31. These roots were then removed without complication. 15 blade was used to make an incision in the right maxilla. There were several sharp areas and undercuts that were removed. Attention was then placed to the left maxilla. Irregular contours and sharp edges were removed. It should be noted that healthy bleeding bone was encountered in all extraction sites. Surgical sites were thoroughly curetted, bone filed, and irrigated with sterile saline. Closure with 3-0 chromic gut sutures. Next the oral cavity was thoroughly irrigated with sterile saline and suctioned with the Yankauer suction. The moist throat packing was removed and the oropharynx was suctioned. I was present for the entire procedure.     Patient Disposition:  PACU , hemodynamically stable, and extubated and stable        SIGNATURE: Sergo Hernandez DMD  DATE: October 16, 2023  TIME: 3:26 PM

## 2023-10-16 NOTE — PROGRESS NOTES
Patient called stating he hasn't heard from [address] yet about what time they will be picking him up today. Informed the patient [address] is aware of when he needs to be at the hospital and will call closer to when they actually pick him up. Patient voiced understanding.

## 2023-10-16 NOTE — ANESTHESIA POSTPROCEDURE EVALUATION
Post-Op Assessment Note    CV Status:  Stable    Pain management: adequate     Mental Status:  Sleepy   Hydration Status:  Euvolemic   PONV Controlled:  Controlled   Airway Patency:  Patent      Post Op Vitals Reviewed: Yes      Staff: CRNA         No notable events documented.     BP   170/106   Temp   97   Pulse  83   Resp   16   SpO2   100%

## 2023-10-16 NOTE — ANESTHESIA PREPROCEDURE EVALUATION
Procedure:  EXTRACTION TEETH#'s 2,3,4,5,14,15,16,18,20,21,22,23,24,25,26,29,32 complete bony extraction #17 (Mouth)    Relevant Problems   CARDIO   (+) Hypertension      Digestive   (+) Oral cancer (HCC)   (+) Periodontitis      Other   (+) Alcohol use disorder, severe, dependence (HCC)   (+) Cervical lymphadenopathy   (+) Cigarette nicotine dependence without complication   (+) Hyponatremia     Normal sinus rhythm  RSR' or QR pattern in V1 suggests right ventricular conduction delay  Minimal voltage criteria for LVH, may be normal variant ( Sokolow-Campa )  When compared with ECG of 05-JAN-2017 17:37,  No significant change was found  Confirmed by Rohan Cowart ([id number]) on 8/4/2023 12:51:50 PM    CT HEAD IMPRESSION:     There is na approximately 3 cm mass within the anterior roof of the tongue primarily right-sided but extending across the midline to the anterior left tongue. There is an additional smaller nodule identified within the right posterior lateral tongue. Multiple pathologic lymph nodes are identified within the neck primarily right-sided involving levels 1B, 2 and 3. Marked compression of the dominant right jugular vein at the level of the level 2 lymph nodes. The vein does remain patent. Anesthesia Plan  ASA Score- 4     Anesthesia Type- general with ASA Monitors. Additional Monitors:     Airway Plan: ETT. Plan Factors-    Chart reviewed. EKG reviewed. Imaging results reviewed. Existing labs reviewed. Patient summary reviewed. Patient is a current smoker.           Induction-     Postoperative Plan-     Informed Consent-

## 2023-10-16 NOTE — PROGRESS NOTES
No change in history or exam since H&P    Vitals:    10/16/23 1159   BP: 154/91   Pulse: 82   Resp: 18   Temp: 99 °F (37.2 °C)   SpO2: 97%      Reviewed plan with patient

## 2023-10-20 ENCOUNTER — TELEPHONE (OUTPATIENT)
Dept: NUTRITION | Facility: CLINIC | Age: 58
End: 2023-10-20

## 2023-10-20 ENCOUNTER — PATIENT OUTREACH (OUTPATIENT)
Dept: HEMATOLOGY ONCOLOGY | Facility: CLINIC | Age: 58
End: 2023-10-20

## 2023-10-20 NOTE — PROGRESS NOTES
Called patient and provided him with the phone number for Dr. Megan Britton office if he does not hear anything from his pharmacy regarding a refill on his pain medication.

## 2023-10-20 NOTE — PROGRESS NOTES
Patient called stating he is having a great deal of pain since his dental extractions and needs a refill on his pain medication. He was requesting the phone number for the physician that preformed the operation. I informed him I would reach out to Dr. Jin James and see if she would be able to refill his script.

## 2023-10-20 NOTE — TELEPHONE ENCOUNTER
Follow up phone call placed to [facility] today. Spoke with pt who reports that he had all of his teeth pulled on Monday and he is waiting to hear about starting RT. He says he needs to call his dentist because he thinks his mouth is infected and he is in severe pain. Discussed full liquid, purred, and very soft/moist diet, which pt says he is already following. He says he was able to eat cut up ravioli last night. At this time, pt has no future appts. Stated that I will reach out to him later next week to coordinate nutrition consult. Pt agreed to this plan and will reach out prn in the meantime.

## 2023-10-20 NOTE — PROGRESS NOTES
Contacted Dr. Adron Brunner and requested a refill of the patient's pain medication. Provided the patient's pharmacy and location.

## 2023-10-24 ENCOUNTER — PATIENT OUTREACH (OUTPATIENT)
Dept: HEMATOLOGY ONCOLOGY | Facility: CLINIC | Age: 58
End: 2023-10-24

## 2023-10-24 NOTE — PROGRESS NOTES
Returned call and spoke to patient. He stated he had his post-op visit with [facility] today and he was told he needs to get back in with his Oncologists. I informed him I would reach out to the Medical Oncology and [address] [place] [address] teams and let them know he is ready to initiate treatment. Patient stated the surgeon gave him stronger pain medication to hold him over until he gets back in with his Oncologist.  I explained the role of Palliative Care and how they would be able to manage his pain medication. I informed him I would get him rescheduled since he missed his last consult. He was appreciative.

## 2023-10-25 ENCOUNTER — PATIENT OUTREACH (OUTPATIENT)
Dept: HEMATOLOGY ONCOLOGY | Facility: CLINIC | Age: 58
End: 2023-10-25

## 2023-10-25 ENCOUNTER — TELEPHONE (OUTPATIENT)
Dept: HEMATOLOGY ONCOLOGY | Facility: CLINIC | Age: 58
End: 2023-10-25

## 2023-10-25 PROCEDURE — 77263 THER RADIOLOGY TX PLNG CPLX: CPT | Performed by: INTERNAL MEDICINE

## 2023-10-25 NOTE — PROGRESS NOTES
Patient called stating he received a call from someone telling him he needs a PORT placed and blood work completed. He questioned who each one of his Oncologists are and what each one does. I informed him that Dr. Farheen Sands will be in charge of his RT and Dr. Duc Pierre will be in charge of his systemic therapy. Reviewed the need for blood work prior to each cycle of chemotherapy. Patient stated it would be more convenient for him to have his treatments at the [facility]. I informed the patient I would check on his PORT placement.

## 2023-10-25 NOTE — TELEPHONE ENCOUNTER
While we try to accommodate patient requests, our priority is to schedule treatment according to Doctor's orders and site availability. Does the Provider use the intake sheet or checkout note? What would be a preferred day of the week that would work best for your infusion appointment? Any day   Do you prefer mornings or afternoons for your appointments? Mornings   Are there any days or dates that do not work for your schedule, including any upcoming vacations? We are going to try our best to schedule you at the infusion center closest to your home. In the event that we are unable to what would be your next preferred infusion site or sites? 1. [facility]   2. [facility]     Do you have transportation to take you to all of your appointments?  [facility]   Would you like the infusion center to draw labs from your port? (disregard if patient doesn't have a port or need labs for infusion appointment) Yes

## 2023-10-25 NOTE — TELEPHONE ENCOUNTER
Phoned patient with Dr. Dale Turk recommendation to start chemotherapy around November 13th with his radiation and follow up in office every 2 weeks during treatment. Patient aware and agreeable with plan.

## 2023-10-26 NOTE — PROGRESS NOTES
Hi Dr. Alecia Olszewski, I have not been able to reach [facility], I left detailed messages with fax and phone #'s for [facility] [place], asking if they can fax the clearance form prior to his simulation next week or call with questions. I can call back tomorrow if they don't respond.  thanks

## 2023-10-26 NOTE — TELEPHONE ENCOUNTER
Spoke with patient, he states he gets text message reminders for his appointments and didn't want to review his chemo schedule with me. I did verbally confirm his first appointment at [facility] and let him know his schedule will be sent to [facility] for transportation.

## 2023-10-27 ENCOUNTER — PATIENT OUTREACH (OUTPATIENT)
Dept: HEMATOLOGY ONCOLOGY | Facility: CLINIC | Age: 58
End: 2023-10-27

## 2023-10-27 ENCOUNTER — TELEPHONE (OUTPATIENT)
Dept: NUTRITION | Facility: CLINIC | Age: 58
End: 2023-10-27

## 2023-10-27 DIAGNOSIS — K02.9 DENTAL CARIES: ICD-10-CM

## 2023-10-27 NOTE — TELEPHONE ENCOUNTER
[address] [place] [address] today to coordinate nutrition consult with his other appts that are now scheduled. Spoke with Dixie today. RD consult set up for 10/31 at 11am after pts simulation.

## 2023-10-27 NOTE — PROGRESS NOTES
Outpatient Oncology Nutrition Consultation   Type of Consult: Initial Consult  Care Location: Office Visit    Reason for referral:   Received notification by RN NavigatorMayco., on 7/18/23 that pt has triggered for oncology nutrition care (reason for referral: HNC dx and Malnutrition Screening Tool (MST) Triggers: scored a 0 indicating No recent wt loss and is not eating poorly due to a decreased appetite. (Date of MST: 7/18/23)). Received notification by Leeanna Cevallos., on 10/12/23 that pt has triggered for oncology nutrition care (reason for referral: HNC dx and Malnutrition Screening Tool (MST) Triggers: scored a 3 indicating 24-33# (11-15 kg) recent wt loss and is not eating poorly due to a decreased appetite. (Date of MST: 10/12/23) MST Note: "over the last 4 months "). Nutrition Assessment:   Oncology Diagnosis & Treatments: Oral Cancer  Planning to start concurrent chemoradiation (weekly cisplatin starting 11/17/23; RT to the oral cavity and BL necks starting ~11/14/23)   Oncology History   Oral cancer ([address])   5/23/2023 Initial Diagnosis    Oral cancer ([address])     8/29/2023 -  Cancer Staged    Staging form: Oral Cavity, AJCC 8th Edition  - Clinical stage from 8/29/2023: Stage CHETAN (cT4a, cN2c, cM0) - Signed by Gallito Sam MD on 9/13/2023  Stage prefix: Initial diagnosis       8/29/2023 Biopsy    A.  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, p16 positive      Chemotherapy    alteplase (CATHFLO), 2 mg, Intracatheter, Every 1 Minute as needed, 0 of 7 cycles  CISplatin (PLATINOL) infusion, 40 mg/m2, Intravenous, Once, 0 of 7 cycles  aprepitant (CINVANTI) in  mL IVPB, 130 mg, Intravenous, Once, 0 of 7 cycles         Past Medical & Surgical Hx:   Patient Active Problem List   Diagnosis   • Dental caries   • Cervical lymphadenopathy   • Periodontitis   • Alcohol use disorder, severe, dependence ([address])   • Hypertension   • Cigarette nicotine dependence without complication   • Hyponatremia   • Hypokalemia   • Hypomagnesemia   • Oral cancer (HCC)   • Preoperative clearance     Past Medical History:   Diagnosis Date   • Alcohol abuse    • Hypertension    • 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     Past Surgical History:   Procedure Laterality Date   • EGD     • KNEE ARTHROSCOPY Left    • MULTIPLE TOOTH EXTRACTIONS N/A 10/16/2023    Procedure: EXTRACTION OF ALL REMAINING TEETH;  Surgeon: Nestor Goldman DMD;  Location: [facility];  Service: Oral Surgery       Review of Medications:   Vitamins, Supplements and Herbals: No, pt denies taking supplements    Current Outpatient Medications:   •  diphenhydramine, lidocaine, Al/Mg hydroxide, simethicone (Magic Mouthwash) SUSP, Swish and swallow 10 mL every 4 (four) hours as needed for mouth pain or discomfort, Disp: 237 mL, Rfl: 2  •  ibuprofen (MOTRIN) 600 mg tablet, Take 1 tablet (600 mg total) by mouth every 6 (six) hours as needed for mild pain, Disp: 30 tablet, Rfl: 0  •  oxyCODONE (Roxicodone) 5 immediate release tablet, Take 1 tablet (5 mg total) by mouth every 6 (six) hours as needed for severe pain Max Daily Amount: 20 mg (Patient not taking: Reported on 10/12/2023), Disp: 20 tablet, Rfl: 0    Most Recent Lab Results:   Lab Results   Component Value Date    WBC 6.57 08/04/2023    NEUTROABS 3.17 08/04/2023    ALT 40 03/30/2023    AST 27 03/30/2023    ALB 3.9 03/30/2023    SODIUM 129 (L) 10/16/2023    SODIUM 129 (L) 08/04/2023    K 4.1 10/16/2023    K 3.8 08/04/2023    CL 94 (L) 10/16/2023    BUN 8 10/16/2023    BUN 6 08/04/2023    CREATININE 0.65 10/16/2023    CREATININE 0.73 08/04/2023    EGFR 105 10/16/2023    GLUCOSE 93 12/11/2016    POCGLU 82 06/19/2023    GLUF 87 10/16/2023    GLUC 87 10/16/2023    CALCIUM 9.9 10/16/2023    MG 1.8 (L) 08/04/2023       Anthropometric Measurements:   Height: 75"  Ht Readings from Last 1 Encounters: 10/16/23 6' 3" (1.905 m)     Wt Readings from Last 20 Encounters:   10/31/23 64 kg (141 lb)   10/16/23 66.7 kg (147 lb)   10/12/23 66.7 kg (147 lb)   10/11/23 64.9 kg (143 lb)   08/29/23 72.6 kg (160 lb)   08/04/23 72.6 kg (160 lb)   05/23/23 66.2 kg (146 lb)   03/05/23 71.3 kg (157 lb 3 oz)   02/22/23 70.3 kg (155 lb)   06/18/19 66.7 kg (147 lb)   01/05/17 79.4 kg (175 lb)   04/25/16 79.4 kg (175 lb)     Weight History:   Usual Weight: 170-175# (July 2023)  Varian: n/a    Oncology Nutrition-Anthropometrics    Flowsheet Row Nutrition from 10/31/2023 in [address] [place] [address] Oncology Dietitian Services   Patient age (years): 59 years   Patient (male) height (in): 76 in   Current weight (lbs): 141 lbs   Current weight to be used for anthropometric calculations (kg) 64.1 kg   BMI: 17.6   IBW male 196 lb   IBW (kg) male 89.1 kg   IBW % (male) 71.9 %   Adjusted BW (male): 182.3 lbs   Adjusted BW in kg (male): 82.9 kg        Nutrition-Focused Physical Findings: none observed    Food/Nutrition-Related History & Client/Social History:    Current Nutrition Impact Symptoms:  [] Nausea  [] Reduced Appetite  [] Acid Reflux    [] Vomiting  [x] Unintended Wt Loss - ~30# wt loss in the past 3 mo d/t poor dentition and oral pain per pt [] Malabsorption    [] Diarrhea  [] Unintended Wt Gain  [] Dumping Syndrome    [] Constipation  [] Thick Mucous/Secretions  [] Abdominal Pain    [] Dysgeusia (Altered Taste)  [] Xerostomia (Dry Mouth)  [] Gas    [] Dysosmia (Altered Smell)  [] Thrush  [x] Difficulty Chewing - pt reports he recently had all of his teeth removed    [] Oral Mucositis (Sore Mouth)  [] Fatigue  [] Hyperglycemia   No results found for: "HGBA1C"   [] Odynophagia  [] Esophagitis  [x] Other: oral pain   [] Dysphagia  [] Early Satiety  [] No Problems Eating      Food Allergies & Intolerances: no    Current Diet: Regular Diet, No Restrictions - room temp foods d/t oral pain  Current Nutrition Intake: Less than usual Appetite: Good  Nutrition Route: PO - not interested in feeding tube  Oral Care:  rx mouthwash BID, brushing tongue 2-3x/day; had all of teeth removed  Activity level: works in [employer] [place], has a very active job, not planning to work during tx    [address] Recall:   Breakfast: 3 containers of yogurt or 2 svg cream of wheat made with water and milk or rice krispies and whole milk  Snack: none  Lunch: 2 c beef chili with cheese, 2 sl bread with butter  Snack: none  Dinner: 2 c beef chili with cheese, 2 sl bread and butter  Snack: 6 individual servings of ice cream and 2 ice cream sandwiches    Beverages: water (not a water drinker), apple juice (8 oz x4), chocolate whole milk (8 oz x2), Gatorade (20 oz x2), beer (25 oz can x 2-3 cans)   -Averaging ~88 oz per day   -does not plan to quit drinking alcohol during tx  Supplements:   Milkshakes - once in [facility]      Oncology Nutrition-Estimated Needs    Mert Dee Nutrition from 10/31/2023 in [address] [place] [address] Oncology Dietitian Services   Weight type used IBW   Weight in kilograms (kg) used for estimated needs 89.1 kg   Energy needs formula:  30-35 kcal/kg   Energy needs based on 30 kcal/k kcal   Energy needs based on 35 kcal/k kcal   Protein needs formula: 1.2-1.5 g/kg   Protein needs based on 1.2 g/k g   Protein needs based on 1.5 g/kg 134 g   Fluid needs formula: 30-35 mL/kg   Fluid needs based on 30 mL/kg 2673 mL   Fluid needs in ounces 90 oz   Fluid needs based on 35 mL/kg 3119 mL   Fluid needs in ounces 105 oz           Discussion & Intervention:   Annemarie Maldonado was evaluated today for an initial RD consultation regarding HNC. Annemarie Maldonado is planned to undergo tx for [facility] . He reports a 30# wt loss in the past 3 months d/t poor dentition and oral pain. He is eating a regular diet, including foods and fluids that could be worsening his pain (spicy foods, acidic foods, alcohol, carbonated beverages).   He shared that he is not interested in having a feeding tube placed but he did have some questions about how to use one. He is drinking 50-75 oz of beer daily and states he has no plans for alcohol cessation. He is willing to start using Ensure, milkshakes, and smoothies to help improve his po intake and stabilize his wt. Today's discussion included MNT for wt loss, oral pain, and HNC. Reviewed 24 hour recall, which revealed an inadequate po intake, and discussed ways to increase kcal, protein, and fluid intakes and optimize nutrient intake. Also reviewed the importance of wt management throughout the tx process and the role of a high kcal/ high protein diet in managing wt and overall health.   Based on today's assessment, discussion included: MNT for: wt loss, oral pain, HNC, how to modify foods for anticipated nutrition impact symptoms pt may experience during CA tx, practicing proper oral care, a high kcal/protein diet & food choices to include at all meals & snacks (Examples of high kcal foods: cheese, full-fat dairy products, nut butter, plant-based fats, coconut oil/milk, avocado, butter, cream soup, etc. Examples of high protein foods: eggs, chicken, fish, beans/legumes, nuts/nut butters, bone broth, etc.) , fortifying foods for added kcal and protein (examples include: adding cheese to foods such as eggs, mashed potatoes, casseroles, etc.; Making oatmeal with whole milk rather than water; Making fortified mashed potatoes with cream, butter, dry milk powder, plain [place] Komal yogurt, and cheese.), adequate hydration & fluid choices, sipping on calorie containing beverages (examples include: adding whole milk or cream to coffee, oral nutrition supplements, juice, electrolyte replacement beverages, milk, etc.), eating smaller more frequent meals every 2-3 hours (5-6 small meals/day), having consistent and planned snacks between meals, starting oral nutrition supplements, utilizing oral nutrition supplements, and recipe suggestions/resources, trying new recipes, & cooking at home more often. Moving forward, Toi Aguilera was encouraged to increase kcal, protein, and fluid intakes and practice MNT for nutrition impact sx management. Materials Provided: [facility] Eating Hints book, Ensure Coupons, Oncology Milkshake &  Smoothie Recipe Packet, and Nutrition Rx & Recommendations, and Ensure Plus samples  All questions and concerns addressed during today’s visit. Toi Aguilera has RD contact information. Nutrition Diagnosis:   Inadequate Energy Intake related to physiological causes, disease state and treatment related issues as evidenced by food recall, wt loss and discussion with pt and/or family. Increased Nutrient Needs (kcal & pro) related to increased demand for nutrients and disease state as evidenced by cancer dx and pt undergoing tx for cancer. Altered GI Function related to alteration in GI tract structure and integrity as evidenced by Unintended Weight Loss and Oral Mucositis (Sore Mouth). Monitoring & Evaluation:   Goals:  weight maintenance/stabilization  adequate nutrition impact symptom management  pt to meet >/=75% estimated nutrition needs daily    Progress Towards Goals: Initiated    Nutrition Rx & Recommendations:  Diet: Very Soft/Moist, High Calorie, High Protein Diet  Small, frequent meals/snacks may be easier to tolerate than 3 large daily meals. Aim for 5-6 small meals per day (every 2-3 hours). Include protein at all meals/snacks. Stay hydrated by sipping fluids of choice/tolerance throughout the day. Liquid nutrition may be better tolerated than solids at times. Refer to Eating Hints book for other meal/snack ideas and symptom management. Avoid spicy, acidic, sharp/hard/crunchy foods & carbonated beverages as needed. Follow proper oral care; Try baking soda/salt water rinse recipe (mix 3/4 tsp salt + 1 tsp baking soda + 1 qt water; rinse with plain water after using) in Eating Hints book (pg 18).   Brush your teeth before/after meals & before bed.  For weight loss: monitor your weight at home at least 2x/week, record your weight, start by adding 250-500 extra calories per day, eat 5-6 small scheduled meals every 2-3 hrs, choose foods that are high in protein and calories (see pages 49-53 in your Eating Hints book), drink liquids with calories for example: milkshakes/smoothies/juice/soup/whole milk/chocolate milk, cook with protein fortified milk (see recipe on page 36 in your Eating Hints book), consider ready-to-drink oral nutrition supplements such as Ensure Plus, Ensure Enlive, Boost Plus, or Boost Very High Calorie, avoid "diet" and "light" foods when possible, avoid drinking too much with meals, contact your dietitian with any continued weight loss over the course of 1 week. For more info see pages 35-37 in your Eating Hints book. Weigh yourself regularly. If you notice weight loss, make an effort to increase your daily food/calorie intake. If you continue to notice loss after these efforts, reach out to your dietitian to establish a plan to stabilize weight. Try fortified mashed potatoes for more calories and protein: make mashed potatoes with whole milk/half and half/cream, extra butter, plain whole milk [place] Komal yogurt (gives a sour cream taste but with protein), cheese, and dry milk powder.   Make hot cereal with whole milk rather than water  Try whole Fairlife milk  Ensure Plus or high calorie shakes/smoothies - start 3-4 per day    Follow Up Plan:  11/15 at 9:30am  Recommend Referral to Other Providers: none at this time

## 2023-10-27 NOTE — PROGRESS NOTES
Patient called stating he is out of pain medication but does not have an appointment with a doctor until next Tuesday. I informed the patient he needs to establish with Palliative Care so that they can manage his pain medication moving forward. He voiced understanding. I told the patient I would assist with getting him scheduled with Palliative Care.

## 2023-10-30 ENCOUNTER — TELEPHONE (OUTPATIENT)
Dept: PALLIATIVE MEDICINE | Facility: CLINIC | Age: 58
End: 2023-10-30

## 2023-10-30 ENCOUNTER — PATIENT OUTREACH (OUTPATIENT)
Dept: HEMATOLOGY ONCOLOGY | Facility: CLINIC | Age: 58
End: 2023-10-30

## 2023-10-30 NOTE — TELEPHONE ENCOUNTER
Patient has an appt with Tiff tomorrow, Left a message for patient to call office back to reschedule an appointment with Palliative Care for tomorrow instead of 11/1. Awaiting call back.

## 2023-10-30 NOTE — PROGRESS NOTES
Returned call and spoke to patient. Patient stated no one called in a refill on his pain medication over the weekend. He stated he won't go to the ED because they won't give him anything. He said he is drinking to alleviate the pain. His Palliative Care consult has been moved to 11/2/23. Patient stated he needs something for pain before then. I suggested he call his oral surgeon. He questioned what Trazadone is used for. I asked him who prescribed him Trazadone. He stated no one and inquired again what it is used for. I informed him it is a sedative. He stated "I guess that is why it helps me sleep". I questioned where he got Trazadone. He said his girlfriend gave it to him. I told him he should not be drinking alcohol with sedatives. Patient is scheduled for [facility] Onc Dietitian tomorrow. He said he would take his empty pill container with him and ask Dr. Aggie Conrad to refill his pain medication. I confirmed patient will be using [facility] for transportation tomorrow. Informed patient I would call after his Palliative Care consult.

## 2023-10-31 ENCOUNTER — TELEPHONE (OUTPATIENT)
Dept: INTERVENTIONAL RADIOLOGY/VASCULAR | Facility: HOSPITAL | Age: 58
End: 2023-10-31

## 2023-10-31 ENCOUNTER — NUTRITION (OUTPATIENT)
Dept: NUTRITION | Facility: CLINIC | Age: 58
End: 2023-10-31

## 2023-10-31 ENCOUNTER — DOCUMENTATION (OUTPATIENT)
Dept: NUTRITION | Facility: CLINIC | Age: 58
End: 2023-10-31

## 2023-10-31 ENCOUNTER — PATIENT OUTREACH (OUTPATIENT)
Dept: HEMATOLOGY ONCOLOGY | Facility: CLINIC | Age: 58
End: 2023-10-31

## 2023-10-31 ENCOUNTER — RADIATION THERAPY TREATMENT (OUTPATIENT)
Dept: RADIATION ONCOLOGY | Facility: HOSPITAL | Age: 58
End: 2023-10-31
Attending: INTERNAL MEDICINE
Payer: COMMERCIAL

## 2023-10-31 ENCOUNTER — TELEPHONE (OUTPATIENT)
Dept: HEMATOLOGY ONCOLOGY | Facility: CLINIC | Age: 58
End: 2023-10-31

## 2023-10-31 VITALS — WEIGHT: 141 LBS | BODY MASS INDEX: 17.62 KG/M2

## 2023-10-31 DIAGNOSIS — Z71.3 NUTRITIONAL COUNSELING: Primary | ICD-10-CM

## 2023-10-31 PROCEDURE — 77334 RADIATION TREATMENT AID(S): CPT | Performed by: INTERNAL MEDICINE

## 2023-10-31 PROCEDURE — 77470 SPECIAL RADIATION TREATMENT: CPT | Performed by: INTERNAL MEDICINE

## 2023-10-31 RX ORDER — CEFAZOLIN SODIUM 1 G/50ML
1000 SOLUTION INTRAVENOUS ONCE
Status: CANCELLED | OUTPATIENT
Start: 2023-10-31 | End: 2023-10-31

## 2023-10-31 RX ORDER — SODIUM CHLORIDE 9 MG/ML
75 INJECTION, SOLUTION INTRAVENOUS CONTINUOUS
Status: CANCELLED | OUTPATIENT
Start: 2023-10-31

## 2023-10-31 NOTE — PROGRESS NOTES
In-basket message received from EnsogoArun, Ruben Monroe.:  "He is agreeable to receiving his chemo/RT in [facility]. I informed him his Oncology Dietitian will now be [facility]. He voiced understanding."    Will cancel upcoming follow up and notify oncology RD, [facility] KATELYNN, of same so that she may set up follow up appt with pt.

## 2023-10-31 NOTE — TELEPHONE ENCOUNTER
While we try to accommodate patient requests, our priority is to schedule treatment according to Doctor's orders and site availability. Does the Provider use the intake sheet or checkout note? What would be a preferred day of the week that would work best for your infusion appointment? Any day   Do you prefer mornings or afternoons for your appointments? Mornings   Are there any days or dates that do not work for your schedule, including any upcoming vacations? We are going to try our best to schedule you at the infusion center closest to your home. In the event that we are unable to what would be your next preferred infusion site or sites? 1. [facility] infusion   2. [facility] infusion     Do you have transportation to take you to all of your appointments?  [facility]  Would you like the infusion center to draw labs from your port? (disregard if patient doesn't have a port or need labs for infusion appointment) Yes

## 2023-10-31 NOTE — Clinical Note
Hello team, I saw Yossi Riley for the first time today. I have some concerns about him getting through treatment. He is underweight and still losing weight, does not want a feeding tube, is drinking 50-75 oz of beer daily with no plans to quit/cut back, has significant oral pain, and is eating/drinking foods that could be making his pain much worse. I did try to educate him, however, I'm not sure how receptive he was. He also told me that he specifically requested all treatment to be at [facility], but he said that [facility] told him he has to be treated close to his home [facility] [place]. So now he has appts all over the place, which may create some barriers for him and compliance with appts. I just wanted everyone to be aware of these concerns as we care for Yossi Riley.  Thanks, Lianna Rubi

## 2023-10-31 NOTE — PATIENT INSTRUCTIONS
Nutrition Rx & Recommendations:  Diet: Very Soft/Moist, High Calorie, High Protein Diet  Small, frequent meals/snacks may be easier to tolerate than 3 large daily meals. Aim for 5-6 small meals per day (every 2-3 hours). Include protein at all meals/snacks. Stay hydrated by sipping fluids of choice/tolerance throughout the day. Liquid nutrition may be better tolerated than solids at times. Refer to Eating Hints book for other meal/snack ideas and symptom management. Avoid spicy, acidic, sharp/hard/crunchy foods & carbonated beverages as needed. Follow proper oral care; Try baking soda/salt water rinse recipe (mix 3/4 tsp salt + 1 tsp baking soda + 1 qt water; rinse with plain water after using) in Eating Hints book (pg 18). Brush your teeth before/after meals & before bed. For weight loss: monitor your weight at home at least 2x/week, record your weight, start by adding 250-500 extra calories per day, eat 5-6 small scheduled meals every 2-3 hrs, choose foods that are high in protein and calories (see pages 49-53 in your Eating Hints book), drink liquids with calories for example: milkshakes/smoothies/juice/soup/whole milk/chocolate milk, cook with protein fortified milk (see recipe on page 36 in your Eating Hints book), consider ready-to-drink oral nutrition supplements such as Ensure Plus, Ensure Enlive, Boost Plus, or Boost Very High Calorie, avoid "diet" and "light" foods when possible, avoid drinking too much with meals, contact your dietitian with any continued weight loss over the course of 1 week. For more info see pages 35-37 in your Eating Hints book. Weigh yourself regularly. If you notice weight loss, make an effort to increase your daily food/calorie intake. If you continue to notice loss after these efforts, reach out to your dietitian to establish a plan to stabilize weight.   Try fortified mashed potatoes for more calories and protein: make mashed potatoes with whole milk/half and half/cream, extra butter, plain whole milk [place] Komal yogurt (gives a sour cream taste but with protein), cheese, and dry milk powder.   Make hot cereal with whole milk rather than water  Try whole Fairlife milk  Ensure Plus or high calorie shakes/smoothies - start 3-4 per day    Follow Up Plan: 11/15 at 9:30am  Recommend Referral to Other Providers: none at this time

## 2023-10-31 NOTE — TELEPHONE ENCOUNTER
Please reach out to patient to schedule chemotherapy with radiation. Radiation now scheduled for [facility], to start 11/14. Please schedule patient at [facility].

## 2023-11-01 ENCOUNTER — TELEPHONE (OUTPATIENT)
Dept: NUTRITION | Facility: CLINIC | Age: 58
End: 2023-11-01

## 2023-11-01 NOTE — TELEPHONE ENCOUNTER
Received notification from oncology RD, Tammie Sanchez., and Nurse Navigator Polina Mckeon., that Enma Chacko will now be getting his treatment at [facility]. Called Enma Chacko to establish care. Introduced self and explained reason for call. Enma Chacko states he would be open to scheduling an appt with this RD for a day he is here for treatment. RD follow up appointment scheduled for 11/15/23 after RT. Provided contact information and encouraged him to reach out as needed in the meantime.

## 2023-11-02 ENCOUNTER — CONSULT (OUTPATIENT)
Dept: PALLIATIVE MEDICINE | Facility: CLINIC | Age: 58
End: 2023-11-02

## 2023-11-02 ENCOUNTER — TELEPHONE (OUTPATIENT)
Dept: PALLIATIVE MEDICINE | Facility: CLINIC | Age: 58
End: 2023-11-02

## 2023-11-02 VITALS
HEIGHT: 75 IN | OXYGEN SATURATION: 96 % | TEMPERATURE: 96.2 F | DIASTOLIC BLOOD PRESSURE: 100 MMHG | WEIGHT: 141.8 LBS | BODY MASS INDEX: 17.63 KG/M2 | HEART RATE: 96 BPM | SYSTOLIC BLOOD PRESSURE: 180 MMHG

## 2023-11-02 DIAGNOSIS — C06.9 ORAL CANCER (HCC): ICD-10-CM

## 2023-11-02 DIAGNOSIS — Z91.89 AT RISK FOR RESPIRATORY DEPRESSION DUE TO OPIOID: ICD-10-CM

## 2023-11-02 DIAGNOSIS — K59.03 DRUG INDUCED CONSTIPATION: ICD-10-CM

## 2023-11-02 DIAGNOSIS — Z71.89 COUNSELING REGARDING ADVANCED CARE PLANNING AND GOALS OF CARE: ICD-10-CM

## 2023-11-02 DIAGNOSIS — R68.84 PAIN IN LOWER JAW: ICD-10-CM

## 2023-11-02 DIAGNOSIS — G89.3 CANCER-RELATED PAIN: ICD-10-CM

## 2023-11-02 DIAGNOSIS — C06.9 CANCER OF ORAL CAVITY (HCC): ICD-10-CM

## 2023-11-02 DIAGNOSIS — F10.20 ALCOHOL USE DISORDER, SEVERE, DEPENDENCE (HCC): Primary | ICD-10-CM

## 2023-11-02 RX ORDER — OXYCODONE HYDROCHLORIDE 10 MG/1
10 TABLET ORAL EVERY 6 HOURS PRN
Qty: 60 TABLET | Refills: 0 | Status: ON HOLD | OUTPATIENT
Start: 2023-11-02

## 2023-11-02 RX ORDER — SENNA AND DOCUSATE SODIUM 50; 8.6 MG/1; MG/1
1 TABLET, FILM COATED ORAL DAILY
Qty: 60 TABLET | Refills: 2 | Status: ON HOLD | OUTPATIENT
Start: 2023-11-02

## 2023-11-02 RX ORDER — NALOXONE HYDROCHLORIDE 4 MG/.1ML
SPRAY NASAL
Qty: 1 EACH | Refills: 1 | Status: ON HOLD | OUTPATIENT
Start: 2023-11-02 | End: 2024-11-01

## 2023-11-02 NOTE — TELEPHONE ENCOUNTER
Prior Authorization needed for Oxycodone 10mg     #[id number]  Phone#[phone]  PCN:LASHANDA Alonzo, [address]:

## 2023-11-02 NOTE — PROGRESS NOTES
Palliative and [address] 59 y.o. male [id number]    Assessment/Plan:  1. Alcohol use disorder, severe, dependence ([address])    2. Cancer of oral cavity ([address])    3. Oral cancer ([address])    4. Drug induced constipation    5. Cancer-related pain    6. At risk for respiratory depression due to opioid    7. Pain in lower jaw    8. Counseling regarding advanced care planning and goals of care      Symptoms - increasing cancer pain in the R lower jaw/neck/face area parallel to his observation that tumor is also growing; some difficulty swallowing; weight loss; poor appetite    UDS ordered  Increase oxyIR (from 5mg PO q6H prn) to 10mg PO q6H prn severe pain. Max of only 4 a day. Start tylenol 1000mg PO q8H ATC. Max of only 3000mg in 24H  Dangers of drinking alcohol with oxycodone (can be fatal) and tylenol (can damage liver) explained. Will only provide 2 week supply at a time for now until safety and compliance established  Narcan prn provided  OTC 4% lidocaine patch to the R jaw, 12H on and 12H off  Senokot-S daily to prevent OIC  Encouraged to take ensure 2-3 a day. Mix with ice cream, fruits to make it more palatable. He already has a onc nut consult    ACP/GOC/Support  He is a . No children. Lives with his girlfriend and gf's son. His father lives in [place] and sister lives in another state. [place] Act 169 explained in terms of hierarchy in surrogate medical decision making. In his case, it will be his father first, sister next, all other relatives next and then girlfriend after all his other living relatives. [place] ACP documents explained in detail. Encouraged to read and complete on his next visit. He became tearful talking about his condition. He wants to live longer so is treatment-focused. Follow up  RTO in 3 weeks, check in on symptoms once chemoRT started.      Controlled Substance Review    [place]MP or [place]  reviewed: No red flags were identified; safe to proceed with prescription. Rose Styles  Written  ID  Drug  QTY  Days  Prescriber  RX #  Dispenser  Refill  Daily Dose*  Pymt Type      10/24/2023 10/24/2023 1 Oxycodone Hcl (Ir) 5 Mg Tablet 12.00 2 Rh Thomas [id number] Pen (4386) 0 45.00 [place]   10/20/2023 10/20/2023 1 Oxycodone-Acetaminophen 5-325 10.00 3 Do Rac [id number] Pen (4386) 0 25.00 [place]   10/16/2023 10/16/2023 1 Hydrocodone-Acetamin 5-325 Mg 15.00 4 Br Gel [id number] Pen (4386) 0 18.75 [place]   10/11/2023 10/11/2023 1 Tramadol Hcl 50 Mg Tablet 12.00 4 Ge Pro [id number] Pen (4386) 0 30.00 [place]   08/29/2023 08/29/2023 1 Oxycodone Hcl (Ir) 5 Mg Tablet 20.00 5 Ja Ohl [id number] Pen (4896) 0 30.00 [place]   01/15/2023 01/15/2023 1 Oxycodone-Acetaminophen 5-325 7.00 3 Je Jabier [id number] Pen (4896) 0 17.50 [place]     Requested Prescriptions     Signed Prescriptions Disp Refills    oxyCODONE (ROXICODONE) 10 MG TABS 60 tablet 0     Sig: Take 1 tablet (10 mg total) by mouth every 6 (six) hours as needed for severe pain Max Daily Amount: 40 mg    naloxone (NARCAN) 4 mg/0.1 mL nasal spray 1 each 1     Sig: Administer 1 spray into a nostril. If no response after 2-3 minutes, give another dose in the other nostril using a new spray. senna-docusate sodium (SENOKOT-S) 8.6-50 mg per tablet 60 tablet 2     Sig: Take 1 tablet by mouth daily     Medications Discontinued During This Encounter   Medication Reason    oxyCODONE (Roxicodone) 5 immediate release tablet        Representatives have queried the patient's controlled substance dispensing history in the Prescription Drug Monitoring Program in compliance with regulations before I have prescribed any controlled substances. The prescription history is consistent with prescribed therapy and our practice policies.       60  minutes were spent face to face with Veronica Boyd with greater than 50% of the time spent in counseling or coordination of care including discussions of etiology of diagnosis, pathogenesis of diagnosis, diagnostic results, impression, and recommendations, risks and benefits of treatment, instructions for disease self management, treatment instructions, follow up requirements, risk factors and risk reduction of disease, patient and family counseling/involvement in care, compliance with treatment regimen, and advanced care planning. All of the patient's questions were answered during this discussion. No follow-ups on file. Subjective:   Chief Complaint  New consultation for:  symptom management, goal of care assessment and decisional support, disease process education and discussion of prognosis, advance care planning, emotional support in the setting of serious illness  HPI     Veronica Boyd is a 59 y.o. 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 PET-CT on 6/19/2023 showed Hypermetabolic right anterior oral cavity mass most concerning for malignancy. Bilateral hypermetabolic cervical adenopathy compatible with metastases. No hypermetabolic metastases in the chest abdomen pelvis. Multiple healing left rib fractures. He is to begin RT on 11/14, chemo on 11/15. Port to be placed on 11/7. Referred to palliative medicine for supportive cares. Met with patient. Introduced palliative medicine. He complains on going pain in the R side of the neck and R lower jaw that had been steadily worsening in the past few weeks consistent in his reporting that the tumor is also getting bigger. He now also has radiating pain to the R temple area. He does not have pain when swallowing. He does have some issues swallowing at times, with food/drinks seem to be getting stuck. His appetite is so-so. He lost 35 lbs in 3 months. He eats what he can. He drinks Ensure but does not like the chocolate flavor.      I discussed with him thoroughly the opioid agreement, specifically highlighting the use of illicit substances and alcohol. He admitted to using "everything, you name it" before but his experience with cocaine made him stop. He reported being clean since 23years old. He does drink beer regularly. He drinks 2-3 cans of beer daily, but when he ran out of pain meds, he increased his consumption to 12 cans a day to alleviate his pain. He claims he does not have a drinking problem. He does shake some when he has not a drink for a few days, but denied anything worse than that. I explained to him the dangers of taking opioids with alcohol, the combination can be fatal. He is not allowed to be on opioid when drinking. He voiced understanding. Time spent discussing etiology of pain (related to cancer) and overall management of cancer-related pain. Discussed short-term side effects - including constipation, respiratory depression, death -  and long-term side effects of opioids - including constipation, dependence, mood changes, sleep-disordered breathing, fractures. Cancer-directed therapies, ie chemotherapy, radiation therapy, immunotherapy, will make the most significant and long term improvement in cancer pain. Role of opioid at this time is for lowering pain threshold to a more tolerable level so he can continue to remain functional despite continued presence of pain. This is not meant for complete resolution of pain. Discussed future weaning of opioids when able. He voiced understanding and wished to proceed. He used to take 5mg oxy but this only lasted him 2 hours before he wakes up with severe pain again. I will increase to 10mg. He will only take as needed. Rest of recommendations as above. Narcan also explained. ACP/GOC/Support: He became tearful talking about his situation. He wants to continue to live, so is determined to proceed with recommended treatments. He is a . No children. Lives with his girlfriend and gf's son. His father lives in [facility] and sister lives in another state.  [place] 169 explained in terms of hierarchy in surrogate medical decision making. In his case, it will be his father first, sister next, all other relatives next and then girlfriend after all his other living relatives. [place] ACP documents explained in detail. Encouraged to read and complete on his next visit. He works in [employer]. The following portions of the medical history were reviewed: past medical history, problem list, medication list, and social history. Current Outpatient Medications:     ibuprofen (MOTRIN) 600 mg tablet, Take 1 tablet (600 mg total) by mouth every 6 (six) hours as needed for mild pain, Disp: 30 tablet, Rfl: 0    naloxone (NARCAN) 4 mg/0.1 mL nasal spray, Administer 1 spray into a nostril. If no response after 2-3 minutes, give another dose in the other nostril using a new spray., Disp: 1 each, Rfl: 1    oxyCODONE (ROXICODONE) 10 MG TABS, Take 1 tablet (10 mg total) by mouth every 6 (six) hours as needed for severe pain Max Daily Amount: 40 mg, Disp: 60 tablet, Rfl: 0    senna-docusate sodium (SENOKOT-S) 8.6-50 mg per tablet, Take 1 tablet by mouth daily, Disp: 60 tablet, Rfl: 2    diphenhydramine, lidocaine, Al/Mg hydroxide, simethicone (Magic Mouthwash) SUSP, Swish and swallow 10 mL every 4 (four) hours as needed for mouth pain or discomfort (Patient not taking: Reported on 11/2/2023), Disp: 237 mL, Rfl: 2  Review of Systems   Constitutional:  Positive for appetite change and unexpected weight change. Negative for activity change and fatigue. HENT:  Positive for trouble swallowing. Negative for mouth sores, sore throat and voice change. Pain in the R neck, R lower jaw radiating to R side of face/temple. Pain in the R neck gets aggravated with swallowing. He denies pain in the throat when swallowing   Respiratory:  Negative for shortness of breath. Cardiovascular:  Negative for chest pain.    Gastrointestinal:  Negative for abdominal pain, constipation, diarrhea, nausea and vomiting. Musculoskeletal:  Negative for back pain. Neurological:  Negative for weakness. Psychiatric/Behavioral:  Positive for sleep disturbance. Sleep disturbance from pain       All other systems negative    Objective:  Vital Signs  BP (!) 180/100 (BP Location: Right arm, Patient Position: Sitting, Cuff Size: Standard)   Pulse 96   Temp (!) 96.2 °F (35.7 °C) (Temporal)   Ht 6' 3" (1.905 m)   Wt 64.3 kg (141 lb 12.8 oz)   SpO2 96%   BMI 17.72 kg/m²    Physical Exam    Constitutional: Appears well-developed and well-nourished. Thin, chronically ill looking, not toxic appearing. Pleasant. Comfortable. In no acute physical or emotional distress. Head: Normocephalic and atraumatic. Eyes: EOM are normal. No ocular discharge. No scleral icterus. Neck: (+) firm rubbery slightly moveable mass in the R lower jaw, size of a [place]. (+) firm fixed mass in the R neck, below angle of jaw about 7x4cm  Respiratory: Effort normal. No stridor. No respiratory distress. Gastrointestinal: No abdominal distension. Musculoskeletal: No edema. Neurological: Alert, oriented and appropriately conversant. Skin: No diaphoresis, no rashes seen on exposed areas of skin. Psychiatric: Displays a normal mood and affect.  Behavior, judgement and thought content appear normal.     Echo Garrison MD  Palliative Medicine & Supportive Care  Internal Medicine  Available via [facility] Text  Office: [phone]  Fax: [phone]

## 2023-11-02 NOTE — TELEPHONE ENCOUNTER
Prior Authorization completed for Oxycodone 10 mg IR  via [facility]    ID#  Phone#  [facility] [employer]   [phone]:      Additional Clinical information atached. Await resonse  Requested urgent as pt with high leel pain and currently with  out pain meds.

## 2023-11-02 NOTE — PATIENT INSTRUCTIONS
For pain:    Take 1000mg acetaminophen every 8 hours SCHEDULED. Do not exceed more than 3000mg in 24H to avoid damaging your liver. Take 10mg oxycodone, 1 tablet every 6 hours ONLY AS NEEDED for severe pain. You are allowed only 4 tablets in a day. Do not take sooner than 6 hours to avoid accidental overdosing. Take 1 tablet of senokot-S every day or every other day to prevent constipation from oxycodone. Buy over the counter 4% lidocaine patch. Place on your R neck/jaw for 12 hours then remove. Do not patch for another 12 hours after. Narcan will be provided for you in case you accidentally overdose on oxycodone. Please do not take oxycodone and tylenol with alcohol.  Combination can be fatal to you

## 2023-11-03 ENCOUNTER — HOSPITAL ENCOUNTER (INPATIENT)
Facility: HOSPITAL | Age: 58
LOS: 3 days | Discharge: HOME | DRG: 045 | End: 2023-11-06
Attending: EMERGENCY MEDICINE | Admitting: INTERNAL MEDICINE
Payer: COMMERCIAL

## 2023-11-03 ENCOUNTER — APPOINTMENT (EMERGENCY)
Dept: CT IMAGING | Facility: HOSPITAL | Age: 58
DRG: 045 | End: 2023-11-03
Payer: COMMERCIAL

## 2023-11-03 DIAGNOSIS — R53.1 WEAKNESS: Primary | ICD-10-CM

## 2023-11-03 DIAGNOSIS — C06.9 CANCER OF ORAL CAVITY (HCC): ICD-10-CM

## 2023-11-03 DIAGNOSIS — R59.0 CERVICAL LYMPHADENOPATHY: ICD-10-CM

## 2023-11-03 DIAGNOSIS — G89.3 CANCER-RELATED PAIN: ICD-10-CM

## 2023-11-03 DIAGNOSIS — C06.9 ORAL CANCER (HCC): ICD-10-CM

## 2023-11-03 DIAGNOSIS — R68.84 PAIN IN LOWER JAW: ICD-10-CM

## 2023-11-03 DIAGNOSIS — F10.20 ALCOHOL USE DISORDER, SEVERE, DEPENDENCE (HCC): ICD-10-CM

## 2023-11-03 DIAGNOSIS — I63.81 RIGHT-SIDED LACUNAR STROKE (HCC): ICD-10-CM

## 2023-11-03 DIAGNOSIS — Z71.89 COUNSELING REGARDING ADVANCED CARE PLANNING AND GOALS OF CARE: ICD-10-CM

## 2023-11-03 PROBLEM — I63.9 STROKE (CEREBRUM) (HCC): Status: ACTIVE | Noted: 2023-11-03

## 2023-11-03 LAB
ANION GAP SERPL CALCULATED.3IONS-SCNC: 10 MMOL/L
APAP SERPL-MCNC: <10 UG/ML (ref 10–20)
APTT PPP: 28 SECONDS (ref 23–37)
BUN SERPL-MCNC: 10 MG/DL (ref 5–25)
CALCIUM SERPL-MCNC: 9.4 MG/DL (ref 8.4–10.2)
CARDIAC TROPONIN I PNL SERPL HS: 10 NG/L
CHLORIDE SERPL-SCNC: 95 MMOL/L (ref 96–108)
CO2 SERPL-SCNC: 23 MMOL/L (ref 21–32)
CREAT SERPL-MCNC: 0.67 MG/DL (ref 0.6–1.3)
ERYTHROCYTE [DISTWIDTH] IN BLOOD BY AUTOMATED COUNT: 12.4 % (ref 11.6–15.1)
ETHANOL SERPL-MCNC: 190 MG/DL
FLUAV RNA RESP QL NAA+PROBE: NEGATIVE
FLUBV RNA RESP QL NAA+PROBE: NEGATIVE
GFR SERPL CREATININE-BSD FRML MDRD: 104 ML/MIN/1.73SQ M
GLUCOSE SERPL-MCNC: 85 MG/DL (ref 65–140)
GLUCOSE SERPL-MCNC: 87 MG/DL (ref 65–140)
HCT VFR BLD AUTO: 40.7 % (ref 36.5–49.3)
HGB BLD-MCNC: 13.8 G/DL (ref 12–17)
INR PPP: 0.9 (ref 0.84–1.19)
MCH RBC QN AUTO: 33.3 PG (ref 26.8–34.3)
MCHC RBC AUTO-ENTMCNC: 33.9 G/DL (ref 31.4–37.4)
MCV RBC AUTO: 98 FL (ref 82–98)
PLATELET # BLD AUTO: 303 THOUSANDS/UL (ref 149–390)
PMV BLD AUTO: 9.1 FL (ref 8.9–12.7)
POTASSIUM SERPL-SCNC: 3.7 MMOL/L (ref 3.5–5.3)
PROTHROMBIN TIME: 12.7 SECONDS (ref 11.6–14.5)
RBC # BLD AUTO: 4.15 MILLION/UL (ref 3.88–5.62)
RSV RNA RESP QL NAA+PROBE: NEGATIVE
SALICYLATES SERPL-MCNC: <5 MG/DL (ref 3–20)
SARS-COV-2 RNA RESP QL NAA+PROBE: NEGATIVE
SODIUM SERPL-SCNC: 128 MMOL/L (ref 135–147)
WBC # BLD AUTO: 8.47 THOUSAND/UL (ref 4.31–10.16)

## 2023-11-03 PROCEDURE — 80143 DRUG ASSAY ACETAMINOPHEN: CPT | Performed by: EMERGENCY MEDICINE

## 2023-11-03 PROCEDURE — 80179 DRUG ASSAY SALICYLATE: CPT | Performed by: EMERGENCY MEDICINE

## 2023-11-03 PROCEDURE — 99285 EMERGENCY DEPT VISIT HI MDM: CPT

## 2023-11-03 PROCEDURE — 82077 ASSAY SPEC XCP UR&BREATH IA: CPT | Performed by: EMERGENCY MEDICINE

## 2023-11-03 PROCEDURE — 36415 COLL VENOUS BLD VENIPUNCTURE: CPT | Performed by: EMERGENCY MEDICINE

## 2023-11-03 PROCEDURE — 70498 CT ANGIOGRAPHY NECK: CPT

## 2023-11-03 PROCEDURE — 82948 REAGENT STRIP/BLOOD GLUCOSE: CPT

## 2023-11-03 PROCEDURE — 85730 THROMBOPLASTIN TIME PARTIAL: CPT | Performed by: EMERGENCY MEDICINE

## 2023-11-03 PROCEDURE — 0241U HB NFCT DS VIR RESP RNA 4 TRGT: CPT | Performed by: EMERGENCY MEDICINE

## 2023-11-03 PROCEDURE — 85610 PROTHROMBIN TIME: CPT | Performed by: EMERGENCY MEDICINE

## 2023-11-03 PROCEDURE — 85027 COMPLETE CBC AUTOMATED: CPT | Performed by: EMERGENCY MEDICINE

## 2023-11-03 PROCEDURE — 80048 BASIC METABOLIC PNL TOTAL CA: CPT | Performed by: EMERGENCY MEDICINE

## 2023-11-03 PROCEDURE — 93005 ELECTROCARDIOGRAM TRACING: CPT

## 2023-11-03 PROCEDURE — 84484 ASSAY OF TROPONIN QUANT: CPT | Performed by: EMERGENCY MEDICINE

## 2023-11-03 PROCEDURE — 70496 CT ANGIOGRAPHY HEAD: CPT

## 2023-11-03 RX ORDER — ASPIRIN 325 MG
325 TABLET ORAL ONCE
Status: COMPLETED | OUTPATIENT
Start: 2023-11-03 | End: 2023-11-03

## 2023-11-03 RX ORDER — OXYCODONE HYDROCHLORIDE 10 MG/1
10 TABLET ORAL EVERY 6 HOURS PRN
Qty: 60 TABLET | Refills: 0 | Status: CANCELLED | OUTPATIENT
Start: 2023-11-03

## 2023-11-03 RX ORDER — OXYCODONE HCL 10 MG/1
10 TABLET, FILM COATED, EXTENDED RELEASE ORAL EVERY 8 HOURS SCHEDULED
Status: DISCONTINUED | OUTPATIENT
Start: 2023-11-03 | End: 2023-11-06 | Stop reason: HOSPADM

## 2023-11-03 RX ORDER — ENOXAPARIN SODIUM 100 MG/ML
40 INJECTION SUBCUTANEOUS DAILY
Status: DISCONTINUED | OUTPATIENT
Start: 2023-11-04 | End: 2023-11-06 | Stop reason: HOSPADM

## 2023-11-03 RX ORDER — CLOPIDOGREL BISULFATE 75 MG/1
300 TABLET ORAL ONCE
Status: COMPLETED | OUTPATIENT
Start: 2023-11-03 | End: 2023-11-03

## 2023-11-03 RX ORDER — ATORVASTATIN CALCIUM 40 MG/1
40 TABLET, FILM COATED ORAL EVERY EVENING
Status: DISCONTINUED | OUTPATIENT
Start: 2023-11-03 | End: 2023-11-06 | Stop reason: HOSPADM

## 2023-11-03 RX ADMIN — ASPIRIN 325 MG ORAL TABLET 325 MG: 325 PILL ORAL at 20:52

## 2023-11-03 RX ADMIN — IOHEXOL 85 ML: 350 INJECTION, SOLUTION INTRAVENOUS at 20:02

## 2023-11-03 RX ADMIN — CLOPIDOGREL BISULFATE 300 MG: 75 TABLET ORAL at 20:53

## 2023-11-03 NOTE — TELEPHONE ENCOUNTER
Please resubmit to [facility] [address] did not have enough in stock and offered [address] Pt agreeable to same for this fill.

## 2023-11-03 NOTE — TELEPHONE ENCOUNTER
Received prior authorization approval for Oxycodone 10mg TAB through 11/02/2023-05/02/2024 . Faxed results to Pharmacy. Pharmacy has been asked to inform pt of outcome. APPROVAL SCANNED IN CHART .

## 2023-11-04 ENCOUNTER — APPOINTMENT (INPATIENT)
Dept: NON INVASIVE DIAGNOSTICS | Facility: HOSPITAL | Age: 58
DRG: 045 | End: 2023-11-04
Payer: COMMERCIAL

## 2023-11-04 PROBLEM — R29.90 STROKE-LIKE SYMPTOM: Status: ACTIVE | Noted: 2023-11-03

## 2023-11-04 PROBLEM — E44.0 MODERATE PROTEIN-CALORIE MALNUTRITION (HCC): Status: ACTIVE | Noted: 2023-11-04

## 2023-11-04 LAB
2HR DELTA HS TROPONIN: 2.3 NG/L
ALBUMIN SERPL BCP-MCNC: 3.8 G/DL (ref 3.5–5)
ALP SERPL-CCNC: 69 U/L (ref 34–104)
ALT SERPL W P-5'-P-CCNC: 10 U/L (ref 7–52)
ANION GAP SERPL CALCULATED.3IONS-SCNC: 6 MMOL/L
ANION GAP SERPL CALCULATED.3IONS-SCNC: 6 MMOL/L
ANION GAP SERPL CALCULATED.3IONS-SCNC: 7 MMOL/L
AORTIC ROOT: 3.2 CM
APICAL FOUR CHAMBER EJECTION FRACTION: 57 %
AST SERPL W P-5'-P-CCNC: 13 U/L (ref 13–39)
AV REGURGITATION PRESSURE HALF TIME: 499 MS
BILIRUB DIRECT SERPL-MCNC: 0.08 MG/DL (ref 0–0.2)
BILIRUB SERPL-MCNC: 0.28 MG/DL (ref 0.2–1)
BUN SERPL-MCNC: 11 MG/DL (ref 5–25)
BUN SERPL-MCNC: 12 MG/DL (ref 5–25)
BUN SERPL-MCNC: 14 MG/DL (ref 5–25)
CALCIUM SERPL-MCNC: 9.6 MG/DL (ref 8.4–10.2)
CALCIUM SERPL-MCNC: 9.6 MG/DL (ref 8.4–10.2)
CALCIUM SERPL-MCNC: 9.9 MG/DL (ref 8.4–10.2)
CARDIAC TROPONIN I PNL SERPL HS: 10 NG/L
CARDIAC TROPONIN I PNL SERPL HS: 12.3 NG/L
CHLORIDE SERPL-SCNC: 100 MMOL/L (ref 96–108)
CHLORIDE SERPL-SCNC: 101 MMOL/L (ref 96–108)
CHLORIDE SERPL-SCNC: 99 MMOL/L (ref 96–108)
CHOLEST SERPL-MCNC: 141 MG/DL
CO2 SERPL-SCNC: 26 MMOL/L (ref 21–32)
CO2 SERPL-SCNC: 28 MMOL/L (ref 21–32)
CO2 SERPL-SCNC: 28 MMOL/L (ref 21–32)
CREAT SERPL-MCNC: 0.78 MG/DL (ref 0.6–1.3)
CREAT SERPL-MCNC: 0.81 MG/DL (ref 0.6–1.3)
CREAT SERPL-MCNC: 0.81 MG/DL (ref 0.6–1.3)
E WAVE DECELERATION TIME: 153 MS
E/A RATIO: 1.62
EST. AVERAGE GLUCOSE BLD GHB EST-MCNC: 123 MG/DL
FRACTIONAL SHORTENING: 31 (ref 28–44)
GFR SERPL CREATININE-BSD FRML MDRD: 96 ML/MIN/1.73SQ M
GFR SERPL CREATININE-BSD FRML MDRD: 96 ML/MIN/1.73SQ M
GFR SERPL CREATININE-BSD FRML MDRD: 98 ML/MIN/1.73SQ M
GLOBAL LONGITUIDAL STRAIN: -20 %
GLUCOSE SERPL-MCNC: 120 MG/DL (ref 65–140)
GLUCOSE SERPL-MCNC: 127 MG/DL (ref 65–140)
GLUCOSE SERPL-MCNC: 129 MG/DL (ref 65–140)
GLUCOSE SERPL-MCNC: 132 MG/DL (ref 65–140)
HBA1C MFR BLD: 5.9 %
HDLC SERPL-MCNC: 59 MG/DL
INTERVENTRICULAR SEPTUM IN DIASTOLE (PARASTERNAL SHORT AXIS VIEW): 1.1 CM
INTERVENTRICULAR SEPTUM: 1.1 CM (ref 0.6–1.1)
LAAS-AP2: 20.4 CM2
LAAS-AP4: 17.2 CM2
LDLC SERPL CALC-MCNC: 67 MG/DL (ref 0–100)
LEFT ATRIUM SIZE: 3.1 CM
LEFT ATRIUM VOLUME (MOD BIPLANE): 54 ML
LEFT ATRIUM VOLUME INDEX (MOD BIPLANE): 28 ML/M2
LEFT INTERNAL DIMENSION IN SYSTOLE: 2.9 CM (ref 2.1–4)
LEFT VENTRICULAR INTERNAL DIMENSION IN DIASTOLE: 4.2 CM (ref 3.5–6)
LEFT VENTRICULAR POSTERIOR WALL IN END DIASTOLE: 1 CM
LEFT VENTRICULAR STROKE VOLUME: 44 ML
LVSV (TEICH): 44 ML
MV E'TISSUE VEL-SEP: 10 CM/S
MV PEAK A VEL: 0.39 M/S
MV PEAK E VEL: 63 CM/S
MV STENOSIS PRESSURE HALF TIME: 44 MS
MV VALVE AREA P 1/2 METHOD: 5
PLATELET # BLD AUTO: 297 THOUSANDS/UL (ref 149–390)
PMV BLD AUTO: 9.2 FL (ref 8.9–12.7)
POTASSIUM SERPL-SCNC: 3.9 MMOL/L (ref 3.5–5.3)
POTASSIUM SERPL-SCNC: 4.3 MMOL/L (ref 3.5–5.3)
POTASSIUM SERPL-SCNC: 4.4 MMOL/L (ref 3.5–5.3)
PROT SERPL-MCNC: 7.4 G/DL (ref 6.4–8.4)
RIGHT ATRIAL 2D VOLUME: 29 ML
RIGHT ATRIUM AREA SYSTOLE A4C: 13.3 CM2
RIGHT VENTRICLE ID DIMENSION: 2.8 CM
SL CV AV DECELERATION TIME RETROGRADE: 1721 MS
SL CV AV PEAK GRADIENT RETROGRADE: 103 MMHG
SL CV LEFT ATRIUM LENGTH A2C: 5.8 CM
SL CV LV EF: 60
SL CV PED ECHO LEFT VENTRICLE DIASTOLIC VOLUME (MOD BIPLANE) 2D: 77 ML
SL CV PED ECHO LEFT VENTRICLE SYSTOLIC VOLUME (MOD BIPLANE) 2D: 33 ML
SODIUM SERPL-SCNC: 133 MMOL/L (ref 135–147)
SODIUM SERPL-SCNC: 134 MMOL/L (ref 135–147)
SODIUM SERPL-SCNC: 134 MMOL/L (ref 135–147)
TRICUSPID ANNULAR PLANE SYSTOLIC EXCURSION: 2.4 CM
TRICUSPID VALVE PEAK REGURGITATION VELOCITY: 2.2 M/S
TRIGL SERPL-MCNC: 75 MG/DL

## 2023-11-04 PROCEDURE — 93356 MYOCRD STRAIN IMG SPCKL TRCK: CPT | Performed by: INTERNAL MEDICINE

## 2023-11-04 PROCEDURE — 93306 TTE W/DOPPLER COMPLETE: CPT

## 2023-11-04 PROCEDURE — 80061 LIPID PANEL: CPT

## 2023-11-04 PROCEDURE — 97166 OT EVAL MOD COMPLEX 45 MIN: CPT

## 2023-11-04 PROCEDURE — 82948 REAGENT STRIP/BLOOD GLUCOSE: CPT

## 2023-11-04 PROCEDURE — 85049 AUTOMATED PLATELET COUNT: CPT

## 2023-11-04 PROCEDURE — 99254 IP/OBS CNSLTJ NEW/EST MOD 60: CPT | Performed by: PSYCHIATRY & NEUROLOGY

## 2023-11-04 PROCEDURE — 93356 MYOCRD STRAIN IMG SPCKL TRCK: CPT

## 2023-11-04 PROCEDURE — 80076 HEPATIC FUNCTION PANEL: CPT

## 2023-11-04 PROCEDURE — 36415 COLL VENOUS BLD VENIPUNCTURE: CPT | Performed by: EMERGENCY MEDICINE

## 2023-11-04 PROCEDURE — 80048 BASIC METABOLIC PNL TOTAL CA: CPT

## 2023-11-04 PROCEDURE — 99222 1ST HOSP IP/OBS MODERATE 55: CPT | Performed by: INTERNAL MEDICINE

## 2023-11-04 PROCEDURE — 80048 BASIC METABOLIC PNL TOTAL CA: CPT | Performed by: INTERNAL MEDICINE

## 2023-11-04 PROCEDURE — 92610 EVALUATE SWALLOWING FUNCTION: CPT

## 2023-11-04 PROCEDURE — 84484 ASSAY OF TROPONIN QUANT: CPT | Performed by: EMERGENCY MEDICINE

## 2023-11-04 PROCEDURE — 83036 HEMOGLOBIN GLYCOSYLATED A1C: CPT

## 2023-11-04 PROCEDURE — 93306 TTE W/DOPPLER COMPLETE: CPT | Performed by: INTERNAL MEDICINE

## 2023-11-04 RX ORDER — FOLIC ACID 1 MG/1
1 TABLET ORAL DAILY
Status: DISCONTINUED | OUTPATIENT
Start: 2023-11-04 | End: 2023-11-06 | Stop reason: HOSPADM

## 2023-11-04 RX ORDER — SODIUM CHLORIDE 9 MG/ML
50 INJECTION, SOLUTION INTRAVENOUS CONTINUOUS
Status: DISCONTINUED | OUTPATIENT
Start: 2023-11-04 | End: 2023-11-04

## 2023-11-04 RX ORDER — LANOLIN ALCOHOL/MO/W.PET/CERES
100 CREAM (GRAM) TOPICAL DAILY
Status: DISCONTINUED | OUTPATIENT
Start: 2023-11-04 | End: 2023-11-06 | Stop reason: HOSPADM

## 2023-11-04 RX ADMIN — OXYCODONE HYDROCHLORIDE 10 MG: 10 TABLET, FILM COATED, EXTENDED RELEASE ORAL at 05:30

## 2023-11-04 RX ADMIN — MULTIPLE VITAMINS W/ MINERALS TAB 1 TABLET: TAB ORAL at 08:59

## 2023-11-04 RX ADMIN — ATORVASTATIN CALCIUM 40 MG: 40 TABLET, FILM COATED ORAL at 18:30

## 2023-11-04 RX ADMIN — Medication 100 MG: at 08:59

## 2023-11-04 RX ADMIN — FOLIC ACID 1 MG: 1 TABLET ORAL at 08:59

## 2023-11-04 RX ADMIN — SODIUM CHLORIDE 250 ML: 0.9 INJECTION, SOLUTION INTRAVENOUS at 03:22

## 2023-11-04 RX ADMIN — ATORVASTATIN CALCIUM 40 MG: 40 TABLET, FILM COATED ORAL at 00:02

## 2023-11-04 RX ADMIN — ENOXAPARIN SODIUM 40 MG: 40 INJECTION SUBCUTANEOUS at 08:59

## 2023-11-04 RX ADMIN — OXYCODONE HYDROCHLORIDE 10 MG: 10 TABLET, FILM COATED, EXTENDED RELEASE ORAL at 21:26

## 2023-11-04 RX ADMIN — OXYCODONE HYDROCHLORIDE 10 MG: 10 TABLET, FILM COATED, EXTENDED RELEASE ORAL at 15:40

## 2023-11-04 RX ADMIN — OXYCODONE HYDROCHLORIDE 10 MG: 10 TABLET, FILM COATED, EXTENDED RELEASE ORAL at 00:02

## 2023-11-04 NOTE — QUICK NOTE
Stroke alert note    Paged at 7:43pm    Responded at 7:43pm    Sudden onset of RUE an RLE weakness that started at 5pm Thursday afternoon but worsened yesterday evening so decided to get evaluated. [facility] 7    CT head negative for acute findings. Cta no LVO    Not a TNK candidate since unknown time of onset    Not a thrombectomy candidate either    Admit for stroke workup.    DAPT   Echo  MRI

## 2023-11-04 NOTE — PROGRESS NOTES
Pt has refused labwork to be done, he had been adamant about food and diet order obtained, pain meds ordered at pt request as well, he would not allow his pain meds to be sent to pharmacy only placed in med box in room, his girlfriend will pick them up in am..he has ambulated to  as a self with observation with steady gait

## 2023-11-04 NOTE — ASSESSMENT & PLAN NOTE
Episode of sudden onset weakness of RUE and RLE starting at approximately 3-4pm Thursday afternoon. EKG as of 8/4/2023: sinus rhythm at 73bpm, LVH noted  CT Brain showed questionable lacunar ischemia involving the right paramedian karla versus beam hardening artifact. CT head/neck showed no evidence of major vessel occlusion or aneurysm. Segmental severe stenosis versus occlusion of the right internal jugular vein, worsened and secondary to adjacent adenopathy. 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 CN X-XII intact, ataxia noted on finger to nose test on RUE and LUE. Plan:  MRI brain, TTE, monitor on tele  Lipid Panel, HbA1c  Atorvastatin 40 mg q.d., Aspirin 81 q.d.   Consult Neurology, PMR  PT/OT/Speech eval  Allow for permissive hypertension with -200 for 48 hours  Neuro checks per protocol

## 2023-11-04 NOTE — CONSULTS
NEUROLOGY RESIDENCY CONSULT NOTE     Name: El Jean   Age & Sex: 59 y.o. male   MRN: [id number]  Unit/Bed#: S -[facility]   Encounter: [id number]  Length of Stay: 1    ASSESSMENT & PLAN     * Stroke-like symptom  Assessment & Plan  59 y.o. male with a PMH of Alcohol abuse, Oral Cancer, HTN, hyponatremia who presented to the ED with complaints of sudden weakness. Pt states for the past few days he has been having weakness in his L leg but was able to ambulate and did not seek medical attention but came in as a  Stroke alert on 11/3/2023  7:32 PM with initial NIHSS of 7 and LKW unknown, likely 11/02 afternoon, initial Blood Pressure: 167/99. Initial presenting deficits were LUE and LLE weakness and worsened dysarthria. 11/03/2023 while at the bar with a friend having alcoholic beverages, pt fell to the floor  around 3-4 pm 11/03/2023 while attempting to stand secondary to severe weakness in LLE as well as new onset of LUE weakness. Pt also was having worsening dysarthria, pt has baseline dysarthria d/t hx of oral cancer. As a result of unclear LKW pt was determined to not be a candidate for thrombolysis (TNK). Exam on 11/04/23: dysarthria (patient doesn't feel worse than his baseline), LUE and LLE weakness 4/5, LUE drift  Current Blood Pressure: 133/77, BP over 24 hours: BP  Min: 96/58  Max: 167/99   Vascular risk factors: ETOH use, past smoking hx, HTN, and oral cancer   Home meds: NO AC/AP    Workup:  Lab Results   Component Value Date    INR 0.90 11/03/2023      CTH: Questionable lacunar ischemia involving the right paramedian karla versus beam hardening artifact. CTA: No definite evidence for high-grade stenosis, major branch vessel occlusion or vascular aneurysm of the cervical or intracranial vessels. Segmental severe stenosis versus occlusion of the right internal jugular vein, worsened and secondary to adjacent adenopathy.   Interval worsening of the right oral tongue mass as well as cervical adenopathy  MRI: pending  Echocardiogram: pending   Telemetry: no events    Pertinent scores:  - NIHSS: 7 at stroke alert   Stroke Modified Davis Score: 2 (Unable to carry out all previous activities, but able to look after own affairs without assistance)    Impression: L sided weakness w/ worsened dysarthria concerning for stroke (R pontine stroke on CTH would explain symptoms) and will need further w/u    Plan:  - Stroke pathway  - Discussed plan with neurology attending, Dr. Sancho Hernandez  - BP: Normotension of <130/80  - Obtain lipids and A1c  - atorvastatin 40mg qhs  - Maintain glucose <180, SSI for coverage if indicated  - Medical management as per primary team appreciated  - Antiplatelet agents: s/p ASA 325mg load and plavix load 300mg and can continue ASA 81mg and plavix 75mg afterwards for 21 days for now and likely ASA 81mg if MRI + for new stroke   - TTE  - MRI brain pending  - DVT ppx and SCDs  - Monitor on telemetry  - PT/OT/Speech/PM&R input appreciated  - Stroke education            Recommendations for outpatient neurological follow up have yet to be determined. This should be completed prior to removing patient from list or discharge     SUBJECTIVE     Reason for Consult / Principal Problem: weakness  Hx and PE limited by: none    HPI: Arlyn Santiago is a 59 y.o. right handed male  with a PMH of Alcohol abuse, Oral Cancer, HTN, smoker, hyponatremia who presented to the ED with complaints of sudden weakness. Pt states for the past few days he has been having weakness in his L leg but was able to ambulate and did not seek medical attention. 11/04 while at the bar with a friend having alcoholic beverages, pt fell to the floor  around 3-4 pm while attempting to stand secondary to severe weakness in LLE as well as new onset of LUE weakness and called EMS. 11/02/2023 woke up with LLE weakness and went to bed 11/01 michelle to bed in normal state of health. Patient has baseline dysarthria d/t hx of oral cancer.   Pt denies being intoxicated at the time of sudden weakness. ETOH 190   Initial NIHSS was reported to be 7 during stroke alert for facial palsy, Left arm weakness, Left Leg weakness, Limb ataxia. No TNK due to LKW unclear and admitted under stroke pathway w/ ASA and Plavix load. He denies losing consciousness or hitting his head. Pt denies any SOB, cough, chest pain, palpitations, lightheadedness, recent sick contacts. He denies any headaches, syncope, paresthesias, diplopia, visual changes, tinnitus, garbled speech, dysarthria/slurring of words,  Loss of bowel or bladder    At baseline, he has trouble swallowing due to his oral cancer (L tongue mass) and some baseline dysarthria due to the oral cancer.        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     Past Surgical History:   Procedure Laterality Date    EGD      KNEE ARTHROSCOPY Left     MULTIPLE TOOTH EXTRACTIONS N/A 10/16/2023    Procedure: EXTRACTION OF ALL REMAINING TEETH;  Surgeon: Jose Kingsley DMD;  Location: [facility];  Service: Oral Surgery     Social History   Social History     Substance and Sexual Activity   Alcohol Use Yes    Comment: 3-4 beers/day 24 oz beer     Social History     Substance and Sexual Activity   Drug Use No     E-Cigarette/Vaping    E-Cigarette Use Never User      E-Cigarette/Vaping Substances    Nicotine Yes      Social History     Tobacco Use   Smoking Status Every Day    Packs/day: 0.25    Types: Cigarettes   Smokeless Tobacco Never     Family History:   Family History   Problem Relation Age of Onset    Breast cancer Mother     Cancer Father      Meds/Allergies   all current active meds have been reviewed, current meds:   Current Facility-Administered Medications   Medication Dose Route Frequency    atorvastatin (LIPITOR) tablet 40 mg  40 mg Oral QPM    enoxaparin (LOVENOX) subcutaneous injection 40 mg  40 mg Subcutaneous Daily    folic acid (FOLVITE) tablet 1 mg  1 mg Oral Daily    multivitamin-minerals (CENTRUM) tablet 1 tablet  1 tablet Oral Daily    oxyCODONE (OxyCONTIN) 12 hr tablet 10 mg  10 mg Oral Q8H [address]    thiamine tablet 100 mg  100 mg Oral Daily   , and PTA meds:   Prior to Admission Medications   Prescriptions Last Dose Informant Patient Reported? Taking? diphenhydramine, lidocaine, Al/Mg hydroxide, simethicone (Magic Mouthwash) SUSP Not Taking  No No   Sig: Swish and swallow 10 mL every 4 (four) hours as needed for mouth pain or discomfort   Patient not taking: Reported on 11/2/2023   ibuprofen (MOTRIN) 600 mg tablet 11/3/2023  No Yes   Sig: Take 1 tablet (600 mg total) by mouth every 6 (six) hours as needed for mild pain   naloxone (NARCAN) 4 mg/0.1 mL nasal spray Past Week  No Yes   Sig: Administer 1 spray into a nostril. If no response after 2-3 minutes, give another dose in the other nostril using a new spray. oxyCODONE (ROXICODONE) 10 MG TABS Past Week  No Yes   Sig: Take 1 tablet (10 mg total) by mouth every 6 (six) hours as needed for severe pain Max Daily Amount: 40 mg   senna-docusate sodium (SENOKOT-S) 8.6-50 mg per tablet Past Week  No Yes   Sig: Take 1 tablet by mouth daily      Facility-Administered Medications: None     Allergies   Allergen Reactions    Bee Venom Hives    Other      bees       Review of previous medical records was  completed. Review of Systems   Constitutional:  Negative for chills and fever. HENT:  Negative for ear pain and sore throat. Eyes:  Negative for pain and visual disturbance.    Respiratory:  Negative for cough and shortness of breath. Cardiovascular:  Negative for chest pain and palpitations. Gastrointestinal:  Negative for abdominal pain and vomiting. Genitourinary:  Negative for dysuria and hematuria. Musculoskeletal:  Negative for arthralgias and back pain. Skin:  Negative for color change and rash. Neurological:  Positive for speech difficulty and weakness. Negative for seizures and syncope. All other systems reviewed and are negative. OBJECTIVE     Patient ID: Mulu Drummond is a 59 y.o. male. Vitals:   Vitals:    23 0516 23 0600 23 0741 23 1136   BP: 115/68 115/68 133/77 133/77   BP Location:  Right arm     Pulse:  72 68 68   Resp:  18 18    Temp:   (!) 97.4 °F (36.3 °C)    TempSrc:   Axillary    SpO2:       Weight:    66.7 kg (147 lb)   Height:    6' 3" (1.905 m)      Body mass index is 18.37 kg/m². Intake/Output Summary (Last 24 hours) at 2023 1219  Last data filed at 2023 0401  Gross per 24 hour   Intake 250 ml   Output --   Net 250 ml       Temperature:   Temp (24hrs), Av.2 °F (36.8 °C), Min:97.4 °F (36.3 °C), Max:98.6 °F (37 °C)    Temperature: (!) 97.4 °F (36.3 °C)    Invasive Devices: Invasive Devices       Peripheral Intravenous Line  Duration             Peripheral IV 23 Right;Ventral (anterior) Forearm <1 day                    Physical Exam  Eyes:      Extraocular Movements: EOM normal.      Pupils: Pupils are equal, round, and reactive to light. Neurological:      Mental Status: He is oriented to person, place, and time. Coordination: Finger-Nose-Finger Test and Heel to [facility] Test normal.      Deep Tendon Reflexes:      Reflex Scores:       Tricep reflexes are 2+ on the right side and 2+ on the left side. Bicep reflexes are 2+ on the right side and 2+ on the left side. Brachioradialis reflexes are 2+ on the right side and 2+ on the left side.        Patellar reflexes are 2+ on the right side and 2+ on the left side. Achilles reflexes are 2+ on the right side and 2+ on the left side. Psychiatric:         Speech: Speech is slurred. Neurologic Exam     Mental Status   Oriented to person, place, and time. Speech: slurred   Able to perform simple calculations. Able to name object. Able to repeat. Normal comprehension. Baseline slurred speech      Cranial Nerves     CN II   Visual fields full to confrontation. CN III, IV, VI   Pupils are equal, round, and reactive to light. Extraocular motions are normal.   CN III: no CN III palsy  CN VI: no CN VI palsy  Nystagmus: none     CN V   Facial sensation intact. CN VIII   CN VIII normal.     CN IX, X   CN IX normal.   CN X normal.     CN XI   CN XI normal.     Slight R facial droop  Noted tongue lesion on R side of tongue      Motor Exam   Right arm pronator drift: absent  Left arm pronator drift: present    Strength   Strength 5/5 except as noted. Left deltoid: 4/5  Left biceps: 4/5  Left triceps: 4/5  Left interossei: 4/5  Left iliopsoas: 4/5  Left quadriceps: 4/5  Left hamstrin/5  Left glutei: 4/5  Left anterior tibial: 4/5  Left posterior tibial: 4/5  Left peroneal: 4/5  Left gastroc: 4/5    4+ in LUE biceps/triceps/hand    4/5 in L deltoid      Sensory Exam   Light touch normal.     Gait, Coordination, and Reflexes     Coordination   Finger to nose coordination: normal  Heel to shin coordination: normal    Tremor   Resting tremor: absent  Intention tremor: absent  Action tremor: absent    Reflexes   Right brachioradialis: 2+  Left brachioradialis: 2+  Right biceps: 2+  Left biceps: 2+  Right triceps: 2+  Left triceps: 2+  Right patellar: 2+  Left patellar: 2+  Right achilles: 2+  Left achilles: 2+  Right plantar: normal  Left plantar: normal       LABORATORY DATA     Labs: I have personally reviewed pertinent reports.    and I have personally reviewed pertinent films in PACS  Results from last 7 days   Lab Units 23 WBC Thousand/uL 8.47   HEMOGLOBIN g/dL 13.8   HEMATOCRIT % 40.7   PLATELETS Thousands/uL 303      Results from last 7 days   Lab Units 11/04/23  0847 11/03/23 1948   POTASSIUM mmol/L 4.4 3.7   CHLORIDE mmol/L 99 95*   CO2 mmol/L 28 23   BUN mg/dL 11 10   CREATININE mg/dL 0.81 0.67   CALCIUM mg/dL 9.6 9.4   ALK PHOS U/L 69  --    ALT U/L 10  --    AST U/L 13  --               Results from last 7 days   Lab Units 11/03/23 1948   INR  0.90   PTT seconds 28               IMAGING & DIAGNOSTIC TESTING     Radiology Results: I have personally reviewed pertinent reports. and I have personally reviewed pertinent films in PACS  CT stroke alert brain   Final Result by Bard Barbara MD (11/03 2030)      Questionable lacunar ischemia involving the right paramedian karla versus beam hardening artifact. Correlation with MRI is recommended. I personally discussed this study with Karli Henriquez on 11/3/2023 8:12 PM.            Workstation performed: [id number]         CTA stroke alert (head/neck)   Final Result by Bard Barbara MD (11/03 2026)      No definite evidence for high-grade stenosis, major branch vessel occlusion or vascular aneurysm of the cervical or intracranial vessels. Segmental severe stenosis versus occlusion of the right internal jugular vein, worsened and secondary to adjacent adenopathy. Interval worsening of the right oral tongue mass as well as cervical adenopathy as described above. I personally discussed this study with Karli Henriquez on 11/3/2023 8:12 PM.                              Workstation performed: [id number]         MRI brain wo contrast    (Results Pending)       Other Diagnostic Testing: I have personally reviewed pertinent reports.    and I have personally reviewed pertinent films in PACS    ACTIVE MEDICATIONS     Current Facility-Administered Medications   Medication Dose Route Frequency    atorvastatin (LIPITOR) tablet 40 mg  40 mg Oral QPM    enoxaparin (LOVENOX) subcutaneous injection 40 mg  40 mg Subcutaneous Daily    folic acid (FOLVITE) tablet 1 mg  1 mg Oral Daily    multivitamin-minerals (CENTRUM) tablet 1 tablet  1 tablet Oral Daily    oxyCODONE (OxyCONTIN) 12 hr tablet 10 mg  10 mg Oral Q8H [facility]    thiamine tablet 100 mg  100 mg Oral Daily       Prior to Admission medications    Medication Sig Start Date End Date Taking? Authorizing Provider   ibuprofen (MOTRIN) 600 mg tablet Take 1 tablet (600 mg total) by mouth every 6 (six) hours as needed for mild pain 10/16/23  Yes Tessy Griggs DMD   naloxone (NARCAN) 4 mg/0.1 mL nasal spray Administer 1 spray into a nostril. If no response after 2-3 minutes, give another dose in the other nostril using a new spray.  11/2/23 11/1/24 Yes Carlee Patel MD   oxyCODONE (ROXICODONE) 10 MG TABS Take 1 tablet (10 mg total) by mouth every 6 (six) hours as needed for severe pain Max Daily Amount: 40 mg 11/2/23  Yes Carlee Patel MD   senna-docusate sodium (SENOKOT-S) 8.6-50 mg per tablet Take 1 tablet by mouth daily 11/2/23  Yes Carlee Patel MD   diphenhydramine, lidocaine, Al/Mg hydroxide, simethicone (Magic Mouthwash) SUSP Swish and swallow 10 mL every 4 (four) hours as needed for mouth pain or discomfort  Patient not taking: Reported on 11/2/2023 10/12/23   Yann Pabon MD         CODE STATUS & ADVANCED DIRECTIVES     Code Status: Level 1 - Full Code  Advance Directive and Living Will:      Power of :    POLST:        VTE Pharmacologic Prophylaxis:  as pe primary  VTE Mechanical Prophylaxis: sequential compression device    ==  [facility] [address] [facility] Neurology Residency, PGY-3

## 2023-11-04 NOTE — ASSESSMENT & PLAN NOTE
Smokes 1/2 pack of cigarettes per day  Pt is considering quitting smoking  Should be given referral to smoking cessation program at discharge and educational material on cessation of smoking  Offer NRT while admitted.

## 2023-11-04 NOTE — UTILIZATION REVIEW
Initial Clinical Review    Admission: Date/Time/Statement:   Admission Orders (From admission, onward)       Ordered        11/03/23 2121  INPATIENT ADMISSION  Once                          Orders Placed This Encounter   Procedures    INPATIENT ADMISSION     Standing Status:   Standing     Number of Occurrences:   1     Order Specific Question:   Level of Care     Answer:   Med Surg [16]     Order Specific Question:   Estimated length of stay     Answer:   More than 2 Midnights     Order Specific Question:   Certification     Answer:   I certify that inpatient services are medically necessary for this patient for a duration of greater than two midnights. See H&P and MD Progress Notes for additional information about the patient's course of treatment. ED Arrival Information       Expected   -    Arrival   11/3/2023 19:32    Acuity   Emergent              Means of arrival   Ambulance    Escorted by   [facility]    Service   Hospitalist    Admission type   Emergency              Arrival complaint   EMS-Fall / Left Sided Weakness             Chief Complaint   Patient presents with    Fall     Pt arrives to ED via EMS. Pt reports falling, -HS, -LOC, -thinners. Pt reports L-sided weakness, deficits noted. GCS 15. Initial Presentation: 59 y.o. male from home to ED via ems admitted inpatient due to Stroke like symptoms. PMH of Alcohol abuse, Oral Cancer with current evaluation,  HTN, smoker, hyponatremia. Drinks 3 to 4 beers daily. Presented due to left sided weakness starting day prior to arrival at 0500,  today unable to stand. On exam: Slurred speech, baseline secondary to tongue carcinoma. 2+ strength to left upper and lower extremity. Intact sensation. Ataxia to left upper and lower extremity. NIHSS 7. Na 128. Ethanol 190. Ct brain shoed questionable lacunar ischemia vs artifact. In the ED stroke alert. No TNK. Loaded with asa and Plavix.   Plan is neuro checks, echo, MRI brain, telemetry. Consult neurology. Permissive hypertension. CIWA. Trend NA    Date: 11/4/23    Day 2: + lightheaded when went to bathroom. Has ongoing trouble swallowing and baseline dysarthria related to oral cancer. Remains weak. On exam speech slurred. Slight right  facial droop. Tongue lesion right side of tongue. Strength 5/5 except 4+ in LUE biceps, triceps and hand . 4/5 left Deltoid. Continue neuro checks. MRI brain. Asa and Plavix. Consult oncology. CIWA    11/4/23 per neurology - patient with stroke like symptoms and concern is for stroke. .  Presented due to weakness. NIHSS 7. Not a TNK candidate. Plan is neuro checks, normotension, check lipids and A1c, atorvastatin. Continue asa and Plavix. MRI, echo. Telemetry. 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 Plan is check ct chest for metastatic disease. If none then needs radiation with cisplatin asap.        ED Triage Vitals   Temperature Pulse Respirations Blood Pressure SpO2   11/03/23 2250 11/03/23 1937 11/03/23 1937 11/03/23 1937 11/03/23 1942   98.5 °F (36.9 °C) 89 18 167/99 97 %      Temp Source Heart Rate Source Patient Position - Orthostatic VS BP Location FiO2 (%)   11/03/23 2250 11/03/23 1937 11/03/23 1937 11/03/23 1937 --   Axillary Monitor Sitting Right arm       Pain Score       11/03/23 1942       4          Wt Readings from Last 1 Encounters:   11/04/23 66.7 kg (147 lb)     Additional Vital Signs:   11/04/23 07:41:42 97.4 °F (36.3 °C) Abnormal  68 18 133/77 96 -- -- --   11/04/23 0600 -- 72 18 115/68 -- -- -- Lying   11/04/23 05:16:29 -- -- -- 115/68 84 -- -- --   11/04/23 0356 -- 76 18 104/62 -- -- -- Lying   11/04/23 03:55:33 -- -- -- 96/58 71 -- -- --   11/04/23 02:24:07 -- -- -- 107/62 77 -- -- --   11/04/23 0100 98.2 °F (36.8 °C) 80 18 102/57 72 98 % None (Room air) Lying   11/04/23 00:12:07 -- -- -- 136/83 101 -- -- --   11/04/23 0002 98.6 °F (37 °C) 78 18 165/100 -- 98 % None (Room air) Lying   11/03/23 2250 98.5 °F (36.9 °C) 77 17 140/88 106 98 % None (Room air) Sitting   11/03/23 2100 -- 79 18 141/88 -- 96 % None (Room air) Lying   11/03/23 2000 -- 80 18 141/88 -- 98 % None (Room air) L     Date and Time Eye Opening Best Verbal Response Best Motor Response [place] Coma Scale Score   11/04/23 0600 4 5 6 15   11/04/23 0357 4 5 6 15   11/04/23 0200 4 5 6 15   11/04/23 0100 4 5 6 15   11/04/23 0002 4 5 6 15   11/03/23 2300 4 5 6 15   11/03/23 2145 4 5 6 15   11/03/23 2130 4 5 6 15   11/03/23 2115 4 5 6 15   11/03/23 2100 4 5 6 15   11/03/23 2045 4 5 6 15   11/03/23 2030 4 5 6 15   11/03/23 2015 4 5 6 15   11/03/23 2000 4 5 6 15   11/03/23 1942 4 5 6 15     Pertinent Labs/Diagnostic Test Results:   MRI brain wo contrast   Final Result by Pari Duarte MD (11/05 1140)      Subcentimeter acute to subacute right pontine infarct. Mild chronic microangiopathic ischemic changes. Workstation performed: [id number]         CT stroke alert brain   Final Result by Bard Barbara MD (11/03 2030)      Questionable lacunar ischemia involving the right paramedian karla versus beam hardening artifact. Correlation with MRI is recommended. I personally discussed this study with Karli Henriquze on 11/3/2023 8:12 PM.            Workstation performed: [id number]         CTA stroke alert (head/neck)   Final Result by Bard Barbara MD (11/03 2026)      No definite evidence for high-grade stenosis, major branch vessel occlusion or vascular aneurysm of the cervical or intracranial vessels. Segmental severe stenosis versus occlusion of the right internal jugular vein, worsened and secondary to adjacent adenopathy. Interval worsening of the right oral tongue mass as well as cervical adenopathy as described above.          I personally discussed this study with Arvid Leventhal on 11/3/2023 8:12 PM.                              Workstation performed: [id number]         CT chest abdomen pelvis w contrast    (Results Pending)       11/3/23 ecg -  Interpretation: non-specific    Rate:     ECG rate:  74     ECG rate assessment: normal    Rhythm:     Rhythm: sinus rhythm    Ectopy:     Ectopy: none    QRS:     QRS axis:  Normal     QRS intervals:  Normal   Conduction:     Conduction: normal    ST segments:     ST segments:  Normal   T waves:     T waves: normal     11/4/23 echo Strain was performed to quantify interventricular dyssynchrony and evaluate components of myocardial function due to pre Chemotherapy. Results from the utilization of Strain Analysis are listed in the report below. Left Ventricle: Left ventricular cavity size is normal. Wall thickness is mildly increased. There is concentric remodeling. The left ventricular ejection fraction is 60%. Systolic function is normal. Global longitudinal strain is normal at -20%. Wall motion is normal. Diastolic function is normal.    Right Ventricle: Right ventricular cavity size is normal. Systolic function is normal.    Aortic Valve: There is mild to moderate regurgitation with a centrally directed jet. Tricuspid Valve: There is mild regurgitation.   Results from last 7 days   Lab Units 11/03/23 1948   SARS-COV-2  Negative     Results from last 7 days   Lab Units 11/04/23 2040 11/03/23 1948   WBC Thousand/uL  --  8.47   HEMOGLOBIN g/dL  --  13.8   HEMATOCRIT %  --  40.7   PLATELETS Thousands/uL 297 303     Results from last 7 days   Lab Units 11/05/23  1157 11/05/23  0615 11/04/23  2040 11/04/23  1215 11/04/23  0847   SODIUM mmol/L 133* 134* 134* 134* 133*   POTASSIUM mmol/L 4.0 4.5 3.9 4.3 4.4   CHLORIDE mmol/L 97 100 101 100 99   CO2 mmol/L 32 31 26 28 28   ANION GAP mmol/L 4 3 7 6 6   BUN mg/dL 14 12 14 12 11   CREATININE mg/dL 0.85 0.93 0.81 0.78 0.81   EGFR ml/min/1.73sq m 94 88 96 98 96   CALCIUM mg/dL 10.1 10.0 9.9 9.6 9.6     Results from last 7 days   Lab Units 11/04/23  0847   AST U/L 13   ALT U/L 10   ALK PHOS U/L 69   TOTAL PROTEIN g/dL 7.4   ALBUMIN g/dL 3.8   TOTAL BILIRUBIN mg/dL 0.28   BILIRUBIN DIRECT mg/dL 0.08     Results from last 7 days   Lab Units 11/05/23  1136 11/05/23  0810 11/04/23  1626 11/03/23  1939   POC GLUCOSE mg/dl 128 96 120 87     Results from last 7 days   Lab Units 11/05/23  1157 11/05/23  0615 11/04/23  2040 11/04/23  1215 11/04/23  0847 11/03/23  1948   GLUCOSE RANDOM mg/dL 108 102 132 129 127 85     Results from last 7 days   Lab Units 11/04/23 2040 11/04/23  0533 11/03/23  1948   HS TNI 0HR ng/L  --   --  10   HS TNI 2HR ng/L  --  12.3  --    HSTNI D2 ng/L  --  2.3  --    HS TNI 4HR ng/L 10  --   --      Results from last 7 days   Lab Units 11/03/23 1948   PROTIME seconds 12.7   INR  0.90   PTT seconds 28     Results from last 7 days   Lab Units 11/03/23 1948   INFLUENZA A PCR  Negative   INFLUENZA B PCR  Negative   RSV PCR  Negative     Results from last 7 days   Lab Units 11/03/23 1948   ETHANOL LVL mg/dL 190*   ACETAMINOPHEN LVL ug/mL <41*   SALICYLATE LVL mg/dL <5         ED Treatment:   Medication Administration from 11/03/2023 1932 to 11/03/2023 2227         Date/Time Order Dose Route Action Comments     11/03/2023 2052 EDT aspirin tablet 325 mg 325 mg Oral Given --     11/03/2023 2053 EDT clopidogrel (PLAVIX) tablet 300 mg 300 mg Oral Given --          Past Medical History:   Diagnosis Date    Alcohol abuse     Hypertension     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 male  Admission Orders:  11/3/23 2121 inpatient   Scheduled Medications:  atorvastatin, 40 mg, Oral, QPM  enoxaparin, 40 mg, Subcutaneous, Daily  folic acid, 1 mg, Oral, Daily  multivitamin-minerals, 1 tablet, Oral, Daily  oxyCODONE, 10 mg, Oral, Q8H [facility]  thiamine, 100 mg, Oral, Daily    sodium chloride 0.9 % bolus 250 mL  Dose: 250 mL  Freq: Once Route: IV  Last Dose: Stopped (11/04/23 0700)  Start: 11/04/23 0245 End: 11/04/23 0700     Continuous IV Infusions: none   sodium chloride, 100 mL/hr, Intravenous, Continuous      PRN Meds:     CIWA  Continuous pulse oximetry  Neuro check  Telemetry     IP CONSULT TO PHYSICAL MEDICINE REHAB  IP CONSULT TO NEUROLOGY      Network Utilization Review Department  ATTENTION: Please call with any questions or concerns to [phone] and carefully listen to the prompts so that you are directed to the right person. All voicemails are confidential.   For Discharge needs, contact Care Management DC Support Team at [phone] opt. 2  Send all requests for admission clinical reviews, approved or denied determinations and any other requests to dedicated fax number below belonging to the campus where the patient is receiving treatment.  List of dedicated fax numbers for the Facilities:  [place] DENIALS (Administrative/Medical Necessity) [phone]   DISCHARGE SUPPORT TEAM (NETWORK) [phone]   [facility] ([facility]) [phone]   [address] [place] [address] [phone]   [facility] 1000 [facility] [phone] 1505 [facility] 207 [facility] [address] [place] [phone]   [address] [address] [place], [place] [address]y [facility] [phone]

## 2023-11-04 NOTE — PLAN OF CARE
Problem: NEUROSENSORY - ADULT  Goal: Achieves stable or improved neurological status  Description: INTERVENTIONS  - Monitor and report changes in neurological status  - Monitor vital signs such as temperature, blood pressure, glucose, and any other labs ordered   - Initiate measures to prevent increased intracranial pressure  - Monitor for seizure activity and implement precautions if appropriate      Outcome: Progressing  Goal: Remains free of injury related to seizures activity  Description: INTERVENTIONS  - Maintain airway, patient safety  and administer oxygen as ordered  - Monitor patient for seizure activity, document and report duration and description of seizure to physician/advanced practitioner  - If seizure occurs,  ensure patient safety during seizure  - Reorient patient post seizure  - Seizure pads on all 4 side rails  - Instruct patient/family to notify RN of any seizure activity including if an aura is experienced  - Instruct patient/family to call for assistance with activity based on nursing assessment  - Administer anti-seizure medications if ordered    Outcome: Progressing  Goal: Achieves maximal functionality and self care  Description: INTERVENTIONS  - Monitor swallowing and airway patency with patient fatigue and changes in neurological status  - Encourage and assist patient to increase activity and self care.    - Encourage visually impaired, hearing impaired and aphasic patients to use assistive/communication devices  Outcome: Progressing     Problem: CARDIOVASCULAR - ADULT  Goal: Maintains optimal cardiac output and hemodynamic stability  Description: INTERVENTIONS:  - Monitor I/O, vital signs and rhythm  - Monitor for S/S and trends of decreased cardiac output  - Administer and titrate ordered vasoactive medications to optimize hemodynamic stability  - Assess quality of pulses, skin color and temperature  - Assess for signs of decreased coronary artery perfusion  - Instruct patient to report change in severity of symptoms  Outcome: Progressing  Goal: Absence of cardiac dysrhythmias or at baseline rhythm  Description: INTERVENTIONS:  - Continuous cardiac monitoring, vital signs, obtain 12 lead EKG if ordered  - Administer antiarrhythmic and heart rate control medications as ordered  - Monitor electrolytes and administer replacement therapy as ordered  Outcome: Progressing     Problem: METABOLIC, FLUID AND ELECTROLYTES - ADULT  Goal: Electrolytes maintained within normal limits  Description: INTERVENTIONS:  - Monitor labs and assess patient for signs and symptoms of electrolyte imbalances  - Administer electrolyte replacement as ordered  - Monitor response to electrolyte replacements, including repeat lab results as appropriate  - Instruct patient on fluid and nutrition as appropriate  Outcome: Progressing  Goal: Fluid balance maintained  Description: INTERVENTIONS:  - Monitor labs   - Monitor I/O and WT  - Instruct patient on fluid and nutrition as appropriate  - Assess for signs & symptoms of volume excess or deficit  Outcome: Progressing  Goal: Glucose maintained within target range  Description: INTERVENTIONS:  - Monitor Blood Glucose as ordered  - Assess for signs and symptoms of hyperglycemia and hypoglycemia  - Administer ordered medications to maintain glucose within target range  - Assess nutritional intake and initiate nutrition service referral as needed  Outcome: Progressing        Problem: HEMATOLOGIC - ADULT  Goal: Maintains hematologic stability  Description: INTERVENTIONS  - Assess for signs and symptoms of bleeding or hemorrhage  - Monitor labs  - Administer supportive blood products/factors as ordered and appropriate  Outcome: Progressing     Problem: MUSCULOSKELETAL - ADULT  Goal: Maintain or return mobility to safest level of function  Description: INTERVENTIONS:  - Assess patient's ability to carry out ADLs; assess patient's baseline for ADL function and identify physical deficits which impact ability to perform ADLs (bathing, care of mouth/teeth, toileting, grooming, dressing, etc.)  - Assess/evaluate cause of self-care deficits   - Assess range of motion  - Assess patient's mobility  - Assess patient's need for assistive devices and provide as appropriate  - Encourage maximum independence but intervene and supervise when necessary  - Involve family in performance of ADLs  - Assess for home care needs following discharge   - Consider OT consult to assist with ADL evaluation and planning for discharge  - Provide patient education as appropriate  Outcome: Progressing  Goal: Maintain proper alignment of affected body part  Description: INTERVENTIONS:  - Support, maintain and protect limb and body alignment  - Provide patient/ family with appropriate education  Outcome: Progressing     Problem: Neurological Deficit  Goal: Neurological status is stable or improving  Description: Interventions:  - Monitor and assess patient's level of consciousness, motor function, sensory function, and level of assistance needed for ADLs. - Monitor and report changes from baseline. Collaborate with interdisciplinary team to initiate plan and implement interventions as ordered. - Provide and maintain a safe environment. - Consider seizure precautions. - Consider fall precautions. - Consider aspiration precautions. - Consider bleeding precautions. Outcome: Progressing     Problem: Activity Intolerance/Impaired Mobility  Goal: Mobility/activity is maintained at optimum level for patient  Description: Interventions:  - Assess and monitor patient  barriers to mobility and need for assistive/adaptive devices. - Assess patient's emotional response to limitations. - Collaborate with interdisciplinary team and initiate plans and interventions as ordered. - Encourage independent activity per ability.  - Maintain proper body alignment. - Perform active/passive rom as tolerated/ordered.   - Plan activities to conserve energy.  - Turn patient as appropriate  Outcome: Progressing     Problem: Communication Impairment  Goal: Ability to express needs and understand communication  Description: Assess patient's communication skills and ability to understand information. Patient will demonstrate use of effective communication techniques, alternative methods of communication and understanding even if not able to speak. - Encourage communication and provide alternate methods of communication as needed. - Collaborate with case management/ for discharge needs. - Include patient/family/caregiver in decisions related to communication. Outcome: Progressing     Problem: Potential for Aspiration  Goal: Non-ventilated patient's risk of aspiration is minimized  Description: Assess and monitor vital signs, respiratory status, and labs (WBC). Monitor for signs of aspiration (tachypnea, cough, rales, wheezing, cyanosis, fever). - Assess and monitor patient's ability to swallow. - Place patient up in chair to eat if possible. - HOB up at 90 degrees to eat if unable to get patient up into chair.  - Supervise patient during oral intake. - Instruct patient/ family to take small bites. - Instruct patient/ family to take small single sips when taking liquids. - Follow patient-specific strategies generated by speech pathologist.  Outcome: Progressing     Problem: Nutrition  Goal: Nutrition/Hydration status is improving  Description: Monitor and assess patient's nutrition/hydration status for malnutrition (ex- brittle hair, bruises, dry skin, pale skin and conjunctiva, muscle wasting, smooth red tongue, and disorientation). Collaborate with interdisciplinary team and initiate plan and interventions as ordered. Monitor patient's weight and dietary intake as ordered or per policy. Utilize nutrition screening tool and intervene per policy.  Determine patient's food preferences and provide high-protein, high-caloric foods as appropriate. - Assist patient with eating.  - Allow adequate time for meals.  - Encourage patient to take dietary supplement as ordered. - Collaborate with clinical nutritionist.  - Include patient/family/caregiver in decisions related to nutrition.   Outcome: Progressing

## 2023-11-04 NOTE — ASSESSMENT & PLAN NOTE
Recent Labs     11/03/23  1948 11/04/23  0847 11/04/23  1215   SODIUM 128* 133* 134*      Pt appears asymptomatic    Plan:  Trend sodium; improving  q8h BMP  Limit correction to less than 6 mEq in 24 hours.

## 2023-11-04 NOTE — OCCUPATIONAL THERAPY NOTE
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     Past Surgical History  Past Surgical History:   Procedure Laterality Date    EGD      KNEE ARTHROSCOPY Left     MULTIPLE TOOTH EXTRACTIONS N/A 10/16/2023    Procedure: EXTRACTION OF ALL REMAINING TEETH;  Surgeon: Marylee Pintos, DMD;  Location: [facility];  Service: Oral Surgery         11/04/23 1357   OT Last Visit   OT Visit Date 11/04/23   Note Type   Note type Evaluation   Pain Assessment   Pain Assessment Tool 0-10   Pain Score 10 - Worst Possible Pain   Pain Location/Orientation Location: Face  (jaw)   Pain Onset/Description Onset: Ongoing; Descriptor: Aching   Effect of Pain on Daily Activities limits comfort, activity tolerance   Patient's Stated Pain Goal No pain   Hospital Pain Intervention(s) Repositioned; Ambulation/increased activity; Emotional support   Restrictions/Precautions   Weight Bearing Precautions Per Order No   Other Precautions Chair Alarm; Bed Alarm;Multiple lines; Fall Risk;Pain   Home Living   Type of [address] One level;Performs ADLs on one level; Able to live on main level with bedroom/bathroom  (0 REJI)   Bathroom Shower/Tub Tub/shower unit   Bathroom Toilet Standard   Bathroom Equipment   (none)   [address]   (none)   Prior Function   Level of [place] Independent with ADLs; Independent with functional mobility; Independent with IADLS   Lives With Significant other   Receives Help From Other (Comment)  (sig other)   IADLs Independent with driving; Independent with meal prep; Independent with medication management  ("If I have use of the car I do it")   Falls in the last 6 months 1 to 4  (1 fall; reson for admission)   Vocational Full time employment  ()   Comments Pt reports he is fully (I) PTA, no AD. Manual labor for work   Lifestyle   Autonomy Pt lives w/ sig other in a 1 level apartment and is fully (I) PTA, no AD, (+) falls, (+)    Reciprocal Relationships Supportive sig other   Service to Others Full-time employment as a    General   Additional Pertinent History Pt admitted w/ facial palsy, Left arm weakness, Left Leg weakness, Limb ataxia. CTH showed R pontine stroke. PMH includes: alcohol use, HTN, active smoker, oral CA   Family/Caregiver Present No   Subjective   Subjective "You wanna take me outside so I can smoke?"   ADL   Eating Assistance 7  Independent   Eating Deficit Setup; Beverage management   Grooming Assistance 5  Supervision/Setup   UB Bathing Assistance 5  Supervision/Setup   LB Bathing Assistance 5  Supervision/Setup   UB Dressing Assistance 5  Supervision/Setup   LB Dressing Assistance 5  Supervision/Setup   LB Dressing Deficit Setup;Steadying;Verbal cueing;Supervision/safety; Increased time to complete; Thread RLE into pants; Thread LLE into pants;Pull up over hips; Fasteners  (sitting EOB, VC for pacing and safety)   Toileting Assistance  5  Supervision/Setup   Bed Mobility   Supine to Sit 6  Modified independent   Additional items Bedrails   Sit to Supine 6  Modified independent   Additional items Bedrails   Additional Comments Somewhat impulsive, quickly standing with (+) dizziness. VC for pacing and safety   Transfers   Sit to Stand 5  Supervision   Additional items Assist x 1; Increased time required;Verbal cues   Stand to Sit 5  Supervision   Additional items Assist x 1; Increased time required;Verbal cues   Additional Comments x 3 STS performed.    Functional Mobility   Functional Mobility 5  Supervision   Additional Comments Short household distance w/ no AD and close S, intermittently losing balance multidirectional however able to self correct. Required VC for pacing/safety   Additional items   (no AD)   Balance   Static Sitting Good   Dynamic Sitting Fair +   Static Standing Fair   Dynamic Standing Fair -   Activity Tolerance   Activity Tolerance Patient limited by fatigue;Patient limited by pain  (dizziness)   Medical Staff Made Aware Spoke to PT [facility]   Nurse Made Aware yes, RN ok to see pt   RUE Assessment   RUE Assessment WFL  (grossly 5/5 MMT)   LUE Assessment   LUE Assessment X  (grossly 4/5 MMT)   Hand Function   Gross Motor Coordination Impaired  (finger to nose impaired LUE (75% acuracy); intact RUE)   Fine Motor Coordination Impaired  ((+) time and concentration to perform opposition L hand; WFL R hand)   Hand Function Comments R handed   Sensation   Light Touch No apparent deficits  (pt denies numbness/tingling)   Vision-Basic Assessment   Current Vision Wears glasses only for reading   Vision - Complex Assessment   Ocular Range of Motion Intact   Tracking Intact   Convergence   (intact)   Acuity Able to read clock/calendar on wall without difficulty; Able to read employee name badge without difficulty   Additional Comments Pt denies acute visual changes   Cognition   Overall Cognitive Status [facility]   Arousal/Participation Alert; Cooperative   Attention Attends with cues to redirect   Orientation Level Oriented X4   Memory Decreased recall of precautions;Decreased recall of recent events   Following Commands Follows one step commands without difficulty   Comments Agreeable to session. Somewhat frustrated, rewuried encouragement to participate. Intermittently required repetition of commands for task carryover. Questionable insight into deficits, safety, impusive at times.  Will continue to assess   Assessment   Limitation Decreased UE strength;Decreased Safe judgement during ADL;Decreased fine motor control;Decreased high-level ADLs  (pain, balance, FM coord, GM coord, and dexterity, direction following, safety awareness, insight, and pacing, use of coping skills)   Prognosis Good   Assessment Pt is a 59 y.o. male seen for OT evaluation s/p admit to [facility] on 11/3/2023 w/Stroke-like symptom. Prior to admission, pt was living with sig other in a 1st floor apartment, (I) with ADLs, (I) with IADLs, (+) falls, (+) . Personal and environmental factors affecting patient at time of evaluation include current habits and behavioral patterns, lifestyle patterns, and difficulty completing IADLs. Personal factors supporting patient at time of evaluation include age, (I) PLOF, supportive sig other, attitude towards recovery, and accessible home environment. Based upon this evaluation, pt is functioning below baseline. Pt will benefit from continued skilled inpatient OT to maximize safety, level of independence overall performance in ADLs, functional transfers, functional mobility to return to functional baseline/highest level of function. Goals   Patient Goals to go home   LTG Time Frame 10-14   Long Term Goal #1 see goals below   Plan   Treatment Interventions ADL retraining;UE strengthening/ROM; Neuromuscular reeducation; Compensatory technique education; Fine motor coordination activities;Continued evaluation; Activityengagement   Goal Expiration Date 11/14/23   OT Treatment Day 0   OT Frequency 1-2x/wk   Discharge Recommendation   Rehab Resource Intensity Level, OT III (Minimum Resource Intensity)  (OPOT for hand therapy)   Additional Comments  The patient's raw score on the AM-PAC Daily Activity inpatient short form is 24, standardized score is 57.54, greater than 39.4. From an OT standpoint, patients at this level may benefit from d/c to Home with outpatient rehabilitation.    -[facility] Daily Activity Inpatient   Lower Body Dressing 4   Bathing 4   Toileting 4   Upper Body Dressing 4   Grooming 4   Eating 4   Daily Activity Raw Score 24   Daily Activity Standardized Score (Calc for Raw Score >=11) 57.54   AM-PAC Applied Cognition Inpatient   Following a Speech/Presentation 3   Understanding Ordinary Conversation 4   Taking Medications 3   Remembering Where Things Are Placed or Put Away 4   Remembering List of 4-5 Errands 2   Taking Care of Complicated Tasks 2   Applied Cognition Raw Score 18   Applied Cognition Standardized Score 38.07   End of Consult   Patient Position at End of Consult Supine;Bed/Chair alarm activated; All needs within reach   Nurse Communication Nurse aware of consult     GOALS:    *Goals established to promote patient goal of to go home:      *Patient will verbalize 3 safety awareness/ principles to prevent falls in the home/work setting. *Participate in 10m UE therex to increase overall strength/coordination in LUE for improved performance in purposeful tasks including current full-time position as     *Patient will engage in ongoing cognitive assessment to assist with safe discharge planning/recommendations.      *Pt will consistently follow multi step directions during ADL performance w/ % accuracy to max I and safety w/ ADL performance     TrendJamal Marino, OTR/L    [place] License [id number]  [address] [place] [address] [id number]

## 2023-11-04 NOTE — MALNUTRITION/BMI
This medical record reflects one or more clinical indicators suggestive of malnutrition and/or morbid obesity. Malnutrition Findings:   Adult Malnutrition type: Chronic illness  Adult Degree of Malnutrition: Malnutrition of moderate degree  Malnutrition Characteristics: Muscle loss, Inadequate energy, Weight loss              360 Statement: Protein calorie malnutrition r/t inadequate intake as evidenced by <75% of estimated energy intake compared to estimated energy needs > 1 month, muscle wasting present to temples/clavicle, and ongoing weight loss; currently treated with diet and oral nutrition supplements    BMI Findings: Body mass index is 18.37 kg/m². See Nutrition note dated 11/4/2023  for additional details. Completed nutrition assessment is viewable in the nutrition documentation.

## 2023-11-04 NOTE — ASSESSMENT & PLAN NOTE
History of oral cancer  Pt has some dysarthria at baseline  Follows heme/onc  Hx of multiple dental caries that needed surgical intervention

## 2023-11-04 NOTE — ED PROVIDER NOTES
History  Chief Complaint   Patient presents with    Fall     Pt arrives to ED via EMS. Pt reports falling, -HS, -LOC, -thinners. Pt reports L-sided weakness, deficits noted. GCS 15. HPI    Patient arrives to the emergency department via EMS. Concern for left-sided weakness. Patient states he stood up from a standing position, she fell to the ground today. Did not strike his head or lose consciousness. Patient states he woke up yesterday morning at about 0500 with left lower extremity weakness, had been dragging while ambulating. He is unsure if it worsened today but states that tonight when he tried to stand up he was unable to do so secondary to weakness. Denies any history of same. Denies any history of stroke. Has a history of squamous cell carcinoma of the tongue for which he is currently undergoing evaluation and ultimately will be treated. Prior to Admission Medications   Prescriptions Last Dose Informant Patient Reported? Taking? diphenhydramine, lidocaine, Al/Mg hydroxide, simethicone (Magic Mouthwash) SUSP Not Taking  No No   Sig: Swish and swallow 10 mL every 4 (four) hours as needed for mouth pain or discomfort   Patient not taking: Reported on 11/2/2023   ibuprofen (MOTRIN) 600 mg tablet 11/3/2023  No Yes   Sig: Take 1 tablet (600 mg total) by mouth every 6 (six) hours as needed for mild pain   naloxone (NARCAN) 4 mg/0.1 mL nasal spray Past Week  No Yes   Sig: Administer 1 spray into a nostril. If no response after 2-3 minutes, give another dose in the other nostril using a new spray.    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 E-Cigarette/Vaping    E-Cigarette Use Never User      E-Cigarette/Vaping Substances    Nicotine Yes      Social History     Tobacco Use    Smoking status: Every Day     Packs/day: 0.25     Types: Cigarettes    Smokeless tobacco: Never   Vaping Use    Vaping Use: Never used   Substance Use Topics    Alcohol use: Yes     Comment: 3-4 beers/day 24 oz beer    Drug use: No       Review of Systems   Neurological:  Positive for weakness. All other systems reviewed and are negative. Physical Exam  Physical Exam  Vitals and nursing note reviewed. Constitutional:       General: He is not in acute distress. Appearance: He is well-developed. HENT:      Head: Normocephalic and atraumatic. Eyes:      Conjunctiva/sclera: Conjunctivae normal.   Cardiovascular:      Rate and Rhythm: Normal rate and regular rhythm. Heart sounds: No murmur heard. Pulmonary:      Effort: Pulmonary effort is normal. No respiratory distress. Breath sounds: Normal breath sounds. Abdominal:      Palpations: Abdomen is soft. Tenderness: There is no abdominal tenderness. Musculoskeletal:         General: No swelling. Cervical back: Neck supple. Skin:     General: Skin is warm and dry. Capillary Refill: Capillary refill takes less than 2 seconds. Neurological:      Mental Status: He is alert. Comments: Slurred speech, baseline secondary to tongue carcinoma. 2+ strength to left upper and lower extremity. Intact sensation.   Ataxia to left upper and lower extremity. Ambulation deferred secondary to acute stroke alert.     No facial droop identified   Psychiatric:         Mood and Affect: Mood normal.         Vital Signs  ED Triage Vitals   Temperature Pulse Respirations Blood Pressure SpO2   11/03/23 2250 11/03/23 1937 11/03/23 1937 11/03/23 1937 11/03/23 1942   98.5 °F (36.9 °C) 89 18 167/99 97 %      Temp Source Heart Rate Source Patient Position - Orthostatic VS BP Location FiO2 (%)   11/03/23 2250 11/03/23 1937 11/03/23 1937 11/03/23 1937 --   Axillary Monitor Sitting Right arm       Pain Score       11/03/23 1942       4           Vitals:    11/03/23 2145 11/03/23 2250 11/04/23 0002 11/04/23 0012   BP: 152/91 140/88 165/100 136/83   Pulse: 76 77 78    Patient Position - Orthostatic VS:  Sitting Lying          Visual Acuity  Visual Acuity      Flowsheet Row Most Recent Value   L Pupil Size (mm) 2   R Pupil Size (mm) 2            ED Medications  Medications   atorvastatin (LIPITOR) tablet 40 mg (40 mg Oral Given 11/4/23 0002)   enoxaparin (LOVENOX) subcutaneous injection 40 mg (has no administration in time range)   oxyCODONE (OxyCONTIN) 12 hr tablet 10 mg (10 mg Oral Given 11/4/23 0002)   iohexol (OMNIPAQUE) 350 MG/ML injection (MULTI-DOSE) 85 mL (85 mL Intravenous Given 11/3/23 2002)   aspirin tablet 325 mg (325 mg Oral Given 11/3/23 2052)   clopidogrel (PLAVIX) tablet 300 mg (300 mg Oral Given 11/3/23 2053)       Diagnostic Studies  Results Reviewed       Procedure Component Value Units Date/Time    Platelet count [[id number]]     Lab Status: No result Specimen: Blood     FLU/RSV/COVID - if FLU/RSV clinically relevant [[id number]]  (Normal) Collected: 11/03/23 1948    Lab Status: Final result Specimen: Nares from Nose Updated: 11/03/23 2130     SARS-CoV-2 Negative     INFLUENZA A PCR Negative     INFLUENZA B PCR Negative     RSV PCR Negative    Narrative:      FOR PEDIATRIC PATIENTS - copy/paste COVID Guidelines URL to browser: [email or web address]    SARS-CoV-2 assay is a Nucleic Acid Amplification assay intended for the  qualitative detection of nucleic acid from SARS-CoV-2 in nasopharyngeal  swabs. Results are for the presumptive identification of SARS-CoV-2 RNA. Positive results are indicative of infection with SARS-CoV-2, the virus  causing COVID-19, but do not rule out bacterial infection or co-infection  with other viruses. Laboratories within the [facility] and its  territories are required to report all positive results to the appropriate  public health authorities. Negative results do not preclude SARS-CoV-2  infection and should not be used as the sole basis for treatment or other  patient management decisions. Negative results must be combined with  clinical observations, patient history, and epidemiological information. This test has not been FDA cleared or approved. This test has been authorized by FDA under an Emergency Use Authorization  (EUA). This test is only authorized for the duration of time the  declaration that circumstances exist justifying the authorization of the  emergency use of an in vitro diagnostic tests for detection of SARS-CoV-2  virus and/or diagnosis of COVID-19 infection under section 564(b)(1) of  the Act, 21 U. S.C. 073UIJ-2(K)(7), unless the authorization is terminated  or revoked sooner. The test has been validated but independent review by FDA  and CLIA is pending. Test performed using [employer] Gene[employer]pert: This RT-PCR assay targets N2,  a region unique to SARS-CoV-2. A conserved region in the E-gene was chosen  for pan-Sarbecovirus detection which includes SARS-CoV-2. According to CMS-2020-01-R, this platform meets the definition of high-throughput technology.     Rachel Smith [[id number]]  (Normal) Collected: 11/03/23 1948    Lab Status: Final result Specimen: Blood from Arm, Right Updated: 11/03/23 2027     Protime 12.7 seconds INR 0.90    APTT [[id number]]  (Normal) Collected: 11/03/23 1948    Lab Status: Final result Specimen: Blood from Arm, Right Updated: 11/03/23 2027     PTT 28 seconds     HS Troponin I 2hr [[id number]]     Lab Status: No result Specimen: Blood     HS Troponin I 4hr [[id number]]     Lab Status: No result Specimen: Blood     HS Troponin 0hr (reflex protocol) [[id number]]  (Normal) Collected: 11/03/23 1948    Lab Status: Final result Specimen: Blood from Arm, Right Updated: 11/03/23 2025     hs TnI 0hr 10 ng/L     Salicylate level [[id number]]  (Normal) Collected: 11/03/23 1948    Lab Status: Final result Specimen: Blood from Arm, Right Updated: 85/03/00 2821     Salicylate Lvl <5 mg/dL     Acetaminophen level-If concentration is detectable, please discuss with medical  on call.  [[id number]]  (Abnormal) Collected: 11/03/23 1948    Lab Status: Final result Specimen: Blood from Arm, Right Updated: 11/03/23 2024     Acetaminophen Level <10 ug/mL     Basic metabolic panel [[id number]]  (Abnormal) Collected: 11/03/23 1948    Lab Status: Final result Specimen: Blood from Arm, Right Updated: 11/03/23 2020     Sodium 128 mmol/L      Potassium 3.7 mmol/L      Chloride 95 mmol/L      CO2 23 mmol/L      ANION GAP 10 mmol/L      BUN 10 mg/dL      Creatinine 0.67 mg/dL      Glucose 85 mg/dL      Calcium 9.4 mg/dL      eGFR 104 ml/min/1.73sq m     Narrative:      [place] guidelines for Chronic Kidney Disease (CKD):     Stage 1 with normal or high GFR (GFR > 90 mL/min/1.73 square meters)    Stage 2 Mild CKD (GFR = 60-89 mL/min/1.73 square meters)    Stage 3A Moderate CKD (GFR = 45-59 mL/min/1.73 square meters)    Stage 3B Moderate CKD (GFR = 30-44 mL/min/1.73 square meters)    Stage 4 Severe CKD (GFR = 15-29 mL/min/1.73 square meters)    Stage 5 End Stage CKD (GFR <15 mL/min/1.73 square meters)  Note: GFR calculation is accurate only with a steady state creatinine    Ethanol [[id number]]  (Abnormal) Collected: 11/03/23 1948    Lab Status: Final result Specimen: Blood from Arm, Right Updated: 11/03/23 2019     Ethanol Lvl 190 mg/dL     CBC and Platelet [[id number]]  (Normal) Collected: 11/03/23 1948    Lab Status: Final result Specimen: Blood from Arm, Right Updated: 11/03/23 1959     WBC 8.47 Thousand/uL      RBC 4.15 Million/uL      Hemoglobin 13.8 g/dL      Hematocrit 40.7 %      MCV 98 fL      MCH 33.3 pg      MCHC 33.9 g/dL      RDW 12.4 %      Platelets 240 Thousands/uL      MPV 9.1 fL     Fingerstick Glucose (POCT) [[id number]]  (Normal) Collected: 11/03/23 1939    Lab Status: Final result Updated: 11/03/23 1939     POC Glucose 87 mg/dl                    CT stroke alert brain   Final Result by Jacelyn Boeck, MD (11/03 2030)      Questionable lacunar ischemia involving the right paramedian karla versus beam hardening artifact. Correlation with MRI is recommended. I personally discussed this study with Marcy Kurtz on 11/3/2023 8:12 PM.            Workstation performed: [id number]         CTA stroke alert (head/neck)   Final Result by Jacelyn Boeck, MD (11/03 2026)      No definite evidence for high-grade stenosis, major branch vessel occlusion or vascular aneurysm of the cervical or intracranial vessels. Segmental severe stenosis versus occlusion of the right internal jugular vein, worsened and secondary to adjacent adenopathy. Interval worsening of the right oral tongue mass as well as cervical adenopathy as described above.          I personally discussed this study with Marcy Kurtz on 11/3/2023 8:12 PM.                              Workstation performed: [id number]         MRI Inpatient Order    (Results Pending)              Procedures  ECG 12 Lead Documentation Only    Date/Time: 11/3/2023 7:54 PM    Performed by: Marcy Kurtz MD  Authorized by: Marcy Kurtz MD    Indications / Diagnosis:  Stroke alert  ECG reviewed by me, the ED Provider: yes    Patient location: ED  Interpretation:     Interpretation: non-specific    Rate:     ECG rate:  74    ECG rate assessment: normal    Rhythm:     Rhythm: sinus rhythm    Ectopy:     Ectopy: none    QRS:     QRS axis:  Normal    QRS intervals:  Normal  Conduction:     Conduction: normal    ST segments:     ST segments:  Normal  T waves:     T waves: normal    CriticalCare Time    Date/Time: 11/4/2023 1:06 AM    Performed by: Maria Luisa Woods MD  Authorized by: Maria Luisa Woods MD    Critical care provider statement:     Critical care time (minutes):  45    Critical care time was exclusive of:  Separately billable procedures and treating other patients and teaching time    Critical care was necessary to treat or prevent imminent or life-threatening deterioration of the following conditions:  CNS failure or compromise    Critical care was time spent personally by me on the following activities:  Examination of patient, evaluation of patient's response to treatment, ordering and review of laboratory studies, ordering and review of radiographic studies, re-evaluation of patient's condition and discussions with consultants  Comments:      Stroke alert, requiring my immediate bedside presence, evaluation, discussion with consultant, consideration of tPA, admission.            ED Course                  Stroke Assessment       Row Name 11/03/23 1948             NIH Stroke Scale    Interval Baseline      Level of Consciousness (1a.) 0      LOC Questions (1b.) 0      LOC Commands (1c.) 0      Best Gaze (2.) 0      Visual (3.) 0      Facial Palsy (4.) 1      Motor Arm, Left (5a.) 2      Motor Arm, Right (5b.) 0      Motor Leg, Left (6a.) 2      Motor Leg, Right (6b.) 0      Limb Ataxia (7.) 2      Sensory (8.) 0      Best Language (9.) 0      Dysarthria (10.) Intubated or other physical barrier  Tongue Mass, baseline dysarthria      Extinction and Inattention (11.) (Formerly Neglect) 0      Total 7                    Flowsheet Row Most Recent Value   Thrombolytic Decision Options    Thrombolytic Decision Patient not a candidate. Patient is not a candidate options Unclear time of onset outside appropriate time window. Medical Decision Making  Stroke alert, initial timeline unclear, became clear throughout patient's stay in the emergency department. Outside of tPA window. No evidence of thrombosis on CTA head and neck. Appreciate neurology real-time consultation, review of imaging, recommended admission for aspirin, Plavix load, admission under stroke pathway. Amount and/or Complexity of Data Reviewed  Labs: ordered. Radiology: ordered. ECG/medicine tests: ordered and independent interpretation performed. Risk  OTC drugs. Prescription drug management. Decision regarding hospitalization. Disposition  Final diagnoses:   Weakness     Time reflects when diagnosis was documented in both MDM as applicable and the Disposition within this note       Time User Action Codes Description Comment    11/3/2023  7:44 PM Evelyn Seay Add [R53.1] Weakness     11/3/2023 10:19 PM Lucy Bassett Add [F10.20] Alcohol use disorder, severe, dependence ([address])     11/3/2023 10:19 PM Lucy Bassett Add [Z71.89] Counseling regarding advanced care planning and goals of care           ED Disposition       ED Disposition   Admit    Condition   Stable    Date/Time   Fri Nov 3, 2023  9:21 PM    Comment   Case was discussed with [facility] and the patient's admission status was agreed to be Admission Status: inpatient status to the service of Dr. Carolyn Harding .                Follow-up Information    None         Current Discharge Medication List        CONTINUE these medications which have NOT CHANGED    Details   ibuprofen (MOTRIN) 600 mg tablet Take 1 tablet (600 mg total) by mouth every 6 (six) hours as needed for mild pain  Qty: 30 tablet, Refills: 0    Associated Diagnoses: Dental caries      naloxone (NARCAN) 4 mg/0.1 mL nasal spray Administer 1 spray into a nostril. If no response after 2-3 minutes, give another dose in the other nostril using a new spray. Qty: 1 each, Refills: 1    Associated Diagnoses: At risk for respiratory depression due to opioid      oxyCODONE (ROXICODONE) 10 MG TABS Take 1 tablet (10 mg total) by mouth every 6 (six) hours as needed for severe pain Max Daily Amount: 40 mg  Qty: 60 tablet, Refills: 0    Comments: Worsening cancer related pain. Please fill immediately. Associated Diagnoses: Cancer of oral cavity ([address]); Cancer-related pain; Pain in lower jaw      senna-docusate sodium (SENOKOT-S) 8.6-50 mg per tablet Take 1 tablet by mouth daily  Qty: 60 tablet, Refills: 2    Associated Diagnoses: Drug induced constipation      diphenhydramine, lidocaine, Al/Mg hydroxide, simethicone (Magic Mouthwash) SUSP Swish and swallow 10 mL every 4 (four) hours as needed for mouth pain or discomfort  Qty: 237 mL, Refills: 2    Associated Diagnoses: Oral cancer ([address])             No discharge procedures on file.     PDMP Review         Value Time User    PDMP Reviewed  Yes 11/2/2023 10:28 AM Michelle Smith MD            ED Provider  Electronically Signed by             Robby Lainez MD  11/04/23 8955

## 2023-11-04 NOTE — ASSESSMENT & PLAN NOTE
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    CT C/A/P today with contrast to look for evidence of metastasis  Results pending  CM evaluation  Cessation of smoking and alcohol recommended  Hx of multiple dental caries that needed surgical intervention

## 2023-11-04 NOTE — ASSESSMENT & PLAN NOTE
59 y.o. 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 Initial /99. NIHSS 7. Patient stated that for the past few days he has been having weakness in his L leg but was able to ambulate and did not seek medical attention. As a result of unclear LKW pt was determined to not be a candidate for thrombolysis (TNK). Exam on 11/3/2023: dysarthria (patient doesn't feel worse than his baseline), LUE and LLE weakness 4/5, LUE drift  Vascular risk factors: ETOH use, past smoking hx, HTN, and oral cancer   Home meds: NO AC/AP    Workup:   A1C 5.9. LDL 67  CTH: Questionable lacunar ischemia involving the R paramedian karla vs beam hardening artifact. CTA: No LVO. Segmental severe stenosis vs occlusion of the R internal jugular vein, worsened and secondary to adjacent adenopathy. Interval worsening of the right oral tongue mass as well as cervical adenopathy  MRI: subcentimeter acute/subacute R pontine infarct  Echocardiogram: LVEF 60%.  Normal L atrium size  Telemetry: no events    Pertinent scores:  - NIHSS: 7 at stroke alert  (per Dr. Margie De La Paz note)  Stroke Modified Chamberlain Score: 2 (Unable to carry out all previous activities, but able to look after own affairs without assistance)    Impression: R pontine infarct likely small vessel etiology    Plan:  - Discussed plan with neurology attending, Dr. House Beat  - S/p ASA 325mg load and plavix load 300mg and can continue ASA 81mg and plavix 75mg afterwards for 21 days then ASA 81mg monotherapy afterward  - Continue atorvastatin 40mg qhs  - BP: Normotension of <130/80  - Maintain glucose <180, SSI for coverage if indicated  - Medical management as per primary team appreciated  - DVT ppx and SCDs  - Monitor on telemetry  - PT/OT/Speech/PM&R input appreciated  - Stroke education    No further management from Neurology.  Please call with questions/concern

## 2023-11-04 NOTE — H&P
[address] [facility]  H&P  Name: Margie See 59 y.o. male I MRN: [id number]  Unit/Bed#: S -[facility] I Date of Admission: 11/3/2023   Date of Service: 11/4/2023 I Hospital Day: 1      Assessment/Plan   * Stroke (cerebrum) [facility])  Assessment & Plan  Episode of sudden onset weakness of RUE and RLE starting at approximately 3-4pm Thursday afternoon. EKG as of 8/4/2023: sinus rhythm at 73bpm, LVH noted  CT Brain showed questionable lacunar ischemia involving the right paramedian karla versus beam hardening artifact. CT head/neck showed no evidence of major vessel occlusion or aneurysm. Segmental severe stenosis versus occlusion of the right internal jugular vein, worsened and secondary to adjacent adenopathy. 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 CN X-XII intact, ataxia noted on finger to nose test on RUE and LUE. Plan:  MRI brain, TTE, monitor on tele  Lipid Panel, HbA1c  Atorvastatin 40 mg q.d., Aspirin 81 q.d.   Consult Neurology, PMR  PT/OT/Speech eval  Allow for permissive hypertension with -200 for 48 hours  Neuro checks per protocol     Cancer-related pain  Assessment & Plan  History of oral cancer  Pt has some dysarthria at baseline  Follows heme/onc  Hx of multiple dental caries that needed surgical intervention    Alcohol use disorder, severe, dependence ([address])  Assessment & Plan  Hx of alcohol abuse, drinks 3-4 beers per day  pt denies being intoxicated on initial onset of symptoms  Medical Alcohol level of 190 on ED presentation  CIWA score-1        Hyponatremia  Assessment & Plan  Recent Labs     11/03/23  1948   SODIUM 128*        Pt appears asymptomatic  Trend sodium    Cigarette nicotine dependence without complication  Assessment & Plan  Smokes 1/2 pack of cigarettes per day  Pt is considering quitting smoking    Hypertension  Assessment & Plan  130's-160's/60's-90's  Currently not on any BP medications as per patient    Plan  Allow for permissive HTN as per stroke protocol    VTE Pharmacologic Prophylaxis: VTE Score: 8 High Risk (Score >/= 5) - Pharmacological DVT Prophylaxis Ordered: enoxaparin (Lovenox). Sequential Compression Devices Ordered. Code Status: Level 1 - Full Code   Discussion with family: Will update family with day team    Anticipated Length of Stay: Patient will be admitted on an inpatient basis with an anticipated length of stay of greater than 2 midnights secondary to weakness. Chief Complaint: weakness    History of Present Illness:  Augie Santamaria is a 59 y.o. male with a PMH of Alcohol abuse, Oral Cancer, HTN, hyponatremia who presented to the ED with complaints of sudden weakness. Pt states for the past few days he has been having weakness in his L leg but was able to ambulate and did not seek medical attention. Yesterday while at the bar with a friend having alcoholic beverages, pt fell to the floor while attempting to stand secondary to severe weakness in LLE as well as new onset of LUE weakness. Pt also was having worsening dysarthria, pt has baseline dysarthria d/t hx of oral cancer. Pt denies being intoxicated at the time of sudden weakness. He denies losing consciousness or hitting his head. Pt denies any SOB, cough, chest pain, palpitations, lightheadedness, recent sick contacts. Review of Systems:  Review of Systems   Constitutional:  Negative for chills and fever. HENT:  Positive for trouble swallowing (secondary to oral cancer). Negative for ear pain, hearing loss and sore throat. Eyes:  Negative for pain and visual disturbance. Respiratory:  Negative for cough and shortness of breath. Cardiovascular:  Negative for chest pain, palpitations and leg swelling. Gastrointestinal:  Negative for abdominal pain and vomiting.    Genitourinary: Negative for dysuria and hematuria. Musculoskeletal:  Positive for gait problem. Negative for arthralgias and back pain. Skin:  Negative for color change and rash. Neurological:  Positive for weakness (LUE, LLE). Negative for tremors, seizures, syncope, numbness and headaches. Psychiatric/Behavioral:  Negative for confusion and hallucinations. All other systems reviewed and are negative. 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 Authorizing Provider   ibuprofen (MOTRIN) 600 mg tablet Take 1 tablet (600 mg total) by mouth every 6 (six) hours as needed for mild pain 10/16/23  Yes Pavan Perez DMD   naloxone (NARCAN) 4 mg/0.1 mL nasal spray Administer 1 spray into a nostril. If no response after 2-3 minutes, give another dose in the other nostril using a new spray. 11/2/23 11/1/24 Yes Anel Villalba MD   oxyCODONE (ROXICODONE) 10 MG TABS Take 1 tablet (10 mg total) by mouth every 6 (six) hours as needed for severe pain Max Daily Amount: 40 mg 11/2/23  Yes Anel Villalba MD   senna-docusate sodium (SENOKOT-S) 8.6-50 mg per tablet Take 1 tablet by mouth daily 11/2/23  Yes Anel Villalba MD   diphenhydramine, lidocaine, Al/Mg hydroxide, simethicone (Magic Mouthwash) SUSP Swish and swallow 10 mL every 4 (four) hours as needed for mouth pain or discomfort  Patient not taking: Reported on 11/2/2023 10/12/23   Gayla Carbone MD     I have reviewed home medications with patient personally. Allergies:    Allergies Allergen Reactions    Bee Venom Hives    Other      bees       Social History:  Marital Status: Single   Occupation:  associat  Patient Pre-hospital Living Situation: Home  Patient Pre-hospital Level of Mobility: walks  Patient Pre-hospital Diet Restrictions: none  Substance Use History:   Social History     Substance and Sexual Activity   Alcohol Use Yes    Comment: 3-4 beers/day 24 oz beer     Social History     Tobacco Use   Smoking Status Every Day    Packs/day: 0.25    Types: Cigarettes   Smokeless Tobacco Never     Social History     Substance and Sexual Activity   Drug Use No       Family History:  Family History   Problem Relation Age of Onset    Breast cancer Mother     Cancer Father        Physical Exam:     Vitals:   Blood Pressure: 115/68 (11/04/23 0516)  Pulse: 76 (11/04/23 0356)  Temperature: 98.2 °F (36.8 °C) (11/04/23 0100)  Temp Source: Axillary (11/04/23 0100)  Respirations: 18 (11/04/23 0356)  Weight - Scale: 67 kg (147 lb 11.3 oz) (11/03/23 1944)  SpO2: 98 % (11/04/23 0100)    Physical Exam  Vitals and nursing note reviewed. Constitutional:       General: He is not in acute distress. Appearance: He is well-developed. HENT:      Head: Normocephalic and atraumatic. Mouth/Throat:      Comments: Tongue mass on L, limited tongue mobility secondary to mass   Eyes:      Conjunctiva/sclera: Conjunctivae normal.   Cardiovascular:      Rate and Rhythm: Normal rate and regular rhythm. Pulses: Normal pulses. Heart sounds: Normal heart sounds. No murmur heard. Pulmonary:      Effort: Pulmonary effort is normal. No respiratory distress. Breath sounds: No stridor. Comments: B chest congestion  Abdominal:      Palpations: Abdomen is soft. Tenderness: There is no abdominal tenderness. Musculoskeletal:         General: No swelling. Cervical back: Neck supple. Skin:     General: Skin is warm and dry.       Capillary Refill: Capillary refill takes less than 2 seconds. Neurological:      Mental Status: He is alert. Cranial Nerves: No cranial nerve deficit. Sensory: No sensory deficit. Motor: Weakness (4/5 gross mm weakness LUE and LLE) present. Coordination: Coordination abnormal (ataxia on finger to nose RUE and LUE). Gait: Gait abnormal.      Deep Tendon Reflexes: Reflexes normal.   Psychiatric:         Mood and Affect: Mood normal.          Additional Data:     Lab Results:  Results from last 7 days   Lab Units 11/03/23 1948   WBC Thousand/uL 8.47   HEMOGLOBIN g/dL 13.8   HEMATOCRIT % 40.7   PLATELETS Thousands/uL 303     Results from last 7 days   Lab Units 11/03/23  1948   SODIUM mmol/L 128*   POTASSIUM mmol/L 3.7   CHLORIDE mmol/L 95*   CO2 mmol/L 23   BUN mg/dL 10   CREATININE mg/dL 0.67   ANION GAP mmol/L 10   CALCIUM mg/dL 9.4   GLUCOSE RANDOM mg/dL 85     Results from last 7 days   Lab Units 11/03/23 1948   INR  0.90     Results from last 7 days   Lab Units 11/03/23  1939   POC GLUCOSE mg/dl 87               Lines/Drains:  Invasive Devices       Peripheral Intravenous Line  Duration             Peripheral IV 11/03/23 Right;Ventral (anterior) Forearm <1 day                        Imaging: Reviewed radiology reports from this admission including: CT head  CT stroke alert brain   Final Result by Jasper Hayowod MD (11/03 2030)      Questionable lacunar ischemia involving the right paramedian karla versus beam hardening artifact. Correlation with MRI is recommended. I personally discussed this study with Guicho Lozada on 11/3/2023 8:12 PM.            Workstation performed: [id number]         CTA stroke alert (head/neck)   Final Result by Jasper Haywood MD (11/03 2026)      No definite evidence for high-grade stenosis, major branch vessel occlusion or vascular aneurysm of the cervical or intracranial vessels.       Segmental severe stenosis versus occlusion of the right internal jugular vein, worsened and secondary to adjacent adenopathy. Interval worsening of the right oral tongue mass as well as cervical adenopathy as described above. I personally discussed this study with Shadi Reynolds on 11/3/2023 8:12 PM.                              Workstation performed: [id number]         MRI Inpatient Order    (Results Pending)       EKG and Other Studies Reviewed on Admission:   EKG (8/3/2023): sinus rhythm, 73 bpm    ** Please Note: This note has been constructed using a voice recognition system.  **

## 2023-11-04 NOTE — ASSESSMENT & PLAN NOTE
Hx of alcohol abuse, drinks 3-4 beers per day  pt denies being intoxicated on initial onset of symptoms  Medical Alcohol level of 190 on ED presentation  CIWA score-1    Plan:  Continue to follow CIWA protocol  Encourage cutting down and eventual cessation of alcohol drinking

## 2023-11-04 NOTE — ASSESSMENT & PLAN NOTE
130's-160's/60's-90's  Currently not on any BP medications as per patient    Plan  Allow for permissive HTN as per stroke protocol

## 2023-11-04 NOTE — SPEECH THERAPY NOTE
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     Past Surgical History  Past Surgical History:   Procedure Laterality Date    EGD      KNEE ARTHROSCOPY Left     MULTIPLE TOOTH EXTRACTIONS N/A 10/16/2023    Procedure: EXTRACTION OF ALL REMAINING TEETH;  Surgeon: Shannon Henry DMD;  Location[facility];  Service: Oral Surgery   Speech-Language Pathology Bedside Swallow Evaluation        Patient Name: Felice HOLLOWAY Date: 11/4/2023     Problem List  Principal Problem:    Stroke-like symptom  Active Problems:    Alcohol use disorder, severe, dependence ([address])    Hypertension    Cigarette nicotine dependence without complication    Hyponatremia    Cancer-related pain         Past Medical History  Past Medical History:   Diagnosis Date    Alcohol abuse     Hypertension     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 Pt presents with at least mild oral dysphagia, due to reduced lingual ROM, strength, and coordination. This results in decreased ability to manipulate/transfer bolus, and mild lingual residue that clears with liquid wash.  Pharyngeal swallows appear WFL, however suspect reduced BOT contraction due to limited lingual movement. There were no overt s/s of aspiration. Discussed with pt recommendation to consider a diet change to dysphagia 3/soft advanced. Pt would prefer to remain on a regular diet for now, and choose softer, easier to chew foods that cause the least amount of pain; "I want to to get in whatever I can". Educated pt on aspiration precautions and strategies; he verbalized and demonstrated understanding. Also educated pt on potential effects/changes in the swallow due to upcoming XRT. He stated that he has heard this from the oncologist, and there has been discussion about possible PEG if needed. Recommendations:   Diet: regular diet and thin liquids, per pt choice. Choose softer, moist foods. Meds: whole with liquid   Frequent Oral care: 2x/day  Independent for feeding  Aspiration precautions and compensatory swallowing strategies: upright posture, slow rate of feeding, small bites/sips, and alternating bites and sips  Other Recommendations/ considerations: ST to see for dysphagia. Recommend follow up with OP ST if/when dysphagia worsens due to XRT. Recommend consult to dietician for nutrition needs. Current Medical Status  Copied from admission/physician notes:  Teetee Zacarias is a 59 y.o. male with a PMH of Alcohol abuse, Oral Cancer, HTN, hyponatremia who presented to the ED with complaints of sudden weakness. Pt states for the past few days he has been having weakness in his L leg but was able to ambulate and did not seek medical attention. Yesterday while at the bar with a friend having alcoholic beverages, pt fell to the floor while attempting to stand secondary to severe weakness in LLE as well as new onset of LUE weakness. Pt also was having worsening dysarthria, pt has baseline dysarthria d/t hx of oral cancer. Pt denies being intoxicated at the time of sudden weakness. He denies losing consciousness or hitting his head.   Pt denies any SOB, cough, chest pain, palpitations, lightheadedness, recent sick contacts. Augie Santamaria 1965 is a 59 y.o. 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 Referred by Dr. Silke Doshi. Additional medical problems include HTN, nicotine and ETOH dependence. Presented to dentist for dental extractions. On clinical exam was found to have a large erythematous open sore under tongue along with enlarged, tender, palpable lymph nodes on right side. He was referred to oncology. 5/23/23  Dr. Kevin Shin is to complete PET/CT for staging and refer to ENT for biopsy     6/19/23  PET CT  IMPRESSION:  1. Hypermetabolic right anterior oral cavity mass most concerning for malignancy. 2. Bilateral hypermetabolic cervical adenopathy compatible with metastases. 3. No hypermetabolic metastases in the chest abdomen pelvis. 4. Multiple healing left rib fractures. 8/29/23  Dr. Silke Doshi  Oral cavity:  Mild trismus, limited tongue ROM with gross invasion along root of tongue, there is extension along the entire Right FOM crossing over to the Left, there is gross invasion along the Right mandible, poor dentition  Neck: Trachea is midline; no thyroid nodules, Salivary glands symmetrical  Lymph: 3-4cm fixed firmed LAD at R level I,  Left level II LAD     8/29/23  A. 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, ulcerated  - Carcinoma present at tissue edges  Addendum   P16 is diffusely positive in tumor cells while ELIE CISH is negative. HPV CISH is pending. Addendum 2   HR HPV CISH (performed at [facility] and interpreted by Dr. Delmi Chase) is: Positive. 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        9/9/23  CT Soft Tissue Neck  IMPRESSION:  There is approximately 3 cm mass within the anterior roof of the tongue primarily right-sided but extending across the midline to the anterior left tongue. There is an additional smaller nodule identified within the right posterior lateral tongue. Multiple pathologic lymph nodes are identified within the neck primarily right-sided involving levels 1B, 2 and 3. Marked compression of the dominant right jugular vein at the level of the level 2 lymph nodes. The vein does remain patent. 9/9/23  CT chest w contrast  IMPRESSION:   No metastases. Emphysema. Benign granulomatous disease. 9/11/23  Head and Neck Multidisciplinary Case Review  PHYSICIAN RECOMMENDED PLAN:  Definitive chemo/RT. 9/13/23  Dr. Susanna Pereira  F/u ENT, surgery not being pursued upfront, IR referral port placement. Weekly Cisplatin C1 coming up in 2 weeks (7 treatments planned) to be concurrent with RT. F/u with radiation oncology. 9/14/23 Palliative-No show     10/2/23 Dr. Susanna Pereira (telemedicine visit)  Plan is for 7 cycles of cisplatin. Needs f/u with ENT       10/10/23 OMS dental (not cleared, needs full extractions)           Upcoming:  10/16/23 Dental extractions                Oncology History   Oral cancer ([address])   5/23/2023 Initial Diagnosis     Oral cancer ([address])      8/29/2023 -  Cancer Staged     Staging form: Oral Cavity, AJCC 8th Edition  - Clinical stage from 8/29/2023: Stage CHETAN (cT4a, cN2c, cM0) - Signed by Shobha Seay MD on 9/13/2023  Stage prefix: Initial diagnosis         8/29/2023 Biopsy     A.  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, ulcerated  - Carcinoma present at tissue edges     HPV, p16 positive        Chemotherapy     alteplase (CATHFLO), 2 mg, Intracatheter, Every 1 Minute as needed, 0 of 7 cycles  CISplatin (PLATINOL) infusion, 40 mg/m2, Intravenous, Once, 0 of 7 cycles  aprepitant (CINVANTI) in  mL IVPB, 130 mg, Intravenous, Once, 0 of 7 cycles               Review of Systems:  Review of Systems   Constitutional:  Positive for unexpected weight change (25 lbs over the last 4 months). Negative for appetite change and fatigue. HENT:  Positive for dental problem (getting full extractions on 10/16) and hearing loss (right sided). Negative for sore throat and trouble swallowing. Eyes: Negative. Respiratory:  Negative for cough and shortness of breath. Smokes about 5 cigarettes a day   Cardiovascular: Negative. Gastrointestinal: Negative. Endocrine: Negative. Genitourinary: Negative. 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 Skin: Negative. Allergic/Immunologic: Negative. Neurological: Negative. Hematological: Negative. Psychiatric/Behavioral:  The patient is nervous/anxious. Order received and chart reviewed. Discussed with RN, who reports pt seems to be tolerating current diet, and taking meds without difficulty. Pt denies difficulty swallowing or any s/s of aspiration, but does report some difficulty chewing hard solids due to recent dental extractions, and pain from lingual tumor. Pt has lost over 25# in the last few months. Past medical history:   Please see H&P for details      Special Studies:  CT brain-  Questionable lacunar ischemia involving the right paramedian karla versus beam hardening artifact. Correlation with MRI is recommended. CTA head/neck-  IMPRESSION:     No definite evidence for high-grade stenosis, major branch vessel occlusion or vascular aneurysm of the cervical or intracranial vessels. Segmental severe stenosis versus occlusion of the right internal jugular vein, worsened and secondary to adjacent adenopathy. Interval worsening of the right oral tongue mass as well as cervical adenopathy as described above.          Social/Education/Vocational Hx:  Pt lives with family    Swallow Information   Current Risks for Dysphagia & Aspiration:  Oral cancer, possible CVA  Current Symptoms/Concerns:  limited lingual ROM due to CA  Current Diet: regular diet and thin liquids Baseline Diet: regular diet, thin liquids, and pt chooses softer foods    Baseline Assessment   Behavior/Cognition: alert  Speech/Language Status: able to participate in conversation, able to follow commands, and mild dysarthria secondary to reduced lingual ROM due to lingual tumor  Patient Positioning: upright in bed     Swallow Mechanism Exam   Facial: symmetrical  Labial: WFL  Lingual: decreased ROM, bilateral decreased strength, and decreased coordination; tumor visible on R lateral tongue  Velum: symmetrical  Mandible:  decreased ROM, able to see tumor/swelling in R mandible  Dentition: edentulous  Vocal quality:clear/adequate   Volitional Cough: strong/productive   Respiratory: RA    Consistencies Assessed and Performance   Consistencies Administered: thin liquids, puree, and soft solids    Oral Stage: Adequate bolus retrieval and draw from straw. Prompt mastication, mildly reduced bolus formation and transfer. No pocketing. Adequate lip seal. Min lingual residue cleared with liquid wash. Pharyngeal Stage: Hyolaryngeal elevation observed and palpated. Swallows appeared fairly prompt. No overt s/s of aspiration. Esophageal Concerns: none reported      Results Reviewed with: patient and RN   Dysphagia Goals:  1. Pt will tolerate softer food items from regular diet with prompt oropharyngeal swallows and no overt s/s of aspiration. 2. Pt will use compensatory strategies to improve swallow function/reduced aspiration risk during 100% of meals and snacks with min cues. 3. Pt will tolerate LRD without s/s of aspiration.    Discharge recommendation: outpt    Speech Therapy Prognosis   Prognosis: fair    Prognosis Considerations: age, medical status, and therapeutic potential

## 2023-11-04 NOTE — ASSESSMENT & PLAN NOTE
Episode of sudden onset weakness of RUE and RLE  and worsened dysarthria from baseline starting at approximately 3-4pm Thursday afternoon. EKG as of 8/4/2023: sinus rhythm at 73bpm, LVH noted  CT Brain showed questionable lacunar ischemia involving the right paramedian karla versus beam hardening artifact. CT head/neck showed no evidence of major vessel occlusion or aneurysm. Segmental severe stenosis versus occlusion of the right internal jugular vein, worsened and secondary to adjacent adenopathy. 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 CN X-XII intact, ataxia noted on finger to nose test on RUE and LUE. MRI brain shows chronic microangiopathic changes and acute/subacute right pontine infarct. TTE does not demonstrate any abnormal heart strain and EF wnl  Plan:  Monitor on tele, No acute overnight events. Lipid Panel, HbA1c  Atorvastatin 40 mg q.d., Aspirin 81 q.d.   Neurology onboard, appreciate their recommendations  PT/OT/Speech eval  Allow for permissive hypertension with -200 for 48 hours  Neuro checks per protocol

## 2023-11-04 NOTE — ASSESSMENT & PLAN NOTE
Malnutrition Findings:   Adult Malnutrition type: Chronic illness  Adult Degree of Malnutrition: Malnutrition of moderate degree  Malnutrition Characteristics: Muscle loss, Inadequate energy, Weight loss                  360 Statement: Protein calorie malnutrition r/t inadequate intake as evidenced by <75% of estimated energy intake compared to estimated energy needs > 1 month, muscle wasting present to temples/clavicle, and ongoing weight loss; currently treated with diet and oral nutrition supplements    BMI Findings: Body mass index is 18.37 kg/m².    Plan: Encourage PO intake  Nutritional supplements with ensure TID

## 2023-11-04 NOTE — PLAN OF CARE
Problem: NEUROSENSORY - ADULT  Goal: Achieves stable or improved neurological status  Description: INTERVENTIONS  - Monitor and report changes in neurological status  - Monitor vital signs such as temperature, blood pressure, glucose, and any other labs ordered   - Initiate measures to prevent increased intracranial pressure  - Monitor for seizure activity and implement precautions if appropriate      11/4/2023 0505 by Donald Santamaria RN  Outcome: Progressing  11/4/2023 0504 by Donald Santamaria RN  Outcome: Progressing  Goal: Remains free of injury related to seizures activity  Description: INTERVENTIONS  - Maintain airway, patient safety  and administer oxygen as ordered  - Monitor patient for seizure activity, document and report duration and description of seizure to physician/advanced practitioner  - If seizure occurs,  ensure patient safety during seizure  - Reorient patient post seizure  - Seizure pads on all 4 side rails  - Instruct patient/family to notify RN of any seizure activity including if an aura is experienced  - Instruct patient/family to call for assistance with activity based on nursing assessment  - Administer anti-seizure medications if ordered    11/4/2023 0505 by Donald Santamaria RN  Outcome: Progressing  11/4/2023 0504 by Donald Santamaria RN  Outcome: Progressing  Goal: Achieves maximal functionality and self care  Description: INTERVENTIONS  - Monitor swallowing and airway patency with patient fatigue and changes in neurological status  - Encourage and assist patient to increase activity and self care.    - Encourage visually impaired, hearing impaired and aphasic patients to use assistive/communication devices  11/4/2023 0505 by Donald Santamaria RN  Outcome: Progressing  11/4/2023 0504 by Donald Santamaria RN  Outcome: Progressing

## 2023-11-04 NOTE — PLAN OF CARE
Problem: OCCUPATIONAL THERAPY ADULT  Goal: Performs self-care activities at highest level of function for planned discharge setting. See evaluation for individualized goals. Description: Treatment Interventions: ADL retraining, UE strengthening/ROM, Neuromuscular reeducation, Compensatory technique education, Fine motor coordination activities, Continued evaluation, Activityengagement          See flowsheet documentation for full assessment, interventions and recommendations. Note: Limitation: Decreased UE strength, Decreased Safe judgement during ADL, Decreased fine motor control, Decreased high-level ADLs (pain, balance, FM coord, GM coord, and dexterity, direction following, safety awareness, insight, and pacing, use of coping skills)  Prognosis: Good  Assessment: Pt is a 59 y.o. male seen for OT evaluation s/p admit to [address] [place] [address] on 11/3/2023 w/Stroke-like symptom. Prior to admission, pt was living with sig other in a 1st floor apartment, (I) with ADLs, (I) with IADLs, (+) falls, (+) . Personal and environmental factors affecting patient at time of evaluation include current habits and behavioral patterns, lifestyle patterns, and difficulty completing IADLs. Personal factors supporting patient at time of evaluation include age, (I) PLOF, supportive sig other, attitude towards recovery, and accessible home environment. Based upon this evaluation, pt is functioning below baseline. Pt will benefit from continued skilled inpatient OT to maximize safety, level of independence overall performance in ADLs, functional transfers, functional mobility to return to functional baseline/highest level of function.      Rehab Resource Intensity Level, OT: III (Minimum Resource Intensity) (OPOT for hand therapy)     BOZEAN Watt, OTR/L  [place] License [id number]  [address] [place] [address] [id number]

## 2023-11-04 NOTE — ASSESSMENT & PLAN NOTE
Hx of alcohol abuse, drinks 3-4 beers per day  pt denies being intoxicated on initial onset of symptoms  Medical Alcohol level of 190 on ED presentation  [address]

## 2023-11-05 ENCOUNTER — APPOINTMENT (INPATIENT)
Dept: CT IMAGING | Facility: HOSPITAL | Age: 58
DRG: 045 | End: 2023-11-05
Payer: COMMERCIAL

## 2023-11-05 ENCOUNTER — APPOINTMENT (INPATIENT)
Dept: MRI IMAGING | Facility: HOSPITAL | Age: 58
DRG: 045 | End: 2023-11-05
Payer: COMMERCIAL

## 2023-11-05 LAB
ANION GAP SERPL CALCULATED.3IONS-SCNC: 3 MMOL/L
ANION GAP SERPL CALCULATED.3IONS-SCNC: 4 MMOL/L
BUN SERPL-MCNC: 12 MG/DL (ref 5–25)
BUN SERPL-MCNC: 14 MG/DL (ref 5–25)
CALCIUM SERPL-MCNC: 10 MG/DL (ref 8.4–10.2)
CALCIUM SERPL-MCNC: 10.1 MG/DL (ref 8.4–10.2)
CHLORIDE SERPL-SCNC: 100 MMOL/L (ref 96–108)
CHLORIDE SERPL-SCNC: 97 MMOL/L (ref 96–108)
CO2 SERPL-SCNC: 31 MMOL/L (ref 21–32)
CO2 SERPL-SCNC: 32 MMOL/L (ref 21–32)
CREAT SERPL-MCNC: 0.85 MG/DL (ref 0.6–1.3)
CREAT SERPL-MCNC: 0.93 MG/DL (ref 0.6–1.3)
GFR SERPL CREATININE-BSD FRML MDRD: 88 ML/MIN/1.73SQ M
GFR SERPL CREATININE-BSD FRML MDRD: 94 ML/MIN/1.73SQ M
GLUCOSE SERPL-MCNC: 102 MG/DL (ref 65–140)
GLUCOSE SERPL-MCNC: 108 MG/DL (ref 65–140)
GLUCOSE SERPL-MCNC: 113 MG/DL (ref 65–140)
GLUCOSE SERPL-MCNC: 128 MG/DL (ref 65–140)
GLUCOSE SERPL-MCNC: 96 MG/DL (ref 65–140)
POTASSIUM SERPL-SCNC: 4 MMOL/L (ref 3.5–5.3)
POTASSIUM SERPL-SCNC: 4.5 MMOL/L (ref 3.5–5.3)
SODIUM SERPL-SCNC: 133 MMOL/L (ref 135–147)
SODIUM SERPL-SCNC: 134 MMOL/L (ref 135–147)

## 2023-11-05 PROCEDURE — 74177 CT ABD & PELVIS W/CONTRAST: CPT

## 2023-11-05 PROCEDURE — 99232 SBSQ HOSP IP/OBS MODERATE 35: CPT | Performed by: INTERNAL MEDICINE

## 2023-11-05 PROCEDURE — 99255 IP/OBS CONSLTJ NEW/EST HI 80: CPT | Performed by: INTERNAL MEDICINE

## 2023-11-05 PROCEDURE — G1004 CDSM NDSC: HCPCS

## 2023-11-05 PROCEDURE — 71260 CT THORAX DX C+: CPT

## 2023-11-05 PROCEDURE — 80048 BASIC METABOLIC PNL TOTAL CA: CPT | Performed by: INTERNAL MEDICINE

## 2023-11-05 PROCEDURE — 70551 MRI BRAIN STEM W/O DYE: CPT

## 2023-11-05 PROCEDURE — 82948 REAGENT STRIP/BLOOD GLUCOSE: CPT

## 2023-11-05 RX ORDER — ACETAMINOPHEN 325 MG/1
975 TABLET ORAL EVERY 8 HOURS SCHEDULED
Status: DISCONTINUED | OUTPATIENT
Start: 2023-11-05 | End: 2023-11-06 | Stop reason: HOSPADM

## 2023-11-05 RX ORDER — SODIUM CHLORIDE 9 MG/ML
100 INJECTION, SOLUTION INTRAVENOUS CONTINUOUS
Status: DISCONTINUED | OUTPATIENT
Start: 2023-11-05 | End: 2023-11-05

## 2023-11-05 RX ORDER — ACETAMINOPHEN 325 MG/1
975 TABLET ORAL EVERY 6 HOURS PRN
Status: DISCONTINUED | OUTPATIENT
Start: 2023-11-05 | End: 2023-11-05

## 2023-11-05 RX ADMIN — NICOTINE 7 MG: 7 PATCH, EXTENDED RELEASE TRANSDERMAL at 13:32

## 2023-11-05 RX ADMIN — MULTIPLE VITAMINS W/ MINERALS TAB 1 TABLET: TAB ORAL at 08:53

## 2023-11-05 RX ADMIN — OXYCODONE HYDROCHLORIDE 10 MG: 10 TABLET, FILM COATED, EXTENDED RELEASE ORAL at 06:06

## 2023-11-05 RX ADMIN — ACETAMINOPHEN 975 MG: 325 TABLET, FILM COATED ORAL at 21:23

## 2023-11-05 RX ADMIN — FOLIC ACID 1 MG: 1 TABLET ORAL at 08:53

## 2023-11-05 RX ADMIN — SODIUM CHLORIDE 100 ML/HR: 0.9 INJECTION, SOLUTION INTRAVENOUS at 11:06

## 2023-11-05 RX ADMIN — ENOXAPARIN SODIUM 40 MG: 40 INJECTION SUBCUTANEOUS at 08:54

## 2023-11-05 RX ADMIN — ACETAMINOPHEN 975 MG: 325 TABLET, FILM COATED ORAL at 08:53

## 2023-11-05 RX ADMIN — IOHEXOL 85 ML: 350 INJECTION, SOLUTION INTRAVENOUS at 10:43

## 2023-11-05 RX ADMIN — ATORVASTATIN CALCIUM 40 MG: 40 TABLET, FILM COATED ORAL at 17:38

## 2023-11-05 RX ADMIN — OXYCODONE HYDROCHLORIDE 10 MG: 10 TABLET, FILM COATED, EXTENDED RELEASE ORAL at 21:23

## 2023-11-05 RX ADMIN — Medication 100 MG: at 08:53

## 2023-11-05 RX ADMIN — ACETAMINOPHEN 975 MG: 325 TABLET, FILM COATED ORAL at 13:32

## 2023-11-05 RX ADMIN — OXYCODONE HYDROCHLORIDE 10 MG: 10 TABLET, FILM COATED, EXTENDED RELEASE ORAL at 13:32

## 2023-11-05 NOTE — PROGRESS NOTES
[facility]  Progress Note  Name: Dominique Coyle  MRN: [id number]  Unit/Bed#: S -01 I Date of Admission: 11/3/2023   Date of Service: 11/5/2023 I Hospital Day: 2    Assessment/Plan   * Stroke-like symptom  Assessment & Plan  Episode of sudden onset weakness of RUE and RLE  and worsened dysarthria from baseline starting at approximately 3-4pm Thursday afternoon. EKG as of 8/4/2023: sinus rhythm at 73bpm, LVH noted  CT Brain showed questionable lacunar ischemia involving the right paramedian karla versus beam hardening artifact. CT head/neck showed no evidence of major vessel occlusion or aneurysm. Segmental severe stenosis versus occlusion of the right internal jugular vein, worsened and secondary to adjacent adenopathy. 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 CN X-XII intact, ataxia noted on finger to nose test on RUE and LUE. MRI brain shows chronic microangiopathic changes and acute/subacute right pontine infarct. TTE does not demonstrate any abnormal heart strain and EF wnl  Plan:  Monitor on tele, No acute overnight events. Lipid Panel, HbA1c  Atorvastatin 40 mg q.d., Aspirin 81 q.d. Neurology onboard, appreciate their recommendations  PT/OT/Speech eval  Allow for permissive hypertension with -200 for 48 hours  Neuro checks per protocol     Cancer-related pain  Assessment & Plan  Has oral cancer-   Biopsy 8/29/2023 with [facility]  Plan was for cisplatin and radiation therapies  Worsening adenopathy and 20lb weight loss  Pt has some dysarthria at baseline  Follows heme/onc  Medical Oncology consulted and recommendations are appreciated.    CT C/A/P today with contrast to look for evidence of metastasis  Results pending  CM evaluation  Cessation of smoking and alcohol recommended  Hx of multiple dental caries that needed surgical intervention    Moderate protein-calorie malnutrition ([address])  Assessment & Plan  Malnutrition Findings:   Adult Malnutrition type: Chronic illness  Adult Degree of Malnutrition: Malnutrition of moderate degree  Malnutrition Characteristics: Muscle loss, Inadequate energy, Weight loss                  360 Statement: Protein calorie malnutrition r/t inadequate intake as evidenced by <75% of estimated energy intake compared to estimated energy needs > 1 month, muscle wasting present to temples/clavicle, and ongoing weight loss; currently treated with diet and oral nutrition supplements    BMI Findings: Body mass index is 18.37 kg/m². Plan: Encourage PO intake  Nutritional supplements with ensure TID     Hyponatremia  Assessment & Plan  Recent Labs     11/03/23  1948 11/04/23  0847 11/04/23  1215   SODIUM 128* 133* 134*      Pt appears asymptomatic    Plan:  Trend sodium; improving  q8h BMP  Limit correction to less than 6 mEq in 24 hours. Cigarette nicotine dependence without complication  Assessment & Plan  Smokes 1/2 pack of cigarettes per day  Pt is considering quitting smoking  Should be given referral to smoking cessation program at discharge and educational material on cessation of smoking  Offer NRT while admitted.     Hypertension  Assessment & Plan  130's-160's/60's-90's  Currently not on any BP medications as per patient    Plan  Allow for permissive HTN as per stroke protocol    Alcohol use disorder, severe, dependence ([address])  Assessment & Plan  Hx of alcohol abuse, drinks 3-4 beers per day  pt denies being intoxicated on initial onset of symptoms  Medical Alcohol level of 190 on ED presentation  CIWA score-1    Plan:  Continue to follow CIWA protocol  Encourage cutting down and eventual cessation of alcohol drinking                  VTE Pharmacologic Prophylaxis:   VTE Score: 8 High Risk (Score >/= 5) - Pharmacological DVT Prophylaxis Ordered: Enoxaparin (Lovenox). Sequential Compression Devices Ordered. Mechanical VTE Prophylaxis in Place: Yes    Patient Centered Rounds: I have performed bedside rounds with nursing staff today. Discussions with Specialists or Other Care Team Provider: IM team, medical oncology    Education and Discussions with Family / Patient: Patient declined call to . Current Length of Stay: 2 day(s)    Current Patient Status: Inpatient     Discharge Plan / Estimated Discharge Date: Anticipate discharge in 24-48 hrs to discharge location to be determined pending rehab evaluations. Code Status: Level 1 - Full Code      Subjective: This AM, Clarence Mata is resting in bed. He reports that he is still having weakness in his left leg and arm  and also more trouble speaking than he usually does at baseline. He denies worsening of his symptoms. He does have some pain and it is mainly along his jaw which he where he typically has pain. Objective:     Vitals:   Temp (24hrs), Av.8 °F (36.6 °C), Min:97.3 °F (36.3 °C), Max:98.6 °F (37 °C)    Temp:  [97.3 °F (36.3 °C)-98.6 °F (37 °C)] 97.3 °F (36.3 °C)  HR:  [70] 70  Resp:  [16-17] 17  BP: (115-169)/(79-86) 137/86  SpO2:  [98 %] 98 %  Body mass index is 18.37 kg/m². Input and Output Summary (last 24 hours):     No intake or output data in the 24 hours ending 23 1221    Physical Exam:     Physical Exam  Vitals and nursing note reviewed. Constitutional:       General: He is not in acute distress. Appearance: He is well-developed and underweight. He is ill-appearing (chronically ill appearing). HENT:      Head: Normocephalic and atraumatic. Mouth/Throat:      Mouth: Mucous membranes are moist.      Pharynx: Oropharynx is clear. Comments: Left tongue mass  Right sided Adenopathy   Eyes:      Conjunctiva/sclera: Conjunctivae normal.      Pupils: Pupils are equal, round, and reactive to light.    Cardiovascular:      Rate and Rhythm: Normal rate and regular rhythm. Pulses: Normal pulses. Heart sounds: Normal heart sounds. No murmur heard. Pulmonary:      Effort: Pulmonary effort is normal. No respiratory distress. Breath sounds: Normal breath sounds. Abdominal:      General: Bowel sounds are normal.      Palpations: Abdomen is soft. Tenderness: There is no abdominal tenderness. Musculoskeletal:         General: No swelling. Cervical back: Neck supple. Right lower leg: No edema. Left lower leg: No edema. Lymphadenopathy:      Cervical: Cervical adenopathy present. Skin:     General: Skin is warm and dry. Capillary Refill: Capillary refill takes less than 2 seconds. Neurological:      Mental Status: He is alert and oriented to person, place, and time. Cranial Nerves: No cranial nerve deficit. Motor: Weakness (LUE and LLE) present. Psychiatric:         Mood and Affect: Mood normal.          Additional Data:     Labs:  Results from last 7 days   Lab Units 11/04/23 2040 11/03/23 1948   WBC Thousand/uL  --  8.47   HEMOGLOBIN g/dL  --  13.8   HEMATOCRIT %  --  40.7   PLATELETS Thousands/uL 297 303     Results from last 7 days   Lab Units 11/05/23  0615 11/04/23  1215 11/04/23  0847   SODIUM mmol/L 134*   < > 133*   POTASSIUM mmol/L 4.5   < > 4.4   CHLORIDE mmol/L 100   < > 99   CO2 mmol/L 31   < > 28   BUN mg/dL 12   < > 11   CREATININE mg/dL 0.93   < > 0.81   ANION GAP mmol/L 3   < > 6   CALCIUM mg/dL 10.0   < > 9.6   ALBUMIN g/dL  --   --  3.8   TOTAL BILIRUBIN mg/dL  --   --  0.28   ALK PHOS U/L  --   --  69   ALT U/L  --   --  10   AST U/L  --   --  13   GLUCOSE RANDOM mg/dL 102   < > 127    < > = values in this interval not displayed.      Results from last 7 days   Lab Units 11/03/23  1948   INR  0.90     Results from last 7 days   Lab Units 11/05/23  1136 11/05/23  0810 11/04/23  1626 11/03/23  1939   POC GLUCOSE mg/dl 128 96 120 87     Results from last 7 days   Lab Units 11/04/23  0533 HEMOGLOBIN A1C % 5.9*           Imaging: Reviewed radiology reports from this admission including: CT head and MRI brain            Lines/Drains:  Invasive Devices       Peripheral Intravenous Line  Duration             Peripheral IV 11/03/23 Right;Ventral (anterior) Forearm 1 day                    Telemetry:   Telemetry Orders (From admission, onward)               24 Hour Telemetry Monitoring  Continuous x 24 Hours (Telem)        Question:  Reason for 24 Hour Telemetry  Answer:  TIA/Suspected CVA/ Confirmed CVA                        Last 24 Hours Medication List:   Current Facility-Administered Medications   Medication Dose Route Frequency Provider Last Rate    acetaminophen  975 mg Oral Q8H [address] Zca Sims DO      atorvastatin  40 mg Oral QPM Radha Foster MD      enoxaparin  40 mg Subcutaneous Daily Radha Foster MD      folic acid  1 mg Oral Daily Sheyla Manley MD      multivitamin-minerals  1 tablet Oral Daily Sheyla Manley MD      nicotine  7 mg Transdermal Daily Zac Sims DO      oxyCODONE  10 mg Oral [facility] Radha Foster MD      sodium chloride  100 mL/hr Intravenous Continuous Zac Sims  mL/hr (11/05/23 1106)    thiamine  100 mg Oral Daily Sheyla Manley MD          Today, Patient Was Seen By: Zac Sims DO    ** Please Note: This note has been constructed using a voice recognition system.  **

## 2023-11-05 NOTE — PLAN OF CARE
Problem: NEUROSENSORY - ADULT  Goal: Achieves stable or improved neurological status  Description: INTERVENTIONS  - Monitor and report changes in neurological status  - Monitor vital signs such as temperature, blood pressure, glucose, and any other labs ordered   - Initiate measures to prevent increased intracranial pressure  - Monitor for seizure activity and implement precautions if appropriate      Outcome: Progressing     Problem: CARDIOVASCULAR - ADULT  Goal: Absence of cardiac dysrhythmias or at baseline rhythm  Description: INTERVENTIONS:  - Continuous cardiac monitoring, vital signs, obtain 12 lead EKG if ordered  - Administer antiarrhythmic and heart rate control medications as ordered  - Monitor electrolytes and administer replacement therapy as ordered  Outcome: Progressing     Problem: METABOLIC, FLUID AND ELECTROLYTES - ADULT  Goal: Electrolytes maintained within normal limits  Description: INTERVENTIONS:  - Monitor labs and assess patient for signs and symptoms of electrolyte imbalances  - Administer electrolyte replacement as ordered  - Monitor response to electrolyte replacements, including repeat lab results as appropriate  - Instruct patient on fluid and nutrition as appropriate  Outcome: Progressing     Problem: HEMATOLOGIC - ADULT  Goal: Maintains hematologic stability  Description: INTERVENTIONS  - Assess for signs and symptoms of bleeding or hemorrhage  - Monitor labs  - Administer supportive blood products/factors as ordered and appropriate  Outcome: Progressing     Problem: Neurological Deficit  Goal: Neurological status is stable or improving  Description: Interventions:  - Monitor and assess patient's level of consciousness, motor function, sensory function, and level of assistance needed for ADLs. - Monitor and report changes from baseline. Collaborate with interdisciplinary team to initiate plan and implement interventions as ordered. - Provide and maintain a safe environment.   - Consider seizure precautions. - Consider fall precautions. - Consider aspiration precautions. - Consider bleeding precautions.   Outcome: Progressing

## 2023-11-05 NOTE — CASE MANAGEMENT
Case Management Assessment & Discharge Planning Note    Patient name Rhonda Vivas  Location S [address] [facility] 315/S -33 MRN [id number]  : 1965 Date 2023       Current Admission Date: 11/3/2023  Current Admission Diagnosis:Stroke-like symptom   Patient Active Problem List    Diagnosis Date Noted    Moderate protein-calorie malnutrition ([address]) 2023    Stroke-like symptom 2023    Drug induced constipation 2023    Cancer-related pain 2023    At risk for respiratory depression due to opioid 2023    Pain in lower jaw 2023    Counseling regarding advanced care planning and goals of care 2023    Preoperative clearance 10/11/2023    Oral cancer ([address]) 2023    Hypokalemia 2023    Hypomagnesemia 2023    Alcohol use disorder, severe, dependence ([address]) 2023    Hyponatremia 2023    Hypertension     Cigarette nicotine dependence without complication     Periodontitis 2023    Dental caries 2023    Cervical lymphadenopathy 2023      LOS (days): 2  Geometric Mean LOS (GMLOS) (days):   Days to GMLOS:     OBJECTIVE:    Risk of Unplanned Readmission Score: 9.16         Current admission status: Inpatient  Referral Reason: Stroke    Preferred Pharmacy:   [id number], [address] [facility] [address] [place] [address]  Phone: [phone] Fax: [address] [facility], [address] [place] [id number] [place] [address]  Phone: [phone] Fax: [phone]    [facility] [address] [place] [facility] - [place] [place] - [address] [place] [address]  Phone: [phone] Fax: [phone] [facility] [address]  [address] [facility] [address]  Phone: [phone] Fax: [phone]    [facility] [address] [facility]  [address] [place] [address]  Phone: [phone] Fax: [phone]    Primary Care Provider: Ann Lucio MD    Primary Insurance: [facility] [address]  Secondary Insurance:     ASSESSMENT:  Readmission Root Cause  30 Day Readmission: No    Patient Information  Admitted from[de-identified] Home  Mental Status: Alert  During Assessment patient was accompanied by: Not accompanied during assessment  Assessment information provided by[de-identified] Patient  Primary Caregiver: Self  Support Systems: Self, Parent  [place] of Residence: [address] [place] [address] do you live in?: [address] [facility] entry access options.  Select all that apply.: No steps to enter home  Type of Current Residence: Apartment  Floor Level: 1  Upon entering residence, is there a bedroom on the main floor (no further steps)?: Yes  Upon entering residence, is there a bathroom on the main floor (no further steps)?: Yes  In the last 12 months, was there a time when you were not able to pay the mortgage or rent on time?: No  In the last 12 months, how many places have you lived?: 1  In the last 12 months, was there a time when you did not have a steady place to sleep or slept in a shelter (including now)?: No  Homeless/housing insecurity resource given?: No  Living Arrangements: Lives w/ Spouse/significant other  Is patient a ?: No    Activities of Daily Living Prior to Admission  Functional Status: Independent  Completes ADLs independently?: Yes  Ambulates independently?: Yes  Does patient use assisted devices?: No  Does patient currently own DME?: No  Does patient have a history of Outpatient Therapy (PT/OT)?: No  Does the patient have a history of Short-Term Rehab?: No  Does patient have a history of HHC?: No  Does patient currently have [address]?: No    Patient Information Continued  Income Source: Employed  Does patient have prescription coverage?: Yes  Within the past 12 months, you worried that your food would run out before you got the money to buy more.: Never true  Within the past 12 months, the food you bought just didn't last and you didn't have money to get more.: Never true  Food insecurity resource given?: No  Does patient receive dialysis treatments?: No  Does patient have a history of substance abuse?: Yes  Historical substance use preference: Alcohol/ETOH  History of Withdrawal Symptoms: Denies past symptoms  Is patient currently in treatment for substance abuse?: No. Patient declined treatment information.   Does patient have a history of Mental Health Diagnosis?: No    PHQ 2/9 Screening   Reviewed PHQ 2/9 Depression Screening Score?: No    Means of Transportation  Means of Transport to Appts[de-identified] Drives Self  In the past 12 months, has lack of transportation kept you from medical appointments or from getting medications?: No  In the past 12 months, has lack of transportation kept you from meetings, work, or from getting things needed for daily living?: No  Was application for public transport provided?: No    DISCHARGE DETAILS:    Discharge planning discussed with[de-identified] Patient  Freedom of Choice: Yes     CM contacted family/caregiver?: Yes (VM left for EC (Father))  Were Treatment Team discharge recommendations reviewed with patient/caregiver?: Yes  Did patient/caregiver verbalize understanding of patient care needs?: Yes  Were patient/caregiver advised of the risks associated with not following Treatment Team discharge recommendations?: Yes    [address]         Is the patient interested in [address] at discharge?: No    DME Referral Provided  Referral made for DME?: No    Other Referral/Resources/Interventions Provided:  Interventions: None Indicated    Would you like to participate in our [address] service program?  : No - Declined    Treatment Team Recommendation: Home  Discharge Destination Plan[de-identified] Home  Transport at Discharge : Family

## 2023-11-05 NOTE — H&P (VIEW-ONLY)
Oncology Consult Note  Liang Albright 59 y.o. 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 provider found   22-year-old  male with past medical history for hypertension, nicotine abuse, alcohol abuse, he was noticed to have right oral mouth lesion by his dentist, CT scan of the head on 6/2/2023 showed scattered cervical lymphadenopathy    PET scan showed hypermetabolic right anterior oral cavity mass concerning for malignancy bilateral hypermetabolic cervical lymphadenopathy, no hypermetabolic metastasis in the chest abdomen and pelvis, multiple healing left rib fractures    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 negative for ELIE, he supposed to start concurrent radiation therapy with weekly cisplatin at 40 mg per metered square, pending Port-A-Cath placement    He presented with right-sided weakness, significant pain in the right mandibular area    CT stroke alert of the brain on 11/3/2023 showed no evidence of stroke however 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    Worsening adenopathy with 4.1 x 2.9 cm necrotic ill-defined right-sided level 2 adenopathy, right oral tongue cancer crossing the midline worse from prior examination with 3.7 x 2.5 cm with erosive changes of the anterior right mandibular body and symphysis    Upper chest area showed significant emphysematous changes with possible spiculated mass in the right upper lobe    He has been losing weight about 20 pounds    Denied any headache blurred vision diplopia he reported exertional dyspnea and right cervical pain  Review of Systems - As stated in the HPI otherwise the fourteen point review of systems was negative.     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       Social History     Socioeconomic History    Marital status: Single     Spouse name: Not on file    Number of children: Not on file    Years of education: Not on file    Highest education level: Not on file   Occupational History    Not on file   Tobacco Use    Smoking status: Every Day     Packs/day: 0.25     Types: Cigarettes    Smokeless tobacco: Never   Vaping Use    Vaping Use: Never used   Substance and Sexual Activity    Alcohol use: Yes     Comment: 3-4 beers/day 24 oz beer    Drug use: No    Sexual activity: Not on file   Other Topics Concern    Not on file   Social History Narrative    Not on file     Social Determinants of Health     Financial Resource Strain: Not on file   Food Insecurity: Not on file   Transportation Needs: Not on file   Physical Activity: Not on file   Stress: Not on file   Social Connections: Not on file   Intimate Partner Violence: Not on file   Housing Stability: Not on file       Family History   Problem Relation Age of Onset    Breast cancer Mother     Cancer Father        Allergies   Allergen Reactions    Bee Venom Hives    Other      bees         Current Facility-Administered Medications:     acetaminophen (TYLENOL) tablet 975 mg, 975 mg, Oral, Q8H [address], [place] No DO, 975 mg at 11/05/23 0853    atorvastatin (LIPITOR) tablet 40 mg, 40 mg, Oral, QPM, Vale Horn MD, 40 mg at 11/04/23 1830    enoxaparin (LOVENOX) subcutaneous injection 40 mg, 40 mg, Subcutaneous, Daily, Vale Horn MD, 40 mg at 00/01/39 3340    folic acid (FOLVITE) tablet 1 mg, 1 mg, Oral, Daily, Jose Ramon MD, 1 mg at 11/05/23 0853    multivitamin-minerals (CENTRUM) tablet 1 tablet, 1 tablet, Oral, Daily, Jose Ramon MD, 1 tablet at 11/05/23 0853    oxyCODONE (OxyCONTIN) 12 hr tablet 10 mg, 10 mg, Oral, Q8H [address], Vale Horn MD, 10 mg at 11/05/23 0606    sodium chloride 0.9 % infusion, 100 mL/hr, Intravenous, Charito, Darlene Olmedo DO    thiamine tablet 100 mg, 100 mg, Oral, Daily, Korin Velasco MD, 100 mg at 11/05/23 0853      /86   Pulse 70   Temp (!) 97.3 °F (36.3 °C)   Resp 17   Ht 6' 3" (1.905 m)   Wt 66.7 kg (147 lb)   SpO2 98%   BMI 18.37 kg/m²       General Appearance:    Alert, oriented, cachectic, tremor       Eyes:    PERRL   Ears:    Normal external ear canals, both ears   Nose:   Nares normal, septum midline   Throat:   Mucosa moist. Pharynx without injection. Neck:   Supple       Lungs:   Distant breath sounds bilaterally   Chest Wall:    No tenderness or deformity    Heart:    Regular rate and rhythm       Abdomen:     Soft, non-tender, bowel sounds +, no organomegaly           Extremities:   Extremities no cyanosis or edema       Skin:   no rash or icterus.     Lymph nodes: Large right cervical lymph nodes measuring up to 5 x 5 cm also in the submandibular area however, supraclavicular, and axillary nodes normal, enlarged right jugular vein   Neurologic:   CNII-XII intact, normal strength, sensation and reflexes     Throughout               Recent Results (from the past 48 hour(s))   Fingerstick Glucose (POCT)    Collection Time: 11/03/23  7:39 PM   Result Value Ref Range    POC Glucose 87 65 - 140 mg/dl   Basic metabolic panel    Collection Time: 11/03/23  7:48 PM   Result Value Ref Range    Sodium 128 (L) 135 - 147 mmol/L    Potassium 3.7 3.5 - 5.3 mmol/L    Chloride 95 (L) 96 - 108 mmol/L    CO2 23 21 - 32 mmol/L    ANION GAP 10 mmol/L    BUN 10 5 - 25 mg/dL    Creatinine 0.67 0.60 - 1.30 mg/dL    Glucose 85 65 - 140 mg/dL    Calcium 9.4 8.4 - 10.2 mg/dL    eGFR 104 ml/min/1.73sq m   CBC and Platelet    Collection Time: 11/03/23  7:48 PM   Result Value Ref Range    WBC 8.47 4.31 - 10.16 Thousand/uL    RBC 4.15 3.88 - 5.62 Million/uL    Hemoglobin 13.8 12.0 - 17.0 g/dL    Hematocrit 40.7 36.5 - 49.3 %    MCV 98 82 - 98 fL    MCH 33.3 26.8 - 34.3 pg    MCHC 33.9 31.4 - 37.4 g/dL    RDW 12.4 11.6 - 15.1 %    Platelets 070 228 - 579 Thousands/uL    MPV 9.1 8.9 - 12.7 fL   Protime-INR    Collection Time: 11/03/23  7:48 PM   Result Value Ref Range    Protime 12.7 11.6 - 14.5 seconds    INR 0.90 0.84 - 1.19   APTT    Collection Time: 11/03/23  7:48 PM   Result Value Ref Range    PTT 28 23 - 37 seconds   HS Troponin 0hr (reflex protocol)    Collection Time: 11/03/23  7:48 PM   Result Value Ref Range    hs TnI 0hr 10 "Refer to ACS Flowchart"- see link ng/L   FLU/RSV/COVID - if FLU/RSV clinically relevant    Collection Time: 11/03/23  7:48 PM    Specimen: Nose; Nares   Result Value Ref Range    SARS-CoV-2 Negative Negative    INFLUENZA A PCR Negative Negative    INFLUENZA B PCR Negative Negative    RSV PCR Negative Negative   Ethanol    Collection Time: 11/03/23  7:48 PM   Result Value Ref Range    Ethanol Lvl 190 (H) <71 mg/dL   Salicylate level    Collection Time: 11/03/23  7:48 PM   Result Value Ref Range    Salicylate Lvl <5 3 - 20 mg/dL   Acetaminophen level-If concentration is detectable, please discuss with medical  on call. Collection Time: 11/03/23  7:48 PM   Result Value Ref Range    Acetaminophen Level <10 (L) 10 - 20 ug/mL   HS Troponin I 2hr    Collection Time: 11/04/23  5:33 AM   Result Value Ref Range    hs TnI 2hr 12.3 "Refer to ACS Flowchart"- see link ng/L    Delta 2hr hsTnI 2.3 <20 ng/L   Lipid Panel with Direct LDL reflex    Collection Time: 11/04/23  5:33 AM   Result Value Ref Range    Cholesterol 141 See Comment mg/dL    Triglycerides 75 See Comment mg/dL    HDL, Direct 59 >=40 mg/dL    LDL Calculated 67 0 - 100 mg/dL   Hemoglobin A1c w/EAG Estimation    Collection Time: 11/04/23  5:33 AM   Result Value Ref Range    Hemoglobin A1C 5.9 (H) Normal 4.0-5.6%; PreDiabetic 5.7-6.4%;  Diabetic >=6.5%; Glycemic control for adults with diabetes <7.0% %     mg/dl   Basic metabolic panel    Collection Time: 11/04/23  8:47 AM   Result Value Ref Range    Sodium 133 (L) 135 - 147 mmol/L    Potassium 4.4 3.5 - 5.3 mmol/L    Chloride 99 96 - 108 mmol/L    CO2 28 21 - 32 mmol/L    ANION GAP 6 mmol/L    BUN 11 5 - 25 mg/dL    Creatinine 0.81 0.60 - 1.30 mg/dL    Glucose 127 65 - 140 mg/dL    Calcium 9.6 8.4 - 10.2 mg/dL    eGFR 96 ml/min/1.73sq m   Hepatic function panel    Collection Time: 11/04/23  8:47 AM   Result Value Ref Range    Total Bilirubin 0.28 0.20 - 1.00 mg/dL    Bilirubin, Direct 0.08 0.00 - 0.20 mg/dL    Alkaline Phosphatase 69 34 - 104 U/L    AST 13 13 - 39 U/L    ALT 10 7 - 52 U/L    Total Protein 7.4 6.4 - 8.4 g/dL    Albumin 3.8 3.5 - 5.0 g/dL   Echo complete w/ contrast if indicated    Collection Time: 11/04/23 11:37 AM   Result Value Ref Range    A4C EF 57 %    LVIDd 4.20 cm    LVIDS 2.90 cm    IVSd 1.10 cm    LVPWd 1.00 cm    FS 31 28 - 44    MV E' Tissue Velocity Septal 10 cm/s    LA Volume Index (BP) 28.0 mL/m2    E/A ratio 1.62     E wave deceleration time 153 ms    MV Peak E Almas 63 cm/s    MV Peak A Almas 0.39 m/s    RVID d 2.8 cm    Tricuspid annular plane systolic excursion 1.76 cm    LA size 3.1 cm    LA length (A2C) 5.80 cm    LA volume (BP) 54 mL    RA 2D Volume 29.0 mL    RAA A4C 13.3 cm2    AV Deceleration Time 1,721 ms    AV regurgitation pressure 1/2 time 499 ms    MV stenosis pressure 1/2 time 44 ms    MV valve area p 1/2 method 5.00     Ao root 3.20 cm    AV peak gradient 103 mmHg    Tricuspid valve peak regurgitation velocity 2.20 m/s    Left ventricular stroke volume (2D) 44.00 mL    IVS 1.1 cm    LEFT VENTRICLE SYSTOLIC VOLUME (MOD BIPLANE) 2D 33 mL    LV DIASTOLIC VOLUME (MOD BIPLANE) 2D 77 mL    Left Atrium Area-systolic Four Chamber 60.1 cm2    Left Atrium Area-systolic Apical Two Chamber 20.4 cm2    LVSV, 2D 44 mL    LV EF 60     GLS -20 %   Basic metabolic panel    Collection Time: 11/04/23 12:15 PM   Result Value Ref Range    Sodium 134 (L) 135 - 147 mmol/L    Potassium 4.3 3.5 - 5.3 mmol/L    Chloride 100 96 - 108 mmol/L    CO2 28 21 - 32 mmol/L ANION GAP 6 mmol/L    BUN 12 5 - 25 mg/dL    Creatinine 0.78 0.60 - 1.30 mg/dL    Glucose 129 65 - 140 mg/dL    Calcium 9.6 8.4 - 10.2 mg/dL    eGFR 98 ml/min/1.73sq m   Fingerstick Glucose (POCT)    Collection Time: 11/04/23  4:26 PM   Result Value Ref Range    POC Glucose 120 65 - 140 mg/dl   HS Troponin I 4hr    Collection Time: 11/04/23  8:40 PM   Result Value Ref Range    hs TnI 4hr 10 "Refer to ACS Flowchart"- see link ng/L    Delta 4hr hsTnI     Platelet count    Collection Time: 11/04/23  8:40 PM   Result Value Ref Range    Platelets 020 765 - 190 Thousands/uL    MPV 9.2 8.9 - 12.7 fL   Basic metabolic panel    Collection Time: 11/04/23  8:40 PM   Result Value Ref Range    Sodium 134 (L) 135 - 147 mmol/L    Potassium 3.9 3.5 - 5.3 mmol/L    Chloride 101 96 - 108 mmol/L    CO2 26 21 - 32 mmol/L    ANION GAP 7 mmol/L    BUN 14 5 - 25 mg/dL    Creatinine 0.81 0.60 - 1.30 mg/dL    Glucose 132 65 - 140 mg/dL    Calcium 9.9 8.4 - 10.2 mg/dL    eGFR 96 ml/min/1.73sq m   Basic metabolic panel    Collection Time: 11/05/23  6:15 AM   Result Value Ref Range    Sodium 134 (L) 135 - 147 mmol/L    Potassium 4.5 3.5 - 5.3 mmol/L    Chloride 100 96 - 108 mmol/L    CO2 31 21 - 32 mmol/L    ANION GAP 3 mmol/L    BUN 12 5 - 25 mg/dL    Creatinine 0.93 0.60 - 1.30 mg/dL    Glucose 102 65 - 140 mg/dL    Calcium 10.0 8.4 - 10.2 mg/dL    eGFR 88 ml/min/1.73sq m   Fingerstick Glucose (POCT)    Collection Time: 11/05/23  8:10 AM   Result Value Ref Range    POC Glucose 96 65 - 140 mg/dl         Echo complete w/ contrast if indicated    Result Date: 11/4/2023  Narrative:   Left Ventricle: Left ventricular cavity size is normal. Wall thickness is mildly increased. There is concentric remodeling. The left ventricular ejection fraction is 60%. Systolic function is normal. Global longitudinal strain is normal at -20%.  Wall motion is normal. Diastolic function is normal.   Right Ventricle: Right ventricular cavity size is normal. Systolic function is normal.   Aortic Valve: There is mild to moderate regurgitation with a centrally directed jet. Tricuspid Valve: There is mild regurgitation. Strain was performed to quantify interventricular dyssynchrony and evaluate components of myocardial function due to pre Chemotherapy. Results from the utilization of Strain Analysis are listed in the report below. CT stroke alert brain    Result Date: 11/3/2023  Narrative: CT BRAIN - STROKE ALERT PROTOCOL INDICATION:   Stroke Alert. COMPARISON: 8/4/2023 TECHNIQUE:  CT examination of the brain was performed. In addition to axial images, coronal reformatted images were created and submitted for interpretation. Radiation dose length product (DLP) for this visit:  1022 mGy-cm . This examination, like all CT scans performed in the [facility], was performed utilizing techniques to minimize radiation dose exposure, including the use of iterative reconstruction and automated exposure control. IMAGE QUALITY:  Diagnostic. FINDINGS: PARENCHYMA: Questionable low-attenuation involving the right paramedian karla versus beam hardening artifact. Age-related involutional change. Mild chronic microvascular ischemic change. Atherosclerotic intracranial calcification. Limited evaluation of the karla secondary to beam hardening artifact. Normal intracranial vasculature. VENTRICLES AND EXTRA-AXIAL SPACES:  Normal for the patient's age. VISUALIZED ORBITS: Normal visualized orbits. PARANASAL SINUSES: Normal visualized paranasal sinuses. CALVARIUM AND EXTRACRANIAL SOFT TISSUES:   Normal.     Impression: Questionable lacunar ischemia involving the right paramedian karla versus beam hardening artifact. Correlation with MRI is recommended.  I personally discussed this study with Micheal Richards on 11/3/2023 8:12 PM. Workstation performed: [id number]     CTA stroke alert (head/neck)    Result Date: 11/3/2023  Narrative: CTA NECK AND BRAIN WITH CONTRAST INDICATION: Stroke Alert COMPARISON:   9/9/2023 TECHNIQUE:   Post contrast imaging was performed after administration of iodinated contrast through the neck and brain. Post contrast axial 0.625 mm images timed to opacify the arterial system. 3D rendering was performed on an independent workstation. MIP reconstructions performed. Coronal reconstructions were performed of the noncontrast portion of the brain. Radiation dose length product (DLP) for this visit:  1228 mGy-cm . This examination, like all CT scans performed in the [facility], was performed utilizing techniques to minimize radiation dose exposure, including the use of iterative reconstruction and automated exposure control. IV Contrast:  85 mL of iohexol (OMNIPAQUE) IMAGE QUALITY:   Diagnostic FINDINGS: CERVICAL VASCULATURE AORTIC ARCH AND GREAT VESSELS:  Mild atherosclerotic disease of the arch, proximal great vessels and visualized subclavian vessels. No significant stenosis. RIGHT VERTEBRAL ARTERY CERVICAL SEGMENT: Atherosclerotic plaque at the origin on the right with severe stenosis. The vessel is normal in caliber throughout the neck. LEFT VERTEBRAL ARTERY CERVICAL SEGMENT: Mild atherosclerotic plaque at the origin. No evidence for high-grade stenosis or focal occlusion. The vessel is normal in caliber throughout the neck. RIGHT EXTRACRANIAL CAROTID SEGMENT: Moderate atherosclerotic disease of the bifurcation. LEFT EXTRACRANIAL CAROTID SEGMENT: Moderate atherosclerotic disease of the bifurcation. NASCET criteria was used to determine the degree of internal carotid artery diameter stenosis. INTRACRANIAL VASCULATURE INTERNAL CAROTID ARTERIES: Atherosclerotic plaque along the cavernous and supraclinoid segments of the internal carotid arteries. No evidence for high-grade stenosis or focal occlusion. ANTERIOR CIRCULATION:  Symmetric A1 segments and anterior cerebral arteries with normal enhancement.   Normal anterior communicating artery. MIDDLE CEREBRAL ARTERY CIRCULATION:  M1 segment and middle cerebral artery branches demonstrate normal enhancement bilaterally. DISTAL VERTEBRAL ARTERIES:  Normal distal vertebral arteries. Posterior inferior cerebellar artery origins are normal. Normal vertebral basilar junction. BASILAR ARTERY:  Basilar artery is normal in caliber. Normal superior cerebellar arteries. POSTERIOR CEREBRAL ARTERIES: Both posterior cerebral arteries arises from the basilar tip. Both arteries demonstrate normal enhancement. There is a right-sided posterior communicating artery. VENOUS STRUCTURES: There is segmental severe compression versus occlusion of the right internal jugular vein in the region of adenopathy described below. NON VASCULAR ANATOMY BONY STRUCTURES:  No acute osseous abnormality. SOFT TISSUES OF THE NECK: The right oral tongue cancer crossing the midline appears worse since the prior examination measuring approximately 2.5 x 3.7 cm. There is erosive change of the anterior right mandibular body and symphysis men type which may be secondary to tumoral erosion or posttreatment in nature. Patient is edentulous. There is worsening adenopathy described below on series 304: - 4.1 x 2.9 cm necrotic ill-defined right-sided level 2 adenopathy on image 121. Previously this measured 3.5 x 2.4 cm. - Right level 1B adenopathy measures 2.3 x 1.9 cm on image 119 and previously measured 1.6 x 0.9 cm. - 1.7 x 1.7 cm left level 1B adenopathy previously measured 1.7 x 1.2 cm. - Right level 2/3 ill-defined adenopathy on image 108 measures 2.1 x 1.5 cm and previously measured 1.6 x 1.4 cm. THORACIC INLET: There is emphysema. Impression: No definite evidence for high-grade stenosis, major branch vessel occlusion or vascular aneurysm of the cervical or intracranial vessels. Segmental severe stenosis versus occlusion of the right internal jugular vein, worsened and secondary to adjacent adenopathy.  Interval worsening of the right oral tongue mass as well as cervical adenopathy as described above. I personally discussed this study with Daniel Fernández on 11/3/2023 8:12 PM. Workstation performed: [id number]     ECOG :1      Assessment and plan: Moderately differentiated squamous cell carcinoma of the right tongue with bilateral cervical lymphadenopathy at least stage Eric with erosion into the mandibular bone    He presented with right-sided weakness, CT scan stroke protocol showed no evidence of masses or hemorrhage    CT scan of the neck showed enlarging right tongue lesion, with erosion into the mandibular, enlarged bilateral cervical lymphadenopathy especially in the right side measuring up to 4.3 cm    Upper CT scan of the chest showed extensive emphysematous changes with spiculated right upper lobe lung mass    1. We will do CAT scan of the chest abdomen pelvis with IV contrast to determine if he has metastatic disease  2. If none patient needs to be treated with concurrent radiation therapy and cisplatin at 40 mg per metered square weekly as soon as possible  3. Mild hypercalcemia secondary to squamous cell carcinoma  4. He needs  evaluation  5.   Alcohol abuse/nicotine abuse

## 2023-11-05 NOTE — CONSULTS
Oncology Consult Note  Anh Steven 59 y.o. 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 provider found   78-year-old  male with past medical history for hypertension, nicotine abuse, alcohol abuse, he was noticed to have right oral mouth lesion by his dentist, CT scan of the head on 6/2/2023 showed scattered cervical lymphadenopathy    PET scan showed hypermetabolic right anterior oral cavity mass concerning for malignancy bilateral hypermetabolic cervical lymphadenopathy, no hypermetabolic metastasis in the chest abdomen and pelvis, multiple healing left rib fractures    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 negative for ELIE, he supposed to start concurrent radiation therapy with weekly cisplatin at 40 mg per metered square, pending Port-A-Cath placement    He presented with right-sided weakness, significant pain in the right mandibular area    CT stroke alert of the brain on 11/3/2023 showed no evidence of stroke however 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    Worsening adenopathy with 4.1 x 2.9 cm necrotic ill-defined right-sided level 2 adenopathy, right oral tongue cancer crossing the midline worse from prior examination with 3.7 x 2.5 cm with erosive changes of the anterior right mandibular body and symphysis    Upper chest area showed significant emphysematous changes with possible spiculated mass in the right upper lobe    He has been losing weight about 20 pounds    Denied any headache blurred vision diplopia he reported exertional dyspnea and right cervical pain  Review of Systems - As stated in the HPI otherwise the fourteen point review of systems was negative.     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       Social History     Socioeconomic History    Marital status: Single     Spouse name: Not on file    Number of children: Not on file    Years of education: Not on file    Highest education level: Not on file   Occupational History    Not on file   Tobacco Use    Smoking status: Every Day     Packs/day: 0.25     Types: Cigarettes    Smokeless tobacco: Never   Vaping Use    Vaping Use: Never used   Substance and Sexual Activity    Alcohol use: Yes     Comment: 3-4 beers/day 24 oz beer    Drug use: No    Sexual activity: Not on file   Other Topics Concern    Not on file   Social History Narrative    Not on file     Social Determinants of Health     Financial Resource Strain: Not on file   Food Insecurity: Not on file   Transportation Needs: Not on file   Physical Activity: Not on file   Stress: Not on file   Social Connections: Not on file   Intimate Partner Violence: Not on file   Housing Stability: Not on file       Family History   Problem Relation Age of Onset    Breast cancer Mother     Cancer Father        Allergies   Allergen Reactions    Bee Venom Hives    Other      bees         Current Facility-Administered Medications:     acetaminophen (TYLENOL) tablet 975 mg, 975 mg, Oral, Q8H [address], [place] No DO, 975 mg at 11/05/23 0853    atorvastatin (LIPITOR) tablet 40 mg, 40 mg, Oral, QPM, Vale Horn MD, 40 mg at 11/04/23 1830    enoxaparin (LOVENOX) subcutaneous injection 40 mg, 40 mg, Subcutaneous, Daily, Vale Horn MD, 40 mg at 41/90/40 0306    folic acid (FOLVITE) tablet 1 mg, 1 mg, Oral, Daily, Jose Ramon MD, 1 mg at 11/05/23 0853    multivitamin-minerals (CENTRUM) tablet 1 tablet, 1 tablet, Oral, Daily, Jose Ramon MD, 1 tablet at 11/05/23 0853    oxyCODONE (OxyCONTIN) 12 hr tablet 10 mg, 10 mg, Oral, Q8H [address], Vale Horn MD, 10 mg at 11/05/23 0606    sodium chloride 0.9 % infusion, 100 mL/hr, Intravenous, Continuous, Domonique Rubio DO    thiamine tablet 100 mg, 100 mg, Oral, Daily, Shivani Dan MD, 100 mg at 11/05/23 0853      /86   Pulse 70   Temp (!) 97.3 °F (36.3 °C)   Resp 17   Ht 6' 3" (1.905 m)   Wt 66.7 kg (147 lb)   SpO2 98%   BMI 18.37 kg/m²       General Appearance:    Alert, oriented, cachectic, tremor       Eyes:    PERRL   Ears:    Normal external ear canals, both ears   Nose:   Nares normal, septum midline   Throat:   Mucosa moist. Pharynx without injection. Neck:   Supple       Lungs:   Distant breath sounds bilaterally   Chest Wall:    No tenderness or deformity    Heart:    Regular rate and rhythm       Abdomen:     Soft, non-tender, bowel sounds +, no organomegaly           Extremities:   Extremities no cyanosis or edema       Skin:   no rash or icterus.     Lymph nodes: Large right cervical lymph nodes measuring up to 5 x 5 cm also in the submandibular area however, supraclavicular, and axillary nodes normal, enlarged right jugular vein   Neurologic:   CNII-XII intact, normal strength, sensation and reflexes     Throughout               Recent Results (from the past 48 hour(s))   Fingerstick Glucose (POCT)    Collection Time: 11/03/23  7:39 PM   Result Value Ref Range    POC Glucose 87 65 - 140 mg/dl   Basic metabolic panel    Collection Time: 11/03/23  7:48 PM   Result Value Ref Range    Sodium 128 (L) 135 - 147 mmol/L    Potassium 3.7 3.5 - 5.3 mmol/L    Chloride 95 (L) 96 - 108 mmol/L    CO2 23 21 - 32 mmol/L    ANION GAP 10 mmol/L    BUN 10 5 - 25 mg/dL    Creatinine 0.67 0.60 - 1.30 mg/dL    Glucose 85 65 - 140 mg/dL    Calcium 9.4 8.4 - 10.2 mg/dL    eGFR 104 ml/min/1.73sq m   CBC and Platelet    Collection Time: 11/03/23  7:48 PM   Result Value Ref Range    WBC 8.47 4.31 - 10.16 Thousand/uL    RBC 4.15 3.88 - 5.62 Million/uL    Hemoglobin 13.8 12.0 - 17.0 g/dL    Hematocrit 40.7 36.5 - 49.3 %    MCV 98 82 - 98 fL    MCH 33.3 26.8 - 34.3 pg    MCHC 33.9 31.4 - 37.4 g/dL    RDW 12.4 11.6 - 15.1 %    Platelets 297 449 - 320 Thousands/uL    MPV 9.1 8.9 - 12.7 fL   Protime-INR    Collection Time: 11/03/23  7:48 PM   Result Value Ref Range    Protime 12.7 11.6 - 14.5 seconds    INR 0.90 0.84 - 1.19   APTT    Collection Time: 11/03/23  7:48 PM   Result Value Ref Range    PTT 28 23 - 37 seconds   HS Troponin 0hr (reflex protocol)    Collection Time: 11/03/23  7:48 PM   Result Value Ref Range    hs TnI 0hr 10 "Refer to ACS Flowchart"- see link ng/L   FLU/RSV/COVID - if FLU/RSV clinically relevant    Collection Time: 11/03/23  7:48 PM    Specimen: Nose; Nares   Result Value Ref Range    SARS-CoV-2 Negative Negative    INFLUENZA A PCR Negative Negative    INFLUENZA B PCR Negative Negative    RSV PCR Negative Negative   Ethanol    Collection Time: 11/03/23  7:48 PM   Result Value Ref Range    Ethanol Lvl 190 (H) <65 mg/dL   Salicylate level    Collection Time: 11/03/23  7:48 PM   Result Value Ref Range    Salicylate Lvl <5 3 - 20 mg/dL   Acetaminophen level-If concentration is detectable, please discuss with medical  on call. Collection Time: 11/03/23  7:48 PM   Result Value Ref Range    Acetaminophen Level <10 (L) 10 - 20 ug/mL   HS Troponin I 2hr    Collection Time: 11/04/23  5:33 AM   Result Value Ref Range    hs TnI 2hr 12.3 "Refer to ACS Flowchart"- see link ng/L    Delta 2hr hsTnI 2.3 <20 ng/L   Lipid Panel with Direct LDL reflex    Collection Time: 11/04/23  5:33 AM   Result Value Ref Range    Cholesterol 141 See Comment mg/dL    Triglycerides 75 See Comment mg/dL    HDL, Direct 59 >=40 mg/dL    LDL Calculated 67 0 - 100 mg/dL   Hemoglobin A1c w/EAG Estimation    Collection Time: 11/04/23  5:33 AM   Result Value Ref Range    Hemoglobin A1C 5.9 (H) Normal 4.0-5.6%; PreDiabetic 5.7-6.4%;  Diabetic >=6.5%; Glycemic control for adults with diabetes <7.0% %     mg/dl   Basic metabolic panel    Collection Time: 11/04/23  8:47 AM   Result Value Ref Range    Sodium 133 (L) 135 - 147 mmol/L    Potassium 4.4 3.5 - 5.3 mmol/L    Chloride 99 96 - 108 mmol/L    CO2 28 21 - 32 mmol/L    ANION GAP 6 mmol/L    BUN 11 5 - 25 mg/dL    Creatinine 0.81 0.60 - 1.30 mg/dL    Glucose 127 65 - 140 mg/dL    Calcium 9.6 8.4 - 10.2 mg/dL    eGFR 96 ml/min/1.73sq m   Hepatic function panel    Collection Time: 11/04/23  8:47 AM   Result Value Ref Range    Total Bilirubin 0.28 0.20 - 1.00 mg/dL    Bilirubin, Direct 0.08 0.00 - 0.20 mg/dL    Alkaline Phosphatase 69 34 - 104 U/L    AST 13 13 - 39 U/L    ALT 10 7 - 52 U/L    Total Protein 7.4 6.4 - 8.4 g/dL    Albumin 3.8 3.5 - 5.0 g/dL   Echo complete w/ contrast if indicated    Collection Time: 11/04/23 11:37 AM   Result Value Ref Range    A4C EF 57 %    LVIDd 4.20 cm    LVIDS 2.90 cm    IVSd 1.10 cm    LVPWd 1.00 cm    FS 31 28 - 44    MV E' Tissue Velocity Septal 10 cm/s    LA Volume Index (BP) 28.0 mL/m2    E/A ratio 1.62     E wave deceleration time 153 ms    MV Peak E Almas 63 cm/s    MV Peak A Almas 0.39 m/s    RVID d 2.8 cm    Tricuspid annular plane systolic excursion 8.62 cm    LA size 3.1 cm    LA length (A2C) 5.80 cm    LA volume (BP) 54 mL    RA 2D Volume 29.0 mL    RAA A4C 13.3 cm2    AV Deceleration Time 1,721 ms    AV regurgitation pressure 1/2 time 499 ms    MV stenosis pressure 1/2 time 44 ms    MV valve area p 1/2 method 5.00     Ao root 3.20 cm    AV peak gradient 103 mmHg    Tricuspid valve peak regurgitation velocity 2.20 m/s    Left ventricular stroke volume (2D) 44.00 mL    IVS 1.1 cm    LEFT VENTRICLE SYSTOLIC VOLUME (MOD BIPLANE) 2D 33 mL    LV DIASTOLIC VOLUME (MOD BIPLANE) 2D 77 mL    Left Atrium Area-systolic Four Chamber 87.6 cm2    Left Atrium Area-systolic Apical Two Chamber 20.4 cm2    LVSV, 2D 44 mL    LV EF 60     GLS -20 %   Basic metabolic panel    Collection Time: 11/04/23 12:15 PM   Result Value Ref Range    Sodium 134 (L) 135 - 147 mmol/L    Potassium 4.3 3.5 - 5.3 mmol/L    Chloride 100 96 - 108 mmol/L    CO2 28 21 - 32 mmol/L ANION GAP 6 mmol/L    BUN 12 5 - 25 mg/dL    Creatinine 0.78 0.60 - 1.30 mg/dL    Glucose 129 65 - 140 mg/dL    Calcium 9.6 8.4 - 10.2 mg/dL    eGFR 98 ml/min/1.73sq m   Fingerstick Glucose (POCT)    Collection Time: 11/04/23  4:26 PM   Result Value Ref Range    POC Glucose 120 65 - 140 mg/dl   HS Troponin I 4hr    Collection Time: 11/04/23  8:40 PM   Result Value Ref Range    hs TnI 4hr 10 "Refer to ACS Flowchart"- see link ng/L    Delta 4hr hsTnI     Platelet count    Collection Time: 11/04/23  8:40 PM   Result Value Ref Range    Platelets 828 342 - 278 Thousands/uL    MPV 9.2 8.9 - 12.7 fL   Basic metabolic panel    Collection Time: 11/04/23  8:40 PM   Result Value Ref Range    Sodium 134 (L) 135 - 147 mmol/L    Potassium 3.9 3.5 - 5.3 mmol/L    Chloride 101 96 - 108 mmol/L    CO2 26 21 - 32 mmol/L    ANION GAP 7 mmol/L    BUN 14 5 - 25 mg/dL    Creatinine 0.81 0.60 - 1.30 mg/dL    Glucose 132 65 - 140 mg/dL    Calcium 9.9 8.4 - 10.2 mg/dL    eGFR 96 ml/min/1.73sq m   Basic metabolic panel    Collection Time: 11/05/23  6:15 AM   Result Value Ref Range    Sodium 134 (L) 135 - 147 mmol/L    Potassium 4.5 3.5 - 5.3 mmol/L    Chloride 100 96 - 108 mmol/L    CO2 31 21 - 32 mmol/L    ANION GAP 3 mmol/L    BUN 12 5 - 25 mg/dL    Creatinine 0.93 0.60 - 1.30 mg/dL    Glucose 102 65 - 140 mg/dL    Calcium 10.0 8.4 - 10.2 mg/dL    eGFR 88 ml/min/1.73sq m   Fingerstick Glucose (POCT)    Collection Time: 11/05/23  8:10 AM   Result Value Ref Range    POC Glucose 96 65 - 140 mg/dl         Echo complete w/ contrast if indicated    Result Date: 11/4/2023  Narrative:   Left Ventricle: Left ventricular cavity size is normal. Wall thickness is mildly increased. There is concentric remodeling. The left ventricular ejection fraction is 60%. Systolic function is normal. Global longitudinal strain is normal at -20%.  Wall motion is normal. Diastolic function is normal.   Right Ventricle: Right ventricular cavity size is normal. Systolic function is normal.   Aortic Valve: There is mild to moderate regurgitation with a centrally directed jet. Tricuspid Valve: There is mild regurgitation. Strain was performed to quantify interventricular dyssynchrony and evaluate components of myocardial function due to pre Chemotherapy. Results from the utilization of Strain Analysis are listed in the report below. CT stroke alert brain    Result Date: 11/3/2023  Narrative: CT BRAIN - STROKE ALERT PROTOCOL INDICATION:   Stroke Alert. COMPARISON: 8/4/2023 TECHNIQUE:  CT examination of the brain was performed. In addition to axial images, coronal reformatted images were created and submitted for interpretation. Radiation dose length product (DLP) for this visit:  1022 mGy-cm . This examination, like all CT scans performed in the [facility], was performed utilizing techniques to minimize radiation dose exposure, including the use of iterative reconstruction and automated exposure control. IMAGE QUALITY:  Diagnostic. FINDINGS: PARENCHYMA: Questionable low-attenuation involving the right paramedian karla versus beam hardening artifact. Age-related involutional change. Mild chronic microvascular ischemic change. Atherosclerotic intracranial calcification. Limited evaluation of the karla secondary to beam hardening artifact. Normal intracranial vasculature. VENTRICLES AND EXTRA-AXIAL SPACES:  Normal for the patient's age. VISUALIZED ORBITS: Normal visualized orbits. PARANASAL SINUSES: Normal visualized paranasal sinuses. CALVARIUM AND EXTRACRANIAL SOFT TISSUES:   Normal.     Impression: Questionable lacunar ischemia involving the right paramedian karla versus beam hardening artifact. Correlation with MRI is recommended.  I personally discussed this study with Cleo Brandt on 11/3/2023 8:12 PM. Workstation performed: [id number]     CTA stroke alert (head/neck)    Result Date: 11/3/2023  Narrative: CTA NECK AND BRAIN WITH CONTRAST INDICATION: Stroke Alert COMPARISON:   9/9/2023 TECHNIQUE:   Post contrast imaging was performed after administration of iodinated contrast through the neck and brain. Post contrast axial 0.625 mm images timed to opacify the arterial system. 3D rendering was performed on an independent workstation. MIP reconstructions performed. Coronal reconstructions were performed of the noncontrast portion of the brain. Radiation dose length product (DLP) for this visit:  1228 mGy-cm . This examination, like all CT scans performed in the [facility], was performed utilizing techniques to minimize radiation dose exposure, including the use of iterative reconstruction and automated exposure control. IV Contrast:  85 mL of iohexol (OMNIPAQUE) IMAGE QUALITY:   Diagnostic FINDINGS: CERVICAL VASCULATURE AORTIC ARCH AND GREAT VESSELS:  Mild atherosclerotic disease of the arch, proximal great vessels and visualized subclavian vessels. No significant stenosis. RIGHT VERTEBRAL ARTERY CERVICAL SEGMENT: Atherosclerotic plaque at the origin on the right with severe stenosis. The vessel is normal in caliber throughout the neck. LEFT VERTEBRAL ARTERY CERVICAL SEGMENT: Mild atherosclerotic plaque at the origin. No evidence for high-grade stenosis or focal occlusion. The vessel is normal in caliber throughout the neck. RIGHT EXTRACRANIAL CAROTID SEGMENT: Moderate atherosclerotic disease of the bifurcation. LEFT EXTRACRANIAL CAROTID SEGMENT: Moderate atherosclerotic disease of the bifurcation. NASCET criteria was used to determine the degree of internal carotid artery diameter stenosis. INTRACRANIAL VASCULATURE INTERNAL CAROTID ARTERIES: Atherosclerotic plaque along the cavernous and supraclinoid segments of the internal carotid arteries. No evidence for high-grade stenosis or focal occlusion. ANTERIOR CIRCULATION:  Symmetric A1 segments and anterior cerebral arteries with normal enhancement.   Normal anterior communicating artery. MIDDLE CEREBRAL ARTERY CIRCULATION:  M1 segment and middle cerebral artery branches demonstrate normal enhancement bilaterally. DISTAL VERTEBRAL ARTERIES:  Normal distal vertebral arteries. Posterior inferior cerebellar artery origins are normal. Normal vertebral basilar junction. BASILAR ARTERY:  Basilar artery is normal in caliber. Normal superior cerebellar arteries. POSTERIOR CEREBRAL ARTERIES: Both posterior cerebral arteries arises from the basilar tip. Both arteries demonstrate normal enhancement. There is a right-sided posterior communicating artery. VENOUS STRUCTURES: There is segmental severe compression versus occlusion of the right internal jugular vein in the region of adenopathy described below. NON VASCULAR ANATOMY BONY STRUCTURES:  No acute osseous abnormality. SOFT TISSUES OF THE NECK: The right oral tongue cancer crossing the midline appears worse since the prior examination measuring approximately 2.5 x 3.7 cm. There is erosive change of the anterior right mandibular body and symphysis men type which may be secondary to tumoral erosion or posttreatment in nature. Patient is edentulous. There is worsening adenopathy described below on series 304: - 4.1 x 2.9 cm necrotic ill-defined right-sided level 2 adenopathy on image 121. Previously this measured 3.5 x 2.4 cm. - Right level 1B adenopathy measures 2.3 x 1.9 cm on image 119 and previously measured 1.6 x 0.9 cm. - 1.7 x 1.7 cm left level 1B adenopathy previously measured 1.7 x 1.2 cm. - Right level 2/3 ill-defined adenopathy on image 108 measures 2.1 x 1.5 cm and previously measured 1.6 x 1.4 cm. THORACIC INLET: There is emphysema. Impression: No definite evidence for high-grade stenosis, major branch vessel occlusion or vascular aneurysm of the cervical or intracranial vessels. Segmental severe stenosis versus occlusion of the right internal jugular vein, worsened and secondary to adjacent adenopathy.  Interval worsening of the right oral tongue mass as well as cervical adenopathy as described above. I personally discussed this study with Deisy Boyd on 11/3/2023 8:12 PM. Workstation performed: [id number]     ECOG :1      Assessment and plan: Moderately differentiated squamous cell carcinoma of the right tongue with bilateral cervical lymphadenopathy at least stage Eric with erosion into the mandibular bone    He presented with right-sided weakness, CT scan stroke protocol showed no evidence of masses or hemorrhage    CT scan of the neck showed enlarging right tongue lesion, with erosion into the mandibular, enlarged bilateral cervical lymphadenopathy especially in the right side measuring up to 4.3 cm    Upper CT scan of the chest showed extensive emphysematous changes with spiculated right upper lobe lung mass    1. We will do CAT scan of the chest abdomen pelvis with IV contrast to determine if he has metastatic disease  2. If none patient needs to be treated with concurrent radiation therapy and cisplatin at 40 mg per metered square weekly as soon as possible  3. Mild hypercalcemia secondary to squamous cell carcinoma  4. He needs  evaluation  5.   Alcohol abuse/nicotine abuse

## 2023-11-06 ENCOUNTER — PATIENT OUTREACH (OUTPATIENT)
Dept: HEMATOLOGY ONCOLOGY | Facility: CLINIC | Age: 58
End: 2023-11-06

## 2023-11-06 PROBLEM — I63.81 RIGHT-SIDED LACUNAR STROKE (HCC): Status: ACTIVE | Noted: 2023-11-03

## 2023-11-06 LAB
ANION GAP SERPL CALCULATED.3IONS-SCNC: 5 MMOL/L
ANION GAP SERPL CALCULATED.3IONS-SCNC: 5 MMOL/L
ATRIAL RATE: 74 BPM
BASOPHILS # BLD MANUAL: 0.09 THOUSAND/UL (ref 0–0.1)
BASOPHILS NFR MAR MANUAL: 1 % (ref 0–1)
BUN SERPL-MCNC: 13 MG/DL (ref 5–25)
BUN SERPL-MCNC: 13 MG/DL (ref 5–25)
CALCIUM SERPL-MCNC: 9.4 MG/DL (ref 8.4–10.2)
CALCIUM SERPL-MCNC: 9.4 MG/DL (ref 8.4–10.2)
CHLORIDE SERPL-SCNC: 102 MMOL/L (ref 96–108)
CHLORIDE SERPL-SCNC: 99 MMOL/L (ref 96–108)
CO2 SERPL-SCNC: 28 MMOL/L (ref 21–32)
CO2 SERPL-SCNC: 30 MMOL/L (ref 21–32)
CREAT SERPL-MCNC: 0.75 MG/DL (ref 0.6–1.3)
CREAT SERPL-MCNC: 0.81 MG/DL (ref 0.6–1.3)
EOSINOPHIL # BLD MANUAL: 0.27 THOUSAND/UL (ref 0–0.4)
EOSINOPHIL NFR BLD MANUAL: 3 % (ref 0–6)
ERYTHROCYTE [DISTWIDTH] IN BLOOD BY AUTOMATED COUNT: 12.3 % (ref 11.6–15.1)
GFR SERPL CREATININE-BSD FRML MDRD: 96 ML/MIN/1.73SQ M
GFR SERPL CREATININE-BSD FRML MDRD: 99 ML/MIN/1.73SQ M
GLUCOSE SERPL-MCNC: 126 MG/DL (ref 65–140)
GLUCOSE SERPL-MCNC: 93 MG/DL (ref 65–140)
GLUCOSE SERPL-MCNC: 97 MG/DL (ref 65–140)
GLUCOSE SERPL-MCNC: 97 MG/DL (ref 65–140)
HCT VFR BLD AUTO: 40.6 % (ref 36.5–49.3)
HGB BLD-MCNC: 13.6 G/DL (ref 12–17)
LYMPHOCYTES # BLD AUTO: 1.97 THOUSAND/UL (ref 0.6–4.47)
LYMPHOCYTES # BLD AUTO: 22 % (ref 14–44)
MCH RBC QN AUTO: 33.7 PG (ref 26.8–34.3)
MCHC RBC AUTO-ENTMCNC: 33.5 G/DL (ref 31.4–37.4)
MCV RBC AUTO: 101 FL (ref 82–98)
MONOCYTES # BLD AUTO: 0.99 THOUSAND/UL (ref 0–1.22)
MONOCYTES NFR BLD: 11 % (ref 4–12)
NEUTROPHILS # BLD MANUAL: 5.65 THOUSAND/UL (ref 1.85–7.62)
NEUTS SEG NFR BLD AUTO: 63 % (ref 43–75)
P AXIS: 76 DEGREES
PLATELET # BLD AUTO: 302 THOUSANDS/UL (ref 149–390)
PLATELET BLD QL SMEAR: ADEQUATE
PMV BLD AUTO: 9.1 FL (ref 8.9–12.7)
POTASSIUM SERPL-SCNC: 3.8 MMOL/L (ref 3.5–5.3)
POTASSIUM SERPL-SCNC: 4.2 MMOL/L (ref 3.5–5.3)
PR INTERVAL: 120 MS
QRS AXIS: 76 DEGREES
QRSD INTERVAL: 90 MS
QT INTERVAL: 388 MS
QTC INTERVAL: 430 MS
RBC # BLD AUTO: 4.04 MILLION/UL (ref 3.88–5.62)
RBC MORPH BLD: NORMAL
SODIUM SERPL-SCNC: 134 MMOL/L (ref 135–147)
SODIUM SERPL-SCNC: 135 MMOL/L (ref 135–147)
T WAVE AXIS: 75 DEGREES
VENTRICULAR RATE: 74 BPM
WBC # BLD AUTO: 8.97 THOUSAND/UL (ref 4.31–10.16)

## 2023-11-06 PROCEDURE — 85007 BL SMEAR W/DIFF WBC COUNT: CPT

## 2023-11-06 PROCEDURE — 80048 BASIC METABOLIC PNL TOTAL CA: CPT | Performed by: INTERNAL MEDICINE

## 2023-11-06 PROCEDURE — 99239 HOSP IP/OBS DSCHRG MGMT >30: CPT | Performed by: INTERNAL MEDICINE

## 2023-11-06 PROCEDURE — 93010 ELECTROCARDIOGRAM REPORT: CPT | Performed by: INTERNAL MEDICINE

## 2023-11-06 PROCEDURE — 85027 COMPLETE CBC AUTOMATED: CPT

## 2023-11-06 PROCEDURE — 82948 REAGENT STRIP/BLOOD GLUCOSE: CPT

## 2023-11-06 PROCEDURE — 99232 SBSQ HOSP IP/OBS MODERATE 35: CPT | Performed by: PSYCHIATRY & NEUROLOGY

## 2023-11-06 PROCEDURE — 99232 SBSQ HOSP IP/OBS MODERATE 35: CPT | Performed by: INTERNAL MEDICINE

## 2023-11-06 RX ORDER — CLOPIDOGREL BISULFATE 75 MG/1
75 TABLET ORAL DAILY
Qty: 21 TABLET | Refills: 0 | Status: SHIPPED | OUTPATIENT
Start: 2023-11-06 | End: 2023-11-06 | Stop reason: SDUPTHER

## 2023-11-06 RX ORDER — CLOPIDOGREL BISULFATE 75 MG/1
75 TABLET ORAL DAILY
Qty: 21 TABLET | Refills: 0 | Status: SHIPPED | OUTPATIENT
Start: 2023-11-06 | End: 2023-11-27

## 2023-11-06 RX ORDER — ASPIRIN 81 MG/1
81 TABLET, CHEWABLE ORAL DAILY
Qty: 90 TABLET | Refills: 0 | Status: SHIPPED | OUTPATIENT
Start: 2023-11-06 | End: 2023-11-06 | Stop reason: SDUPTHER

## 2023-11-06 RX ORDER — ATORVASTATIN CALCIUM 40 MG/1
40 TABLET, FILM COATED ORAL EVERY EVENING
Qty: 30 TABLET | Refills: 0 | Status: SHIPPED | OUTPATIENT
Start: 2023-11-06 | End: 2023-12-06

## 2023-11-06 RX ORDER — DIPHENHYDRAMINE HYDROCHLORIDE AND LIDOCAINE HYDROCHLORIDE AND ALUMINUM HYDROXIDE AND MAGNESIUM HYDRO
10 KIT EVERY 4 HOURS PRN
Qty: 237 ML | Refills: 0 | Status: SHIPPED | OUTPATIENT
Start: 2023-11-06 | End: 2023-11-16 | Stop reason: SDUPTHER

## 2023-11-06 RX ORDER — ASPIRIN 81 MG/1
81 TABLET, CHEWABLE ORAL DAILY
Qty: 90 TABLET | Refills: 0 | Status: SHIPPED | OUTPATIENT
Start: 2023-11-06 | End: 2024-02-04

## 2023-11-06 RX ORDER — AMOXICILLIN 250 MG
1 CAPSULE ORAL 2 TIMES DAILY
Status: DISCONTINUED | OUTPATIENT
Start: 2023-11-06 | End: 2023-11-06 | Stop reason: HOSPADM

## 2023-11-06 RX ORDER — POLYETHYLENE GLYCOL 3350 17 G/17G
17 POWDER, FOR SOLUTION ORAL DAILY
Status: DISCONTINUED | OUTPATIENT
Start: 2023-11-06 | End: 2023-11-06 | Stop reason: HOSPADM

## 2023-11-06 RX ORDER — ATORVASTATIN CALCIUM 40 MG/1
40 TABLET, FILM COATED ORAL EVERY EVENING
Qty: 30 TABLET | Refills: 0 | Status: SHIPPED | OUTPATIENT
Start: 2023-11-06 | End: 2023-11-06 | Stop reason: SDUPTHER

## 2023-11-06 RX ADMIN — OXYCODONE HYDROCHLORIDE 10 MG: 10 TABLET, FILM COATED, EXTENDED RELEASE ORAL at 06:37

## 2023-11-06 RX ADMIN — Medication 100 MG: at 08:25

## 2023-11-06 RX ADMIN — FOLIC ACID 1 MG: 1 TABLET ORAL at 08:25

## 2023-11-06 RX ADMIN — ACETAMINOPHEN 975 MG: 325 TABLET, FILM COATED ORAL at 06:37

## 2023-11-06 RX ADMIN — MULTIPLE VITAMINS W/ MINERALS TAB 1 TABLET: TAB ORAL at 08:25

## 2023-11-06 NOTE — PROGRESS NOTES
NEUROLOGY RESIDENCY PROGRESS NOTE     Name: Christiano Tello   Age & Sex: 59 y.o. male   MRN: [id number]  Unit/Bed#: S -01   Encounter: [id number]    Christiano Tello will need follow up in 4-6 weeks with neurovascular attending/resident or AP . He will not require outpatient neurological testing. Pending for discharge: None from neurology standpoint. Will defer to primary team    ASSESSMENT & PLAN     * Right-sided lacunar stroke [facility])  Assessment & Plan  59 y.o. 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 Initial /99. NIHSS 7. Patient stated that for the past few days he has been having weakness in his L leg but was able to ambulate and did not seek medical attention. As a result of unclear LKW pt was determined to not be a candidate for thrombolysis (TNK). Exam on 11/3/2023: dysarthria (patient doesn't feel worse than his baseline), LUE and LLE weakness 4/5, LUE drift  Vascular risk factors: ETOH use, past smoking hx, HTN, and oral cancer   Home meds: NO AC/AP    Workup:   A1C 5.9. LDL 67  CTH: Questionable lacunar ischemia involving the R paramedian karla vs beam hardening artifact. CTA: No LVO. Segmental severe stenosis vs occlusion of the R internal jugular vein, worsened and secondary to adjacent adenopathy. Interval worsening of the right oral tongue mass as well as cervical adenopathy  MRI: subcentimeter acute/subacute R pontine infarct  Echocardiogram: LVEF 60%.  Normal L atrium size  Telemetry: no events    Pertinent scores:  - NIHSS: 7 at stroke alert  (per Dr. Vesna Talley note)  Stroke Modified Haywood Score: 2 (Unable to carry out all previous activities, but able to look after own affairs without assistance)    Impression: R pontine infarct likely small vessel etiology    Plan:  - Discussed plan with neurology attending, Dr. Beto Guardado  - S/p ASA 325mg load and plavix load 300mg and can continue ASA 81mg and plavix 75mg afterwards for 21 days then ASA 81mg monotherapy afterward  - Continue atorvastatin 40mg qhs  - BP: Normotension of <130/80  - Maintain glucose <180, SSI for coverage if indicated  - Medical management as per primary team appreciated  - DVT ppx and SCDs  - Monitor on telemetry  - PT/OT/Speech/PM&R input appreciated  - Stroke education    No further management from Neurology. Please call with questions/concern          SUBJECTIVE     Patient was seen and examined. No acute events overnight. Patient is agitated this AM since he is asked to do neurological exam. He follows commands and answers questions appropriately. No new neurological symptoms or deficits. Review of Systems   Constitutional:  Negative for chills and fever. HENT:  Negative for ear pain and sore throat. Eyes:  Negative for pain and visual disturbance. Respiratory:  Negative for cough and shortness of breath. Cardiovascular:  Negative for chest pain and palpitations. Gastrointestinal:  Negative for abdominal pain and vomiting. Genitourinary:  Negative for dysuria and hematuria. Musculoskeletal:  Negative for arthralgias and back pain. Skin:  Negative for color change and rash. Neurological:  Negative for seizures and syncope. All other systems reviewed and are negative. OBJECTIVE     Patient ID: Ansley Gary is a 59 y.o. male.     Vitals:    23 0708 23 0858 23 1539 23 0700   BP: 122/83 137/86 137/87 159/81   BP Location:   Left arm    Pulse:   59 (!) 54   Resp: 17  18 18   Temp: (!) 97.3 °F (36.3 °C)   (!) 97.4 °F (36.3 °C)   TempSrc:       SpO2:   100% 95%   Weight:       Height:          Temperature:   Temp (24hrs), Av.4 °F (36.3 °C), Min:97.4 °F (36.3 °C), Max:97.4 °F (36.3 °C)    Temperature: (!) 97.4 °F (36.3 °C)      Physical Exam  Vitals and nursing note reviewed. Constitutional:       General: He is not in acute distress. Appearance: He is well-developed. Comments: Agitated and irritated from being asked to do exam   HENT:      Head: Normocephalic. Comments: Head tilted to the right     Mouth/Throat:      Comments: right oral tongue cancer  Eyes:      Extraocular Movements: Extraocular movements intact. Pupils: Pupils are equal, round, and reactive to light. Cardiovascular:      Rate and Rhythm: Normal rate. Pulmonary:      Effort: Pulmonary effort is normal. No respiratory distress. Musculoskeletal:         General: Normal range of motion. Skin:     General: Skin is warm. Neurological:      Mental Status: He is alert and oriented to person, place, and time. Coordination: Finger-Nose-Finger Test normal.      Gait: Gait is intact. Deep Tendon Reflexes:      Reflex Scores:       Tricep reflexes are 3+ on the left side. Bicep reflexes are 3+ on the left side. Brachioradialis reflexes are 3+ on the left side. Psychiatric:         Speech: Speech is slurred. Neurologic Exam     Mental Status   Oriented to person, place, and time. Speech: slurred (Due to right oral tongue cancer)  Level of consciousness: alert    Cranial Nerves     CN II   Visual fields full to confrontation. CN III, IV, VI   Pupils are equal, round, and reactive to light. CN V   Facial sensation intact. CN VII   Facial expression full, symmetric. CN VIII   CN VIII normal.     CN IX, X   CN IX normal.   CN X normal.     CN XI   CN XI normal.     CN XII   CN XII normal.     Motor Exam   Muscle bulk: normal  Overall muscle tone: normal    Strength   Strength 5/5 except as noted.  LUE: 5-/5  L iliopsoas 4+/5     Sensory Exam   Light touch normal.     Gait, Coordination, and Reflexes     Gait  Gait: normal    Coordination   Finger to nose coordination: normal    Reflexes Reflexes 2+ except as noted. Left brachioradialis: 3+  Left biceps: 3+  Left triceps: 3+  Right Nunez: absent  Left Nunez: absentGrip strength normal and equal bilaterally        LABORATORY DATA     Labs: I have personally reviewed pertinent reports. Results from last 7 days   Lab Units 11/06/23 0644 11/04/23 2040 11/03/23 1948   WBC Thousand/uL 8.97  --  8.47   HEMOGLOBIN g/dL 13.6  --  13.8   HEMATOCRIT % 40.6  --  40.7   PLATELETS Thousands/uL 302 297 303   MONO PCT % 11  --   --    EOS PCT % 3  --   --       Results from last 7 days   Lab Units 11/06/23  1056 11/06/23  0644 11/05/23  1157 11/04/23  1215 11/04/23  0847   SODIUM mmol/L 134* 135 133*   < > 133*   POTASSIUM mmol/L 3.8 4.2 4.0   < > 4.4   CHLORIDE mmol/L 99 102 97   < > 99   CO2 mmol/L 30 28 32   < > 28   BUN mg/dL 13 13 14   < > 11   CREATININE mg/dL 0.81 0.75 0.85   < > 0.81   CALCIUM mg/dL 9.4 9.4 10.1   < > 9.6   ALK PHOS U/L  --   --   --   --  69   ALT U/L  --   --   --   --  10   AST U/L  --   --   --   --  13    < > = values in this interval not displayed. Results from last 7 days   Lab Units 11/03/23 1948   INR  0.90   PTT seconds 28               IMAGING & DIAGNOSTIC TESTING     Radiology Results: I have personally reviewed pertinent reports. MRI brain wo contrast   Final Result by Mile Cardoza MD (11/05 1140)      Subcentimeter acute to subacute right pontine infarct. Mild chronic microangiopathic ischemic changes. Workstation performed: [id number]         CT stroke alert brain   Final Result by Jasper Haywood MD (11/03 2030)      Questionable lacunar ischemia involving the right paramedian karla versus beam hardening artifact. Correlation with MRI is recommended.          I personally discussed this study with Guicho Lozada on 11/3/2023 8:12 PM.            Workstation performed: [id number]         CTA stroke alert (head/neck)   Final Result by Jasper Haywood MD (11/03 2026)      No definite evidence for high-grade stenosis, major branch vessel occlusion or vascular aneurysm of the cervical or intracranial vessels. Segmental severe stenosis versus occlusion of the right internal jugular vein, worsened and secondary to adjacent adenopathy. Interval worsening of the right oral tongue mass as well as cervical adenopathy as described above. I personally discussed this study with Blayne Garner on 11/3/2023 8:12 PM.                              Workstation performed: [id number]         CT chest abdomen pelvis w contrast    (Results Pending)       Other Diagnostic Testing: I have personally reviewed pertinent reports. ACTIVE MEDICATIONS     Current Facility-Administered Medications   Medication Dose Route Frequency    acetaminophen (TYLENOL) tablet 975 mg  975 mg Oral Q8H [facility]    atorvastatin (LIPITOR) tablet 40 mg  40 mg Oral QPM    enoxaparin (LOVENOX) subcutaneous injection 40 mg  40 mg Subcutaneous Daily    folic acid (FOLVITE) tablet 1 mg  1 mg Oral Daily    multivitamin-minerals (CENTRUM) tablet 1 tablet  1 tablet Oral Daily    nicotine (NICODERM CQ) 7 mg/24hr TD 24 hr patch 7 mg  7 mg Transdermal Daily    oxyCODONE (OxyCONTIN) 12 hr tablet 10 mg  10 mg Oral Q8H [facility]    polyethylene glycol (MIRALAX) packet 17 g  17 g Oral Daily    senna-docusate sodium (SENOKOT S) 8.6-50 mg per tablet 1 tablet  1 tablet Oral BID    thiamine tablet 100 mg  100 mg Oral Daily       Prior to Admission medications    Medication Sig Start Date End Date Taking? Authorizing Provider   ibuprofen (MOTRIN) 600 mg tablet Take 1 tablet (600 mg total) by mouth every 6 (six) hours as needed for mild pain 10/16/23  Yes Shannon Henry DMD   naloxone (NARCAN) 4 mg/0.1 mL nasal spray Administer 1 spray into a nostril. If no response after 2-3 minutes, give another dose in the other nostril using a new spray.  11/2/23 11/1/24 Yes Keyla Lawson MD   oxyCODONE (ROXICODONE) 10 MG TABS Take 1 tablet (10 mg total) by mouth every 6 (six) hours as needed for severe pain Max Daily Amount: 40 mg 11/2/23  Yes Sami Gong MD   senna-docusate sodium (SENOKOT-S) 8.6-50 mg per tablet Take 1 tablet by mouth daily 11/2/23  Yes Sami Gong MD   diphenhydramine, lidocaine, Al/Mg hydroxide, simethicone (Magic Mouthwash) SUSP Swish and swallow 10 mL every 4 (four) hours as needed for mouth pain or discomfort  Patient not taking: Reported on 11/2/2023 10/12/23   Richard Richard MD         VTE Pharmacologic Prophylaxis: Enoxaparin (Lovenox)  VTE Mechanical Prophylaxis: sequential compression device    ======    I have discussed the patient's history, physical exam findings, assessment, and plan in detail with attending, Dr. Janell Booker    Thank you for allowing me to participate in the care of your patient, Jackson Pain.     Luis Olivo MD  [facility] Neurology Residency, PGY-III

## 2023-11-06 NOTE — DISCHARGE INSTR - AVS FIRST PAGE
Dear Homer Smith,     It was our pleasure to care for you here at [address] [place] [address] [employer]. It is our hope that we were always able to exceed the expected standards for your care during your stay. You were hospitalized due to a stroke. You were cared for on the 3rd floor by Madiah Franco under the service of Walter Mcgregor MD with the [facility] who covers for your primary care physician (PCP), Rowena Draper MD, while you were hospitalized. If you have any questions or concerns related to this hospitalization, you may contact us at [phone]. For follow up as well as any medication refills, we recommend that you follow up with your primary care physician. A registered nurse will reach out to you by phone within a few days after your discharge to answer any additional questions that you may have after going home. However, at this time we provide for you here, the most important instructions / recommendations at discharge:     Notable Medication Adjustments -   Please begin taking aspirin 81 mg daily  Please begin taking Plavix 75 mg daily for 21 days  Please begin taking Lipitor 40 mg daily  Testing Required after Discharge -   None unless directed by your PCP or neurologist  Important follow up information -   Please follow-up with your PCP within 1 week  Please call your neurologist and schedule a follow-up appointment. A referral has been provided to you.   Please see them for follow-up within 6 to 8 weeks  Please stay in close contact with your oncology team  Other Instructions -   Please make every attempt to abstain from alcohol and tobacco usage  Please follow-up with your appointment tomorrow in [facility] for port placement  Following that, please attend your first session of chemotherapy on 11/15  Please stay in close contact with your oncology team  Please review this entire after visit summary as additional general instructions including medication list, appointments, activity, diet, any pertinent wound care, and other additional recommendations from your care team that may be provided for you.       Sincerely,     Sheyla Manley

## 2023-11-06 NOTE — ASSESSMENT & PLAN NOTE
17-year-old  male with past medical history for hypertension, nicotine abuse, alcohol abuse, he was noticed to have right oral mouth lesion by his dentist, CT scan of the head on 6/2/2023 showed scattered cervical lymphadenopathy.     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 negative for ELIE     CT scan this admission:   Worsening adenopathy with 4.1 x 2.9 cm necrotic ill-defined right-sided level 2 adenopathy, right oral tongue cancer crossing the midline worse from prior examination with 3.7 x 2.5 cm with erosive changes of the anterior right mandibular body and symphysis     Plan:  CT CAP w/ contrast to assess for possible distal mets   If none, begin radiotherapy and Cisplatin as outlined in prior oncology notes    evaluation for needs   Ethanol/tobacco cessation   Patient has appointment for 11/7 in [facility] for port installation  Following that, patient has appointment 11/15 for initiation of chemotherapy

## 2023-11-06 NOTE — ASSESSMENT & PLAN NOTE
Recent Labs     11/04/23  2040 11/05/23  0615 11/05/23  1157   SODIUM 134* 134* 133*        Pt appears asymptomatic    Plan:  Trend sodium; improving  q8h BMP  Limit correction to less than 6 mEq in 24 hours.

## 2023-11-06 NOTE — QUICK NOTE
Per nursing patient was adamantly refusing blood draws for BMP and IV fluids by pushing away the pump. IV fluids were discontinued and plan to reattempt BMP check in the morning.

## 2023-11-06 NOTE — DISCHARGE SUMMARY
[address] [facility]  Discharge- Brand Carlosyme 1965, 59 y.o. male MRN: [id number]  Unit/Bed#: S -Tayler Encounter: [id number]  Primary Care Provider: Toya Gonzales MD   Date and time admitted to hospital: 11/3/2023  7:32 PM    * Right-sided lacunar stroke [facility])  Assessment & Plan  Episode of sudden onset weakness of RUE and RLE  and worsened dysarthria from baseline starting at approximately 3-4pm Thursday afternoon. EKG as of 8/4/2023: sinus rhythm at 73bpm, LVH noted  CT Brain showed questionable lacunar ischemia involving the right paramedian karla versus beam hardening artifact. CT head/neck showed no evidence of major vessel occlusion or aneurysm. Segmental severe stenosis versus occlusion of the right internal jugular vein, worsened and secondary to adjacent adenopathy. 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 CN X-XII intact, ataxia noted on finger to nose test on RUE and LUE. MRI brain shows chronic microangiopathic changes and acute/subacute right pontine infarct. TTE does not demonstrate any abnormal heart strain and EF wnl  Plan:  Monitor on tele, No acute overnight events. Lipid Panel, HbA1c  Atorvastatin 40 mg q.d., Aspirin 81 q.d. Neurology onboard, appreciate their recommendations  PT/OT/Speech eval  Allow for permissive hypertension with -200 for 48 hours  Neuro checks per protocol     Cancer-related pain  Assessment & Plan  Has oral cancer-   Biopsy 8/29/2023 with [facility]  Plan was for cisplatin and radiation therapies  Worsening adenopathy and 20lb weight loss  Pt has some dysarthria at baseline  Follows heme/onc  Medical Oncology consulted and recommendations are appreciated.    CT C/A/P today with contrast to look for evidence of metastasis  Results pending  CM evaluation  Cessation of smoking and alcohol recommended  Hx of multiple dental caries that needed surgical intervention    Oral cancer [facility])  Assessment & Plan  59-year-old  male with past medical history for hypertension, nicotine abuse, alcohol abuse, he was noticed to have right oral mouth lesion by his dentist, CT scan of the head on 6/2/2023 showed scattered cervical lymphadenopathy. 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 negative for ELIE     CT scan this admission:   Worsening adenopathy with 4.1 x 2.9 cm necrotic ill-defined right-sided level 2 adenopathy, right oral tongue cancer crossing the midline worse from prior examination with 3.7 x 2.5 cm with erosive changes of the anterior right mandibular body and symphysis     Plan:  CT CAP w/ contrast to assess for possible distal mets   If none, begin radiotherapy and Cisplatin as outlined in prior oncology notes    evaluation for needs   Ethanol/tobacco cessation     Moderate protein-calorie malnutrition ([address])  Assessment & Plan  Malnutrition Findings:   Adult Malnutrition type: Chronic illness  Adult Degree of Malnutrition: Malnutrition of moderate degree  Malnutrition Characteristics: Muscle loss, Inadequate energy, Weight loss                  360 Statement: Protein calorie malnutrition r/t inadequate intake as evidenced by <75% of estimated energy intake compared to estimated energy needs > 1 month, muscle wasting present to temples/clavicle, and ongoing weight loss; currently treated with diet and oral nutrition supplements    BMI Findings: Body mass index is 18.37 kg/m².    Plan: Encourage PO intake  Nutritional supplements with ensure TID     Hyponatremia  Assessment & Plan  Recent Labs     11/04/23  2040 11/05/23  0615 11/05/23  1157   SODIUM 134* 134* 133*        Pt appears asymptomatic    Plan:  Trend sodium; improving  q8h BMP  Limit correction to less than 6 mEq in 24 hours.    Cigarette nicotine dependence without complication  Assessment & Plan  Smokes 1/2 pack of cigarettes per day  Pt is considering quitting smoking  Should be given referral to smoking cessation program at discharge and educational material on cessation of smoking  Offer NRT while admitted. Hypertension  Assessment & Plan  130's-160's/60's-90's  Currently not on any BP medications as per patient    Plan  Allow for permissive HTN as per stroke protocol    Alcohol use disorder, severe, dependence ([address])  Assessment & Plan  Hx of alcohol abuse, drinks 3-4 beers per day  pt denies being intoxicated on initial onset of symptoms  Medical Alcohol level of 190 on ED presentation  CIWA score-1    Plan:  Continue to follow CIWA protocol  Encourage cutting down and eventual cessation of alcohol drinking         Medical Problems       Resolved Problems  Date Reviewed: 11/3/2023   None       Discharging Resident: Asya Hall MD  Discharging Attending: Emmie Eubanks MD  PCP: Rickie Stewart MD  Admission Date:   Admission Orders (From admission, onward)       Ordered        11/03/23 2219  Inpatient Admission  Once            11/03/23 2121  INPATIENT ADMISSION  Once                          Discharge Date: 11/06/23    Consultations During Hospital Stay:  Neurology  Oncology    Procedures Performed:   CT head  CTA head neck  MRI brain  CT chest abdomen pelvis    Significant Findings / Test Results:   CT chest abdomen pelvis w contrast    Result Date: 11/6/2023  Impression: Emphysema is again demonstrated. Mild mediastinal adenopathy without change. No new mass or adenopathy in the abdomen and pelvis. Some gastric distention is present and should be correlated with any GI symptomatology. Workstation performed: [id number]     MRI brain wo contrast    Result Date: 11/5/2023  Impression: Subcentimeter acute to subacute right pontine infarct. Mild chronic microangiopathic ischemic changes.  Workstation performed: [id number] CT stroke alert brain    Result Date: 11/3/2023  Impression: Questionable lacunar ischemia involving the right paramedian karla versus beam hardening artifact. Correlation with MRI is recommended. I personally discussed this study with Mymichelmomo Colungaory on 11/3/2023 8:12 PM. Workstation performed: [id number]     CTA stroke alert (head/neck)    Result Date: 11/3/2023  Impression: No definite evidence for high-grade stenosis, major branch vessel occlusion or vascular aneurysm of the cervical or intracranial vessels. Segmental severe stenosis versus occlusion of the right internal jugular vein, worsened and secondary to adjacent adenopathy. Interval worsening of the right oral tongue mass as well as cervical adenopathy as described above. I personally discussed this study with Blayne Garner on 11/3/2023 8:12 PM. Workstation performed: [id number]       No Chest XR results available for this patient. Incidental Findings:   None    Test Results Pending at Discharge (will require follow up): None     Outpatient Tests Requested:  None unless directed by PCP, neurologist, oncologist    Complications: None    Reason for Admission: Left-sided weakness    Hospital Course:   Phuong Lockwood is a 59 y.o. male with a PMH of Alcohol abuse, Oral Cancer, HTN, hyponatremia who presented to the ED with complaints of sudden weakness. Pt states for the past few days he has been having weakness in his L leg but was able to ambulate and did not seek medical attention. Yesterday while at the bar with a friend having alcoholic beverages, pt fell to the floor while attempting to stand secondary to severe weakness in LLE as well as new onset of LUE weakness. Pt also was having worsening dysarthria, pt has baseline dysarthria d/t hx of oral cancer. Patient presented to ED for assessment of above listed problems. In ED, vitals were assessed and patient was found to be moderately hypertensive.   Labs were drawn which demonstrated mild hyponatremia, elevated blood ethanol level of 190, rest of labs within normal limits. CT head demonstrated questionable lacunar ischemia involving the right paramedian karla. Following this, patient was admitted to inpatient for management of suspected CVA    While inpatient, patient was allowed permissive hypertension for 48 hours as per stroke pathway. Patient was initiated on dual antiplatelet with aspirin and Plavix, as well as Lipitor. MRI brain performed while inpatient demonstrated subcentimeter acute to subacute right pontine infarct. Neurology was consulted while inpatient, who recommended dual antiplatelet therapy for 21 days, followed by aspirin monotherapy as well as atorvastatin and outpatient follow-up with neurology team. Oncology team additionally saw patient while inpatient, and recommended CT chest abdomen pelvis while inpatient, followed by continuation of outpatient plan of port installation and cisplatin chemotherapy. Following completion of these tests and exams, patient was stable for discharge to home with outpatient neurology, oncology, PCP follow-up. Please see above list of diagnoses and related plan for additional information. Condition at Discharge: fair    Discharge Day Visit / Exam:   Subjective:    Patient seen and examined at bedside on day of discharge. Patient stated he was moderately agitated, and wished to leave the hospital urgently. Patient was advised that his discharge be prioritized, and was advised that as necessary outpatient follow-ups with oncology, neurology, and family medicine.   Patient verbalized understanding    Vitals: Blood Pressure: 159/81 (11/06/23 0700)  Pulse: (!) 54 (11/06/23 0700)  Temperature: (!) 97.4 °F (36.3 °C) (11/06/23 0700)  Temp Source: Oral (11/04/23 2055)  Respirations: 18 (11/06/23 0700)  Height: 6' 3" (190.5 cm) (11/04/23 1136)  Weight - Scale: 66.7 kg (147 lb) (11/04/23 1136)  SpO2: 95 % (11/06/23 0700)  Exam: Physical Exam  Vitals and nursing note reviewed. Constitutional:       General: He is not in acute distress. Appearance: He is well-developed and underweight. He is not ill-appearing. HENT:      Head: Normocephalic and atraumatic. Mouth/Throat:      Mouth: Mucous membranes are moist.      Pharynx: Oropharynx is clear. Comments: Left tongue mass  Right sided Adenopathy   Eyes:      Conjunctiva/sclera: Conjunctivae normal.      Pupils: Pupils are equal, round, and reactive to light. Cardiovascular:      Rate and Rhythm: Normal rate and regular rhythm. Pulses: Normal pulses. Heart sounds: Normal heart sounds. No murmur heard. Pulmonary:      Effort: Pulmonary effort is normal. No respiratory distress. Breath sounds: Normal breath sounds. Abdominal:      General: Bowel sounds are normal.      Palpations: Abdomen is soft. Tenderness: There is no abdominal tenderness. Musculoskeletal:         General: No swelling. Cervical back: Neck supple. Right lower leg: No edema. Left lower leg: No edema. Lymphadenopathy:      Cervical: Cervical adenopathy present. Skin:     General: Skin is warm and dry. Capillary Refill: Capillary refill takes less than 2 seconds. Neurological:      Mental Status: He is alert and oriented to person, place, and time. Cranial Nerves: No cranial nerve deficit. Motor: Weakness (LUE and LLE) present. Psychiatric:         Mood and Affect: Mood normal.          Discussion with Family: Patient declined call to . Discharge instructions/Information to patient and family:   See after visit summary for information provided to patient and family. Provisions for Follow-Up Care:  See after visit summary for information related to follow-up care and any pertinent home health orders.        Disposition:   Home    Planned Readmission: No    Discharge Medications:  See after visit summary for reconciled discharge medications provided to patient and/or family.       **Please Note: This note may have been constructed using a voice recognition system**

## 2023-11-06 NOTE — PROGRESS NOTES
Returned call and spoke to patient. Patient was discharged from the hospital this afternoon. He inquired when his appointment is tomorrow for his PORT placement. I reviewed with the patient that his procedure is scheduled at 9:45 but they will want him there an hour earlier. He voiced understanding. I inquired if the patient was still planning to walk to the hospital or if he would need [facility] transport to get him in. Patient stated he is still very dizzy. I suggested he have [facility] transport him to the procedure. He is aware that they cannot provide transportation home as the procedure requires sedation. He stated his girlfriend Gibson  will be available to take him home.

## 2023-11-06 NOTE — PLAN OF CARE
Problem: NEUROSENSORY - ADULT  Goal: Achieves stable or improved neurological status  Description: INTERVENTIONS  - Monitor and report changes in neurological status  - Monitor vital signs such as temperature, blood pressure, glucose, and any other labs ordered   - Initiate measures to prevent increased intracranial pressure  - Monitor for seizure activity and implement precautions if appropriate      Outcome: Progressing  Goal: Remains free of injury related to seizures activity  Description: INTERVENTIONS  - Maintain airway, patient safety  and administer oxygen as ordered  - Monitor patient for seizure activity, document and report duration and description of seizure to physician/advanced practitioner  - If seizure occurs,  ensure patient safety during seizure  - Reorient patient post seizure  - Seizure pads on all 4 side rails  - Instruct patient/family to notify RN of any seizure activity including if an aura is experienced  - Instruct patient/family to call for assistance with activity based on nursing assessment  - Administer anti-seizure medications if ordered    Outcome: Progressing  Goal: Achieves maximal functionality and self care  Description: INTERVENTIONS  - Monitor swallowing and airway patency with patient fatigue and changes in neurological status  - Encourage and assist patient to increase activity and self care.    - Encourage visually impaired, hearing impaired and aphasic patients to use assistive/communication devices  Outcome: Progressing     Problem: CARDIOVASCULAR - ADULT  Goal: Maintains optimal cardiac output and hemodynamic stability  Description: INTERVENTIONS:  - Monitor I/O, vital signs and rhythm  - Monitor for S/S and trends of decreased cardiac output  - Administer and titrate ordered vasoactive medications to optimize hemodynamic stability  - Assess quality of pulses, skin color and temperature  - Assess for signs of decreased coronary artery perfusion  - Instruct patient to report change in severity of symptoms  Outcome: Progressing  Goal: Absence of cardiac dysrhythmias or at baseline rhythm  Description: INTERVENTIONS:  - Continuous cardiac monitoring, vital signs, obtain 12 lead EKG if ordered  - Administer antiarrhythmic and heart rate control medications as ordered  - Monitor electrolytes and administer replacement therapy as ordered  Outcome: Progressing     Problem: METABOLIC, FLUID AND ELECTROLYTES - ADULT  Goal: Electrolytes maintained within normal limits  Description: INTERVENTIONS:  - Monitor labs and assess patient for signs and symptoms of electrolyte imbalances  - Administer electrolyte replacement as ordered  - Monitor response to electrolyte replacements, including repeat lab results as appropriate  - Instruct patient on fluid and nutrition as appropriate  Outcome: Progressing  Goal: Fluid balance maintained  Description: INTERVENTIONS:  - Monitor labs   - Monitor I/O and WT  - Instruct patient on fluid and nutrition as appropriate  - Assess for signs & symptoms of volume excess or deficit  Outcome: Progressing  Goal: Glucose maintained within target range  Description: INTERVENTIONS:  - Monitor Blood Glucose as ordered  - Assess for signs and symptoms of hyperglycemia and hypoglycemia  - Administer ordered medications to maintain glucose within target range  - Assess nutritional intake and initiate nutrition service referral as needed  Outcome: Progressing     Problem: SKIN/TISSUE INTEGRITY - ADULT  Goal: Skin Integrity remains intact(Skin Breakdown Prevention)  Description: Assess:  -Perform Obi assessment every   -Clean and moisturize skin every   -Inspect skin when repositioning, toileting, and assisting with ADLS  -Assess under medical devices such as  every   -Assess extremities for adequate circulation and sensation     Bed Management:  -Have minimal linens on bed & keep smooth, unwrinkled  -Change linens as needed when moist or perspiring  -Avoid sitting or lying in one position for more than  hours while in bed  -Keep HOB at degrees     Toileting:  -Offer bedside commode  -Assess for incontinence every   -Use incontinent care products after each incontinent episode such as     Activity:  -Mobilize patient  times a day  -Encourage activity and walks on unit  -Encourage or provide ROM exercises   -Turn and reposition patient every  Hours  -Use appropriate equipment to lift or move patient in bed  -Instruct/ Assist with weight shifting every  when out of bed in chair  -Consider limitation of chair time  hour intervals    Skin Care:  -Avoid use of baby powder, tape, friction and shearing, hot water or constrictive clothing  -Relieve pressure over bony prominences using   -Do not massage red bony areas    Next Steps:  -Teach patient strategies to minimize risks such as    -Consider consults to  interdisciplinary teams such as   Outcome: Progressing  Goal: Incision(s), wounds(s) or drain site(s) healing without S/S of infection  Description: INTERVENTIONS  - Assess and document dressing, incision, wound bed, drain sites and surrounding tissue  - Provide patient and family education  - Perform skin care/dressing changes every   Outcome: Progressing  Goal: Pressure injury heals and does not worsen  Description: Interventions:  - Implement low air loss mattress or specialty surface (Criteria met)  - Apply silicone foam dressing  - Instruct/assist with weight shifting every  minutes when in chair   - Limit chair time to  hour intervals  - Use special pressure reducing interventions such as  when in chair   - Apply fecal or urinary incontinence containment device   - Perform passive or active ROM every   - Turn and reposition patient & offload bony prominences every  hours   - Utilize friction reducing device or surface for transfers   - Consider consults to  interdisciplinary teams such as   - Use incontinent care products after each incontinent episode such as   - Consider nutrition services referral as needed  Outcome: Progressing

## 2023-11-06 NOTE — CASE MANAGEMENT
Case Management Discharge Planning Note    Patient name Ansley Gray  Location S /S -76 MRN [id number]  : 1965 Date 2023       Current Admission Date: 11/3/2023  Current Admission Diagnosis:Right-sided lacunar stroke [facility])   Patient Active Problem List    Diagnosis Date Noted    Moderate protein-calorie malnutrition ([address]) 2023    Right-sided lacunar stroke ([address]) 2023    Drug induced constipation 2023    Cancer-related pain 2023    At risk for respiratory depression due to opioid 2023    Pain in lower jaw 2023    Counseling regarding advanced care planning and goals of care 2023    Preoperative clearance 10/11/2023    Oral cancer ([address]) 2023    Hypokalemia 2023    Hypomagnesemia 2023    Alcohol use disorder, severe, dependence ([address]) 2023    Hyponatremia 2023    Hypertension     Cigarette nicotine dependence without complication     Periodontitis 2023    Dental caries 2023    Cervical lymphadenopathy 2023      LOS (days): 3  Geometric Mean LOS (GMLOS) (days):   Days to GMLOS:     OBJECTIVE:  Risk of Unplanned Readmission Score: 9.49         Current admission status: Inpatient   Preferred Pharmacy:   [facility]pharmacy #[id number] [address]wan Medrano, [address] [address]  Phone: [phone] Fax: [address] [facility], [id number] [place] [address]  Phone: [phone] Fax: [phone]    [facility] [address] [facility] [employer] - ALEXANDREA Gould - [address] [place] [address]  Phone: [phone] Fax: [phone] [facility] [address] [facility]  [address] [facility] [address]  Phone: [phone] Fax: [phone]    [facility] [address] [address]  Phone: [phone] Fax: [phone]    Primary Care Provider: Joey Harry MD    Primary Insurance: [facility]  Secondary Insurance:     DISCHARGE DETAILS:    Lyft waiver provided to patient. Lyft transport requested for 1510 p/u through [employer] - direct RN ph: # provided for confirmation of p/u time.

## 2023-11-06 NOTE — PROGRESS NOTES
Progress Note - Medical Oncology:  Teetee Zacarias 59 y.o. male MRN: [id number]  Unit/Bed#: S -01 Encounter: [id number]    Assessment and Plan:  1. 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 Repeat CTA Neck showed interval worsening of the right tongue-lesion; with accompanying erosion into the anterior right mandibular body, and symphysis. Progressive cervical adenopathy noted, as well. CT Chest, Abdomen, and Pelvis was otherwise stable. Given that patient is clinically-stable for discharge, agree with close interval follow-up, as well as immediate initiation of systemic-chemotherapy in the outpatient setting. Patient is scheduled to receive weekly Cisplatin, beginning on Wednesday, November 15th at 09:00 in [facility]. He has outpatient follow-up with Medical Oncology Merlin Serna M.D.), thereafter, on November 22nd. Patient is in agreement. 1. Agree with immediate initiation of systemic-chemotherapy, as an outpatient:    A. Patient is scheduled for weekly Cisplatin to start on Wednesday, November 15th at 09:00.   2. Recommend close outpatient follow-up with Medical Oncology on November 22nd. Subjective: Interval Events: Patient was seen and examined on the General Medical floor. Resting comfortably in bedside chair on my arrival. No acute events overnight. Patient is feeling well; and is eager for hospital discharge. Discussed importance for close interval follow-up with Medical Oncology, as well as initiation of outpatient chemotherapy, as scheduled. Patient expressed understanding and agreement with the above-mentioned plan.     Objective:  Vital Signs:  Vitals:    11/06/23 0700   BP: 159/81   Pulse: (!) 54   Resp: 18   Temp: (!) 97.4 °F (36.3 °C)   SpO2: 95%     Physical Exam: General: Alert, and oriented; Pleasant, and conversational; Well-Appearing Male  HEENT: Atraumatic, and normocephalic; PERRLA; EOMI; Moist mucosal membranes  Neck: Trachea midline; No neck masses, or thyromegaly; Cervical Lymphadenopathy Noted on Exam  Cardiovascular: Regular rate and rhythm; No murmurs, rubs, or gallops appreciated; No peripheral edema  Respiratory: Diminished Breathsounds; No supplemental oxygen requirement  Abdomen: Soft, non-tender; Non-distended; No organomegaly appreciated; Bowel sounds present in 4-quadrants  Extremities: No obvious deformities; No peripheral edema; Moves all  Neurologic: Appropriately alert, and oriented to Person, Place, and Time; No focal neurologic deficits    Lab, Imaging and other studies: I have personally reviewed pertinent reports. Patient was seen and discussed with attending physician, JOCELYNE Guaman D.O.   Hematology-Oncology Fellow (PGY-4)

## 2023-11-06 NOTE — INCIDENTAL FINDINGS
The following findings require follow up:  Radiographic finding   Finding: MRI brain wo contrast: Subcentimeter acute to subacute right pontine infarct., Mild chronic microangiopathic ischemic changes. Follow up required: Follow-up with neurology   Follow up should be done within 6-8 week(s)    Please notify the following clinician to assist with the follow up:    Your PCP

## 2023-11-07 ENCOUNTER — HOSPITAL ENCOUNTER (OUTPATIENT)
Dept: INTERVENTIONAL RADIOLOGY/VASCULAR | Facility: HOSPITAL | Age: 58
Discharge: HOME | End: 2023-11-07
Attending: INTERNAL MEDICINE | Admitting: RADIOLOGY
Payer: COMMERCIAL

## 2023-11-07 ENCOUNTER — PATIENT OUTREACH (OUTPATIENT)
Dept: HEMATOLOGY ONCOLOGY | Facility: CLINIC | Age: 58
End: 2023-11-07

## 2023-11-07 VITALS
DIASTOLIC BLOOD PRESSURE: 90 MMHG | HEART RATE: 79 BPM | RESPIRATION RATE: 16 BRPM | TEMPERATURE: 97.8 F | OXYGEN SATURATION: 97 % | SYSTOLIC BLOOD PRESSURE: 157 MMHG

## 2023-11-07 VITALS
SYSTOLIC BLOOD PRESSURE: 86 MMHG | RESPIRATION RATE: 18 BRPM | TEMPERATURE: 98.6 F | DIASTOLIC BLOOD PRESSURE: 56 MMHG | OXYGEN SATURATION: 95 % | HEART RATE: 54 BPM | BODY MASS INDEX: 18.28 KG/M2 | HEIGHT: 75 IN | WEIGHT: 147 LBS

## 2023-11-07 DIAGNOSIS — C06.9 MALIGNANT NEOPLASM OF ORAL CAVITY (HCC): ICD-10-CM

## 2023-11-07 PROCEDURE — 36561 INSERT TUNNELED CV CATH: CPT | Performed by: RADIOLOGY

## 2023-11-07 PROCEDURE — 99152 MOD SED SAME PHYS/QHP 5/>YRS: CPT | Performed by: RADIOLOGY

## 2023-11-07 PROCEDURE — 76937 US GUIDE VASCULAR ACCESS: CPT | Performed by: RADIOLOGY

## 2023-11-07 PROCEDURE — 36561 INSERT TUNNELED CV CATH: CPT

## 2023-11-07 PROCEDURE — C1788 PORT, INDWELLING, IMP: HCPCS

## 2023-11-07 PROCEDURE — 77001 FLUOROGUIDE FOR VEIN DEVICE: CPT | Performed by: RADIOLOGY

## 2023-11-07 PROCEDURE — 99152 MOD SED SAME PHYS/QHP 5/>YRS: CPT

## 2023-11-07 PROCEDURE — C1894 INTRO/SHEATH, NON-LASER: HCPCS

## 2023-11-07 RX ORDER — SODIUM CHLORIDE 9 MG/ML
75 INJECTION, SOLUTION INTRAVENOUS CONTINUOUS
Status: DISCONTINUED | OUTPATIENT
Start: 2023-11-07 | End: 2023-11-08 | Stop reason: HOSPADM

## 2023-11-07 RX ORDER — FENTANYL CITRATE 50 UG/ML
INJECTION, SOLUTION INTRAMUSCULAR; INTRAVENOUS AS NEEDED
Status: COMPLETED | OUTPATIENT
Start: 2023-11-07 | End: 2023-11-07

## 2023-11-07 RX ORDER — MIDAZOLAM HYDROCHLORIDE 2 MG/2ML
INJECTION, SOLUTION INTRAMUSCULAR; INTRAVENOUS AS NEEDED
Status: COMPLETED | OUTPATIENT
Start: 2023-11-07 | End: 2023-11-07

## 2023-11-07 RX ORDER — CEFAZOLIN SODIUM 1 G/50ML
1000 SOLUTION INTRAVENOUS ONCE
Status: COMPLETED | OUTPATIENT
Start: 2023-11-07 | End: 2023-11-07

## 2023-11-07 RX ADMIN — FENTANYL CITRATE 50 MCG: 50 INJECTION, SOLUTION INTRAMUSCULAR; INTRAVENOUS at 10:50

## 2023-11-07 RX ADMIN — SODIUM CHLORIDE 75 ML/HR: 0.9 INJECTION, SOLUTION INTRAVENOUS at 10:31

## 2023-11-07 RX ADMIN — CEFAZOLIN SODIUM 1000 MG: 1 SOLUTION INTRAVENOUS at 10:31

## 2023-11-07 RX ADMIN — MIDAZOLAM 1 MG: 1 INJECTION INTRAMUSCULAR; INTRAVENOUS at 10:50

## 2023-11-07 RX ADMIN — Medication 20 ML: at 10:51

## 2023-11-07 NOTE — PROGRESS NOTES
Patient called and left message at 8:18 stating [facility] has not called yet about what time they will be picking him up for his 9:45 PORT placement. Returned call and spoke to patient. He stated [facility] picked him up a few minutes ago and was on the way to his procedure.

## 2023-11-07 NOTE — DISCHARGE INSTRUCTIONS
Implanted Venous Access Port     WHAT YOU NEED TO KNOW:   An implanted venous access port is a device used to give treatments and take blood. It may also be called a central venous access device (CVAD). The port is a small container that is placed under your skin, usually in your upper chest. The port is attached to a catheter that enters a large vein. DISCHARGE INSTRUCTIONS:   Resume your normal diet. Small sips of flat soda will help with mild nausea. Prevent an infection:   Wash your hands often. Use soap and water. Clean your hands before and after you care for your port. Remind everyone who cares for your port to wash their hands. Check your skin for infection every day. Look for redness, swelling, or fluid oozing from the port site. Care for your port:   1. You may shower beginning 48 hours after procedure. 2.  Leave glue in place. 3. It is normal for some bruising to occur. 4. Use Tylenol for pain. 5. Limit use of arm on the side that your port was placed. Lift nothing heavier than 5 pounds for 1 week, and then gradually increase activity as tolerated. 6. DO NOT apply ointment, lotion or cream to port site until incision is healed. Allow glue to fall off. DO NOT attempt to peel glue from skin even it it begins to flake. 7. After the port incision is healed you may swim, bathe. Notify the Interventional Radiologist if you have any of the followin. Fever above 101 F    2. Increased redness or swelling after 1st day. 3. Increased pain after 1st day. 4. Any sign of infection (drainage from port site, skin separation, hot to touch). 5. Persistent nausea or vomiting. Contact Interventional Radiology at [phone] [facility] PATIENTS: Contact Interventional Radiology at [phone]) ([address] [facility] 281: Contact Interventional Radiology at [phone]). Procedural Sedation   WHAT YOU NEED TO KNOW:   Procedural sedation is medicine used during procedures to help you feel relaxed and calm. You will remember little to none of the procedure. After sedation you may feel tired, weak, or unsteady on your feet. You may also have trouble concentrating or short-term memory loss. These symptoms should go away in 24 hours or less. DISCHARGE INSTRUCTIONS:     Call 911 or have someone else call for any of the following: You have sudden trouble breathing. You cannot be woken. Contact Interventional Radiology at [phone]   Justin PATIENTS: Contact Interventional Radiology at [phone]) Reggie Us PATIENTS: Contact Interventional Radiology at [phone]) if any of the following occur: You have a severe headache or dizziness. Your heart is beating faster than usual.    You have a fever or chills. Your skin is itchy, swollen, or you have a rash. You have nausea or are vomiting for more than 8 hours after the procedure. You have questions or concerns about your condition or care. Self-care:   Have someone stay with you for 24 hours. This person can drive you to errands and help you do things around the house. This person can also watch for problems. Rest and do quiet activities for 24 hours. Do not exercise, ride a bike, or play sports. Stand up slowly to prevent dizziness and falls. Take short walks around the house with another person. Slowly return to your usual activities the next day. Do not drive or use dangerous machines or tools for 24 hours. You may injure yourself or others. Examples include a lawnmower, saw, or drill. Do not return to work for 24 hours if you use dangerous machines or tools for work. Do not make important decisions for 24 hours. For example, do not sign important papers or invest money. Drink liquids as directed. Liquids help flush the sedation medicine out of your body. Ask how much liquid to drink each day and which liquids are best for you. Eat small, frequent meals to prevent nausea and vomiting.  Start with clear liquids such as juice or broth. If you do not vomit after clear liquids, you can eat your usual foods. Do not drink alcohol or take medicines that make you drowsy. This includes medicines that help you sleep and anxiety medicines. Ask your healthcare provider if it is safe for you to take pain medicine. Follow up with your healthcare provider as directed: Write down your questions so you remember to ask them during your visits. Procedural Sedation   WHAT YOU NEED TO KNOW:   Procedural sedation is medicine used during procedures to help you feel relaxed and calm. You will remember little to none of the procedure. After sedation you may feel tired, weak, or unsteady on your feet. You may also have trouble concentrating or short-term memory loss. These symptoms should go away in 24 hours or less. DISCHARGE INSTRUCTIONS:     Call 911 or have someone else call for any of the following: You have sudden trouble breathing. You cannot be woken. Contact Interventional Radiology at [phone]   Justin PATIENTS: Contact Interventional Radiology at [phone]) Vince Vallejo PATIENTS: Contact Interventional Radiology at [phone]) if any of the following occur: You have a severe headache or dizziness. Your heart is beating faster than usual.    You have a fever or chills. Your skin is itchy, swollen, or you have a rash. You have nausea or are vomiting for more than 8 hours after the procedure. You have questions or concerns about your condition or care. Self-care:   Have someone stay with you for 24 hours. This person can drive you to errands and help you do things around the house. This person can also watch for problems. Rest and do quiet activities for 24 hours. Do not exercise, ride a bike, or play sports. Stand up slowly to prevent dizziness and falls. Take short walks around the house with another person. Slowly return to your usual activities the next day.       Do not drive or use dangerous machines or tools for 24 hours. You may injure yourself or others. Examples include a lawnmower, saw, or drill. Do not return to work for 24 hours if you use dangerous machines or tools for work. Do not make important decisions for 24 hours. For example, do not sign important papers or invest money. Drink liquids as directed. Liquids help flush the sedation medicine out of your body. Ask how much liquid to drink each day and which liquids are best for you. Eat small, frequent meals to prevent nausea and vomiting. Start with clear liquids such as juice or broth. If you do not vomit after clear liquids, you can eat your usual foods. Do not drink alcohol or take medicines that make you drowsy. This includes medicines that help you sleep and anxiety medicines. Ask your healthcare provider if it is safe for you to take pain medicine. Follow up with your healthcare provider as directed: Write down your questions so you remember to ask them during your visits.

## 2023-11-07 NOTE — INTERVAL H&P NOTE
H&P reviewed. After examining the patient, I find no changed to the H&P since it had been written. /92   Pulse 100   Temp 98 °F (36.7 °C) (Temporal)   Resp 20   SpO2 97%     Patient re-evaluated.  Accept as history and physical.    Vamsi Wooten, DO/November 7, 2023/10:25 AM

## 2023-11-07 NOTE — BRIEF OP NOTE (RAD/CATH)
IR PORT PLACEMENT  Procedure Note    PATIENT NAME: Augie Santamaria  : 1965  MRN: [id number]     Pre-op Diagnosis:   1. Malignant neoplasm of oral cavity (HCC)      Post-op Diagnosis:   1. Malignant neoplasm of oral cavity [facility])        Surgeon:   Farida Guillaume DO  Assistants:     No qualified resident was available.     Estimated Blood Loss: None  Findings: L IJ port placement    Specimens: none    Complications:  none    Anesthesia: conscious sedation and local    Farida Guillaume DO     Date: 2023  Time: 11:11 AM

## 2023-11-07 NOTE — NURSING NOTE
Pt returned to [facility] awake,alert,no complaints,assisted OOB to BR, taking po, port a cath insertion site to  left chest wall is clean dry and intact, pt taking liquids.

## 2023-11-08 ENCOUNTER — TELEPHONE (OUTPATIENT)
Dept: FAMILY MEDICINE CLINIC | Facility: CLINIC | Age: 58
End: 2023-11-08

## 2023-11-08 NOTE — TELEPHONE ENCOUNTER
first attempt to contact patient.  left message to return my call on answering machine    If pt returns phone call, pleae assist with scheduling tcm/hosp f/u

## 2023-11-10 ENCOUNTER — PATIENT OUTREACH (OUTPATIENT)
Dept: HEMATOLOGY ONCOLOGY | Facility: CLINIC | Age: 58
End: 2023-11-10

## 2023-11-10 RX ORDER — SODIUM CHLORIDE 9 MG/ML
20 INJECTION, SOLUTION INTRAVENOUS ONCE
Status: CANCELLED | OUTPATIENT
Start: 2023-11-15

## 2023-11-10 NOTE — PROGRESS NOTES
Received a message from patient requesting call back. Called and spoke to patient. He stated he is having tongue pain and requested a refill on his chlorhexidine rinse. I asked him who ordered the original script. Upon review, the patient realized he still has one refill left on the script and will call it in.

## 2023-11-10 NOTE — PROGRESS NOTES
Received request to schedule port labs prior to chemo. I contacted [employer] and spoke to Sahil Reddy. He added [facility] on for central lab draw on Tue 11/14/23 after his radiation at 1pm. He will work on adding additional pre chemo lab draws from his port in coordination with his daily radiation schedule. [facility] uses [facility]. GILDA Adams will be returning patients call and will provide the updates to [facility].

## 2023-11-11 ENCOUNTER — HOSPITAL ENCOUNTER (OUTPATIENT)
Facility: HOSPITAL | Age: 58
Setting detail: OBSERVATION
Discharge: HOME | End: 2023-11-13
Attending: EMERGENCY MEDICINE | Admitting: INTERNAL MEDICINE
Payer: COMMERCIAL

## 2023-11-11 ENCOUNTER — APPOINTMENT (EMERGENCY)
Dept: CT IMAGING | Facility: HOSPITAL | Age: 58
End: 2023-11-11
Payer: COMMERCIAL
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
## 2023-11-11 DIAGNOSIS — K14.6 TONGUE PAIN: Primary | ICD-10-CM

## 2023-11-11 DIAGNOSIS — F10.20 ALCOHOL USE DISORDER, SEVERE, DEPENDENCE (HCC): ICD-10-CM

## 2023-11-11 DIAGNOSIS — R42 DIZZINESS: ICD-10-CM

## 2023-11-11 DIAGNOSIS — K59.03 DRUG INDUCED CONSTIPATION: ICD-10-CM

## 2023-11-11 DIAGNOSIS — Z85.819 HISTORY OF ORAL CANCER: ICD-10-CM

## 2023-11-11 DIAGNOSIS — G89.3 CANCER-RELATED PAIN: ICD-10-CM

## 2023-11-11 LAB
ALBUMIN SERPL BCP-MCNC: 4.1 G/DL (ref 3.5–5)
ALP SERPL-CCNC: 74 U/L (ref 34–104)
ALT SERPL W P-5'-P-CCNC: 11 U/L (ref 7–52)
ANION GAP SERPL CALCULATED.3IONS-SCNC: 10 MMOL/L
AST SERPL W P-5'-P-CCNC: 13 U/L (ref 13–39)
BASOPHILS # BLD MANUAL: 0 THOUSAND/UL (ref 0–0.1)
BASOPHILS NFR MAR MANUAL: 0 % (ref 0–1)
BILIRUB SERPL-MCNC: 0.29 MG/DL (ref 0.2–1)
BUN SERPL-MCNC: 8 MG/DL (ref 5–25)
CALCIUM SERPL-MCNC: 9.5 MG/DL (ref 8.4–10.2)
CHLORIDE SERPL-SCNC: 97 MMOL/L (ref 96–108)
CO2 SERPL-SCNC: 25 MMOL/L (ref 21–32)
CREAT SERPL-MCNC: 0.67 MG/DL (ref 0.6–1.3)
EOSINOPHIL # BLD MANUAL: 0.28 THOUSAND/UL (ref 0–0.4)
EOSINOPHIL NFR BLD MANUAL: 3 % (ref 0–6)
ERYTHROCYTE [DISTWIDTH] IN BLOOD BY AUTOMATED COUNT: 12 % (ref 11.6–15.1)
GFR SERPL CREATININE-BSD FRML MDRD: 104 ML/MIN/1.73SQ M
GLUCOSE SERPL-MCNC: 73 MG/DL (ref 65–140)
HCT VFR BLD AUTO: 41.5 % (ref 36.5–49.3)
HGB BLD-MCNC: 14.3 G/DL (ref 12–17)
LYMPHOCYTES # BLD AUTO: 2.49 THOUSAND/UL (ref 0.6–4.47)
LYMPHOCYTES # BLD AUTO: 27 % (ref 14–44)
MCH RBC QN AUTO: 33.3 PG (ref 26.8–34.3)
MCHC RBC AUTO-ENTMCNC: 34.5 G/DL (ref 31.4–37.4)
MCV RBC AUTO: 97 FL (ref 82–98)
MONOCYTES # BLD AUTO: 1.01 THOUSAND/UL (ref 0–1.22)
MONOCYTES NFR BLD: 11 % (ref 4–12)
NEUTROPHILS # BLD MANUAL: 5.43 THOUSAND/UL (ref 1.85–7.62)
NEUTS BAND NFR BLD MANUAL: 1 % (ref 0–8)
NEUTS SEG NFR BLD AUTO: 58 % (ref 43–75)
PLATELET # BLD AUTO: 350 THOUSANDS/UL (ref 149–390)
PLATELET BLD QL SMEAR: ADEQUATE
PMV BLD AUTO: 9.3 FL (ref 8.9–12.7)
POTASSIUM SERPL-SCNC: 3.5 MMOL/L (ref 3.5–5.3)
PROT SERPL-MCNC: 7.9 G/DL (ref 6.4–8.4)
RBC # BLD AUTO: 4.3 MILLION/UL (ref 3.88–5.62)
RBC MORPH BLD: NORMAL
SODIUM SERPL-SCNC: 132 MMOL/L (ref 135–147)
WBC # BLD AUTO: 9.21 THOUSAND/UL (ref 4.31–10.16)

## 2023-11-11 PROCEDURE — 85027 COMPLETE CBC AUTOMATED: CPT | Performed by: PHYSICIAN ASSISTANT

## 2023-11-11 PROCEDURE — 93005 ELECTROCARDIOGRAM TRACING: CPT

## 2023-11-11 PROCEDURE — 36415 COLL VENOUS BLD VENIPUNCTURE: CPT | Performed by: PHYSICIAN ASSISTANT

## 2023-11-11 PROCEDURE — 80053 COMPREHEN METABOLIC PANEL: CPT | Performed by: PHYSICIAN ASSISTANT

## 2023-11-11 PROCEDURE — G1004 CDSM NDSC: HCPCS

## 2023-11-11 PROCEDURE — 96361 HYDRATE IV INFUSION ADD-ON: CPT

## 2023-11-11 PROCEDURE — 85007 BL SMEAR W/DIFF WBC COUNT: CPT | Performed by: PHYSICIAN ASSISTANT

## 2023-11-11 PROCEDURE — 99285 EMERGENCY DEPT VISIT HI MDM: CPT | Performed by: PHYSICIAN ASSISTANT

## 2023-11-11 PROCEDURE — 99284 EMERGENCY DEPT VISIT MOD MDM: CPT

## 2023-11-11 PROCEDURE — 70496 CT ANGIOGRAPHY HEAD: CPT

## 2023-11-11 PROCEDURE — 70498 CT ANGIOGRAPHY NECK: CPT

## 2023-11-11 PROCEDURE — 96374 THER/PROPH/DIAG INJ IV PUSH: CPT

## 2023-11-11 RX ORDER — LIDOCAINE HYDROCHLORIDE 20 MG/ML
15 SOLUTION OROPHARYNGEAL ONCE
Status: COMPLETED | OUTPATIENT
Start: 2023-11-11 | End: 2023-11-11

## 2023-11-11 RX ORDER — FENTANYL CITRATE 50 UG/ML
50 INJECTION, SOLUTION INTRAMUSCULAR; INTRAVENOUS ONCE
Status: COMPLETED | OUTPATIENT
Start: 2023-11-11 | End: 2023-11-11

## 2023-11-11 RX ADMIN — SODIUM CHLORIDE 1000 ML: 0.9 INJECTION, SOLUTION INTRAVENOUS at 21:01

## 2023-11-11 RX ADMIN — LIDOCAINE HYDROCHLORIDE 15 ML: 20 SOLUTION ORAL; TOPICAL at 22:45

## 2023-11-11 RX ADMIN — FENTANYL CITRATE 50 MCG: 0.05 INJECTION, SOLUTION INTRAMUSCULAR; INTRAVENOUS at 21:03

## 2023-11-11 RX ADMIN — IOHEXOL 85 ML: 350 INJECTION, SOLUTION INTRAVENOUS at 22:40

## 2023-11-12 PROBLEM — F10.920 ALCOHOLIC INTOXICATION WITHOUT COMPLICATION (HCC): Status: ACTIVE | Noted: 2023-11-12

## 2023-11-12 PROBLEM — Z86.73 HISTORY OF CVA (CEREBROVASCULAR ACCIDENT): Status: ACTIVE | Noted: 2023-11-03

## 2023-11-12 LAB
ATRIAL RATE: 95 BPM
ETHANOL SERPL-MCNC: 39 MG/DL
P AXIS: 83 DEGREES
PR INTERVAL: 120 MS
QRS AXIS: 79 DEGREES
QRSD INTERVAL: 86 MS
QT INTERVAL: 366 MS
QTC INTERVAL: 459 MS
T WAVE AXIS: 80 DEGREES
VENTRICULAR RATE: 95 BPM

## 2023-11-12 PROCEDURE — 97163 PT EVAL HIGH COMPLEX 45 MIN: CPT

## 2023-11-12 PROCEDURE — 36415 COLL VENOUS BLD VENIPUNCTURE: CPT | Performed by: NURSE PRACTITIONER

## 2023-11-12 PROCEDURE — 93010 ELECTROCARDIOGRAM REPORT: CPT | Performed by: INTERNAL MEDICINE

## 2023-11-12 PROCEDURE — 97116 GAIT TRAINING THERAPY: CPT

## 2023-11-12 PROCEDURE — 99223 1ST HOSP IP/OBS HIGH 75: CPT | Performed by: INTERNAL MEDICINE

## 2023-11-12 PROCEDURE — 82077 ASSAY SPEC XCP UR&BREATH IA: CPT | Performed by: NURSE PRACTITIONER

## 2023-11-12 RX ORDER — ENOXAPARIN SODIUM 100 MG/ML
40 INJECTION SUBCUTANEOUS DAILY
Status: DISCONTINUED | OUTPATIENT
Start: 2023-11-12 | End: 2023-11-13 | Stop reason: HOSPADM

## 2023-11-12 RX ORDER — AMOXICILLIN 250 MG
2 CAPSULE ORAL DAILY
Status: DISCONTINUED | OUTPATIENT
Start: 2023-11-12 | End: 2023-11-13 | Stop reason: HOSPADM

## 2023-11-12 RX ORDER — ATORVASTATIN CALCIUM 40 MG/1
40 TABLET, FILM COATED ORAL
Status: DISCONTINUED | OUTPATIENT
Start: 2023-11-12 | End: 2023-11-13 | Stop reason: HOSPADM

## 2023-11-12 RX ORDER — LANOLIN ALCOHOL/MO/W.PET/CERES
100 CREAM (GRAM) TOPICAL DAILY
Status: DISCONTINUED | OUTPATIENT
Start: 2023-11-12 | End: 2023-11-13 | Stop reason: HOSPADM

## 2023-11-12 RX ORDER — CLOPIDOGREL BISULFATE 75 MG/1
75 TABLET ORAL DAILY
Status: DISCONTINUED | OUTPATIENT
Start: 2023-11-12 | End: 2023-11-13 | Stop reason: HOSPADM

## 2023-11-12 RX ORDER — OXYCODONE HYDROCHLORIDE 10 MG/1
10 TABLET ORAL EVERY 6 HOURS
Status: DISCONTINUED | OUTPATIENT
Start: 2023-11-12 | End: 2023-11-13 | Stop reason: HOSPADM

## 2023-11-12 RX ORDER — SODIUM CHLORIDE 9 MG/ML
100 INJECTION, SOLUTION INTRAVENOUS CONTINUOUS
Status: DISPENSED | OUTPATIENT
Start: 2023-11-12 | End: 2023-11-12

## 2023-11-12 RX ORDER — ASPIRIN 81 MG/1
81 TABLET, CHEWABLE ORAL DAILY
Status: DISCONTINUED | OUTPATIENT
Start: 2023-11-12 | End: 2023-11-13 | Stop reason: HOSPADM

## 2023-11-12 RX ORDER — ACETAMINOPHEN 325 MG/1
975 TABLET ORAL EVERY 8 HOURS SCHEDULED
Status: DISCONTINUED | OUTPATIENT
Start: 2023-11-12 | End: 2023-11-13 | Stop reason: HOSPADM

## 2023-11-12 RX ORDER — POLYETHYLENE GLYCOL 3350 17 G/17G
17 POWDER, FOR SOLUTION ORAL DAILY PRN
Status: DISCONTINUED | OUTPATIENT
Start: 2023-11-12 | End: 2023-11-13

## 2023-11-12 RX ORDER — ONDANSETRON 2 MG/ML
4 INJECTION INTRAMUSCULAR; INTRAVENOUS EVERY 6 HOURS PRN
Status: DISCONTINUED | OUTPATIENT
Start: 2023-11-12 | End: 2023-11-13 | Stop reason: HOSPADM

## 2023-11-12 RX ORDER — FOLIC ACID 1 MG/1
1 TABLET ORAL DAILY
Status: DISCONTINUED | OUTPATIENT
Start: 2023-11-12 | End: 2023-11-13 | Stop reason: HOSPADM

## 2023-11-12 RX ORDER — MAGNESIUM HYDROXIDE/ALUMINUM HYDROXICE/SIMETHICONE 120; 1200; 1200 MG/30ML; MG/30ML; MG/30ML
30 SUSPENSION ORAL EVERY 6 HOURS PRN
Status: DISCONTINUED | OUTPATIENT
Start: 2023-11-12 | End: 2023-11-13 | Stop reason: HOSPADM

## 2023-11-12 RX ADMIN — SODIUM CHLORIDE 100 ML/HR: 0.9 INJECTION, SOLUTION INTRAVENOUS at 01:59

## 2023-11-12 RX ADMIN — ENOXAPARIN SODIUM 40 MG: 40 INJECTION SUBCUTANEOUS at 08:39

## 2023-11-12 RX ADMIN — OXYCODONE HYDROCHLORIDE 10 MG: 10 TABLET ORAL at 08:39

## 2023-11-12 RX ADMIN — CLOPIDOGREL BISULFATE 75 MG: 75 TABLET ORAL at 08:39

## 2023-11-12 RX ADMIN — ASPIRIN 81 MG CHEWABLE TABLET 81 MG: 81 TABLET CHEWABLE at 08:39

## 2023-11-12 RX ADMIN — SENNOSIDES AND DOCUSATE SODIUM 2 TABLET: 8.6; 5 TABLET ORAL at 08:39

## 2023-11-12 RX ADMIN — OXYCODONE HYDROCHLORIDE 10 MG: 10 TABLET ORAL at 02:30

## 2023-11-12 RX ADMIN — Medication 1 TABLET: at 08:39

## 2023-11-12 RX ADMIN — POLYETHYLENE GLYCOL 3350 17 G: 17 POWDER, FOR SOLUTION ORAL at 14:17

## 2023-11-12 RX ADMIN — FOLIC ACID 1 MG: 1 TABLET ORAL at 08:39

## 2023-11-12 RX ADMIN — THIAMINE HCL TAB 100 MG 100 MG: 100 TAB at 08:39

## 2023-11-12 RX ADMIN — OXYCODONE HYDROCHLORIDE 10 MG: 10 TABLET ORAL at 14:17

## 2023-11-12 RX ADMIN — ACETAMINOPHEN 325MG 975 MG: 325 TABLET ORAL at 08:39

## 2023-11-12 RX ADMIN — ATORVASTATIN CALCIUM 40 MG: 40 TABLET, FILM COATED ORAL at 17:18

## 2023-11-12 RX ADMIN — OXYCODONE HYDROCHLORIDE 10 MG: 10 TABLET ORAL at 20:17

## 2023-11-12 NOTE — PHYSICAL THERAPY NOTE
PHYSICAL THERAPY EVALUATION  NAME:  Haydee Puri  DATE: 11/12/23    AGE:   59 y.o. Mrn:   [id number]  ADMIT DX:  Dizziness [R42]  Tongue pain [K14.6]  History of oral cancer [Z85.819]  Fall, initial encounter [W19. 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    Stroke [facility])      Length Of Stay: 0  Performed at least 2 patient identifiers during session: Name and Birthday    PHYSICAL THERAPY EVALUATION :    11/12/23 1007   PT Last Visit   PT Visit Date 11/12/23   Note Type   Note type Evaluation   Pain Assessment   Pain Assessment Tool 0-10   Pain Score 10 - Worst Possible Pain   Pain Location/Orientation Location: Generalized; Location: Face; Other (Comment)  (Tongue)   Pain Onset/Description Onset: Ongoing   Patient's Stated Pain Goal No pain   Restrictions/Precautions   Weight Bearing Precautions Per Order No   Home Living   Type of Home Apartment   Home Layout One level; Other (Comment); Able to live on main level with bedroom/bathroom; Performs ADLs on one level  (No stairs to enter)   Bathroom Shower/Tub Tub/shower unit   Bathroom Toilet Standard   Bathroom Equipment Other (Comment)  (None)   Home Equipment Other (Comment)  (None)   Prior Function   Level of [place] Independent with ADLs; Independent with functional mobility   Lives With Friend(s)   Receives Help From Friend(s)   IADLs Independent with driving; Independent with meal prep; Independent with medication management   Falls in the last 6 months 1 to 4  (2 falls walking with left leg buckling)   Vocational Other (Comment)  (Not currently working)   General   Family/Caregiver Present No   Cognition   Overall Cognitive Status WFL   Arousal/Participation Alert   Orientation Level Oriented X4   Vision-Basic Assessment   Current Vision Wears glasses only for reading   Coordination   Movements are Fluid and Coordinated 1   Sensation WFL  (No c/o numbness or tingling in UE or LE.)   Five Times Sit To Stand   Time (Seconds) 16 Seconds   Self Selected Walking Speed   SSWS Trial 1 (Seconds) 7 Seconds   SSWS Trial 2 (Seconds) 7 Seconds   SSWS Trial 3 (Seconds) 7 Seconds   SSWS Average Time (Seconds) 7 seconds   SSWS Average Score (m/sec) 0.7 m/sec   Bed Mobility   Rolling R 7  Independent   Rolling L 7  Independent   Supine to Sit 7  Independent   Sit to Supine 7  Independent   Transfers   Sit to Stand 7  Independent   Additional items Other  (Does report dizzines with transition to standing. With BP in supine 148/80 and in initial standing 128/75 with symptoms. Post walk /81.)   Stand to Sit 7  Independent   Ambulation/Elevation   Gait pattern Narrow JALEESA; Decreased foot clearance; Short stride   Gait Assistance 5  Supervision  (Assist with IV pole)   Assistive Device Other (Comment)  (IV pole)   Distance 250'   Stair Management Assistance Not tested   Ambulation/Elevation Additional Comments PAtient with no left knee buckle during gait trial.   Balance   Static Sitting Normal   Dynamic Sitting Normal   Static Standing Good   Dynamic Standing Fair   Ambulatory Fair   Endurance Deficit   Endurance Deficit Yes   Endurance Deficit Description Patient's BP's documented above; HR 72 at rest to 86 with activity SpO2 100% on RA. Activity Tolerance   Activity Tolerance Patient tolerated treatment well   Medical Staff Made Aware CM updated via tiger text   Nurse Made Aware Spoke with Valeriano Lucas RN for clearance and update post session. Assessment   Prognosis Good   Problem List Decreased strength;Decreased endurance;Pain   Barriers to Discharge None   Barriers to Discharge Comments Patient has accessible environment with support at home. Goals   Patient Goals To not have my leg give out. STG Expiration Date 11/18/23   PT Treatment Day 1   Plan   Treatment/Interventions LE strengthening/ROM; Therapeutic exercise; Endurance training;Patient/family training;Equipment eval/education;Gait training; Compensatory technique education;Spoke to nursing;Spoke to case management   PT Frequency 3-5x/wk   Discharge Recommendation   Rehab Resource Intensity Level, PT III (Minimum Resource Intensity)  (Patient would benefit from outpatient Neuro PT to address residual weaknesses.)   Equipment Recommended Cane  (issued during treatment session)   AM-PAC Basic Mobility Inpatient   Turning in Flat Bed Without Bedrails 4   Lying on Back to Sitting on Edge of Flat Bed Without Bedrails 4   Moving Bed to Chair 3   Standing Up From Chair Using Arms 4   Walk in Room 3   Climb 3-5 Stairs With Railing 3   Basic Mobility Inpatient Raw Score 21   Basic Mobility Standardized Score 45.55   Highest Level Of Mobility   JH-HLM Goal 6: Walk 10 steps or more   JH-HLM Achieved 8: Walk 250 feet ot more   Tinetti   Sitting Balance 1   Arises 2   Attempts to Arise 2   Immediate Standing Balance (First 5 Seconds) 2   Standing Balance 2   Nudged 2   Eyes Closed 1   Turned 360 Degrees: Steadiness 1   Turned 360 Degrees: Continuity of Steps 1   Sitting Down 2   Balance Score 16   Initiation of Gait 0   Step Height: R Swing Foot 1   Step Length: R Swing Foot 1   Step Height: L Swing Foot 1   Step Length: L Swing Foot 1   Step Symmetry 1   Step Continuity 1   Path 1   Trunk 1   Walking Time 0   Gait Score 8   Total Score 24   Additional Treatment Session   Start Time 1023   End Time 1050   Treatment Assessment Patient seen for therapy session post evaluation to provide mobility and continued monitoring of vital signs with activity to assist in d/c planning. Session primary focused on gait training with SPC, tolerance to functional activity and education surrounding the outcome measures used during the session of the 5x STS . Norms shared with the patient regarding these tools. 5x STS for his age, normal would be 8.4 seconds without the use of UE.  In his score was 16 seconds to complete the test demonstrating LE weakness which was also detected in manual testing during the evaluation in the LLE. The outcome of this assessment recommend the use of a SPC to support occasional Left knee buckling which is happening as the L quadricep’s fatigues. Patient was able to demonstrate S walking with the [facility] 250’. Continued PT during hospitalization is recommended to address LLE weakness and fall risk. Equipment Use [facility]   Exercises   Balance training  Gait training as well as education on use of the [facility]. End of Consult   Patient Position at End of Consult Supine; All needs within reach       (Please find full objective findings from PT assessment regarding body systems outlined above). Assessment: Pt is a 59 y.o. male seen for PT evaluation s/p admit to [address] on 00/29/4190 w/ Alcoholic intoxication without complication ([address] Order placed for PT. Prior to admission, pt was independent w/ all functional mobility w/ no device, ambulated community distances and elevations, lived in one floor environment, and lived with significant other . Upon evaluation: Pt requires  no assistance for bed mobility and transfers and supervision assistance for ambulation with IV pole/SPC for 250' . Pt's clinical presentation is currently unstable/unpredictable given the functional mobility deficits above, especially weakness, decreased endurance, gait deviations, pain, and decreased activity tolerance, coupled with fall risks including hx of falls and impaired balance, and combined with medical complications of hypertension , readmission to hospital, and abnormal sodium values. Pt to benefit from continued skilled PT tx while in hospital and upon DC to address deficits as defined above and maximize level of functional mobility.  From PT/mobility standpoint, recommendation at time of d/c would be home with family support, with cane, and Level 3 rehab  pending progress in order to maximize pt's functional independence and consistency w/ mobility in order to facilitate return to PLOF. Recommend trial with cane next 1-2 sessions and ther ex next 1-2 sessions. Additional Medical Information: 63yo male with pmh of tongue and throat cancer who presents to ER for evaluation of dizziness. Ongoing since last week. States he was admitted earlier this month for a stroke. States he had this dizziness then and was told it was related to the stroke. Admits to chronic unchanged left leg weakness. He told RN he fell twice today. Admits to drinking alcohol today. He was started on plavix during recent admission. Denies new weakness or paresthesias. States pain in tongue/throat is not well controlled and he is not eating well due to this. Pain now radiates from right side of mouth/jaw into right side of his head. Denies chest pain or sob. Denies N/V. Recent admission at [facility] 11/3-11/6 for right-sided lacunar stroke. Goals: Pt will in 7 days:   Perform transfers to  independently with SPC  to decrease risk for falls. Perform ambulation with SPC for 500' to  modified I  to decrease risk for falls and improve gait quality. Increase LLE strength by 1/2 grade. Increase activity tolerance to 45 minutes in order to improve endurance to functional tasks. PT for ongoing patient and family/caregiver education, DME needs and d/c planning in order to promote highest level of function in least restrictive environment    The following objective measures were performed on IE: Tinetti/ESTELITA: 24/28 (Moderate risk for falls), AM-PAC 6-Clicks: 82/11, and 5xSTS: 16 seconds . Comorbidities affecting pt's physical performance at time of assessment include: HTN, CVA, cancer history and/or treatment, and ETOH use . Personal factors affecting pt at time of IE include: hx of non-compliance and recent fall(s).      Tati Garcia, PT, DPT, GCS

## 2023-11-12 NOTE — H&P
[address] [place] [address]  H&P  Name: John Yung 59 y.o. male I MRN: [id number]  Unit/Bed#: E2 -01 I Date of Admission: 11/11/2023   Date of Service: 11/12/2023  Hospital Day: 0      Assessment/Plan   * Alcoholic intoxication without complication [facility])  Assessment & Plan  Presents with complaint of fall in setting of alcohol intoxication  Reports drinking 2-6 beers daily. 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 Seen by oncology during admission at [facility] last week and recommended concurrent radiation therapy and cisplatin at 40 mg per metered square weekly as soon as possible   S/p extraction of all teeth in Oct    Cancer-related pain  Assessment & Plan  Follows with palliative care, PTA regimen:   Oxy IR 10mg PO q6H prn severe pain. Max of only 4 a day. Tylenol 1000mg PO q8H ATC. Max of only 3000mg in 24H  OTC 4% lidocaine patch to the R jaw, 12H on and 12H off  Senokot-S daily to prevent OIC  Patient only taking oxycodone from prescribed regimen. Found with a bottle of oxycodone in his possession. Taken from patient and sent to pharmacy  Check urine drug screen  Concern for opioid use disorder.   Palliative care consult    Moderate protein-calorie malnutrition (HCC)  Assessment & Plan  Malnutrition Findings: 16.48  Muscle loss, Inadequate energy, Weight loss  360 Statement: Protein calorie malnutrition r/t inadequate intake as evidenced by <75% of estimated energy intake compared to estimated energy needs > 1 month, muscle wasting present to temples/clavicle, and ongoing weight loss; currently treated with diet and oral nutrition supplements   In setting of oral cancer  Seen by speech therapy 11/4 as part of stroke pathway, recommended regular diet with thin liquids, choose softer, moist foods  Add supplement with meals  Aspiration precautions    History of CVA (cerebrovascular accident)  Assessment & Plan  Recent admission at [facility] 11/3-11/6 for right-sided lacunar stroke. Secondary prevention with aspirin and statin    Hyponatremia  Assessment & Plan  Chronic mild hyponatremia due to beer potomania         VTE Prophylaxis: Enoxaparin (Lovenox)  / reason for no mechanical VTE prophylaxis ac    Code Status: fc  POLST: POLST is not applicable to this patient  Discussion with family:     Anticipated Length of Stay:  Patient will be admitted on an Observation basis with an anticipated length of stay of  < 2 midnights. Justification for Hospital Stay: fall in setting of ETOH intoxication    Total Time for Visit, including Counseling / Coordination of Care: 60 minutes. Greater than 50% of this total time spent on direct patient counseling and coordination of care. Chief Complaint:   "dizzy and fell"    History of Present Illness:    Ana Rowell is a 59 y.o. male who presents with c/o dizziness and fall; denies head strike, states he caught his fall. Admits to drinking beer today, unclear amount. States he drinks 2-6 bottles of beer daily. Was found with a water bottle full of beer in his belongings and a bottle of oxycodone. Patient irritable, unable to obtain HPI.     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 Authorizing Provider   aspirin 81 mg chewable tablet Chew 1 tablet (81 mg total) daily 11/6/23 2/4/24  aYmilet Ahumada MD   atorvastatin (LIPITOR) 40 mg tablet Take 1 tablet (40 mg total) by mouth every evening 11/6/23 12/6/23  Yamilet Ahumada MD   clopidogrel (Plavix) 75 mg tablet Take 1 tablet (75 mg total) by mouth daily for 21 days 11/6/23 11/27/23  Yamilet Ahumada MD   diphenhydramine, lidocaine, Al/Mg hydroxide, simethicone (Magic Mouthwash) SUSP Swish and swallow 10 mL every 4 (four) hours as needed for mouth pain or discomfort 11/6/23   Yamilet Ahumada MD   ibuprofen (MOTRIN) 600 mg tablet Take 1 tablet (600 mg total) by mouth every 6 (six) hours as needed for mild pain 10/16/23   Pavan Perez DMD   naloxone [facility]) 4 mg/0.1 mL nasal spray Administer 1 spray into a nostril. If no response after 2-3 minutes, give another dose in the other nostril using a new spray. 11/2/23 11/1/24  Anel Villalba MD   oxyCODONE (ROXICODONE) 10 MG TABS Take 1 tablet (10 mg total) by mouth every 6 (six) hours as needed for severe pain Max Daily Amount: 40 mg 11/2/23   Anel Villalba MD   senna-docusate sodium (SENOKOT-S) 8.6-50 mg per tablet Take 1 tablet by mouth daily 11/2/23   Anel Villalba MD     I have reviewed home medications with patient personally. Allergies:    Allergies   Allergen Reactions    Bee Venom Hives    Other      bees       Social History:     Marital Status: Single   Occupation: unemployed  Patient Pre-hospital Living Situation: Resides with girlfriend  Patient Pre-hospital Level of Mobility: Ambulatory  Patient Pre-hospital Diet Restrictions: Regular, thin, soft moist food  Substance Use History:   Social History     Substance and Sexual Activity   Alcohol Use Yes    Comment: 3-4 beers/day 24 oz beer     Social History     Tobacco Use   Smoking Status Every Day    Packs/day: 0.25    Types: Cigarettes   Smokeless Tobacco Never     Social History     Substance and Sexual Activity   Drug Use No       Family History:    Family History   Problem Relation Age of Onset    Breast cancer Mother     Cancer Father        Physical Exam:     Vitals:   Blood Pressure: 167/99 (11/12/23 0113)  Pulse: 80 (11/12/23 0113)  Temperature: 97.8 °F (36.6 °C) (11/12/23 0113)  Temp Source: Temporal (11/12/23 0113)  Respirations: 16 (11/12/23 0113)  Weight - Scale: 59.8 kg (131 lb 13.4 oz) (11/12/23 0113)  SpO2: 96 % (11/12/23 0113)    Physical Exam  Constitutional:       Comments: Underweight   HENT:      Head: Normocephalic and atraumatic. Mouth/Throat:      Mouth: Mucous membranes are dry. Comments: Edentulous  Eyes:      Comments: R eye closed shut although he is able to open it   Cardiovascular:      Rate and Rhythm: Normal rate and regular rhythm. Heart sounds: Normal heart sounds. Pulmonary:      Effort: Pulmonary effort is normal.      Breath sounds: Normal breath sounds. Abdominal:      Palpations: Abdomen is soft. Comments: Decreased bowel sounds   Musculoskeletal:      Right lower leg: No edema. Left lower leg: No edema. Comments: Small dry scabbed wounds /BLLE    Skin:     General: Skin is dry. Neurological:      Mental Status: He is alert and oriented to person, place, and time. Psychiatric:         Thought Content: Thought content normal.         Judgment: Judgment normal.      Comments: Blunted affect, irritable       Additional Data:     Lab Results: I have personally reviewed pertinent reports.       Results from last 7 days   Lab Units 11/11/23  2100   WBC Thousand/uL 9.21   HEMOGLOBIN g/dL 14.3   HEMATOCRIT % 41.5   PLATELETS Thousands/uL 350   BANDS PCT % 1   LYMPHO PCT % 27   MONO PCT % 11   EOS PCT % 3     Results from last 7 days   Lab Units 11/11/23  2100   SODIUM mmol/L 132*   POTASSIUM mmol/L 3.5   CHLORIDE mmol/L 97   CO2 mmol/L 25   BUN mg/dL 8   CREATININE mg/dL 0.67   ANION GAP mmol/L 10   CALCIUM mg/dL 9.5   ALBUMIN g/dL 4.1   TOTAL BILIRUBIN mg/dL 0.29   ALK PHOS U/L 74   ALT U/L 11   AST U/L 13   GLUCOSE RANDOM mg/dL 73         Results from last 7 days   Lab Units 11/06/23  1043 11/06/23  0735 11/05/23  1541 11/05/23  1136 11/05/23  0810   POC GLUCOSE mg/dl 126 93 113 128 96               Imaging: I have personally reviewed pertinent reports. CTA head and neck with and without contrast   Final Result by Malcolm Henao MD (11/11 0966)      1. No cervical or intracranial major vessel arterial occlusion, focal saccular aneurysm, acute injury/dissection, or significant flow-limiting stenosis identified. 2.  Short segment of moderate right vertebral artery luminal narrowing due to external compression by bony canal narrowing at the C5-6 level from osteophyte formation and facet hypertrophy is again seen, stable compared to the prior study. The right    cervical vertebral artery is otherwise patent and unremarkable. 3.  Short segment of mild left vertebral artery luminal narrowing due to external compression by bony canal narrowing at the C5-6 level from osteophyte formation and facet hypertrophy is again seen, stable compared to the prior study. The left cervical    vertebral artery is otherwise patent and unremarkable. 4.  Mild luminal narrowing of the bilateral carotid bulb and proximal internal carotid artery segments secondary to calcified wall plaque is again seen without interval change. Otherwise normal common/internal carotid artery caliber without significant    stenosis or dissection.    5.  Extensive submandibular/internal jugular chain metastatic lymphadenopathy again noted, right greater than left without significant interval change compared to the prior study of 11/3/2023 but worse compared to the study of 9/9/2023. Right tongue base    mass with slight compression and deviation of the oropharyngeal airways is also seen without significant interval change since 11/3/2023.   6.  There is marked severe compression upon the right internal jugular vein by pathologically enlarged metastatic lymph nodes in the right neck which becomes occluded/nonvisualized from the C1-2 to the C4-5 level. Findings appear similar compared to the    prior study of 11/3/2023.   7.  Multiple pathologically enlarged superior mediastinal lymph nodes are partially seen with the node measuring 2.1 x 1.5 cm in the precarinal region. Cannot exclude mediastinal metastatic disease. Calcifications within a few of the mediastinal lymph    nodes noted which may suggest sequela of prior granulomatous disease. 8.  Bony destruction of the left posterior maxilla and bilateral mandible could suggest tumor invasion or osseous metastatic disease. There is associated subtle nondisplaced pathological fracture involving the angle of the left mandible. 9.  Emphysema, scarring, and calcifications in the visualized lung apices. Workstation performed: [id number]             EKG, Pathology, and Other Studies Reviewed on Admission:   cta    Allscripts / Epic Records Reviewed: Yes     ** Please Note: This note has been constructed using a voice recognition system.  **

## 2023-11-12 NOTE — PLAN OF CARE
Problem: PHYSICAL THERAPY ADULT  Goal: Performs mobility at highest level of function for planned discharge setting. See evaluation for individualized goals. Description: Treatment/Interventions: LE strengthening/ROM, Therapeutic exercise, Endurance training, Patient/family training, Equipment eval/education, Gait training, Compensatory technique education, Spoke to nursing, Spoke to case management  Equipment Recommended: Jewel Gar (issued during treatment session)       See flowsheet documentation for full assessment, interventions and recommendations. Note: Prognosis: Good  Problem List: Decreased strength, Decreased endurance, Pain  Assessment: Pt is a 59 y.o. male seen for PT evaluation s/p admit to [facility] on 10/78/8452 w/ Alcoholic intoxication without complication ([address] Order placed for PT. Prior to admission, pt was independent w/ all functional mobility w/ no device, ambulated community distances and elevations, lived in one floor environment, and lived with significant other . Upon evaluation: Pt requires  no assistance for bed mobility and transfers and supervision assistance for ambulation with IV pole/SPC for 250' . Pt's clinical presentation is currently unstable/unpredictable given the functional mobility deficits above, especially weakness, decreased endurance, gait deviations, pain, and decreased activity tolerance, coupled with fall risks including hx of falls and impaired balance, and combined with medical complications of hypertension , readmission to hospital, and abnormal sodium values. Pt to benefit from continued skilled PT tx while in hospital and upon DC to address deficits as defined above and maximize level of functional mobility.  From PT/mobility standpoint, recommendation at time of d/c would be home with family support, with cane, and Level 3 rehab  pending progress in order to maximize pt's functional independence and consistency w/ mobility in order to facilitate return to PLOF. Recommend trial with cane next 1-2 sessions and ther ex next 1-2 sessions. Barriers to Discharge: None  Barriers to Discharge Comments: Patient has accessible environment with support at home. Rehab Resource Intensity Level, PT: III (Minimum Resource Intensity) (Patient would benefit from outpatient Neuro PT to address residual weaknesses.)    See flowsheet documentation for full assessment.

## 2023-11-12 NOTE — ED PROVIDER NOTES
History  Chief Complaint   Patient presents with    Dizziness     Pt reports dizziness (lightheaded) that started last Monday when he was discharged from hospital. 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 Reports this caused a stroke. Denies deficits from stroke. Reports two falls today, denies head strike, reports recently started taking plavix. GCS 15. Reports 4 tall beers daily, last drink 1 hour ago. 61yo male with pmh of tongue and throat cancer who presents to ER for evaluation of dizziness. Ongoing since last week. States he was admitted earlier this month for a stroke. States he had this dizziness then and was told it was related to the stroke. Admits to chronic unchanged left leg weakness. He told RN he fell twice today. Admits to drinking alcohol today. He was started on plavix during recent admission. Denies new weakness or paresthesias. States pain in tongue/throat is not well controlled and he is not eating well due to this. Pain now radiates from right side of mouth/jaw into right side of his head. Denies chest pain or sob. Denies N/V. History provided by:  Patient  Dizziness  Associated symptoms: no chest pain, no nausea, no shortness of breath and no vomiting        Prior to Admission Medications   Prescriptions Last Dose Informant Patient Reported?  Taking?   aspirin 81 mg chewable tablet   No No   Sig: Chew 1 tablet (81 mg total) daily   atorvastatin (LIPITOR) 40 mg tablet   No No   Sig: Take 1 tablet (40 mg total) by mouth every evening   clopidogrel (Plavix) 75 mg tablet   No No   Sig: Take 1 tablet (75 mg total) by mouth daily for 21 days   diphenhydramine, lidocaine, Al/Mg hydroxide, simethicone (Magic Mouthwash) SUSP   No No   Sig: Swish and swallow 10 mL every 4 (four) hours as needed for mouth pain or discomfort   ibuprofen (MOTRIN) 600 mg tablet   No No   Sig: Take 1 tablet (600 mg total) by mouth every 6 (six) hours as needed for mild pain naloxone (NARCAN) 4 mg/0.1 mL nasal spray   No No   Sig: Administer 1 spray into a nostril. If no response after 2-3 minutes, give another dose in the other nostril using a new spray. 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)        Past Surgical History:   Procedure Laterality Date    EGD      IR PORT PLACEMENT  11/7/2023    KNEE ARTHROSCOPY Left     MULTIPLE TOOTH EXTRACTIONS N/A 10/16/2023    Procedure: EXTRACTION OF ALL REMAINING TEETH;  Surgeon: Verona Mackay DMD;  Location: [facility];  Service: Oral Surgery       Family History   Problem Relation Age of Onset    Breast cancer Mother     Cancer Father      I have reviewed and agree with the history as documented. E-Cigarette/Vaping    E-Cigarette Use Never User      E-Cigarette/Vaping Substances    Nicotine Yes      Social History     Tobacco Use    Smoking status: Every Day     Packs/day: 0.25     Types: Cigarettes    Smokeless tobacco: Never   Vaping Use    Vaping Use: Never used   Substance Use Topics    Alcohol use: Yes     Comment: 3-4 beers/day 24 oz beer    Drug use: No       Review of Systems   Constitutional:  Negative for chills and fever. Respiratory:  Negative for shortness of breath. Cardiovascular:  Negative for chest pain. Gastrointestinal:  Negative for nausea and vomiting. Musculoskeletal:  Negative for arthralgias. Skin:  Negative for rash. Neurological:  Positive for dizziness and light-headedness. All other systems reviewed and are negative.       Physical Exam  Physical Exam  Vitals and nursing note reviewed. Constitutional:       Appearance: Normal appearance. He is well-developed. Comments: He is irritable and frustrated   HENT:      Head: Atraumatic. Right Ear: External ear normal.      Left Ear: External ear normal.      Nose: Nose normal.      Mouth/Throat:      Mouth: Mucous membranes are moist.      Pharynx: Oropharyngeal exudate and posterior oropharyngeal erythema present. Comments: Tongue is swollen and red  Eyes:      General: No scleral icterus. Extraocular Movements: Extraocular movements intact. Conjunctiva/sclera: Conjunctivae normal.      Pupils: Pupils are equal, round, and reactive to light. Cardiovascular:      Rate and Rhythm: Normal rate and regular rhythm. Heart sounds: Normal heart sounds. Pulmonary:      Effort: Pulmonary effort is normal.      Breath sounds: Normal breath sounds. Abdominal:      General: Bowel sounds are normal. There is no distension. Palpations: Abdomen is soft. Tenderness: There is no abdominal tenderness. Musculoskeletal:         General: No swelling, tenderness or deformity. Normal range of motion. Cervical back: Neck supple. Skin:     General: Skin is warm and dry. Findings: No rash. Neurological:      General: No focal deficit present. Mental Status: He is alert and oriented to person, place, and time. Cranial Nerves: No cranial nerve deficit. Comments: Patient refuses to complete finger to nose test   Psychiatric:         Attention and Perception: Attention normal.         Behavior: Behavior is agitated. Thought Content:  Thought content normal.         Vital Signs  ED Triage Vitals   Temperature Pulse Respirations Blood Pressure SpO2   11/11/23 2036 11/11/23 2035 11/11/23 2035 11/11/23 2035 11/11/23 2035   97.9 °F (36.6 °C) 96 18 135/72 98 %      Temp Source Heart Rate Source Patient Position - Orthostatic VS BP Location FiO2 (%)   11/11/23 2036 11/11/23 2035 11/11/23 2035 11/11/23 2035 --   Oral Monitor Sitting Right arm       Pain Score       11/11/23 2035       No Pain           Vitals:    11/11/23 2035 11/11/23 2045 11/11/23 2200   BP: 135/72 126/70 135/79   Pulse: 96 91 75   Patient Position - Orthostatic VS: Sitting  Lying         Visual Acuity  Visual Acuity      Flowsheet Row Most Recent Value   L Pupil Size (mm) 3   R Pupil Size (mm) 3            ED Medications  Medications   sodium chloride 0.9 % bolus 1,000 mL (0 mL Intravenous Stopped 11/11/23 2258)   fentanyl citrate (PF) 100 MCG/2ML 50 mcg (50 mcg Intravenous Given 11/11/23 2103)   Lidocaine Viscous HCl (XYLOCAINE) 2 % mucosal solution 15 mL (15 mL Swish & Spit Given 11/11/23 2245)   iohexol (OMNIPAQUE) 350 MG/ML injection (MULTI-DOSE) 85 mL (85 mL Intravenous Given 11/11/23 2240)       Diagnostic Studies  Results Reviewed       Procedure Component Value Units Date/Time    Manual Differential(PHLEBS Do Not Order) [[id number]] Collected: 11/11/23 2100    Lab Status: Final result Specimen: Blood from Arm, Right Updated: 11/11/23 2132     Segmented % 58 %      Bands % 1 %      Lymphocytes % 27 %      Monocytes % 11 %      Eosinophils, % 3 %      Basophils % 0 %      Absolute Neutrophils 5.43 Thousand/uL      Lymphocytes Absolute 2.49 Thousand/uL      Monocytes Absolute 1.01 Thousand/uL      Eosinophils Absolute 0.28 Thousand/uL      Basophils Absolute 0.00 Thousand/uL      Total Counted --     RBC Morphology Normal     Platelet Estimate Adequate    Comprehensive metabolic panel [[id number]]  (Abnormal) Collected: 11/11/23 2100    Lab Status: Final result Specimen: Blood from Arm, Right Updated: 11/11/23 2122     Sodium 132 mmol/L      Potassium 3.5 mmol/L      Chloride 97 mmol/L      CO2 25 mmol/L      ANION GAP 10 mmol/L      BUN 8 mg/dL      Creatinine 0.67 mg/dL      Glucose 73 mg/dL      Calcium 9.5 mg/dL      AST 13 U/L      ALT 11 U/L      Alkaline Phosphatase 74 U/L      Total Protein 7.9 g/dL      Albumin 4.1 g/dL      Total Bilirubin 0.29 mg/dL      eGFR 104 ml/min/1.73sq m     Narrative:      [place] guidelines for Chronic Kidney Disease (CKD):     Stage 1 with normal or high GFR (GFR > 90 mL/min/1.73 square meters)    Stage 2 Mild CKD (GFR = 60-89 mL/min/1.73 square meters)    Stage 3A Moderate CKD (GFR = 45-59 mL/min/1.73 square meters)    Stage 3B Moderate CKD (GFR = 30-44 mL/min/1.73 square meters)    Stage 4 Severe CKD (GFR = 15-29 mL/min/1.73 square meters)    Stage 5 End Stage CKD (GFR <15 mL/min/1.73 square meters)  Note: GFR calculation is accurate only with a steady state creatinine    CBC and differential [[id number]]  (Normal) Collected: 11/11/23 2100    Lab Status: Final result Specimen: Blood from Arm, Right Updated: 11/11/23 2109     WBC 9.21 Thousand/uL      RBC 4.30 Million/uL      Hemoglobin 14.3 g/dL      Hematocrit 41.5 %      MCV 97 fL      MCH 33.3 pg      MCHC 34.5 g/dL      RDW 12.0 %      MPV 9.3 fL      Platelets 543 Thousands/uL     Narrative: This is an appended report. These results have been appended to a previously verified report. CTA head and neck with and without contrast   Final Result by Haydee Dahl MD (11/11 4101)      1. No cervical or intracranial major vessel arterial occlusion, focal saccular aneurysm, acute injury/dissection, or significant flow-limiting stenosis identified. 2.  Short segment of moderate right vertebral artery luminal narrowing due to external compression by bony canal narrowing at the C5-6 level from osteophyte formation and facet hypertrophy is again seen, stable compared to the prior study. The right    cervical vertebral artery is otherwise patent and unremarkable.    3.  Short segment of mild left vertebral artery luminal narrowing due to external compression by bony canal narrowing at the C5-6 level from osteophyte formation and facet hypertrophy is again seen, stable compared to the prior study. The left cervical    vertebral artery is otherwise patent and unremarkable. 4.  Mild luminal narrowing of the bilateral carotid bulb and proximal internal carotid artery segments secondary to calcified wall plaque is again seen without interval change. Otherwise normal common/internal carotid artery caliber without significant    stenosis or dissection. 5.  Extensive submandibular/internal jugular chain metastatic lymphadenopathy again noted, right greater than left without significant interval change compared to the prior study of 11/3/2023 but worse compared to the study of 9/9/2023. Right tongue base    mass with slight compression and deviation of the oropharyngeal airways is also seen without significant interval change since 11/3/2023.   6.  There is marked severe compression upon the right internal jugular vein by pathologically enlarged metastatic lymph nodes in the right neck which becomes occluded/nonvisualized from the C1-2 to the C4-5 level. Findings appear similar compared to the    prior study of 11/3/2023.   7.  Multiple pathologically enlarged superior mediastinal lymph nodes are partially seen with the node measuring 2.1 x 1.5 cm in the precarinal region. Cannot exclude mediastinal metastatic disease. Calcifications within a few of the mediastinal lymph    nodes noted which may suggest sequela of prior granulomatous disease. 8.  Bony destruction of the left posterior maxilla and bilateral mandible could suggest tumor invasion or osseous metastatic disease. There is associated subtle nondisplaced pathological fracture involving the angle of the left mandible. 9.  Emphysema, scarring, and calcifications in the visualized lung apices.          Workstation performed: [id number]                    Procedures  ECG 12 Lead Documentation Only    Date/Time: 11/11/2023 9:23 PM    Performed by: Tita Malcolm PA-C  Authorized by: Tita Malcolm PA-C    Indications / Diagnosis: Dizzy  ECG reviewed by me, the ED Provider: yes    Patient location:  ED  Previous ECG:     Previous ECG:  Compared to current    Similarity:  No change  Interpretation:     Interpretation: abnormal    Rate:     ECG rate:  95    ECG rate assessment: normal    Rhythm:     Rhythm: sinus rhythm    Ectopy:     Ectopy: none    QRS:     QRS axis:  Normal  Conduction:     Conduction: normal    ST segments:     ST segments:  Normal  T waves:     T waves: normal    Other findings:     Other findings: LVH             ED Course  ED Course as of 11/12/23 0016   Sat Nov 11, 2023 2148 Pain improved, however states his tongue still bothers him, will try viscous lido swish   Sun Nov 12, 2023   0006 Reviewed results with patient, discussed admission, [facility] paged for admission and they accepted                               SBIRT 22yo+      Flowsheet Row Most Recent Value   Initial Alcohol Screen: US AUDIT-C     1. How often do you have a drink containing alcohol? 0 Filed at: 11/11/2023 2121   2. How many drinks containing alcohol do you have on a typical day you are drinking? 0 Filed at: 11/11/2023 2121   3a. Male UNDER 65: How often do you have five or more drinks on one occasion? 0 Filed at: 11/11/2023 2121   Audit-C Score 0 Filed at: 11/11/2023 2121   CHHAYA: How many times in the past year have you. .. Used an illegal drug or used a prescription medication for non-medical reasons? Never Filed at: 11/11/2023 2121                      Medical Decision Making  Differential diagnosis includes but is not limited to: intracranial hemorrhage or ischemia, vertebral artery dissection, ambulatory dysfunction, malnutrition, anemia, valeriy, electrolyte imbalance. Amount and/or Complexity of Data Reviewed  External Data Reviewed: notes. Details: discharge summary from admission 11/3-6  palliative care consult for pain control  Labs: ordered. Details: reviewed  Radiology: ordered.      Details: result reviewed  ECG/medicine tests: ordered and independent interpretation performed. Risk  Prescription drug management. Decision regarding hospitalization. Disposition  Final diagnoses:   Tongue pain   Dizziness   Fall, initial encounter   History of oral cancer     Time reflects when diagnosis was documented in both MDM as applicable and the Disposition within this note       Time User Action Codes Description Comment    11/12/2023 12:11 AM Ellwood Gong L Add [K14.6] Tongue pain     11/12/2023 12:11 AM Ellwood Gong L Add [R42] Dizziness     11/12/2023 12:11 AM Efe Furry Add [D40. IHWC] Fall, initial encounter     11/12/2023 12:12 AM Efe Furry Add [G84.181] History of oral cancer           ED Disposition       ED Disposition   Admit    Condition   Stable    Date/Time   Sun Nov 12, 2023 0011    Comment   Case was discussed with Raven Mo PA-C and the patient's admission status was agreed to be Admission Status: observation status to the service of Dr. Baron Turner . Follow-up Information    None         Patient's Medications   Discharge Prescriptions    No medications on file       No discharge procedures on file.     PDMP Review         Value Time User    PDMP Reviewed  Yes 11/11/2023 11:54 PM Gt Nina PA-C            ED Provider  Electronically Signed by             Gt Nina PA-C  11/12/23 0016

## 2023-11-12 NOTE — CASE MANAGEMENT
Case Management Assessment & Discharge Planning Note    Patient name Simeon Gabriel  Location [facility] /E2 -* MRN [id number]  : 1965 Date 2023       Current Admission Date: 2023  Current Admission Diagnosis:Alcoholic intoxication without complication [facility])   Patient Active Problem List    Diagnosis Date Noted    Alcoholic intoxication without complication ([address])     Moderate protein-calorie malnutrition ([address]) 2023    History of CVA (cerebrovascular accident) 2023    Drug induced constipation 2023    Cancer-related pain 2023    At risk for respiratory depression due to opioid 2023    Pain in lower jaw 2023    Counseling regarding advanced care planning and goals of care 2023    Preoperative clearance 10/11/2023    Oral cancer ([address]) 2023    Hypokalemia 2023    Hypomagnesemia 2023    Alcohol use disorder, severe, dependence ([address]) 2023    Hyponatremia 2023    Hypertension     Cigarette nicotine dependence without complication     Periodontitis 2023    Dental caries 2023    Cervical lymphadenopathy 2023      LOS (days): 0  Geometric Mean LOS (GMLOS) (days):   Days to GMLOS:     OBJECTIVE:              Current admission status: Observation       Preferred Pharmacy:   [id number], [address] [place] [address]  Phone: [phone] Fax: [address] [facility], [address] [address],[place] [address]  Phone: [phone] Fax: [phone]    [facility] [address] [facility] [employer] - ALEXANDREA Dill - [address] [place] [address]  Phone: [phone] Fax: [phone] [address] [address]  Phone: [phone] Fax: [phone]    [id number] [address] [place] [address] [address]  [address]  [address] [address]  Phone: [phone] Fax: [phone]    Primary Care Provider: Ashley Oquendo MD    Primary Insurance: [address]:     ASSESSMENT:  [address], [address] Representative - Father   Primary Phone: [phone] (Mobile)                 Advance Directives  Primary Contact: Kadi Chung (father) [phone]              Patient Information  Admitted from[de-identified] Home  Mental Status: Alert  During Assessment patient was accompanied by: Not accompanied during assessment  Assessment information provided by[de-identified] Patient  Primary Caregiver: Self  Support Systems: Family members  [place] of Residence: [address] [place] [address] do you live in?: [facility]  Type of Current Residence: Apartment  Living Arrangements: Lives w/ Spouse/significant other    Activities of Daily Living Prior to Admission  Functional Status: Independent  Completes ADLs independently?: Yes  Ambulates independently?: Yes  Does patient use assisted devices?: No  Does patient have a history of Outpatient Therapy (PT/OT)?: No  Does the patient have a history of Short-Term Rehab?: No  Does patient have a history of HHC?: No  Does patient currently have [facility]?: No         Patient Information Continued  Does patient receive dialysis treatments?: No  Does patient have a history of substance abuse?: Yes  Historical substance use preference: Alcohol/ETOH  Is patient currently in treatment for substance abuse?: No. Patient declined treatment information.   Does patient have a history of Mental Health Diagnosis?: No         Means of Transportation  Means of Transport to Appts[de-identified] Drives Self        DISCHARGE DETAILS:    Discharge planning discussed with[de-identified] Patient        CM contacted family/caregiver?: No- see comments (declined need at this time)                                                                                   Additional Comments: CM met with pt at bedside to complete assessment. Pt reported he was d/c on the 6th last week and went to [facility] to  meds but they werent ready. He went back on Tuesday and was able to pick them up. Other than that, no issues with medications. PT/OT saw pt and provided him with cane. Recommending OPPT. CM offered HOST but pt declined. Pt may need Lyft home if significant other is working. Will continue to follow.

## 2023-11-12 NOTE — PLAN OF CARE
Problem: PAIN - ADULT  Goal: Verbalizes/displays adequate comfort level or baseline comfort level  Description: Interventions:  - Encourage patient to monitor pain and request assistance  - Assess pain using appropriate pain scale  - Administer analgesics based on type and severity of pain and evaluate response  - Implement non-pharmacological measures as appropriate and evaluate response  - Consider cultural and social influences on pain and pain management  - Notify physician/advanced practitioner if interventions unsuccessful or patient reports new pain  Outcome: Progressing     Problem: SAFETY ADULT  Goal: Patient will remain free of falls  Description: INTERVENTIONS:  - Educate patient/family on patient safety including physical limitations  - Instruct patient to call for assistance with activity   - Consult OT/PT to assist with strengthening/mobility   - Keep Call bell within reach  - Keep bed low and locked with side rails adjusted as appropriate  - Keep care items and personal belongings within reach  - Initiate and maintain comfort rounds  - Make Fall Risk Sign visible to staff  - Offer Toileting every 2 Hours, in advance of need  - Obtain necessary fall risk management equipment: gripper socks, call bell  - Apply yellow socks and bracelet for high fall risk patients  - Consider moving patient to room near nurses station  Outcome: Progressing  Goal: Maintain or return to baseline ADL function  Description: INTERVENTIONS:  -  Assess patient's ability to carry out ADLs; assess patient's baseline for ADL function and identify physical deficits which impact ability to perform ADLs (bathing, care of mouth/teeth, toileting, grooming, dressing, etc.)  - Assess/evaluate cause of self-care deficits   - Assess range of motion  - Assess patient's mobility; develop plan if impaired  - Assess patient's need for assistive devices and provide as appropriate  - Encourage maximum independence but intervene and supervise when necessary  - Involve family in performance of ADLs  - Assess for home care needs following discharge   - Consider OT consult to assist with ADL evaluation and planning for discharge  - Provide patient education as appropriate  Outcome: Progressing

## 2023-11-12 NOTE — ASSESSMENT & PLAN NOTE
Recent admission at [facility] 11/3-11/6 for right-sided lacunar stroke.   Secondary prevention with aspirin and statin

## 2023-11-12 NOTE — ED NOTES
Patient transported to [facility], [address] [place] [address]  11/11/23 8289

## 2023-11-12 NOTE — ED NOTES
Patient had pocket knife, locked away with security.  Tag number [id number]     Trevon La RN  11/11/23 2040

## 2023-11-12 NOTE — ASSESSMENT & PLAN NOTE
Follows with palliative care, PTA regimen:   Oxy IR 10mg PO q6H prn severe pain. Max of only 4 a day. Tylenol 1000mg PO q8H ATC. Max of only 3000mg in 24H  OTC 4% lidocaine patch to the R jaw, 12H on and 12H off  Senokot-S daily to prevent OIC  Patient only taking oxycodone from prescribed regimen. Found with a bottle of oxycodone in his possession. Taken from patient and sent to pharmacy  Check urine drug screen  Concern for opioid use disorder.   Palliative care consult

## 2023-11-12 NOTE — ASSESSMENT & PLAN NOTE
Presents with complaint of fall in setting of alcohol intoxication  Reports drinking 2-6 beers daily.   Found with a water bottle full of beer and bottle of home oxycodone when arrived to unit  Will check serum ethanol level and UDS  Add CIWA protocol and vitamin supplements  Monitor lytes and replete  HOST referral

## 2023-11-12 NOTE — ASSESSMENT & PLAN NOTE
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   Seen by oncology during [facility] week and recommended concurrent radiation therapy and cisplatin at 40 mg per metered square weekly as soon as possible   S/p extraction of all teeth in Oct

## 2023-11-12 NOTE — ASSESSMENT & PLAN NOTE
Malnutrition Findings: 16.48  Muscle loss, Inadequate energy, Weight loss  360 Statement: Protein calorie malnutrition r/t inadequate intake as evidenced by <75% of estimated energy intake compared to estimated energy needs > 1 month, muscle wasting present to temples/clavicle, and ongoing weight loss; currently treated with diet and oral nutrition supplements   In setting of oral cancer  Seen by speech therapy 11/4 as part of stroke pathway, recommended regular diet with thin liquids, choose softer, moist foods  Add supplement with meals  Aspiration precautions

## 2023-11-12 NOTE — PLAN OF CARE
Problem: Potential for Falls  Goal: Patient will remain free of falls  Description: INTERVENTIONS:  - Educate patient/family on patient safety including physical limitations  - Instruct patient to call for assistance with activity   - Consult OT/PT to assist with strengthening/mobility   - Keep Call bell within reach  - Keep bed low and locked with side rails adjusted as appropriate  - Keep care items and personal belongings within reach  - Initiate and maintain comfort rounds  - Make Fall Risk Sign visible to staff  - Offer Toileting every 2 Hours, in advance of need  - Initiate/Maintain bed alarm  - Obtain necessary fall risk management equipment  - Apply yellow socks and bracelet for high fall risk patients  - Consider moving patient to room near nurses station  Outcome: Progressing     Problem: PAIN - ADULT  Goal: Verbalizes/displays adequate comfort level or baseline comfort level  Description: Interventions:  - Encourage patient to monitor pain and request assistance  - Assess pain using appropriate pain scale  - Administer analgesics based on type and severity of pain and evaluate response  - Implement non-pharmacological measures as appropriate and evaluate response  - Consider cultural and social influences on pain and pain management  - Notify physician/advanced practitioner if interventions unsuccessful or patient reports new pain  Outcome: Progressing     Problem: SAFETY ADULT  Goal: Patient will remain free of falls  Description: INTERVENTIONS:  - Educate patient/family on patient safety including physical limitations  - Instruct patient to call for assistance with activity   - Consult OT/PT to assist with strengthening/mobility   - Keep Call bell within reach  - Keep bed low and locked with side rails adjusted as appropriate  - Keep care items and personal belongings within reach  - Initiate and maintain comfort rounds  - Make Fall Risk Sign visible to staff  - Offer Toileting every 2 Hours, in advance of need  - Initiate/Maintain bed alarm  - Obtain necessary fall risk management equipment  - Apply yellow socks and bracelet for high fall risk patients  - Consider moving patient to room near nurses station  Outcome: Progressing  Goal: Maintain or return to baseline ADL function  Description: INTERVENTIONS:  -  Assess patient's ability to carry out ADLs; assess patient's baseline for ADL function and identify physical deficits which impact ability to perform ADLs (bathing, care of mouth/teeth, toileting, grooming, dressing, etc.)  - Assess/evaluate cause of self-care deficits   - Assess range of motion  - Assess patient's mobility; develop plan if impaired  - Assess patient's need for assistive devices and provide as appropriate  - Encourage maximum independence but intervene and supervise when necessary  - Involve family in performance of ADLs  - Assess for home care needs following discharge   - Consider OT consult to assist with ADL evaluation and planning for discharge  - Provide patient education as appropriate  Outcome: Progressing  Goal: Maintains/Returns to pre admission functional level  Description: INTERVENTIONS:  - Perform BMAT or MOVE assessment daily.   - Set and communicate daily mobility goal to care team and patient/family/caregiver. - Collaborate with rehabilitation services on mobility goals if consulted  - Perform Range of Motion 3 times a day. - Reposition patient every 2 hours.   - Dangle patient 3 times a day  - Stand patient 3 times a day  - Ambulate patient 3 times a day  - Out of bed to chair 3 times a day   - Out of bed for meals 3 times a day  - Out of bed for toileting  - Record patient progress and toleration of activity level   Outcome: Progressing

## 2023-11-13 ENCOUNTER — TELEPHONE (OUTPATIENT)
Dept: NEUROLOGY | Facility: CLINIC | Age: 58
End: 2023-11-13

## 2023-11-13 ENCOUNTER — APPOINTMENT (OUTPATIENT)
Dept: RADIATION ONCOLOGY | Facility: CLINIC | Age: 58
End: 2023-11-13
Payer: COMMERCIAL

## 2023-11-13 VITALS
OXYGEN SATURATION: 98 % | TEMPERATURE: 98.5 F | SYSTOLIC BLOOD PRESSURE: 133 MMHG | HEART RATE: 76 BPM | RESPIRATION RATE: 18 BRPM | BODY MASS INDEX: 16.48 KG/M2 | WEIGHT: 131.84 LBS | DIASTOLIC BLOOD PRESSURE: 83 MMHG

## 2023-11-13 PROBLEM — Z95.828 PORT-A-CATH IN PLACE: Status: ACTIVE | Noted: 2023-11-13

## 2023-11-13 LAB
AMPHETAMINES SERPL QL SCN: NEGATIVE
ANION GAP SERPL CALCULATED.3IONS-SCNC: 7 MMOL/L
BARBITURATES UR QL: NEGATIVE
BENZODIAZ UR QL: NEGATIVE
BUN SERPL-MCNC: 10 MG/DL (ref 5–25)
CALCIUM SERPL-MCNC: 9.6 MG/DL (ref 8.4–10.2)
CHLORIDE SERPL-SCNC: 95 MMOL/L (ref 96–108)
CO2 SERPL-SCNC: 29 MMOL/L (ref 21–32)
COCAINE UR QL: NEGATIVE
CREAT SERPL-MCNC: 0.74 MG/DL (ref 0.6–1.3)
ERYTHROCYTE [DISTWIDTH] IN BLOOD BY AUTOMATED COUNT: 12 % (ref 11.6–15.1)
GFR SERPL CREATININE-BSD FRML MDRD: 100 ML/MIN/1.73SQ M
GLUCOSE SERPL-MCNC: 116 MG/DL (ref 65–140)
HCT VFR BLD AUTO: 40 % (ref 36.5–49.3)
HGB BLD-MCNC: 13.6 G/DL (ref 12–17)
MAGNESIUM SERPL-MCNC: 1.7 MG/DL (ref 1.9–2.7)
MCH RBC QN AUTO: 33.3 PG (ref 26.8–34.3)
MCHC RBC AUTO-ENTMCNC: 34 G/DL (ref 31.4–37.4)
MCV RBC AUTO: 98 FL (ref 82–98)
OPIATES UR QL SCN: POSITIVE
OXYCODONE+OXYMORPHONE UR QL SCN: POSITIVE
PCP UR QL: NEGATIVE
PLATELET # BLD AUTO: 301 THOUSANDS/UL (ref 149–390)
PMV BLD AUTO: 9.1 FL (ref 8.9–12.7)
POTASSIUM SERPL-SCNC: 3.8 MMOL/L (ref 3.5–5.3)
RBC # BLD AUTO: 4.09 MILLION/UL (ref 3.88–5.62)
SODIUM SERPL-SCNC: 131 MMOL/L (ref 135–147)
THC UR QL: NEGATIVE
WBC # BLD AUTO: 11.26 THOUSAND/UL (ref 4.31–10.16)

## 2023-11-13 PROCEDURE — 77300 RADIATION THERAPY DOSE PLAN: CPT | Performed by: INTERNAL MEDICINE

## 2023-11-13 PROCEDURE — 85027 COMPLETE CBC AUTOMATED: CPT | Performed by: INTERNAL MEDICINE

## 2023-11-13 PROCEDURE — 77338 DESIGN MLC DEVICE FOR IMRT: CPT | Performed by: INTERNAL MEDICINE

## 2023-11-13 PROCEDURE — 97166 OT EVAL MOD COMPLEX 45 MIN: CPT

## 2023-11-13 PROCEDURE — 80307 DRUG TEST PRSMV CHEM ANLYZR: CPT | Performed by: NURSE PRACTITIONER

## 2023-11-13 PROCEDURE — 80048 BASIC METABOLIC PNL TOTAL CA: CPT | Performed by: INTERNAL MEDICINE

## 2023-11-13 PROCEDURE — 99254 IP/OBS CNSLTJ NEW/EST MOD 60: CPT | Performed by: INTERNAL MEDICINE

## 2023-11-13 PROCEDURE — 77301 RADIOTHERAPY DOSE PLAN IMRT: CPT | Performed by: INTERNAL MEDICINE

## 2023-11-13 PROCEDURE — 83735 ASSAY OF MAGNESIUM: CPT | Performed by: INTERNAL MEDICINE

## 2023-11-13 PROCEDURE — 99239 HOSP IP/OBS DSCHRG MGMT >30: CPT | Performed by: INTERNAL MEDICINE

## 2023-11-13 RX ORDER — OXYCODONE HYDROCHLORIDE 10 MG/1
10 TABLET ORAL EVERY 6 HOURS PRN
Status: DISCONTINUED | OUTPATIENT
Start: 2023-11-13 | End: 2023-11-13

## 2023-11-13 RX ORDER — POLYETHYLENE GLYCOL 3350 17 G/17G
17 POWDER, FOR SOLUTION ORAL ONCE
Status: COMPLETED | OUTPATIENT
Start: 2023-11-13 | End: 2023-11-13

## 2023-11-13 RX ORDER — POLYETHYLENE GLYCOL 3350 17 G/17G
17 POWDER, FOR SOLUTION ORAL DAILY
Qty: 30 EACH | Refills: 0 | Status: SHIPPED | OUTPATIENT
Start: 2023-11-13

## 2023-11-13 RX ORDER — OXYCODONE HYDROCHLORIDE 5 MG/1
5 TABLET ORAL EVERY 6 HOURS PRN
Status: DISCONTINUED | OUTPATIENT
Start: 2023-11-13 | End: 2023-11-13

## 2023-11-13 RX ORDER — POLYETHYLENE GLYCOL 3350 17 G/17G
17 POWDER, FOR SOLUTION ORAL 2 TIMES DAILY
Status: DISCONTINUED | OUTPATIENT
Start: 2023-11-13 | End: 2023-11-13 | Stop reason: HOSPADM

## 2023-11-13 RX ADMIN — OXYCODONE HYDROCHLORIDE 10 MG: 10 TABLET ORAL at 01:05

## 2023-11-13 RX ADMIN — POLYETHYLENE GLYCOL 3350 17 G: 17 POWDER, FOR SOLUTION ORAL at 08:55

## 2023-11-13 RX ADMIN — OXYCODONE HYDROCHLORIDE 10 MG: 10 TABLET ORAL at 12:53

## 2023-11-13 RX ADMIN — ENOXAPARIN SODIUM 40 MG: 40 INJECTION SUBCUTANEOUS at 08:24

## 2023-11-13 RX ADMIN — POLYETHYLENE GLYCOL 3350 17 G: 17 POWDER, FOR SOLUTION ORAL at 14:25

## 2023-11-13 RX ADMIN — Medication 1 TABLET: at 08:24

## 2023-11-13 RX ADMIN — SENNOSIDES AND DOCUSATE SODIUM 2 TABLET: 8.6; 5 TABLET ORAL at 08:24

## 2023-11-13 RX ADMIN — ACETAMINOPHEN 325MG 975 MG: 325 TABLET ORAL at 08:55

## 2023-11-13 RX ADMIN — FOLIC ACID 1 MG: 1 TABLET ORAL at 08:23

## 2023-11-13 RX ADMIN — ATORVASTATIN CALCIUM 40 MG: 40 TABLET, FILM COATED ORAL at 16:42

## 2023-11-13 RX ADMIN — OXYCODONE HYDROCHLORIDE 10 MG: 10 TABLET ORAL at 08:24

## 2023-11-13 RX ADMIN — CLOPIDOGREL BISULFATE 75 MG: 75 TABLET ORAL at 08:24

## 2023-11-13 RX ADMIN — THIAMINE HCL TAB 100 MG 100 MG: 100 TAB at 08:24

## 2023-11-13 RX ADMIN — ASPIRIN 81 MG CHEWABLE TABLET 81 MG: 81 TABLET CHEWABLE at 08:24

## 2023-11-13 NOTE — DISCHARGE SUMMARY
[facility]  Discharge- Haydee Khushi 1965, 59 y.o. male MRN: [id number]  Unit/Bed#: E2 -01 Encounter: [id number]  Primary Care Provider: Rodger Eisenmenger, MD   Date and time admitted to hospital: 11/11/2023  8:31 PM    Admitting Provider:  Ro Nunez MD  Discharge Provider:  Matthew Meyer DO  Admission Date: 11/11/2023       Discharge Date: 11/13/23   LOS: 0  Primary Care Physician at Discharge: Rodger Eisenmenger, [phone]    DISCHARGE DIAGNOSES  * Alcoholic intoxication without complication [facility])  Assessment & Plan  Recent hospitalization for stroke and squamous cell cancer of tongue who presents with fall secondary to alcohol intoxication  No orthostasis. Hydrated with IV fluids. Seen by palliative care. Patient continues to complain of tongue pain. Advised to go to outpatient XRT appointment tomorrow and chemo Wednesday. No withdrawal symptoms    History of CVA (cerebrovascular accident)  Assessment & Plan  Recent hospitalization for stroke. Continue DAPT and atorvastatin. Cancer-related pain  Assessment & Plan  Cancer pain follows with palliative care. Continue oxycodone 10 mg as needed. Patient reports having enough supply. Drug induced constipation  Assessment & Plan  Given senna/docusate and MiraLAX during hospitalization. Prescription for MiraLAX sent to pharmacy. 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   Advised discharge so he can go to these appointments    Hyponatremia  Assessment & Plan  Hyponatremia likely secondary to chronic beer consumption    Results from last 7 days   Lab Units 11/13/23  0854 11/11/23  2100   SODIUM mmol/L 131* 132*         REASON FOR ADMISSION  Dizziness (Pt reports dizziness (lightheaded) that started last Monday when he was discharged from hospital. 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 Reports this caused a stroke. Denies deficits from stroke. Reports two falls today, denies head strike, reports recently started taking plavix. GCS 15. Reports 4 tall beers daily, last drink 1 hour ago. )    HOSPITAL COURSE:  Augie Santamaria is a 59 y.o. male with a recent diagnosis of stroke and squamous cell cancer who presented to the hospital for with lightheadedness and fall after drinking some beers he was monitored on observation. He received IV fluids and orthostatic vitals was negative. He still having a lot of tongue pain. He was seen by palliative care. He needs to go to his XRT and chemotherapy appointments tomorrow and the next day    Please see problem list listed above. CONSULTING PROVIDERS   IP CONSULT TO PALLIATIVE CARE    PROCEDURES PERFORMED  * No surgery found *    RADIOLOGY RESULTS  CTA head and neck with and without contrast    Result Date: 11/11/2023  Impression: 1. No cervical or intracranial major vessel arterial occlusion, focal saccular aneurysm, acute injury/dissection, or significant flow-limiting stenosis identified. 2.  Short segment of moderate right vertebral artery luminal narrowing due to external compression by bony canal narrowing at the C5-6 level from osteophyte formation and facet hypertrophy is again seen, stable compared to the prior study. The right cervical vertebral artery is otherwise patent and unremarkable. 3.  Short segment of mild left vertebral artery luminal narrowing due to external compression by bony canal narrowing at the C5-6 level from osteophyte formation and facet hypertrophy is again seen, stable compared to the prior study. The left cervical vertebral artery is otherwise patent and unremarkable. 4.  Mild luminal narrowing of the bilateral carotid bulb and proximal internal carotid artery segments secondary to calcified wall plaque is again seen without interval change.  Otherwise normal common/internal carotid artery caliber without significant stenosis or dissection. 5.  Extensive submandibular/internal jugular chain metastatic lymphadenopathy again noted, right greater than left without significant interval change compared to the prior study of 11/3/2023 but worse compared to the study of 9/9/2023. Right tongue base  mass with slight compression and deviation of the oropharyngeal airways is also seen without significant interval change since 11/3/2023. 6.  There is marked severe compression upon the right internal jugular vein by pathologically enlarged metastatic lymph nodes in the right neck which becomes occluded/nonvisualized from the C1-2 to the C4-5 level. Findings appear similar compared to the prior study of 11/3/2023. 7.  Multiple pathologically enlarged superior mediastinal lymph nodes are partially seen with the node measuring 2.1 x 1.5 cm in the precarinal region. Cannot exclude mediastinal metastatic disease. Calcifications within a few of the mediastinal lymph nodes noted which may suggest sequela of prior granulomatous disease. 8.  Bony destruction of the left posterior maxilla and bilateral mandible could suggest tumor invasion or osseous metastatic disease. There is associated subtle nondisplaced pathological fracture involving the angle of the left mandible. 9.  Emphysema, scarring, and calcifications in the visualized lung apices.  Workstation performed: [id number]       LABS  Results from last 7 days   Lab Units 11/13/23  0854 11/11/23  2100   WBC Thousand/uL 11.26* 9.21   HEMOGLOBIN g/dL 13.6 14.3   HEMATOCRIT % 40.0 41.5   MCV fL 98 97   TOTAL NEUT ABS Thousand/uL  --  5.43   BANDS PCT %  --  1   PLATELETS Thousands/uL 301 350     Results from last 7 days   Lab Units 11/13/23  0854 11/11/23  2100   SODIUM mmol/L 131* 132*   POTASSIUM mmol/L 3.8 3.5   CHLORIDE mmol/L 95* 97   CO2 mmol/L 29 25   BUN mg/dL 10 8   CREATININE mg/dL 0.74 0.67   CALCIUM mg/dL 9.6 9.5   ALBUMIN g/dL  --  4.1   TOTAL BILIRUBIN mg/dL --  0.29   ALK PHOS U/L  --  74   ALT U/L  --  11   AST U/L  --  13   EGFR ml/min/1.73sq m 100 104   GLUCOSE RANDOM mg/dL 116 73         DISCHARGE DAY VISIT AND PHYSICAL EXAM:  Subjective: Patient seen and examined still having tongue pain. Less dizzy able to go to bathroom without difficulty    Vitals:   Blood Pressure: 120/73 (11/13/23 1138)  Pulse: 66 (11/13/23 1138)  Temperature: (!) 96.9 °F (36.1 °C) (11/13/23 1138)  Temp Source: Temporal (11/13/23 1138)  Respirations: 14 (11/13/23 1138)  Weight - Scale: 59.8 kg (131 lb 13.4 oz) (11/12/23 0113)  SpO2: 99 % (11/13/23 1138)    Physical Exam  Vitals reviewed. Constitutional:       General: He is not in acute distress. HENT:      Mouth/Throat:      Comments: Abnormal tongue  Cardiovascular:      Rate and Rhythm: Regular rhythm. Pulmonary:      Effort: Pulmonary effort is normal.      Breath sounds: Decreased breath sounds present. No wheezing. Abdominal:      General: Bowel sounds are normal.      Palpations: Abdomen is soft. Tenderness: There is no abdominal tenderness. There is no rebound. Musculoskeletal:         General: No swelling or tenderness. Skin:     General: Skin is warm and dry. Neurological:      General: No focal deficit present. Mental Status: He is alert. Cranial Nerves: No cranial nerve deficit. Psychiatric:         Mood and Affect: Mood normal.       Planned Re-admission: No  Discharge Disposition: Home/Self Care    Test Results Pending at Discharge:   Incidental findings:   Short segment of moderate right vertebral artery luminal narrowing due to external compression by bony canal narrowing at the C5-6 level from osteophyte formation and facet hypertrophy is again seen, stable compared to the prior study. The right cervical vertebral artery is otherwise patent and unremarkable.    Short segment of mild left vertebral artery luminal narrowing due to external compression by bony canal narrowing at the C5-6 level from osteophyte formation and facet hypertrophy is again seen, stable compared to the prior study. The left cervical vertebral artery is otherwise patent and unremarkable. Mild luminal narrowing of the bilateral carotid bulb and proximal internal carotid artery segments secondary to calcified wall plaque is again seen without interval change. Otherwise normal common/internal carotid artery caliber without significant stenosis or dissection. Extensive submandibular/internal jugular chain metastatic lymphadenopathy again noted, right greater than left without significant interval change compared to the prior study of 11/3/2023 but worse compared to the study of 9/9/2023. Right tongue base  mass with slight compression and deviation of the oropharyngeal airways is also seen without significant interval change since 11/3/2023. There is marked severe compression upon the right internal jugular vein by pathologically enlarged metastatic lymph nodes in the right neck which becomes occluded/nonvisualized from the C1-2 to the C4-5 level. Findings appear similar compared to the prior study of 11/3/2023. Multiple pathologically enlarged superior mediastinal lymph nodes are partially seen with the node measuring 2.1 x 1.5 cm in the precarinal region. Cannot exclude mediastinal metastatic disease. Calcifications within a few of the mediastinal lymph nodes noted which may suggest sequela of prior granulomatous disease. Bony destruction of the left posterior maxilla and bilateral mandible could suggest tumor invasion or osseous metastatic disease. There is associated subtle nondisplaced pathological fracture involving the angle of the left mandible. Medications   Discharge Medication List: See after visit summary for reconciled discharge medications.      Diet restrictions: Regular diet  Activity restrictions: No strenuous activity  Discharge Condition: stable    Outpatient Follow-Up and Discharge Instructions  See after visit summary section titled Discharge Instructions for information provided to patient and family. Code Status: Level 1 - Full Code  Discharge Statement   I spent 35 minutes discharging the patient. This time was spent on the day of discharge. Greater than 50% of total time was spent with the patient and / or family counseling and / or coordination of care. ** Please Note: This note has been constructed using a voice recognition system.  **

## 2023-11-13 NOTE — PLAN OF CARE
Problem: SAFETY ADULT  Goal: Patient will remain free of falls  Description: INTERVENTIONS:  - Educate patient/family on patient safety including physical limitations  - Instruct patient to call for assistance with activity   - Consult OT/PT to assist with strengthening/mobility   - Keep Call bell within reach  - Keep bed low and locked with side rails adjusted as appropriate  - Keep care items and personal belongings within reach  - Initiate and maintain comfort rounds  - Apply yellow socks and bracelet for high fall risk patients  - Consider moving patient to room near nurses station  11/13/2023 1616 by Elyse Tellez RN  Outcome: Adequate for Discharge  11/13/2023 1609 by Elyse Tellez RN  Outcome: Progressing  Goal: Maintain or return to baseline ADL function  Description: INTERVENTIONS:  -  Assess patient's ability to carry out ADLs; assess patient's baseline for ADL function and identify physical deficits which impact ability to perform ADLs (bathing, care of mouth/teeth, toileting, grooming, dressing, etc.)  - Assess/evaluate cause of self-care deficits   - Assess range of motion  - Assess patient's mobility; develop plan if impaired  - Assess patient's need for assistive devices and provide as appropriate  - Encourage maximum independence but intervene and supervise when necessary  - Involve family in performance of ADLs  - Assess for home care needs following discharge   - Consider OT consult to assist with ADL evaluation and planning for discharge  - Provide patient education as appropriate  11/13/2023 1616 by Elyse Tellez RN  Outcome: Adequate for Discharge  11/13/2023 1609 by Elyse Tellez RN  Outcome: Progressing  Goal: Maintains/Returns to pre admission functional level  Description: INTERVENTIONS:  - Perform BMAT or MOVE assessment daily.   - Set and communicate daily mobility goal to care team and patient/family/caregiver.    - Collaborate with rehabilitation services on mobility goals if consulted  - Out of bed for toileting  - Record patient progress and toleration of activity level   11/13/2023 1616 by Abraham Tapia RN  Outcome: Adequate for Discharge  11/13/2023 1609 by Abraham Tapia RN  Outcome: Progressing

## 2023-11-13 NOTE — CONSULTS
Consultation - [address] [facility] R Jose  59 y.o.  male  Luma Teran 2 /E2 MS 80-*   MRN: [id number]  Encounter: [id number]    ASSESSMENT:    Patient Active Problem List   Diagnosis    Dental caries    Cervical lymphadenopathy    Periodontitis    Alcohol use disorder, severe, dependence ([address])    Hypertension    Cigarette nicotine dependence without complication    Hyponatremia    Hypokalemia    Hypomagnesemia    Oral cancer (HCC)    Preoperative clearance    Drug induced constipation    Cancer-related pain    At risk for respiratory depression due to opioid    Pain in lower jaw    Counseling regarding advanced care planning and goals of care    History of CVA (cerebrovascular accident)    Moderate protein-calorie malnutrition ([address])    Alcoholic intoxication without complication ([address])     Active problems addressed:  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 Goals:   Treatment-focused, level 1    Code status: Level 1 - Full Code   Decisional apparatus:  Patient does have capacity to make medical decisions on my exam today. If such capacity is lost, patient's substitute decision maker would default to father by [place] Act 169. Advance Directive / Living Will / POLST:  none on file    2. Symptom management:  He reports drinking beer maybe 1-2 a day but not as much as he used to drink  I again explained the dangers of being on opioids and alcohol. The combination can be fatal. He is aware and willing to take the risk and he is agreeable to plan of action in the outpatient setting of limited supply of oxycodone per script to better monitor usage  It should be noted that his tumors are growing compared to the last time I saw him in the office. He has not started treatments yet.  He does have legitimate cancer-related pain that at this time can only be managed effectively by opioids  Agree with ATC oxyIR 10mg PO q6H - will add hold parameters  Add narcan prn  Continue senokot-S 2 tabd daily  Add miralax BID daily  D/w Dr. Josef Mendoza    Controlled Substance Review    [place] PDMP or [address]  reviewed: No red flags were identified; safe to proceed with prescription. Paolo Mcdaniels  Written  ID  Drug  QTY  Days  Prescriber  RX #  Dispenser  Refill  Daily Dose*  Pymt Type      11/03/2023 11/02/2023 1 Oxycodone Hcl (Ir) 10 Mg Tab 60.00 15 Ti Lobo [id number] Pen ([id number]) 0 60.00 [place]   10/24/2023 10/24/2023 1 Oxycodone Hcl (Ir) 5 Mg Tablet 12.00 2 Rh Thomas [id number] Pen ([id number]) 0 45.00 [place]   10/20/2023 10/20/2023 1 Oxycodone-Acetaminophen 5-325 10.00 3 Do Rac [id number] Pen (4386) 0 25.00 [place]   10/16/2023 10/16/2023 1 Hydrocodone-Acetamin 5-325 Mg 15.00 4 Br Gel [id number] Pen (4386) 0 18.75 [place]     I have reviewed the patient's controlled substance dispensing history in the Prescription Drug Monitoring Program in compliance with the [facility] regulations before prescribing any controlled substances. We appreciate the opportunity to participate in this patient's care. We will continue to follow. Please do not hesitate to contact our on-call provider through our clinic answering service at [phone] should you have acute symptom control concerns. IDENTIFICATION:  Inpatient consult to Palliative Care  Consult performed by: Yovana Rush MD  Consult ordered by: Brian Nolasco, [address] [place] [address]        Reason for Consult / Principal Problem: symptoms    HISTORY OF PRESENT ILLNESS:    Lainey Dougherty is a 59 y.o. 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 PET-CT on 6/19/2023 showed Hypermetabolic right anterior oral cavity mass most concerning for malignancy.  Bilateral hypermetabolic cervical adenopathy compatible with metastases. No hypermetabolic metastases in the chest abdomen pelvis. Multiple healing left rib fractures. He was admitted in the hospital on 11/3 to 11/6 because of an acute R pontine infarct. He is to begin RT on 11/14, chemo on 11/15. He is again admitted on 11/11 due to lightheadedness thought related to possible alcohol intoxicataion +/- opioids for cancer pain. He is known to palliative medicine referred by his oncologist for assistance with cancer pain. Palliative medicine consulted inpatient for the same. Met with patient. He was seen AAO x 3. Discussed [facility] [place] [facility] [place] and this hospital stay. We also discussed his growing tumors in the oral cavity and both side of jaw. He is supposed to begin treatments tomorrow. In the interim, he continues to have pain and now more pain with chewing. He finds relief from home regimen of oxy. We again discussed candidly about the safety issues of being on opioids and drinking alcohol. Discussed how this can be fatal. He is adamant that he is no longer drinking as much as he used to. He is still drinking 1-2 beers a day. He feels that things get blown out of proportion. I validated his feelings but I also emphasized that we are all only concerned for his safety. He even cut back on smoking from 1ppd to only two sticks a day. His alcohol levels can support what he is saying as it is still trending down. Nonetheless, I discussed with him that I will only prescribe him a small supply of oxycodone per script to better monitor use and ensure safety. He nodded in agreement. He has not moved his bowels in 2 weeks. No abdominal distention, he is passing gas and tolerating food/water. He was only taking senokot 1 tab daily. I will increase his bowel regimen. Interview and exam limited by: none    Review of Systems   Constitutional:  Positive for activity change, appetite change and unexpected weight change.    HENT:  Positive for trouble swallowing. R > L sided oral pain, pain in the R > L jaw/below jaw in area of visible tumors   Respiratory:  Negative for shortness of breath. Cardiovascular:  Negative for chest pain. Gastrointestinal:  Positive for constipation. Negative for abdominal pain, diarrhea, nausea and vomiting. Musculoskeletal:  Negative for back pain. Psychiatric/Behavioral:  Negative for sleep disturbance. The patient is not nervous/anxious. All other systems reviewed and are negative.       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    Stroke [facility])      Past Surgical History:   Procedure Laterality Date    EGD      IR PORT PLACEMENT  11/7/2023    KNEE ARTHROSCOPY Left     MULTIPLE TOOTH EXTRACTIONS N/A 10/16/2023    Procedure: EXTRACTION OF ALL REMAINING TEETH;  Surgeon: Marylee Pintos, DMD;  Location: [facility];  Service: Oral Surgery     Social History     Socioeconomic History    Marital status: Single     Spouse name: Not on file    Number of children: Not on file    Years of education: Not on file    Highest education level: Not on file   Occupational History    Not on file   Tobacco Use    Smoking status: Every Day     Packs/day: 0.25     Types: Cigarettes    Smokeless tobacco: Never   Vaping Use    Vaping Use: Never used   Substance and Sexual Activity    Alcohol use: Yes     Comment: 3-4 beers/day 24 oz beer    Drug use: No    Sexual activity: Not on file   Other Topics Concern    Not on file   Social History Narrative    Not on file     Social Determinants of Health     Financial Resource Strain: Not on file   Food Insecurity: No Food Insecurity (11/5/2023)    Hunger Vital Sign     Worried About Running Out of Food in the Last Year: Never true     Ran Out of Food in the Last Year: Never true   Transportation Needs: No Transportation Needs (11/5/2023)    Cheryl Colmenares Transportation     Lack of Transportation (Medical): No     Lack of Transportation (Non-Medical): No   Physical Activity: Not on file   Stress: Not on file   Social Connections: Not on file   Intimate Partner Violence: Not on file   Housing Stability: Low Risk  (11/5/2023)    Housing Stability Vital Sign     Unable to Pay for Housing in the Last Year: No     Number of Places Lived in the Last Year: 1     Unstable Housing in the Last Year: No     Family History   Problem Relation Age of Onset    Breast cancer Mother     Cancer Father        MEDICATIONS / ALLERGIES:  all current active meds have been reviewed    Allergies   Allergen Reactions    Bee Venom Hives    Other      bees       OBJECTIVE:  /84 (BP Location: Left arm)   Pulse 69   Temp (!) 96.7 °F (35.9 °C) (Temporal)   Resp 18   Wt 59.8 kg (131 lb 13.4 oz)   SpO2 99%   BMI 16.48 kg/m²   Physical Exam:  Constitutional: Appears well-developed and well-nourished. Appears thin, frail looking, chronically ill looking. Comfortable. In no acute physical or emotional distress. Head: Normocephalic and atraumatic. Eyes: EOM are normal. No ocular discharge. No scleral icterus. Oral cavity: growing mass in the R side of the cheek, gum, and R side of the tongue  Neck: (+) masses below the R and L lower jaw  Respiratory: Effort normal. No stridor. No respiratory distress. Gastrointestinal: No abdominal distension. Musculoskeletal: No edema. Neurological: Alert, oriented and appropriately conversant. Skin: Dry, no diaphoresis. Psychiatric: Displays a normal mood and affect. Behavior, judgment and thought content appear normal.     Lab Results: I have personally reviewed pertinent labs. Imaging Studies: I have personally reviewed pertinent reports. EKG, Pathology, and Other Studies: I have personally reviewed pertinent reports.       Counseling / Coordination of Care:  Counseling / Coordination of Care  Total floor / unit time spent today 45 minutes. Greater than 50% of total time was spent with the patient and / or family counseling and / or coordination of care. A description of the counseling / coordination of care: provided medical updates, discussed palliative care, determined competency, determined goals of care, determined POA, determined social/family support, discussed plans of care, discussed symptom management, provided psychosocial support.      Saravanan Brown MD  [address] [place] [address] Dr Bates and [facility]  [phone]

## 2023-11-13 NOTE — UTILIZATION REVIEW
Initial Clinical Review    Admission: Date/Time/Statement:   Admission Orders (From admission, onward)       Ordered        11/12/23 0015  Place in Observation  Once                          Orders Placed This Encounter   Procedures    Place in Observation     Standing Status:   Standing     Number of Occurrences:   1     Order Specific Question:   Level of Care     Answer:   Med Surg [16]     ED Arrival Information       Expected   -    Arrival   11/11/2023 20:31    Acuity   Urgent              Means of arrival   Ambulance    Escorted by   [facility] ([address] [place] [address])    Service   Hospitalist    Admission type   Emergency              Arrival complaint   dizziness             Chief Complaint   Patient presents with    Dizziness     Pt reports dizziness (lightheaded) that started last Monday when he was discharged from hospital. 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 Reports this caused a stroke. Denies deficits from stroke. Reports two falls today, denies head strike, reports recently started taking plavix. GCS 15. Reports 4 tall beers daily, last drink 1 hour ago. Initial Presentation: 59 y.o. male presents to ED via  EMS  from home after a fall, dizziness, denies head strike. Admits to drinking beer,    unclear amount. Drinks  2-6  bottles beer daily. Belongings  revealed a bottle of oxycodone  and a full beer bottle. Irritable,  unable to provide PMH. Per records, PMH  oral cancer,  cancer related pain,  and  CVA. CTA head and neck negative. BAL  39. Admit  Observation with  Alcohol intoxication,  Lightheadedness, S/P fall  and recent  acute/subacute  right pontine infartc, 11/3/23 and plan is  CIWA scores, monitor labs, pain control  and palliative care consult.         ED Triage Vitals   Temperature Pulse Respirations Blood Pressure SpO2   11/11/23 2036 11/11/23 2035 11/11/23 2035 11/11/23 2035 11/11/23 2035   97.9 °F (36.6 °C) 96 18 135/72 98 % Temp Source Heart Rate Source Patient Position - Orthostatic VS BP Location FiO2 (%)   11/11/23 2036 11/11/23 2035 11/11/23 2035 11/11/23 2035 --   Oral Monitor Sitting Right arm       Pain Score       11/11/23 2035       No Pain          Wt Readings from Last 1 Encounters:   11/12/23 59.8 kg (131 lb 13.4 oz)     Additional Vital Signs:     68 18 130/76 84 97 % None (Room air) Lying     11/12/23 2018 -- -- -- -- -- -- None (Room air) --   11/12/23 1934 -- 86 -- 160/94 114 -- -- Lying   11/12/23 1933 100.6 °F (38.1 °C) Abnormal  86 18 161/93 109 96 % None (Room air) Lying   11/12/23 1528 97.5 °F (36.4 °C) 65 18 144/88 97 99 % None (Room air) Lying   11/12/23 1150 96.7 °F (35.9 °C) Abnormal  74 18 116/80 88 98 % None (Room air) Lying   11/12/23 0747 96.8 °F (36 °C) Abnormal  83 14 148/85 101 99 % None (Room air) Lying   11/12/23 0113 97.8 °F (36.6 °C) 80 16 167/99 128 96 % -- Lying   11/11/23 2200 -- 75 16 135/79 101 99 % None (Room air) Lying   11/11/23 2045 -- 91 18 126/70 91 99 % None (Room air) --   11/11/23 2037 -- -- -- -- -- -- None (Room air) --   11/11/23 2036 97.9 °F (36.6 °C) -- -- -- -- -- -- --   11/11/23 2035 -- 96 18 135/72 -- 98 % None (Room air) Sitting     Pertinent Labs/Diagnostic Test Results:   CTA head and neck with and without contrast   Final Result by Dalila Valenzuela MD (11/11 8105)      1. No cervical or intracranial major vessel arterial occlusion, focal saccular aneurysm, acute injury/dissection, or significant flow-limiting stenosis identified. 2.  Short segment of moderate right vertebral artery luminal narrowing due to external compression by bony canal narrowing at the C5-6 level from osteophyte formation and facet hypertrophy is again seen, stable compared to the prior study. The right    cervical vertebral artery is otherwise patent and unremarkable.    3.  Short segment of mild left vertebral artery luminal narrowing due to external compression by bony canal narrowing at the C5-6 level from osteophyte formation and facet hypertrophy is again seen, stable compared to the prior study. The left cervical    vertebral artery is otherwise patent and unremarkable. 4.  Mild luminal narrowing of the bilateral carotid bulb and proximal internal carotid artery segments secondary to calcified wall plaque is again seen without interval change. Otherwise normal common/internal carotid artery caliber without significant    stenosis or dissection. 5.  Extensive submandibular/internal jugular chain metastatic lymphadenopathy again noted, right greater than left without significant interval change compared to the prior study of 11/3/2023 but worse compared to the study of 9/9/2023. Right tongue base    mass with slight compression and deviation of the oropharyngeal airways is also seen without significant interval change since 11/3/2023.   6.  There is marked severe compression upon the right internal jugular vein by pathologically enlarged metastatic lymph nodes in the right neck which becomes occluded/nonvisualized from the C1-2 to the C4-5 level. Findings appear similar compared to the    prior study of 11/3/2023.   7.  Multiple pathologically enlarged superior mediastinal lymph nodes are partially seen with the node measuring 2.1 x 1.5 cm in the precarinal region. Cannot exclude mediastinal metastatic disease. Calcifications within a few of the mediastinal lymph    nodes noted which may suggest sequela of prior granulomatous disease. 8.  Bony destruction of the left posterior maxilla and bilateral mandible could suggest tumor invasion or osseous metastatic disease. There is associated subtle nondisplaced pathological fracture involving the angle of the left mandible. 9.  Emphysema, scarring, and calcifications in the visualized lung apices.          Workstation performed: [id number]               Results from last 7 days   Lab Units 11/11/23  2100   WBC Thousand/uL 9.21   HEMOGLOBIN g/dL 14.3 HEMATOCRIT % 41.5   PLATELETS Thousands/uL 350   BANDS PCT % 1         Results from last 7 days   Lab Units 11/11/23  2100 11/06/23  1056   SODIUM mmol/L 132* 134*   POTASSIUM mmol/L 3.5 3.8   CHLORIDE mmol/L 97 99   CO2 mmol/L 25 30   ANION GAP mmol/L 10 5   BUN mg/dL 8 13   CREATININE mg/dL 0.67 0.81   EGFR ml/min/1.73sq m 104 96   CALCIUM mg/dL 9.5 9.4     Results from last 7 days   Lab Units 11/11/23 2100   AST U/L 13   ALT U/L 11   ALK PHOS U/L 74   TOTAL PROTEIN g/dL 7.9   ALBUMIN g/dL 4.1   TOTAL BILIRUBIN mg/dL 0.29     Results from last 7 days   Lab Units 11/06/23  1043   POC GLUCOSE mg/dl 126     Results from last 7 days   Lab Units 11/11/23 2100 11/06/23  1056   GLUCOSE RANDOM mg/dL 73 97           Results from last 7 days   Lab Units 11/12/23  0036   ETHANOL LVL mg/dL 39*         ED Treatment:   Medication Administration from 11/11/2023 2031 to 11/12/2023 0055         Date/Time Order Dose Route Action Comments     11/11/2023 2258 EST sodium chloride 0.9 % bolus 1,000 mL 0 mL Intravenous Stopped --     11/11/2023 2101 EST sodium chloride 0.9 % bolus 1,000 mL 1,000 mL Intravenous New Bag --     11/11/2023 2103 EST fentanyl citrate (PF) 100 MCG/2ML 50 mcg 50 mcg Intravenous Given --     11/11/2023 2245 EST Lidocaine Viscous HCl (XYLOCAINE) 2 % mucosal solution 15 mL 15 mL Swish & Spit Given --     11/11/2023 2240 EST iohexol (OMNIPAQUE) 350 MG/ML injection (MULTI-DOSE) 85 mL 85 mL Intravenous Given --            Present on Admission:   Cancer-related pain   Alcoholic intoxication without complication (HCC)   Moderate protein-calorie malnutrition (HCC)   Oral cancer ([address])   Hyponatremia      Admitting Diagnosis: Dizziness [R42]  Tongue pain [K14.6]  History of oral cancer [Z85.819]  Fall in elderly patient [R29.6]  Age/Sex: 59 y.o. male  Admission Orders:  Scheduled Medications:  acetaminophen, 975 mg, Oral, Q8H [address]  aspirin, 81 mg, Oral, Daily  atorvastatin, 40 mg, Oral, Daily With Dinner  clopidogrel, 75 mg, Oral, Daily  enoxaparin, 40 mg, Subcutaneous, Daily  folic acid, 1 mg, Oral, Daily  multivitamin-minerals, 1 tablet, Oral, Daily  oxyCODONE, 10 mg, Oral, Q6H  senna-docusate sodium, 2 tablet, Oral, Daily  thiamine, 100 mg, Oral, Daily      Continuous IV Infusions:     PRN Meds:  aluminum-magnesium hydroxide-simethicone, 30 mL, Oral, Q6H PRN  ondansetron, 4 mg, Intravenous, Q6H PRN  polyethylene glycol, 17 g, Oral, Daily PRN        IP CONSULT TO PALLIATIVE CARE    Network Utilization Review Department  ATTENTION: Please call with any questions or concerns to [phone] and carefully listen to the prompts so that you are directed to the right person. All voicemails are confidential.   For Discharge needs, contact Care Management DC Support Team at [phone] opt. 2  Send all requests for admission clinical reviews, approved or denied determinations and any other requests to dedicated fax number below belonging to the campus where the patient is receiving treatment.  List of dedicated fax numbers for the Facilities:  [place] DENIALS (Administrative/Medical Necessity) [phone]   DISCHARGE SUPPORT TEAM (NETWORK) [address] [facility] (Maternity/NICU/Pediatrics) [phone]   [address] [facility] [address] [facility] [phone]   [address] [facility] [address] [facility] [address] [facility] [address] [address] [facility] [address] [place] [address] [facility] [phone]

## 2023-11-13 NOTE — TELEPHONE ENCOUNTER
Post CVA Discharge Follow Up  Hospitalization: 11/3/23-11/6/23    Reviewed chart. Patient is currently admitted to [address] [place] [address] on 11/11/23-present. Per neurology notes:  "Anh Steven will need follow up in 4-6 weeks with neurovascular attending/resident or AP .   He will not require outpatient neurological testing."

## 2023-11-13 NOTE — ASSESSMENT & PLAN NOTE
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   Advised discharge so he can go to these appointments

## 2023-11-13 NOTE — OCCUPATIONAL THERAPY NOTE
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)      Past Surgical History  Past Surgical History:   Procedure Laterality Date    EGD      IR PORT PLACEMENT  11/7/2023    KNEE ARTHROSCOPY Left     MULTIPLE TOOTH EXTRACTIONS N/A 10/16/2023    Procedure: EXTRACTION OF ALL REMAINING TEETH;  Surgeon: Sandip Manley DMD;  Location: [facility];  Service: Oral Surgery           11/13/23 1010   Note Type   Note type Evaluation   Pain Assessment   Pain Assessment Tool 0-10   Pain Score 10 - Worst Possible Pain   Pain Location/Orientation Location: Face  (mouth are)   Restrictions/Precautions   Weight Bearing Precautions Per Order No   Other Precautions Fall Risk;Pain   Home Living   Type of [address] [place] [address] One level   Bathroom Shower/Tub Tub/shower unit   [facility]   Prior Function   Lives With Friend(s)   Falls in the last 6 months 1 to 4  (2)   Lifestyle   Autonomy PTA pt states independence with all aspects of his ADLs, transfers, ambulation--ocassional use of SPC; +, +home alone   Reciprocal Relationships supportive friends   Service to Others worked in [employer]   Intrinsic Gratification watching TV   Subjective   Subjective "My left leg get tired when I walk too fair."   ADL   Where Assessed Edge of bed   Eating Assistance 6  Modified independent   Grooming Assistance 6  Modified Independent   UB Bathing Assistance 6  Modified Independent LB Bathing Assistance 6  Modified Independent   UB Dressing Assistance 6  Modified independent   LB Dressing Assistance 6  Modified independent   Bed Mobility   Rolling R 7  Independent   Rolling L 7  Independent   Supine to Sit 7  Independent   Sit to Supine 7  Independent   Transfers   Sit to Stand 7  Independent   Stand to Sit 7  Independent   Additional Comments bp's=150/92(supine), 164/101(EOB), 152/100(stainding)--nsg aware   Functional Mobility   Functional Mobility 7  Independent   Additional items   (w/o device)   Balance   Static Sitting Normal   Dynamic Sitting Good   Static Standing Fair +   Dynamic Standing Fair   Activity Tolerance   Activity Tolerance Patient limited by fatigue;Patient limited by pain   Medical Staff Made Aware nsg, P.T., CM, MD   RUE Assessment   RUE Assessment WFL   RUE Strength   RUE Overall Strength Within Functional Limits - able to perform ADL tasks with strength  (4/5 throughout)   LUE Assessment   LUE Assessment WFL   LUE Strength   LUE Overall Strength Within Functional Limits - able to perform ADL tasks with strength  (4/5 throughout)   Hand Function   Gross Motor Coordination Functional   Fine Motor Coordination Functional   Sensation   Light Touch No apparent deficits   Proprioception   Proprioception No apparent deficits   Vision-Basic Assessment   Current Vision   (no glasses)   Vision - Complex Assessment   Acuity Able to read clock/calendar on wall without difficulty   Psychosocial   Psychosocial (WDL) X   Patient Behaviors/Mood Flat affect; Cooperative   Perception   Inattention/Neglect Appears intact   Cognition   Overall Cognitive Status WFL   Arousal/Participation Alert   Attention Attends with cues to redirect   Orientation Level Oriented X4   Memory Decreased short term memory;Decreased recall of precautions   Following Commands Follows one step commands without difficulty   Assessment   Limitation Decreased endurance   Prognosis Fair   Assessment Pt is a 57y/o male admitted to the hospital 2* symptoms of dizziness, fall. Pt noted with +ETOH. Pt with PMH oral Ca, HTN, recent(earlier this month) CVA--L LE residual, L knee sx. PTA pt states independence with all aspects of his ADLs, transfers, ambulation--ocassional use of SPC; +, +home alone. During initial eval, pt demonstrated slight deficits with his functional balance, functional mobility, and activity tolerance. Pt was able to demonstrate good ADL status and states no concerns about going directly home. Pt would benefit from continued P.T./restorative therapy to improve his overall endurance, balance, and mobility. Acute OT tx not indicated at this time 2* limited ADL deficits. The patient's raw score on the AM-PAC Daily Activity Inpatient Short Form is 24. A raw score of greater than or equal to 19 suggests the patient may benefit from discharge to home. Please refer to the recommendation of the Occupational Therapist for safe discharge planning.    Goals   Patient Goals "less pain"   Plan   OT Frequency Eval only   Discharge Recommendation   Rehab Resource Intensity Level, OT III (Minimum Resource Intensity)   AM-PAC Daily Activity Inpatient   Lower Body Dressing 4   Bathing 4   Toileting 4   Upper Body Dressing 4   Grooming 4   Eating 4   Daily Activity Raw Score 24   Daily Activity Standardized Score (Calc for Raw Score >=11) 57.54   AM-PAC Applied Cognition Inpatient   Following a Speech/Presentation 4   Understanding Ordinary Conversation 4   Taking Medications 4   Remembering Where Things Are Placed or Put Away 4   Remembering List of 4-5 Errands 3   Taking Care of Complicated Tasks 3   Applied Cognition Raw Score 22   Applied Cognition Standardized Score 47.83   Buddy Moreno

## 2023-11-13 NOTE — UTILIZATION REVIEW
NOTIFICATION OF ADMISSION DISCHARGE   This is a Notification of Discharge from [address] [facility]. Please be advised that this patient has been discharge from our facility. Below you will find the admission and discharge date and time including the patient’s disposition. UTILIZATION REVIEW CONTACT:  Naida Gonsalez  Utilization   Network Utilization Review Department  Phone: [phone] x carefully listen to the prompts. All voicemails are confidential.  Email: [email or web address]     ADMISSION INFORMATION  PRESENTATION DATE: 11/3/2023  7:32 PM  OBERVATION ADMISSION DATE:   INPATIENT ADMISSION DATE: 11/3/23  9:21 PM   DISCHARGE DATE: 11/6/2023  3:25 PM   DISPOSITION:Home/Self Care    Network Utilization Review Department  ATTENTION: Please call with any questions or concerns to [phone] and carefully listen to the prompts so that you are directed to the right person. All voicemails are confidential.   For Discharge needs, contact Care Management DC Support Team at [phone] opt. 2  Send all requests for admission clinical reviews, approved or denied determinations and any other requests to dedicated fax number below belonging to the campus where the patient is receiving treatment.  List of dedicated fax numbers for the Facilities:  [place] DENIALS (Administrative/Medical Necessity) [phone]   DISCHARGE SUPPORT TEAM (Network) [phone] [facility]) [phone]   [address] [facility] [address] [facility] [address] [place] [address] [id number] [place] [address] [phone]   [facility] [address] [facility] [address] [address] [phone]   [address] [place] [address]  [address]  [phone]

## 2023-11-13 NOTE — PROGRESS NOTES
Hematology Outpatient Office Note    Date of Service: 11/22/2023    [facility] HEMATOLOGY/MEDICAL ONCOLOGY SPECIALISTS STEFFANIE [place]  [phone]    Reason for Consultation:   Chief Complaint   Patient presents with    Follow-up     Stage of cancer: CHETAN  Treatment goal is cure    Referral Physician: No ref. provider found    Primary Care Physician:  Jeremías Chowdhury MD       ASSESSMENT & PLAN      Diagnosis ICD-10-CM Associated Orders   1. Oral cancer [facility])  C06.9             This is a 59 y.o. 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 None of the visualized lymph nodes appear enlarged in size. No suspicious mass visualized on noncontrast imaging. 6/19/2023 PET/CT: Hypermetabolic right anterior oral cavity mass most concerning for malignancy. Bilateral hypermetabolic cervical adenopathy compatible with metastases. No hypermetabolic metastases in the chest abdomen pelvis. Multiple healing left rib fractures. Supportive care: Zofran, EMLA    Discussion of decision making    I personally reviewed the following lab results, the image studies, pathology, other specialty/physicians consult notes and recommendations, and outside medical records. I had a lengthy discussion with the patient and shared the work-up findings. We discussed the diagnosis and management plan as below.  I spent 42 minutes reviewing the records (labs, clinician notes, outside records, medical history, ordering medicine/tests/procedures, interpreting the imaging/labs previously done) and coordination of care as well as direct time with the patient today, of which greater than 50% of the time was spent in counseling and coordination of care with the patient/family. Plan/Labs  F/u ENT, surgery not being pursued upfront  F/u Rad Onc  IR referral port  Infusion chairs for weekly Cisplatin 40mg/m2, C3 coming up in 2 weeks (7 treatments planned)    I discussed the risks of ongoing cigarette smoking and reviewed the benefits of quitting and risk of new lung primary, heart disease, stroke, among other risks and cancers. Reviewed options including support, quit date, medications, and family support. Patient voiced understanding and willingness to try to quit. Patient was told to notify [facility] family practitioner for additional management. Greater than 3 minutes were needed for discussion of tobacco dependence and quitting counselling. Follow Up: q 3 weeks for systemic chemo scheduled 1/2/2024    All questions were answered to the patient's satisfaction during this encounter. The patient knows the contact information for our office and knows to reach out for any relevant concerns related to this encounter. They are to call for any temperature 100.4 or higher, new symptoms including but not restricted to shaking chills, decreased appetite, nausea, vomiting, diarrhea, increased fatigue, shortness of breath or chest pain, confusion, and not feeling the strength to come to the clinic. For all other listed problems and medical diagnosis in their chart - they are managed by PCP and/or other specialists, which the patient acknowledges. Thank you very much for your consultation and making us a part of this patient's care. We are continuing to follow closely with you. Please do not hesitate to reach out to me with any additional questions or concerns. Roosevelt Brandt MD  Hematology & Medical Oncology Staff Physician             Disclaimer: This document was prepared using [employer] Fluency Direct technology.  If a word or phrase is confusing, or does not make sense, this is likely due to recognition error which was not discovered during this clinician's review. If you believe an error has occurred, please contact me through [phone] line for yamini? clementina. ONCOLOGIC HISTORY OF PRESENT ILLNESS     His general dentist saw him on May 16 and appreciated a large oral cavity lesion which was concerning for malignancy and he is thus referred to us for further management. Clotting History None   Bleeding History None   Cancer History Oral cavity suspected   Family Cancer History Dad (skin), Mom (breast)   H/O Blood/Plt Transfusion None   Tobacco/etoh/drug abuse 1 PPD x  45 years, beer (6 pack a day), no rec drug use       Cancer Screening history No colon cancer screening   Occupation Landscape work         SUBJECTIVE  (INTERVAL HISTORY)        I have reviewed the relevant past medical, surgical, social and family history. I have also reviewed allergies and medications for this patient. No new changes, doing fine. Wants to get seen now to get his cancer care under control. Hasn't been able to work. Review of Systems    Baseline weight: 145-150 lbs    Denies F/C, N/V, SOB, CP, LH, HA, rash, itching, gen weakness, melena, hematuria, hematochezia, falls, diarrhea, or constipation       A 10-point review of system was performed, pertinent positive and negative were detailed as above. Otherwise, the 10-point review of system was negative.       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)        Past Surgical History:   Procedure Laterality Date    EGD      IR PORT PLACEMENT  11/7/2023    KNEE ARTHROSCOPY Left     MULTIPLE TOOTH EXTRACTIONS N/A 10/16/2023    Procedure: EXTRACTION OF ALL REMAINING TEETH;  Surgeon: Sergo Hernandez DMD;  Location: [facility];  Service: Oral Surgery       Family History   Problem Relation Age of Onset    Breast cancer Mother     Cancer Father        Social History     Socioeconomic History    Marital status: Single     Spouse name: Not on file    Number of children: Not on file    Years of education: Not on file    Highest education level: Not on file   Occupational History    Not on file   Tobacco Use    Smoking status: Every Day     Packs/day: 0.25     Types: Cigarettes    Smokeless tobacco: Never   Vaping Use    Vaping Use: Never used   Substance and Sexual Activity    Alcohol use: Yes     Comment: 3-4 beers/day 24 oz beer    Drug use: No    Sexual activity: Not on file   Other Topics Concern    Not on file   Social History Narrative    Not on file     Social Determinants of Health     Financial Resource Strain: Not on file   Food Insecurity: No Food Insecurity (11/5/2023)    Hunger Vital Sign     Worried About Running Out of Food in the Last Year: Never true     Ran Out of Food in the Last Year: Never true   Transportation Needs: No Transportation Needs (11/5/2023)    PRAPARE - Transportation     Lack of Transportation (Medical): No     Lack of Transportation (Non-Medical):  No   Physical Activity: Not on file   Stress: Not on file   Social Connections: Not on file   Intimate Partner Violence: Not on file   Housing Stability: Low Risk  (11/5/2023)    Housing Stability Vital Sign     Unable to Pay for Housing in the Last Year: No     Number of Places Lived in the Last Year: 1     Unstable Housing in the Last Year: No       Allergies   Allergen Reactions    Bee Venom Hives    Other      bees       Current Outpatient Medications   Medication Sig Dispense Refill    chlorhexidine (PERIDEX) 0.12 % solution       diphenhydramine, lidocaine, Al/Mg hydroxide, simethicone (Magic Mouthwash) SUSP Swish and swallow 10 mL every 4 (four) hours as needed for mouth pain or discomfort 237 mL 7    gabapentin (NEURONTIN) 100 mg capsule       ibuprofen (MOTRIN) 600 mg tablet Take 1 tablet (600 mg total) by mouth every 6 (six) hours as needed for mild pain 30 tablet 0    oxyCODONE (ROXICODONE) 10 MG TABS Take 1 tablet (10 mg total) by mouth every 6 (six) hours as needed for severe pain Max Daily Amount: 40 mg 60 tablet 0    oxyCODONE-acetaminophen (PERCOCET) 5-325 mg per tablet Take 1 tablet by mouth every 4 to 6 hours as needed for pain      polyethylene glycol (MIRALAX) 17 g packet Take 17 g by mouth daily 30 each 0    senna-docusate sodium (SENOKOT-S) 8.6-50 mg per tablet Take 1 tablet by mouth daily 60 tablet 2    aspirin 81 mg chewable tablet Chew 1 tablet (81 mg total) daily (Patient not taking: Reported on 11/22/2023) 90 tablet 0    atorvastatin (LIPITOR) 40 mg tablet Take 1 tablet (40 mg total) by mouth every evening (Patient not taking: Reported on 11/22/2023) 30 tablet 0    clopidogrel (Plavix) 75 mg tablet Take 1 tablet (75 mg total) by mouth daily for 21 days (Patient not taking: Reported on 11/22/2023) 21 tablet 0    naloxone (NARCAN) 4 mg/0.1 mL nasal spray Administer 1 spray into a nostril. If no response after 2-3 minutes, give another dose in the other nostril using a new spray. (Patient not taking: Reported on 11/22/2023) 1 each 1     No current facility-administered medications for this visit. Facility-Administered Medications Ordered in Other Visits   Medication Dose Route Frequency Provider Last Rate Last Admin    alteplase (CATHFLO) injection 2 mg  2 mg Intracatheter Q1MIN PRN Gretel La MD        [facility] alteplase (CATHFLO) injection 2 mg  2 mg Mario Gift PRN Gretel La MD           (Not in a hospital admission)        Objective:     24 Hour Vitals Assessment:     Vitals:    11/22/23 1151   BP: 132/80   Pulse: 72   Resp: 18   Temp: (!) 96.9 °F (36.1 °C)   SpO2: 98%          PHYSICIAN EXAM:    General: Appearance: alert, cooperative, no distress. HEENT: Normocephalic, atraumatic. No scleral icterus. conjunctivae clear. EOMI. Posterior tongue mass noted, poor dentition  Chest: No tenderness to palpation.  No open wound noted.  Lungs: Clear to auscultation bilaterally, Respirations unlabored. Cardiac: Regular rate and rhythm, +S1and S2  Abdomen: Soft, non-tender, non-distended. Bowel sounds are normal  Extremities:  No edema, cyanosis, clubbing. Skin: Skin color, turgor are normal. No rashes. Lymphatics: no palpable axillary, or inguinal adenopathy, palpable R sub-mandibular LN noted  Neurologic: Awake, Alert, and oriented, no gross focal deficits noted b/l. DATA REVIEW:    Pathology Result:    Final Diagnosis   Date Value Ref Range Status   08/29/2023   Final    A. 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, ulcerated  - Carcinoma present at tissue edges    Comment:  - HPV CISH and p16 ordered.   - Dr. Mallory Joe was notified of the diagnosis via Epic messaging on 9/1/23 at 9:02 am.     Interpretation performed at [facility], [address] [place] [address]              Image Results:   Image result are reviewed and documented in Hematology/Oncology history. I personally reviewed these images. CTA head and neck with and without contrast  Narrative: CTA NECK AND BRAIN WITH AND WITHOUT CONTRAST    INDICATION: pain and dizziness, h/o throat/tongue cancer    COMPARISON:   11/3/2023 and 9/9/2023. TECHNIQUE:  Routine CT imaging of the Brain without contrast.  Post contrast imaging was performed after administration of iodinated contrast through the neck and brain. Post contrast axial 0.625 mm images timed to opacify the arterial system. 3D rendering was performed on an independent workstation. MIP reconstructions performed. Coronal reconstructions were performed of the noncontrast portion of the brain. Radiation dose length product (DLP) for this visit:  1374.89 mGy-cm .   This examination, like all CT scans performed in the [facility], was performed utilizing techniques to minimize radiation dose exposure, including the use of iterative reconstruction and automated exposure control. IV Contrast:  85 mL of iohexol (OMNIPAQUE)    IMAGE QUALITY:   Diagnostic    FINDINGS:  NONCONTRAST BRAIN  PARENCHYMA:  No intracranial mass, mass effect or midline shift. No CT signs of acute infarction. No acute parenchymal hemorrhage. VENTRICLES AND EXTRA-AXIAL SPACES:  Normal for the patient's age. VISUALIZED ORBITS: Normal visualized orbits. PARANASAL SINUSES: Normal visualized paranasal sinuses. CERVICAL VASCULATURE  AORTIC ARCH AND GREAT VESSELS:  Normal aortic arch and great vessel origins. Normal visualized subclavian vessels. RIGHT VERTEBRAL ARTERY CERVICAL SEGMENT:  Normal origin. Short segment of moderate luminal narrowing due to external compression by bony canal narrowing at the C5-6 level from osteophyte formation and facet hypertrophy is again seen, stable compared to   the prior study. The vessel is otherwise normal in caliber throughout the neck. LEFT VERTEBRAL ARTERY CERVICAL SEGMENT:  Normal origin. Short segment of mild luminal narrowing due to external compression by bony canal narrowing at the C5-6 level from osteophyte formation and facet hypertrophy is again seen, stable compared to the   prior study. The vessel is otherwise normal in caliber throughout the neck. RIGHT EXTRACRANIAL CAROTID SEGMENT:  Normal caliber common carotid artery. Mild luminal narrowing of the carotid bulb and proximal internal carotid artery secondary to calcified wall plaque is again seen. Otherwise normal common/internal carotid artery   caliber without significant stenosis or dissection. LEFT EXTRACRANIAL CAROTID SEGMENT:  Normal caliber common carotid artery. Mild luminal narrowing of the carotid bulb and proximal internal carotid artery secondary to calcified wall plaque is again seen. Otherwise normal common/internal carotid artery   caliber without significant stenosis or dissection.     NASCET criteria was used to determine the degree of internal carotid artery diameter stenosis. INTRACRANIAL VASCULATURE  INTERNAL CAROTID ARTERIES:  Normal enhancement of the intracranial portions of the internal carotid arteries. Normal ophthalmic artery origins. Normal ICA terminus. ANTERIOR CIRCULATION:  Symmetric A1 segments and anterior cerebral arteries with normal enhancement. Normal anterior communicating artery. MIDDLE CEREBRAL ARTERY CIRCULATION:  M1 segment and middle cerebral artery branches demonstrate normal enhancement bilaterally. DISTAL VERTEBRAL ARTERIES:  Normal distal vertebral arteries. Posterior inferior cerebellar artery origins are normal. Normal vertebral basilar junction. BASILAR ARTERY:  Basilar artery is normal in caliber. Normal superior cerebellar arteries. POSTERIOR CEREBRAL ARTERIES: Both posterior cerebral arteries arises from the basilar tip. Both arteries demonstrate normal enhancement. Normal posterior communicating arteries. VENOUS STRUCTURES: Major intracranial dural venous sinuses appear grossly patent. Severe compression upon the right internal jugular vein from the C1-2 the C4-5 level with nonvisualization suggesting focal occlusion noted. Findings are not significantly   changed compared to the prior study of 11/3/2023. NON VASCULAR ANATOMY  BONY STRUCTURES: No acute fracture is seen. Bony destruction of the left posterior maxilla and bilateral mandible could suggest tumor invasion or osseous metastatic disease. There is associated subtle nondisplaced pathological fracture involving the   angle of the left mandible. Stable small Schmorl's node along the inferior endplate of C4. Stable chronic moderate compression fracture deformity of the T3 vertebrae with 50% loss of vertebral body height anteriorly. No additional destructive bone   lesions, acute fracture or subluxation identified.     SOFT TISSUES OF THE NECK: Bilateral parotid glands and left submandibular gland appear grossly unremarkable. Right submandibular gland appear replaced with tumor. Extensive necrotic metastatic adenopathy throughout the bilateral submandibular and   internal jugular chain regions again seen, right greater than left without significant interval change compared to the prior study of 11/3/2023 but worse compared to the study of 9/9/2023. The largest right level 2 lymph node with ill-defined borders and   central necrosis measures approximately 4.2 x 2.9 cm. There is marked severe compression upon the right internal jugular vein by pathologically enlarged metastatic lymph nodes in the right neck which becomes occluded/nonvisualized from the C1-2 to the   C4-5 level. Right tongue base mass with slight deviation of the oropharyngeal airways to the left appears stable. THORACIC INLET: Multiple pathologically enlarged superior mediastinal lymph nodes are partially seen with the node measuring 2.1 x 1.5 cm in the precarinal region. Cannot exclude mediastinal metastatic disease. Calcifications within a few of the   mediastinal lymph nodes noted which may suggest sequela of prior granulomatous disease. Moderate centrilobular and paraseptal emphysematous changes in the visualized lung apices. Scarring and calcifications in the lung apices again noted, right greater   than left. No pneumothorax or pleural effusions. No suspicious pulmonary parenchymal mass in the visualized lung apices. Impression: 1. No cervical or intracranial major vessel arterial occlusion, focal saccular aneurysm, acute injury/dissection, or significant flow-limiting stenosis identified. 2.  Short segment of moderate right vertebral artery luminal narrowing due to external compression by bony canal narrowing at the C5-6 level from osteophyte formation and facet hypertrophy is again seen, stable compared to the prior study. The right   cervical vertebral artery is otherwise patent and unremarkable.   3.  Short segment of mild left vertebral artery luminal narrowing due to external compression by bony canal narrowing at the C5-6 level from osteophyte formation and facet hypertrophy is again seen, stable compared to the prior study. The left cervical   vertebral artery is otherwise patent and unremarkable. 4.  Mild luminal narrowing of the bilateral carotid bulb and proximal internal carotid artery segments secondary to calcified wall plaque is again seen without interval change. Otherwise normal common/internal carotid artery caliber without significant   stenosis or dissection. 5.  Extensive submandibular/internal jugular chain metastatic lymphadenopathy again noted, right greater than left without significant interval change compared to the prior study of 11/3/2023 but worse compared to the study of 9/9/2023. Right tongue base   mass with slight compression and deviation of the oropharyngeal airways is also seen without significant interval change since 11/3/2023.  6.  There is marked severe compression upon the right internal jugular vein by pathologically enlarged metastatic lymph nodes in the right neck which becomes occluded/nonvisualized from the C1-2 to the C4-5 level. Findings appear similar compared to the   prior study of 11/3/2023.  7.  Multiple pathologically enlarged superior mediastinal lymph nodes are partially seen with the node measuring 2.1 x 1.5 cm in the precarinal region. Cannot exclude mediastinal metastatic disease. Calcifications within a few of the mediastinal lymph   nodes noted which may suggest sequela of prior granulomatous disease. 8.  Bony destruction of the left posterior maxilla and bilateral mandible could suggest tumor invasion or osseous metastatic disease. There is associated subtle nondisplaced pathological fracture involving the angle of the left mandible. 9.  Emphysema, scarring, and calcifications in the visualized lung apices.     Workstation performed: [id number]      LABS:  Lab data are reviewed and documented in HemOnc history.        Lab Results   Component Value Date    HGB 11.9 (L) 11/20/2023    HCT 35.3 (L) 11/20/2023    MCV 97 11/20/2023     (H) 11/20/2023    WBC 11.18 (H) 11/20/2023    NRBC 0 08/04/2023    BANDSPCT 17 (H) 11/14/2023     Lab Results   Component Value Date    K 4.0 11/20/2023    CL 97 11/20/2023    CO2 29 11/20/2023    BUN 12 11/20/2023    CREATININE 0.51 (L) 11/20/2023    GLUCOSE 93 12/11/2016    GLUF 87 10/16/2023    CALCIUM 9.5 11/20/2023    AST 13 11/20/2023    ALT 14 11/20/2023    ALKPHOS 52 11/20/2023    EGFR 116 11/20/2023       No results found for: "IRON", "TIBC", "FERRITIN"    No results found for: "UDDSTRXT81"    Recent Labs     11/20/23  1205   WBC 11.18*   *        By:  Jason Porter MD, 11/22/2023, 12:08 PM

## 2023-11-13 NOTE — ASSESSMENT & PLAN NOTE
Hyponatremia likely secondary to chronic beer consumption    Results from last 7 days   Lab Units 11/13/23  0854 11/11/23  2100   SODIUM mmol/L 131* 132*

## 2023-11-14 ENCOUNTER — HOSPITAL ENCOUNTER (OUTPATIENT)
Dept: INFUSION CENTER | Facility: CLINIC | Age: 58
Discharge: HOME | End: 2023-11-14
Payer: COMMERCIAL

## 2023-11-14 ENCOUNTER — APPOINTMENT (OUTPATIENT)
Dept: RADIATION ONCOLOGY | Facility: CLINIC | Age: 58
End: 2023-11-14
Attending: INTERNAL MEDICINE
Payer: COMMERCIAL

## 2023-11-14 DIAGNOSIS — C06.9 ORAL CANCER (HCC): ICD-10-CM

## 2023-11-14 DIAGNOSIS — Z95.828 PORT-A-CATH IN PLACE: Primary | ICD-10-CM

## 2023-11-14 LAB
ALBUMIN SERPL BCP-MCNC: 3.6 G/DL (ref 3.5–5)
ALP SERPL-CCNC: 59 U/L (ref 34–104)
ALT SERPL W P-5'-P-CCNC: 15 U/L (ref 7–52)
ANION GAP SERPL CALCULATED.3IONS-SCNC: 9 MMOL/L
AST SERPL W P-5'-P-CCNC: 15 U/L (ref 13–39)
BILIRUB SERPL-MCNC: 0.29 MG/DL (ref 0.2–1)
BUN SERPL-MCNC: 8 MG/DL (ref 5–25)
CALCIUM SERPL-MCNC: 9.7 MG/DL (ref 8.4–10.2)
CHLORIDE SERPL-SCNC: 94 MMOL/L (ref 96–108)
CO2 SERPL-SCNC: 26 MMOL/L (ref 21–32)
CREAT SERPL-MCNC: 0.77 MG/DL (ref 0.6–1.3)
ERYTHROCYTE [DISTWIDTH] IN BLOOD BY AUTOMATED COUNT: 11.9 % (ref 11.6–15.1)
GFR SERPL CREATININE-BSD FRML MDRD: 98 ML/MIN/1.73SQ M
GLUCOSE SERPL-MCNC: 94 MG/DL (ref 65–140)
HCT VFR BLD AUTO: 37.3 % (ref 36.5–49.3)
HGB BLD-MCNC: 12.5 G/DL (ref 12–17)
MAGNESIUM SERPL-MCNC: 1.9 MG/DL (ref 1.9–2.7)
MCH RBC QN AUTO: 32.9 PG (ref 26.8–34.3)
MCHC RBC AUTO-ENTMCNC: 33.5 G/DL (ref 31.4–37.4)
MCV RBC AUTO: 98 FL (ref 82–98)
PLATELET # BLD AUTO: 309 THOUSANDS/UL (ref 149–390)
PMV BLD AUTO: 9.6 FL (ref 8.9–12.7)
POTASSIUM SERPL-SCNC: 3.8 MMOL/L (ref 3.5–5.3)
PROT SERPL-MCNC: 7.2 G/DL (ref 6.4–8.4)
RBC # BLD AUTO: 3.8 MILLION/UL (ref 3.88–5.62)
SODIUM SERPL-SCNC: 129 MMOL/L (ref 135–147)
WBC # BLD AUTO: 14 THOUSAND/UL (ref 4.31–10.16)

## 2023-11-14 PROCEDURE — 85027 COMPLETE CBC AUTOMATED: CPT | Performed by: INTERNAL MEDICINE

## 2023-11-14 PROCEDURE — 80053 COMPREHEN METABOLIC PANEL: CPT | Performed by: INTERNAL MEDICINE

## 2023-11-14 PROCEDURE — 83735 ASSAY OF MAGNESIUM: CPT | Performed by: INTERNAL MEDICINE

## 2023-11-14 PROCEDURE — 85007 BL SMEAR W/DIFF WBC COUNT: CPT | Performed by: INTERNAL MEDICINE

## 2023-11-14 NOTE — ASSESSMENT & PLAN NOTE
Recent hospitalization for stroke and squamous cell cancer of tongue who presents with fall secondary to alcohol intoxication  No orthostasis. Hydrated with IV fluids. Seen by palliative care. Patient continues to complain of tongue pain. Advised to go to outpatient XRT appointment tomorrow and chemo Wednesday.   No withdrawal symptoms

## 2023-11-14 NOTE — PROGRESS NOTES
Pt. Denies new symptoms or concerns today. Pt. Noted in ED yesterday. BMP obtained along with CBC without diff and Magnesium. CBC with diff, CMP and Magnesium obtained today as ordered pre-chemotherapy ordered tomorrow. Excellent blood return noted from Lt chest port. Port healing well. Future appointments reviewed. AVS declined.

## 2023-11-14 NOTE — PLAN OF CARE
Problem: INFECTION - ADULT  Goal: Absence or prevention of progression during hospitalization  Description: INTERVENTIONS:  - Assess and monitor for signs and symptoms of infection  - Monitor lab/diagnostic results  - Monitor all insertion sites, i.e. indwelling lines, tubes, and drains  - Monitor endotracheal if appropriate and nasal secretions for changes in amount and color  - [place] appropriate cooling/warming therapies per order  - Administer medications as ordered  - Instruct and encourage patient and family to use good hand hygiene technique  - Identify and instruct in appropriate isolation precautions for identified infection/condition  Outcome: Progressing  Goal: Absence of fever/infection during neutropenic period  Description: INTERVENTIONS:  - Monitor WBC    Outcome: Progressing     Problem: Knowledge Deficit  Goal: Patient/family/caregiver demonstrates understanding of disease process, treatment plan, medications, and discharge instructions  Description: Complete learning assessment and assess knowledge base.   Interventions:  - Provide teaching at level of understanding  - Provide teaching via preferred learning methods  Outcome: Progressing

## 2023-11-14 NOTE — ASSESSMENT & PLAN NOTE
Cancer pain follows with palliative care. Continue oxycodone 10 mg as needed. Patient reports having enough supply.

## 2023-11-15 ENCOUNTER — NUTRITION (OUTPATIENT)
Dept: NUTRITION | Facility: CLINIC | Age: 58
End: 2023-11-15

## 2023-11-15 ENCOUNTER — HOSPITAL ENCOUNTER (OUTPATIENT)
Dept: INFUSION CENTER | Facility: CLINIC | Age: 58
Discharge: HOME | End: 2023-11-15
Payer: COMMERCIAL

## 2023-11-15 ENCOUNTER — APPOINTMENT (OUTPATIENT)
Dept: RADIATION ONCOLOGY | Facility: CLINIC | Age: 58
End: 2023-11-15
Attending: INTERNAL MEDICINE
Payer: COMMERCIAL

## 2023-11-15 VITALS
RESPIRATION RATE: 18 BRPM | BODY MASS INDEX: 17.08 KG/M2 | SYSTOLIC BLOOD PRESSURE: 132 MMHG | HEART RATE: 66 BPM | DIASTOLIC BLOOD PRESSURE: 86 MMHG | TEMPERATURE: 97.7 F | HEIGHT: 75 IN | WEIGHT: 137.35 LBS

## 2023-11-15 DIAGNOSIS — Z71.3 NUTRITIONAL COUNSELING: Primary | ICD-10-CM

## 2023-11-15 DIAGNOSIS — C06.9 ORAL CANCER (HCC): Primary | ICD-10-CM

## 2023-11-15 LAB
BASOPHILS # BLD MANUAL: 0 THOUSAND/UL (ref 0–0.1)
BASOPHILS NFR MAR MANUAL: 0 % (ref 0–1)
EOSINOPHIL # BLD MANUAL: 0.14 THOUSAND/UL (ref 0–0.4)
EOSINOPHIL NFR BLD MANUAL: 1 % (ref 0–6)
ERYTHROCYTE [DISTWIDTH] IN BLOOD BY AUTOMATED COUNT: 11.9 % (ref 11.6–15.1)
HCT VFR BLD AUTO: 37.3 % (ref 36.5–49.3)
HGB BLD-MCNC: 12.5 G/DL (ref 12–17)
LYMPHOCYTES # BLD AUTO: 1.12 THOUSAND/UL (ref 0.6–4.47)
LYMPHOCYTES # BLD AUTO: 8 % (ref 14–44)
MCH RBC QN AUTO: 32.9 PG (ref 26.8–34.3)
MCHC RBC AUTO-ENTMCNC: 33.5 G/DL (ref 31.4–37.4)
MCV RBC AUTO: 98 FL (ref 82–98)
MONOCYTES # BLD AUTO: 2.52 THOUSAND/UL (ref 0–1.22)
MONOCYTES NFR BLD: 18 % (ref 4–12)
NEUTROPHILS # BLD MANUAL: 10.22 THOUSAND/UL (ref 1.85–7.62)
NEUTS BAND NFR BLD MANUAL: 17 % (ref 0–8)
NEUTS SEG NFR BLD AUTO: 56 % (ref 43–75)
PLATELET # BLD AUTO: 309 THOUSANDS/UL (ref 149–390)
PLATELET BLD QL SMEAR: ADEQUATE
PMV BLD AUTO: 9.6 FL (ref 8.9–12.7)
RBC # BLD AUTO: 3.8 MILLION/UL (ref 3.88–5.62)
RBC MORPH BLD: NORMAL
WBC # BLD AUTO: 14 THOUSAND/UL (ref 4.31–10.16)

## 2023-11-15 PROCEDURE — 77014 CHG CT GUIDANCE RADIATION THERAPY FLDS PLACEMENT: CPT | Performed by: INTERNAL MEDICINE

## 2023-11-15 PROCEDURE — 77386 HB NTSTY MODUL RAD TX DLVR CPLX: CPT | Performed by: INTERNAL MEDICINE

## 2023-11-15 PROCEDURE — 96367 TX/PROPH/DG ADDL SEQ IV INF: CPT

## 2023-11-15 PROCEDURE — 77427 RADIATION TX MANAGEMENT X5: CPT | Performed by: RADIOLOGY

## 2023-11-15 PROCEDURE — 96361 HYDRATE IV INFUSION ADD-ON: CPT

## 2023-11-15 PROCEDURE — 96413 CHEMO IV INFUSION 1 HR: CPT

## 2023-11-15 RX ORDER — SODIUM CHLORIDE 9 MG/ML
20 INJECTION, SOLUTION INTRAVENOUS ONCE
Status: COMPLETED | OUTPATIENT
Start: 2023-11-15 | End: 2023-11-15

## 2023-11-15 RX ADMIN — DEXAMETHASONE SODIUM PHOSPHATE: 10 INJECTION, SOLUTION INTRAMUSCULAR; INTRAVENOUS at 09:04

## 2023-11-15 RX ADMIN — SODIUM CHLORIDE 500 ML: 0.9 INJECTION, SOLUTION INTRAVENOUS at 09:03

## 2023-11-15 RX ADMIN — SODIUM CHLORIDE 500 ML: 0.9 INJECTION, SOLUTION INTRAVENOUS at 11:20

## 2023-11-15 RX ADMIN — SODIUM CHLORIDE 20 ML/HR: 0.9 INJECTION, SOLUTION INTRAVENOUS at 09:03

## 2023-11-15 RX ADMIN — SODIUM CHLORIDE 70 MG: 0.9 INJECTION, SOLUTION INTRAVENOUS at 10:10

## 2023-11-15 RX ADMIN — APREPITANT 130 MG: 130 INJECTION, EMULSION INTRAVENOUS at 09:28

## 2023-11-15 NOTE — PROGRESS NOTES
Janice Beauchamp RN made aware of sodium of 129. Pt to get pre and post hydration today with Cisplatin. Per Dr. Reina Oneil, no changes to today's treatment and no further orders.

## 2023-11-15 NOTE — PATIENT INSTRUCTIONS
Nutrition Rx & Recommendations:  Diet: Very Soft/Moist, High Calorie, High Protein Diet  Small, frequent meals/snacks may be easier to tolerate than 3 large daily meals. Aim for 5-6 small meals per day (every 2-3 hours). Include protein at all meals/snacks. Stay hydrated by sipping fluids of choice/tolerance throughout the day. Liquid nutrition may be better tolerated than solids at times. Refer to Eating Hints book for other meal/snack ideas and symptom management. Avoid spicy, acidic, sharp/hard/crunchy foods & carbonated beverages as needed. Follow proper oral care; Try baking soda/salt water rinse recipe (mix 3/4 tsp salt + 1 tsp baking soda + 1 qt water; rinse with plain water after using) in Eating Hints book (pg 18). Brush your teeth before/after meals & before bed. For weight loss: monitor your weight at home at least 2x/week, record your weight, start by adding 250-500 extra calories per day, eat 5-6 small scheduled meals every 2-3 hrs, choose foods that are high in protein and calories (see pages 49-53 in your Eating Hints book), drink liquids with calories for example: milkshakes/smoothies/juice/soup/whole milk/chocolate milk, cook with protein fortified milk (see recipe on page 36 in your Eating Hints book), consider ready-to-drink oral nutrition supplements such as Ensure Plus, Ensure Enlive, Boost Plus, or Boost Very High Calorie, avoid "diet" and "light" foods when possible, avoid drinking too much with meals, contact your dietitian with any continued weight loss over the course of 1 week. For more info see pages 35-37 in your Eating Hints book. Weigh yourself regularly. If you notice weight loss, make an effort to increase your daily food/calorie intake. If you continue to notice loss after these efforts, reach out to your dietitian to establish a plan to stabilize weight.   Try fortified mashed potatoes for more calories and protein: make mashed potatoes with whole milk/half and half/cream, extra butter, plain whole milk Saint Komal yogurt (gives a sour cream taste but with protein), cheese, and dry milk powder. Make hot cereal with whole milk rather than water  Try whole Fairlife milk  Ensure Plus/Complete or high calorie shakes/smoothies - start 3-4 per day  Soft/easy to swallow foods that are high in calories and protein: casseroles, chicken/egg/tuna salad with extra ball to add calories and moisture, oatmeal/cream of wheat made with whole milk, cottage cheese, whole milk Saint Komal yogurt, scrambled eggs with cheese, avocado, macaroni and cheese, mashed potatoes made with butter and whole milk or dry milk powder, ground meat with extra sauce/gravy, canned fruit, peanut butter, cream soups (cream of chicken, cream of mushroom, broccoli cheddar), pudding made with whole milk, custard, ice cream, banana, milkshakes/smoothies, Review page 52 of Eating Hints Book for more ideas.    Use whole milk greek yogurt    Follow Up Plan: 11/22 after RT  Recommend Referral to Other Providers: none at this time

## 2023-11-15 NOTE — TELEPHONE ENCOUNTER
11/15/23    SPOKE TO PT    INFORMED PT THE REASON OF MY CALL (TO [address])     PT STATED THAT “HE DID NOT KNOW WHAT I WAS TALKING ABOUT & WHERE I WAS CALLING FROM”    EXPLAINED TO PT AGAIN WHERE AND WHY I WAS CALLING AND PT SOUNDED CONFUSED AND A LITTLE FRUSTRATED WITH MY CALL. TRY TO SPEAK TO THE PT AGAIN BUT PT DISCONNECTED THE CALL.        A LETTER TO [address] APPT HAS BEEN SENT TO PT.      IF PT CONTACTS OFFICE, PLEASE ASSIST PT WITH SCHEDULING A HOSPITAL F/U  APPT TYPE: OVL  APPT NOTE: ADM 11/03/23 - DC 11/06/23 F/U STROKE

## 2023-11-15 NOTE — PROGRESS NOTES
Pt tolerated treatment today without incident. Pt was provided with AVS and calendar view of future appts.

## 2023-11-15 NOTE — TELEPHONE ENCOUNTER
Post CVA Discharge Follow Up  Hospitalization: 11/3/23-11/6/23, 11/11/23-11/13/23    Called patient with no answer. Left a voice message requesting for a call. Provided the office's phone number.

## 2023-11-15 NOTE — PROGRESS NOTES
Outpatient Oncology Nutrition Consultation   Type of Consult: Follow Up  Care Location: [facility] [place]    Reason for referral:   Received notification by RN NavigatorPolina., on 7/18/23 that pt has triggered for oncology nutrition care (reason for referral: HNC dx and Malnutrition Screening Tool (MST) Triggers: scored a 0 indicating No recent wt loss and is not eating poorly due to a decreased appetite. (Date of MST: 7/18/23)). Received notification by Delia Daley., on 10/12/23 that pt has triggered for oncology nutrition care (reason for referral: HNC dx and Malnutrition Screening Tool (MST) Triggers: scored a 3 indicating 24-33# (11-15 kg) recent wt loss and is not eating poorly due to a decreased appetite. (Date of MST: 10/12/23) MST Note: "over the last 4 months "). Nutrition Assessment:   Oncology Diagnosis & Treatments: Oral Cancer  Started concurrent chemoradiation (weekly cisplatin &  RT to the oral cavity and BL necks started 11/14/23)   Oncology History   Oral cancer ([address])   5/23/2023 Initial Diagnosis    Oral cancer ([address])     8/29/2023 -  Cancer Staged    Staging form: Oral Cavity, AJCC 8th Edition  - Clinical stage from 8/29/2023: Stage CHETAN (cT4a, cN2c, cM0) - Signed by Carol Choudhury MD on 9/13/2023  Stage prefix: Initial diagnosis       8/29/2023 Biopsy    A.  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, p16 positive      Chemotherapy    alteplase (CATHFLO), 2 mg, Intracatheter, Every 1 Minute as needed, 0 of 7 cycles  CISplatin (PLATINOL) infusion, 40 mg/m2, Intravenous, Once, 0 of 7 cycles  aprepitant (CINVANTI) in  mL IVPB, 130 mg, Intravenous, Once, 0 of 7 cycles         Past Medical & Surgical Hx:   Patient Active Problem List   Diagnosis    Dental caries    Cervical lymphadenopathy    Periodontitis    Alcohol use disorder, severe, dependence ([address])    Hypertension Cigarette nicotine dependence without complication    Hyponatremia    Hypokalemia    Hypomagnesemia    Oral cancer (HCC)    Preoperative clearance    Drug induced constipation    Cancer-related pain    At risk for respiratory depression due to opioid    Pain in lower jaw    Counseling regarding advanced care planning and goals of care    History of CVA (cerebrovascular accident)    Moderate protein-calorie malnutrition ([address])    Alcoholic intoxication without complication ([address])    Port-A-Cath in place     Past Medical History:   Diagnosis Date    Alcohol abuse     Cancer ([address])     Hypertension     Muscle weakness     Left leg    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)      Past Surgical History:   Procedure Laterality Date    EGD      IR PORT PLACEMENT  11/7/2023    KNEE ARTHROSCOPY Left     MULTIPLE TOOTH EXTRACTIONS N/A 10/16/2023    Procedure: EXTRACTION OF ALL REMAINING TEETH;  Surgeon: Anthony Thornton DMD;  Location: [facility];  Service: Oral Surgery       Review of Medications:   Vitamins, Supplements and Herbals: No, pt denies taking supplements    Current Outpatient Medications:     aspirin 81 mg chewable tablet, Chew 1 tablet (81 mg total) daily, Disp: 90 tablet, Rfl: 0    atorvastatin (LIPITOR) 40 mg tablet, Take 1 tablet (40 mg total) by mouth every evening, Disp: 30 tablet, Rfl: 0    chlorhexidine (PERIDEX) 0.12 % solution, , Disp: , Rfl:     clopidogrel (Plavix) 75 mg tablet, Take 1 tablet (75 mg total) by mouth daily for 21 days, Disp: 21 tablet, Rfl: 0    diphenhydramine, lidocaine, Al/Mg hydroxide, simethicone (Magic Mouthwash) SUSP, Swish and swallow 10 mL every 4 (four) hours as needed for mouth pain or discomfort, Disp: 237 mL, Rfl: 0    gabapentin (NEURONTIN) 100 mg capsule, , Disp: , Rfl:     ibuprofen (MOTRIN) 600 mg tablet, Take 1 tablet (600 mg total) by mouth every 6 (six) hours as needed for mild pain, Disp: 30 tablet, Rfl: 0    naloxone (NARCAN) 4 mg/0.1 mL nasal spray, Administer 1 spray into a nostril. If no response after 2-3 minutes, give another dose in the other nostril using a new spray., Disp: 1 each, Rfl: 1    oxyCODONE (ROXICODONE) 10 MG TABS, Take 1 tablet (10 mg total) by mouth every 6 (six) hours as needed for severe pain Max Daily Amount: 40 mg, Disp: 60 tablet, Rfl: 0    oxyCODONE-acetaminophen (PERCOCET) 5-325 mg per tablet, Take 1 tablet by mouth every 4 to 6 hours as needed for pain, Disp: , Rfl:     polyethylene glycol (MIRALAX) 17 g packet, Take 17 g by mouth daily, Disp: 30 each, Rfl: 0    senna-docusate sodium (SENOKOT-S) 8.6-50 mg per tablet, Take 1 tablet by mouth daily, Disp: 60 tablet, Rfl: 2  No current facility-administered medications for this visit.     Facility-Administered Medications Ordered in Other Visits:     alteplase (CATHFLO) injection 2 mg, 2 mg, Intracatheter, Q1MIN PRN, Fanny Oconnor MD    alteplase (CATHFLO) injection 2 mg, 2 mg, Intracatheter, Q1MIN PRN, Fanny Oconnor MD    Most Recent Lab Results:   Lab Results   Component Value Date    WBC 14.00 (H) 11/14/2023    WBC 14.00 (H) 11/14/2023    NEUTROABS 3.17 08/04/2023    CHOLESTEROL 141 11/04/2023    TRIG 75 11/04/2023    HDL 59 11/04/2023    LDLCALC 67 11/04/2023    ALT 15 11/14/2023    AST 15 11/14/2023    ALB 3.6 11/14/2023    SODIUM 129 (L) 11/14/2023    SODIUM 131 (L) 11/13/2023    K 3.8 11/14/2023    K 3.8 11/13/2023    CL 94 (L) 11/14/2023    BUN 8 11/14/2023    BUN 10 11/13/2023    CREATININE 0.77 11/14/2023    CREATININE 0.74 11/13/2023    EGFR 98 11/14/2023    GLUCOSE 93 12/11/2016    POCGLU 126 11/06/2023    GLUF 87 10/16/2023    GLUC 94 11/14/2023    HGBA1C 5.9 (H) 11/04/2023    CALCIUM 9.7 11/14/2023    MG 1.9 11/14/2023       Anthropometric Measurements:   Height: 75"  Ht Readings from Last 1 Encounters:   11/15/23 6' 3" (1.905 m)     Wt Readings from Last 20 Encounters:   11/15/23 62.3 kg (137 lb 5.6 oz)   11/12/23 59.8 kg (131 lb 13.4 oz)   11/04/23 66.7 kg (147 lb)   11/02/23 64.3 kg (141 lb 12.8 oz)   10/31/23 64 kg (141 lb)   10/16/23 66.7 kg (147 lb)   10/12/23 66.7 kg (147 lb)   10/11/23 64.9 kg (143 lb)   08/29/23 72.6 kg (160 lb)   08/04/23 72.6 kg (160 lb)   05/23/23 66.2 kg (146 lb)   03/05/23 71.3 kg (157 lb 3 oz)   02/22/23 70.3 kg (155 lb)   06/18/19 66.7 kg (147 lb)   01/05/17 79.4 kg (175 lb)   04/25/16 79.4 kg (175 lb)     Weight History:   Usual Weight: 170-175# (July 2023)  Varian: n/a    Oncology Nutrition-Anthropometrics      Flowsheet Row Nutrition from 11/15/2023 in [facility] Nutrition from 10/31/2023 in [facility]   Patient age (years): [age] years   Patient (male) height (in): 76 in 76 in   Current weight (lbs): 137.4 lbs 141 lbs   Current weight to be used for anthropometric calculations (kg) 62.5 kg 64.1 kg   BMI: 17.2 17.6   IBW male 196 lb 196 lb   IBW (kg) male 89.1 kg 89.1 kg   IBW % (male) 70.1 % 71.9 %   Adjusted BW (male): 181.4 lbs 182.3 lbs   Adjusted BW in kg (male): 82.5 kg 82.9 kg   % weight change after 1 month: -6.5 % --   Weight change after 1 month (lbs) -9.6 lbs --   % weight change after 3 months: -14.1 % --   Weight change after 3 months (lbs) -22.6 lbs --   % weight change after 6 months: -5.9 % --   Weight change after 6 months (lbs) -8.6 lbs --          Nutrition-Focused Physical Findings: none observed    Food/Nutrition-Related History & Client/Social History:    Current Nutrition Impact Symptoms:  [] Nausea  [] Reduced Appetite  [] Acid Reflux    [] Vomiting  [x] Unintended Wt Loss - ~30# wt loss in the past 3 mo d/t poor dentition and oral pain per pt [] Malabsorption    [] Diarrhea  [] Unintended Wt Gain  [] Dumping Syndrome    [] Constipation  [] Thick Mucous/Secretions  [] Abdominal Pain    [] Dysgeusia (Altered Taste)  [] Xerostomia (Dry Mouth)  [] Gas    [] Dysosmia (Altered Smell)  [] Thrush  [x] Difficulty Chewing - pt reports he recently had all of his teeth removed    [x] Oral Mucositis (Sore Mouth)  [] Fatigue  [] Hyperglycemia   Lab Results   Component Value Date    HGBA1C 5.9 (H) 2023      [] Odynophagia  [] Esophagitis  [] Other:    [] Dysphagia  [] Early Satiety  [] No Problems Eating      Food Allergies & Intolerances: no    Current Diet: Soft/Moist - room temp foods d/t oral pain  Current Nutrition Intake: Decreased since last visit  Appetite: Good  Nutrition Route: PO - not interested in feeding tube  Oral Care:  rx mouthwash BID, brushing tongue 2-3x/day; had all of teeth removed  Activity level: works in [employer], has a very active job, not planning to work during tx    [address] Recall:   Breakfast: 1 container of yogurt  Snack: maybe some jello  Lunch: nothing  Snack: none  Dinner: bite of chili, 2 containers of applesauce, yogurt, chocolate milk  Snack: ice cream    Beverages: water (not a water drinker), apple juice (8 oz x4), chocolate whole milk (8 oz x2), Gatorade (20 oz x2), beer (25 oz can x 2-3 cans)   -Averaging ~88 oz per day   -does not plan to quit drinking alcohol during tx  Supplements:   Milkshakes - once in awhile - milk, ice cream, banana      Oncology Nutrition-Estimated Needs      Flowsheet Row Nutrition from 10/31/2023 in [facility]   Weight type used IBW   Weight in kilograms (kg) used for estimated needs 89.1 kg   Energy needs formula:  30-35 kcal/kg   Energy needs based on 30 kcal/k kcal   Energy needs based on 35 kcal/k kcal   Protein needs formula: 1.2-1.5 g/kg   Protein needs based on 1.2 g/k g   Protein needs based on 1.5 g/kg 134 g   Fluid needs formula: 30-35 mL/kg   Fluid needs based on 30 mL/kg 2673 mL   Fluid needs in ounces 90 oz   Fluid needs based on 35 mL/kg 3119 mL   Fluid needs in ounces 105 oz             Discussion & Intervention:   Nae Diaz was evaluated today for an RD follow up regarding HNC.   Nae Diaz is currently undergoing tx for HNC . He has lost weight since last RD visit. He reports more difficulty with eating/swallowing and is only able to eat soft/moist foods now. He is making milkshakes but not every day. Encouraged him to start drinking these daily, and provided him with suggestions on how to increase calorie/protein intake of milkshakes. He doesn't like Ensure/Boost, but was willing to try them again if they are really cold. Gave him a sample of Ensure Complete and encouraged him to use these in milkshakes. Discussed calorie/protein needs during treatment. Reviewed 24 hour recall, which revealed an inadequate po intake, and discussed ways to increase kcal, protein, and fluid intakes and optimize nutrient intake. Also reviewed the importance of wt management throughout the tx process and the role of a high kcal/ high protein diet in managing wt and overall health.   Based on today's assessment, discussion included: MNT for: wt loss, oral pain, HNC, soft/moist food, how to modify foods for anticipated nutrition impact symptoms pt may experience during CA tx, practicing proper oral care, a high kcal/protein diet & food choices to include at all meals & snacks (Examples of high kcal foods: cheese, full-fat dairy products, nut butter, plant-based fats, coconut oil/milk, avocado, butter, cream soup, etc. Examples of high protein foods: eggs, chicken, fish, beans/legumes, nuts/nut butters, bone broth, etc.) , fortifying foods for added kcal and protein (examples include: adding cheese to foods such as eggs, mashed potatoes, casseroles, etc.; Making oatmeal with whole milk rather than water; Making fortified mashed potatoes with cream, butter, dry milk powder, plain Saint Komal yogurt, and cheese.), adequate hydration & fluid choices, sipping on calorie containing beverages (examples include: adding whole milk or cream to coffee, oral nutrition supplements, juice, electrolyte replacement beverages, milk, etc.), eating smaller more frequent meals every 2-3 hours (5-6 small meals/day), having consistent and planned snacks between meals, starting oral nutrition supplements, utilizing oral nutrition supplements, and recipe suggestions/resources, trying new recipes, & cooking at home more often. Moving forward, Cr Olivas was encouraged to increase kcal, protein, and fluid intakes and practice MNT for nutrition impact sx management. Materials Provided: Ensure samples and Orgain samples, and Ensure Plus samples  All questions and concerns addressed during today’s visit. Cr Olivas has RD contact information. Nutrition Diagnosis:   Inadequate Energy Intake related to physiological causes, disease state and treatment related issues as evidenced by food recall, wt loss and discussion with pt and/or family. Increased Nutrient Needs (kcal & pro) related to increased demand for nutrients and disease state as evidenced by cancer dx and pt undergoing tx for cancer. Altered GI Function related to alteration in GI tract structure and integrity as evidenced by Unintended Weight Loss and Oral Mucositis (Sore Mouth). Patient has clinical indicators (or ASPEN criteria) consistent with severe protein-calorie malnutrition in the context of Chronic Illness as evidenced by >5% wt loss in 1 month and </=75% energy intake vs. Estimated needs for >/=1 month. Monitoring & Evaluation:   Goals:  weight maintenance/stabilization  adequate nutrition impact symptom management  pt to meet >/=75% estimated nutrition needs daily    Progress Towards Goals: Progressing    Nutrition Rx & Recommendations:  Diet: Very Soft/Moist, High Calorie, High Protein Diet  Small, frequent meals/snacks may be easier to tolerate than 3 large daily meals. Aim for 5-6 small meals per day (every 2-3 hours). Include protein at all meals/snacks. Stay hydrated by sipping fluids of choice/tolerance throughout the day.   Liquid nutrition may be better tolerated than solids at times. Refer to Eating Hints book for other meal/snack ideas and symptom management. Avoid spicy, acidic, sharp/hard/crunchy foods & carbonated beverages as needed. Follow proper oral care; Try baking soda/salt water rinse recipe (mix 3/4 tsp salt + 1 tsp baking soda + 1 qt water; rinse with plain water after using) in Eating Hints book (pg 18). Brush your teeth before/after meals & before bed. For weight loss: monitor your weight at home at least 2x/week, record your weight, start by adding 250-500 extra calories per day, eat 5-6 small scheduled meals every 2-3 hrs, choose foods that are high in protein and calories (see pages 49-53 in your Eating Hints book), drink liquids with calories for example: milkshakes/smoothies/juice/soup/whole milk/chocolate milk, cook with protein fortified milk (see recipe on page 36 in your Eating Hints book), consider ready-to-drink oral nutrition supplements such as Ensure Plus, Ensure Enlive, Boost Plus, or Boost Very High Calorie, avoid "diet" and "light" foods when possible, avoid drinking too much with meals, contact your dietitian with any continued weight loss over the course of 1 week. For more info see pages 35-37 in your Eating Hints book. Weigh yourself regularly. If you notice weight loss, make an effort to increase your daily food/calorie intake. If you continue to notice loss after these efforts, reach out to your dietitian to establish a plan to stabilize weight. Try fortified mashed potatoes for more calories and protein: make mashed potatoes with whole milk/half and half/cream, extra butter, plain whole milk Saint Komal yogurt (gives a sour cream taste but with protein), cheese, and dry milk powder.   Make hot cereal with whole milk rather than water  Try whole Fairlife milk  Ensure Plus/Complete or high calorie shakes/smoothies - start 3-4 per day  Soft/easy to swallow foods that are high in calories and protein: casseroles, chicken/egg/tuna salad with extra ball to add calories and moisture, oatmeal/cream of wheat made with whole milk, cottage cheese, whole milk Saint Komal yogurt, scrambled eggs with cheese, avocado, macaroni and cheese, mashed potatoes made with butter and whole milk or dry milk powder, ground meat with extra sauce/gravy, canned fruit, peanut butter, cream soups (cream of chicken, cream of mushroom, broccoli cheddar), pudding made with whole milk, custard, ice cream, banana, milkshakes/smoothies, Review page 52 of Eating Hints Book for more ideas.    Use whole milk greek yogurt    Follow Up Plan:  11/22 after RT  Recommend Referral to Other Providers: none at this time

## 2023-11-16 ENCOUNTER — PATIENT OUTREACH (OUTPATIENT)
Dept: CASE MANAGEMENT | Facility: HOSPITAL | Age: 58
End: 2023-11-16

## 2023-11-16 ENCOUNTER — PATIENT OUTREACH (OUTPATIENT)
Dept: HEMATOLOGY ONCOLOGY | Facility: CLINIC | Age: 58
End: 2023-11-16

## 2023-11-16 DIAGNOSIS — R13.10 PAIN WITH SWALLOWING: Primary | ICD-10-CM

## 2023-11-16 DIAGNOSIS — C06.9 ORAL CANCER (HCC): ICD-10-CM

## 2023-11-16 PROCEDURE — 77386 HB NTSTY MODUL RAD TX DLVR CPLX: CPT | Performed by: RADIOLOGY

## 2023-11-16 PROCEDURE — 77014 CHG CT GUIDANCE RADIATION THERAPY FLDS PLACEMENT: CPT | Performed by: RADIOLOGY

## 2023-11-16 RX ORDER — DIPHENHYDRAMINE HYDROCHLORIDE AND LIDOCAINE HYDROCHLORIDE AND ALUMINUM HYDROXIDE AND MAGNESIUM HYDRO
10 KIT EVERY 4 HOURS PRN
Qty: 237 ML | Refills: 7 | Status: SHIPPED | OUTPATIENT
Start: 2023-11-16

## 2023-11-16 NOTE — PROGRESS NOTES
OSW outreached to [facility] [phone] to ask how much it would cost for Magic Mouthwash for patient. Was informed that if they mix the components of the wash, it's about $10 for 4 ounces of solution. Call placed to patient to ask if he would be [facility]  [facility], he stated that he wouldn't because he has a lot of appointments. LSW asked if he would be able to have a friend/family member pick it up for him, patient said no they work. Patient stated that he really needs help applying for social security. LSW offered to provide him with the contact information for Social Security office so that he could schedule an appointment. Patient stated that he didn't have anything to write the contact information down. LSW will write it down and place in an envelope to provide to Rad/onc department. Patient has appointment with them today and they can pass the information onto him. LSW called [facility] to ask if they deliver. Was informed that they typically do mail order for staff. They do have hours during the week from 7-7 so if patient/family/friends could come during those hours, he can pick it up. Sat hours- 9-3 and Sunday 9-1. Provided update to Radonc team. Was informed that typically order is written for 450ml which lasts about 10 days. Patient will need about 9 weeks worth of the medication. OSW reached out to patient to ask if his family could  after/before work, message left.

## 2023-11-16 NOTE — TELEPHONE ENCOUNTER
Called patient and made him aware that we are ordering MMW through [facility] that will be covered under compassion funds. Pt in agreement and verbalized understanding.

## 2023-11-16 NOTE — PROGRESS NOTES
Get Prompt Medical Attention (Check one eye at a time by covering the other eye).  If any of the following signs of a new retinal tear occur:  · Any sudden changes in your vision.  · Light flashes.  · Curtain moving across part of your visual field.  · Burst of new floaters (small dots or strings that seem to be moving across your field of vision).   Returned call and spoke to patient. He state he had RT today and requested antibiotics and a script for ibuprofen from the physician. He said he was told he would need to see his PCP or the physician that originally ordered his antibiotics. I informed the patient that if his pain is not well controlled he should be reaching out to Palliative Care as they are managing this for him. Of note, he was ordered magic mouthwash that he said he could not afford but his Oncology social worker reached out to [facility] and they will be able to provide the mix for $10.00. Patient is aware but states he has no one to pick it up. I informed patient of [facility] hours and suggested his girlfriend, Valdemar Carrasco pick it up on the way home from work. He said she works in [employer] and can't pick it up. He then talked about how he doesn't have any money since he hasn't worked in six weeks and is upset that no one is helping him apply for SS. I reminded him that he told me several weeks ago the social workers provided him the phone number and information to schedule an appointment. He told me he just got the paperwork today. I encouraged him to follow through with scheduling an appointment. He voiced understanding.

## 2023-11-16 NOTE — PROGRESS NOTES
OSW reached out to patient to ask if he/family/friend would be able to go to [facility] to  the medication if it was completely paid for. Patient didn't understand why he couldn't get it at the same office as he goes to radiation. LSW explained that [facility] is a pharmacy like a [facility] and that the medication has to be picked up at the pharmacy. LSW provided patient with the pharmacy hours, Mon-Fri 7-7, Sat 9-3 and Sun 9-1. Plan now is for the medication to be ordered at the pharmacy and paid for and when he or a member of his family/friend group can get there, they will. LSW will outreach to ensure that medication is ordered and paid for along with the refills for this medication.

## 2023-11-17 PROCEDURE — 77014 CHG CT GUIDANCE RADIATION THERAPY FLDS PLACEMENT: CPT | Performed by: RADIOLOGY

## 2023-11-17 PROCEDURE — 77386 HB NTSTY MODUL RAD TX DLVR CPLX: CPT | Performed by: RADIOLOGY

## 2023-11-17 RX ORDER — SODIUM CHLORIDE 9 MG/ML
20 INJECTION, SOLUTION INTRAVENOUS ONCE
Status: CANCELLED | OUTPATIENT
Start: 2023-11-21

## 2023-11-17 NOTE — TELEPHONE ENCOUNTER
Post CVA Discharge Follow Up  Hospitalization: 11/3/23-11/6/23    Called patient. Since discharge, he denies experiencing any new or worsening stroke-like symptoms. Patient reports he continues to have the following symptoms: dizziness. He claims symptoms are about the same since discharge with no changes/improvements/worsening. He is ambulating independently as well as preforming his own ADLs. Patient manages his own medications, appointments, and affairs. Reviewed appointments - encouraged PCP follow up. He is following up with oncology. Per inpatient neurology notes:  "Tayo Byrd will need follow up in 4-6 weeks with neurovascular attending/resident or AP . He will not require outpatient neurological testing."    Offered to schedule stroke hospital follow up appointment, patient is agreeable to this. Scheduled appointment. Mailed appointment reminder card. Reviewed medications with him. Reports he is taking as prescribed with no medication side effects or signs of bleeding. He also verbalizes understanding of DAPT instructions/plan. During this call, we reviewed stroke type, symptoms, personal risk factors and management, medications. Mailed stroke education booklet. As for risk factors, patient reports his BP is well controlled and how they checked at his oncology visits. Continues to be an everyday smoker. Smoking cessation reviewed. All of his questions were addressed. At the conclusion of the conversation, patient denies having any further questions or concerns.

## 2023-11-18 ENCOUNTER — TELEPHONE (OUTPATIENT)
Dept: OTHER | Facility: OTHER | Age: 58
End: 2023-11-18

## 2023-11-18 NOTE — TELEPHONE ENCOUNTER
Medication:   oxyCODONE (ROXICODONE) 10 MG TABS      Dose/Frequency: 10 mg     Quantity: 60    Pharmacy:   [facility]/pharmacy #[id number] [address]       Office:   [] PCP/Provider -   [x] Speciality/Provider - palliative     Does the patient have enough for 3 days?    [] Yes   [x] No - Send as HP to POD

## 2023-11-19 ENCOUNTER — TELEPHONE (OUTPATIENT)
Dept: OTHER | Facility: OTHER | Age: 58
End: 2023-11-19

## 2023-11-19 PROCEDURE — 77014 CHG CT GUIDANCE RADIATION THERAPY FLDS PLACEMENT: CPT | Performed by: RADIOLOGY

## 2023-11-19 PROCEDURE — 77386 HB NTSTY MODUL RAD TX DLVR CPLX: CPT | Performed by: RADIOLOGY

## 2023-11-19 NOTE — TELEPHONE ENCOUNTER
Medication:   oxyCODONE (ROXICODONE) 10 MG TABS       Dose/Frequency: 10 mg      Quantity: 60     Pharmacy:   [facility]/pharmacy #[id number] [id number]        Office:   [] PCP/Provider -   [x] Speciality/Provider - palliative      Does the patient have enough for 3 days?    [] Yes   [x] No

## 2023-11-20 ENCOUNTER — HOSPITAL ENCOUNTER (OUTPATIENT)
Dept: INFUSION CENTER | Facility: CLINIC | Age: 58
Discharge: HOME | End: 2023-11-20
Payer: COMMERCIAL

## 2023-11-20 DIAGNOSIS — C06.9 ORAL CANCER (HCC): ICD-10-CM

## 2023-11-20 DIAGNOSIS — Z95.828 PORT-A-CATH IN PLACE: Primary | ICD-10-CM

## 2023-11-20 DIAGNOSIS — G89.3 CANCER-RELATED PAIN: ICD-10-CM

## 2023-11-20 DIAGNOSIS — R68.84 PAIN IN LOWER JAW: ICD-10-CM

## 2023-11-20 DIAGNOSIS — C06.9 CANCER OF ORAL CAVITY (HCC): ICD-10-CM

## 2023-11-20 LAB
ALBUMIN SERPL BCP-MCNC: 3.7 G/DL (ref 3.5–5)
ALP SERPL-CCNC: 52 U/L (ref 34–104)
ALT SERPL W P-5'-P-CCNC: 14 U/L (ref 7–52)
ANION GAP SERPL CALCULATED.3IONS-SCNC: 5 MMOL/L
AST SERPL W P-5'-P-CCNC: 13 U/L (ref 13–39)
BASOPHILS # BLD MANUAL: 0 THOUSAND/UL (ref 0–0.1)
BASOPHILS NFR MAR MANUAL: 0 % (ref 0–1)
BILIRUB SERPL-MCNC: 0.29 MG/DL (ref 0.2–1)
BUN SERPL-MCNC: 12 MG/DL (ref 5–25)
CALCIUM SERPL-MCNC: 9.5 MG/DL (ref 8.4–10.2)
CHLORIDE SERPL-SCNC: 97 MMOL/L (ref 96–108)
CO2 SERPL-SCNC: 29 MMOL/L (ref 21–32)
CREAT SERPL-MCNC: 0.51 MG/DL (ref 0.6–1.3)
EOSINOPHIL # BLD MANUAL: 0 THOUSAND/UL (ref 0–0.4)
EOSINOPHIL NFR BLD MANUAL: 0 % (ref 0–6)
ERYTHROCYTE [DISTWIDTH] IN BLOOD BY AUTOMATED COUNT: 11.9 % (ref 11.6–15.1)
GFR SERPL CREATININE-BSD FRML MDRD: 116 ML/MIN/1.73SQ M
GLUCOSE SERPL-MCNC: 82 MG/DL (ref 65–140)
HCT VFR BLD AUTO: 35.3 % (ref 36.5–49.3)
HGB BLD-MCNC: 11.9 G/DL (ref 12–17)
LYMPHOCYTES # BLD AUTO: 1.23 THOUSAND/UL (ref 0.6–4.47)
LYMPHOCYTES # BLD AUTO: 11 % (ref 14–44)
MAGNESIUM SERPL-MCNC: 1.8 MG/DL (ref 1.9–2.7)
MCH RBC QN AUTO: 32.8 PG (ref 26.8–34.3)
MCHC RBC AUTO-ENTMCNC: 33.7 G/DL (ref 31.4–37.4)
MCV RBC AUTO: 97 FL (ref 82–98)
MONOCYTES # BLD AUTO: 2.24 THOUSAND/UL (ref 0–1.22)
MONOCYTES NFR BLD: 20 % (ref 4–12)
MYELOCYTES NFR BLD MANUAL: 1 % (ref 0–1)
NEUTROPHILS # BLD MANUAL: 7.6 THOUSAND/UL (ref 1.85–7.62)
NEUTS SEG NFR BLD AUTO: 68 % (ref 43–75)
PLATELET # BLD AUTO: 417 THOUSANDS/UL (ref 149–390)
PLATELET BLD QL SMEAR: ABNORMAL
PMV BLD AUTO: 9.4 FL (ref 8.9–12.7)
POTASSIUM SERPL-SCNC: 4 MMOL/L (ref 3.5–5.3)
PROT SERPL-MCNC: 7.1 G/DL (ref 6.4–8.4)
RBC # BLD AUTO: 3.63 MILLION/UL (ref 3.88–5.62)
RBC MORPH BLD: NORMAL
SODIUM SERPL-SCNC: 131 MMOL/L (ref 135–147)
WBC # BLD AUTO: 11.18 THOUSAND/UL (ref 4.31–10.16)

## 2023-11-20 PROCEDURE — 85007 BL SMEAR W/DIFF WBC COUNT: CPT | Performed by: INTERNAL MEDICINE

## 2023-11-20 PROCEDURE — 80053 COMPREHEN METABOLIC PANEL: CPT | Performed by: INTERNAL MEDICINE

## 2023-11-20 PROCEDURE — 83735 ASSAY OF MAGNESIUM: CPT | Performed by: INTERNAL MEDICINE

## 2023-11-20 PROCEDURE — 77386 HB NTSTY MODUL RAD TX DLVR CPLX: CPT | Performed by: RADIOLOGY

## 2023-11-20 PROCEDURE — 77014 CHG CT GUIDANCE RADIATION THERAPY FLDS PLACEMENT: CPT | Performed by: RADIOLOGY

## 2023-11-20 PROCEDURE — 85027 COMPLETE CBC AUTOMATED: CPT | Performed by: INTERNAL MEDICINE

## 2023-11-20 PROCEDURE — 77336 RADIATION PHYSICS CONSULT: CPT | Performed by: RADIOLOGY

## 2023-11-20 RX ORDER — OXYCODONE HYDROCHLORIDE 10 MG/1
10 TABLET ORAL EVERY 6 HOURS PRN
Qty: 60 TABLET | Refills: 0 | Status: SHIPPED | OUTPATIENT
Start: 2023-11-20 | End: 2023-11-30 | Stop reason: SDUPTHER

## 2023-11-20 NOTE — PROGRESS NOTES
Port accessed for central labs. Labs drawn without complication and sent. Port flushed per protocol. AVS declined, left unit in stable condition.

## 2023-11-20 NOTE — TELEPHONE ENCOUNTER
Controlled Substance Review    [place] or [place]  reviewed: No red flags were identified; safe to proceed with prescription.   Filled  Written  ID  Drug  QTY  Days  Prescriber  RX #  Dispenser  Refill  Daily Dose*  Pymt Type     11/03/2023 11/02/2023 1 Oxycodone Hcl (Ir) 10 Mg Tab 60.00 15 Ti Lobo [id number] Pen ([id number]) 0 60.00 [place]ME Comm Ins [place]  10/24/2023 10/24/2023 1 Oxycodone Hcl (Ir) 5 Mg Tablet 12.00 2 Rh Thomas [id number] Pen (4386) 0 45.00 [place]  10/20/2023 10/20/2023 1 Oxycodone-Acetaminophen 5-325 10.00 3 Do Rac [id number] Pen (4386) 0 25.00 MME Comm Ins ALEXANDREA Barajas MD  Palliative Medicine & Supportive Care  Internal Medicine  Available via [place] Text  Office: [phone]  Fax: [phone]

## 2023-11-20 NOTE — TELEPHONE ENCOUNTER
Last appointment:    Next scheduled appointment:11/24  [facility] attached  Pharmacy:

## 2023-11-21 ENCOUNTER — HOSPITAL ENCOUNTER (OUTPATIENT)
Dept: INFUSION CENTER | Facility: CLINIC | Age: 58
Discharge: HOME | End: 2023-11-21
Payer: COMMERCIAL

## 2023-11-21 VITALS
RESPIRATION RATE: 18 BRPM | TEMPERATURE: 98.5 F | HEIGHT: 75 IN | DIASTOLIC BLOOD PRESSURE: 77 MMHG | WEIGHT: 139.77 LBS | SYSTOLIC BLOOD PRESSURE: 136 MMHG | HEART RATE: 70 BPM | BODY MASS INDEX: 17.38 KG/M2

## 2023-11-21 DIAGNOSIS — C06.9 ORAL CANCER (HCC): Primary | ICD-10-CM

## 2023-11-21 PROCEDURE — 96367 TX/PROPH/DG ADDL SEQ IV INF: CPT

## 2023-11-21 PROCEDURE — 77427 RADIATION TX MANAGEMENT X5: CPT | Performed by: INTERNAL MEDICINE

## 2023-11-21 PROCEDURE — 96361 HYDRATE IV INFUSION ADD-ON: CPT

## 2023-11-21 PROCEDURE — 77014 CHG CT GUIDANCE RADIATION THERAPY FLDS PLACEMENT: CPT | Performed by: RADIOLOGY

## 2023-11-21 PROCEDURE — 96413 CHEMO IV INFUSION 1 HR: CPT

## 2023-11-21 PROCEDURE — 77386 HB NTSTY MODUL RAD TX DLVR CPLX: CPT | Performed by: RADIOLOGY

## 2023-11-21 RX ORDER — SODIUM CHLORIDE 9 MG/ML
20 INJECTION, SOLUTION INTRAVENOUS ONCE
OUTPATIENT
Start: 2023-12-27

## 2023-11-21 RX ORDER — SODIUM CHLORIDE 9 MG/ML
20 INJECTION, SOLUTION INTRAVENOUS ONCE
Status: CANCELLED | OUTPATIENT
Start: 2023-11-29

## 2023-11-21 RX ORDER — SODIUM CHLORIDE 9 MG/ML
20 INJECTION, SOLUTION INTRAVENOUS ONCE
Status: COMPLETED | OUTPATIENT
Start: 2023-11-21 | End: 2023-11-21

## 2023-11-21 RX ADMIN — SODIUM CHLORIDE 20 ML/HR: 0.9 INJECTION, SOLUTION INTRAVENOUS at 08:51

## 2023-11-21 RX ADMIN — SODIUM CHLORIDE 500 ML: 0.9 INJECTION, SOLUTION INTRAVENOUS at 11:10

## 2023-11-21 RX ADMIN — CISPLATIN 70 MG: 1 INJECTION, SOLUTION INTRAVENOUS at 10:10

## 2023-11-21 RX ADMIN — DEXAMETHASONE SODIUM PHOSPHATE: 10 INJECTION, SOLUTION INTRAMUSCULAR; INTRAVENOUS at 09:01

## 2023-11-21 RX ADMIN — SODIUM CHLORIDE 500 ML: 0.9 INJECTION, SOLUTION INTRAVENOUS at 08:51

## 2023-11-21 RX ADMIN — APREPITANT 130 MG: 130 INJECTION, EMULSION INTRAVENOUS at 09:24

## 2023-11-21 NOTE — PROGRESS NOTES
Pt arrived to unit without complaint. Pt tolerated treatment without incident. AVS declined, but aware of future appts. Pt left unit in stable condition.

## 2023-11-22 ENCOUNTER — OFFICE VISIT (OUTPATIENT)
Dept: HEMATOLOGY ONCOLOGY | Facility: CLINIC | Age: 58
End: 2023-11-22
Payer: COMMERCIAL

## 2023-11-22 ENCOUNTER — PATIENT OUTREACH (OUTPATIENT)
Dept: CASE MANAGEMENT | Facility: OTHER | Age: 58
End: 2023-11-22

## 2023-11-22 ENCOUNTER — NUTRITION (OUTPATIENT)
Dept: NUTRITION | Facility: CLINIC | Age: 58
End: 2023-11-22

## 2023-11-22 VITALS
OXYGEN SATURATION: 98 % | HEART RATE: 72 BPM | RESPIRATION RATE: 18 BRPM | WEIGHT: 143 LBS | HEIGHT: 75 IN | TEMPERATURE: 96.9 F | BODY MASS INDEX: 17.78 KG/M2 | DIASTOLIC BLOOD PRESSURE: 80 MMHG | SYSTOLIC BLOOD PRESSURE: 132 MMHG

## 2023-11-22 DIAGNOSIS — Z71.3 NUTRITIONAL COUNSELING: Primary | ICD-10-CM

## 2023-11-22 DIAGNOSIS — C06.9 ORAL CANCER (HCC): Primary | ICD-10-CM

## 2023-11-22 PROCEDURE — 77014 CHG CT GUIDANCE RADIATION THERAPY FLDS PLACEMENT: CPT | Performed by: INTERNAL MEDICINE

## 2023-11-22 PROCEDURE — 77386 HB NTSTY MODUL RAD TX DLVR CPLX: CPT | Performed by: INTERNAL MEDICINE

## 2023-11-22 PROCEDURE — 99406 BEHAV CHNG SMOKING 3-10 MIN: CPT | Performed by: INTERNAL MEDICINE

## 2023-11-22 PROCEDURE — 99215 OFFICE O/P EST HI 40 MIN: CPT | Performed by: INTERNAL MEDICINE

## 2023-11-22 NOTE — PROGRESS NOTES
Outpatient Oncology Nutrition Consultation   Type of Consult: Follow Up  Care Location: Radiation Oncology    Reason for referral:   Received notification by SAQIB Freeman, on 7/18/23 that pt has triggered for oncology nutrition care (reason for referral: HNC dx and Malnutrition Screening Tool (MST) Triggers: scored a 0 indicating No recent wt loss and is not eating poorly due to a decreased appetite. (Date of MST: 7/18/23)). Received notification by Gifty Mendez, on 10/12/23 that pt has triggered for oncology nutrition care (reason for referral: HNC dx and Malnutrition Screening Tool (MST) Triggers: scored a 3 indicating 24-33# (11-15 kg) recent wt loss and is not eating poorly due to a decreased appetite. (Date of MST: 10/12/23) MST Note: "over the last 4 months "). Nutrition Assessment:   Oncology Diagnosis & Treatments: Oral Cancer  Started concurrent chemoradiation (weekly cisplatin &  RT to the oral cavity and BL necks started 11/14/23)   Oncology History   Oral cancer ([address])   5/23/2023 Initial Diagnosis    Oral cancer ([address])     8/29/2023 -  Cancer Staged    Staging form: Oral Cavity, AJCC 8th Edition  - Clinical stage from 8/29/2023: Stage CHETAN (cT4a, cN2c, cM0) - Signed by Jason Porter MD on 9/13/2023  Stage prefix: Initial diagnosis       8/29/2023 Biopsy    A.  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, p16 positive      Chemotherapy    alteplase (CATHFLO), 2 mg, Intracatheter, Every 1 Minute as needed, 0 of 7 cycles  CISplatin (PLATINOL) infusion, 40 mg/m2, Intravenous, Once, 0 of 7 cycles  aprepitant (CINVANTI) in  mL IVPB, 130 mg, Intravenous, Once, 0 of 7 cycles         Past Medical & Surgical Hx:   Patient Active Problem List   Diagnosis    Dental caries    Cervical lymphadenopathy    Periodontitis    Alcohol use disorder, severe, dependence ([address])    Hypertension Cigarette nicotine dependence without complication    Hyponatremia    Hypokalemia    Hypomagnesemia    Oral cancer (HCC)    Preoperative clearance    Drug induced constipation    Cancer-related pain    At risk for respiratory depression due to opioid    Pain in lower jaw    Counseling regarding advanced care planning and goals of care    History of CVA (cerebrovascular accident)    Moderate protein-calorie malnutrition ([address])    Alcoholic intoxication without complication ([address])    Port-A-Cath in place     Past Medical History:   Diagnosis Date    Alcohol abuse     Cancer ([address])     Hypertension     Muscle weakness     Left leg    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    Stroke [facility])      Past Surgical History:   Procedure Laterality Date    EGD      IR PORT PLACEMENT  11/7/2023    KNEE ARTHROSCOPY Left     MULTIPLE TOOTH EXTRACTIONS N/A 10/16/2023    Procedure: EXTRACTION OF ALL REMAINING TEETH;  Surgeon: Trenton Valdes DMD;  Location: [facility];  Service: Oral Surgery       Review of Medications:   Vitamins, Supplements and Herbals: No, pt denies taking supplements    Current Outpatient Medications:     aspirin 81 mg chewable tablet, Chew 1 tablet (81 mg total) daily (Patient not taking: Reported on 11/22/2023), Disp: 90 tablet, Rfl: 0    atorvastatin (LIPITOR) 40 mg tablet, Take 1 tablet (40 mg total) by mouth every evening (Patient not taking: Reported on 11/22/2023), Disp: 30 tablet, Rfl: 0    chlorhexidine (PERIDEX) 0.12 % solution, , Disp: , Rfl:     clopidogrel (Plavix) 75 mg tablet, Take 1 tablet (75 mg total) by mouth daily for 21 days (Patient not taking: Reported on 11/22/2023), Disp: 21 tablet, Rfl: 0    diphenhydramine, lidocaine, Al/Mg hydroxide, simethicone (Magic Mouthwash) SUSP, Swish and swallow 10 mL every 4 (four) hours as needed for mouth pain or discomfort, Disp: 237 mL, Rfl: 7    gabapentin (NEURONTIN) 100 mg capsule, , Disp: , Rfl:     ibuprofen (MOTRIN) 600 mg tablet, Take 1 tablet (600 mg total) by mouth every 6 (six) hours as needed for mild pain, Disp: 30 tablet, Rfl: 0    naloxone (NARCAN) 4 mg/0.1 mL nasal spray, Administer 1 spray into a nostril. If no response after 2-3 minutes, give another dose in the other nostril using a new spray. (Patient not taking: Reported on 11/22/2023), Disp: 1 each, Rfl: 1    oxyCODONE (ROXICODONE) 10 MG TABS, Take 1 tablet (10 mg total) by mouth every 6 (six) hours as needed for severe pain Max Daily Amount: 40 mg, Disp: 60 tablet, Rfl: 0    oxyCODONE-acetaminophen (PERCOCET) 5-325 mg per tablet, Take 1 tablet by mouth every 4 to 6 hours as needed for pain, Disp: , Rfl:     polyethylene glycol (MIRALAX) 17 g packet, Take 17 g by mouth daily, Disp: 30 each, Rfl: 0    senna-docusate sodium (SENOKOT-S) 8.6-50 mg per tablet, Take 1 tablet by mouth daily, Disp: 60 tablet, Rfl: 2  No current facility-administered medications for this visit.     Facility-Administered Medications Ordered in Other Visits:     alteplase (CATHFLO) injection 2 mg, 2 mg, Intracatheter, Q1MIN PRN, Gretel La MD    alteplase (CATHFLO) injection 2 mg, 2 mg, Intracatheter, Q1MIN PRN, Gretel La MD    Most Recent Lab Results:   Lab Results   Component Value Date    WBC 11.18 (H) 11/20/2023    NEUTROABS 3.17 08/04/2023    CHOLESTEROL 141 11/04/2023    TRIG 75 11/04/2023    HDL 59 11/04/2023    LDLCALC 67 11/04/2023    ALT 14 11/20/2023    AST 13 11/20/2023    ALB 3.7 11/20/2023    SODIUM 131 (L) 11/20/2023    SODIUM 129 (L) 11/14/2023    K 4.0 11/20/2023    K 3.8 11/14/2023    CL 97 11/20/2023    BUN 12 11/20/2023    BUN 8 11/14/2023    CREATININE 0.51 (L) 11/20/2023    CREATININE 0.77 11/14/2023    EGFR 116 11/20/2023    GLUCOSE 93 12/11/2016    POCGLU 126 11/06/2023    GLUF 87 10/16/2023    GLUC 82 11/20/2023    HGBA1C 5.9 (H) 11/04/2023    CALCIUM 9.5 11/20/2023    MG 1.8 (L) 11/20/2023       Anthropometric Measurements:   Height: 75"  Ht Readings from Last 1 Encounters:   11/22/23 6' 3" (1.905 m)     Wt Readings from Last 20 Encounters:   11/22/23 64.9 kg (143 lb)   11/21/23 63.4 kg (139 lb 12.4 oz)   11/15/23 62.3 kg (137 lb 5.6 oz)   11/12/23 59.8 kg (131 lb 13.4 oz)   11/04/23 66.7 kg (147 lb)   11/02/23 64.3 kg (141 lb 12.8 oz)   10/31/23 64 kg (141 lb)   10/16/23 66.7 kg (147 lb)   10/12/23 66.7 kg (147 lb)   10/11/23 64.9 kg (143 lb)   08/29/23 72.6 kg (160 lb)   08/04/23 72.6 kg (160 lb)   05/23/23 66.2 kg (146 lb)   03/05/23 71.3 kg (157 lb 3 oz)   02/22/23 70.3 kg (155 lb)   06/18/19 66.7 kg (147 lb)   01/05/17 79.4 kg (175 lb)   04/25/16 79.4 kg (175 lb)     Weight History:   Usual Weight: 170-175# (July 2023)  Varian: (11/16/23) 141.8#, (11/20/23) 140#    Oncology Nutrition-Anthropometrics      Flowsheet Row Nutrition from 11/22/2023 in [facility] Nutrition from 11/15/2023 in [facility]   Patient age (years): 59 years 59 years   Patient (male) height (in): 76 in 76 in   Current weight (lbs): 140 lbs 137.4 lbs   Current weight to be used for anthropometric calculations (kg) 63.6 kg 62.5 kg   BMI: 17.5 17.2   IBW male 196 lb 196 lb   IBW (kg) male 89.1 kg 89.1 kg   IBW % (male) 71.4 % 70.1 %   Adjusted BW (male): 182 lbs 181.4 lbs   Adjusted BW in kg (male): 82.7 kg 82.5 kg   % weight change after 1 week: 1.9 % --   Weight change after 1 week (lbs) 2.6 lbs --   % weight change after 1 month: -0.7 % -6.5 %   Weight change after 1 month (lbs) -1 lbs -9.6 lbs   % weight change after 3 months: -12.5 % -14.1 %   Weight change after 3 months (lbs) -20 lbs -22.6 lbs   % weight change after 6 months: -4.1 % -5.9 %   Weight change after 6 months (lbs) -6 lbs -8.6 lbs          Nutrition-Focused Physical Findings: none observed    Food/Nutrition-Related History & Client/Social History:    Current Nutrition Impact Symptoms:  [x] Nausea a little after chemo [x] Reduced Appetite  [] Acid Reflux [] Vomiting  [x] Unintended Wt Loss - ~30# wt loss in the past 3 mo d/t poor dentition and oral pain per pt [] Malabsorption    [] Diarrhea  [] Unintended Wt Gain  [] Dumping Syndrome    [] Constipation  [] Thick Mucous/Secretions  [] Abdominal Pain    [] Dysgeusia (Altered Taste)  [] Xerostomia (Dry Mouth)  [] Gas    [] Dysosmia (Altered Smell)  [] Thrush  [x] Difficulty Chewing - pt reports he recently had all of his teeth removed    [x] Oral Mucositis (Sore Mouth)  [x] Fatigue  [] Hyperglycemia   Lab Results   Component Value Date    HGBA1C 5.9 (H) 2023      [] Odynophagia  [] Esophagitis  [] Other:    [] Dysphagia  [] Early Satiety  [] No Problems Eating      Food Allergies & Intolerances: no    Current Diet: Soft/Moist - room temp foods d/t oral pain  Current Nutrition Intake: Unchanged from last visit  Appetite: Good, Fair   Nutrition Route: PO - not interested in feeding tube  Oral Care:  rx mouthwash BID, brushing tongue 2-3x/day; had all of teeth removed.  Plans to  MMW  Activity level: not working during tx    24 Hr Diet Recall:   Breakfast: 1 container of yogurt  Snack: maybe some jello  Lunch: yogurt, applesauce or chicken and rice  Snack: none  Dinner: mac and cheese, hot dogs cut in small pieces and baked beans  Snack: ice cream    Beverages: water (not a water drinker), apple juice (8 oz x4), chocolate whole milk (8 oz x2), Gatorade (20 oz x1-2), beer (still drinking about 1 beer daily)  Supplements:   Milkshakes - once in a while - milk, ice cream, banana      Oncology Nutrition-Estimated Needs      Flowsheet Row Nutrition from 10/31/2023 in [address] [place] [address] [facility]   Weight type used IBW   Weight in kilograms (kg) used for estimated needs 89.1 kg   Energy needs formula:  30-35 kcal/kg   Energy needs based on 30 kcal/k kcal   Energy needs based on 35 kcal/k kcal   Protein needs formula: 1.2-1.5 g/kg   Protein needs based on 1.2 g/k g Protein needs based on 1.5 g/kg 134 g   Fluid needs formula: 30-35 mL/kg   Fluid needs based on 30 mL/kg 2673 mL   Fluid needs in ounces 90 oz   Fluid needs based on 35 mL/kg 3119 mL   Fluid needs in ounces 105 oz             Discussion & Intervention:   Kaushik Montague was evaluated today for an RD follow up regarding HNC. Kaushik Montague is currently undergoing tx for HNC . Met with Kaushik Montague after RT today. He reports doing OK. His wt is down 1.8# since last week. He reports eating soft/moist foods and liquids. He does not like Ensure/Boost, but was willing to try Ensure clear- provided samples today. Encouraged small/frequent meals, reviewed calorie and protein dense soft foods. Encouraged milkshakes/smoothies. He plans to  MMW today. He also plans to  Eggnog and start drinking that. Reviewed 24 hour recall, which revealed an inadequate po intake, and discussed ways to increase kcal, protein, and fluid intakes and optimize nutrient intake. Also reviewed the importance of wt management throughout the tx process and the role of a high kcal/ high protein diet in managing wt and overall health.   Based on today's assessment, discussion included: MNT for: wt loss, oral pain, soft/moist food, how to modify foods for anticipated nutrition impact symptoms pt may experience during CA tx, practicing proper oral care, a high kcal/protein diet & food choices to include at all meals & snacks (Examples of high kcal foods: cheese, full-fat dairy products, nut butter, plant-based fats, coconut oil/milk, avocado, butter, cream soup, etc. Examples of high protein foods: eggs, chicken, fish, beans/legumes, nuts/nut butters, bone broth, etc.) , fortifying foods for added kcal and protein (examples include: adding cheese to foods such as eggs, mashed potatoes, casseroles, etc.; Making oatmeal with whole milk rather than water; Making fortified mashed potatoes with cream, butter, dry milk powder, plain [place] yogurt, and cheese.), adequate hydration & fluid choices, sipping on calorie containing beverages (examples include: adding whole milk or cream to coffee, oral nutrition supplements, juice, electrolyte replacement beverages, milk, etc.), eating smaller more frequent meals every 2-3 hours (5-6 small meals/day), having consistent and planned snacks between meals, trying other oral nutrition supplements, utilizing oral nutrition supplements, and recipe suggestions/resources, trying new recipes, & cooking at home more often. Moving forward, Jesse Ambrosio was encouraged to increase kcal, protein, and fluid intakes and practice MNT for nutrition impact sx management. Materials Provided: Ensure samples and Ensure Coupons, ( Ensure Clear samples )  All questions and concerns addressed during today’s visit. Jesse Ambrosio has RD contact information. Nutrition Diagnosis:   Inadequate Energy Intake related to physiological causes, disease state and treatment related issues as evidenced by food recall, wt loss and discussion with pt and/or family. Increased Nutrient Needs (kcal & pro) related to increased demand for nutrients and disease state as evidenced by cancer dx and pt undergoing tx for cancer. Altered GI Function related to alteration in GI tract structure and integrity as evidenced by Unintended Weight Loss and Oral Mucositis (Sore Mouth). Patient has clinical indicators (or ASPEN criteria) consistent with severe protein-calorie malnutrition in the context of Chronic Illness as evidenced by >7.5% wt loss in 3 months and </=75% energy intake vs. Estimated needs for >/=1 month. Monitoring & Evaluation:   Goals:  weight maintenance/stabilization  adequate nutrition impact symptom management  pt to meet >/=75% estimated nutrition needs daily    Progress Towards Goals: Progressing    Nutrition Rx & Recommendations:  Diet: Very Soft/Moist, High Calorie, High Protein Diet  Small, frequent meals/snacks may be easier to tolerate than 3 large daily meals. Aim for 5-6 small meals per day (every 2-3 hours). Include protein at all meals/snacks. Stay hydrated by sipping fluids of choice/tolerance throughout the day. Liquid nutrition may be better tolerated than solids at times. Refer to Eating Hints book for other meal/snack ideas and symptom management. Avoid spicy, acidic, sharp/hard/crunchy foods & carbonated beverages as needed. Follow proper oral care; Try baking soda/salt water rinse recipe (mix 3/4 tsp salt + 1 tsp baking soda + 1 qt water; rinse with plain water after using) in Eating Hints book (pg 18). Brush your teeth before/after meals & before bed. For weight loss: monitor your weight at home at least 2x/week, record your weight, start by adding 250-500 extra calories per day, eat 5-6 small scheduled meals every 2-3 hrs, choose foods that are high in protein and calories (see pages 49-53 in your Eating Hints book), drink liquids with calories for example: milkshakes/smoothies/juice/soup/whole milk/chocolate milk, cook with protein fortified milk (see recipe on page 36 in your Eating Hints book), consider ready-to-drink oral nutrition supplements such as Ensure Plus, Ensure Enlive, Boost Plus, or Boost Very High Calorie, avoid "diet" and "light" foods when possible, avoid drinking too much with meals, contact your dietitian with any continued weight loss over the course of 1 week. For more info see pages 35-37 in your Eating Hints book. Weigh yourself regularly. If you notice weight loss, make an effort to increase your daily food/calorie intake. If you continue to notice loss after these efforts, reach out to your dietitian to establish a plan to stabilize weight. Try fortified mashed potatoes for more calories and protein: make mashed potatoes with whole milk/half and half/cream, extra butter, plain whole milk [place] yogurt (gives a sour cream taste but with protein), cheese, and dry milk powder.   Make hot cereal with whole milk rather than water  Try whole [employer] milk  Try Ensure clear  Try homemade milkshakes/smoothies and incorporate 2 daily  Soft/easy to swallow foods that are high in calories and protein: casseroles, chicken/egg/tuna salad with extra ball to add calories and moisture, oatmeal/cream of wheat made with whole milk, cottage cheese, whole milk [place] yogurt, scrambled eggs with cheese, avocado, macaroni and cheese, mashed potatoes made with butter and whole milk or dry milk powder, ground meat with extra sauce/gravy, canned fruit, peanut butter, cream soups (cream of chicken, cream of mushroom, broccoli cheddar), pudding made with whole milk, custard, ice cream, banana, milkshakes/smoothies, Review page 52 of Eating Hints Book for more ideas.    Use whole milk greek yogurt    Follow Up Plan:  12/6/23 during infusion  Recommend Referral to Other Providers: none at this time

## 2023-11-22 NOTE — PROGRESS NOTES
OSW performed chart review. Pt has been in contact with Abdirizak Lunsford, as he is receiving his treatment in [facility]. He has her contact information if any needs present. OSW will close the referral at this time.

## 2023-11-22 NOTE — PATIENT INSTRUCTIONS
Nutrition Rx & Recommendations:  Diet: Very Soft/Moist, High Calorie, High Protein Diet  Small, frequent meals/snacks may be easier to tolerate than 3 large daily meals. Aim for 5-6 small meals per day (every 2-3 hours). Include protein at all meals/snacks. Stay hydrated by sipping fluids of choice/tolerance throughout the day. Liquid nutrition may be better tolerated than solids at times. Refer to Eating Hints book for other meal/snack ideas and symptom management. Avoid spicy, acidic, sharp/hard/crunchy foods & carbonated beverages as needed. Follow proper oral care; Try baking soda/salt water rinse recipe (mix 3/4 tsp salt + 1 tsp baking soda + 1 qt water; rinse with plain water after using) in Eating Hints book (pg 18). Brush your teeth before/after meals & before bed. For weight loss: monitor your weight at home at least 2x/week, record your weight, start by adding 250-500 extra calories per day, eat 5-6 small scheduled meals every 2-3 hrs, choose foods that are high in protein and calories (see pages 49-53 in your Eating Hints book), drink liquids with calories for example: milkshakes/smoothies/juice/soup/whole milk/chocolate milk, cook with protein fortified milk (see recipe on page 36 in your Eating Hints book), consider ready-to-drink oral nutrition supplements such as Ensure Plus, Ensure Enlive, Boost Plus, or Boost Very High Calorie, avoid "diet" and "light" foods when possible, avoid drinking too much with meals, contact your dietitian with any continued weight loss over the course of 1 week. For more info see pages 35-37 in your Eating Hints book. Weigh yourself regularly. If you notice weight loss, make an effort to increase your daily food/calorie intake. If you continue to notice loss after these efforts, reach out to your dietitian to establish a plan to stabilize weight.   Try fortified mashed potatoes for more calories and protein: make mashed potatoes with whole milk/half and half/cream, extra butter, plain whole milk [place] yogurt (gives a sour cream taste but with protein), cheese, and dry milk powder. Make hot cereal with whole milk rather than water  Try whole [employer]life milk  Try Ensure clear  Try homemade milkshakes/smoothies and incorporate 2 daily  Soft/easy to swallow foods that are high in calories and protein: casseroles, chicken/egg/tuna salad with extra ball to add calories and moisture, oatmeal/cream of wheat made with whole milk, cottage cheese, whole milk [place] yogurt, scrambled eggs with cheese, avocado, macaroni and cheese, mashed potatoes made with butter and whole milk or dry milk powder, ground meat with extra sauce/gravy, canned fruit, peanut butter, cream soups (cream of chicken, cream of mushroom, broccoli cheddar), pudding made with whole milk, custard, ice cream, banana, milkshakes/smoothies, Review page 52 of Eating Hints Book for more ideas.    Use whole milk greek yogurt    Follow Up Plan: 12/6/23 during infusion  Recommend Referral to Other Providers: none at this time

## 2023-11-24 ENCOUNTER — OFFICE VISIT (OUTPATIENT)
Dept: PALLIATIVE MEDICINE | Facility: CLINIC | Age: 58
End: 2023-11-24
Payer: COMMERCIAL

## 2023-11-24 ENCOUNTER — CLINICAL SUPPORT (OUTPATIENT)
Dept: PALLIATIVE MEDICINE | Facility: CLINIC | Age: 58
End: 2023-11-24
Payer: COMMERCIAL

## 2023-11-24 VITALS
DIASTOLIC BLOOD PRESSURE: 76 MMHG | BODY MASS INDEX: 17.1 KG/M2 | OXYGEN SATURATION: 99 % | TEMPERATURE: 96.9 F | WEIGHT: 136.8 LBS | HEART RATE: 64 BPM | SYSTOLIC BLOOD PRESSURE: 122 MMHG

## 2023-11-24 DIAGNOSIS — G89.3 CANCER-RELATED PAIN: Primary | ICD-10-CM

## 2023-11-24 DIAGNOSIS — Z71.89 COUNSELING AND COORDINATION OF CARE: Primary | ICD-10-CM

## 2023-11-24 DIAGNOSIS — F10.20 ALCOHOL USE DISORDER, SEVERE, DEPENDENCE (HCC): ICD-10-CM

## 2023-11-24 DIAGNOSIS — Z91.89 AT RISK FOR RESPIRATORY DEPRESSION DUE TO OPIOID: ICD-10-CM

## 2023-11-24 DIAGNOSIS — K59.03 DRUG INDUCED CONSTIPATION: ICD-10-CM

## 2023-11-24 DIAGNOSIS — R68.84 PAIN IN LOWER JAW: ICD-10-CM

## 2023-11-24 PROCEDURE — 99214 OFFICE O/P EST MOD 30 MIN: CPT | Performed by: INTERNAL MEDICINE

## 2023-11-24 PROCEDURE — 99024 POSTOP FOLLOW-UP VISIT: CPT | Performed by: COUNSELOR

## 2023-11-24 NOTE — PROGRESS NOTES
Palliative and [address] 59 y.o. male [id number]    Assessment/Plan:  1. Cancer-related pain    2. Alcohol use disorder, severe, dependence ([address])    3. At risk for respiratory depression due to opioid    4. Pain in lower jaw    5. Drug induced constipation      Symptoms - stable cancer pain in the R lower jaw/neck/face area; some difficulty swallowing; poor appetite  Continue oxyIR 10mg PO q6H prn severe pain. Max of only 4 a day. He will call office if with increased pain  Will only supply 2 week supply at a time for safety  He said he only drank 1 beer last night  Continue tylenol 1000mg PO q8H ATC. Max of only 3000mg in 24H  Dangers of drinking alcohol with oxycodone (can be fatal) and tylenol (can damage liver) explained prior  Narcan prn provided  He was also prescribed magic mouthwash by oncology but he has not picked it up yet. I encouraged him to use it as well but he is hesitant to do so, doubtful of its efficacy and he has no one to pick it up for him from [facility] in [facility]. He does not want this sent to a closer pharmacy because of the copay. Senokot-S daily to prevent OIC  Encouraged to take ensure 2-3 a day. Mix with ice cream, fruits to make it more palatable. He already has a onc nut consult    ACP/GOC/Support  He is a . No children. Lives with his girlfriend and gf's son. His father lives in [facility] and sister lives in another state. [place] Act 169 explained in terms of hierarchy in surrogate medical decision making on initial visit. In his case, it will be his father first, sister next, all other relatives next and then girlfriend after all his other living relatives. DEBORAH ACP documents explained in detail. Encouraged to read and complete on his next visit.    He met with [facility] SW today, please refer to her separate documentation for more details    Follow up  RTO in 3 weeks, call sooner if needed    Controlled Substance Review    [place]MP or [place]  reviewed: No red flags were identified; safe to proceed with prescription. Maxi [place]  Written  ID  Drug  QTY  Days  Prescriber  RX #  Dispenser  Refill  Daily Dose*  Pymt Type      11/20/2023 11/20/2023 1 Oxycodone Hcl (Ir) 10 Mg Tab 60.00 15 Ti Lobo [id number] Pen ([id number]) 0 60.00 [place]   11/03/2023 11/02/2023 1 Oxycodone Hcl (Ir) 10 Mg Tab 60.00 15 Ti Lobo [id number] Pen ([id number]) 0 60.00 [place]   10/24/2023 10/24/2023 1 Oxycodone Hcl (Ir) 5 Mg Tablet 12.00 2 Rh Thomas [id number] Pen ([id number]) 0 45.00 [place]   10/20/2023 10/20/2023 1 Oxycodone-Acetaminophen 5-325 10.00 3 Do Rac [id number] Pen ([id number]) 0 25.00 [place]       Requested Prescriptions      No prescriptions requested or ordered in this encounter     Medications Discontinued During This Encounter   Medication Reason    oxyCODONE-acetaminophen (PERCOCET) 5-325 mg per tablet Therapy completed    gabapentin (NEURONTIN) 100 mg capsule      Representatives have queried the patient's controlled substance dispensing history in the Prescription Drug Monitoring Program in compliance with regulations before I have prescribed any controlled substances. The prescription history is consistent with prescribed therapy and our practice policies. 20 minutes were spent face to face with Ana Rowell with greater than 50% of the time spent in counseling or coordination of care including discussions of etiology of diagnosis, pathogenesis of diagnosis, diagnostic results, impression, and recommendations, risks and benefits of treatment, instructions for disease self management, treatment instructions, follow up requirements, risk factors and risk reduction of disease, patient and family counseling/involvement in care, compliance with treatment regimen, and advanced care planning. All of the patient's questions were answered during this discussion. No follow-ups on file.     Subjective:   Chief Complaint  Follow up visit for:  symptom management, goal of care assessment and decisional support, disease process education and discussion of prognosis, advance care planning, emotional support in the setting of serious illness  JEREMY Tello is a 59 y.o. 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 PET-CT on 6/19/2023 showed Hypermetabolic right anterior oral cavity mass most concerning for malignancy. Bilateral hypermetabolic cervical adenopathy compatible with metastases. No hypermetabolic metastases in the chest abdomen pelvis. Multiple healing left rib fractures. He was admitted in the hospital on 11/3 to 11/6 because of an acute R pontine infarct. He then again was admitted 11/11 to 11/13 due to lightheadedness thought related to possible alcohol intoxicataion +/- opioids for cancer pain. Received hydration and d/c. He is now on concurrent cisplatin + R since 11/15. Known to palliative medicine for supportive cares. Of note, he is a heavy alcohol drinker and still admits to drinking daily, but has cut back down significantly enough for him as evidenced by his UDS. Prior to consult with palliative medicine, he was drinking more alcohol than his usual baseline to help with his cancer pain from the growing tumor in his lower jaw. His initial UDS from the office from 11/2 only showed tramadol and alcohol. He returns today for follow up. Last seen in the hospital. He looks like he is stable and doing well. He feels some improvement at least in the bleeding from inside his mouth from the tumor. His pain is also slightly better, though still there and will likely get worse. Overall, controlled with 4 a day (very 6H prn) oxycodone. No changes. He is moving his bowels with miralax from the hospital. I discussed with him the magic mouthwash, note as above. When asked about his drinking, he said he only had 1 beer last night.  Had a good thanksgiving, though he was limited with the food he can tolerate. ACP/GOC/Support: on initial consult: He said he wants to continue to live, so is determined to proceed with recommended treatments. He is a . No children. Lives with his girlfriend and gf's son. His father lives in [place] and sister lives in another state. [place] Act 169 explained in terms of hierarchy in surrogate medical decision making. In his case, it will be his father first, sister next, all other relatives next and then girlfriend after all his other living relatives. DEBORAH ACP documents explained in detail. Encouraged to read and complete on his next visit. He works in [employer]. The following portions of the medical history were reviewed: past medical history, problem list, medication list, and social history.     Current Outpatient Medications:     aspirin 81 mg chewable tablet, Chew 1 tablet (81 mg total) daily, Disp: 90 tablet, Rfl: 0    atorvastatin (LIPITOR) 40 mg tablet, Take 1 tablet (40 mg total) by mouth every evening, Disp: 30 tablet, Rfl: 0    oxyCODONE (ROXICODONE) 10 MG TABS, Take 1 tablet (10 mg total) by mouth every 6 (six) hours as needed for severe pain Max Daily Amount: 40 mg, Disp: 60 tablet, Rfl: 0    senna-docusate sodium (SENOKOT-S) 8.6-50 mg per tablet, Take 1 tablet by mouth daily, Disp: 60 tablet, Rfl: 2    chlorhexidine (PERIDEX) 0.12 % solution, , Disp: , Rfl:     clopidogrel (Plavix) 75 mg tablet, Take 1 tablet (75 mg total) by mouth daily for 21 days (Patient not taking: Reported on 11/22/2023), Disp: 21 tablet, Rfl: 0    diphenhydramine, lidocaine, Al/Mg hydroxide, simethicone (Magic Mouthwash) SUSP, Swish and swallow 10 mL every 4 (four) hours as needed for mouth pain or discomfort (Patient not taking: Reported on 11/24/2023), Disp: 237 mL, Rfl: 7    ibuprofen (MOTRIN) 600 mg tablet, Take 1 tablet (600 mg total) by mouth every 6 (six) hours as needed for mild pain (Patient not taking: Reported on 11/24/2023), Disp: 30 tablet, Rfl: 0 naloxone (NARCAN) 4 mg/0.1 mL nasal spray, Administer 1 spray into a nostril. If no response after 2-3 minutes, give another dose in the other nostril using a new spray. (Patient not taking: Reported on 11/22/2023), Disp: 1 each, Rfl: 1    polyethylene glycol (MIRALAX) 17 g packet, Take 17 g by mouth daily (Patient not taking: Reported on 11/24/2023), Disp: 30 each, Rfl: 0  No current facility-administered medications for this visit. Review of Systems   Constitutional:  Positive for appetite change. Negative for activity change, fatigue and unexpected weight change. HENT:  Positive for trouble swallowing. Negative for mouth sores, sore throat and voice change. Pain in the R neck, R lower jaw radiating to R side of face/temple. Pain in the R neck gets aggravated with swallowing. Pain is overall stable, well controlled with pain regimen. He denies pain in the throat when swallowing. Respiratory:  Negative for shortness of breath. Cardiovascular:  Negative for chest pain. Gastrointestinal:  Negative for abdominal pain, constipation, diarrhea, nausea and vomiting. Musculoskeletal:  Negative for back pain. Neurological:  Negative for weakness. Psychiatric/Behavioral:  Negative for sleep disturbance. All other systems negative    Objective:  Vital Signs  /76 (BP Location: Right arm, Patient Position: Sitting, Cuff Size: Standard)   Pulse 64   Temp (!) 96.9 °F (36.1 °C) (Temporal)   Wt 62.1 kg (136 lb 12.8 oz)   SpO2 99%   BMI 17.10 kg/m²    Physical Exam    Constitutional: Appears well-developed and well-nourished. Thin, chronically ill looking, not toxic appearing. Pleasant. Comfortable. In no acute physical or emotional distress. Head: Normocephalic and atraumatic. Mouth: tumor in the R lower area, R side of tongue appears white from the tumor, no bleeding noted. No ulcerations noted elsewhere  Eyes: EOM are normal. No ocular discharge. No scleral icterus.    Neck: (+) fixed firm rubbery mass in the R lower jaw, stable in size. Respiratory: Effort normal. No stridor. No respiratory distress. Gastrointestinal: No abdominal distension. Musculoskeletal: No edema. Neurological: Alert, oriented and appropriately conversant. Walked with a cane  Skin: No diaphoresis, no rashes seen on exposed areas of skin. Psychiatric: Displays a normal mood and affect.  Behavior, judgement and thought content appear normal.     Andrey Johnson MD  Palliative Medicine & Supportive Care  Internal Medicine  Available via [place] Text  Office: [phone]  Fax: [phone]

## 2023-11-24 NOTE — PROGRESS NOTES
Palliative Supportive Care MSW met with patient to continue to provide emotional support and guidance. Updated biopsychosocial information relevant to support: MSW introduced self and role of Palliative SW to the patient. The patient was hesitant to talk and expressed "you're not going to get much out of me."  MSW explained that these encounters are for supportive measures and reviewed our services at length. The patient expressed that he watched the [place] [employer] yesterday. The patient is still employed as a  in the warmer months, and construction in the colder months. The patient voiced frustration as he has not worked in 11 weeks and is "borrowing money."  The patient had a stroke 3 weeks ago and is currently recovering from that. MSW inquired on if the patient was receiving support from [facility] and the patient stated that "there's nothing they can do for me."  The patient expressed that the process of applying for SSD is too much. MSW inquired on supports within the patients life and the patient stated that his wife was a big support but that she passed away 6 years ago from Lupus. The patient has his father, Darlene Bryan, who lives in [place] is the patient's main support aside from friends. The patient states that he was supposed to visit him this weekend but remembered that he had this appointment. The patient plans on working on his car this weekend to address his wheel bering's. Identified areas of need include: Follow-up on ACP documentation. Provided to the patient and reviewed at length. The patient expressed that he would identify his father, Darlene Bryan, as his health care representative. The patient is going to take it home to review/look over. Resources provided: MSW contact card. Areas that need future follow-up include: ACP documentation/Ongoing Emotional Support.     I have spent 30 minutes with Patient  today in which greater than 50% of this time was spent in counseling/coordination of care     Palliative MSW will follow-up as requested by patient, family, and primary team.  Please contact with any specific requests

## 2023-11-26 NOTE — PROGRESS NOTES
Telemedicine consent    Patient: Simeon Gabriel  Provider: Kathya Gordon MD  Provider located at [address] [place] [id number]    The patient was identified by name and date of birth. Simeon Gabriel was informed that this is a telemedicine visit and that the visit is being conducted through Telephone. My office door was closed. No one else was in the room. He acknowledged consent and understanding of privacy and security of the video platform. The patient has agreed to participate and understands they can discontinue the visit at any time. Patient is aware this is a billable service. Hematology Outpatient Office Note    Date of Service: 12/6/2023    [facility]ATOLOG[facility] [address]  [phone]    Reason for Consultation:   No chief complaint on file. Stage of cancer: CHETAN  Treatment goal is cure    Referral Physician: No ref. provider found    Primary Care Physician:  Ann Lucio MD       ASSESSMENT & PLAN      Diagnosis ICD-10-CM Associated Orders   1. Oral cancer [facility])  C06.9             This is a 59 y.o. 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 None of the visualized lymph nodes appear enlarged in size. No suspicious mass visualized on noncontrast imaging. 6/19/2023 PET/CT: Hypermetabolic right anterior oral cavity mass most concerning for malignancy. Bilateral hypermetabolic cervical adenopathy compatible with metastases. No hypermetabolic metastases in the chest abdomen pelvis.  Multiple healing left rib fractures. Supportive care: Zofran, EMLA    Discussion of decision making    I personally reviewed the following lab results, the image studies, pathology, other specialty/physicians consult notes and recommendations, and outside medical records. I had a lengthy discussion with the patient and shared the work-up findings. We discussed the diagnosis and management plan as below. I spent 42 minutes reviewing the records (labs, clinician notes, outside records, medical history, ordering medicine/tests/procedures, interpreting the imaging/labs previously done) and coordination of care as well as direct time with the patient today, of which greater than 50% of the time was spent in counseling and coordination of care with the patient/family. Plan/Labs  F/u ENT, surgery not being pursued upfront  F/u Rad Onc  Infusion chairs for weekly Cisplatin 40mg/m2, C4 coming up in 2 weeks (7 treatments planned)  Restaging PET/CT after RT/Chemo completed    I discussed the risks of ongoing cigarette smoking and reviewed the benefits of quitting and risk of new lung primary, heart disease, stroke, among other risks and cancers. Reviewed options including support, quit date, medications, and family support. Patient voiced understanding and willingness to try to quit. Patient was told to notify [facility] family practitioner for additional management. Greater than 3 minutes were needed for discussion of tobacco dependence and quitting counselling. Follow Up: q 3 weeks for systemic chemo scheduled 1/2/2024    All questions were answered to the patient's satisfaction during this encounter. The patient knows the contact information for our office and knows to reach out for any relevant concerns related to this encounter.  They are to call for any temperature 100.4 or higher, new symptoms including but not restricted to shaking chills, decreased appetite, nausea, vomiting, diarrhea, increased fatigue, shortness of breath or chest pain, confusion, and not feeling the strength to come to the clinic. For all other listed problems and medical diagnosis in their chart - they are managed by PCP and/or other specialists, which the patient acknowledges. Thank you very much for your consultation and making us a part of this patient's care. We are continuing to follow closely with you. Please do not hesitate to reach out to me with any additional questions or concerns. Roosevelt Carpenter MD  Hematology & Medical Oncology Staff Physician             Disclaimer: This document was prepared using [employer] technology. If a word or phrase is confusing, or does not make sense, this is likely due to recognition error which was not discovered during this clinician's review. If you believe an error has occurred, please contact me through [facility] line for yamini? cation. ONCOLOGIC HISTORY OF PRESENT ILLNESS     His general dentist saw him on May 16 and appreciated a large oral cavity lesion which was concerning for malignancy and he is thus referred to us for further management. Clotting History None   Bleeding History None   Cancer History Oral cavity suspected   Family Cancer History Dad (skin), Mom (breast)   H/O Blood/Plt Transfusion None   Tobacco/etoh/drug abuse 1 PPD x  45 years, beer (6 pack a day), no rec drug use       Cancer Screening history No colon cancer screening   Occupation Landscape work         SUBJECTIVE  (INTERVAL HISTORY)        I have reviewed the relevant past medical, surgical, social and family history. I have also reviewed allergies and medications for this patient. Having harder time swallowing, some pain. Going for a feeding tube on Monday. He has preserved strength and energy. He has intermittent LH increasing. Hasn't been able to work.     Review of Systems    Baseline weight: 145-150 lbs    Denies F/C, N/V, SOB, CP, HA, rash, itching, gen weakness, melena, hematuria, hematochezia, falls, diarrhea, or constipation       A 10-point review of system was performed, pertinent positive and negative were detailed as above. Otherwise, the 10-point review of system was negative. 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    Stroke [facility])        Past Surgical History:   Procedure Laterality Date    EGD      IR PORT PLACEMENT  11/7/2023    KNEE ARTHROSCOPY Left     MULTIPLE TOOTH EXTRACTIONS N/A 10/16/2023    Procedure: EXTRACTION OF ALL REMAINING TEETH;  Surgeon: Saravanan Espinoza DMD;  Location: [facility];  Service: Oral Surgery       Family History   Problem Relation Age of Onset    Breast cancer Mother     Cancer Father        Social History     Socioeconomic History    Marital status: Single     Spouse name: Not on file    Number of children: Not on file    Years of education: Not on file    Highest education level: Not on file   Occupational History    Not on file   Tobacco Use    Smoking status: Every Day     Packs/day: 0.25     Types: Cigarettes    Smokeless tobacco: Never   Vaping Use    Vaping Use: Never used   Substance and Sexual Activity    Alcohol use: Yes     Comment: 3-4 beers/day 24 oz beer    Drug use: No    Sexual activity: Not on file   Other Topics Concern    Not on file   Social History Narrative    Not on file     Social Determinants of Health     Financial Resource Strain: Not on file   Food Insecurity: No Food Insecurity (11/5/2023)    Hunger Vital Sign     Worried About Running Out of Food in the Last Year: Never true     Ran Out of Food in the Last Year: Never true   Transportation Needs: No Transportation Needs (11/5/2023)    PRAPARE - Transportation     Lack of Transportation (Medical): No     Lack of Transportation (Non-Medical):  No   Physical Activity: Not on file   Stress: Not on file   Social Connections: Not on file   Intimate Partner Violence: Not on file   Housing Stability: Low Risk  (11/5/2023)    Housing Stability Vital Sign     Unable to Pay for Housing in the Last Year: No     Number of Places Lived in the Last Year: 1     Unstable Housing in the Last Year: No       Allergies   Allergen Reactions    Bee Venom Hives    Other      bees       Current Outpatient Medications   Medication Sig Dispense Refill    aspirin 81 mg chewable tablet Chew 1 tablet (81 mg total) daily 90 tablet 0    atorvastatin (LIPITOR) 40 mg tablet Take 1 tablet (40 mg total) by mouth every evening 30 tablet 0    chlorhexidine (PERIDEX) 0.12 % solution       clopidogrel (Plavix) 75 mg tablet Take 1 tablet (75 mg total) by mouth daily for 21 days (Patient not taking: Reported on 11/22/2023) 21 tablet 0    diphenhydramine, lidocaine, Al/Mg hydroxide, simethicone (Magic Mouthwash) SUSP Swish and swallow 10 mL every 4 (four) hours as needed for mouth pain or discomfort (Patient not taking: Reported on 11/24/2023) 237 mL 7    ibuprofen (MOTRIN) 600 mg tablet Take 1 tablet (600 mg total) by mouth every 6 (six) hours as needed for mild pain (Patient not taking: Reported on 11/24/2023) 30 tablet 0    naloxone (NARCAN) 4 mg/0.1 mL nasal spray Administer 1 spray into a nostril. If no response after 2-3 minutes, give another dose in the other nostril using a new spray. (Patient not taking: Reported on 11/22/2023) 1 each 1    oxyCODONE (ROXICODONE) 20 MG TABS Take 1/2 tab (10mg) for moderate pain, 1 tab (20mg) for severe pain, every 4 hours AS NEEDED. Do not take sooner than 4 hours 90 tablet 0    polyethylene glycol (MIRALAX) 17 g packet Take 17 g by mouth daily (Patient not taking: Reported on 11/24/2023) 30 each 0    senna-docusate sodium (SENOKOT-S) 8.6-50 mg per tablet Take 1 tablet by mouth daily 60 tablet 2     No current facility-administered medications for this visit.      Facility-Administered Medications Ordered in Other Visits   Medication Dose Route Frequency Provider Last Rate Last Admin    alteplase (CATHFLO) injection 2 mg  2 mg Intracatheter Q1MIN PRN Chance Lazo MD        aprepitant (CINVANTI) 130 mg in sodium chloride 0.9 % 100 mL IVPB  130 mg Intravenous Once Chance Lazo MD        CISplatin (PLATINOL) 70 mg in sodium chloride 0.9 % 250 mL infusion  70 mg Intravenous Once Chance Lazo MD        magnesium sulfate 2 g/50 mL IVPB (premix) 2 g  2 g Intravenous Once Chance Lazo MD   2 g at 12/06/23 0851    magnesium sulfate 2 g/50 mL IVPB (premix) 2 g  2 g Intravenous Once Chance Lazo MD        ondansetron (ZOFRAN) 16 mg, dexamethasone (DECADRON) 10 mg in sodium chloride 0.9 % 50 mL IVPB   Intravenous Once Chance Lazo MD        sodium chloride 0.9 % bolus 500 mL  500 mL Intravenous Once Chance Lazo  mL/hr at 12/06/23 0851 500 mL at 12/06/23 0851    sodium chloride 0.9 % bolus 500 mL  500 mL Intravenous Once Chance Lazo MD        sodium chloride 0.9 % infusion  20 mL/hr Intravenous Once Chance Lazo MD           (Not in a hospital admission)        Objective:     24 Hour Vitals Assessment:     There were no vitals filed for this visit. Below not updated as this was a televisit    PHYSICIAN EXAM:    General: Appearance: alert, cooperative, no distress. HEENT: Normocephalic, atraumatic. No scleral icterus. conjunctivae clear. EOMI. Posterior tongue mass noted, poor dentition  Chest: No tenderness to palpation. No open wound noted. Lungs: Clear to auscultation bilaterally, Respirations unlabored. Cardiac: Regular rate and rhythm, +S1and S2  Abdomen: Soft, non-tender, non-distended. Bowel sounds are normal  Extremities:  No edema, cyanosis, clubbing. Skin: Skin color, turgor are normal. No rashes. Lymphatics: no palpable axillary, or inguinal adenopathy, palpable R sub-mandibular LN noted  Neurologic: Awake, Alert, and oriented, no gross focal deficits noted b/l.        DATA REVIEW:    Pathology Result:    Final Diagnosis   Date Value Ref Range Status   08/29/2023   Final    A. 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, ulcerated  - Carcinoma present at tissue edges    Comment:  - HPV CISH and p16 ordered.   - Dr. Danitza Aranda was notified of the diagnosis via Epic messaging on 9/1/23 at 9:02 am.     Interpretation performed at [facility], [address] [place] [address], All Def [employer] [facility], [address] [facility]              Image Results:   Image result are reviewed and documented in Hematology/Oncology history. I personally reviewed these images. CTA head and neck with and without contrast  Narrative: CTA NECK AND BRAIN WITH AND WITHOUT CONTRAST    INDICATION: pain and dizziness, h/o throat/tongue cancer    COMPARISON:   11/3/2023 and 9/9/2023. TECHNIQUE:  Routine CT imaging of the Brain without contrast.  Post contrast imaging was performed after administration of iodinated contrast through the neck and brain. Post contrast axial 0.625 mm images timed to opacify the arterial system. 3D rendering was performed on an independent workstation. MIP reconstructions performed. Coronal reconstructions were performed of the noncontrast portion of the brain. Radiation dose length product (DLP) for this visit:  1374.89 mGy-cm . This examination, like all CT scans performed in the [facility], was performed utilizing techniques to minimize radiation dose exposure, including the use of   iterative reconstruction and automated exposure control. IV Contrast:  85 mL of iohexol (OMNIPAQUE)    IMAGE QUALITY:   Diagnostic    FINDINGS:  NONCONTRAST BRAIN  PARENCHYMA:  No intracranial mass, mass effect or midline shift. No CT signs of acute infarction. No acute parenchymal hemorrhage. VENTRICLES AND EXTRA-AXIAL SPACES:  Normal for the patient's age. VISUALIZED ORBITS: Normal visualized orbits. PARANASAL SINUSES: Normal visualized paranasal sinuses.     CERVICAL VASCULATURE  AORTIC ARCH AND GREAT VESSELS:  Normal aortic arch and great vessel origins. Normal visualized subclavian vessels. RIGHT VERTEBRAL ARTERY CERVICAL SEGMENT:  Normal origin. Short segment of moderate luminal narrowing due to external compression by bony canal narrowing at the C5-6 level from osteophyte formation and facet hypertrophy is again seen, stable compared to   the prior study. The vessel is otherwise normal in caliber throughout the neck. LEFT VERTEBRAL ARTERY CERVICAL SEGMENT:  Normal origin. Short segment of mild luminal narrowing due to external compression by bony canal narrowing at the C5-6 level from osteophyte formation and facet hypertrophy is again seen, stable compared to the   prior study. The vessel is otherwise normal in caliber throughout the neck. RIGHT EXTRACRANIAL CAROTID SEGMENT:  Normal caliber common carotid artery. Mild luminal narrowing of the carotid bulb and proximal internal carotid artery secondary to calcified wall plaque is again seen. Otherwise normal common/internal carotid artery   caliber without significant stenosis or dissection. LEFT EXTRACRANIAL CAROTID SEGMENT:  Normal caliber common carotid artery. Mild luminal narrowing of the carotid bulb and proximal internal carotid artery secondary to calcified wall plaque is again seen. Otherwise normal common/internal carotid artery   caliber without significant stenosis or dissection. NASCET criteria was used to determine the degree of internal carotid artery diameter stenosis. INTRACRANIAL VASCULATURE  INTERNAL CAROTID ARTERIES:  Normal enhancement of the intracranial portions of the internal carotid arteries. Normal ophthalmic artery origins. Normal ICA terminus. ANTERIOR CIRCULATION:  Symmetric A1 segments and anterior cerebral arteries with normal enhancement. Normal anterior communicating artery.     MIDDLE CEREBRAL ARTERY CIRCULATION:  M1 segment and middle cerebral artery branches demonstrate normal enhancement bilaterally. DISTAL VERTEBRAL ARTERIES:  Normal distal vertebral arteries. Posterior inferior cerebellar artery origins are normal. Normal vertebral basilar junction. BASILAR ARTERY:  Basilar artery is normal in caliber. Normal superior cerebellar arteries. POSTERIOR CEREBRAL ARTERIES: Both posterior cerebral arteries arises from the basilar tip. Both arteries demonstrate normal enhancement. Normal posterior communicating arteries. VENOUS STRUCTURES: Major intracranial dural venous sinuses appear grossly patent. Severe compression upon the right internal jugular vein from the C1-2 the C4-5 level with nonvisualization suggesting focal occlusion noted. Findings are not significantly   changed compared to the prior study of 11/3/2023. NON VASCULAR ANATOMY  BONY STRUCTURES: No acute fracture is seen. Bony destruction of the left posterior maxilla and bilateral mandible could suggest tumor invasion or osseous metastatic disease. There is associated subtle nondisplaced pathological fracture involving the   angle of the left mandible. Stable small Schmorl's node along the inferior endplate of C4. Stable chronic moderate compression fracture deformity of the T3 vertebrae with 50% loss of vertebral body height anteriorly. No additional destructive bone   lesions, acute fracture or subluxation identified. SOFT TISSUES OF THE NECK: Bilateral parotid glands and left submandibular gland appear grossly unremarkable. Right submandibular gland appear replaced with tumor. Extensive necrotic metastatic adenopathy throughout the bilateral submandibular and   internal jugular chain regions again seen, right greater than left without significant interval change compared to the prior study of 11/3/2023 but worse compared to the study of 9/9/2023. The largest right level 2 lymph node with ill-defined borders and   central necrosis measures approximately 4.2 x 2.9 cm.  There is marked severe compression upon the right internal jugular vein by pathologically enlarged metastatic lymph nodes in the right neck which becomes occluded/nonvisualized from the C1-2 to the   C4-5 level. Right tongue base mass with slight deviation of the oropharyngeal airways to the left appears stable. THORACIC INLET: Multiple pathologically enlarged superior mediastinal lymph nodes are partially seen with the node measuring 2.1 x 1.5 cm in the precarinal region. Cannot exclude mediastinal metastatic disease. Calcifications within a few of the   mediastinal lymph nodes noted which may suggest sequela of prior granulomatous disease. Moderate centrilobular and paraseptal emphysematous changes in the visualized lung apices. Scarring and calcifications in the lung apices again noted, right greater   than left. No pneumothorax or pleural effusions. No suspicious pulmonary parenchymal mass in the visualized lung apices. Impression: 1. No cervical or intracranial major vessel arterial occlusion, focal saccular aneurysm, acute injury/dissection, or significant flow-limiting stenosis identified. 2.  Short segment of moderate right vertebral artery luminal narrowing due to external compression by bony canal narrowing at the C5-6 level from osteophyte formation and facet hypertrophy is again seen, stable compared to the prior study. The right   cervical vertebral artery is otherwise patent and unremarkable. 3.  Short segment of mild left vertebral artery luminal narrowing due to external compression by bony canal narrowing at the C5-6 level from osteophyte formation and facet hypertrophy is again seen, stable compared to the prior study. The left cervical   vertebral artery is otherwise patent and unremarkable. 4.  Mild luminal narrowing of the bilateral carotid bulb and proximal internal carotid artery segments secondary to calcified wall plaque is again seen without interval change.  Otherwise normal common/internal carotid artery caliber without significant   stenosis or dissection. 5.  Extensive submandibular/internal jugular chain metastatic lymphadenopathy again noted, right greater than left without significant interval change compared to the prior study of 11/3/2023 but worse compared to the study of 9/9/2023. Right tongue base   mass with slight compression and deviation of the oropharyngeal airways is also seen without significant interval change since 11/3/2023.  6.  There is marked severe compression upon the right internal jugular vein by pathologically enlarged metastatic lymph nodes in the right neck which becomes occluded/nonvisualized from the C1-2 to the C4-5 level. Findings appear similar compared to the   prior study of 11/3/2023.  7.  Multiple pathologically enlarged superior mediastinal lymph nodes are partially seen with the node measuring 2.1 x 1.5 cm in the precarinal region. Cannot exclude mediastinal metastatic disease. Calcifications within a few of the mediastinal lymph   nodes noted which may suggest sequela of prior granulomatous disease. 8.  Bony destruction of the left posterior maxilla and bilateral mandible could suggest tumor invasion or osseous metastatic disease. There is associated subtle nondisplaced pathological fracture involving the angle of the left mandible. 9.  Emphysema, scarring, and calcifications in the visualized lung apices. Workstation performed: [id number]      LABS:  Lab data are reviewed and documented in HemOnc history.        Lab Results   Component Value Date    HGB 10.9 (L) 12/05/2023    HCT 31.9 (L) 12/05/2023    MCV 97 12/05/2023     12/05/2023    WBC 6.18 12/05/2023    NRBC 0 12/05/2023    BANDSPCT 6 11/28/2023     Lab Results   Component Value Date    K 4.3 12/05/2023    CL 95 (L) 12/05/2023    CO2 28 12/05/2023    BUN 8 12/05/2023    CREATININE 0.60 12/05/2023    GLUCOSE 93 12/11/2016    GLUF 87 10/16/2023    CALCIUM 9.1 12/05/2023    AST 12 (L) 12/05/2023    ALT 15 12/05/2023 ALKPHOS 72 12/05/2023    EGFR 109 12/05/2023       No results found for: "IRON", "TIBC", "FERRITIN"    No results found for: "[id number]"    Recent Labs     12/05/23  1223   WBC 6.18             By:  Juanita Orellana MD, 12/6/2023, 9:07 AM

## 2023-11-26 NOTE — H&P (VIEW-ONLY)
Telemedicine consent    Patient: Bryce Nielson  Provider: Shobha Seay MD  Provider located at [address] [place] [id number]    The patient was identified by name and date of birth. Bryce Nielson was informed that this is a telemedicine visit and that the visit is being conducted through Telephone. My office door was closed. No one else was in the room. He acknowledged consent and understanding of privacy and security of the video platform. The patient has agreed to participate and understands they can discontinue the visit at any time. Patient is aware this is a billable service. Hematology Outpatient Office Note    Date of Service: 12/6/2023    [facility] HEMATOLOGY/[facility]ICAL ONCOL[facility] [address]  [phone]    Reason for Consultation:   No chief complaint on file. Stage of cancer: CHETAN  Treatment goal is cure    Referral Physician: No ref. provider found    Primary Care Physician:  Nicky Masters MD       ASSESSMENT & PLAN      Diagnosis ICD-10-CM Associated Orders   1. Oral cancer [facility])  C06.9             This is a 59 y.o. 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 None of the visualized lymph nodes appear enlarged in size. No suspicious mass visualized on noncontrast imaging. 6/19/2023 PET/CT: Hypermetabolic right anterior oral cavity mass most concerning for malignancy. Bilateral hypermetabolic cervical adenopathy compatible with metastases. No hypermetabolic metastases in the chest abdomen pelvis.  Multiple healing left rib fractures. Supportive care: Zofran, EMLA    Discussion of decision making    I personally reviewed the following lab results, the image studies, pathology, other specialty/physicians consult notes and recommendations, and outside medical records. I had a lengthy discussion with the patient and shared the work-up findings. We discussed the diagnosis and management plan as below. I spent 42 minutes reviewing the records (labs, clinician notes, outside records, medical history, ordering medicine/tests/procedures, interpreting the imaging/labs previously done) and coordination of care as well as direct time with the patient today, of which greater than 50% of the time was spent in counseling and coordination of care with the patient/family. Plan/Labs  F/u ENT, surgery not being pursued upfront  F/u Rad Onc  Infusion chairs for weekly Cisplatin 40mg/m2, C4 coming up in 2 weeks (7 treatments planned)  Restaging PET/CT after RT/Chemo completed    I discussed the risks of ongoing cigarette smoking and reviewed the benefits of quitting and risk of new lung primary, heart disease, stroke, among other risks and cancers. Reviewed options including support, quit date, medications, and family support. Patient voiced understanding and willingness to try to quit. Patient was told to notify [facility] family practitioner for additional management. Greater than 3 minutes were needed for discussion of tobacco dependence and quitting counselling. Follow Up: q 3 weeks for systemic chemo scheduled 1/2/2024    All questions were answered to the patient's satisfaction during this encounter. The patient knows the contact information for our office and knows to reach out for any relevant concerns related to this encounter.  They are to call for any temperature 100.4 or higher, new symptoms including but not restricted to shaking chills, decreased appetite, nausea, vomiting, diarrhea, increased fatigue, shortness of breath or chest pain, confusion, and not feeling the strength to come to the clinic. For all other listed problems and medical diagnosis in their chart - they are managed by PCP and/or other specialists, which the patient acknowledges. Thank you very much for your consultation and making us a part of this patient's care. We are continuing to follow closely with you. Please do not hesitate to reach out to me with any additional questions or concerns. Roosevelt Sandoval MD  Hematology & Medical Oncology Staff Physician             Disclaimer: This document was prepared using [employer] technology. If a word or phrase is confusing, or does not make sense, this is likely due to recognition error which was not discovered during this clinician's review. If you believe an error has occurred, please contact me through [facility] line for yamini? cation. ONCOLOGIC HISTORY OF PRESENT ILLNESS     His general dentist saw him on May 16 and appreciated a large oral cavity lesion which was concerning for malignancy and he is thus referred to us for further management. Clotting History None   Bleeding History None   Cancer History Oral cavity suspected   Family Cancer History Dad (skin), Mom (breast)   H/O Blood/Plt Transfusion None   Tobacco/etoh/drug abuse 1 PPD x  45 years, beer (6 pack a day), no rec drug use       Cancer Screening history No colon cancer screening   Occupation Landscape work         SUBJECTIVE  (INTERVAL HISTORY)        I have reviewed the relevant past medical, surgical, social and family history. I have also reviewed allergies and medications for this patient. Having harder time swallowing, some pain. Going for a feeding tube on Monday. He has preserved strength and energy. He has intermittent LH increasing. Hasn't been able to work.     Review of Systems    Baseline weight: 145-150 lbs    Denies F/C, N/V, SOB, CP, HA, rash, itching, gen weakness, melena, hematuria, hematochezia, falls, diarrhea, or constipation       A 10-point review of system was performed, pertinent positive and negative were detailed as above. Otherwise, the 10-point review of system was negative. 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    Stroke [facility])        Past Surgical History:   Procedure Laterality Date    EGD      IR PORT PLACEMENT  11/7/2023    KNEE ARTHROSCOPY Left     MULTIPLE TOOTH EXTRACTIONS N/A 10/16/2023    Procedure: EXTRACTION OF ALL REMAINING TEETH;  Surgeon: Vijay Bravo DMD;  Location: [facility];  Service: Oral Surgery       Family History   Problem Relation Age of Onset    Breast cancer Mother     Cancer Father        Social History     Socioeconomic History    Marital status: Single     Spouse name: Not on file    Number of children: Not on file    Years of education: Not on file    Highest education level: Not on file   Occupational History    Not on file   Tobacco Use    Smoking status: Every Day     Packs/day: 0.25     Types: Cigarettes    Smokeless tobacco: Never   Vaping Use    Vaping Use: Never used   Substance and Sexual Activity    Alcohol use: Yes     Comment: 3-4 beers/day 24 oz beer    Drug use: No    Sexual activity: Not on file   Other Topics Concern    Not on file   Social History Narrative    Not on file     Social Determinants of Health     Financial Resource Strain: Not on file   Food Insecurity: No Food Insecurity (11/5/2023)    Hunger Vital Sign     Worried About Running Out of Food in the Last Year: Never true     Ran Out of Food in the Last Year: Never true   Transportation Needs: No Transportation Needs (11/5/2023)    PRAPARE - Transportation     Lack of Transportation (Medical): No     Lack of Transportation (Non-Medical):  No   Physical Activity: Not on file   Stress: Not on file   Social Connections: Not on file   Intimate Partner Violence: Not on file   Housing Stability: Low Risk  (11/5/2023)    Housing Stability Vital Sign     Unable to Pay for Housing in the Last Year: No     Number of Places Lived in the Last Year: 1     Unstable Housing in the Last Year: No       Allergies   Allergen Reactions    Bee Venom Hives    Other      bees       Current Outpatient Medications   Medication Sig Dispense Refill    aspirin 81 mg chewable tablet Chew 1 tablet (81 mg total) daily 90 tablet 0    atorvastatin (LIPITOR) 40 mg tablet Take 1 tablet (40 mg total) by mouth every evening 30 tablet 0    chlorhexidine (PERIDEX) 0.12 % solution       clopidogrel (Plavix) 75 mg tablet Take 1 tablet (75 mg total) by mouth daily for 21 days (Patient not taking: Reported on 11/22/2023) 21 tablet 0    diphenhydramine, lidocaine, Al/Mg hydroxide, simethicone (Magic Mouthwash) SUSP Swish and swallow 10 mL every 4 (four) hours as needed for mouth pain or discomfort (Patient not taking: Reported on 11/24/2023) 237 mL 7    ibuprofen (MOTRIN) 600 mg tablet Take 1 tablet (600 mg total) by mouth every 6 (six) hours as needed for mild pain (Patient not taking: Reported on 11/24/2023) 30 tablet 0    naloxone (NARCAN) 4 mg/0.1 mL nasal spray Administer 1 spray into a nostril. If no response after 2-3 minutes, give another dose in the other nostril using a new spray. (Patient not taking: Reported on 11/22/2023) 1 each 1    oxyCODONE (ROXICODONE) 20 MG TABS Take 1/2 tab (10mg) for moderate pain, 1 tab (20mg) for severe pain, every 4 hours AS NEEDED. Do not take sooner than 4 hours 90 tablet 0    polyethylene glycol (MIRALAX) 17 g packet Take 17 g by mouth daily (Patient not taking: Reported on 11/24/2023) 30 each 0    senna-docusate sodium (SENOKOT-S) 8.6-50 mg per tablet Take 1 tablet by mouth daily 60 tablet 2     No current facility-administered medications for this visit.      Facility-Administered Medications Ordered in Other Visits   Medication Dose Route Frequency Provider Last Rate Last Admin    alteplase (CATHFLO) injection 2 mg  2 mg Intracatheter Q1MIN PRN Leif Doherty MD        aprepitant (CINVANTI) 130 mg in sodium chloride 0.9 % 100 mL IVPB  130 mg Intravenous Once Leif Doherty MD        CISplatin (PLATINOL) 70 mg in sodium chloride 0.9 % 250 mL infusion  70 mg Intravenous Once Leif Doherty MD        magnesium sulfate 2 g/50 mL IVPB (premix) 2 g  2 g Intravenous Once Leif Doherty MD   2 g at 12/06/23 0851    magnesium sulfate 2 g/50 mL IVPB (premix) 2 g  2 g Intravenous Once Leif Doherty MD        ondansetron (ZOFRAN) 16 mg, dexamethasone (DECADRON) 10 mg in sodium chloride 0.9 % 50 mL IVPB   Intravenous Once Leif Doherty MD        sodium chloride 0.9 % bolus 500 mL  500 mL Intravenous Once Leif Doherty  mL/hr at 12/06/23 0851 500 mL at 12/06/23 0851    sodium chloride 0.9 % bolus 500 mL  500 mL Intravenous Once Leif Doherty MD        sodium chloride 0.9 % infusion  20 mL/hr Intravenous Once Leif Doherty MD           (Not in a hospital admission)        Objective:     24 Hour Vitals Assessment:     There were no vitals filed for this visit. Below not updated as this was a televisit    PHYSICIAN EXAM:    General: Appearance: alert, cooperative, no distress. HEENT: Normocephalic, atraumatic. No scleral icterus. conjunctivae clear. EOMI. Posterior tongue mass noted, poor dentition  Chest: No tenderness to palpation. No open wound noted. Lungs: Clear to auscultation bilaterally, Respirations unlabored. Cardiac: Regular rate and rhythm, +S1and S2  Abdomen: Soft, non-tender, non-distended. Bowel sounds are normal  Extremities:  No edema, cyanosis, clubbing. Skin: Skin color, turgor are normal. No rashes. Lymphatics: no palpable axillary, or inguinal adenopathy, palpable R sub-mandibular LN noted  Neurologic: Awake, Alert, and oriented, no gross focal deficits noted b/l.        DATA REVIEW:    Pathology Result:    Final Diagnosis   Date Value Ref Range Status   08/29/2023   Final    A. 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, ulcerated  - Carcinoma present at tissue edges    Comment:  - HPV CISH and p16 ordered.   - Dr. Cale Pyle was notified of the diagnosis via Epic messaging on 9/1/23 at 9:02 am.     Interpretation performed at [facility], [address] [place] [address]              Image Results:   Image result are reviewed and documented in Hematology/Oncology history. I personally reviewed these images. CTA head and neck with and without contrast  Narrative: CTA NECK AND BRAIN WITH AND WITHOUT CONTRAST    INDICATION: pain and dizziness, h/o throat/tongue cancer    COMPARISON:   11/3/2023 and 9/9/2023. TECHNIQUE:  Routine CT imaging of the Brain without contrast.  Post contrast imaging was performed after administration of iodinated contrast through the neck and brain. Post contrast axial 0.625 mm images timed to opacify the arterial system. 3D rendering was performed on an independent workstation. MIP reconstructions performed. Coronal reconstructions were performed of the noncontrast portion of the brain. Radiation dose length product (DLP) for this visit:  1374.89 mGy-cm . This examination, like all CT scans performed in the [facility], was performed utilizing techniques to minimize radiation dose exposure, including the use of   iterative reconstruction and automated exposure control. IV Contrast:  85 mL of iohexol (OMNIPAQUE)    IMAGE QUALITY:   Diagnostic    FINDINGS:  NONCONTRAST BRAIN  PARENCHYMA:  No intracranial mass, mass effect or midline shift. No CT signs of acute infarction. No acute parenchymal hemorrhage. VENTRICLES AND EXTRA-AXIAL SPACES:  Normal for the patient's age. VISUALIZED ORBITS: Normal visualized orbits. PARANASAL SINUSES: Normal visualized paranasal sinuses.     CERVICAL VASCULATURE  AORTIC ARCH AND GREAT VESSELS:  Normal aortic arch and great vessel origins. Normal visualized subclavian vessels. RIGHT VERTEBRAL ARTERY CERVICAL SEGMENT:  Normal origin. Short segment of moderate luminal narrowing due to external compression by bony canal narrowing at the C5-6 level from osteophyte formation and facet hypertrophy is again seen, stable compared to   the prior study. The vessel is otherwise normal in caliber throughout the neck. LEFT VERTEBRAL ARTERY CERVICAL SEGMENT:  Normal origin. Short segment of mild luminal narrowing due to external compression by bony canal narrowing at the C5-6 level from osteophyte formation and facet hypertrophy is again seen, stable compared to the   prior study. The vessel is otherwise normal in caliber throughout the neck. RIGHT EXTRACRANIAL CAROTID SEGMENT:  Normal caliber common carotid artery. Mild luminal narrowing of the carotid bulb and proximal internal carotid artery secondary to calcified wall plaque is again seen. Otherwise normal common/internal carotid artery   caliber without significant stenosis or dissection. LEFT EXTRACRANIAL CAROTID SEGMENT:  Normal caliber common carotid artery. Mild luminal narrowing of the carotid bulb and proximal internal carotid artery secondary to calcified wall plaque is again seen. Otherwise normal common/internal carotid artery   caliber without significant stenosis or dissection. NASCET criteria was used to determine the degree of internal carotid artery diameter stenosis. INTRACRANIAL VASCULATURE  INTERNAL CAROTID ARTERIES:  Normal enhancement of the intracranial portions of the internal carotid arteries. Normal ophthalmic artery origins. Normal ICA terminus. ANTERIOR CIRCULATION:  Symmetric A1 segments and anterior cerebral arteries with normal enhancement. Normal anterior communicating artery.     MIDDLE CEREBRAL ARTERY CIRCULATION:  M1 segment and middle cerebral artery branches demonstrate normal enhancement bilaterally. DISTAL VERTEBRAL ARTERIES:  Normal distal vertebral arteries. Posterior inferior cerebellar artery origins are normal. Normal vertebral basilar junction. BASILAR ARTERY:  Basilar artery is normal in caliber. Normal superior cerebellar arteries. POSTERIOR CEREBRAL ARTERIES: Both posterior cerebral arteries arises from the basilar tip. Both arteries demonstrate normal enhancement. Normal posterior communicating arteries. VENOUS STRUCTURES: Major intracranial dural venous sinuses appear grossly patent. Severe compression upon the right internal jugular vein from the C1-2 the C4-5 level with nonvisualization suggesting focal occlusion noted. Findings are not significantly   changed compared to the prior study of 11/3/2023. NON VASCULAR ANATOMY  BONY STRUCTURES: No acute fracture is seen. Bony destruction of the left posterior maxilla and bilateral mandible could suggest tumor invasion or osseous metastatic disease. There is associated subtle nondisplaced pathological fracture involving the   angle of the left mandible. Stable small Schmorl's node along the inferior endplate of C4. Stable chronic moderate compression fracture deformity of the T3 vertebrae with 50% loss of vertebral body height anteriorly. No additional destructive bone   lesions, acute fracture or subluxation identified. SOFT TISSUES OF THE NECK: Bilateral parotid glands and left submandibular gland appear grossly unremarkable. Right submandibular gland appear replaced with tumor. Extensive necrotic metastatic adenopathy throughout the bilateral submandibular and   internal jugular chain regions again seen, right greater than left without significant interval change compared to the prior study of 11/3/2023 but worse compared to the study of 9/9/2023. The largest right level 2 lymph node with ill-defined borders and   central necrosis measures approximately 4.2 x 2.9 cm.  There is marked severe compression upon the right internal jugular vein by pathologically enlarged metastatic lymph nodes in the right neck which becomes occluded/nonvisualized from the C1-2 to the   C4-5 level. Right tongue base mass with slight deviation of the oropharyngeal airways to the left appears stable. THORACIC INLET: Multiple pathologically enlarged superior mediastinal lymph nodes are partially seen with the node measuring 2.1 x 1.5 cm in the precarinal region. Cannot exclude mediastinal metastatic disease. Calcifications within a few of the   mediastinal lymph nodes noted which may suggest sequela of prior granulomatous disease. Moderate centrilobular and paraseptal emphysematous changes in the visualized lung apices. Scarring and calcifications in the lung apices again noted, right greater   than left. No pneumothorax or pleural effusions. No suspicious pulmonary parenchymal mass in the visualized lung apices. Impression: 1. No cervical or intracranial major vessel arterial occlusion, focal saccular aneurysm, acute injury/dissection, or significant flow-limiting stenosis identified. 2.  Short segment of moderate right vertebral artery luminal narrowing due to external compression by bony canal narrowing at the C5-6 level from osteophyte formation and facet hypertrophy is again seen, stable compared to the prior study. The right   cervical vertebral artery is otherwise patent and unremarkable. 3.  Short segment of mild left vertebral artery luminal narrowing due to external compression by bony canal narrowing at the C5-6 level from osteophyte formation and facet hypertrophy is again seen, stable compared to the prior study. The left cervical   vertebral artery is otherwise patent and unremarkable. 4.  Mild luminal narrowing of the bilateral carotid bulb and proximal internal carotid artery segments secondary to calcified wall plaque is again seen without interval change.  Otherwise normal common/internal carotid artery caliber without significant   stenosis or dissection. 5.  Extensive submandibular/internal jugular chain metastatic lymphadenopathy again noted, right greater than left without significant interval change compared to the prior study of 11/3/2023 but worse compared to the study of 9/9/2023. Right tongue base   mass with slight compression and deviation of the oropharyngeal airways is also seen without significant interval change since 11/3/2023.  6.  There is marked severe compression upon the right internal jugular vein by pathologically enlarged metastatic lymph nodes in the right neck which becomes occluded/nonvisualized from the C1-2 to the C4-5 level. Findings appear similar compared to the   prior study of 11/3/2023.  7.  Multiple pathologically enlarged superior mediastinal lymph nodes are partially seen with the node measuring 2.1 x 1.5 cm in the precarinal region. Cannot exclude mediastinal metastatic disease. Calcifications within a few of the mediastinal lymph   nodes noted which may suggest sequela of prior granulomatous disease. 8.  Bony destruction of the left posterior maxilla and bilateral mandible could suggest tumor invasion or osseous metastatic disease. There is associated subtle nondisplaced pathological fracture involving the angle of the left mandible. 9.  Emphysema, scarring, and calcifications in the visualized lung apices. Workstation performed: [id number]      LABS:  Lab data are reviewed and documented in HemOnc history.        Lab Results   Component Value Date    HGB 10.9 (L) 12/05/2023    HCT 31.9 (L) 12/05/2023    MCV 97 12/05/2023     12/05/2023    WBC 6.18 12/05/2023    NRBC 0 12/05/2023    BANDSPCT 6 11/28/2023     Lab Results   Component Value Date    K 4.3 12/05/2023    CL 95 (L) 12/05/2023    CO2 28 12/05/2023    BUN 8 12/05/2023    CREATININE 0.60 12/05/2023    GLUCOSE 93 12/11/2016    GLUF 87 10/16/2023    CALCIUM 9.1 12/05/2023    AST 12 (L) 12/05/2023    ALT 15 12/05/2023 ALKPHOS 72 12/05/2023    EGFR 109 12/05/2023       No results found for: "IRON", "TIBC", "FERRITIN"    No results found for: "[id number]"    Recent Labs     12/05/23  1223   WBC 6.18             By:  Kathya Gordon MD, 12/6/2023, 9:07 AM

## 2023-11-27 PROCEDURE — 77014 CHG CT GUIDANCE RADIATION THERAPY FLDS PLACEMENT: CPT | Performed by: RADIOLOGY

## 2023-11-27 PROCEDURE — 77386 HB NTSTY MODUL RAD TX DLVR CPLX: CPT | Performed by: RADIOLOGY

## 2023-11-28 ENCOUNTER — HOSPITAL ENCOUNTER (OUTPATIENT)
Dept: INFUSION CENTER | Facility: CLINIC | Age: 58
Discharge: HOME | End: 2023-11-28
Payer: COMMERCIAL

## 2023-11-28 ENCOUNTER — TELEPHONE (OUTPATIENT)
Dept: HEMATOLOGY ONCOLOGY | Facility: CLINIC | Age: 58
End: 2023-11-28

## 2023-11-28 DIAGNOSIS — Z95.828 PORT-A-CATH IN PLACE: Primary | ICD-10-CM

## 2023-11-28 DIAGNOSIS — C06.9 ORAL CANCER (HCC): ICD-10-CM

## 2023-11-28 LAB
ALBUMIN SERPL BCP-MCNC: 4 G/DL (ref 3.5–5)
ALP SERPL-CCNC: 65 U/L (ref 34–104)
ALT SERPL W P-5'-P-CCNC: 18 U/L (ref 7–52)
ANION GAP SERPL CALCULATED.3IONS-SCNC: 9 MMOL/L
AST SERPL W P-5'-P-CCNC: 14 U/L (ref 13–39)
BILIRUB SERPL-MCNC: 0.28 MG/DL (ref 0.2–1)
BUN SERPL-MCNC: 8 MG/DL (ref 5–25)
CALCIUM SERPL-MCNC: 9.4 MG/DL (ref 8.4–10.2)
CHLORIDE SERPL-SCNC: 97 MMOL/L (ref 96–108)
CO2 SERPL-SCNC: 25 MMOL/L (ref 21–32)
CREAT SERPL-MCNC: 0.56 MG/DL (ref 0.6–1.3)
ERYTHROCYTE [DISTWIDTH] IN BLOOD BY AUTOMATED COUNT: 12 % (ref 11.6–15.1)
GFR SERPL CREATININE-BSD FRML MDRD: 112 ML/MIN/1.73SQ M
GLUCOSE SERPL-MCNC: 88 MG/DL (ref 65–140)
HCT VFR BLD AUTO: 34.1 % (ref 36.5–49.3)
HGB BLD-MCNC: 11.8 G/DL (ref 12–17)
MAGNESIUM SERPL-MCNC: 1.6 MG/DL (ref 1.9–2.7)
MCH RBC QN AUTO: 33.2 PG (ref 26.8–34.3)
MCHC RBC AUTO-ENTMCNC: 34.6 G/DL (ref 31.4–37.4)
MCV RBC AUTO: 96 FL (ref 82–98)
PLATELET # BLD AUTO: 325 THOUSANDS/UL (ref 149–390)
PMV BLD AUTO: 9.3 FL (ref 8.9–12.7)
POTASSIUM SERPL-SCNC: 4.1 MMOL/L (ref 3.5–5.3)
PROT SERPL-MCNC: 7.1 G/DL (ref 6.4–8.4)
RBC # BLD AUTO: 3.55 MILLION/UL (ref 3.88–5.62)
SODIUM SERPL-SCNC: 131 MMOL/L (ref 135–147)
WBC # BLD AUTO: 8.88 THOUSAND/UL (ref 4.31–10.16)

## 2023-11-28 PROCEDURE — 85027 COMPLETE CBC AUTOMATED: CPT | Performed by: INTERNAL MEDICINE

## 2023-11-28 PROCEDURE — 85007 BL SMEAR W/DIFF WBC COUNT: CPT | Performed by: INTERNAL MEDICINE

## 2023-11-28 PROCEDURE — 77014 CHG CT GUIDANCE RADIATION THERAPY FLDS PLACEMENT: CPT | Performed by: RADIOLOGY

## 2023-11-28 PROCEDURE — 80053 COMPREHEN METABOLIC PANEL: CPT | Performed by: INTERNAL MEDICINE

## 2023-11-28 PROCEDURE — 77386 HB NTSTY MODUL RAD TX DLVR CPLX: CPT | Performed by: RADIOLOGY

## 2023-11-28 PROCEDURE — 83735 ASSAY OF MAGNESIUM: CPT | Performed by: INTERNAL MEDICINE

## 2023-11-28 NOTE — TELEPHONE ENCOUNTER
Title: Magnesium 2 grams to pre and post hydration       Date patient scheduled: 11/29/23    Original medication ordered: NSS pre and post hydration     New Medication ordered: NSS with 2 gram magnesium pre and post hydration     Is the patient scheduled within 24 hours? ? If yes, follow up with verbal telephone call. Phoned Og Gallardo at [facility] infusion. Aware of addition of magnesium     Office RN to route to INF  pool. Infusion  pool routes to [facility] [place]. Infusion tech to receive message, confirm scheduled treatment duration matches ordered treatment duration or adjust accordingly, and re-link appointment request orders. Infusion tech to notify pharmacy and finance.

## 2023-11-28 NOTE — PROGRESS NOTES
Pt arrived to unit without complaint. Central labs drawn and sent. AVS declined, but aware of future appts. Pt left unit in stable condition.

## 2023-11-28 NOTE — PROGRESS NOTES
TIME OUT: Spoke with Teresa Packer RN via telephone and patient will have 2 grams of Magnesium added to pre hydration and 2 grams of magnesium added to post hydration with tomorrow's (11/29/23) treatment. Pharmacy notified.

## 2023-11-29 ENCOUNTER — HOSPITAL ENCOUNTER (OUTPATIENT)
Dept: INFUSION CENTER | Facility: CLINIC | Age: 58
Discharge: HOME | End: 2023-11-29
Payer: COMMERCIAL

## 2023-11-29 VITALS
DIASTOLIC BLOOD PRESSURE: 79 MMHG | RESPIRATION RATE: 18 BRPM | HEART RATE: 66 BPM | SYSTOLIC BLOOD PRESSURE: 128 MMHG | WEIGHT: 136.02 LBS | BODY MASS INDEX: 16.91 KG/M2 | HEIGHT: 75 IN | TEMPERATURE: 97.1 F

## 2023-11-29 DIAGNOSIS — C06.9 ORAL CANCER (HCC): Primary | ICD-10-CM

## 2023-11-29 LAB
BASOPHILS # BLD MANUAL: 0 THOUSAND/UL (ref 0–0.1)
BASOPHILS NFR MAR MANUAL: 0 % (ref 0–1)
EOSINOPHIL # BLD MANUAL: 0.18 THOUSAND/UL (ref 0–0.4)
EOSINOPHIL NFR BLD MANUAL: 2 % (ref 0–6)
LYMPHOCYTES # BLD AUTO: 0.53 THOUSAND/UL (ref 0.6–4.47)
LYMPHOCYTES # BLD AUTO: 6 % (ref 14–44)
MONOCYTES # BLD AUTO: 0.98 THOUSAND/UL (ref 0–1.22)
MONOCYTES NFR BLD: 11 % (ref 4–12)
NEUTROPHILS # BLD MANUAL: 7.19 THOUSAND/UL (ref 1.85–7.62)
NEUTS BAND NFR BLD MANUAL: 6 % (ref 0–8)
NEUTS SEG NFR BLD AUTO: 75 % (ref 43–75)
PLATELET BLD QL SMEAR: ADEQUATE
PLATELET CLUMP BLD QL SMEAR: PRESENT
RBC MORPH BLD: NORMAL

## 2023-11-29 PROCEDURE — 96366 THER/PROPH/DIAG IV INF ADDON: CPT

## 2023-11-29 PROCEDURE — 96413 CHEMO IV INFUSION 1 HR: CPT

## 2023-11-29 PROCEDURE — 96367 TX/PROPH/DG ADDL SEQ IV INF: CPT

## 2023-11-29 RX ORDER — SODIUM CHLORIDE 9 MG/ML
20 INJECTION, SOLUTION INTRAVENOUS ONCE
Status: CANCELLED | OUTPATIENT
Start: 2023-12-06

## 2023-11-29 RX ORDER — SODIUM CHLORIDE 9 MG/ML
20 INJECTION, SOLUTION INTRAVENOUS ONCE
Status: COMPLETED | OUTPATIENT
Start: 2023-11-29 | End: 2023-11-29

## 2023-11-29 RX ADMIN — CISPLATIN 70 MG: 1 INJECTION, SOLUTION INTRAVENOUS at 11:08

## 2023-11-29 RX ADMIN — MAGNESIUM SULFATE HEPTAHYDRATE: 500 INJECTION, SOLUTION INTRAMUSCULAR; INTRAVENOUS at 09:07

## 2023-11-29 RX ADMIN — APREPITANT 130 MG: 130 INJECTION, EMULSION INTRAVENOUS at 10:28

## 2023-11-29 RX ADMIN — SODIUM CHLORIDE 20 ML/HR: 0.9 INJECTION, SOLUTION INTRAVENOUS at 09:07

## 2023-11-29 RX ADMIN — MAGNESIUM SULFATE HEPTAHYDRATE: 500 INJECTION, SOLUTION INTRAMUSCULAR; INTRAVENOUS at 12:08

## 2023-11-29 RX ADMIN — DEXAMETHASONE SODIUM PHOSPHATE: 10 INJECTION, SOLUTION INTRAMUSCULAR; INTRAVENOUS at 10:08

## 2023-11-30 ENCOUNTER — TELEPHONE (OUTPATIENT)
Age: 58
End: 2023-11-30

## 2023-11-30 ENCOUNTER — TELEPHONE (OUTPATIENT)
Dept: NUTRITION | Facility: CLINIC | Age: 58
End: 2023-11-30

## 2023-11-30 ENCOUNTER — APPOINTMENT (OUTPATIENT)
Dept: RADIATION ONCOLOGY | Facility: CLINIC | Age: 58
End: 2023-11-30
Payer: COMMERCIAL

## 2023-11-30 ENCOUNTER — PREP FOR PROCEDURE (OUTPATIENT)
Dept: INTERVENTIONAL RADIOLOGY/VASCULAR | Facility: CLINIC | Age: 58
End: 2023-11-30

## 2023-11-30 ENCOUNTER — APPOINTMENT (OUTPATIENT)
Dept: RADIATION ONCOLOGY | Facility: CLINIC | Age: 58
End: 2023-11-30
Attending: INTERNAL MEDICINE
Payer: COMMERCIAL

## 2023-11-30 DIAGNOSIS — R13.10 DYSPHAGIA, UNSPECIFIED TYPE: ICD-10-CM

## 2023-11-30 DIAGNOSIS — C06.9 ORAL CANCER (HCC): Primary | ICD-10-CM

## 2023-11-30 DIAGNOSIS — R68.84 PAIN IN LOWER JAW: ICD-10-CM

## 2023-11-30 DIAGNOSIS — R63.4 WEIGHT LOSS: ICD-10-CM

## 2023-11-30 DIAGNOSIS — C06.9 CANCER OF ORAL CAVITY (HCC): ICD-10-CM

## 2023-11-30 DIAGNOSIS — G89.3 CANCER-RELATED PAIN: ICD-10-CM

## 2023-11-30 PROCEDURE — 77386 HB NTSTY MODUL RAD TX DLVR CPLX: CPT | Performed by: RADIOLOGY

## 2023-11-30 PROCEDURE — 77014 CHG CT GUIDANCE RADIATION THERAPY FLDS PLACEMENT: CPT | Performed by: RADIOLOGY

## 2023-11-30 PROCEDURE — 77336 RADIATION PHYSICS CONSULT: CPT | Performed by: INTERNAL MEDICINE

## 2023-11-30 RX ORDER — OXYCODONE HYDROCHLORIDE 20 MG/1
TABLET ORAL
Qty: 90 TABLET | Refills: 0 | Status: SHIPPED | OUTPATIENT
Start: 2023-11-30

## 2023-11-30 RX ORDER — OXYCODONE HYDROCHLORIDE 10 MG/1
TABLET ORAL
Qty: 120 TABLET | Refills: 0 | Status: SHIPPED | OUTPATIENT
Start: 2023-11-30 | End: 2023-11-30

## 2023-11-30 NOTE — TELEPHONE ENCOUNTER
Received VM from patient asking RD to call back. Called Dixie back to touch [facility]. He asked if I was the one to talk to about a feeding tube. He states he continues to lose weight and continues to have trouble eating. He is agreeable to a feeding tube now. RD reached out to [address], as Dixie was currently there for treatment. He has an OTV today and they will discuss plan for feeding tube. RD available for TF recs as needed. We do have an RD follow up scheduled for 12/6 during his infusion.

## 2023-11-30 NOTE — TELEPHONE ENCOUNTER
Last appointment: 11/24/23    Next scheduled appointment: 12/14/23    Pharmacy: [facility]    Patient reports he is in a lot of pain and is seeking advice from provider.

## 2023-11-30 NOTE — TELEPHONE ENCOUNTER
[facility] on [address] has 100 tabs of 20mg Oxycodone.  Please put through new script

## 2023-11-30 NOTE — TELEPHONE ENCOUNTER
Was contacted by pharmacy. 10mg oxycodone is on hold/back order. Contacted 3 additional pharmacies and they also do not have 10mg tabs.  Please advise

## 2023-11-30 NOTE — TELEPHONE ENCOUNTER
Called patient at 11:00 am and discussed pain. He is having increased pain in the mouth and throat area since starting chemoRT which is somewhat to be expected. He still has a month left of RT to go. Current dose of oxycodone is not enough to keep him comfortable. Especially at night, the dose is not lasting him 4 hours (only 45 mins). Plan:    Start oxyIR 10mg per tab, take 1 tab (10mg) PO moderate pain and 2 tabs (20mg) for severe pain, q4H as NEEDED. Explained thoroughly not to take doses sooner than 4 hours to avoid overdosing. He has narcan at home, he verified this. Senokot daily     Controlled Substance Review    [place] or [place] [facility] reviewed: No red flags were identified; safe to proceed with prescription.     Filled  Written  ID  Drug  QTY  Days  Prescriber  RX #  Dispenser  Refill  Daily Dose*  Pymt Type  [facility]    11/20/2023 11/20/2023 1 Oxycodone Hcl (Ir) 10 Mg Tab 60.00 15 Ti Lobo [id number] Pen (4386) 0 60.00 [place] Comm [place]   11/03/2023 11/02/2023 1 Oxycodone Hcl (Ir) 10 Mg Tab 60.00 15 Ti Lobo [id number] Pen (4386) 0 60.00 [place]   10/24/2023 10/24/2023 1 Oxycodone Hcl (Ir) 5 Mg Tablet 12.00 2 Rh Thomas [id number] Pen (4386) 0 45.00 MME Comm [facility]   10/20/2023 10/20/2023 1 Oxycodone-Acetaminophen 5-325 10.00 3 Do Rac [id number] Pen (4386) 0 25.00 MME Comm [facility]   10/16/2023 10/16/2023 1 Hydrocodone-Acetamin 5-325 Mg 15.00 4 Br Gel [id number] Pen (4386) 0 18.75 [place]   10/11/2023 10/11/2023 1 Tramadol Hcl 50 Mg Tablet 12.00 4 Ge Pro [id number] Pen (4386) 0 30.00 MME Comm [facility]   08/29/2023 08/29/2023 1 Oxycodone Hcl (Ir) 5 Mg Tablet 20.00 5 Ja Ohl [id number] Pen (3707) 0 30.00 MME Com        Nishant Denton MD  Palliative Medicine & Supportive Care  Internal Medicine  Available via [address] [facility] Text  Office: [phone]  Fax: [phone]

## 2023-11-30 NOTE — TELEPHONE ENCOUNTER
Please let patient know of the new changes and reason for the changes. 1. Oxycodone 20mg per tab, take 1/2 tab (10mg) for moderate pain, 1 tab (20mg) for severe pain, every 4 hours AS NEEDED. Do not take sooner than 4 hours. 2. Senokot 1-2 tabs daily.      Thank you

## 2023-12-01 ENCOUNTER — APPOINTMENT (OUTPATIENT)
Dept: RADIATION ONCOLOGY | Facility: CLINIC | Age: 58
End: 2023-12-01
Payer: COMMERCIAL

## 2023-12-01 PROCEDURE — 77427 RADIATION TX MANAGEMENT X5: CPT | Performed by: INTERNAL MEDICINE

## 2023-12-01 PROCEDURE — 77014 CHG CT GUIDANCE RADIATION THERAPY FLDS PLACEMENT: CPT | Performed by: RADIOLOGY

## 2023-12-01 PROCEDURE — 77386 HB NTSTY MODUL RAD TX DLVR CPLX: CPT | Performed by: RADIOLOGY

## 2023-12-04 ENCOUNTER — APPOINTMENT (OUTPATIENT)
Dept: RADIATION ONCOLOGY | Facility: CLINIC | Age: 58
End: 2023-12-04
Payer: COMMERCIAL

## 2023-12-04 ENCOUNTER — TELEPHONE (OUTPATIENT)
Dept: RADIOLOGY | Facility: HOSPITAL | Age: 58
End: 2023-12-04

## 2023-12-04 PROCEDURE — 77014 CHG CT GUIDANCE RADIATION THERAPY FLDS PLACEMENT: CPT | Performed by: RADIOLOGY

## 2023-12-04 PROCEDURE — 77386 HB NTSTY MODUL RAD TX DLVR CPLX: CPT | Performed by: RADIOLOGY

## 2023-12-04 RX ORDER — SODIUM CHLORIDE 9 MG/ML
75 INJECTION, SOLUTION INTRAVENOUS CONTINUOUS
OUTPATIENT
Start: 2023-12-04

## 2023-12-04 NOTE — PRE-PROCEDURE INSTRUCTIONS
Pre-procedure Instructions for Interventional Radiology  [facility]  [address]  [place] [address]  [place] [phone]    You are scheduled for a/an Gastrostomy Tube (Feeding Tube) Placement. On Monday 12/11/23. Your arrival time is 1000. Our Interventional Radiology nurse will notify you a few days before your procedure with the exact arrival time and location to arrive. To prepare for your procedure:  Please arrange for someone to drive you home after the procedure and stay with you until the next morning if you are instructed to do so. This is typically for patients receiving some type of sedative or anesthetic for the procedure. DO NOT EAT OR DRINK ANYTHING after midnight on the evening before your procedure including candy & gum. ONLY SIPS OF WATER with your medications are allowed on the morning of your procedure. TAKE ALL OF YOUR REGULAR MEDICATIONS THE MORNING OF YOUR PROCEDURE with sips of water! We may call you to stop some of your blood sugar, blood pressure and blood thinning medications depending on the procedure. Please take all of these medications unless we instruct you to stop them. If you have an allergy to x-ray dye, please contact Interventional Radiology for an x-ray dye preparation which usually consists of an oral steroid and Benadryl. The day of your procedure:  Bring a list of the medications you take at home. Bring medications you take for breathing problems (such as inhalers), medications for chest pain, or both. Bring your insurance card and a form of photo ID. Bring a case for your glasses or contacts. Please leave all valuables such as credit cards and jewelry at home. Report to the registration desk in the main lobby at the [facility]. Ask to be directed to the After Procedure Unit on the 2nd floor. While your procedure is being performed your family may wait in the Waiting Room on the 2nd floor.   Be prepared to stay overnight just in case. Sometimes procedures will indicate the need for further observation or treatment. If you are scheduled for a follow-up visit with the Interventional Radiologist after your procedure, you will be called with a date and time. Special Instructions (Medications to alter or stop taking before your procedure etc.):  ASA LD 12/5/23 and restart 12/12/23. Patient being brought in by [employer].

## 2023-12-05 ENCOUNTER — APPOINTMENT (OUTPATIENT)
Dept: RADIATION ONCOLOGY | Facility: CLINIC | Age: 58
End: 2023-12-05
Payer: COMMERCIAL

## 2023-12-05 ENCOUNTER — HOSPITAL ENCOUNTER (OUTPATIENT)
Dept: INFUSION CENTER | Facility: CLINIC | Age: 58
Discharge: HOME | End: 2023-12-05
Payer: COMMERCIAL

## 2023-12-05 DIAGNOSIS — C06.9 ORAL CANCER (HCC): ICD-10-CM

## 2023-12-05 LAB
ALBUMIN SERPL BCP-MCNC: 3.7 G/DL (ref 3.5–5)
ALP SERPL-CCNC: 72 U/L (ref 34–104)
ALT SERPL W P-5'-P-CCNC: 15 U/L (ref 7–52)
ANION GAP SERPL CALCULATED.3IONS-SCNC: 5 MMOL/L
AST SERPL W P-5'-P-CCNC: 12 U/L (ref 13–39)
BASOPHILS # BLD AUTO: 0.03 THOUSANDS/ÂΜL (ref 0–0.1)
BASOPHILS NFR BLD AUTO: 1 % (ref 0–1)
BILIRUB SERPL-MCNC: 0.35 MG/DL (ref 0.2–1)
BUN SERPL-MCNC: 8 MG/DL (ref 5–25)
CALCIUM SERPL-MCNC: 9.1 MG/DL (ref 8.4–10.2)
CHLORIDE SERPL-SCNC: 95 MMOL/L (ref 96–108)
CO2 SERPL-SCNC: 28 MMOL/L (ref 21–32)
CREAT SERPL-MCNC: 0.6 MG/DL (ref 0.6–1.3)
EOSINOPHIL # BLD AUTO: 0.13 THOUSAND/ÂΜL (ref 0–0.61)
EOSINOPHIL NFR BLD AUTO: 2 % (ref 0–6)
ERYTHROCYTE [DISTWIDTH] IN BLOOD BY AUTOMATED COUNT: 12.3 % (ref 11.6–15.1)
GFR SERPL CREATININE-BSD FRML MDRD: 109 ML/MIN/1.73SQ M
GLUCOSE SERPL-MCNC: 114 MG/DL (ref 65–140)
HCT VFR BLD AUTO: 31.9 % (ref 36.5–49.3)
HGB BLD-MCNC: 10.9 G/DL (ref 12–17)
IMM GRANULOCYTES # BLD AUTO: 0.04 THOUSAND/UL (ref 0–0.2)
IMM GRANULOCYTES NFR BLD AUTO: 1 % (ref 0–2)
LYMPHOCYTES # BLD AUTO: 0.69 THOUSANDS/ÂΜL (ref 0.6–4.47)
LYMPHOCYTES NFR BLD AUTO: 11 % (ref 14–44)
MAGNESIUM SERPL-MCNC: 1.5 MG/DL (ref 1.9–2.7)
MCH RBC QN AUTO: 33.2 PG (ref 26.8–34.3)
MCHC RBC AUTO-ENTMCNC: 34.2 G/DL (ref 31.4–37.4)
MCV RBC AUTO: 97 FL (ref 82–98)
MONOCYTES # BLD AUTO: 1.11 THOUSAND/ÂΜL (ref 0.17–1.22)
MONOCYTES NFR BLD AUTO: 18 % (ref 4–12)
NEUTROPHILS # BLD AUTO: 4.18 THOUSANDS/ÂΜL (ref 1.85–7.62)
NEUTS SEG NFR BLD AUTO: 67 % (ref 43–75)
NRBC BLD AUTO-RTO: 0 /100 WBCS
PLATELET # BLD AUTO: 229 THOUSANDS/UL (ref 149–390)
PMV BLD AUTO: 9.3 FL (ref 8.9–12.7)
POTASSIUM SERPL-SCNC: 4.3 MMOL/L (ref 3.5–5.3)
PROT SERPL-MCNC: 6.9 G/DL (ref 6.4–8.4)
RBC # BLD AUTO: 3.28 MILLION/UL (ref 3.88–5.62)
SODIUM SERPL-SCNC: 128 MMOL/L (ref 135–147)
WBC # BLD AUTO: 6.18 THOUSAND/UL (ref 4.31–10.16)

## 2023-12-05 PROCEDURE — 77014 CHG CT GUIDANCE RADIATION THERAPY FLDS PLACEMENT: CPT | Performed by: RADIOLOGY

## 2023-12-05 PROCEDURE — 80053 COMPREHEN METABOLIC PANEL: CPT | Performed by: INTERNAL MEDICINE

## 2023-12-05 PROCEDURE — 83735 ASSAY OF MAGNESIUM: CPT | Performed by: INTERNAL MEDICINE

## 2023-12-05 PROCEDURE — 85025 COMPLETE CBC W/AUTO DIFF WBC: CPT | Performed by: INTERNAL MEDICINE

## 2023-12-05 PROCEDURE — 77386 HB NTSTY MODUL RAD TX DLVR CPLX: CPT | Performed by: RADIOLOGY

## 2023-12-05 NOTE — PROGRESS NOTES
Teetee Zacarias  tolerated treatment well with no complications. Teetee Zacarias is aware of future appt on 12/6/23_______ at __0830______. AVS printed and given to Teetee Zacarias:  Yes_x__ No (Declined by Teetee Zacarias)     Pt here for central labs. Labs obtained via port a cath. Port flushed per protocol.

## 2023-12-06 ENCOUNTER — NUTRITION (OUTPATIENT)
Dept: NUTRITION | Facility: CLINIC | Age: 58
End: 2023-12-06

## 2023-12-06 ENCOUNTER — HOSPITAL ENCOUNTER (OUTPATIENT)
Dept: INFUSION CENTER | Facility: CLINIC | Age: 58
Discharge: HOME | End: 2023-12-06
Payer: COMMERCIAL

## 2023-12-06 ENCOUNTER — APPOINTMENT (OUTPATIENT)
Dept: RADIATION ONCOLOGY | Facility: CLINIC | Age: 58
End: 2023-12-06
Attending: INTERNAL MEDICINE
Payer: COMMERCIAL

## 2023-12-06 ENCOUNTER — TELEMEDICINE (OUTPATIENT)
Dept: HEMATOLOGY ONCOLOGY | Facility: CLINIC | Age: 58
End: 2023-12-06
Payer: COMMERCIAL

## 2023-12-06 VITALS
HEART RATE: 76 BPM | WEIGHT: 134.48 LBS | BODY MASS INDEX: 16.72 KG/M2 | TEMPERATURE: 99.3 F | DIASTOLIC BLOOD PRESSURE: 74 MMHG | SYSTOLIC BLOOD PRESSURE: 112 MMHG | RESPIRATION RATE: 16 BRPM | HEIGHT: 75 IN

## 2023-12-06 DIAGNOSIS — C06.9 ORAL CANCER (HCC): Primary | ICD-10-CM

## 2023-12-06 DIAGNOSIS — Z71.3 NUTRITIONAL COUNSELING: Primary | ICD-10-CM

## 2023-12-06 DIAGNOSIS — E83.42 HYPOMAGNESEMIA: ICD-10-CM

## 2023-12-06 PROCEDURE — 99443 PR PHYS/QHP TELEPHONE EVALUATION 21-30 MIN: CPT | Performed by: INTERNAL MEDICINE

## 2023-12-06 PROCEDURE — 96413 CHEMO IV INFUSION 1 HR: CPT

## 2023-12-06 PROCEDURE — 96367 TX/PROPH/DG ADDL SEQ IV INF: CPT

## 2023-12-06 PROCEDURE — 77386 HB NTSTY MODUL RAD TX DLVR CPLX: CPT | Performed by: RADIOLOGY

## 2023-12-06 PROCEDURE — 99406 BEHAV CHNG SMOKING 3-10 MIN: CPT | Performed by: INTERNAL MEDICINE

## 2023-12-06 PROCEDURE — 77014 CHG CT GUIDANCE RADIATION THERAPY FLDS PLACEMENT: CPT | Performed by: RADIOLOGY

## 2023-12-06 PROCEDURE — 96366 THER/PROPH/DIAG IV INF ADDON: CPT

## 2023-12-06 RX ORDER — MAGNESIUM SULFATE HEPTAHYDRATE 40 MG/ML
2 INJECTION, SOLUTION INTRAVENOUS ONCE
Status: CANCELLED
Start: 2023-12-06

## 2023-12-06 RX ORDER — SODIUM CHLORIDE 9 MG/ML
20 INJECTION, SOLUTION INTRAVENOUS ONCE
Status: COMPLETED | OUTPATIENT
Start: 2023-12-06 | End: 2023-12-06

## 2023-12-06 RX ORDER — SODIUM CHLORIDE 9 MG/ML
20 INJECTION, SOLUTION INTRAVENOUS ONCE
OUTPATIENT
Start: 2023-12-20

## 2023-12-06 RX ORDER — MAGNESIUM SULFATE HEPTAHYDRATE 40 MG/ML
2 INJECTION, SOLUTION INTRAVENOUS ONCE
Status: COMPLETED | OUTPATIENT
Start: 2023-12-06 | End: 2023-12-06

## 2023-12-06 RX ORDER — SODIUM CHLORIDE 9 MG/ML
20 INJECTION, SOLUTION INTRAVENOUS ONCE
OUTPATIENT
Start: 2023-12-13

## 2023-12-06 RX ADMIN — SODIUM CHLORIDE 500 ML: 0.9 INJECTION, SOLUTION INTRAVENOUS at 12:02

## 2023-12-06 RX ADMIN — APREPITANT 130 MG: 130 INJECTION, EMULSION INTRAVENOUS at 10:24

## 2023-12-06 RX ADMIN — SODIUM CHLORIDE 20 ML/HR: 0.9 INJECTION, SOLUTION INTRAVENOUS at 09:56

## 2023-12-06 RX ADMIN — MAGNESIUM SULFATE HEPTAHYDRATE 2 G: 40 INJECTION, SOLUTION INTRAVENOUS at 08:51

## 2023-12-06 RX ADMIN — CISPLATIN 70 MG: 1 INJECTION, SOLUTION INTRAVENOUS at 11:02

## 2023-12-06 RX ADMIN — SODIUM CHLORIDE 500 ML: 0.9 INJECTION, SOLUTION INTRAVENOUS at 08:51

## 2023-12-06 RX ADMIN — MAGNESIUM SULFATE HEPTAHYDRATE 2 G: 40 INJECTION, SOLUTION INTRAVENOUS at 12:03

## 2023-12-06 RX ADMIN — DEXAMETHASONE SODIUM PHOSPHATE: 10 INJECTION, SOLUTION INTRAMUSCULAR; INTRAVENOUS at 09:57

## 2023-12-06 NOTE — PROGRESS NOTES
Medication time out done on 12/6/23 with Serenity Santana, RN at [address] [place] [address] infusion center, addition of Magnesium 2 GM pre and post hydration for 12/6/23.

## 2023-12-06 NOTE — PROGRESS NOTES
Patient arrived for infusions. Port accessed with positive blood return. Received pre and post hydration with mag as well. All tolerated with no noted adverse reactions. Patient with next appt scheduled for December 12th and 13th. Port flushed and de accessed. AVS given and patient left infusion center in baseline condition.

## 2023-12-06 NOTE — PROGRESS NOTES
Spoke with Rommel Wells RN via telephone. Per Dr Markie Ralph, patient is to receive 2 grams of Magnesium to pre hydration and 2 grams of Magnesium to post hydration with today's (12/6/23) treatment.

## 2023-12-06 NOTE — PATIENT INSTRUCTIONS
Nutrition Rx & Recommendations:  Diet: Very Soft/Moist, High Calorie, High Protein Diet; enteral nutrition once Peg placed  Small, frequent meals/snacks may be easier to tolerate than 3 large daily meals. Aim for 5-6 small meals per day (every 2-3 hours). Include protein at all meals/snacks. Stay hydrated by sipping fluids of choice/tolerance throughout the day. Liquid nutrition may be better tolerated than solids at times. Refer to Eating Hints book for other meal/snack ideas and symptom management. Avoid spicy, acidic, sharp/hard/crunchy foods & carbonated beverages as needed. Follow proper oral care; Try baking soda/salt water rinse recipe (mix 3/4 tsp salt + 1 tsp baking soda + 1 qt water; rinse with plain water after using) in Eating Hints book (pg 18). Brush your teeth before/after meals & before bed. For weight loss: monitor your weight at home at least 2x/week, record your weight, start by adding 250-500 extra calories per day, eat 5-6 small scheduled meals every 2-3 hrs, choose foods that are high in protein and calories (see pages 49-53 in your Eating Hints book), drink liquids with calories for example: milkshakes/smoothies/juice/soup/whole milk/chocolate milk, cook with protein fortified milk (see recipe on page 36 in your Eating Hints book), consider ready-to-drink oral nutrition supplements such as Ensure Plus, Ensure Enlive, Boost Plus, or Boost Very High Calorie, avoid "diet" and "light" foods when possible, avoid drinking too much with meals, contact your dietitian with any continued weight loss over the course of 1 week. For more info see pages 35-37 in your Eating Hints book.   For trouble swallowing: eat 5-6 small meals/day; choose foods that are easy to swallow; choose foods that are high in protein & calories; cook foods until they are soft/tender; cut food into small pieces; moisten & soften foods (gravy/sauce/broth/yogurt); sip drinks through a straw (as tolerated); avoid foods/drinks that can burn/scrape your throat (hot temperatures, spicy foods, acidic foods, sharp/hard/crunchy foods, alcohol); talk to your doctor about a Speech Therapy referral for a swallowing evaluation (see page 26-28 in your Eating Hints book). Weigh yourself regularly. If you notice weight loss, make an effort to increase your daily food/calorie intake. If you continue to notice loss after these efforts, reach out to your dietitian to establish a plan to stabilize weight. Make hot cereal with whole milk rather than water  Try whole Fairlife milk  Ensure clear- aim for 4-5 daily until PEG is placed  Try homemade milkshakes/smoothies and incorporate 2 daily  Soft/easy to swallow foods that are high in calories and protein: casseroles, chicken/egg/tuna salad with extra ball to add calories and moisture, oatmeal/cream of wheat made with whole milk, cottage cheese, whole milk [place] yogurt, scrambled eggs with cheese, avocado, macaroni and cheese, mashed potatoes made with butter and whole milk or dry milk powder, ground meat with extra sauce/gravy, canned fruit, peanut butter, cream soups (cream of chicken, cream of mushroom, broccoli cheddar), pudding made with whole milk, custard, ice cream, banana, milkshakes/smoothies, Review page 52 of Eating Hints Book for more ideas. Use whole milk greek yogurt  Enteral nutrition will be needed for Eliceo's sole source of nutrition, recommend:  Initiation: Recommend initiating bolus TF with a 120 mL bolus 8 times per day (every 2-3 hours, as tolerated) until bolus volume goal is achieved. TF Goal: 1 container (237 mL) 8 times per day of Jevity 1.5 via PEG (push syringe or gravity syringe method to administer boluses; 1 container to infuse over ~15-20 minutes).   Water Flushin mL free water flush pre/post each bolus (960 mL water)   Enteral Nutrition goal to provide: 1775 mL volume (7.5 containers/day), 2666 kcal, 113 grams protein, 1351 mL free water, 2311 mL total water daily. Allow for substitutions   Your syringes are each 60 mL or 2 fl oz. Always flush your feeding tube with 60 mL room-temp water 1-2 times daily while feeding tube is not in use. Call [facility] when you have 10 days left of TF supplies: [phone], option 3 (customer support). Follow Up Plan: 12/20/23 during infusion.  Will touch base 12/14 before or after RT to discuss how Tfs are going  Recommend Referral to Other Providers: none at this time

## 2023-12-06 NOTE — PROGRESS NOTES
Outpatient Oncology Nutrition Consultation   Type of Consult: Follow Up  Care Location: [facility] [place]    Reason for referral:   Received notification by RN NavigatorMayco, on 7/18/23 that pt has triggered for oncology nutrition care (reason for referral: HNC dx and Malnutrition Screening Tool (MST) Triggers: scored a 0 indicating No recent wt loss and is not eating poorly due to a decreased appetite. (Date of MST: 7/18/23)). Received notification by Leeanna Cevallos., on 10/12/23 that pt has triggered for oncology nutrition care (reason for referral: HNC dx and Malnutrition Screening Tool (MST) Triggers: scored a 3 indicating 24-33# (11-15 kg) recent wt loss and is not eating poorly due to a decreased appetite. (Date of MST: 10/12/23) MST Note: "over the last 4 months "). Nutrition Assessment:   Oncology Diagnosis & Treatments: Oral Cancer  Started concurrent chemoradiation (weekly cisplatin &  RT to the oral cavity and BL necks started 11/14/23)   Oncology History   Oral cancer ([address])   5/23/2023 Initial Diagnosis    Oral cancer ([address])     8/29/2023 -  Cancer Staged    Staging form: Oral Cavity, AJCC 8th Edition  - Clinical stage from 8/29/2023: Stage CHETAN (cT4a, cN2c, cM0) - Signed by Gallito Sam MD on 9/13/2023  Stage prefix: Initial diagnosis       8/29/2023 Biopsy    A.  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, p16 positive      Chemotherapy    alteplase (CATHFLO), 2 mg, Intracatheter, Every 1 Minute as needed, 0 of 7 cycles  CISplatin (PLATINOL) infusion, 40 mg/m2, Intravenous, Once, 0 of 7 cycles  aprepitant (CINVANTI) in  mL IVPB, 130 mg, Intravenous, Once, 0 of 7 cycles         Past Medical & Surgical Hx:   Patient Active Problem List   Diagnosis    Dental caries    Cervical lymphadenopathy    Periodontitis    Alcohol use disorder, severe, dependence ([address])    Hypertension Cigarette nicotine dependence without complication    Hyponatremia    Hypokalemia    Hypomagnesemia    Oral cancer (HCC)    Preoperative clearance    Drug induced constipation    Cancer-related pain    At risk for respiratory depression due to opioid    Pain in lower jaw    Counseling regarding advanced care planning and goals of care    History of CVA (cerebrovascular accident)    Moderate protein-calorie malnutrition ([address])    Alcoholic intoxication without complication ([address])    Port-A-Cath in place     Past Medical History:   Diagnosis Date    Alcohol abuse     Cancer ([address])     Hypertension     Muscle weakness     Left leg    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    Stroke [facility])      Past Surgical History:   Procedure Laterality Date    EGD      IR PORT PLACEMENT  11/7/2023    KNEE ARTHROSCOPY Left     MULTIPLE TOOTH EXTRACTIONS N/A 10/16/2023    Procedure: EXTRACTION OF ALL REMAINING TEETH;  Surgeon: Verona Mackay DMD;  Location: [facility];  Service: Oral Surgery       Review of Medications:   Vitamins, Supplements and Herbals: No, pt denies taking supplements    Current Outpatient Medications:     aspirin 81 mg chewable tablet, Chew 1 tablet (81 mg total) daily, Disp: 90 tablet, Rfl: 0    atorvastatin (LIPITOR) 40 mg tablet, Take 1 tablet (40 mg total) by mouth every evening, Disp: 30 tablet, Rfl: 0    chlorhexidine (PERIDEX) 0.12 % solution, , Disp: , Rfl:     clopidogrel (Plavix) 75 mg tablet, Take 1 tablet (75 mg total) by mouth daily for 21 days (Patient not taking: Reported on 11/22/2023), Disp: 21 tablet, Rfl: 0    diphenhydramine, lidocaine, Al/Mg hydroxide, simethicone (Magic Mouthwash) SUSP, Swish and swallow 10 mL every 4 (four) hours as needed for mouth pain or discomfort (Patient not taking: Reported on 11/24/2023), Disp: 237 mL, Rfl: 7    ibuprofen (MOTRIN) 600 mg tablet, Take 1 tablet (600 mg total) by mouth every 6 (six) hours as needed for mild pain (Patient not taking: Reported on 11/24/2023), Disp: 30 tablet, Rfl: 0    naloxone (NARCAN) 4 mg/0.1 mL nasal spray, Administer 1 spray into a nostril. If no response after 2-3 minutes, give another dose in the other nostril using a new spray. (Patient not taking: Reported on 11/22/2023), Disp: 1 each, Rfl: 1    oxyCODONE (ROXICODONE) 20 MG TABS, Take 1/2 tab (10mg) for moderate pain, 1 tab (20mg) for severe pain, every 4 hours AS NEEDED. Do not take sooner than 4 hours, Disp: 90 tablet, Rfl: 0    polyethylene glycol (MIRALAX) 17 g packet, Take 17 g by mouth daily (Patient not taking: Reported on 11/24/2023), Disp: 30 each, Rfl: 0    senna-docusate sodium (SENOKOT-S) 8.6-50 mg per tablet, Take 1 tablet by mouth daily, Disp: 60 tablet, Rfl: 2  No current facility-administered medications for this visit.     Facility-Administered Medications Ordered in Other Visits:     alteplase (CATHFLO) injection 2 mg, 2 mg, Intracatheter, Q1MIN PRN, Gretel La MD    magnesium sulfate 2 g/50 mL IVPB (premix) 2 g, 2 g, Intravenous, Once, Gretel La MD, 2 g at 12/06/23 1203    sodium chloride 0.9 % bolus 500 mL, 500 mL, Intravenous, Once, Gretel La MD, Last Rate: 500 mL/hr at 12/06/23 1202, 500 mL at 12/06/23 1202    Most Recent Lab Results:   Lab Results   Component Value Date    WBC 6.18 12/05/2023    NEUTROABS 4.18 12/05/2023    CHOLESTEROL 141 11/04/2023    TRIG 75 11/04/2023    HDL 59 11/04/2023    LDLCALC 67 11/04/2023    ALT 15 12/05/2023    AST 12 (L) 12/05/2023    ALB 3.7 12/05/2023    SODIUM 128 (L) 12/05/2023    SODIUM 131 (L) 11/28/2023    K 4.3 12/05/2023    K 4.1 11/28/2023    CL 95 (L) 12/05/2023    BUN 8 12/05/2023    BUN 8 11/28/2023    CREATININE 0.60 12/05/2023    CREATININE 0.56 (L) 11/28/2023    EGFR 109 12/05/2023    GLUCOSE 93 12/11/2016    POCGLU 126 11/06/2023    GLUF 87 10/16/2023    GLUC 114 12/05/2023    HGBA1C 5.9 (H) 11/04/2023    CALCIUM 9.1 12/05/2023    MG 1.5 (L) 12/05/2023       Anthropometric Measurements: Height: 75"  Ht Readings from Last 1 Encounters:   12/06/23 6' 3" (1.905 m)     Wt Readings from Last 20 Encounters:   12/06/23 61 kg (134 lb 7.7 oz)   11/29/23 61.7 kg (136 lb 0.4 oz)   11/24/23 62.1 kg (136 lb 12.8 oz)   11/22/23 64.9 kg (143 lb)   11/21/23 63.4 kg (139 lb 12.4 oz)   11/15/23 62.3 kg (137 lb 5.6 oz)   11/12/23 59.8 kg (131 lb 13.4 oz)   11/04/23 66.7 kg (147 lb)   11/02/23 64.3 kg (141 lb 12.8 oz)   10/31/23 64 kg (141 lb)   10/16/23 66.7 kg (147 lb)   10/12/23 66.7 kg (147 lb)   10/11/23 64.9 kg (143 lb)   08/29/23 72.6 kg (160 lb)   08/04/23 72.6 kg (160 lb)   05/23/23 66.2 kg (146 lb)   03/05/23 71.3 kg (157 lb 3 oz)   02/22/23 70.3 kg (155 lb)   06/18/19 66.7 kg (147 lb)   01/05/17 79.4 kg (175 lb)     Weight History:   Usual Weight: 170-175# (July 2023)  Varian: (11/16/23) 141.8#, (11/20/23) 140#, (11/27/23) 135.5#, (11/28/23) 135.2#, (11/30/23) 140.4#, (12/4/23) 135#    Oncology Nutrition-Anthropometrics      Flowsheet Row Nutrition from 12/6/2023 in [facility] Nutrition from 11/22/2023 in [facility]   Patient age (years): 59 years 59 years   Patient (male) height (in): 76 in 76 in   Current weight (lbs): 134.5 lbs 140 lbs   Current weight to be used for anthropometric calculations (kg) 61.1 kg 63.6 kg   BMI: 16.8 17.5   IBW male 196 lb 196 lb   IBW (kg) male 89.1 kg 89.1 kg   IBW % (male) 68.6 % 71.4 %   Adjusted BW (male): 180.6 lbs 182 lbs   Adjusted BW in kg (male): 82.1 kg 82.7 kg   % weight change after 1 week: -1.2 % 1.9 %   Weight change after 1 week (lbs) -1.6 lbs 2.6 lbs   % weight change after 1 month: -8.5 % -0.7 %   Weight change after 1 month (lbs) -12.5 lbs -1 lbs   % weight change after 3 months: -15.9 % -12.5 %   Weight change after 3 months (lbs) -25.5 lbs -20 lbs   % weight change after 6 months: -- -4.1 %   Weight change after 6 months (lbs) -- -6 lbs          Nutrition-Focused Physical Findings: mild  muscle depletion ([place]) - hollowing/scooping/depression    Food/Nutrition-Related History & Client/Social History:    Current Nutrition Impact Symptoms:  [x] Nausea a little after chemo [x] Reduced Appetite  [] Acid Reflux    [] Vomiting  [x] Unintended Wt Loss - ~30# wt loss in the past 3 mo d/t poor dentition and oral pain per pt [] Malabsorption    [] Diarrhea  [] Unintended Wt Gain  [] Dumping Syndrome    [] Constipation  [] Thick Mucous/Secretions  [] Abdominal Pain    [] Dysgeusia (Altered Taste)  [] Xerostomia (Dry Mouth)  [] Gas    [] Dysosmia (Altered Smell)  [] Thrush  [x] Difficulty Chewing - pt reports he recently had all of his teeth removed    [x] Oral Mucositis (Sore Mouth)  [x] Fatigue  [] Hyperglycemia   Lab Results   Component Value Date    HGBA1C 5.9 (H) 11/04/2023      [] Odynophagia  [] Esophagitis  [] Other:    [x] Dysphagia  [] Early Satiety  [] No Problems Eating      Food Allergies & Intolerances: no    Current Diet: Soft/Moist - room temp foods d/t oral pain  Current Nutrition Intake: Decreased since last visit. Some days are better than others though  Appetite: Fair , Poor  Nutrition Route: PO , PEG placement scheduled for 12/11  Oral Care:  rx mouthwash BID, brushing tongue 2-3x/day; had all of teeth removed.  Plans to  MMW  Activity level: not working during tx    24 Hr Diet Recall:   Breakfast: 1 container of yogurt  Snack: maybe some jello  Lunch: yogurt, applesauce   Snack: none  Dinner: Ensure  Snack: ice cream    Beverages: water (sips), apple juice (8 oz x2-4), chocolate whole milk (8 oz x1-2), Gatorade (20 oz x1)  Supplements:   Ensure Clear (10 oz, 180 kcal, 8 g pro) maybe once/day  Milkshakes - once in a while - milk, ice cream, banana      Oncology Nutrition-Estimated Needs      Flowsheet Row Nutrition from 10/31/2023 in [address] [place] [address] Oncology Diet[facility] Services   Weight type used IBW   Weight in kilograms (kg) used for estimated needs 89.1 kg   Energy needs formula:  30-35 kcal/kg   Energy needs based on 30 kcal/k kcal   Energy needs based on 35 kcal/k kcal   Protein needs formula: 1.2-1.5 g/kg   Protein needs based on 1.2 g/k g   Protein needs based on 1.5 g/kg 134 g   Fluid needs formula: 30-35 mL/kg   Fluid needs based on 30 mL/kg 2673 mL   Fluid needs in ounces 90 oz   Fluid needs based on 35 mL/kg 3119 mL   Fluid needs in ounces 105 oz             Discussion & Intervention:   Jez Allen was evaluated today for an RD follow up regarding HNC. Jez Allen is currently undergoing tx for HNC . Met with Eliceo during infusion. He reports difficulty eating. Unable to swallow much at this time. He is eating small amounts of food like yogurt, pudding, jello. Sipping on juice, Ensure clear sometimes, etc. He is scheduled for a PEG placement . We discussed TF plan and goal. Provided some education today on flushing, TF administrating (gravity feeds), etc. All questions answered at this time. Reviewed 24 hour recall, which revealed an inadequate po intake, and discussed ways to increase kcal, protein, and fluid intakes and optimize nutrient intake. Also reviewed the importance of wt management throughout the tx process and the role of a high kcal/ high protein diet in managing wt and overall health.   Based on today's assessment, discussion included: MNT for: wt loss, oral pain, soft/moist food, enteral nutrition, dysphagia, how to modify foods for anticipated nutrition impact symptoms pt may experience during CA tx, practicing proper oral care, a high kcal/protein diet & food choices to include at all meals & snacks (Examples of high kcal foods: cheese, full-fat dairy products, nut butter, plant-based fats, coconut oil/milk, avocado, butter, cream soup, etc. Examples of high protein foods: eggs, chicken, fish, beans/legumes, nuts/nut butters, bone broth, etc.) , fortifying foods for added kcal and protein (examples include: adding cheese to foods such as eggs, mashed potatoes, casseroles, etc.; Making oatmeal with whole milk rather than water; Making fortified mashed potatoes with cream, butter, dry milk powder, plain [place] yogurt, and cheese.), adequate hydration & fluid choices, sipping on calorie containing beverages (examples include: adding whole milk or cream to coffee, oral nutrition supplements, juice, electrolyte replacement beverages, milk, etc.), eating smaller more frequent meals every 2-3 hours (5-6 small meals/day), having consistent and planned snacks between meals, increasing oral nutrition supplements, utilizing oral nutrition supplements, and recipe suggestions/resources, trying new recipes, & cooking at home more often. Moving forward, Kristan Maldonado was encouraged to increase kcal, protein, and fluid intakes and practice MNT for nutrition impact sx management. Materials Provided: not applicable  All questions and concerns addressed during today’s visit. Kristan Maldonado has RD contact information. Nutrition Diagnosis:   Inadequate Energy Intake related to physiological causes, disease state and treatment related issues as evidenced by food recall, wt loss and discussion with pt and/or family. Increased Nutrient Needs (kcal & pro) related to increased demand for nutrients and disease state as evidenced by cancer dx and pt undergoing tx for cancer. Altered GI Function related to alteration in GI tract structure and integrity as evidenced by Unintended Weight Loss and Oral Mucositis (Sore Mouth). Patient has clinical indicators (or ASPEN criteria) consistent with severe protein-calorie malnutrition in the context of Chronic Illness as evidenced by >7.5% wt loss in 3 months and </=75% energy intake vs. Estimated needs for >/=1 month.   Monitoring & Evaluation:   Goals:  weight maintenance/stabilization  adequate nutrition impact symptom management  pt to meet >/=75% estimated nutrition needs daily  achieve tube feeding goal rate (once PEG is placed)    Progress Towards Goals: Not Progressing    Nutrition Rx & Recommendations:  Diet: Very Soft/Moist, High Calorie, High Protein Diet; enteral nutrition once Peg placed  Small, frequent meals/snacks may be easier to tolerate than 3 large daily meals. Aim for 5-6 small meals per day (every 2-3 hours). Include protein at all meals/snacks. Stay hydrated by sipping fluids of choice/tolerance throughout the day. Liquid nutrition may be better tolerated than solids at times. Refer to Eating Hints book for other meal/snack ideas and symptom management. Avoid spicy, acidic, sharp/hard/crunchy foods & carbonated beverages as needed. Follow proper oral care; Try baking soda/salt water rinse recipe (mix 3/4 tsp salt + 1 tsp baking soda + 1 qt water; rinse with plain water after using) in Eating Hints book (pg 18). Brush your teeth before/after meals & before bed. For weight loss: monitor your weight at home at least 2x/week, record your weight, start by adding 250-500 extra calories per day, eat 5-6 small scheduled meals every 2-3 hrs, choose foods that are high in protein and calories (see pages 49-53 in your Eating Hints book), drink liquids with calories for example: milkshakes/smoothies/juice/soup/whole milk/chocolate milk, cook with protein fortified milk (see recipe on page 36 in your Eating Hints book), consider ready-to-drink oral nutrition supplements such as Ensure Plus, Ensure Enlive, Boost Plus, or Boost Very High Calorie, avoid "diet" and "light" foods when possible, avoid drinking too much with meals, contact your dietitian with any continued weight loss over the course of 1 week. For more info see pages 35-37 in your Eating Hints book.   For trouble swallowing: eat 5-6 small meals/day; choose foods that are easy to swallow; choose foods that are high in protein & calories; cook foods until they are soft/tender; cut food into small pieces; moisten & soften foods (gravy/sauce/broth/yogurt); sip drinks through a straw (as tolerated); avoid foods/drinks that can burn/scrape your throat (hot temperatures, spicy foods, acidic foods, sharp/hard/crunchy foods, alcohol); talk to your doctor about a Speech Therapy referral for a swallowing evaluation (see page 26-28 in your Eating Hints book). Weigh yourself regularly. If you notice weight loss, make an effort to increase your daily food/calorie intake. If you continue to notice loss after these efforts, reach out to your dietitian to establish a plan to stabilize weight. Make hot cereal with whole milk rather than water  Try whole Fairlife milk  Ensure clear- aim for 4-5 daily until PEG is placed  Try homemade milkshakes/smoothies and incorporate 2 daily  Soft/easy to swallow foods that are high in calories and protein: casseroles, chicken/egg/tuna salad with extra ball to add calories and moisture, oatmeal/cream of wheat made with whole milk, cottage cheese, whole milk [place] yogurt, scrambled eggs with cheese, avocado, macaroni and cheese, mashed potatoes made with butter and whole milk or dry milk powder, ground meat with extra sauce/gravy, canned fruit, peanut butter, cream soups (cream of chicken, cream of mushroom, broccoli cheddar), pudding made with whole milk, custard, ice cream, banana, milkshakes/smoothies, Review page 52 of Eating Hints Book for more ideas. Use whole milk greek yogurt  Enteral nutrition will be needed for Eliceo's sole source of nutrition, recommend:  Initiation: Recommend initiating bolus TF with a 120 mL bolus 8 times per day (every 2-3 hours, as tolerated) until bolus volume goal is achieved. TF Goal: 1 container (237 mL) 8 times per day of Jevity 1.5 via PEG (push syringe or gravity syringe method to administer boluses; 1 container to infuse over ~15-20 minutes).   Water Flushin mL free water flush pre/post each bolus (960 mL water)   Enteral Nutrition goal to provide: 1896 mL volume (8 containers/day), 2844 kcal, 121 grams protein, 1441 mL free water, 2401 mL total water daily. Allow for substitutions   Your syringes are each 60 mL or 2 fl oz. Always flush your feeding tube with 60 mL room-temp water 1-2 times daily while feeding tube is not in use. Call [facility] when you have 10 days left of TF supplies: [phone], option 3 (customer support). Follow Up Plan:  12/20/23 during infusion.  Will touch base 12/14 before or after RT to discuss how Tfs are going  Recommend Referral to Other Providers: none at this time

## 2023-12-07 ENCOUNTER — APPOINTMENT (OUTPATIENT)
Dept: RADIATION ONCOLOGY | Facility: CLINIC | Age: 58
End: 2023-12-07
Payer: COMMERCIAL

## 2023-12-07 PROCEDURE — 77336 RADIATION PHYSICS CONSULT: CPT | Performed by: INTERNAL MEDICINE

## 2023-12-07 PROCEDURE — 77386 HB NTSTY MODUL RAD TX DLVR CPLX: CPT | Performed by: RADIOLOGY

## 2023-12-07 PROCEDURE — 77014 CHG CT GUIDANCE RADIATION THERAPY FLDS PLACEMENT: CPT | Performed by: RADIOLOGY

## 2023-12-08 ENCOUNTER — APPOINTMENT (OUTPATIENT)
Dept: RADIATION ONCOLOGY | Facility: CLINIC | Age: 58
End: 2023-12-08
Payer: COMMERCIAL

## 2023-12-08 PROCEDURE — 77427 RADIATION TX MANAGEMENT X5: CPT | Performed by: INTERNAL MEDICINE

## 2023-12-08 PROCEDURE — 77014 CHG CT GUIDANCE RADIATION THERAPY FLDS PLACEMENT: CPT | Performed by: RADIOLOGY

## 2023-12-08 PROCEDURE — 77386 HB NTSTY MODUL RAD TX DLVR CPLX: CPT | Performed by: RADIOLOGY

## 2023-12-11 ENCOUNTER — ANESTHESIA EVENT (OUTPATIENT)
Dept: INTERVENTIONAL RADIOLOGY/VASCULAR | Facility: HOSPITAL | Age: 58
End: 2023-12-11

## 2023-12-11 ENCOUNTER — HOSPITAL ENCOUNTER (OUTPATIENT)
Dept: INTERVENTIONAL RADIOLOGY/VASCULAR | Facility: HOSPITAL | Age: 58
Discharge: HOME | End: 2023-12-11
Attending: RADIOLOGY | Admitting: RADIOLOGY
Payer: COMMERCIAL

## 2023-12-11 ENCOUNTER — ANESTHESIA (OUTPATIENT)
Dept: INTERVENTIONAL RADIOLOGY/VASCULAR | Facility: HOSPITAL | Age: 58
End: 2023-12-11

## 2023-12-11 ENCOUNTER — APPOINTMENT (OUTPATIENT)
Dept: RADIATION ONCOLOGY | Facility: CLINIC | Age: 58
End: 2023-12-11
Payer: COMMERCIAL

## 2023-12-11 VITALS
RESPIRATION RATE: 20 BRPM | BODY MASS INDEX: 16.04 KG/M2 | TEMPERATURE: 98.6 F | DIASTOLIC BLOOD PRESSURE: 75 MMHG | OXYGEN SATURATION: 98 % | WEIGHT: 129 LBS | SYSTOLIC BLOOD PRESSURE: 119 MMHG | HEART RATE: 94 BPM | HEIGHT: 75 IN

## 2023-12-11 DIAGNOSIS — C06.9 ORAL CANCER (HCC): ICD-10-CM

## 2023-12-11 PROBLEM — F10.10 ALCOHOL ABUSE: Status: ACTIVE | Noted: 2023-12-11

## 2023-12-11 PROCEDURE — 49440 PLACE GASTROSTOMY TUBE PERC: CPT

## 2023-12-11 PROCEDURE — 49440 PLACE GASTROSTOMY TUBE PERC: CPT | Performed by: RADIOLOGY

## 2023-12-11 PROCEDURE — C1887 CATHETER, GUIDING: HCPCS

## 2023-12-11 PROCEDURE — C1769 GUIDE WIRE: HCPCS

## 2023-12-11 RX ORDER — CEFAZOLIN SODIUM 1 G/50ML
1000 SOLUTION INTRAVENOUS ONCE
Status: COMPLETED | OUTPATIENT
Start: 2023-12-11 | End: 2023-12-11

## 2023-12-11 RX ORDER — EPHEDRINE SULFATE 50 MG/ML
INJECTION INTRAVENOUS AS NEEDED
Status: DISCONTINUED | OUTPATIENT
Start: 2023-12-11 | End: 2023-12-11

## 2023-12-11 RX ORDER — LIDOCAINE HYDROCHLORIDE 10 MG/ML
INJECTION, SOLUTION EPIDURAL; INFILTRATION; INTRACAUDAL; PERINEURAL AS NEEDED
Status: COMPLETED | OUTPATIENT
Start: 2023-12-11 | End: 2023-12-11

## 2023-12-11 RX ORDER — PROMETHAZINE HYDROCHLORIDE 25 MG/ML
12.5 INJECTION, SOLUTION INTRAMUSCULAR; INTRAVENOUS ONCE AS NEEDED
Status: DISCONTINUED | OUTPATIENT
Start: 2023-12-11 | End: 2023-12-15 | Stop reason: HOSPADM

## 2023-12-11 RX ORDER — LIDOCAINE HYDROCHLORIDE 20 MG/ML
JELLY TOPICAL AS NEEDED
Status: COMPLETED | OUTPATIENT
Start: 2023-12-11 | End: 2023-12-11

## 2023-12-11 RX ORDER — SODIUM CHLORIDE 9 MG/ML
75 INJECTION, SOLUTION INTRAVENOUS CONTINUOUS
Status: DISCONTINUED | OUTPATIENT
Start: 2023-12-11 | End: 2023-12-15 | Stop reason: HOSPADM

## 2023-12-11 RX ORDER — PROPOFOL 10 MG/ML
INJECTION, EMULSION INTRAVENOUS CONTINUOUS PRN
Status: DISCONTINUED | OUTPATIENT
Start: 2023-12-11 | End: 2023-12-11

## 2023-12-11 RX ORDER — ONDANSETRON 2 MG/ML
4 INJECTION INTRAMUSCULAR; INTRAVENOUS ONCE AS NEEDED
Status: DISCONTINUED | OUTPATIENT
Start: 2023-12-11 | End: 2023-12-15 | Stop reason: HOSPADM

## 2023-12-11 RX ORDER — FENTANYL CITRATE 50 UG/ML
INJECTION, SOLUTION INTRAMUSCULAR; INTRAVENOUS AS NEEDED
Status: DISCONTINUED | OUTPATIENT
Start: 2023-12-11 | End: 2023-12-11

## 2023-12-11 RX ADMIN — SODIUM CHLORIDE 75 ML/HR: 0.9 INJECTION, SOLUTION INTRAVENOUS at 10:33

## 2023-12-11 RX ADMIN — CEFAZOLIN SODIUM 1000 MG: 1 SOLUTION INTRAVENOUS at 11:35

## 2023-12-11 RX ADMIN — LIDOCAINE HYDROCHLORIDE 1 APPLICATION: 20 JELLY TOPICAL at 11:39

## 2023-12-11 RX ADMIN — FENTANYL CITRATE 12.5 MCG: 50 INJECTION, SOLUTION INTRAMUSCULAR; INTRAVENOUS at 11:39

## 2023-12-11 RX ADMIN — PROPOFOL 50 MCG/KG/MIN: 10 INJECTION, EMULSION INTRAVENOUS at 11:34

## 2023-12-11 RX ADMIN — LIDOCAINE HYDROCHLORIDE 10 ML: 10 INJECTION, SOLUTION EPIDURAL; INFILTRATION; INTRACAUDAL at 12:03

## 2023-12-11 RX ADMIN — FENTANYL CITRATE 12.5 MCG: 50 INJECTION, SOLUTION INTRAMUSCULAR; INTRAVENOUS at 11:34

## 2023-12-11 RX ADMIN — GLUCAGON 1 MG: 1 INJECTION, POWDER, LYOPHILIZED, FOR SOLUTION INTRAMUSCULAR; INTRAVENOUS at 11:56

## 2023-12-11 RX ADMIN — IOHEXOL 20 ML: 350 INJECTION, SOLUTION INTRAVENOUS at 12:27

## 2023-12-11 RX ADMIN — EPHEDRINE SULFATE 10 MG: 50 INJECTION, SOLUTION INTRAVENOUS at 11:57

## 2023-12-11 NOTE — INTERVAL H&P NOTE
Update: (This section must be completed if the H&P was completed greater than 24 hrs to procedure or admission)    H&P reviewed. After examining the patient, I find no changed to the H&P since it had been written. Patient re-evaluated.  Accept as history and physical.    Anders Sosa MD/December 11, 2023/12:15 PM

## 2023-12-11 NOTE — BRIEF OP NOTE (RAD/CATH)
INTERVENTIONAL RADIOLOGY PROCEDURE NOTE    Date: 12/11/2023    Procedure:   Procedure Summary       Date: 12/11/23 Room / Location: [address] [place] [address] Interventional Radiology    Anesthesia Start: 5955 Anesthesia Stop:     Procedure: IR GASTROSTOMY TUBE PLACEMENT Diagnosis:       Oral cancer ([address])      (H&N cancer)    Scheduled Providers:  Responsible Provider: Evens Gordillo DO    Anesthesia Type: IV sedation with anesthesia ASA Status: 3            Preoperative diagnosis:   1. Oral cancer ([address])         Postoperative diagnosis: Same. Surgeon: Lianet Clinton MD     Assistant: None. No qualified resident was available. Blood loss: Minimal    Specimens: None     Findings: 12 F G tube placed. Complications: None immediate.     Anesthesia: conscious sedation

## 2023-12-11 NOTE — DISCHARGE INSTRUCTIONS
The Buttons on your belly near the G-tube should fall off on their own. If the buttons DO NOT fall off within 14 days, please call IR with the number below so we could remove them    Contact Interventional Radiology at [phone] Justin PATIENTS: Contact Interventional Radiology at [phone]) Vu Cooper PATIENTS: Contact Interventional Radiology at [phone])     Feeding Tube Use & Care     This handout explains how to properly care for feeding tubes and prevent problems like   irritation, infection, or clogging. Basic Tips for Using a Feeding Tube      During feedings and when giving medications through the tube, sit up or position yourself so   your shoulders are higher than your stomach. Do not lie flat on your back. Sitting upright and   having your shoulders higher than your stomach can help reduce the risk for food accidentally   getting into the lungs (aspiration) and stomach acid and food backing up from the stomach   (reflux). Store unopened formula for tube feeding at room temperature. Do not let it freeze. Opened cans or containers of formula should be labeled with the date that it was opened,   covered and stored in the refrigerator. Proper storage will reduce the risk for contamination   and prevent spoilage. Throw away all unused formula from opened cans or containers after 24   hours. If the formula is stored in the refrigerator, allow the formula to stand at room temperature   for 15 to 20 minutes before a feeding. Very cold liquids may upset the stomach. Always wash your hands with mild soap and warm water before touching the feeding   equipment, the formula, or giving medications. Keep the tube and the area around the tube clean. Always flush the feeding tube with water before and after each feeding, and before and after   administering medications. Flushing the tube keeps it clean and prevents the tube from   clogging.       Call your health care team if you have diarrhea, constipation, nausea, vomiting, or skin   irritation. How to Clean Around Your Feeding Tube     Follow these directions every day to clean around your feeding tube:     1. Wash your hands with mild soap and water and dry them with a clean towel. 2. Wet a soft, clean cloth or gauze with warm, soapy water. 3. Gently wipe the skin around the tube with the cloth. Also clean the base of the tube. 4. Carefully clean the skin under the bumper with the cloth. Do not pull on the feeding   tube. 5. Look for redness, swelling, bleeding, or leakage around the tube. If you notice any of   these things, report them to your health care team immediately. Do not wait another   day. 6. Rinse the area you cleaned with clear, warm water. (It is also safe to rinse off in the   shower.)     7. Pat the area dry with another clean, soft cloth. 8. Apply a protective skin barrier or antibacterial ointment to the area around tube if   you have been told to do so by your health care team.     9. For PEG tubes or G-Tubes Only - Gently push the base of the tube against the skin and   twist the tube in a [place]. This step keeps the tube from sticking to the inside of the   stomach. Never twist a J-Tube or Jejunostomy tube. 10. When you are finished, wash your hands again with mild soap and water and dry   them with a clean towel. How to Flush Your Feeding Tube     To keep your tube clean and unclogged, you must flush it with warm water before and after   feedings, and before and after medications are administered. Flushes also help you stay   hydrated. If you are not currently using your feeding tube for feedings or medications, you will   need to flush your feeding tube with 60 mL water at least once a day to keep the feeding tube   clean and working. Use the following steps to flush your feeding tube before and after feedings.  (Instructions for flushing your tube when administering medications are covered in the next section.)     1. Wash your hands with mild soap and water and dry them with a clean towel. 2. Fill a clean container with warm water. 3. Place the tip of a large feeding syringe in the water. 4. Draw 60 milliliters (mL) of water into the syringe. 5. Pinch or bend the end of the feeding tube to keep the contents from leaking out. 6. Open the cap on the feeding tube. 7. Insert the tip of the syringe into the feeding tube. 8. Unbend the tube to allow the water to flow in.     9. Gently and slowly push the plunger of the syringe down. 10. When the syringe is empty, pinch or bend tube to keep contents from leaking out. Then remove the syringe from the tube and close the cap on the feeding tube. 11. Tape the tube to your stomach with medical paper tape. 12. Wash your hands with mild soap and water and dry them with a clean towel. How to Administer Medications Through a Feeding   Tube     General Tips      Check with your health care team before you take any medication through a feeding tube. Some medications should not be crushed, and a different prescription or form of the drug may   be needed for use in a feeding tube. Do not mix medications together. Give each medication separately through the feeding tube   and flush the tube with 30 milliliters (mL) of warm water before and after each medication. Solid medications must be crushed before given through your feeding tube. Thoroughly crush   the medication and dissolve it in 60 mL of water (see step 2 in the instructions below). Never   use tube feeding formula to dissolve a medication. . Some medications should not be crushed, and a different prescription or form of   the drug may be needed for use in a feeding tube.    . Do not use the feeding tube for medications that should not be crushed (such as   time-release medications). If you need to take medications at the same time as your tube feeding, give the medications   half-way through the feeding. Make sure your hands are clean before administering   medications. Remember to never mix medications with tube feeding formula. Step-by-Step Instructions     Follow these directions for taking medications through a feeding tube:     1. Prepare each liquid medication, and put it in a reachable but safe place. 2. Separately prepare each medication that needs to be crushed before it is   administered. Crush the medication and place it in 60 milliliters (mL) of warm water for   5 minutes before giving it through your feeding tube. Make sure the medication is   thoroughly dissolved in the water before giving it. 3. In a clean container, put enough warm water to flush the tube before and after each   medicine is given. You will need about ¼ cup of water for each medication you give   yourself. 4. To flush the feeding tube:     a. Pull the plunger out of your feeding syringe. b. Sagar Ramirez or pinch the end of the feeding tube to prevent the contents from   leaking out.     c. Open the cap on the feeding tube. d. Put the tip of the syringe into the tube. e. Svitlana Mouse the feeding syringe with 30 mL of warm water. Priyanka Sat the feeding tube and let the water run into your feeding tube. 5. After the tube is flushed, pour the liquid or crushed-and-diluted medication into the   feeding syringe. 6. Hold the syringe straight up and let the medication run into the feeding tube. 7. Repeat step 4 to flush your tube again. 8. Repeat steps 2 through 7 for each medication, giving water, then the medication,   then more water. 9. Remove the feeding syringe and close the feeding tube cap.

## 2023-12-11 NOTE — SEDATION DOCUMENTATION
Pt tolerated gtube placement with anesthesia. Vitals stable. Pt going to PACU/APU to recover.  Report given to receiving nurse

## 2023-12-11 NOTE — ANESTHESIA PREPROCEDURE EVALUATION
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 Wall thickness is mildly increased. There is concentric remodeling. The left ventricular ejection fraction is 60%. Systolic function is normal. Global longitudinal strain is normal at -20%. Wall motion is normal. Diastolic function is normal.    Right Ventricle: Right ventricular cavity size is normal. Systolic function is normal.    Aortic Valve: There is mild to moderate regurgitation with a centrally directed jet. Tricuspid Valve: There is mild regurgitation. Strain was performed to quantify interventricular dyssynchrony and evaluate components of myocardial function due to pre Chemotherapy. Results from the utilization of Strain Analysis are listed in the report below. Physical Exam    Airway    Mallampati score: IV  TM Distance: >3 FB  Neck ROM: full     Dental   Comment: Poor mouth opening. Right-side neck mass. No midline shift of tracheal noted. ROM WNL. Patient reports developing difficulty swallowing liquids within the past few days. Cardiovascular      Pulmonary      Other Findings        Anesthesia Plan  ASA Score- 3     Anesthesia Type- IV sedation with anesthesia with ASA Monitors. Additional Monitors:     Airway Plan:            Plan Factors-Exercise tolerance (METS): >4 METS. Chart reviewed. Existing labs reviewed. Patient summary reviewed. Patient is a current smoker. Induction- intravenous. Postoperative Plan-     Informed Consent- Anesthetic plan and risks discussed with patient. I personally reviewed this patient with the CRNA.  Discussed and agreed on the Anesthesia Plan with the CRNA. Margie Bhardwaj

## 2023-12-11 NOTE — ANESTHESIA POSTPROCEDURE EVALUATION
Post-Op Assessment Note    CV Status:  Stable  Pain Score: 0    Pain management: adequate       Mental Status:  Alert and awake   Hydration Status:  Euvolemic   PONV Controlled:  Controlled   Airway Patency:  Patent     Post Op Vitals Reviewed: Yes      Staff: CRNA           /80 (12/11/23 1225)    Temp (!) 97.4 °F (36.3 °C) (12/11/23 1225)    Pulse 87 (12/11/23 1225)   Resp 16 (12/11/23 1225)    SpO2 99 % (12/11/23 1225)

## 2023-12-12 ENCOUNTER — TELEPHONE (OUTPATIENT)
Dept: RADIOLOGY | Facility: HOSPITAL | Age: 58
End: 2023-12-12

## 2023-12-12 ENCOUNTER — HOSPITAL ENCOUNTER (OUTPATIENT)
Dept: INFUSION CENTER | Facility: CLINIC | Age: 58
Discharge: HOME | End: 2023-12-12
Payer: COMMERCIAL

## 2023-12-12 ENCOUNTER — APPOINTMENT (OUTPATIENT)
Dept: RADIATION ONCOLOGY | Facility: CLINIC | Age: 58
End: 2023-12-12
Payer: COMMERCIAL

## 2023-12-12 VITALS — TEMPERATURE: 96.2 F

## 2023-12-12 DIAGNOSIS — C06.9 ORAL CANCER (HCC): ICD-10-CM

## 2023-12-12 DIAGNOSIS — E83.42 HYPOMAGNESEMIA: Primary | ICD-10-CM

## 2023-12-12 DIAGNOSIS — C06.9 MALIGNANT NEOPLASM OF ORAL CAVITY (HCC): ICD-10-CM

## 2023-12-12 DIAGNOSIS — Z95.828 PORT-A-CATH IN PLACE: Primary | ICD-10-CM

## 2023-12-12 LAB
ALBUMIN SERPL BCP-MCNC: 3.9 G/DL (ref 3.5–5)
ALP SERPL-CCNC: 98 U/L (ref 34–104)
ALT SERPL W P-5'-P-CCNC: 21 U/L (ref 7–52)
ANION GAP SERPL CALCULATED.3IONS-SCNC: 8 MMOL/L
AST SERPL W P-5'-P-CCNC: 19 U/L (ref 13–39)
BASOPHILS # BLD AUTO: 0.02 THOUSANDS/ÂΜL (ref 0–0.1)
BASOPHILS NFR BLD AUTO: 0 % (ref 0–1)
BILIRUB SERPL-MCNC: 0.31 MG/DL (ref 0.2–1)
BUN SERPL-MCNC: 5 MG/DL (ref 5–25)
CALCIUM SERPL-MCNC: 9.7 MG/DL (ref 8.4–10.2)
CHLORIDE SERPL-SCNC: 91 MMOL/L (ref 96–108)
CO2 SERPL-SCNC: 27 MMOL/L (ref 21–32)
CREAT SERPL-MCNC: 0.56 MG/DL (ref 0.6–1.3)
EOSINOPHIL # BLD AUTO: 0.14 THOUSAND/ÂΜL (ref 0–0.61)
EOSINOPHIL NFR BLD AUTO: 3 % (ref 0–6)
ERYTHROCYTE [DISTWIDTH] IN BLOOD BY AUTOMATED COUNT: 13 % (ref 11.6–15.1)
GFR SERPL CREATININE-BSD FRML MDRD: 112 ML/MIN/1.73SQ M
GLUCOSE SERPL-MCNC: 90 MG/DL (ref 65–140)
HCT VFR BLD AUTO: 33.1 % (ref 36.5–49.3)
HGB BLD-MCNC: 11.2 G/DL (ref 12–17)
IMM GRANULOCYTES # BLD AUTO: 0.03 THOUSAND/UL (ref 0–0.2)
IMM GRANULOCYTES NFR BLD AUTO: 1 % (ref 0–2)
LYMPHOCYTES # BLD AUTO: 0.45 THOUSANDS/ÂΜL (ref 0.6–4.47)
LYMPHOCYTES NFR BLD AUTO: 8 % (ref 14–44)
MAGNESIUM SERPL-MCNC: 1.4 MG/DL (ref 1.9–2.7)
MCH RBC QN AUTO: 32.7 PG (ref 26.8–34.3)
MCHC RBC AUTO-ENTMCNC: 33.8 G/DL (ref 31.4–37.4)
MCV RBC AUTO: 97 FL (ref 82–98)
MONOCYTES # BLD AUTO: 0.92 THOUSAND/ÂΜL (ref 0.17–1.22)
MONOCYTES NFR BLD AUTO: 17 % (ref 4–12)
NEUTROPHILS # BLD AUTO: 3.89 THOUSANDS/ÂΜL (ref 1.85–7.62)
NEUTS SEG NFR BLD AUTO: 71 % (ref 43–75)
NRBC BLD AUTO-RTO: 0 /100 WBCS
PLATELET # BLD AUTO: 218 THOUSANDS/UL (ref 149–390)
PMV BLD AUTO: 9.1 FL (ref 8.9–12.7)
POTASSIUM SERPL-SCNC: 3.9 MMOL/L (ref 3.5–5.3)
PROT SERPL-MCNC: 6.9 G/DL (ref 6.4–8.4)
RBC # BLD AUTO: 3.43 MILLION/UL (ref 3.88–5.62)
SODIUM SERPL-SCNC: 126 MMOL/L (ref 135–147)
WBC # BLD AUTO: 5.45 THOUSAND/UL (ref 4.31–10.16)

## 2023-12-12 PROCEDURE — 77014 CHG CT GUIDANCE RADIATION THERAPY FLDS PLACEMENT: CPT | Performed by: RADIOLOGY

## 2023-12-12 PROCEDURE — 83735 ASSAY OF MAGNESIUM: CPT | Performed by: INTERNAL MEDICINE

## 2023-12-12 PROCEDURE — 85025 COMPLETE CBC W/AUTO DIFF WBC: CPT | Performed by: INTERNAL MEDICINE

## 2023-12-12 PROCEDURE — 77386 HB NTSTY MODUL RAD TX DLVR CPLX: CPT | Performed by: RADIOLOGY

## 2023-12-12 PROCEDURE — 80053 COMPREHEN METABOLIC PANEL: CPT | Performed by: INTERNAL MEDICINE

## 2023-12-12 NOTE — TELEPHONE ENCOUNTER
Pt called stating he is in great pain from his Gtube placement done yesterday at the [facility]. I mentioned I would have a nurse give him a call.

## 2023-12-12 NOTE — PROGRESS NOTES
Magnesium 2 grams added to pre and post hydration for Cycle 5, per Dr. Sim Bradford recommendations.

## 2023-12-12 NOTE — PROGRESS NOTES
[place]  tolerated treatment well with no complications. [place] is aware of future appt on 12/13/23 at 0830.      Patient declined AVS.

## 2023-12-12 NOTE — PROGRESS NOTES
Title: addition of magnesium    Date patient scheduled: 12/12/23    Original medication ordered: n/a    New Medication ordered: Magnesium 2 GM pre and post hydration for 12/12/23. Is the patient scheduled within 24 hours? ? If yes, follow up with verbal telephone call. Call out to [facility] and spoke with Jazz Silverman RN    Office RN to route to [facility]. Infusion  pool routes to [facility] [place]. Infusion tech to receive message, confirm scheduled treatment duration matches ordered treatment duration or adjust accordingly, and re-link appointment request orders. Infusion tech to notify pharmacy and finance.

## 2023-12-12 NOTE — TELEPHONE ENCOUNTER
Attempted to phone patient with Dr. Crystal Emmanuel recommendation for oral magnesium and adding IV magnesium to treatment scheduled for 12/13.   Left voice message with direct call back number

## 2023-12-12 NOTE — PROGRESS NOTES
MED TIME OUT with Cassius Villalba RN. ADD 4 gm mag to treatment tomorrow 12/13. 2 gm with prehydration and 2 gm with posthydration. Pharmacy aware.

## 2023-12-13 ENCOUNTER — HOSPITAL ENCOUNTER (OUTPATIENT)
Dept: INFUSION CENTER | Facility: CLINIC | Age: 58
Discharge: HOME | End: 2023-12-13
Payer: COMMERCIAL

## 2023-12-13 ENCOUNTER — APPOINTMENT (OUTPATIENT)
Dept: RADIATION ONCOLOGY | Facility: CLINIC | Age: 58
End: 2023-12-13
Attending: INTERNAL MEDICINE
Payer: COMMERCIAL

## 2023-12-13 ENCOUNTER — APPOINTMENT (OUTPATIENT)
Dept: RADIATION ONCOLOGY | Facility: CLINIC | Age: 58
End: 2023-12-13
Payer: COMMERCIAL

## 2023-12-13 VITALS
BODY MASS INDEX: 16.61 KG/M2 | HEIGHT: 75 IN | HEART RATE: 78 BPM | TEMPERATURE: 97.8 F | WEIGHT: 133.6 LBS | DIASTOLIC BLOOD PRESSURE: 82 MMHG | RESPIRATION RATE: 16 BRPM | SYSTOLIC BLOOD PRESSURE: 140 MMHG

## 2023-12-13 DIAGNOSIS — C06.9 ORAL CANCER (HCC): Primary | ICD-10-CM

## 2023-12-13 DIAGNOSIS — E83.42 HYPOMAGNESEMIA: ICD-10-CM

## 2023-12-13 DIAGNOSIS — C06.9 MALIGNANT NEOPLASM OF ORAL CAVITY (HCC): ICD-10-CM

## 2023-12-13 PROCEDURE — 96366 THER/PROPH/DIAG IV INF ADDON: CPT

## 2023-12-13 PROCEDURE — 96367 TX/PROPH/DG ADDL SEQ IV INF: CPT

## 2023-12-13 PROCEDURE — 77386 HB NTSTY MODUL RAD TX DLVR CPLX: CPT | Performed by: RADIOLOGY

## 2023-12-13 PROCEDURE — 96413 CHEMO IV INFUSION 1 HR: CPT

## 2023-12-13 PROCEDURE — 77014 CHG CT GUIDANCE RADIATION THERAPY FLDS PLACEMENT: CPT | Performed by: RADIOLOGY

## 2023-12-13 RX ORDER — SODIUM CHLORIDE 9 MG/ML
20 INJECTION, SOLUTION INTRAVENOUS ONCE
Status: COMPLETED | OUTPATIENT
Start: 2023-12-13 | End: 2023-12-13

## 2023-12-13 RX ADMIN — MAGNESIUM SULFATE HEPTAHYDRATE: 500 INJECTION, SOLUTION INTRAMUSCULAR; INTRAVENOUS at 08:53

## 2023-12-13 RX ADMIN — DEXAMETHASONE SODIUM PHOSPHATE: 10 INJECTION, SOLUTION INTRAMUSCULAR; INTRAVENOUS at 10:07

## 2023-12-13 RX ADMIN — MAGNESIUM SULFATE HEPTAHYDRATE: 500 INJECTION, SOLUTION INTRAMUSCULAR; INTRAVENOUS at 12:25

## 2023-12-13 RX ADMIN — CISPLATIN 70 MG: 1 INJECTION, SOLUTION INTRAVENOUS at 11:23

## 2023-12-13 RX ADMIN — APREPITANT 130 MG: 130 INJECTION, EMULSION INTRAVENOUS at 10:33

## 2023-12-13 RX ADMIN — SODIUM CHLORIDE 20 ML/HR: 0.9 INJECTION, SOLUTION INTRAVENOUS at 08:45

## 2023-12-13 NOTE — PROGRESS NOTES
Pt. Tolerated Cisplatin, Magnesium and Hydration without adverse event. Pt. Will return for labs on 12/19 at 1130. Pt. Will continue with XRT and Chemotherapy. AVS declined.

## 2023-12-13 NOTE — PLAN OF CARE
Problem: INFECTION - ADULT  Goal: Absence or prevention of progression during hospitalization  Description: INTERVENTIONS:  - Assess and monitor for signs and symptoms of infection  - Monitor lab/diagnostic results  - Monitor all insertion sites, i.e. indwelling lines, tubes, and drains  - Monitor endotracheal if appropriate and nasal secretions for changes in amount and color  - [place] appropriate cooling/warming therapies per order  - Administer medications as ordered  - Instruct and encourage patient and family to use good hand hygiene technique  - Identify and instruct in appropriate isolation precautions for identified infection/condition  Outcome: Progressing  Goal: Absence of fever/infection during neutropenic period  Description: INTERVENTIONS:  - Monitor WBC    Outcome: Progressing     Problem: Knowledge Deficit  Goal: Patient/family/caregiver demonstrates understanding of disease process, treatment plan, medications, and discharge instructions  Description: Complete learning assessment and assess knowledge base.   Interventions:  - Provide teaching at level of understanding  - Provide teaching via preferred learning methods  Outcome: Progressing

## 2023-12-13 NOTE — PROGRESS NOTES
Pt. Denied new symptoms or concerns today. Pt. Verbalized getting a message from office of Dr. Oliverio Zambrano related to new prescription, but states it was sent to a pharmacy 20-30 minutes from his home. Pt. Does use [employer]. Message sent to Sahra Kennedy RN who routed prescription to [facility] on [address] In [facility]. Pt. Understands he should pick prescription up and begin today, but stated he will  over the weekend. Pt. Strongly encouraged to  today. Labs reviewed. Pt. Is receiving Magnesium 4 grams today along with Cisplatin and Hydration.

## 2023-12-14 ENCOUNTER — APPOINTMENT (OUTPATIENT)
Dept: RADIATION ONCOLOGY | Facility: CLINIC | Age: 58
End: 2023-12-14
Payer: COMMERCIAL

## 2023-12-14 ENCOUNTER — OFFICE VISIT (OUTPATIENT)
Dept: PALLIATIVE MEDICINE | Facility: CLINIC | Age: 58
End: 2023-12-14
Payer: COMMERCIAL

## 2023-12-14 ENCOUNTER — DOCUMENTATION (OUTPATIENT)
Dept: NUTRITION | Facility: CLINIC | Age: 58
End: 2023-12-14

## 2023-12-14 VITALS
TEMPERATURE: 96.6 F | BODY MASS INDEX: 17.12 KG/M2 | OXYGEN SATURATION: 89 % | WEIGHT: 137 LBS | SYSTOLIC BLOOD PRESSURE: 170 MMHG | HEART RATE: 50 BPM | DIASTOLIC BLOOD PRESSURE: 90 MMHG

## 2023-12-14 DIAGNOSIS — K59.03 DRUG INDUCED CONSTIPATION: ICD-10-CM

## 2023-12-14 DIAGNOSIS — F10.20 ALCOHOL USE DISORDER, SEVERE, DEPENDENCE (HCC): ICD-10-CM

## 2023-12-14 DIAGNOSIS — R68.84 PAIN IN LOWER JAW: ICD-10-CM

## 2023-12-14 DIAGNOSIS — G89.3 CANCER-RELATED PAIN: Primary | ICD-10-CM

## 2023-12-14 DIAGNOSIS — Z51.5 PALLIATIVE CARE ENCOUNTER: ICD-10-CM

## 2023-12-14 DIAGNOSIS — C06.9 ORAL CANCER (HCC): ICD-10-CM

## 2023-12-14 PROCEDURE — 77014 CHG CT GUIDANCE RADIATION THERAPY FLDS PLACEMENT: CPT | Performed by: RADIOLOGY

## 2023-12-14 PROCEDURE — 77386 HB NTSTY MODUL RAD TX DLVR CPLX: CPT | Performed by: RADIOLOGY

## 2023-12-14 PROCEDURE — 99214 OFFICE O/P EST MOD 30 MIN: CPT | Performed by: INTERNAL MEDICINE

## 2023-12-14 RX ORDER — OXYCODONE HYDROCHLORIDE 20 MG/1
TABLET ORAL
Qty: 90 TABLET | Refills: 0 | Status: SHIPPED | OUTPATIENT
Start: 2023-12-18

## 2023-12-14 NOTE — PROGRESS NOTES
Palliative and [address] [place] 59 y.o. male [id number]    Assessment/Plan:  1. Cancer-related pain    2. Oral cancer ([address])    3. Pain in lower jaw    4. Drug induced constipation    5. Alcohol use disorder, severe, dependence ([address])    6. Palliative care encounter      Symptoms - stable cancer pain in the R lower jaw/neck/face area; some difficulty swallowing; constipation. But overall feels continued improvement since starting chemoRT. R > L jaw masses appear smaller. He has more energy  Continue oxyIR 10mg PO for moderate, 20mg PO for severe pain, every 4H prn  He said he has until Monday, 12/18. Will send refill now with appropriate  date  Discussed eventual wean to off depending on treatment response. He voiced understanding and agreement  Continue tylenol 1000mg PO q8H ATC. Max of only 3000mg in 24H  Dangers of drinking alcohol with oxycodone (can be fatal) and tylenol (can damage liver) explained prior  Narcan prn provided  He was also prescribed magic mouthwash by oncology but he has not picked it up yet. I encouraged him to use it as well but he is hesitant to do so, doubtful of its efficacy and he has no one to pick it up for him from [facility] in [facility]. He does not want this sent to a closer pharmacy because of the copay. Increase Senokot-S (from OD daily) to 1 tab BID to prevent OIC  Follows with onc nut     ACP/GOC/Support  He is a . No children. Lives with his girlfriend and gf's son. His father lives in [facility] and sister lives in another state. [place] Act 169 explained in terms of hierarchy in surrogate medical decision making on initial visit. In his case, it will be his father first, sister next, all other relatives next and then girlfriend after all his other living relatives. [place] ACP documents explained in detail. Encouraged to read and complete on his next visit.    He met with Robyn SONI today, please refer to her separate documentation for more details    Follow up  RTO in 4 weeks, to check in on pain and/or consider weaning down opioid after completion of chemoRT. Controlled Substance Review    [place]MP or [place]  reviewed: No red flags were identified; safe to proceed with prescription. Eliza Dominguez  Written  ID  Drug  QTY  Days  Prescriber  RX #  Dispenser  Refill  Daily Dose*  Pymt Type      11/30/2023 11/30/2023 1 Oxycodone Hcl (Ir) 20 Mg Tab 90.00 15 Ti Lobo [id number] Pen (4386) 0 180.00 [place]   11/20/2023 11/20/2023 1 Oxycodone Hcl (Ir) 10 Mg Tab 60.00 15 Ti Lobo [id number] Pen (4386) 0 60.00 [place]   11/03/2023 11/02/2023 1 Oxycodone Hcl (Ir) 10 Mg Tab 60.00 15 Ti Lobo [id number] Pen (4386) 0 60.00 [place]   10/24/2023 10/24/2023 1 Oxycodone Hcl (Ir) 5 Mg Tablet 12.00 2 Rh Thomas [id number] Pen (4386) 0 45.00 [place]   10/20/2023 10/20/2023 1 Oxycodone-Acetaminophen 5-325 10.00 3 Do Rac [id number] Pen (4386) 0 25.00 [place]   10/16/2023 10/16/2023 1 Hydrocodone-Acetamin 5-325 Mg 15.00 4 Br Gel [id number] Pen (4386) 0 18.75 [place]   10/11/2023 10/11/2023 1 Tramadol Hcl 50 Mg Tablet 12.00 4 Ge Pro [id number] Pen (4386) 0 30.00 MME Comm Ins [place]       Requested Prescriptions      No prescriptions requested or ordered in this encounter     There are no discontinued medications. Representatives have queried the patient's controlled substance dispensing history in the Prescription Drug Monitoring Program in compliance with regulations before I have prescribed any controlled substances. The prescription history is consistent with prescribed therapy and our practice policies.       20 minutes were spent face to face with Heriberto Mcgee with greater than 50% of the time spent in counseling or coordination of care including discussions of etiology of diagnosis, pathogenesis of diagnosis, diagnostic results, impression, and recommendations, risks and benefits of treatment, instructions for disease self management, treatment instructions, follow up requirements, risk factors and risk reduction of disease, patient and family counseling/involvement in care, compliance with treatment regimen, and advanced care planning. All of the patient's questions were answered during this discussion. No follow-ups on file. Subjective:   Chief Complaint  Follow up visit for:  symptom management, goal of care assessment and decisional support, disease process education and discussion of prognosis, advance care planning, emotional support in the setting of serious illness  HPI     Nestor Garcia is a 59 y.o. 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 PET-CT on 6/19/2023 showed Hypermetabolic right anterior oral cavity mass most concerning for malignancy. Bilateral hypermetabolic cervical adenopathy compatible with metastases. No hypermetabolic metastases in the chest abdomen pelvis. Multiple healing left rib fractures. He was admitted in the hospital on 11/3 to 11/6 because of an acute R pontine infarct. He then again was admitted 11/11 to 11/13 due to lightheadedness thought related to possible alcohol intoxicataion +/- opioids for cancer pain. Received hydration and d/c. He is now on concurrent cisplatin + R since 11/15. Known to palliative medicine for supportive cares. Of note, he is a heavy alcohol drinker and still admits to drinking daily, but has cut back down significantly enough for him as evidenced by his UDS. Prior to consult with palliative medicine, he was drinking more alcohol than his usual baseline to help with his cancer pain from the growing tumor in his lower jaw. His initial UDS from the office from 11/2 only showed tramadol and alcohol. He returns today for follow up. Last seen in the office on 11/24 where he reports cancer pain was overall stable so no changes were made to effective regimen.  However, he called office on 11/30 with reports of increasing pain which we expected and discussed with his chemoRT. We increased pain regimen to 10mg for moderate pain and 20mg for severe pain, q4H prn. He also received an IR PEG on 12/11 due to dysphagia. Today in the office, he appears healthier and more stable looking. His mass in the jaw area are noticeably smaller. He reports ongoing pain in the same area as well as inside his mouth. He is about long term through his chemoRT. He averages 5 doses of 10mg oxy and 20mg oxy at night, when pain appears to be more severe. He has no issues with opioid intolerance. He has issues with swallowing issues intermittently and so had to proceed with a PEG. He is overall tolerating chemoRT well, but does have days when he will have increased pain afterwards. Despite this, however, he still feels overall improved. He has more energy and stamina. He appears pleased overall. We dicussed that his pain is expected to improve overtime especially once done with chemoRT. For this reason, we discussed eventual wean to off from opioids. He voiced understanding and agreement. When asked about his drinking, he said he only had 1 beer last night. Had a good thanksgiving, though he was limited with the food he can tolerate. He reports constipation. Only doing senokot daily. Advised to increase as above. ACP/GOC/Support: on initial consult: He said he wants to continue to live, so is determined to proceed with recommended treatments. He is a . No children. Lives with his girlfriend and gf's son. His father lives in [place] and sister lives in another state. [place] 169 explained in terms of hierarchy in surrogate medical decision making. In his case, it will be his father first, sister next, all other relatives next and then girlfriend after all his other living relatives. DEB[place]AH ACP documents explained in detail. Encouraged to read and complete on his next visit. He works in [employer].     The following portions of the medical history were reviewed: past medical history, problem list, medication list, and social history. Current Outpatient Medications:     aspirin 81 mg chewable tablet, Chew 1 tablet (81 mg total) daily, Disp: 90 tablet, Rfl: 0    oxyCODONE (ROXICODONE) 20 MG TABS, Take 1/2 tab (10mg) for moderate pain, 1 tab (20mg) for severe pain, every 4 hours AS NEEDED. Do not take sooner than 4 hours, Disp: 90 tablet, Rfl: 0    senna-docusate sodium (SENOKOT-S) 8.6-50 mg per tablet, Take 1 tablet by mouth daily, Disp: 60 tablet, Rfl: 2    atorvastatin (LIPITOR) 40 mg tablet, Take 1 tablet (40 mg total) by mouth every evening, Disp: 30 tablet, Rfl: 0    chlorhexidine (PERIDEX) 0.12 % solution, , Disp: , Rfl:     clopidogrel (Plavix) 75 mg tablet, Take 1 tablet (75 mg total) by mouth daily for 21 days (Patient not taking: Reported on 11/22/2023), Disp: 21 tablet, Rfl: 0    diphenhydramine, lidocaine, Al/Mg hydroxide, simethicone (Magic Mouthwash) SUSP, Swish and swallow 10 mL every 4 (four) hours as needed for mouth pain or discomfort (Patient not taking: Reported on 11/24/2023), Disp: 237 mL, Rfl: 7    ibuprofen (MOTRIN) 600 mg tablet, Take 1 tablet (600 mg total) by mouth every 6 (six) hours as needed for mild pain (Patient not taking: Reported on 11/24/2023), Disp: 30 tablet, Rfl: 0    Magnesium Oxide -Mg Supplement 200 MG TABS, Take 1 tablet (200 mg total) by mouth 2 (two) times a day, Disp: 60 tablet, Rfl: 0    naloxone (NARCAN) 4 mg/0.1 mL nasal spray, Administer 1 spray into a nostril. If no response after 2-3 minutes, give another dose in the other nostril using a new spray. (Patient not taking: Reported on 11/22/2023), Disp: 1 each, Rfl: 1    polyethylene glycol (MIRALAX) 17 g packet, Take 17 g by mouth daily (Patient not taking: Reported on 11/24/2023), Disp: 30 each, Rfl: 0  No current facility-administered medications for this visit.     Facility-Administered Medications Ordered in Other Visits:     alteplase (CATHFLO) injection 2 mg, 2 mg, Intracatheter, Q1MIN PRN, Claudean Holding, MD    alteplase (CATHFLO) injection 2 mg, 2 mg, Intracatheter, Q1MIN PRN, Claudean Holding, MD    ondansetron (ZOFRAN) injection 4 mg, 4 mg, Intravenous, Once PRN, Luke Ordonez CRNA    promethazine (PHENERGAN) injection 12.5 mg, 12.5 mg, Intravenous, Once PRN, Luke Ordonez CRNA    sodium chloride 0.9 % infusion, 75 mL/hr, Intravenous, Continuous, Garnette Romberg, DO, Stopped at 12/11/23 1650  Review of Systems   Constitutional:  Positive for appetite change. Negative for activity change, fatigue and unexpected weight change. HENT:  Positive for trouble swallowing. Negative for mouth sores, sore throat and voice change. Pain in the R neck, R lower jaw radiating to R side of face/temple. Pain in the R neck gets aggravated with swallowing. Pain is overall controlled   Respiratory:  Negative for shortness of breath. Cardiovascular:  Negative for chest pain. Gastrointestinal:  Negative for abdominal pain, constipation, diarrhea, nausea and vomiting. Musculoskeletal:  Negative for back pain. Neurological:  Negative for weakness. Psychiatric/Behavioral:  Negative for sleep disturbance. All other systems negative    Objective:  Vital Signs  /90 (BP Location: Right arm, Patient Position: Sitting, Cuff Size: Standard)   Pulse (!) 50   Temp (!) 96.6 °F (35.9 °C) (Temporal)   Wt 62.1 kg (137 lb)   SpO2 (!) 89%   BMI 17.12 kg/m²    Physical Exam    Constitutional: Appears well-developed and well-nourished. Thin, chronically ill looking, not toxic appearing. Appears healthier and stable compared to previous office. Pleasant. Comfortable. In no acute physical or emotional distress. Head: Normocephalic and atraumatic. Mouth: tumor in the R lower area, tongue overall looks better in appearance, unable to stick tongue outside of mouth. No ulcers or thrush noted.    Eyes: EOM are normal. No ocular discharge. No scleral icterus. Neck: (+) fixed firm rubbery mass in the R and L lower jaw, smaller than before  Respiratory: Effort normal. No stridor. No respiratory distress. Gastrointestinal: No abdominal distension. Musculoskeletal: No edema. Neurological: Alert, oriented and appropriately conversant. Walked with a cane. Hard of hearing  Skin: No diaphoresis, no rashes seen on exposed areas of skin. Scratches on both hands and some excoriations in neck - he said is from scratches from his cat  Psychiatric: Displays a normal mood and affect.  Behavior, judgement and thought content appear normal.     Andrey Johnson MD  Palliative Medicine & Supportive Care  Internal Medicine  Available via [facility] Text  Office: [phone]  Fax: [phone]

## 2023-12-14 NOTE — PROGRESS NOTES
Met with Toi Aguilera briefly after RT today to check in. He was having trouble using his PEG. TF teaching was provided today by Delaney CERRATO, and Toi Aguilera states he is comfortable using PEG now. He knows TF plan and amount. RD follow up appointment is already scheduled for next week during infusion. Encouraged him to reach out as needed in the meantime.

## 2023-12-15 ENCOUNTER — APPOINTMENT (OUTPATIENT)
Dept: RADIATION ONCOLOGY | Facility: CLINIC | Age: 58
End: 2023-12-15
Payer: COMMERCIAL

## 2023-12-15 PROCEDURE — 77386 HB NTSTY MODUL RAD TX DLVR CPLX: CPT | Performed by: RADIOLOGY

## 2023-12-15 PROCEDURE — 77014 CHG CT GUIDANCE RADIATION THERAPY FLDS PLACEMENT: CPT | Performed by: RADIOLOGY

## 2023-12-15 PROCEDURE — 77336 RADIATION PHYSICS CONSULT: CPT | Performed by: INTERNAL MEDICINE

## 2023-12-18 ENCOUNTER — APPOINTMENT (OUTPATIENT)
Dept: RADIATION ONCOLOGY | Facility: CLINIC | Age: 58
End: 2023-12-18
Payer: COMMERCIAL

## 2023-12-18 PROCEDURE — 77386 HB NTSTY MODUL RAD TX DLVR CPLX: CPT | Performed by: RADIOLOGY

## 2023-12-18 PROCEDURE — 77014 CHG CT GUIDANCE RADIATION THERAPY FLDS PLACEMENT: CPT | Performed by: RADIOLOGY

## 2023-12-18 PROCEDURE — 77427 RADIATION TX MANAGEMENT X5: CPT | Performed by: INTERNAL MEDICINE

## 2023-12-19 ENCOUNTER — APPOINTMENT (OUTPATIENT)
Dept: RADIATION ONCOLOGY | Facility: CLINIC | Age: 58
End: 2023-12-19
Payer: COMMERCIAL

## 2023-12-19 ENCOUNTER — HOSPITAL ENCOUNTER (OUTPATIENT)
Dept: INFUSION CENTER | Facility: CLINIC | Age: 58
Discharge: HOME | End: 2023-12-19
Payer: COMMERCIAL

## 2023-12-19 DIAGNOSIS — C06.9 ORAL CANCER (HCC): ICD-10-CM

## 2023-12-19 DIAGNOSIS — Z95.828 PORT-A-CATH IN PLACE: Primary | ICD-10-CM

## 2023-12-19 LAB
ALBUMIN SERPL BCP-MCNC: 3.8 G/DL (ref 3.5–5)
ALP SERPL-CCNC: 88 U/L (ref 34–104)
ALT SERPL W P-5'-P-CCNC: 14 U/L (ref 7–52)
ANION GAP SERPL CALCULATED.3IONS-SCNC: 8 MMOL/L
AST SERPL W P-5'-P-CCNC: 15 U/L (ref 13–39)
BASOPHILS # BLD AUTO: 0.05 THOUSANDS/ÂΜL (ref 0–0.1)
BASOPHILS NFR BLD AUTO: 1 % (ref 0–1)
BILIRUB SERPL-MCNC: 0.34 MG/DL (ref 0.2–1)
BUN SERPL-MCNC: 7 MG/DL (ref 5–25)
CALCIUM SERPL-MCNC: 9.1 MG/DL (ref 8.4–10.2)
CHLORIDE SERPL-SCNC: 94 MMOL/L (ref 96–108)
CO2 SERPL-SCNC: 26 MMOL/L (ref 21–32)
CREAT SERPL-MCNC: 0.59 MG/DL (ref 0.6–1.3)
EOSINOPHIL # BLD AUTO: 0.08 THOUSAND/ÂΜL (ref 0–0.61)
EOSINOPHIL NFR BLD AUTO: 2 % (ref 0–6)
ERYTHROCYTE [DISTWIDTH] IN BLOOD BY AUTOMATED COUNT: 13.6 % (ref 11.6–15.1)
GFR SERPL CREATININE-BSD FRML MDRD: 110 ML/MIN/1.73SQ M
GLUCOSE SERPL-MCNC: 78 MG/DL (ref 65–140)
HCT VFR BLD AUTO: 31.3 % (ref 36.5–49.3)
HGB BLD-MCNC: 11 G/DL (ref 12–17)
IMM GRANULOCYTES # BLD AUTO: 0.03 THOUSAND/UL (ref 0–0.2)
IMM GRANULOCYTES NFR BLD AUTO: 1 % (ref 0–2)
LYMPHOCYTES # BLD AUTO: 0.28 THOUSANDS/ÂΜL (ref 0.6–4.47)
LYMPHOCYTES NFR BLD AUTO: 5 % (ref 14–44)
MAGNESIUM SERPL-MCNC: 1.6 MG/DL (ref 1.9–2.7)
MCH RBC QN AUTO: 33.5 PG (ref 26.8–34.3)
MCHC RBC AUTO-ENTMCNC: 35.1 G/DL (ref 31.4–37.4)
MCV RBC AUTO: 95 FL (ref 82–98)
MONOCYTES # BLD AUTO: 0.92 THOUSAND/ÂΜL (ref 0.17–1.22)
MONOCYTES NFR BLD AUTO: 17 % (ref 4–12)
NEUTROPHILS # BLD AUTO: 4.03 THOUSANDS/ÂΜL (ref 1.85–7.62)
NEUTS SEG NFR BLD AUTO: 74 % (ref 43–75)
NRBC BLD AUTO-RTO: 0 /100 WBCS
PLATELET # BLD AUTO: 211 THOUSANDS/UL (ref 149–390)
PMV BLD AUTO: 9 FL (ref 8.9–12.7)
POTASSIUM SERPL-SCNC: 3.6 MMOL/L (ref 3.5–5.3)
PROT SERPL-MCNC: 6.8 G/DL (ref 6.4–8.4)
RBC # BLD AUTO: 3.28 MILLION/UL (ref 3.88–5.62)
SODIUM SERPL-SCNC: 128 MMOL/L (ref 135–147)
WBC # BLD AUTO: 5.39 THOUSAND/UL (ref 4.31–10.16)

## 2023-12-19 PROCEDURE — 83735 ASSAY OF MAGNESIUM: CPT | Performed by: INTERNAL MEDICINE

## 2023-12-19 PROCEDURE — 80053 COMPREHEN METABOLIC PANEL: CPT | Performed by: INTERNAL MEDICINE

## 2023-12-19 PROCEDURE — 77014 CHG CT GUIDANCE RADIATION THERAPY FLDS PLACEMENT: CPT | Performed by: RADIOLOGY

## 2023-12-19 PROCEDURE — 77386 HB NTSTY MODUL RAD TX DLVR CPLX: CPT | Performed by: RADIOLOGY

## 2023-12-19 PROCEDURE — 85025 COMPLETE CBC W/AUTO DIFF WBC: CPT | Performed by: INTERNAL MEDICINE

## 2023-12-19 NOTE — PROGRESS NOTES
MED TIME OUT with Luciana Honeycutt RN. Add 4 gm Mag to treatment tomorrow, 2gm with prehydration and 2 gm with posthydration. Pharmacy aware.

## 2023-12-19 NOTE — PROGRESS NOTES
Title: addition of Magnesium 2 GM pre and post hydration     Date patient scheduled: 12/20/23    Original medication ordered: n/a    New Medication ordered: Magnesium 2GM pre and post hydration    Office RN notified patient?? Left vm on patient phone with RN teams number.     Is the patient scheduled within 24 hours?? If yes, follow up with verbal telephone call.  Yes, call out to [facility] infusion and spoke with SAQIB Eric     Office RN to route to INF  pool. Infusion  pool routes to INF TECH POOL.    Infusion tech to receive message, confirm scheduled treatment duration matches ordered treatment duration or adjust accordingly, and re-link appointment request orders.    Infusion tech to notify pharmacy and finance.

## 2023-12-19 NOTE — PROGRESS NOTES
Pt presents for central labs. Port accessed, positive blood return noted, labs collected per protocol. Pt declined AVS, aware of appt tomorrow at 0830.

## 2023-12-20 ENCOUNTER — TELEPHONE (OUTPATIENT)
Dept: HEMATOLOGY ONCOLOGY | Facility: CLINIC | Age: 58
End: 2023-12-20

## 2023-12-20 ENCOUNTER — HOSPITAL ENCOUNTER (OUTPATIENT)
Dept: INFUSION CENTER | Facility: CLINIC | Age: 58
Discharge: HOME | End: 2023-12-20
Payer: COMMERCIAL

## 2023-12-20 ENCOUNTER — APPOINTMENT (OUTPATIENT)
Dept: RADIATION ONCOLOGY | Facility: CLINIC | Age: 58
End: 2023-12-20
Attending: INTERNAL MEDICINE
Payer: COMMERCIAL

## 2023-12-20 ENCOUNTER — DOCUMENTATION (OUTPATIENT)
Dept: RADIATION ONCOLOGY | Facility: CLINIC | Age: 58
End: 2023-12-20

## 2023-12-20 ENCOUNTER — NUTRITION (OUTPATIENT)
Dept: NUTRITION | Facility: CLINIC | Age: 58
End: 2023-12-20

## 2023-12-20 VITALS
HEART RATE: 71 BPM | SYSTOLIC BLOOD PRESSURE: 134 MMHG | TEMPERATURE: 97.8 F | RESPIRATION RATE: 18 BRPM | HEIGHT: 75 IN | BODY MASS INDEX: 16.17 KG/M2 | DIASTOLIC BLOOD PRESSURE: 84 MMHG | WEIGHT: 130.07 LBS

## 2023-12-20 DIAGNOSIS — C06.9 ORAL CANCER (HCC): ICD-10-CM

## 2023-12-20 DIAGNOSIS — C06.9 ORAL CANCER (HCC): Primary | ICD-10-CM

## 2023-12-20 DIAGNOSIS — Z71.3 NUTRITIONAL COUNSELING: Primary | ICD-10-CM

## 2023-12-20 DIAGNOSIS — E83.42 HYPOMAGNESEMIA: Primary | ICD-10-CM

## 2023-12-20 PROCEDURE — 77014 CHG CT GUIDANCE RADIATION THERAPY FLDS PLACEMENT: CPT | Performed by: RADIOLOGY

## 2023-12-20 PROCEDURE — 77386 HB NTSTY MODUL RAD TX DLVR CPLX: CPT | Performed by: RADIOLOGY

## 2023-12-20 PROCEDURE — 96366 THER/PROPH/DIAG IV INF ADDON: CPT

## 2023-12-20 PROCEDURE — 96367 TX/PROPH/DG ADDL SEQ IV INF: CPT

## 2023-12-20 PROCEDURE — 96413 CHEMO IV INFUSION 1 HR: CPT

## 2023-12-20 RX ORDER — SODIUM CHLORIDE 9 MG/ML
20 INJECTION, SOLUTION INTRAVENOUS ONCE
Status: COMPLETED | OUTPATIENT
Start: 2023-12-20 | End: 2023-12-20

## 2023-12-20 RX ADMIN — MAGNESIUM SULFATE HEPTAHYDRATE: 500 INJECTION, SOLUTION INTRAMUSCULAR; INTRAVENOUS at 11:56

## 2023-12-20 RX ADMIN — CISPLATIN 70 MG: 1 INJECTION, SOLUTION INTRAVENOUS at 10:51

## 2023-12-20 RX ADMIN — DEXAMETHASONE SODIUM PHOSPHATE: 10 INJECTION, SOLUTION INTRAMUSCULAR; INTRAVENOUS at 09:53

## 2023-12-20 RX ADMIN — APREPITANT 130 MG: 130 INJECTION, EMULSION INTRAVENOUS at 10:13

## 2023-12-20 RX ADMIN — SODIUM CHLORIDE 20 ML/HR: 0.9 INJECTION, SOLUTION INTRAVENOUS at 08:50

## 2023-12-20 RX ADMIN — MAGNESIUM SULFATE HEPTAHYDRATE: 500 INJECTION, SOLUTION INTRAMUSCULAR; INTRAVENOUS at 08:46

## 2023-12-20 NOTE — PROGRESS NOTES
"Outpatient Oncology Nutrition Consultation   Type of Consult: Follow Up  Care Location: [facility]    Reason for referral:   Received notification by RN NavigatorSherrell, on 7/18/23 that pt has triggered for oncology nutrition care (reason for referral: HNC dx and Malnutrition Screening Tool (MST) Triggers: scored a 0 indicating No recent wt loss and is not eating poorly due to a decreased appetite. (Date of MST: 7/18/23)).  [facility], on 10/12/23 that pt has triggered for oncology nutrition care (reason for referral: HNC dx and Malnutrition Screening Tool (MST) Triggers: scored a 3 indicating 24-33# (11-15 kg) recent wt loss and is not eating poorly due to a decreased appetite. (Date of MST: 10/12/23) MST Note: \"over the last 4 months \").    Nutrition Assessment:   Oncology Diagnosis & Treatments: Oral Cancer  Started concurrent chemoradiation (weekly cisplatin &  RT to the oral cavity and BL necks started 11/14/23)   Oncology History   Oral cancer (HCC)   5/23/2023 Initial Diagnosis    Oral cancer (HCC)     8/29/2023 -  Cancer Staged    Staging form: Oral Cavity, AJCC 8th Edition  - Clinical stage from 8/29/2023: Stage CHETAN (cT4a, cN2c, cM0) - Signed by Roosevelt Hu MD on 9/13/2023  Stage prefix: Initial diagnosis       8/29/2023 Biopsy    A. 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, p16 positive      Chemotherapy    alteplase (CATHFLO), 2 mg, Intracatheter, Every 1 Minute as needed, 0 of 7 cycles  CISplatin (PLATINOL) infusion, 40 mg/m2, Intravenous, Once, 0 of 7 cycles  aprepitant (CINVANTI) in  mL IVPB, 130 mg, Intravenous, Once, 0 of 7 cycles         Past Medical & Surgical Hx:   Patient Active Problem List   Diagnosis    Dental caries    Cervical lymphadenopathy    Periodontitis    Alcohol use disorder, severe, dependence (HCC)    Hypertension    " Cigarette nicotine dependence without complication    Hyponatremia    Hypokalemia    Hypomagnesemia    Oral cancer (HCC)    Preoperative clearance    Drug induced constipation    Cancer-related pain    At risk for respiratory depression due to opioid    Pain in lower jaw    Counseling regarding advanced care planning and goals of care    History of CVA (cerebrovascular accident)    Moderate protein-calorie malnutrition (HCC)    Alcoholic intoxication without complication (HCC)    Port-A-Cath in place    Alcohol abuse    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)    Palliative care encounter     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    Stroke (HCC)      Past Surgical History:   Procedure Laterality Date    EGD      IR GASTROSTOMY TUBE PLACEMENT  12/11/2023    IR PORT PLACEMENT  11/7/2023    KNEE ARTHROSCOPY Left     MULTIPLE TOOTH EXTRACTIONS N/A 10/16/2023    Procedure: EXTRACTION OF ALL REMAINING TEETH;  Surgeon: Humble Otero DMD;  Location: [facility];  Service: Oral Surgery       Review of Medications:   Vitamins, Supplements and Herbals: No, pt denies taking supplements    Current Outpatient Medications:     aspirin 81 mg chewable tablet, Chew 1 tablet (81 mg total) daily, Disp: 90 tablet, Rfl: 0    atorvastatin (LIPITOR) 40 mg tablet, Take 1 tablet (40 mg total) by mouth every evening, Disp: 30 tablet, Rfl: 0    chlorhexidine (PERIDEX) 0.12 % solution, , Disp: , Rfl:     clopidogrel (Plavix) 75 mg tablet, Take 1 tablet (75 mg total) by mouth daily for 21 days (Patient not taking: Reported on 11/22/2023), Disp: 21 tablet, Rfl: 0    diphenhydramine, lidocaine, Al/Mg hydroxide, simethicone (Magic Mouthwash) SUSP, Swish and swallow 10 mL every 4 (four) hours as needed for mouth pain or discomfort (Patient not taking: Reported on 11/24/2023), Disp: 237 mL, Rfl: 7    ibuprofen  (MOTRIN) 600 mg tablet, Take 1 tablet (600 mg total) by mouth every 6 (six) hours as needed for mild pain (Patient not taking: Reported on 11/24/2023), Disp: 30 tablet, Rfl: 0    Magnesium Oxide -Mg Supplement 200 MG TABS, Take 1 tablet (200 mg total) by mouth 2 (two) times a day, Disp: 60 tablet, Rfl: 0    naloxone (NARCAN) 4 mg/0.1 mL nasal spray, Administer 1 spray into a nostril. If no response after 2-3 minutes, give another dose in the other nostril using a new spray. (Patient not taking: Reported on 11/22/2023), Disp: 1 each, Rfl: 1    oxyCODONE (ROXICODONE) 20 MG TABS, Take 1/2 tab (10mg) for moderate pain, 1 tab (20mg) for severe pain, every 4 hours AS NEEDED. Do not take sooner than 4 hours Do not start before December 18, 2023., Disp: 90 tablet, Rfl: 0    polyethylene glycol (MIRALAX) 17 g packet, Take 17 g by mouth daily (Patient not taking: Reported on 11/24/2023), Disp: 30 each, Rfl: 0    senna-docusate sodium (SENOKOT-S) 8.6-50 mg per tablet, Take 1 tablet by mouth daily, Disp: 60 tablet, Rfl: 2  No current facility-administered medications for this visit.    Facility-Administered Medications Ordered in Other Visits:     alteplase (CATHFLO) injection 2 mg, 2 mg, Intracatheter, Q1MIN PRN, Roosevelt Hu MD    alteplase (CATHFLO) injection 2 mg, 2 mg, Intracatheter, Q1MIN PRN, Roosevelt Hu MD    aprepitant (CINVANTI) 130 mg in sodium chloride 0.9 % 100 mL IVPB, 130 mg, Intravenous, Once, Roosevelt Hu MD, Last Rate: 236 mL/hr at 12/20/23 1013, 130 mg at 12/20/23 1013    CISplatin (PLATINOL) 70 mg in sodium chloride 0.9 % 250 mL infusion, 70 mg, Intravenous, Once, Roosevelt Hu MD    magnesium sulfate 2 g in sodium chloride 0.9 % 500 mL IVPB, , Intravenous, Once, Thom Polanco MD    Most Recent Lab Results:   Lab Results   Component Value Date    WBC 5.39 12/19/2023    NEUTROABS 4.03 12/19/2023    CHOLESTEROL 141 11/04/2023    TRIG 75 11/04/2023    HDL 59 11/04/2023    LDLCALC 67  "11/04/2023    ALT 14 12/19/2023    AST 15 12/19/2023    ALB 3.8 12/19/2023    SODIUM 128 (L) 12/19/2023    SODIUM 126 (L) 12/12/2023    K 3.6 12/19/2023    K 3.9 12/12/2023    CL 94 (L) 12/19/2023    BUN 7 12/19/2023    BUN 5 12/12/2023    CREATININE 0.59 (L) 12/19/2023    CREATININE 0.56 (L) 12/12/2023    EGFR 110 12/19/2023    GLUCOSE 93 12/11/2016    POCGLU 126 11/06/2023    GLUF 87 10/16/2023    GLUC 78 12/19/2023    HGBA1C 5.9 (H) 11/04/2023    CALCIUM 9.1 12/19/2023    MG 1.6 (L) 12/19/2023       Anthropometric Measurements:   Height: 75\"  Ht Readings from Last 1 Encounters:   12/20/23 6' 3\" (1.905 m)     Wt Readings from Last 20 Encounters:   12/20/23 59 kg (130 lb 1.1 oz)   12/14/23 62.1 kg (137 lb)   12/13/23 60.6 kg (133 lb 9.6 oz)   12/11/23 58.5 kg (129 lb)   12/06/23 61 kg (134 lb 7.7 oz)   11/29/23 61.7 kg (136 lb 0.4 oz)   11/24/23 62.1 kg (136 lb 12.8 oz)   11/22/23 64.9 kg (143 lb)   11/21/23 63.4 kg (139 lb 12.4 oz)   11/15/23 62.3 kg (137 lb 5.6 oz)   11/12/23 59.8 kg (131 lb 13.4 oz)   11/04/23 66.7 kg (147 lb)   11/02/23 64.3 kg (141 lb 12.8 oz)   10/31/23 64 kg (141 lb)   10/16/23 66.7 kg (147 lb)   10/12/23 66.7 kg (147 lb)   10/11/23 64.9 kg (143 lb)   08/29/23 72.6 kg (160 lb)   08/04/23 72.6 kg (160 lb)   05/23/23 66.2 kg (146 lb)     Weight History:   Usual Weight: 170-175# (July 2023)  Varian: (11/16/23) 141.8#, (11/20/23) 140#, (11/27/23) 135.5#, (11/28/23) 135.2#, (11/30/23) 140.4#, (12/4/23) 135#, (12/7/23) 135.4#, (12/12/23) 136#, (12/14/23) 137.4#, (12/18/23) 129.8#    Oncology Nutrition-Anthropometrics      Flowsheet Row Nutrition from 12/20/2023 in [facility] Nutrition from 12/6/2023 in [facility]   Patient age (years): 58 years 58 years   Patient (male) height (in): 75 in 75 in   Current weight (lbs): 130.1 lbs 134.5 lbs   Current weight to be used for anthropometric calculations (kg) 59.1 kg 61.1 kg "   BMI: 16.3 16.8   IBW male 196 lb 196 lb   IBW (kg) male 89.1 kg 89.1 kg   IBW % (male) 66.4 % 68.6 %   Adjusted BW (male): 179.5 lbs 180.6 lbs   Adjusted BW in kg (male): 81.6 kg 82.1 kg   % weight change after 1 week: -5 % -1.2 %   Weight change after 1 week (lbs) -6.9 lbs -1.6 lbs   % weight change after 1 month: -6.9 % -8.5 %   Weight change after 1 month (lbs) -9.7 lbs -12.5 lbs   % weight change after 3 months: -18.7 % -15.9 %   Weight change after 3 months (lbs) -29.9 lbs -25.5 lbs          Nutrition-Focused Physical Findings: mild  muscle depletion ([place]) - hollowing/scooping/depression    Food/Nutrition-Related History & Client/Social History:    Current Nutrition Impact Symptoms:  [x] Nausea a little after chemo [x] Reduced Appetite  [] Acid Reflux    [] Vomiting  [x] Unintended Wt Loss  [] Malabsorption    [] Diarrhea  [] Unintended Wt Gain  [] Dumping Syndrome    [] Constipation  [] Thick Mucous/Secretions  [] Abdominal Pain    [] Dysgeusia (Altered Taste)  [] Xerostomia (Dry Mouth)  [] Gas    [] Dysosmia (Altered Smell)  [] Thrush  [x] Difficulty Chewing - pt reports he recently had all of his teeth removed    [x] Oral Mucositis (Sore Mouth)  [x] Fatigue  [] Hyperglycemia   Lab Results   Component Value Date    HGBA1C 5.9 (H) 11/04/2023      [] Odynophagia  [] Esophagitis  [] Other:    [x] Dysphagia  [] Early Satiety  [] No Problems Eating      Food Allergies & Intolerances: no    Current Diet: Soft/Moist and Tube Feeding - room temp foods d/t oral pain  Current Nutrition Intake: Decreased since last visit.  Appetite: Fair , Poor  Nutrition Route: PO , PEG placement scheduled for 12/11  Oral Care:  rx mouthwash BID, brushing tongue 2-3x/day; had all of teeth removed. Plans to  MMW  Activity level: not working during tx    24 Hr Diet Recall:   Breakfast:yogurt  Snack: maybe some jello  Lunch: applesauce   Snack: none  Dinner: Ensure  Snack: nothing    Beverages: water (sips), apple juice (8 oz  "x2-4), chocolate whole milk (8 oz x1-2), Gatorade (20 oz x1)  Supplements:   Ensure Clear (10 oz, 180 kcal, 8 g pro) a few times a week  Milkshakes - once in a while - milk, ice cream, banana. Hasn't been drinking these lately    EN Recall:  DME: [employer]  Access Type: PEG  Formula: Jevity 1.5  Method: Bolus  Eliceo hasn't been doing Tfs d/t feeling like the formula isn't going through easily.  Hasn't had anything via PEG in a few days    Oncology Nutrition-Estimated Needs      Flowsheet Row Nutrition from 10/31/2023 in [facility]   Weight type used IBW   Weight in kilograms (kg) used for estimated needs 89.1 kg   Energy needs formula:  30-35 kcal/kg   Energy needs based on 30 kcal/k kcal   Energy needs based on 35 kcal/k kcal   Protein needs formula: 1.2-1.5 g/kg   Protein needs based on 1.2 g/k g   Protein needs based on 1.5 g/kg 134 g   Fluid needs formula: 30-35 mL/kg   Fluid needs based on 30 mL/kg 2673 mL   Fluid needs in ounces 90 oz   Fluid needs based on 35 mL/kg 3119 mL   Fluid needs in ounces 105 oz             Discussion & Intervention:   Eliceo was evaluated today for an RD follow up regarding HNC.  Eliceo is currently undergoing tx for HNC .  Met with Eliceo during infusion. He has not been using his PEG and reports the few times he tried to use it he wasn't able to get the formula through, and it \"came back out\" when he tried to unscrew the syringe. He isn't eating much PO at all. His weight continues to drop. Discussed how important adequate calorie and protein intake is. RD discussed how he will likely continue to lose weight if he doesn't get any calories in. RD continued to encourage him to utilize PEG for nutrition. Offered to try to give him a feed during his infusion but he declined. RD then brought over [facility] RN to help with TF teaching and administration. Eliceo was then willing to try TF again. Teaching was successful and Eliceo " was able to get in 1 carton of Jevity 1.5 today. He was instructed by RN again on how to use PEG.    Reviewed 24 hour recall, which revealed an inadequate po intake, and discussed ways to increase kcal, protein, and fluid intakes and optimize nutrient intake.  Also reviewed the importance of wt management throughout the tx process and the role of a high kcal/ high protein diet in managing wt and overall health.  Based on today's assessment, discussion included: MNT for: wt loss, oral pain, soft/moist food, enteral nutrition, dysphagia, how to modify foods for anticipated nutrition impact symptoms pt may experience during CA tx, practicing proper oral care, a high kcal/protein diet & food choices to include at all meals & snacks (Examples of high kcal foods: cheese, full-fat dairy products, nut butter, plant-based fats, coconut oil/milk, avocado, butter, cream soup, etc. Examples of high protein foods: eggs, chicken, fish, beans/legumes, nuts/nut butters, bone broth, etc.) , fortifying foods for added kcal and protein (examples include: adding cheese to foods such as eggs, mashed potatoes, casseroles, etc.; Making oatmeal with whole milk rather than water; Making fortified mashed potatoes with cream, butter, dry milk powder, plain Greek yogurt, and cheese.), adequate hydration & fluid choices, sipping on calorie containing beverages (examples include: adding whole milk or cream to coffee, oral nutrition supplements, juice, electrolyte replacement beverages, milk, etc.), eating smaller more frequent meals every 2-3 hours (5-6 small meals/day), having consistent and planned snacks between meals, increasing oral nutrition supplements, utilizing oral nutrition supplements, and recipe suggestions/resources, trying new recipes, & cooking at home more often.   Moving forward, Eliceo was encouraged to increase kcal, protein, and fluid intakes and practice MNT for nutrition impact sx management.  Materials Provided: Ensure  samples (Ensure Clear)  All questions and concerns addressed during today’s visit.  Eliceo has RD contact information.    Nutrition Diagnosis:   Inadequate Energy Intake related to physiological causes, disease state and treatment related issues as evidenced by food recall, wt loss and discussion with pt and/or family.  Increased Nutrient Needs (kcal & pro) related to increased demand for nutrients and disease state as evidenced by cancer dx and pt undergoing tx for cancer.  Altered GI Function related to alteration in GI tract structure and integrity as evidenced by Unintended Weight Loss and Oral Mucositis (Sore Mouth).  Patient has clinical indicators (or ASPEN criteria) consistent with severe protein-calorie malnutrition in the context of Chronic Illness as evidenced by >7.5% wt loss in 3 months and </=75% energy intake vs. Estimated needs for >/=1 month.  Monitoring & Evaluation:   Goals:  weight maintenance/stabilization  adequate nutrition impact symptom management  pt to meet >/=75% estimated nutrition needs daily  achieve tube feeding goal rate     Progress Towards Goals: Not Progressing    Nutrition Rx & Recommendations:  Diet: Very Soft/Moist, High Calorie, High Protein Diet as able; enteral nutrition as 1' source of nutrition  Small, frequent meals/snacks may be easier to tolerate than 3 large daily meals.  Aim for 5-6 small meals per day (every 2-3 hours).  Include protein at all meals/snacks.  Stay hydrated by sipping fluids of choice/tolerance throughout the day.  Liquid nutrition may be better tolerated than solids at times.  Refer to Eating Hints book for other meal/snack ideas and symptom management.  Avoid spicy, acidic, sharp/hard/crunchy foods & carbonated beverages as needed.  Follow proper oral care; Try baking soda/salt water rinse recipe (mix 3/4 tsp salt + 1 tsp baking soda + 1 qt water; rinse with plain water after using) in Eating Hints book (pg 18).  Brush your teeth before/after meals &  "before bed.  For weight loss: monitor your weight at home at least 2x/week, record your weight, start by adding 250-500 extra calories per day, eat 5-6 small scheduled meals every 2-3 hrs, choose foods that are high in protein and calories (see pages 49-53 in your Eating Hints book), drink liquids with calories for example: milkshakes/smoothies/juice/soup/whole milk/chocolate milk, cook with protein fortified milk (see recipe on page 36 in your Eating Hints book), consider ready-to-drink oral nutrition supplements such as Ensure Plus, Ensure Enlive, Boost Plus, or Boost Very High Calorie, avoid \"diet\" and \"light\" foods when possible, avoid drinking too much with meals, contact your dietitian with any continued weight loss over the course of 1 week.  For more info see pages 35-37 in your Eating Hints book.  For trouble swallowing: eat 5-6 small meals/day; choose foods that are easy to swallow; choose foods that are high in protein & calories; cook foods until they are soft/tender; cut food into small pieces; moisten & soften foods (gravy/sauce/broth/yogurt); sip drinks through a straw (as tolerated); avoid foods/drinks that can burn/scrape your throat (hot temperatures, spicy foods, acidic foods, sharp/hard/crunchy foods, alcohol); talk to your doctor about a Speech Therapy referral for a swallowing evaluation (see page 26-28 in your Eating Hints book).  Weigh yourself regularly. If you notice weight loss, make an effort to increase your daily food/calorie intake. If you continue to notice loss after these efforts, reach out to your dietitian to establish a plan to stabilize weight.  Continue sipping on Ensure clear as able/tolerated  Enteral nutrition is needed for Eliceo's sole source of nutrition, recommend  TF Goal: 1 container (237 mL) 8 times per day of Jevity 1.5 via PEG (push syringe or gravity syringe method to administer boluses; 1 container to infuse over ~15-20 minutes).  Water Flushin mL free water " flush pre/post each bolus (960 mL water)   Enteral Nutrition goal to provide: 1896 mL volume (8 containers/day), 2844 kcal, 121 grams protein, 1441 mL free water, 2401 mL total water daily.  Allow for substitutions   Your syringes are each 60 mL or 2 fl oz.  Always flush your feeding tube with 60 mL room-temp water 1-2 times daily while feeding tube is not in use.  Call [facility] when you have 10 days left of TF supplies: [phone], option 3 (customer support).      Follow Up Plan:  12/28/23 after RT  Recommend Referral to Other Providers: none at this time

## 2023-12-20 NOTE — PROGRESS NOTES
Visit with patient at infusion center.  States that his tube feeding isn't working.  He is only able to flush it with water and feeding doesn't go thru.  Dressing removed.  Peg tube site cleansed with sterile saline.  Small amount of dried blood noted. Tack fell off while cleaning.  Tube flushed with water without difficulty. Patient infused 1 carton jevity via gravity without difficulty

## 2023-12-20 NOTE — PROGRESS NOTES
"Pt arrived to unit without complaint r/t chemotherapy. Pt is frustrated with his Gtube, and Gtube feedings, stating \"They are not working!\". Mansi ANDREW spent time with patient reviewing how to utilized tube for nutrition, Sherrell CERRATO from radiation performed tube feeding with patient here in infusion. Problems were resolved. Pt now more confident in use of his feeding tube. Pt tolerated chemotherapy treatment without incident. Pt declined AVS, aware of future appts. Next appt confirmed 12/26/23 1130 for central labs and then treatment on 12/27.   "

## 2023-12-20 NOTE — PATIENT INSTRUCTIONS
"Nutrition Rx & Recommendations:  Diet: Very Soft/Moist, High Calorie, High Protein Diet as able; enteral nutrition as 1' source of nutrition  Small, frequent meals/snacks may be easier to tolerate than 3 large daily meals.  Aim for 5-6 small meals per day (every 2-3 hours).  Include protein at all meals/snacks.  Stay hydrated by sipping fluids of choice/tolerance throughout the day.  Liquid nutrition may be better tolerated than solids at times.  Refer to Eating Hints book for other meal/snack ideas and symptom management.  Avoid spicy, acidic, sharp/hard/crunchy foods & carbonated beverages as needed.  Follow proper oral care; Try baking soda/salt water rinse recipe (mix 3/4 tsp salt + 1 tsp baking soda + 1 qt water; rinse with plain water after using) in Eating Hints book (pg 18).  Brush your teeth before/after meals & before bed.  For weight loss: monitor your weight at home at least 2x/week, record your weight, start by adding 250-500 extra calories per day, eat 5-6 small scheduled meals every 2-3 hrs, choose foods that are high in protein and calories (see pages 49-53 in your Eating Hints book), drink liquids with calories for example: milkshakes/smoothies/juice/soup/whole milk/chocolate milk, cook with protein fortified milk (see recipe on page 36 in your Eating Hints book), consider ready-to-drink oral nutrition supplements such as Ensure Plus, Ensure Enlive, Boost Plus, or Boost Very High Calorie, avoid \"diet\" and \"light\" foods when possible, avoid drinking too much with meals, contact your dietitian with any continued weight loss over the course of 1 week.  For more info see pages 35-37 in your Eating Hints book.  For trouble swallowing: eat 5-6 small meals/day; choose foods that are easy to swallow; choose foods that are high in protein & calories; cook foods until they are soft/tender; cut food into small pieces; moisten & soften foods (gravy/sauce/broth/yogurt); sip drinks through a straw (as tolerated); " avoid foods/drinks that can burn/scrape your throat (hot temperatures, spicy foods, acidic foods, sharp/hard/crunchy foods, alcohol); talk to your doctor about a Speech Therapy referral for a swallowing evaluation (see page 26-28 in your Eating Hints book).  Weigh yourself regularly. If you notice weight loss, make an effort to increase your daily food/calorie intake. If you continue to notice loss after these efforts, reach out to your dietitian to establish a plan to stabilize weight.  Continue sipping on Ensure clear as able/tolerated  Enteral nutrition is needed for Eliceo's sole source of nutrition, recommend  TF Goal: 1 container (237 mL) 8 times per day of Jevity 1.5 via PEG (push syringe or gravity syringe method to administer boluses; 1 container to infuse over ~15-20 minutes).  Water Flushin mL free water flush pre/post each bolus (960 mL water)   Enteral Nutrition goal to provide: 1896 mL volume (8 containers/day), 2844 kcal, 121 grams protein, 1441 mL free water, 2401 mL total water daily.  Allow for substitutions   Your syringes are each 60 mL or 2 fl oz.  Always flush your feeding tube with 60 mL room-temp water 1-2 times daily while feeding tube is not in use.  Call [facility] when you have 10 days left of TF supplies: [phone], option 3 (customer support).      Follow Up Plan: 23 after RT  Recommend Referral to Other Providers: none at this time

## 2023-12-20 NOTE — PLAN OF CARE
Problem: Potential for Falls  Goal: Patient will remain free of falls  Description: INTERVENTIONS:  - Educate patient/family on patient safety including physical limitations  - Instruct patient to call for assistance with activity   - Consult OT/PT to assist with strengthening/mobility   - Keep Call bell within reach  - Keep bed low and locked with side rails adjusted as appropriate  - Keep care items and personal belongings within reach  - Initiate and maintain comfort rounds  - Make Fall Risk Sign visible to staff  -- Apply yellow socks and bracelet for high fall risk patients  - Consider moving patient to room near nurses station  Outcome: Progressing

## 2023-12-21 ENCOUNTER — APPOINTMENT (OUTPATIENT)
Dept: RADIATION ONCOLOGY | Facility: CLINIC | Age: 58
End: 2023-12-21
Payer: COMMERCIAL

## 2023-12-21 DIAGNOSIS — C06.9 ORAL CANCER (HCC): Primary | ICD-10-CM

## 2023-12-21 PROCEDURE — 77014 CHG CT GUIDANCE RADIATION THERAPY FLDS PLACEMENT: CPT | Performed by: RADIOLOGY

## 2023-12-21 PROCEDURE — 77386 HB NTSTY MODUL RAD TX DLVR CPLX: CPT | Performed by: RADIOLOGY

## 2023-12-21 RX ORDER — SODIUM CHLORIDE 9 MG/ML
20 INJECTION, SOLUTION INTRAVENOUS ONCE
Status: CANCELLED | OUTPATIENT
Start: 2024-01-03

## 2023-12-22 ENCOUNTER — APPOINTMENT (OUTPATIENT)
Dept: RADIATION ONCOLOGY | Facility: CLINIC | Age: 58
End: 2023-12-22
Payer: COMMERCIAL

## 2023-12-22 ENCOUNTER — TELEPHONE (OUTPATIENT)
Age: 58
End: 2023-12-22

## 2023-12-22 PROCEDURE — 77386 HB NTSTY MODUL RAD TX DLVR CPLX: CPT | Performed by: RADIOLOGY

## 2023-12-22 PROCEDURE — 77014 CHG CT GUIDANCE RADIATION THERAPY FLDS PLACEMENT: CPT | Performed by: RADIOLOGY

## 2023-12-22 PROCEDURE — 77336 RADIATION PHYSICS CONSULT: CPT | Performed by: INTERNAL MEDICINE

## 2023-12-24 PROBLEM — C06.9 SQUAMOUS CELL CARCINOMA OF ORAL CAVITY (HCC): Chronic | Status: ACTIVE | Noted: 2023-05-23

## 2023-12-24 PROBLEM — C06.9 SQUAMOUS CELL CARCINOMA OF ORAL CAVITY (HCC): Status: RESOLVED | Noted: 2023-01-01 | Resolved: 2023-12-24

## 2023-12-24 NOTE — PROGRESS NOTES
Telemedicine consent    Patient: Eliceo Garcia  Provider: Roosevelt Hu MD  Provider located at [address] [place] [address] [facility] [address] [address]    The patient was identified by name and date of birth. Eliceo Garcia was informed that this is a telemedicine visit and that the visit is being conducted through Telephone.  My office door was closed. No one else was in the room.  He acknowledged consent and understanding of privacy and security of the video platform. The patient has agreed to participate and understands they can discontinue the visit at any time.    Patient is aware this is a billable service.                                Hematology Outpatient Office Note    Date of Service: 1/2/2024    [facility] [address] [address] [phone]    Reason for Consultation:   No chief complaint on file.    Stage of cancer: CHETAN  Treatment goal is cure    Referral Physician: No ref. provider found    Primary Care Physician:  Elias Schoen, MD       ASSESSMENT & PLAN      Diagnosis ICD-10-CM Associated Orders   1. 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 None of the visualized lymph nodes appear enlarged in size. No suspicious mass visualized on noncontrast imaging.      6/19/2023 PET/CT: Hypermetabolic right anterior oral cavity mass most concerning for malignancy. Bilateral hypermetabolic cervical adenopathy compatible  with metastases. No hypermetabolic metastases in the chest abdomen pelvis. Multiple healing left rib fractures.    Supportive care: Zofran, EMLA    Discussion of decision making    I personally reviewed the following lab results, the image studies, pathology, other specialty/physicians consult notes and recommendations, and outside medical records. I had a lengthy discussion with the patient and shared the work-up findings. We discussed the diagnosis and management plan as below. I spent  minutes reviewing the records (labs, clinician notes, outside records, medical history, ordering medicine/tests/procedures, interpreting the imaging/labs previously done) and coordination of care as well as direct time with the patient today, of which greater than 50% of the time was spent in counseling and coordination of care with the patient/family.    Plan/Labs  F/u ENT, surgery not being pursued upfront  F/u Rad Onc  Pt completed 7 cycles of Cisplatin and will not order more as he has reached 280 mg/m2  Restaging PET/CT after RT/Chemo completed ordered for 4/3/2024    I discussed the risks of ongoing cigarette smoking and reviewed the benefits of quitting and risk of new lung primary, heart disease, stroke, among other risks and cancers. Reviewed options including support, quit date, medications, and family support. Patient voiced understanding and willingness to try to quit. [facility]. Greater than 3 minutes were needed for discussion of tobacco dependence and quitting counselling.        Follow Up: 12 weeks to review PET/CT    All questions were answered to the patient's satisfaction during this encounter. The patient knows the contact information for our office and knows to reach out for any relevant concerns related to this encounter. They are to call for any temperature 100.4 or higher, new symptoms including but not restricted to shaking chills,  decreased appetite, nausea, vomiting, diarrhea, increased fatigue, shortness of breath or chest pain, confusion, and not feeling the strength to come to the clinic. For all other listed problems and medical diagnosis in their chart - they are managed by PCP and/or other specialists, which the patient acknowledges. Thank you very much for your consultation and making us a part of this patient's care. We are continuing to follow closely with you. Please do not hesitate to reach out to me with any additional questions or concerns.    Roosevelt Hu MD  Hematology & Medical Oncology Staff Physician             Disclaimer: This document was prepared using [employer] technology. If a word or phrase is confusing, or does not make sense, this is likely due to recognition error which was not discovered during this clinician's review. If you believe an error has occurred, please contact me through [facility] service line for yamini?cation.      ONCOLOGIC HISTORY OF PRESENT ILLNESS     His general dentist saw him on May 16 and appreciated a large oral cavity lesion which was concerning for malignancy and he is thus referred to us for further management.    Clotting History None   Bleeding History None   Cancer History Oral cavity suspected   Family Cancer History Dad (skin), Mom (breast)   H/O Blood/Plt Transfusion None   Tobacco/etoh/drug abuse 1 PPD x  45 years, beer (6 pack a day), no rec drug use       Cancer Screening history No colon cancer screening   Occupation Landscape work         SUBJECTIVE  (INTERVAL HISTORY)        I have reviewed the relevant past medical, surgical, social and family history. I have also reviewed allergies and medications for this patient.    He is still smoking but down to 1 cig in the morning and evening.  RT was not easy.  He has preserved strength and energy. He has intermittent LH increasing.  Hasn't been able to work.    Review of Systems    Baseline weight: 145-150  lbs    Denies F/C, N/V, SOB, CP, HA, rash, itching, gen weakness, melena, hematuria, hematochezia, falls, diarrhea, or constipation       A 10-point review of system was performed, pertinent positive and negative were detailed as above. Otherwise, the 10-point review of system was negative.      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    Stroke (HCC)        Past Surgical History:   Procedure Laterality Date    EGD      IR GASTROSTOMY TUBE PLACEMENT  12/11/2023    IR PORT PLACEMENT  11/7/2023    KNEE ARTHROSCOPY Left     MULTIPLE TOOTH EXTRACTIONS N/A 10/16/2023    Procedure: EXTRACTION OF ALL REMAINING TEETH;  Surgeon: Humble Otero DMD;  Location: [facility];  Service: Oral Surgery       Family History   Problem Relation Age of Onset    Breast cancer Mother     Cancer Father        Social History     Socioeconomic History    Marital status: Single     Spouse name: Not on file    Number of children: Not on file    Years of education: Not on file    Highest education level: Not on file   Occupational History    Not on file   Tobacco Use    Smoking status: Every Day     Current packs/day: 0.25     Types: Cigarettes    Smokeless tobacco: Never   Vaping Use    Vaping status: Never Used   Substance and Sexual Activity    Alcohol use: Yes     Comment: 3-4 beers/day 24 oz beer    Drug use: No    Sexual activity: Not on file   Other Topics Concern    Not on file   Social History Narrative    Not on file     Social Determinants of Health     Financial Resource Strain: Not on file   Food Insecurity: No Food Insecurity (11/5/2023)    Hunger Vital Sign     Worried About Running Out of Food in the Last Year: Never true     Ran Out of Food in the Last Year: Never true   Transportation Needs: No Transportation Needs (11/5/2023)    PRAPARE - Transportation     Lack of Transportation (Medical): No      Lack of Transportation (Non-Medical): No   Physical Activity: Not on file   Stress: Not on file   Social Connections: Not on file   Intimate Partner Violence: Not on file   Housing Stability: Low Risk  (11/5/2023)    Housing Stability Vital Sign     Unable to Pay for Housing in the Last Year: No     Number of Places Lived in the Last Year: 1     Unstable Housing in the Last Year: No       Allergies   Allergen Reactions    Bee Venom Hives    Other      bees       Current Outpatient Medications   Medication Sig Dispense Refill    Aspirin Low Dose 81 MG chewable tablet CHEW 1 TABLET BY MOUTH DAILY 90 tablet 0    atorvastatin (LIPITOR) 40 mg tablet TAKE 1 TABLET BY MOUTH EVERY DAY IN THE EVENING 30 tablet 0    chlorhexidine (PERIDEX) 0.12 % solution  (Patient not taking: Reported on 12/14/2023)      clopidogrel (Plavix) 75 mg tablet Take 1 tablet (75 mg total) by mouth daily for 21 days (Patient not taking: Reported on 11/22/2023) 21 tablet 0    diphenhydramine, lidocaine, Al/Mg hydroxide, simethicone (Magic Mouthwash) SUSP Swish and swallow 10 mL every 4 (four) hours as needed for mouth pain or discomfort (Patient not taking: Reported on 11/24/2023) 237 mL 7    ibuprofen (MOTRIN) 600 mg tablet Take 1 tablet (600 mg total) by mouth every 6 (six) hours as needed for mild pain (Patient not taking: Reported on 11/24/2023) 30 tablet 0    Magnesium Oxide -Mg Supplement 200 MG TABS Take 1 tablet (200 mg total) by mouth 2 (two) times a day 60 tablet 0    naloxone (NARCAN) 4 mg/0.1 mL nasal spray Administer 1 spray into a nostril. If no response after 2-3 minutes, give another dose in the other nostril using a new spray. (Patient not taking: Reported on 11/22/2023) 1 each 1    oxyCODONE (ROXICODONE) 20 MG TABS Take 1/2 tab (10mg) for moderate pain, 1 tab (20mg) for severe pain, every 4 hours AS NEEDED. Do not take sooner than 4 hours Do not start before January 1, 2024. 90 tablet 0    polyethylene glycol (MIRALAX) 17 g  packet Take 17 g by mouth daily (Patient not taking: Reported on 11/24/2023) 30 each 0    senna-docusate sodium (SENOKOT-S) 8.6-50 mg per tablet Take 1 tablet by mouth daily 60 tablet 2    silver sulfadiazine (SILVADENE,SSD) 1 % cream Apply topically 2 (two) times a day 400 g 1     No current facility-administered medications for this visit.       (Not in a hospital admission)        Objective:     24 Hour Vitals Assessment:     There were no vitals filed for this visit.    Weight today: not obtained as this was a televisit    PHYSICIAN EXAM:    General: Appearance: alert, cooperative, no distress.  HEENT: Normocephalic, atraumatic. No scleral icterus. conjunctivae clear. EOMI. Posterior tongue mass noted, poor dentition  Chest: No tenderness to palpation. No open wound noted.  Lungs: Clear to auscultation bilaterally, Respirations unlabored.  Cardiac: Regular rate and rhythm, +S1and S2  Abdomen: Soft, non-tender, non-distended. Bowel sounds are normal  Extremities:  No edema, cyanosis, clubbing.  Skin: Skin color, turgor are normal. No rashes.  Lymphatics: no palpable axillary, or inguinal adenopathy, palpable R sub-mandibular LN noted  Neurologic: Awake, Alert, and oriented, no gross focal deficits noted b/l.       DATA REVIEW:    Pathology Result:    Final Diagnosis   Date Value Ref Range Status   08/29/2023   Final    A. 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, ulcerated  - Carcinoma present at tissue edges    Comment:  - HPV CISH and p16 ordered.   - Dr. Dimas was notified of the diagnosis via Epic messaging on 9/1/23 at 9:02 am.     Interpretation performed at [facility]- [address] [address]              Image Results:   Image result are reviewed and documented in Hematology/Oncology history. I personally reviewed these images.    IR gastrostomy tube placement  Narrative: Ultrasound and fluoroscopically guided gastrostomy tube  placement    Clinical History: 4-year-old male with head and neck cancer    Contrast: 20 mL of iohexol (OMNIPAQUE)    Fluoro time: 4.6 minutes    Number of Images: Multiple    Radiation dose: 29 mGy    Conscious sedation time: anesthesia    Technique: The patient was brought to the interventional radiology suite and placed supine on the table. A brief ultrasound examination was performed in order to cecy the edges of the liver and spleen. Under fluoroscopic control, a 5 Fijian angled glide   catheter was advanced through the nostril, into the stomach.    The left upper quadrant was then prepped and draped in usual sterile fashion. Lidocaine was administered to the skin, and a small skin incision was made. After additional lidocaine was administered, 2 suture anchors were then placed percutaneously around   the chosen puncture site. An 18 gauge hollow bore needle was advanced percutaneously under fluoroscopy into of the anterolateral aspect of the mid body of the stomach. Air was aspirated, and contrast injected confirming appropriate needle placement. A   Amplatz wire was advanced into the stomach, and the needle was removed. The coaxial dilator/peel-away was advanced over the wire, and the tract was dilated. The inner portions of the dilator were removed, leaving the peel-away and wire in place. The 16   Fijian gastrostomy tube was then advanced over the wire, and through the peel-away. The peel-away was removed. The balloon was inflated, and pulled back up against the anterior abdominal wall. The flange was then advanced down to the skin. Contrast was   injected into the catheter, confirming satisfactory placement of the tube within the stomach. The catheter was then flushed with normal saline. The catheter was sterilely dressed.  Impression: Impression: Successful percutaneous fluoroscopic and ultrasound-guided placement of a 16 Fijian gastrostomy tube as described above.    Workstation performed:  "[id number]      LABS:  Lab data are reviewed and documented in HemOnc history.       Lab Results   Component Value Date    HGB 9.7 (L) 12/26/2023    HCT 28.2 (L) 12/26/2023    MCV 97 12/26/2023     12/26/2023    WBC 3.59 (L) 12/26/2023    NRBC 0 12/26/2023    BANDSPCT 6 11/28/2023     Lab Results   Component Value Date    K 3.5 12/26/2023    CL 93 (L) 12/26/2023    CO2 27 12/26/2023    BUN 10 12/26/2023    CREATININE 0.71 12/26/2023    GLUCOSE 93 12/11/2016    GLUF 87 10/16/2023    CALCIUM 9.1 12/26/2023    AST 17 12/26/2023    ALT 14 12/26/2023    ALKPHOS 70 12/26/2023    EGFR 101 12/26/2023       No results found for: \"IRON\", \"TIBC\", \"FERRITIN\"    No results found for: \"JMECYHDN83\"    No results for input(s): \"WBC\", \"CREAT\", \"PLT\" in the last 72 hours.         By:  Roosevelt Hu MD, 1/2/2024, 2:53 PM                                  "

## 2023-12-26 ENCOUNTER — HOSPITAL ENCOUNTER (OUTPATIENT)
Dept: INFUSION CENTER | Facility: CLINIC | Age: 58
Discharge: HOME | End: 2023-12-26
Payer: COMMERCIAL

## 2023-12-26 ENCOUNTER — APPOINTMENT (OUTPATIENT)
Dept: RADIATION ONCOLOGY | Facility: CLINIC | Age: 58
End: 2023-12-26
Payer: COMMERCIAL

## 2023-12-26 DIAGNOSIS — Z95.828 PORT-A-CATH IN PLACE: Primary | ICD-10-CM

## 2023-12-26 DIAGNOSIS — C06.9 SQUAMOUS CELL CARCINOMA OF ORAL CAVITY (HCC): ICD-10-CM

## 2023-12-26 LAB
ALBUMIN SERPL BCP-MCNC: 3.5 G/DL (ref 3.5–5)
ALP SERPL-CCNC: 70 U/L (ref 34–104)
ALT SERPL W P-5'-P-CCNC: 14 U/L (ref 7–52)
ANION GAP SERPL CALCULATED.3IONS-SCNC: 8 MMOL/L
AST SERPL W P-5'-P-CCNC: 17 U/L (ref 13–39)
BASOPHILS # BLD AUTO: 0.02 THOUSANDS/ÂΜL (ref 0–0.1)
BASOPHILS NFR BLD AUTO: 1 % (ref 0–1)
BILIRUB SERPL-MCNC: 0.31 MG/DL (ref 0.2–1)
BUN SERPL-MCNC: 10 MG/DL (ref 5–25)
CALCIUM SERPL-MCNC: 9.1 MG/DL (ref 8.4–10.2)
CHLORIDE SERPL-SCNC: 93 MMOL/L (ref 96–108)
CO2 SERPL-SCNC: 27 MMOL/L (ref 21–32)
CREAT SERPL-MCNC: 0.71 MG/DL (ref 0.6–1.3)
EOSINOPHIL # BLD AUTO: 0.05 THOUSAND/ÂΜL (ref 0–0.61)
EOSINOPHIL NFR BLD AUTO: 1 % (ref 0–6)
ERYTHROCYTE [DISTWIDTH] IN BLOOD BY AUTOMATED COUNT: 14.4 % (ref 11.6–15.1)
GFR SERPL CREATININE-BSD FRML MDRD: 101 ML/MIN/1.73SQ M
GLUCOSE SERPL-MCNC: 91 MG/DL (ref 65–140)
HCT VFR BLD AUTO: 28.2 % (ref 36.5–49.3)
HGB BLD-MCNC: 9.7 G/DL (ref 12–17)
IMM GRANULOCYTES # BLD AUTO: 0.02 THOUSAND/UL (ref 0–0.2)
IMM GRANULOCYTES NFR BLD AUTO: 1 % (ref 0–2)
LYMPHOCYTES # BLD AUTO: 0.22 THOUSANDS/ÂΜL (ref 0.6–4.47)
LYMPHOCYTES NFR BLD AUTO: 6 % (ref 14–44)
MAGNESIUM SERPL-MCNC: 1.4 MG/DL (ref 1.9–2.7)
MCH RBC QN AUTO: 33.2 PG (ref 26.8–34.3)
MCHC RBC AUTO-ENTMCNC: 34.4 G/DL (ref 31.4–37.4)
MCV RBC AUTO: 97 FL (ref 82–98)
MONOCYTES # BLD AUTO: 0.87 THOUSAND/ÂΜL (ref 0.17–1.22)
MONOCYTES NFR BLD AUTO: 24 % (ref 4–12)
NEUTROPHILS # BLD AUTO: 2.41 THOUSANDS/ÂΜL (ref 1.85–7.62)
NEUTS SEG NFR BLD AUTO: 67 % (ref 43–75)
NRBC BLD AUTO-RTO: 0 /100 WBCS
PLATELET # BLD AUTO: 222 THOUSANDS/UL (ref 149–390)
PMV BLD AUTO: 9.2 FL (ref 8.9–12.7)
POTASSIUM SERPL-SCNC: 3.5 MMOL/L (ref 3.5–5.3)
PROT SERPL-MCNC: 6.5 G/DL (ref 6.4–8.4)
RBC # BLD AUTO: 2.92 MILLION/UL (ref 3.88–5.62)
SODIUM SERPL-SCNC: 128 MMOL/L (ref 135–147)
WBC # BLD AUTO: 3.59 THOUSAND/UL (ref 4.31–10.16)

## 2023-12-26 PROCEDURE — 77386 HB NTSTY MODUL RAD TX DLVR CPLX: CPT | Performed by: RADIOLOGY

## 2023-12-26 PROCEDURE — 83735 ASSAY OF MAGNESIUM: CPT | Performed by: INTERNAL MEDICINE

## 2023-12-26 PROCEDURE — 80053 COMPREHEN METABOLIC PANEL: CPT | Performed by: INTERNAL MEDICINE

## 2023-12-26 PROCEDURE — 77427 RADIATION TX MANAGEMENT X5: CPT | Performed by: INTERNAL MEDICINE

## 2023-12-26 PROCEDURE — 77014 CHG CT GUIDANCE RADIATION THERAPY FLDS PLACEMENT: CPT | Performed by: RADIOLOGY

## 2023-12-26 PROCEDURE — 85025 COMPLETE CBC W/AUTO DIFF WBC: CPT | Performed by: INTERNAL MEDICINE

## 2023-12-26 NOTE — PROGRESS NOTES
Labs collected via port a cath. Port flushed per protocol. Pt is aware to return tomorrow for chemotherapy infusion. Declined AVS.

## 2023-12-27 ENCOUNTER — APPOINTMENT (OUTPATIENT)
Dept: RADIATION ONCOLOGY | Facility: CLINIC | Age: 58
End: 2023-12-27
Attending: INTERNAL MEDICINE
Payer: COMMERCIAL

## 2023-12-27 ENCOUNTER — TELEPHONE (OUTPATIENT)
Dept: HEMATOLOGY ONCOLOGY | Facility: CLINIC | Age: 58
End: 2023-12-27

## 2023-12-27 ENCOUNTER — HOSPITAL ENCOUNTER (OUTPATIENT)
Dept: INFUSION CENTER | Facility: CLINIC | Age: 58
Discharge: HOME | End: 2023-12-27
Payer: COMMERCIAL

## 2023-12-27 VITALS
RESPIRATION RATE: 18 BRPM | HEIGHT: 75 IN | WEIGHT: 130.07 LBS | BODY MASS INDEX: 16.17 KG/M2 | DIASTOLIC BLOOD PRESSURE: 76 MMHG | SYSTOLIC BLOOD PRESSURE: 138 MMHG | TEMPERATURE: 98.3 F | HEART RATE: 75 BPM

## 2023-12-27 DIAGNOSIS — C06.9 SQUAMOUS CELL CARCINOMA OF ORAL CAVITY (HCC): Primary | ICD-10-CM

## 2023-12-27 DIAGNOSIS — C06.9 ORAL CANCER (HCC): Primary | ICD-10-CM

## 2023-12-27 DIAGNOSIS — L58.0 ACUTE RADIATION DERMATITIS: ICD-10-CM

## 2023-12-27 PROCEDURE — 96366 THER/PROPH/DIAG IV INF ADDON: CPT

## 2023-12-27 PROCEDURE — 96367 TX/PROPH/DG ADDL SEQ IV INF: CPT

## 2023-12-27 PROCEDURE — 96413 CHEMO IV INFUSION 1 HR: CPT

## 2023-12-27 PROCEDURE — 77014 CHG CT GUIDANCE RADIATION THERAPY FLDS PLACEMENT: CPT | Performed by: INTERNAL MEDICINE

## 2023-12-27 PROCEDURE — 77386 HB NTSTY MODUL RAD TX DLVR CPLX: CPT | Performed by: INTERNAL MEDICINE

## 2023-12-27 RX ORDER — SODIUM CHLORIDE 9 MG/ML
20 INJECTION, SOLUTION INTRAVENOUS ONCE
Status: COMPLETED | OUTPATIENT
Start: 2023-12-27 | End: 2023-12-27

## 2023-12-27 RX ADMIN — MAGNESIUM SULFATE HEPTAHYDRATE: 500 INJECTION, SOLUTION INTRAMUSCULAR; INTRAVENOUS at 12:46

## 2023-12-27 RX ADMIN — SODIUM CHLORIDE 20 ML/HR: 0.9 INJECTION, SOLUTION INTRAVENOUS at 09:35

## 2023-12-27 RX ADMIN — APREPITANT 130 MG: 130 INJECTION, EMULSION INTRAVENOUS at 09:57

## 2023-12-27 RX ADMIN — CISPLATIN 70 MG: 1 INJECTION, SOLUTION INTRAVENOUS at 11:39

## 2023-12-27 RX ADMIN — MAGNESIUM SULFATE HEPTAHYDRATE: 500 INJECTION, SOLUTION INTRAMUSCULAR; INTRAVENOUS at 10:33

## 2023-12-27 RX ADMIN — DEXAMETHASONE SODIUM PHOSPHATE: 10 INJECTION, SOLUTION INTRAMUSCULAR; INTRAVENOUS at 09:36

## 2023-12-27 NOTE — TELEPHONE ENCOUNTER
Title: Magnesium to pre and post hydration      Date patient scheduled: 12/27/23    Original medication ordered: None    New Medication ordered: Magnesium 2 grams pre and post hydration     Office RN notified patient?? No     Is the patient scheduled within 24 hours?? If yes, follow up with verbal telephone call.  Yes, call to Chris RN at infusion.  Aware     Office RN to route to INF  pool. Infusion  pool routes to INF TECH POOL.    Infusion tech to receive message, confirm scheduled treatment duration matches ordered treatment duration or adjust accordingly, and re-link appointment request orders.    Infusion tech to notify pharmacy and finance.

## 2023-12-27 NOTE — PROGRESS NOTES
Patient arrived to unit without incident. Patient tolerated chemotherapy without incident. AVS declined and patient 's next appointment is 1/3/24 at 11:30.  Patient left in stable condition.

## 2023-12-28 ENCOUNTER — NUTRITION (OUTPATIENT)
Dept: NUTRITION | Facility: CLINIC | Age: 58
End: 2023-12-28

## 2023-12-28 ENCOUNTER — APPOINTMENT (OUTPATIENT)
Dept: RADIATION ONCOLOGY | Facility: CLINIC | Age: 58
End: 2023-12-28
Attending: INTERNAL MEDICINE
Payer: COMMERCIAL

## 2023-12-28 DIAGNOSIS — C06.9 ORAL CANCER (HCC): ICD-10-CM

## 2023-12-28 DIAGNOSIS — I63.81 RIGHT-SIDED LACUNAR STROKE (HCC): ICD-10-CM

## 2023-12-28 DIAGNOSIS — G89.3 CANCER-RELATED PAIN: ICD-10-CM

## 2023-12-28 PROCEDURE — 77014 CHG CT GUIDANCE RADIATION THERAPY FLDS PLACEMENT: CPT | Performed by: RADIOLOGY

## 2023-12-28 PROCEDURE — 77386 HB NTSTY MODUL RAD TX DLVR CPLX: CPT | Performed by: RADIOLOGY

## 2023-12-28 RX ORDER — ASPIRIN 81 MG/1
81 TABLET, CHEWABLE ORAL DAILY
Qty: 90 TABLET | Refills: 0 | Status: CANCELLED | OUTPATIENT
Start: 2023-12-28 | End: 2024-03-27

## 2023-12-28 RX ORDER — OXYCODONE HYDROCHLORIDE 20 MG/1
TABLET ORAL
Qty: 90 TABLET | Refills: 0 | Status: SHIPPED | OUTPATIENT
Start: 2024-01-01

## 2023-12-28 NOTE — TELEPHONE ENCOUNTER
Pls forward the aspirin to the prescribing MD. I will only fill oxycodone to be dispensed no sooner than 1/1/2024. He should still have enough before then.    Controlled Substance Review    [place] PDMP or [place]  reviewed: No red flags were identified; safe to proceed with prescription..      Filled  Written  ID  Drug  QTY  Days  Prescriber  RX #  Dispenser  Refill  Daily Dose*  Pymt Type     12/18/2023 12/14/2023 1 Oxycodone Hcl (Ir) 20 Mg Tab 90.00 15 Ti Lobo [id number] Pen ([id number]) 0 180.00 [place]  11/30/2023 11/30/2023 1 Oxycodone Hcl (Ir) 20 Mg Tab 90.00 15 Ti Lobo [id number] Pen (4386) 0 180.00 [place]  11/20/2023 11/20/2023 1 Oxycodone Hcl (Ir) 10 Mg Tab 60.00 15 Ti Lobo [id number] Pen (4386) 0 60.00 [place]  11/03/2023 11/02/2023 1 Oxycodone Hcl (Ir) 10 Mg Tab 60.00 15 Ti Lobo [id number] Pen (4386)        Mitra Lima MD  Palliative Medicine & Supportive Care  Internal Medicine  Available via [employer] Text  Office: [phone]  Fax: [phone]

## 2023-12-28 NOTE — PATIENT INSTRUCTIONS
"  Nutrition Rx & Recommendations:  Diet: Very Soft/Moist, High Calorie, High Protein Diet as able; enteral nutrition as 1' source of nutrition  Small, frequent meals/snacks may be easier to tolerate than 3 large daily meals.  Aim for 5-6 small meals per day (every 2-3 hours).  Include protein at all meals/snacks.  Stay hydrated by sipping fluids of choice/tolerance throughout the day.  Liquid nutrition may be better tolerated than solids at times.  Refer to Eating Hints book for other meal/snack ideas and symptom management.  Avoid spicy, acidic, sharp/hard/crunchy foods & carbonated beverages as needed.  Follow proper oral care; Try baking soda/salt water rinse recipe (mix 3/4 tsp salt + 1 tsp baking soda + 1 qt water; rinse with plain water after using) in Eating Hints book (pg 18).  Brush your teeth before/after meals & before bed.  For weight loss: monitor your weight at home at least 2x/week, record your weight, start by adding 250-500 extra calories per day, eat 5-6 small scheduled meals every 2-3 hrs, choose foods that are high in protein and calories (see pages 49-53 in your Eating Hints book), drink liquids with calories for example: milkshakes/smoothies/juice/soup/whole milk/chocolate milk, cook with protein fortified milk (see recipe on page 36 in your Eating Hints book), consider ready-to-drink oral nutrition supplements such as Ensure Plus, Ensure Enlive, Boost Plus, or Boost Very High Calorie, avoid \"diet\" and \"light\" foods when possible, avoid drinking too much with meals, contact your dietitian with any continued weight loss over the course of 1 week.  For more info see pages 35-37 in your Eating Hints book.  For trouble swallowing: eat 5-6 small meals/day; choose foods that are easy to swallow; choose foods that are high in protein & calories; cook foods until they are soft/tender; cut food into small pieces; moisten & soften foods (gravy/sauce/broth/yogurt); sip drinks through a straw (as " tolerated); avoid foods/drinks that can burn/scrape your throat (hot temperatures, spicy foods, acidic foods, sharp/hard/crunchy foods, alcohol); talk to your doctor about a Speech Therapy referral for a swallowing evaluation (see page 26-28 in your Eating Hints book).  Weigh yourself regularly. If you notice weight loss, make an effort to increase your daily food/calorie intake. If you continue to notice loss after these efforts, reach out to your dietitian to establish a plan to stabilize weight.  Aim for at least 2 Ensure completes daily  Try making jello with protein powder  Continue homemade milkshakes/smoothies- try incorporating at least one daily.   Foods to add to smoothies: avocado, heavy cream, peanut butter, protein powder, Ensure, whole milk or fairlife milk, ice cream (if tolerating), whole fat coconut milk  Enteral nutrition is needed for Eliceo's sole source of nutrition, recommend  TF Goal: 1 container (237 mL) 8 times per day of Jevity 1.5 via PEG (push syringe or gravity syringe method to administer boluses; 1 container to infuse over ~15-20 minutes).  Water Flushin mL free water flush pre/post each bolus (960 mL water)   Enteral Nutrition goal to provide: 1896 mL volume (8 containers/day), 2844 kcal, 121 grams protein, 1441 mL free water, 2401 mL total water daily.  Allow for substitutions   Your syringes are each 60 mL or 2 fl oz.  Always flush your feeding tube with 60 mL room-temp water 1-2 times daily while feeding tube is not in use.  Call [facility] when you have 10 days left of TF supplies: [phone], option 3 (customer support).      Follow Up Plan: 24 during infusion  Recommend Referral to Other Providers: none at this time

## 2023-12-28 NOTE — PROGRESS NOTES
"Outpatient Oncology Nutrition Consultation   Type of Consult: Follow Up  Care Location: Radiation Oncology    Reason for referral:   Received notification by RN NavigatorSherrell, on 7/18/23 that pt has triggered for oncology nutrition care (reason for referral: HNC dx and Malnutrition Screening Tool (MST) Triggers: scored a 0 indicating No recent wt loss and is not eating poorly due to a decreased appetite. (Date of MST: 7/18/23)).  [facility] by [facility], on 10/12/23 that pt has triggered for oncology nutrition care (reason for referral: HNC dx and Malnutrition Screening Tool (MST) Triggers: scored a 3 indicating 24-33# (11-15 kg) recent wt loss and is not eating poorly due to a decreased appetite. (Date of MST: 10/12/23) MST Note: \"over the last 4 months \").    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, p16 positive      Chemotherapy    alteplase (CATHFLO), 2 mg, Intracatheter, Every 1 Minute as needed, 0 of 7 cycles  CISplatin (PLATINOL) infusion, 40 mg/m2, Intravenous, Once, 0 of 7 cycles  aprepitant (CINVANTI) in  mL IVPB, 130 mg, Intravenous, Once, 0 of 7 cycles         Past Medical & Surgical Hx:   Patient Active Problem List   Diagnosis    Dental caries    Cervical lymphadenopathy    Periodontitis    Alcohol use disorder, severe, " dependence (HCC)    Hypertension    Cigarette nicotine dependence without complication    Hyponatremia    Hypokalemia    Hypomagnesemia    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)    Preoperative clearance    Drug induced constipation    Cancer-related pain    At risk for respiratory depression due to opioid    Pain in lower jaw    Counseling regarding advanced care planning and goals of care    History of CVA (cerebrovascular accident)    Moderate protein-calorie malnutrition (HCC)    Alcoholic intoxication without complication (HCC)    Port-A-Cath in place    Alcohol abuse    Palliative care encounter     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    Stroke (HCC)      Past Surgical History:   Procedure Laterality Date    EGD      IR GASTROSTOMY TUBE PLACEMENT  12/11/2023    IR PORT PLACEMENT  11/7/2023    KNEE ARTHROSCOPY Left     MULTIPLE TOOTH EXTRACTIONS N/A 10/16/2023    Procedure: EXTRACTION OF ALL REMAINING TEETH;  Surgeon: Humble Otero DMD;  Location: [facility];  Service: Oral Surgery       Review of Medications:   Vitamins, Supplements and Herbals: No, pt denies taking supplements    Current Outpatient Medications:     aspirin 81 mg chewable tablet, Chew 1 tablet (81 mg total) daily, Disp: 90 tablet, Rfl: 0    atorvastatin (LIPITOR) 40 mg tablet, Take 1 tablet (40 mg total) by mouth every evening, Disp: 30 tablet, Rfl: 0    chlorhexidine (PERIDEX) 0.12 % solution, , Disp: , Rfl:     clopidogrel (Plavix) 75 mg tablet, Take 1 tablet (75 mg total) by mouth daily for 21 days (Patient not taking: Reported on 11/22/2023), Disp: 21 tablet, Rfl: 0    diphenhydramine, lidocaine, Al/Mg hydroxide, simethicone (Magic Mouthwash) SUSP, Swish and swallow 10 mL every 4 (four) hours as needed for mouth pain or discomfort (Patient not taking: Reported on 11/24/2023), Disp: 237 mL, Rfl:  7    ibuprofen (MOTRIN) 600 mg tablet, Take 1 tablet (600 mg total) by mouth every 6 (six) hours as needed for mild pain (Patient not taking: Reported on 11/24/2023), Disp: 30 tablet, Rfl: 0    Magnesium Oxide -Mg Supplement 200 MG TABS, Take 1 tablet (200 mg total) by mouth 2 (two) times a day, Disp: 60 tablet, Rfl: 0    naloxone (NARCAN) 4 mg/0.1 mL nasal spray, Administer 1 spray into a nostril. If no response after 2-3 minutes, give another dose in the other nostril using a new spray. (Patient not taking: Reported on 11/22/2023), Disp: 1 each, Rfl: 1    oxyCODONE (ROXICODONE) 20 MG TABS, Take 1/2 tab (10mg) for moderate pain, 1 tab (20mg) for severe pain, every 4 hours AS NEEDED. Do not take sooner than 4 hours Do not start before December 18, 2023., Disp: 90 tablet, Rfl: 0    polyethylene glycol (MIRALAX) 17 g packet, Take 17 g by mouth daily (Patient not taking: Reported on 11/24/2023), Disp: 30 each, Rfl: 0    senna-docusate sodium (SENOKOT-S) 8.6-50 mg per tablet, Take 1 tablet by mouth daily, Disp: 60 tablet, Rfl: 2    silver sulfadiazine (SILVADENE,SSD) 1 % cream, Apply topically 2 (two) times a day, Disp: 400 g, Rfl: 1  No current facility-administered medications for this visit.    Facility-Administered Medications Ordered in Other Visits:     alteplase (CATHFLO) injection 2 mg, 2 mg, Intracatheter, Q1MIN PRN, Roosevelt Hu MD    alteplase (CATHFLO) injection 2 mg, 2 mg, Intracatheter, Q1MIN PRN, Roosevelt Hu MD    Most Recent Lab Results:   Lab Results   Component Value Date    WBC 3.59 (L) 12/26/2023    NEUTROABS 2.41 12/26/2023    CHOLESTEROL 141 11/04/2023    TRIG 75 11/04/2023    HDL 59 11/04/2023    LDLCALC 67 11/04/2023    ALT 14 12/26/2023    AST 17 12/26/2023    ALB 3.5 12/26/2023    SODIUM 128 (L) 12/26/2023    SODIUM 128 (L) 12/19/2023    K 3.5 12/26/2023    K 3.6 12/19/2023    CL 93 (L) 12/26/2023    BUN 10 12/26/2023    BUN 7 12/19/2023    CREATININE 0.71 12/26/2023    CREATININE  "0.59 (L) 12/19/2023    EGFR 101 12/26/2023    GLUCOSE 93 12/11/2016    POCGLU 126 11/06/2023    GLUF 87 10/16/2023    GLUC 91 12/26/2023    HGBA1C 5.9 (H) 11/04/2023    CALCIUM 9.1 12/26/2023    MG 1.4 (L) 12/26/2023       Anthropometric Measurements:   Height: 75\"  Ht Readings from Last 1 Encounters:   12/27/23 6' 3\" (1.905 m)     Wt Readings from Last 20 Encounters:   12/27/23 59 kg (130 lb 1.1 oz)   12/20/23 59 kg (130 lb 1.1 oz)   12/14/23 62.1 kg (137 lb)   12/13/23 60.6 kg (133 lb 9.6 oz)   12/11/23 58.5 kg (129 lb)   12/06/23 61 kg (134 lb 7.7 oz)   11/29/23 61.7 kg (136 lb 0.4 oz)   11/24/23 62.1 kg (136 lb 12.8 oz)   11/22/23 64.9 kg (143 lb)   11/21/23 63.4 kg (139 lb 12.4 oz)   11/15/23 62.3 kg (137 lb 5.6 oz)   11/12/23 59.8 kg (131 lb 13.4 oz)   11/04/23 66.7 kg (147 lb)   11/02/23 64.3 kg (141 lb 12.8 oz)   10/31/23 64 kg (141 lb)   10/16/23 66.7 kg (147 lb)   10/12/23 66.7 kg (147 lb)   10/11/23 64.9 kg (143 lb)   08/29/23 72.6 kg (160 lb)   08/04/23 72.6 kg (160 lb)     Weight History:   Usual Weight: 170-175# (July 2023)  Varian: (11/16/23) 141.8#, (11/20/23) 140#, (11/27/23) 135.5#, (11/28/23) 135.2#, (11/30/23) 140.4#, (12/4/23) 135#, (12/7/23) 135.4#, (12/12/23) 136#, (12/14/23) 137.4#, (12/18/23) 129.8#, (12/21/23) 133#, (12/26/23) 126.5#    Oncology Nutrition-Anthropometrics      Flowsheet Row Nutrition from 12/28/2023 in [facility] Nutrition from 12/20/2023 in [facility]   Patient age (years): 58 years 58 years   Patient (male) height (in): 75 in 75 in   Current weight (lbs): 130.1 lbs 130.1 lbs   Current weight to be used for anthropometric calculations (kg) 59.1 kg 59.1 kg   BMI: 16.3 16.3   IBW male 196 lb 196 lb   IBW (kg) male 89.1 kg 89.1 kg   IBW % (male) 66.4 % 66.4 %   Adjusted BW (male): 179.5 lbs 179.5 lbs   Adjusted BW in kg (male): 81.6 kg 81.6 kg   % weight change after 1 week: -- -5 %   Weight change " after 1 week (lbs) -- -6.9 lbs   % weight change after 1 month: -4.4 % -6.9 %   Weight change after 1 month (lbs) -6 lbs -9.7 lbs   % weight change after 3 months: -- -18.7 %   Weight change after 3 months (lbs) -- -29.9 lbs          Nutrition-Focused Physical Findings: mild  muscle depletion ([place]) - hollowing/scooping/depression    Food/Nutrition-Related History & Client/Social History:    Current Nutrition Impact Symptoms:  [x] Nausea a little after chemo [x] Reduced Appetite  [] Acid Reflux    [] Vomiting  [x] Unintended Wt Loss  [] Malabsorption    [] Diarrhea  [] Unintended Wt Gain  [] Dumping Syndrome    [] Constipation  [] Thick Mucous/Secretions  [] Abdominal Pain    [] Dysgeusia (Altered Taste)  [x] Xerostomia (Dry Mouth)  [] Gas    [] Dysosmia (Altered Smell)  [] Thrush  [x] Difficulty Chewing - pt reports he recently had all of his teeth removed    [x] Oral Mucositis (Sore Mouth)  [x] Fatigue  [] Hyperglycemia   Lab Results   Component Value Date    HGBA1C 5.9 (H) 11/04/2023      [] Odynophagia  [] Esophagitis  [] Other:    [x] Dysphagia  [] Early Satiety  [] No Problems Eating      Food Allergies & Intolerances: no    Current Diet: Soft/Moist and Tube Feeding - room temp foods d/t oral pain  Current Nutrition Intake: Decreased since last visit.  Appetite: Fair , Poor  Nutrition Route: PO and PEG   Oral Care:  rx mouthwash BID, brushing tongue 2-3x/day; had all of teeth removed  Activity level: not working during tx    24 Hr Diet Recall:   Breakfast:nothing  Snack: maybe some jello  Lunch: applesauce   Snack: none  Dinner: Ensure  Snack: nothing    Beverages: water (sips), apple juice (8 oz x2-4), chocolate whole milk (8 oz x1-2), Gatorade (20 oz x1)  Supplements:   Ensure Complete (10 oz, 350 kcal, 30 g pro) sometimes, not every day  Milkshakes - once in a while - milk, ice cream, banana    EN Recall:  DME: [employer]  Access Type: PEG  Formula: Jevity 1.5  Method: Bolus  Eliceo hasn't been doing Tfs  . Plans to try 1 carton tonight    Oncology Nutrition-Estimated Needs      Flowsheet Row Nutrition from 10/31/2023 in [facility]   Weight type used IBW   Weight in kilograms (kg) used for estimated needs 89.1 kg   Energy needs formula:  30-35 kcal/kg   Energy needs based on 30 kcal/k kcal   Energy needs based on 35 kcal/k kcal   Protein needs formula: 1.2-1.5 g/kg   Protein needs based on 1.2 g/k g   Protein needs based on 1.5 g/kg 134 g   Fluid needs formula: 30-35 mL/kg   Fluid needs based on 30 mL/kg 2673 mL   Fluid needs in ounces 90 oz   Fluid needs based on 35 mL/kg 3119 mL   Fluid needs in ounces 105 oz             Discussion & Intervention:   Eliceo was evaluated today for an RD follow up regarding HNC.  Eliceo is currently undergoing tx for HNC .  Met with Eliceo after radiation today. He has not used PEG since last week. He feels it takes too much time and doesn't want to use it. He just wants treatment to be finished and he is eager to finish next week. Discussed that he may still have trouble eating once treatment is finished. His weight continues to drop. Discussed importance of calorie/protein intake. Encouraged him to use PEG tube, which he then said he would try to use 1 carton tonight. He is able to get down jello, applesauce, Ensure, and milkshakes that he makes at home. Discussed ways to add calories to his milkshakes. Encouraged him to drink Ensure Complete shakes at least twice daily but aim for more if able.    Reviewed 24 hour recall, which revealed an inadequate po intake, and discussed ways to increase kcal, protein, and fluid intakes and optimize nutrient intake.  Also reviewed the importance of wt management throughout the tx process and the role of a high kcal/ high protein diet in managing wt and overall health.  Based on today's assessment, discussion included: MNT for: wt loss, oral pain, soft/moist food, enteral nutrition,  dysphagia, nausea, how to modify foods for anticipated nutrition impact symptoms pt may experience during CA tx, practicing proper oral care, a high kcal/protein diet & food choices to include at all meals & snacks (Examples of high kcal foods: cheese, full-fat dairy products, nut butter, plant-based fats, coconut oil/milk, avocado, butter, cream soup, etc. Examples of high protein foods: eggs, chicken, fish, beans/legumes, nuts/nut butters, bone broth, etc.) , fortifying foods for added kcal and protein (examples include: adding cheese to foods such as eggs, mashed potatoes, casseroles, etc.; Making oatmeal with whole milk rather than water; Making fortified mashed potatoes with cream, butter, dry milk powder, plain Greek yogurt, and cheese.), adequate hydration & fluid choices, sipping on calorie containing beverages (examples include: adding whole milk or cream to coffee, oral nutrition supplements, juice, electrolyte replacement beverages, milk, etc.), eating smaller more frequent meals every 2-3 hours (5-6 small meals/day), having consistent and planned snacks between meals, increasing oral nutrition supplements, utilizing oral nutrition supplements, and recipe suggestions/resources, trying new recipes, & cooking at home more often.   Moving forward, Eliceo was encouraged to increase kcal, protein, and fluid intakes and practice MNT for nutrition impact sx management.  Materials Provided: Ensure samples (Ensure Complete)  All questions and concerns addressed during today’s visit.  Eliceo has RD contact information.    Nutrition Diagnosis:   Inadequate Energy Intake related to physiological causes, disease state and treatment related issues as evidenced by food recall, wt loss and discussion with pt and/or family.  Increased Nutrient Needs (kcal & pro) related to increased demand for nutrients and disease state as evidenced by cancer dx and pt undergoing tx for cancer.  Altered GI Function related to alteration in  GI tract structure and integrity as evidenced by Unintended Weight Loss and Oral Mucositis (Sore Mouth).  Patient has clinical indicators (or ASPEN criteria) consistent with severe protein-calorie malnutrition in the context of Chronic Illness as evidenced by >7.5% wt loss in 3 months and </=75% energy intake vs. Estimated needs for >/=1 month.  Monitoring & Evaluation:   Goals:  weight maintenance/stabilization  adequate nutrition impact symptom management  pt to meet >/=75% estimated nutrition needs daily  achieve tube feeding goal rate     Progress Towards Goals: Not Progressing    Nutrition Rx & Recommendations:  Diet: Very Soft/Moist, High Calorie, High Protein Diet as able; enteral nutrition as 1' source of nutrition  Small, frequent meals/snacks may be easier to tolerate than 3 large daily meals.  Aim for 5-6 small meals per day (every 2-3 hours).  Include protein at all meals/snacks.  Stay hydrated by sipping fluids of choice/tolerance throughout the day.  Liquid nutrition may be better tolerated than solids at times.  Refer to Eating Hints book for other meal/snack ideas and symptom management.  Avoid spicy, acidic, sharp/hard/crunchy foods & carbonated beverages as needed.  Follow proper oral care; Try baking soda/salt water rinse recipe (mix 3/4 tsp salt + 1 tsp baking soda + 1 qt water; rinse with plain water after using) in Eating Hints book (pg 18).  Brush your teeth before/after meals & before bed.  For weight loss: monitor your weight at home at least 2x/week, record your weight, start by adding 250-500 extra calories per day, eat 5-6 small scheduled meals every 2-3 hrs, choose foods that are high in protein and calories (see pages 49-53 in your Eating Hints book), drink liquids with calories for example: milkshakes/smoothies/juice/soup/whole milk/chocolate milk, cook with protein fortified milk (see recipe on page 36 in your Eating Hints book), consider ready-to-drink oral nutrition supplements such  "as Ensure Plus, Ensure Enlive, Boost Plus, or Boost Very High Calorie, avoid \"diet\" and \"light\" foods when possible, avoid drinking too much with meals, contact your dietitian with any continued weight loss over the course of 1 week.  For more info see pages 35-37 in your Eating Hints book.  For trouble swallowing: eat 5-6 small meals/day; choose foods that are easy to swallow; choose foods that are high in protein & calories; cook foods until they are soft/tender; cut food into small pieces; moisten & soften foods (gravy/sauce/broth/yogurt); sip drinks through a straw (as tolerated); avoid foods/drinks that can burn/scrape your throat (hot temperatures, spicy foods, acidic foods, sharp/hard/crunchy foods, alcohol); talk to your doctor about a Speech Therapy referral for a swallowing evaluation (see page 26-28 in your Eating Hints book).  Weigh yourself regularly. If you notice weight loss, make an effort to increase your daily food/calorie intake. If you continue to notice loss after these efforts, reach out to your dietitian to establish a plan to stabilize weight.  Aim for at least 2 Ensure completes daily  Try making jello with protein powder  Continue homemade milkshakes/smoothies- try incorporating at least one daily.   Foods to add to smoothies: avocado, heavy cream, peanut butter, protein powder, Ensure, whole milk or fairlife milk, ice cream (if tolerating), whole fat coconut milk  Enteral nutrition is needed for Eliceo's sole source of nutrition, recommend  TF Goal: 1 container (237 mL) 8 times per day of Jevity 1.5 via PEG (push syringe or gravity syringe method to administer boluses; 1 container to infuse over ~15-20 minutes).  Water Flushin mL free water flush pre/post each bolus (960 mL water)   Enteral Nutrition goal to provide: 1896 mL volume (8 containers/day), 2844 kcal, 121 grams protein, 1441 mL free water, 2401 mL total water daily.  Allow for substitutions   Your syringes are each 60 mL or " 2 fl oz.  Always flush your feeding tube with 60 mL room-temp water 1-2 times daily while feeding tube is not in use.  [facility] you have 10 days [facility] TF supplies: [phone], option 3 (customer support).      Follow Up Plan:  1/4/24 during infusion  Recommend Referral to Other Providers: none at this time

## 2023-12-29 ENCOUNTER — APPOINTMENT (OUTPATIENT)
Dept: RADIATION ONCOLOGY | Facility: CLINIC | Age: 58
End: 2023-12-29
Payer: COMMERCIAL

## 2023-12-29 ENCOUNTER — TELEPHONE (OUTPATIENT)
Dept: HEMATOLOGY ONCOLOGY | Facility: CLINIC | Age: 58
End: 2023-12-29

## 2023-12-29 PROCEDURE — 77014 CHG CT GUIDANCE RADIATION THERAPY FLDS PLACEMENT: CPT | Performed by: INTERNAL MEDICINE

## 2023-12-29 PROCEDURE — 77386 HB NTSTY MODUL RAD TX DLVR CPLX: CPT | Performed by: INTERNAL MEDICINE

## 2023-12-29 RX ORDER — MAGNESIUM SULFATE HEPTAHYDRATE 40 MG/ML
4 INJECTION, SOLUTION INTRAVENOUS ONCE
Status: CANCELLED
Start: 2024-01-04

## 2024-01-01 DIAGNOSIS — I63.81 RIGHT-SIDED LACUNAR STROKE (HCC): ICD-10-CM

## 2024-01-02 ENCOUNTER — APPOINTMENT (OUTPATIENT)
Dept: RADIATION ONCOLOGY | Facility: CLINIC | Age: 59
End: 2024-01-02
Attending: INTERNAL MEDICINE
Payer: COMMERCIAL

## 2024-01-02 ENCOUNTER — TELEPHONE (OUTPATIENT)
Dept: HEMATOLOGY ONCOLOGY | Facility: CLINIC | Age: 59
End: 2024-01-02

## 2024-01-02 ENCOUNTER — TELEMEDICINE (OUTPATIENT)
Dept: HEMATOLOGY ONCOLOGY | Facility: CLINIC | Age: 59
End: 2024-01-02
Payer: COMMERCIAL

## 2024-01-02 DIAGNOSIS — C06.9 SQUAMOUS CELL CARCINOMA OF ORAL CAVITY (HCC): Primary | Chronic | ICD-10-CM

## 2024-01-02 PROCEDURE — 77014 CHG CT GUIDANCE RADIATION THERAPY FLDS PLACEMENT: CPT | Performed by: RADIOLOGY

## 2024-01-02 PROCEDURE — 99406 BEHAV CHNG SMOKING 3-10 MIN: CPT | Performed by: INTERNAL MEDICINE

## 2024-01-02 PROCEDURE — 77336 RADIATION PHYSICS CONSULT: CPT | Performed by: INTERNAL MEDICINE

## 2024-01-02 PROCEDURE — 77386 HB NTSTY MODUL RAD TX DLVR CPLX: CPT | Performed by: RADIOLOGY

## 2024-01-02 PROCEDURE — G2012 BRIEF CHECK IN BY MD/QHP: HCPCS | Performed by: INTERNAL MEDICINE

## 2024-01-02 RX ORDER — MAGNESIUM SULFATE HEPTAHYDRATE 40 MG/ML
4 INJECTION, SOLUTION INTRAVENOUS ONCE
Start: 2024-01-03 | End: 2024-01-02

## 2024-01-02 RX ORDER — ASPIRIN 81 MG
81 TABLET,CHEWABLE ORAL DAILY
Qty: 90 TABLET | Refills: 0 | Status: SHIPPED | OUTPATIENT
Start: 2024-01-02

## 2024-01-02 RX ORDER — ATORVASTATIN CALCIUM 40 MG/1
40 TABLET, FILM COATED ORAL EVERY EVENING
Qty: 30 TABLET | Refills: 0 | Status: SHIPPED | OUTPATIENT
Start: 2024-01-02

## 2024-01-02 NOTE — TELEPHONE ENCOUNTER
Patient Call    Who are you speaking with? [facility] - Radiation Oncology     If it is not the patient, are they listed on an active communication consent form? N/A   What is the reason for this call? Ig calling in regards to patient's appointment with Dr. Hu at 2:40pm. Patient would like to be seen sooner due to star transportation and having radiation today at 11:15am.  Patient would like a call back to discuss coming in sooner for his appointment.  Ig stated patient will not be able to wait for his appointment at 2:40pm due to star transportation.    Does this require a call back? Yes   If a call back is required, please list best call back number [phone]   If a call back is required, advise that a message will be forwarded to their care team and someone will return their call as soon as possible.   Did you relay this information to the patient? Yes

## 2024-01-02 NOTE — TELEPHONE ENCOUNTER
Patient already home and is OK with virtual- patient unable to come in for visit today.    Apparently patient has radiation daily and is willing to come into the office tomorrow or another time he has radiation.    Mentioned that if  wanted pt to reschedule and have an in person visit that I would reach out to patient.    As of now patient will be a virtual/telephone call for his appointment later today @2:40pm.

## 2024-01-03 ENCOUNTER — APPOINTMENT (OUTPATIENT)
Dept: RADIATION ONCOLOGY | Facility: CLINIC | Age: 59
End: 2024-01-03
Payer: COMMERCIAL

## 2024-01-03 PROCEDURE — 77386 HB NTSTY MODUL RAD TX DLVR CPLX: CPT | Performed by: INTERNAL MEDICINE

## 2024-01-03 PROCEDURE — 77014 CHG CT GUIDANCE RADIATION THERAPY FLDS PLACEMENT: CPT | Performed by: INTERNAL MEDICINE

## 2024-01-03 PROCEDURE — 77427 RADIATION TX MANAGEMENT X5: CPT | Performed by: INTERNAL MEDICINE

## 2024-01-04 ENCOUNTER — APPOINTMENT (OUTPATIENT)
Dept: RADIATION ONCOLOGY | Facility: CLINIC | Age: 59
End: 2024-01-04
Payer: COMMERCIAL

## 2024-01-04 ENCOUNTER — TELEPHONE (OUTPATIENT)
Dept: NUTRITION | Facility: CLINIC | Age: 59
End: 2024-01-04

## 2024-01-04 ENCOUNTER — HOSPITAL ENCOUNTER (OUTPATIENT)
Dept: INFUSION CENTER | Facility: CLINIC | Age: 59
End: 2024-01-04

## 2024-01-04 PROCEDURE — 77386 HB NTSTY MODUL RAD TX DLVR CPLX: CPT | Performed by: RADIOLOGY

## 2024-01-04 PROCEDURE — 77014 CHG CT GUIDANCE RADIATION THERAPY FLDS PLACEMENT: CPT | Performed by: RADIOLOGY

## 2024-01-04 NOTE — TELEPHONE ENCOUNTER
Eliceo was scheduled for an RD follow up visit today, 1/4/24, during infusion. Infusion was cancelled as he is finished with chemo. Called Eliceo to offer to change appt to a phone appt. He states he is busy at this time but can reschedule for tomorrow. RD follow up appt rescheduled for 1/5/24 at 1:30 pm via phone.

## 2024-01-05 ENCOUNTER — TELEPHONE (OUTPATIENT)
Dept: NUTRITION | Facility: CLINIC | Age: 59
End: 2024-01-05

## 2024-01-05 NOTE — TELEPHONE ENCOUNTER
Eliceo was scheduled for an RD phone follow up appt today (1/5/2024) and was unable to attend his appointment (no answer).  A call was placed and a voice message was left encouraging him to call back and reschedule his appointment at a more convenient time for him. RD contact information was provided.

## 2024-01-08 ENCOUNTER — APPOINTMENT (OUTPATIENT)
Dept: RADIATION ONCOLOGY | Facility: CLINIC | Age: 59
End: 2024-01-08
Payer: COMMERCIAL

## 2024-01-08 PROCEDURE — 77014 CHG CT GUIDANCE RADIATION THERAPY FLDS PLACEMENT: CPT | Performed by: RADIOLOGY

## 2024-01-08 PROCEDURE — 77386 HB NTSTY MODUL RAD TX DLVR CPLX: CPT | Performed by: RADIOLOGY

## 2024-01-09 ENCOUNTER — APPOINTMENT (OUTPATIENT)
Dept: RADIATION ONCOLOGY | Facility: CLINIC | Age: 59
End: 2024-01-09
Payer: COMMERCIAL

## 2024-01-09 PROCEDURE — 77386 HB NTSTY MODUL RAD TX DLVR CPLX: CPT | Performed by: RADIOLOGY

## 2024-01-09 PROCEDURE — 77014 CHG CT GUIDANCE RADIATION THERAPY FLDS PLACEMENT: CPT | Performed by: RADIOLOGY

## 2024-01-09 NOTE — TELEPHONE ENCOUNTER
Reached out to Eliceo again today to see if he would like to reschedule missed appt from last week, and to check in on his nutrition. No answer. Left VM explaining reason for call and encouraging him to call back.

## 2024-01-10 ENCOUNTER — APPOINTMENT (OUTPATIENT)
Dept: RADIATION ONCOLOGY | Facility: CLINIC | Age: 59
End: 2024-01-10
Attending: INTERNAL MEDICINE
Payer: COMMERCIAL

## 2024-01-10 ENCOUNTER — APPOINTMENT (OUTPATIENT)
Dept: RADIATION ONCOLOGY | Facility: CLINIC | Age: 59
End: 2024-01-10
Payer: COMMERCIAL

## 2024-01-10 PROCEDURE — 77386 HB NTSTY MODUL RAD TX DLVR CPLX: CPT | Performed by: INTERNAL MEDICINE

## 2024-01-10 PROCEDURE — 77014 CHG CT GUIDANCE RADIATION THERAPY FLDS PLACEMENT: CPT | Performed by: INTERNAL MEDICINE

## 2024-01-10 PROCEDURE — 77336 RADIATION PHYSICS CONSULT: CPT | Performed by: INTERNAL MEDICINE

## 2024-01-11 ENCOUNTER — OFFICE VISIT (OUTPATIENT)
Dept: PALLIATIVE MEDICINE | Facility: CLINIC | Age: 59
End: 2024-01-11
Payer: COMMERCIAL

## 2024-01-11 VITALS
BODY MASS INDEX: 15.22 KG/M2 | SYSTOLIC BLOOD PRESSURE: 110 MMHG | HEART RATE: 100 BPM | WEIGHT: 121.8 LBS | TEMPERATURE: 97 F | DIASTOLIC BLOOD PRESSURE: 80 MMHG | OXYGEN SATURATION: 96 %

## 2024-01-11 DIAGNOSIS — K12.33 MUCOSITIS DUE TO RADIATION THERAPY: ICD-10-CM

## 2024-01-11 DIAGNOSIS — C06.9 ORAL CANCER (HCC): ICD-10-CM

## 2024-01-11 DIAGNOSIS — C06.9 SQUAMOUS CELL CARCINOMA OF ORAL CAVITY (HCC): Chronic | ICD-10-CM

## 2024-01-11 DIAGNOSIS — Z51.5 PALLIATIVE CARE ENCOUNTER: ICD-10-CM

## 2024-01-11 DIAGNOSIS — K59.03 DRUG INDUCED CONSTIPATION: ICD-10-CM

## 2024-01-11 DIAGNOSIS — Z91.89 AT RISK FOR RESPIRATORY DEPRESSION DUE TO OPIOID: ICD-10-CM

## 2024-01-11 DIAGNOSIS — G89.3 CANCER-RELATED PAIN: ICD-10-CM

## 2024-01-11 DIAGNOSIS — K13.79 PAIN IN ORAL CAVITY: Primary | ICD-10-CM

## 2024-01-11 PROBLEM — R68.84 PAIN IN LOWER JAW: Status: RESOLVED | Noted: 2023-11-02 | Resolved: 2024-01-11

## 2024-01-11 PROCEDURE — 99214 OFFICE O/P EST MOD 30 MIN: CPT | Performed by: INTERNAL MEDICINE

## 2024-01-11 RX ORDER — DEXAMETHASONE 0.5 MG/5ML
1 ELIXIR ORAL EVERY 6 HOURS SCHEDULED
Status: CANCELLED | OUTPATIENT
Start: 2024-01-11 | End: 2024-01-21

## 2024-01-11 RX ORDER — DEXAMETHASONE 0.5 MG/5ML
1 ELIXIR ORAL 4 TIMES DAILY
Qty: 400 ML | Refills: 0 | Status: SHIPPED | OUTPATIENT
Start: 2024-01-11

## 2024-01-11 RX ORDER — OXYCODONE HYDROCHLORIDE 20 MG/1
20 TABLET ORAL EVERY 4 HOURS PRN
Qty: 90 TABLET | Refills: 0 | Status: SHIPPED | OUTPATIENT
Start: 2024-01-14

## 2024-01-11 RX ORDER — DIPHENHYDRAMINE HCL 12.5MG/5ML
LIQUID (ML) ORAL
COMMUNITY
Start: 2023-11-17

## 2024-01-11 NOTE — PROGRESS NOTES
Palliative and Supportive Care   Eliceo Garcia 58 y.o. male [id number]    Assessment/Plan:  1. Mucositis due to radiation therapy    2. Pain in oral cavity    3. Cancer-related pain    4. 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 At risk for respiratory depression due to opioid    6. Drug induced constipation    7. Palliative care encounter      Symptoms - increased pain from mucositis from RT. Last RT 1/10/2024    1. Mucositis due to radiation therapy  Assessment & Plan:  Start dexamethasone elixir 0.5mg/5mL, 10mL (1mg) 4 x a day, swish for 2 mins then swallow to hopefully speed up healing and help with pain  Increase oxyIR (from 10-20mg q4H prn) to 20mg PO q4H prn  Revisit issue in 2 weeks   Discussed eventual wean to off depending on treatment response. He voiced understanding and agreement  Of note, he was also prescribed magic mouthwash by oncology but he does not want to use it    Orders:  -     dexamethasone 0.5 MG/5ML elixir; Take 10 mL (1 mg total) by mouth 4 (four) times a day    2. Pain in oral cavity  -     dexamethasone 0.5 MG/5ML elixir; Take 10 mL (1 mg total) by mouth 4 (four) times a day    3. Cancer-related pain  -     dexamethasone 0.5 MG/5ML elixir; Take 10 mL (1 mg total) by mouth 4 (four) times a day    4. 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 At risk for respiratory depression due to opioid  Assessment & Plan:  He has narcan at home as a precaution      6. Drug induced constipation  Assessment & Plan:  Continue Senokot-S 1 tab BID to prevent OIC      7. Palliative care encounter      ACP/GOC/Support  He is a . No children. Lives with his girlfriend and gf's son. His father lives in [place] and sister lives in another state.  [place] Act 169 explained in terms of hierarchy in surrogate medical decision making on initial visit. In his case, it will be his father first, sister next, all other relatives next and then girlfriend after all his other living relatives. [place] ACP  documents explained in detail. Encouraged to read and complete on his next visit.   He met with [facility] today, please refer to her separate documentation for more details    Follow up  RTO in 2 weeks, to check in on pain and/or consider weaning down opioid when appropriate    Controlled Substance Review    [place]MP or [place]  reviewed: No red flags were identified; safe to proceed with prescription..    Filled  Written  ID  Drug  QTY  Days  Prescriber  RX #  Dispenser  Refill  Daily Dose*  Pymt Type      01/02/2024 12/28/2023 1 Oxycodone Hcl (Ir) 20 Mg Tab 90.00 15 Ti Lobo [id number] Pen (4386) 0 180.00 [place]ME [place]   12/18/2023 12/14/2023 1 Oxycodone Hcl (Ir) 20 Mg Tab 90.00 15 Ti Lobo [id number] Pen (4386) 0 180.[place]   11/30/2023 11/30/2023 1 Oxycodone Hcl (Ir) 20 Mg Tab 90.00 15 Ti Lobo [id number] Pen (4386) 0 180.[place]   11/20/2023 11/20/2023 1 Oxycodone Hcl (Ir) 10 Mg Tab 60.00 15 Ti Lobo [id number] Pen (4386) 0 60.00 [place]   11/03/2023 11/02/2023 1 Oxycodone Hcl (Ir) 10 Mg Tab 60.00 15 Ti Lobo [id number] Pen (4386) 0 60.00 [place]ME [place]     Requested Prescriptions     Signed Prescriptions Disp Refills    dexamethasone 0.5 MG/5ML elixir 400 mL 0     Sig: Take 10 mL (1 mg total) by mouth 4 (four) times a day     There are no discontinued medications.    Representatives have queried the patient's controlled substance dispensing history in the Prescription Drug Monitoring Program in compliance with regulations before I have prescribed any controlled substances.  The prescription history is consistent with prescribed therapy and our practice policies.      20 minutes were spent face to face with Eliceo Garcia with greater than 50% of the time spent in counseling or coordination of care including discussions of etiology of diagnosis, pathogenesis of diagnosis, diagnostic results, impression, and recommendations, risks and benefits of treatment, instructions for disease self management,  treatment instructions, follow up requirements, risk factors and risk reduction of disease, patient and family counseling/involvement in care, compliance with treatment regimen, and advanced care planning.  All of the patient's questions were answered during this discussion.    No follow-ups on file.    Subjective:   Chief Complaint  Follow up visit for:  symptom management, goal of care assessment and decisional support, disease process education and discussion of prognosis, advance care planning, emotional support in the setting of serious illness  HPI     Eliceo Garcia is a 58 y.o. 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 PET-CT on 6/19/2023 showed Hypermetabolic right anterior oral cavity mass most concerning for malignancy. Bilateral hypermetabolic cervical adenopathy compatible with metastases. No hypermetabolic metastases in the chest abdomen pelvis. Multiple healing left rib fractures. He was admitted in the hospital on 11/3 to 11/6 because of an acute R pontine infarct. He then again was admitted 11/11 to 11/13 due to lightheadedness thought related to possible alcohol intoxicataion +/- opioids for cancer pain. Received hydration and d/c. He is now s/p concurrent cisplatin + R since 11/15 (last RT was on 1/10/2024).  Known to palliative medicine for supportive cares.    Of note, he is a heavy alcohol drinker and still admits to drinking daily, but has cut back down significantly enough for him as evidenced by his UDS. Prior to consult with palliative medicine, he was drinking more alcohol than his usual baseline to help with his cancer pain from the growing tumor in his lower jaw. His initial UDS from the office from 11/2 only showed tramadol and alcohol.     He returns today for follow up. He reports increased oral pain in the last 1-2 weeks. He completed his RT yesterday. He has more redness and some  blisters/ulcers in his mouth, mostly in the R upper part. No thrush noted. His oxycodone is not working as long as before. He uses 20mg in the am and another one at night, then in between he will take 10mg. I discussed rationale of using decadron. Recommendations as above.     His tongue and jaw mass are smaller now, thankfully. He hopes he heals from this quickly.     He reports constipation, but effective with senokot BID with prn colace.     ACP/GOC/Support: on initial consult: He said he wants to continue to live, so is determined to proceed with recommended treatments. He is a . No children. Lives with his girlfriend and gf's son. His father lives in [place] and sister lives in another state. [place] 169 explained in terms of hierarchy in surrogate medical decision making. In his case, it will be his father first, sister next, all other relatives next and then girlfriend after all his other living relatives.  ACP documents explained in detail. Encouraged to read and complete on his next visit. He works in [employer].    The following portions of the medical history were reviewed: past medical history, problem list, medication list, and social history.    Current Outpatient Medications:     Aspirin Low Dose 81 MG chewable tablet, CHEW 1 TABLET BY MOUTH DAILY, Disp: 90 tablet, Rfl: 0    atorvastatin (LIPITOR) 40 mg tablet, TAKE 1 TABLET BY MOUTH EVERY DAY IN THE EVENING, Disp: 30 tablet, Rfl: 0    dexamethasone 0.5 MG/5ML elixir, Take 10 mL (1 mg total) by mouth 4 (four) times a day, Disp: 400 mL, Rfl: 0    diphenhydrAMINE (BENADRYL) 12.5 mg/5 mL elixir, , Disp: , Rfl:     oxyCODONE (ROXICODONE) 20 MG TABS, Take 1/2 tab (10mg) for moderate pain, 1 tab (20mg) for severe pain, every 4 hours AS NEEDED. Do not take sooner than 4 hours Do not start before January 1, 2024., Disp: 90 tablet, Rfl: 0    senna-docusate sodium (SENOKOT-S) 8.6-50 mg per tablet, Take 1 tablet by mouth daily, Disp: 60 tablet, Rfl: 2     chlorhexidine (PERIDEX) 0.12 % solution, , Disp: , Rfl:     clopidogrel (Plavix) 75 mg tablet, Take 1 tablet (75 mg total) by mouth daily for 21 days (Patient not taking: Reported on 11/22/2023), Disp: 21 tablet, Rfl: 0    diphenhydramine, lidocaine, Al/Mg hydroxide, simethicone (Magic Mouthwash) SUSP, Swish and swallow 10 mL every 4 (four) hours as needed for mouth pain or discomfort (Patient not taking: Reported on 11/24/2023), Disp: 237 mL, Rfl: 7    ibuprofen (MOTRIN) 600 mg tablet, Take 1 tablet (600 mg total) by mouth every 6 (six) hours as needed for mild pain (Patient not taking: Reported on 11/24/2023), Disp: 30 tablet, Rfl: 0    Magnesium Oxide -Mg Supplement 200 MG TABS, Take 1 tablet (200 mg total) by mouth 2 (two) times a day, Disp: 60 tablet, Rfl: 0    naloxone (NARCAN) 4 mg/0.1 mL nasal spray, Administer 1 spray into a nostril. If no response after 2-3 minutes, give another dose in the other nostril using a new spray. (Patient not taking: Reported on 11/22/2023), Disp: 1 each, Rfl: 1    polyethylene glycol (MIRALAX) 17 g packet, Take 17 g by mouth daily (Patient not taking: Reported on 11/24/2023), Disp: 30 each, Rfl: 0    silver sulfadiazine (SILVADENE,SSD) 1 % cream, Apply topically 2 (two) times a day (Patient not taking: Reported on 1/11/2024), Disp: 400 g, Rfl: 1  Review of Systems   Constitutional:  Positive for appetite change. Negative for activity change, fatigue and unexpected weight change.   HENT:  Negative for mouth sores, sore throat, trouble swallowing and voice change.         Increased oral pain, ulcers/blisters in the mouth, no thrush   Respiratory:  Negative for shortness of breath.    Cardiovascular:  Negative for chest pain.   Gastrointestinal:  Positive for constipation. Negative for abdominal pain, diarrhea, nausea and vomiting.   Musculoskeletal:  Negative for back pain.   Neurological:  Negative for weakness.   Psychiatric/Behavioral:  Negative for sleep disturbance.         All other systems negative    Objective:  Vital Signs  /80 (BP Location: Right arm, Patient Position: Sitting, Cuff Size: Standard)   Pulse 100   Temp (!) 97 °F (36.1 °C) (Temporal)   Wt 55.2 kg (121 lb 12.8 oz)   SpO2 96%   BMI 15.22 kg/m²    Physical Exam    Constitutional: Appears well-developed and well-nourished. Thin, chronically ill looking, not toxic appearing. Appears stable. Pleasant. In no acute physical or emotional distress.   Head: Normocephalic and atraumatic.   Mouth: tumor in the R lower area, tongue overall looks better in appearance, unable to stick tongue outside of mouth. Redness and shallow ulcerations on tongue and roof of mouth. No thrush appreciated  Eyes: EOM are normal. No ocular discharge. No scleral icterus.   Neck: (+) fixed firm rubbery mass in the R and L lower jaw, smaller than before  Respiratory: Effort normal. No stridor. No respiratory distress.   Gastrointestinal: No abdominal distension.   Musculoskeletal: No edema.   Neurological: Alert, oriented and appropriately conversant. Walked with a cane. Hard of hearing  Skin: No diaphoresis, no rashes seen on exposed areas of skin. Scratches on both hands and some excoriations in neck - he said is from scratches from his cat  Psychiatric: Displays a normal mood and affect. Behavior, judgement and thought content appear normal.     Mitra Lima MD  Palliative Medicine & Supportive Care  Internal Medicine  Available via [employer]  Office: [phone]  Fax: [phone]

## 2024-01-11 NOTE — ASSESSMENT & PLAN NOTE
Start dexamethasone elixir 0.5mg/5mL, 10mL (1mg) 4 x a day, swish for 2 mins then swallow to hopefully speed up healing and help with pain  Increase oxyIR (from 10-20mg q4H prn) to 20mg PO q4H prn  Revisit issue in 2 weeks   Discussed eventual wean to off depending on treatment response. He voiced understanding and agreement  Of note, he was also prescribed magic mouthwash by oncology but he does not want to use it

## 2024-01-11 NOTE — PATIENT INSTRUCTIONS
PRESCRIPTION REFILL REMINDER:  All medication refills should be requested prior to Noon on Friday. Any refill requests after noon on Friday would be addressed the following Monday.  Please protect yourself from COVID.  = Wash your hands.  Soap and water, or hand  with at least 60% alcohol, are both effective at killing the virus.   = Wear a mask.  This will help protect others from any virus particles you might spread.  Your mouth and nose BOTH need to be covered.  = Keep the distance.  Keep 6 feet of distance from other people, even if they seem healthy.  Keeping distance protects you from the other person's virus spread.    = Get a vaccine, and boost it.  Three vaccines are approved for use in the [place] for all adults.  These vaccines do provide protection against all known variants, and the new 'bi-valent' booster is predicted to be more protective against Omicron variants.  + Pfizer is FDA approved for all persons age 5 and older.  + Moderna may be given to all person age 18 and older.  - We do NOT recommend [employer] vaccine for our Palliative Care patients.  - [facility] is no longer offering regular COVID vaccine clinics.  You may ask your primary doctor for help and advice.   = you may also visit the CDC's complete list of approved sites for new vaccines: Vaccines.gov - Vaccine Location Search Results .  (You may also visit Vaccines.gov and search for 'Bi-valent booster' in your zip code.)  = [facility]), [facility], [facility], [facility], and many [facility] locations ALL have shots.  + The CDC recommends booster shots on ALL vaccines for ALL adults.    = Test to treat.  If you have symptoms, get tested, and get treatment!  New drugs like Paxlovid can prevent you from ever having to go to the hospital , and may be covered completely by your insurance or special government programs.   - https://aspr.hhs.gov/TestToTreat/       Informacion en espanol sobre vacunas, de nos companeros de [facility] --  https://www.lvhn.org/treatments/vacunas-de-covid-19    Check out [place] [employer] for State data that are updated daily:    Https://www.TweetDecknews.org/story/2020/03/16/auf-btq-nbiouwlm-the-spread-of-the-coronavirus-in-the-u-s     Global Epidemics.Org, from [facility], will give you Rqvgdo-do-Jhnenf information on virus cases and vaccination rates:    Https://globalepidemics.org/    Frequently Asked Questions about COVID, answered by [email or web address]    https://www.F[employer].com/interactive/2020/health/coronavirus-questions-answers/

## 2024-01-18 ENCOUNTER — TELEPHONE (OUTPATIENT)
Dept: NUTRITION | Facility: CLINIC | Age: 59
End: 2024-01-18

## 2024-01-18 NOTE — TELEPHONE ENCOUNTER
Called Eliceo again today to touch base about his nutrition now that he's finished with treatment. No answer. Left VM explaining reason to call back and encouraged him to reach out with any questions/concerns or if he would like to schedule a follow up appointment with LORRIE.

## 2024-01-24 DIAGNOSIS — I63.81 RIGHT-SIDED LACUNAR STROKE (HCC): ICD-10-CM

## 2024-01-24 RX ORDER — ATORVASTATIN CALCIUM 40 MG/1
40 TABLET, FILM COATED ORAL EVERY EVENING
Qty: 90 TABLET | Refills: 1 | Status: SHIPPED | OUTPATIENT
Start: 2024-01-24

## 2024-01-26 ENCOUNTER — OFFICE VISIT (OUTPATIENT)
Dept: PALLIATIVE MEDICINE | Facility: CLINIC | Age: 59
End: 2024-01-26
Payer: COMMERCIAL

## 2024-01-26 VITALS
WEIGHT: 120 LBS | TEMPERATURE: 97.1 F | SYSTOLIC BLOOD PRESSURE: 150 MMHG | OXYGEN SATURATION: 94 % | BODY MASS INDEX: 15 KG/M2 | HEART RATE: 70 BPM | DIASTOLIC BLOOD PRESSURE: 84 MMHG

## 2024-01-26 DIAGNOSIS — K59.03 DRUG INDUCED CONSTIPATION: ICD-10-CM

## 2024-01-26 DIAGNOSIS — Z51.5 PALLIATIVE CARE ENCOUNTER: ICD-10-CM

## 2024-01-26 DIAGNOSIS — K13.79 PAIN IN ORAL CAVITY: ICD-10-CM

## 2024-01-26 DIAGNOSIS — G89.3 CANCER-RELATED PAIN: Primary | ICD-10-CM

## 2024-01-26 DIAGNOSIS — C06.9 SQUAMOUS CELL CARCINOMA OF ORAL CAVITY (HCC): Chronic | ICD-10-CM

## 2024-01-26 DIAGNOSIS — K12.33 MUCOSITIS DUE TO RADIATION THERAPY: ICD-10-CM

## 2024-01-26 DIAGNOSIS — Z71.89 COUNSELING REGARDING ADVANCED CARE PLANNING AND GOALS OF CARE: ICD-10-CM

## 2024-01-26 PROCEDURE — 99213 OFFICE O/P EST LOW 20 MIN: CPT | Performed by: INTERNAL MEDICINE

## 2024-01-26 NOTE — ASSESSMENT & PLAN NOTE
Continue OxyIR 20mg PO q4H prn. He said he now sometimes use 1/2 tab (10mg)   I offered liquid but he refused  Discussed eventual wean to off depending on treatment response. He voiced understanding and agreement

## 2024-01-26 NOTE — ASSESSMENT & PLAN NOTE
He is a . No children. Lives with his girlfriend and gf's son. His father lives in [place] and sister lives in another state.  [place] 169 explained in terms of hierarchy in surrogate medical decision making on initial visit. In his case, it will be his father first, sister next, all other relatives next and then girlfriend after all his other living relatives. [place] ACP documents explained in detail on last visit. Yet to complete.

## 2024-01-26 NOTE — PROGRESS NOTES
Palliative and Supportive Care   Eliceo Garcia 58 y.o. male [id number]    Assessment/Plan:  1. Cancer-related pain    2. 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 Mucositis due to radiation therapy    4. Pain in oral cavity    5. Drug induced constipation    6. Palliative care encounter    7. Counseling regarding advanced care planning and goals of care      Symptoms - continued pain from mucositis from RT. Last RT 1/10/2024. Controlled.    1. Cancer-related pain  Assessment & Plan:  Continue OxyIR 20mg PO q4H prn. He said he now sometimes use 1/2 tab (10mg)   I offered liquid but he refused  Discussed eventual wean to off depending on treatment response. He voiced understanding and agreement      2. 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 Mucositis due to radiation therapy  Assessment & Plan:  Completed course of dexamethasone elixir and oral mucosa does look better/less red than before  Still with pain, continue oxy  Of note, he was also prescribed magic mouthwash by oncology but he does not want to use it      4. Pain in oral cavity    5. Drug induced constipation  Assessment & Plan:  Continue Senokot-S 1 tab BID and add miralax BID along with senokot to prevent OIC      6. Palliative care encounter    7. Counseling regarding advanced care planning and goals of care  Assessment & Plan:  He is a . No children. Lives with his girlfriend and gf's son. His father lives in [place] and sister lives in another state.  [place] Act 169 explained in terms of hierarchy in surrogate medical decision making on initial visit. In his case, it will be his father first, sister next, all other relatives next and then girlfriend after all his other living relatives.  ACP documents explained in detail on last visit. Yet to complete.      Follow up  RTO in 2 months, to check in on pain and/or consider weaning down opioid when  appropriate    Controlled Substance Review    [place] PDMP or [place]  reviewed: No red flags were identified; safe to proceed with prescription..    Filled  Written  ID  Drug  QTY  Days  Prescriber  RX #  Dispenser  Refill  Daily Dose*  Pymt Type      01/02/2024 12/28/2023 1 Oxycodone Hcl (Ir) 20 Mg Tab 90.00 15 Ti Lobo [id number] Pen ([id number]) 0 180.00 [place]ME [place] Ins [place]   12/18/2023 12/14/2023 1 Oxycodone Hcl (Ir) 20 Mg Tab 90.00 15 Ti Lobo [id number] Pen (4386) 0 180.00 [place]   11/30/2023 11/30/2023 1 Oxycodone Hcl (Ir) 20 Mg Tab 90.00 15 Ti Lobo [id number] Pen (4386) 0 180.00 [place]   11/20/2023 11/20/2023 1 Oxycodone Hcl (Ir) 10 Mg Tab 60.00 15 Ti Lobo [id number] Pen (4386) 0 60.00 [place]   11/03/2023 11/02/2023 1 Oxycodone Hcl (Ir) 10 Mg Tab 60.00 15 Ti Lobo [id number] Pen (4386) 0 60.00 [place]ME Comm Ins [place]     Requested Prescriptions      No prescriptions requested or ordered in this encounter     There are no discontinued medications.    Representatives have queried the patient's controlled substance dispensing history in the Prescription Drug Monitoring Program in compliance with regulations before I have prescribed any controlled substances.  The prescription history is consistent with prescribed therapy and our practice policies.      15 minutes were spent face to face with Eliceo Garcia with greater than 50% of the time spent in counseling or coordination of care including discussions of etiology of diagnosis, pathogenesis of diagnosis, diagnostic results, impression, and recommendations, risks and benefits of treatment, instructions for disease self management, treatment instructions, follow up requirements, risk factors and risk reduction of disease, patient and family counseling/involvement in care, compliance with treatment regimen, and advanced care planning.  All of the patient's questions were answered during this discussion.    No follow-ups on file.    Subjective:   Chief Complaint  Follow up visit  for:  symptom management, goal of care assessment and decisional support, disease process education and discussion of prognosis, advance care planning, emotional support in the setting of serious illness  HPI     Eliceo Garcia is a 58 y.o. 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 PET-CT on 6/19/2023 showed Hypermetabolic right anterior oral cavity mass most concerning for malignancy. Bilateral hypermetabolic cervical adenopathy compatible with metastases. No hypermetabolic metastases in the chest abdomen pelvis. Multiple healing left rib fractures. He was admitted in the hospital on 11/3 to 11/6 because of an acute R pontine infarct. He then again was admitted 11/11 to 11/13 due to lightheadedness thought related to possible alcohol intoxicataion +/- opioids for cancer pain. He is now s/p concurrent cisplatin + R since 11/15 (last RT was on 1/10/2024).  Known to palliative medicine for supportive cares.    Of note, he was a heavy alcohol drinker and still admits to drinking daily, but has cut back down significantly enough for him as evidenced by his UDS. Prior to consult with palliative medicine, he was drinking more alcohol than his usual baseline to help with his cancer pain from the growing tumor in his lower jaw. His initial UDS from the office from 11/2 only showed tramadol and alcohol.     He returns today for follow up. He reports continued oral pain, but it is getting better. He appears frustrated with his overall condition and was not very clear on reporting on his symptoms. He mentioned 20mg oxy not being helpful but at the same time, he uses 10mg at times and it is okay. He was also saying he does not know what the steroid is doing since it burns his mouth and his pain is back after 10 mins. But at the same token, his oral mucosa is no longer looking inflamed and I do not appreciate blisters and ulcers  anymore. Nonetheless, he reported that the RT is working because his masses on the jaw/neck are still getting smaller and he is overall improved, albeit slowly, and that he just needs to go through the consequences of RT that should still, hopefully, get better over time. He is also constipated. Recs as above.     ACP/GOC/Support: on initial consult: He said he wants to continue to live, so is determined to proceed with recommended treatments. He is a . No children. Lives with his girlfriend and gf's son. His father lives in [place] and sister lives in another state. [place] Act 169 explained in terms of hierarchy in surrogate medical decision making. In his case, it will be his father first, sister next, all other relatives next and then girlfriend after all his other living relatives. [place] ACP documents explained in detail. Encouraged to read and complete on his next visit. He works in [employer].    The following portions of the medical history were reviewed: past medical history, problem list, medication list, and social history.    Current Outpatient Medications:     atorvastatin (LIPITOR) 40 mg tablet, TAKE 1 TABLET BY MOUTH EVERY DAY IN THE EVENING, Disp: 90 tablet, Rfl: 1    oxyCODONE (ROXICODONE) 20 MG TABS, Take 1 tablet (20 mg total) by mouth every 4 (four) hours as needed for moderate pain Max Daily Amount: 120 mg Do not start before January 14, 2024., Disp: 90 tablet, Rfl: 0    senna-docusate sodium (SENOKOT-S) 8.6-50 mg per tablet, Take 1 tablet by mouth daily, Disp: 60 tablet, Rfl: 2    Aspirin Low Dose 81 MG chewable tablet, CHEW 1 TABLET BY MOUTH DAILY (Patient not taking: Reported on 1/26/2024), Disp: 90 tablet, Rfl: 0    chlorhexidine (PERIDEX) 0.12 % solution, , Disp: , Rfl:     clopidogrel (Plavix) 75 mg tablet, Take 1 tablet (75 mg total) by mouth daily for 21 days (Patient not taking: Reported on 11/22/2023), Disp: 21 tablet, Rfl: 0    dexamethasone 0.5 MG/5ML elixir, Take 10 mL (1 mg total) by  mouth 4 (four) times a day (Patient not taking: Reported on 1/26/2024), Disp: 400 mL, Rfl: 0    diphenhydrAMINE (BENADRYL) 12.5 mg/5 mL elixir, , Disp: , Rfl:     diphenhydramine, lidocaine, Al/Mg hydroxide, simethicone (Magic Mouthwash) SUSP, Swish and swallow 10 mL every 4 (four) hours as needed for mouth pain or discomfort (Patient not taking: Reported on 11/24/2023), Disp: 237 mL, Rfl: 7    ibuprofen (MOTRIN) 600 mg tablet, Take 1 tablet (600 mg total) by mouth every 6 (six) hours as needed for mild pain (Patient not taking: Reported on 11/24/2023), Disp: 30 tablet, Rfl: 0    Magnesium Oxide -Mg Supplement 200 MG TABS, Take 1 tablet (200 mg total) by mouth 2 (two) times a day (Patient not taking: Reported on 1/26/2024), Disp: 60 tablet, Rfl: 0    naloxone (NARCAN) 4 mg/0.1 mL nasal spray, Administer 1 spray into a nostril. If no response after 2-3 minutes, give another dose in the other nostril using a new spray. (Patient not taking: Reported on 11/22/2023), Disp: 1 each, Rfl: 1    polyethylene glycol (MIRALAX) 17 g packet, Take 17 g by mouth daily (Patient not taking: Reported on 11/24/2023), Disp: 30 each, Rfl: 0    silver sulfadiazine (SILVADENE,SSD) 1 % cream, Apply topically 2 (two) times a day (Patient not taking: Reported on 1/11/2024), Disp: 400 g, Rfl: 1  Review of Systems   Constitutional:  Positive for appetite change. Negative for activity change, fatigue and unexpected weight change.   HENT:  Positive for trouble swallowing. Negative for mouth sores, sore throat and voice change.         Continued oral pain; pills getting stuck   Respiratory:  Negative for shortness of breath.    Cardiovascular:  Negative for chest pain.   Gastrointestinal:  Positive for constipation. Negative for abdominal pain, diarrhea, nausea and vomiting.   Musculoskeletal:  Negative for back pain.   Neurological:  Negative for weakness.   Psychiatric/Behavioral:  Negative for sleep disturbance.        All other systems  negative    Objective:  Vital Signs  /84 (BP Location: Left arm, Patient Position: Sitting, Cuff Size: Standard)   Pulse 70   Temp (!) 97.1 °F (36.2 °C) (Temporal)   Wt 54.4 kg (120 lb)   SpO2 94%   BMI 15.00 kg/m²    Physical Exam    Constitutional: Appears well-developed and well-nourished. Irritable. Thin, chronically ill looking, not toxic appearing. Appears stable. Pleasant. In no acute physical or emotional distress.   Head: Normocephalic and atraumatic.   Mouth: tumor in the R lower area, tongue overall looks better in appearance, unable to stick tongue outside of mouth. No more redness or ulceration noted. No thrush appreciated  Eyes: EOM are normal. No ocular discharge. No scleral icterus.   Neck: (+) fixed firm rubbery mass in the R and L lower jaw, smaller than before  Respiratory: Effort normal. No stridor. No respiratory distress.   Gastrointestinal: No abdominal distension.   Musculoskeletal: No edema.   Neurological: Alert, oriented and appropriately conversant. Walked with a cane. Hard of hearing  Skin: No diaphoresis, no rashes seen on exposed areas of skin. Scratches on both hands and some excoriations in neck - he said is from scratches from his cat  Psychiatric: Displays a normal mood and affect. Behavior, judgement and thought content appear normal.     Mitra Lima MD  Palliative Medicine & Supportive Care  Internal Medicine  Available via [employer]  Office: [phone]  Fax: [phone]

## 2024-01-26 NOTE — ASSESSMENT & PLAN NOTE
Completed course of dexamethasone elixir and oral mucosa does look better/less red than before  Still with pain, continue oxy  Of note, he was also prescribed magic mouthwash by oncology but he does not want to use it

## 2024-01-29 DIAGNOSIS — C06.9 ORAL CANCER (HCC): ICD-10-CM

## 2024-01-29 DIAGNOSIS — K12.33 MUCOSITIS DUE TO RADIATION THERAPY: ICD-10-CM

## 2024-01-29 DIAGNOSIS — K13.79 PAIN IN ORAL CAVITY: ICD-10-CM

## 2024-01-29 DIAGNOSIS — G89.3 CANCER-RELATED PAIN: ICD-10-CM

## 2024-01-29 RX ORDER — OXYCODONE HYDROCHLORIDE 20 MG/1
20 TABLET ORAL EVERY 4 HOURS PRN
Qty: 90 TABLET | Refills: 0 | Status: ON HOLD | OUTPATIENT
Start: 2024-01-29

## 2024-02-07 ENCOUNTER — APPOINTMENT (OUTPATIENT)
Dept: CT IMAGING | Facility: HOSPITAL | Age: 59
DRG: 254 | End: 2024-02-07
Payer: COMMERCIAL

## 2024-02-07 ENCOUNTER — TELEPHONE (OUTPATIENT)
Dept: RADIATION ONCOLOGY | Facility: CLINIC | Age: 59
End: 2024-02-07

## 2024-02-07 ENCOUNTER — HOSPITAL ENCOUNTER (INPATIENT)
Facility: HOSPITAL | Age: 59
LOS: 5 days | Discharge: HOME | DRG: 254 | End: 2024-02-13
Attending: EMERGENCY MEDICINE | Admitting: INTERNAL MEDICINE
Payer: COMMERCIAL

## 2024-02-07 DIAGNOSIS — K62.5 RECTAL BLEEDING: ICD-10-CM

## 2024-02-07 DIAGNOSIS — K62.5 BRIGHT RED BLOOD PER RECTUM: ICD-10-CM

## 2024-02-07 DIAGNOSIS — E87.6 HYPOKALEMIA: ICD-10-CM

## 2024-02-07 DIAGNOSIS — K62.5 GASTROINTESTINAL HEMORRHAGE ASSOCIATED WITH ANORECTAL SOURCE: ICD-10-CM

## 2024-02-07 DIAGNOSIS — C14.0 THROAT CANCER (HCC): ICD-10-CM

## 2024-02-07 DIAGNOSIS — R11.0 NAUSEA: Primary | ICD-10-CM

## 2024-02-07 DIAGNOSIS — E44.0 MODERATE PROTEIN-CALORIE MALNUTRITION (HCC): ICD-10-CM

## 2024-02-07 DIAGNOSIS — R63.8 INADEQUATE ORAL INTAKE: ICD-10-CM

## 2024-02-07 DIAGNOSIS — E83.42 HYPOMAGNESEMIA: ICD-10-CM

## 2024-02-07 PROBLEM — R53.1 WEAKNESS: Status: ACTIVE | Noted: 2024-02-07

## 2024-02-07 LAB
2HR DELTA HS TROPONIN: 0 NG/L
4HR DELTA HS TROPONIN: 1 NG/L
ALBUMIN SERPL BCP-MCNC: 3.6 G/DL (ref 3.5–5)
ALP SERPL-CCNC: 104 U/L (ref 34–104)
ALT SERPL W P-5'-P-CCNC: 9 U/L (ref 7–52)
ANION GAP SERPL CALCULATED.3IONS-SCNC: 10 MMOL/L
ANISOCYTOSIS BLD QL SMEAR: PRESENT
APTT PPP: 30 SECONDS (ref 23–37)
AST SERPL W P-5'-P-CCNC: 12 U/L (ref 13–39)
BASOPHILS # BLD MANUAL: 0 THOUSAND/UL (ref 0–0.1)
BASOPHILS NFR MAR MANUAL: 0 % (ref 0–1)
BILIRUB DIRECT SERPL-MCNC: 0.13 MG/DL (ref 0–0.2)
BILIRUB SERPL-MCNC: 0.51 MG/DL (ref 0.2–1)
BUN SERPL-MCNC: 8 MG/DL (ref 5–25)
CALCIUM SERPL-MCNC: 9.3 MG/DL (ref 8.4–10.2)
CARDIAC TROPONIN I PNL SERPL HS: 11 NG/L
CARDIAC TROPONIN I PNL SERPL HS: 11 NG/L
CARDIAC TROPONIN I PNL SERPL HS: 12 NG/L
CHLORIDE SERPL-SCNC: 93 MMOL/L (ref 96–108)
CO2 SERPL-SCNC: 26 MMOL/L (ref 21–32)
CREAT SERPL-MCNC: 0.66 MG/DL (ref 0.6–1.3)
EOSINOPHIL # BLD MANUAL: 0 THOUSAND/UL (ref 0–0.4)
EOSINOPHIL NFR BLD MANUAL: 0 % (ref 0–6)
ERYTHROCYTE [DISTWIDTH] IN BLOOD BY AUTOMATED COUNT: 18.1 % (ref 11.6–15.1)
GFR SERPL CREATININE-BSD FRML MDRD: 105 ML/MIN/1.73SQ M
GLUCOSE SERPL-MCNC: 88 MG/DL (ref 65–140)
HCT VFR BLD AUTO: 31 % (ref 36.5–49.3)
HGB BLD-MCNC: 10.5 G/DL (ref 12–17)
INR PPP: 0.94 (ref 0.84–1.19)
LYMPHOCYTES # BLD AUTO: 0.73 THOUSAND/UL (ref 0.6–4.47)
LYMPHOCYTES # BLD AUTO: 7 % (ref 14–44)
MAGNESIUM SERPL-MCNC: 1.8 MG/DL (ref 1.9–2.7)
MCH RBC QN AUTO: 34.5 PG (ref 26.8–34.3)
MCHC RBC AUTO-ENTMCNC: 33.9 G/DL (ref 31.4–37.4)
MCV RBC AUTO: 102 FL (ref 82–98)
MONOCYTES # BLD AUTO: 1.18 THOUSAND/UL (ref 0–1.22)
MONOCYTES NFR BLD: 13 % (ref 4–12)
NEUTROPHILS # BLD MANUAL: 7.2 THOUSAND/UL (ref 1.85–7.62)
NEUTS BAND NFR BLD MANUAL: 2 % (ref 0–8)
NEUTS SEG NFR BLD AUTO: 77 % (ref 43–75)
PHOSPHATE SERPL-MCNC: 3.9 MG/DL (ref 2.3–4.1)
PLATELET # BLD AUTO: 269 THOUSANDS/UL (ref 149–390)
PLATELET BLD QL SMEAR: ADEQUATE
PMV BLD AUTO: 9.2 FL (ref 8.9–12.7)
POTASSIUM SERPL-SCNC: 3.8 MMOL/L (ref 3.5–5.3)
PREALB SERPL-MCNC: 9.5 MG/DL (ref 17–34)
PROT SERPL-MCNC: 6.8 G/DL (ref 6.4–8.4)
PROTHROMBIN TIME: 13.2 SECONDS (ref 11.6–14.5)
RBC # BLD AUTO: 3.04 MILLION/UL (ref 3.88–5.62)
RBC MORPH BLD: PRESENT
SODIUM SERPL-SCNC: 129 MMOL/L (ref 135–147)
TSH SERPL DL<=0.05 MIU/L-ACNC: 1.04 UIU/ML (ref 0.45–4.5)
VARIANT LYMPHS # BLD AUTO: 1 %
WBC # BLD AUTO: 9.11 THOUSAND/UL (ref 4.31–10.16)

## 2024-02-07 PROCEDURE — 83735 ASSAY OF MAGNESIUM: CPT | Performed by: EMERGENCY MEDICINE

## 2024-02-07 PROCEDURE — 84443 ASSAY THYROID STIM HORMONE: CPT | Performed by: EMERGENCY MEDICINE

## 2024-02-07 PROCEDURE — 85027 COMPLETE CBC AUTOMATED: CPT | Performed by: EMERGENCY MEDICINE

## 2024-02-07 PROCEDURE — 85730 THROMBOPLASTIN TIME PARTIAL: CPT | Performed by: EMERGENCY MEDICINE

## 2024-02-07 PROCEDURE — 85610 PROTHROMBIN TIME: CPT | Performed by: EMERGENCY MEDICINE

## 2024-02-07 PROCEDURE — 99284 EMERGENCY DEPT VISIT MOD MDM: CPT

## 2024-02-07 PROCEDURE — 80076 HEPATIC FUNCTION PANEL: CPT | Performed by: EMERGENCY MEDICINE

## 2024-02-07 PROCEDURE — 96366 THER/PROPH/DIAG IV INF ADDON: CPT

## 2024-02-07 PROCEDURE — 96375 TX/PRO/DX INJ NEW DRUG ADDON: CPT

## 2024-02-07 PROCEDURE — 84100 ASSAY OF PHOSPHORUS: CPT | Performed by: EMERGENCY MEDICINE

## 2024-02-07 PROCEDURE — G1004 CDSM NDSC: HCPCS

## 2024-02-07 PROCEDURE — 84134 ASSAY OF PREALBUMIN: CPT | Performed by: EMERGENCY MEDICINE

## 2024-02-07 PROCEDURE — 96365 THER/PROPH/DIAG IV INF INIT: CPT

## 2024-02-07 PROCEDURE — 84484 ASSAY OF TROPONIN QUANT: CPT | Performed by: EMERGENCY MEDICINE

## 2024-02-07 PROCEDURE — 36415 COLL VENOUS BLD VENIPUNCTURE: CPT | Performed by: EMERGENCY MEDICINE

## 2024-02-07 PROCEDURE — 93005 ELECTROCARDIOGRAM TRACING: CPT

## 2024-02-07 PROCEDURE — 85007 BL SMEAR W/DIFF WBC COUNT: CPT | Performed by: EMERGENCY MEDICINE

## 2024-02-07 PROCEDURE — 80048 BASIC METABOLIC PNL TOTAL CA: CPT | Performed by: EMERGENCY MEDICINE

## 2024-02-07 PROCEDURE — 99285 EMERGENCY DEPT VISIT HI MDM: CPT | Performed by: EMERGENCY MEDICINE

## 2024-02-07 PROCEDURE — 74177 CT ABD & PELVIS W/CONTRAST: CPT

## 2024-02-07 RX ORDER — SODIUM CHLORIDE 9 MG/ML
50 INJECTION, SOLUTION INTRAVENOUS CONTINUOUS
Status: DISCONTINUED | OUTPATIENT
Start: 2024-02-07 | End: 2024-02-12

## 2024-02-07 RX ORDER — MAGNESIUM SULFATE HEPTAHYDRATE 40 MG/ML
2 INJECTION, SOLUTION INTRAVENOUS ONCE
Status: COMPLETED | OUTPATIENT
Start: 2024-02-07 | End: 2024-02-08

## 2024-02-07 RX ORDER — METOCLOPRAMIDE HYDROCHLORIDE 5 MG/ML
10 INJECTION INTRAMUSCULAR; INTRAVENOUS ONCE
Status: COMPLETED | OUTPATIENT
Start: 2024-02-07 | End: 2024-02-07

## 2024-02-07 RX ORDER — OXYCODONE HYDROCHLORIDE 10 MG/1
20 TABLET ORAL EVERY 4 HOURS PRN
Status: DISCONTINUED | OUTPATIENT
Start: 2024-02-07 | End: 2024-02-13 | Stop reason: HOSPADM

## 2024-02-07 RX ORDER — POLYETHYLENE GLYCOL 3350 17 G/17G
17 POWDER, FOR SOLUTION ORAL DAILY
Status: DISCONTINUED | OUTPATIENT
Start: 2024-02-07 | End: 2024-02-08

## 2024-02-07 RX ORDER — ASPIRIN 81 MG/1
81 TABLET, CHEWABLE ORAL DAILY
Status: DISCONTINUED | OUTPATIENT
Start: 2024-02-08 | End: 2024-02-08

## 2024-02-07 RX ORDER — ACETAMINOPHEN 325 MG/1
650 TABLET ORAL EVERY 4 HOURS PRN
Status: DISCONTINUED | OUTPATIENT
Start: 2024-02-07 | End: 2024-02-13 | Stop reason: HOSPADM

## 2024-02-07 RX ORDER — ATORVASTATIN CALCIUM 40 MG/1
40 TABLET, FILM COATED ORAL EVERY EVENING
Status: DISCONTINUED | OUTPATIENT
Start: 2024-02-07 | End: 2024-02-13 | Stop reason: HOSPADM

## 2024-02-07 RX ORDER — BISACODYL 10 MG
10 SUPPOSITORY, RECTAL RECTAL DAILY PRN
Status: DISCONTINUED | OUTPATIENT
Start: 2024-02-07 | End: 2024-02-09

## 2024-02-07 RX ORDER — SODIUM CHLORIDE, SODIUM GLUCONATE, SODIUM ACETATE, POTASSIUM CHLORIDE, MAGNESIUM CHLORIDE, SODIUM PHOSPHATE, DIBASIC, AND POTASSIUM PHOSPHATE .53; .5; .37; .037; .03; .012; .00082 G/100ML; G/100ML; G/100ML; G/100ML; G/100ML; G/100ML; G/100ML
1000 INJECTION, SOLUTION INTRAVENOUS ONCE
Status: COMPLETED | OUTPATIENT
Start: 2024-02-07 | End: 2024-02-07

## 2024-02-07 RX ORDER — POLYETHYLENE GLYCOL 3350 17 G/17G
17 POWDER, FOR SOLUTION ORAL DAILY
Status: DISCONTINUED | OUTPATIENT
Start: 2024-02-08 | End: 2024-02-07

## 2024-02-07 RX ORDER — ENOXAPARIN SODIUM 100 MG/ML
40 INJECTION SUBCUTANEOUS DAILY
Status: DISCONTINUED | OUTPATIENT
Start: 2024-02-08 | End: 2024-02-08

## 2024-02-07 RX ADMIN — MAGNESIUM SULFATE HEPTAHYDRATE 2 G: 40 INJECTION, SOLUTION INTRAVENOUS at 23:21

## 2024-02-07 RX ADMIN — POLYETHYLENE GLYCOL 3350 17 G: 17 POWDER, FOR SOLUTION ORAL at 22:27

## 2024-02-07 RX ADMIN — OXYCODONE HYDROCHLORIDE 20 MG: 10 TABLET ORAL at 22:27

## 2024-02-07 RX ADMIN — SODIUM CHLORIDE, SODIUM GLUCONATE, SODIUM ACETATE, POTASSIUM CHLORIDE, MAGNESIUM CHLORIDE, SODIUM PHOSPHATE, DIBASIC, AND POTASSIUM PHOSPHATE 1000 ML: .53; .5; .37; .037; .03; .012; .00082 INJECTION, SOLUTION INTRAVENOUS at 15:23

## 2024-02-07 RX ADMIN — ATORVASTATIN CALCIUM 40 MG: 40 TABLET, FILM COATED ORAL at 22:28

## 2024-02-07 RX ADMIN — METOCLOPRAMIDE 10 MG: 5 INJECTION, SOLUTION INTRAMUSCULAR; INTRAVENOUS at 15:21

## 2024-02-07 RX ADMIN — IOHEXOL 100 ML: 350 INJECTION, SOLUTION INTRAVENOUS at 23:41

## 2024-02-07 NOTE — TELEPHONE ENCOUNTER
Returned call to patient.    States he is unable to eat or drink d/t discomfort.  Not using PEG tube, states he feels nauseated whenever he uses it.  C/O not having a bowel movement for several weeks.  Describing liquid stool as leaking out.  Advised ED evaluation for fluids and to assess.  Patient verbalized understanding and states he will go to [facility]

## 2024-02-07 NOTE — ED PROVIDER NOTES
History  Chief Complaint   Patient presents with    Dizziness     Pt reports dizziness since yesterday. Worsening today. H/o throat CA. Finished last dose of radiation and chemo last week. Denies HA. Worse upon standing.      This is a 58 y.o. old male who presents to the ED for evaluation of dizziness. Patient state he isnt tolerating tubefeeds. More nauseous. Vomiting up TFs. Is taking water and other soft food at time, but now has dizziness, weakness. No bleeding, hematemesis. No cough congestion. Hx of noncompliance with his TFs in the past.      Prior to Admission Medications   Prescriptions Last Dose Informant Patient Reported? Taking?   Aspirin Low Dose 81 MG chewable tablet 2/7/2024  No Yes   Sig: CHEW 1 TABLET BY MOUTH DAILY   Magnesium Oxide -Mg Supplement 200 MG TABS Not Taking  No No   Sig: Take 1 tablet (200 mg total) by mouth 2 (two) times a day   Patient not taking: Reported on 1/26/2024   atorvastatin (LIPITOR) 40 mg tablet 2/6/2024  No Yes   Sig: TAKE 1 TABLET BY MOUTH EVERY DAY IN THE EVENING   chlorhexidine (PERIDEX) 0.12 % solution Not Taking  Yes No   Patient not taking: Reported on 12/14/2023   clopidogrel (Plavix) 75 mg tablet   No No   Sig: Take 1 tablet (75 mg total) by mouth daily for 21 days   Patient not taking: Reported on 11/22/2023   dexamethasone 0.5 MG/5ML elixir Not Taking  No No   Sig: Take 10 mL (1 mg total) by mouth 4 (four) times a day   Patient not taking: Reported on 1/26/2024   diphenhydrAMINE (BENADRYL) 12.5 mg/5 mL elixir Not Taking  Yes No   Patient not taking: Reported on 2/7/2024   diphenhydramine, lidocaine, Al/Mg hydroxide, simethicone (Magic Mouthwash) SUSP Not Taking  No No   Sig: Swish and swallow 10 mL every 4 (four) hours as needed for mouth pain or discomfort   Patient not taking: Reported on 11/24/2023   ibuprofen (MOTRIN) 600 mg tablet Not Taking  No No   Sig: Take 1 tablet (600 mg total) by mouth every 6 (six) hours as needed for mild pain   Patient not  taking: Reported on 11/24/2023   naloxone (NARCAN) 4 mg/0.1 mL nasal spray Not Taking  No No   Sig: Administer 1 spray into a nostril. If no response after 2-3 minutes, give another dose in the other nostril using a new spray.   Patient not taking: Reported on 11/22/2023   oxyCODONE (ROXICODONE) 20 MG TABS 2/7/2024  No Yes   Sig: Take 1 tablet (20 mg total) by mouth every 4 (four) hours as needed for moderate pain Max Daily Amount: 120 mg   polyethylene glycol (MIRALAX) 17 g packet Not Taking  No No   Sig: Take 17 g by mouth daily   Patient not taking: Reported on 11/24/2023   senna-docusate sodium (SENOKOT-S) 8.6-50 mg per tablet Not Taking  No No   Sig: Take 1 tablet by mouth daily   Patient not taking: Reported on 2/7/2024   silver sulfadiazine (SILVADENE,SSD) 1 % cream Not Taking  No No   Sig: Apply topically 2 (two) times a day   Patient not taking: Reported on 1/11/2024      Facility-Administered Medications: None     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    E-Cigarette/Vaping    E-Cigarette Use Never User      E-Cigarette/Vaping Substances    Nicotine Yes      Social History     Tobacco Use    Smoking status: Every Day     Current packs/day: 0.25     Types: Cigarettes    Smokeless tobacco: Never   Vaping Use    Vaping status: Never Used   Substance Use Topics    Alcohol use:  Yes     Comment: 3-4 beers/day 24 oz beer    Drug use: No     Review of Systems   Constitutional:  Negative for chills, fatigue, fever and unexpected weight change.   HENT:  Negative for congestion, rhinorrhea and sore throat.    Eyes:  Negative for redness and visual disturbance.   Respiratory:  Negative for cough and shortness of breath.    Cardiovascular:  Negative for chest pain and leg swelling.   Gastrointestinal:  Positive for nausea and vomiting. Negative for abdominal pain, constipation and diarrhea.   Endocrine: Negative for cold intolerance and heat intolerance.   Genitourinary:  Negative for dysuria, frequency and urgency.   Musculoskeletal:  Negative for back pain.   Skin:  Negative for rash.   Neurological:  Negative for dizziness, syncope and numbness.   All other systems reviewed and are negative.    Physical Exam  Physical Exam  Vitals and nursing note reviewed.   Constitutional:       General: He is not in acute distress.     Appearance: He is well-developed. He is ill-appearing (chornically). He is not diaphoretic.   HENT:      Head: Normocephalic and atraumatic.      Right Ear: External ear normal.      Left Ear: External ear normal.      Nose: Nose normal.      Mouth/Throat:      Mouth: Mucous membranes are moist.   Eyes:      Conjunctiva/sclera: Conjunctivae normal.      Pupils: Pupils are equal, round, and reactive to light.   Cardiovascular:      Rate and Rhythm: Normal rate and regular rhythm.      Heart sounds: Normal heart sounds. No murmur heard.     No gallop.   Pulmonary:      Effort: Pulmonary effort is normal. No respiratory distress.      Breath sounds: Normal breath sounds. No wheezing or rales.   Abdominal:      General: Bowel sounds are normal. There is no distension.      Palpations: Abdomen is soft. There is no mass.      Tenderness: There is no abdominal tenderness. There is no guarding or rebound.      Comments: PEG in place in LUQ.   Musculoskeletal:         General: No  deformity. Normal range of motion.      Cervical back: Normal range of motion and neck supple.   Lymphadenopathy:      Cervical: No cervical adenopathy.   Skin:     General: Skin is warm and dry.      Findings: No erythema or rash.   Neurological:      Mental Status: He is alert and oriented to person, place, and time.      Cranial Nerves: No cranial nerve deficit.       Vital Signs  ED Triage Vitals   Temperature Pulse Respirations Blood Pressure SpO2   02/07/24 1422 02/07/24 1422 02/07/24 1422 02/07/24 1422 02/07/24 1422   97.6 °F (36.4 °C) 78 18 (!) 182/90 98 %      Temp Source Heart Rate Source Patient Position - Orthostatic VS BP Location FiO2 (%)   02/07/24 1422 02/07/24 1422 02/07/24 1422 02/07/24 1422 --   (S) Axillary Monitor Sitting Right arm       Pain Score       02/07/24 2227       10 - Worst Possible Pain         ED Medications  Medications   atorvastatin (LIPITOR) tablet 40 mg ( Oral MAR Unhold 2/9/24 0213)   oxyCODONE (ROXICODONE) immediate release tablet 20 mg (20 mg Oral Given 2/9/24 0931)   acetaminophen (TYLENOL) tablet 650 mg ( Oral MAR Unhold 2/9/24 0213)   sodium chloride 0.9 % infusion ( Intravenous Restarted 2/9/24 1623)   polyethylene glycol (MIRALAX) packet 17 g (17 g Per PEG Tube Not Given 2/9/24 0825)   polyethylene glycol (GLYCOLAX) bowel prep 238 g (has no administration in time range)   polyethylene glycol (GOLYTELY) bowel prep 4,000 mL (has no administration in time range)   multi-electrolyte (PLASMALYTE-A/ISOLYTE-S PH 7.4) IV solution 1,000 mL (0 mL Intravenous Stopped 2/7/24 1811)   metoclopramide (REGLAN) injection 10 mg (10 mg Intravenous Given 2/7/24 1521)   magnesium sulfate 2 g/50 mL IVPB (premix) 2 g (2 g Intravenous New Bag 2/7/24 2321)   iohexol (OMNIPAQUE) 350 MG/ML injection (MULTI-DOSE) 100 mL (100 mL Intravenous Given 2/7/24 2341)   multi-electrolyte (ISOLYTE-S PH 7.4) bolus 1,000 mL (1,000 mL Intravenous New Bag 2/8/24 6205)   polyethylene glycol (GOLYTELY) bowel prep  4,000 mL (4,000 mL Per PEG Tube Given 2/8/24 1539)   iohexol (OMNIPAQUE) 350 MG/ML injection (MULTI-DOSE) 100 mL (100 mL Intravenous Given 2/9/24 0001)   multi-electrolyte (ISOLYTE-S PH 7.4) bolus 1,000 mL ( Intravenous MAR Unhold 2/9/24 0213)   sodium chloride 0.9 % bolus (1,000 mL Intravenous New Bag 2/9/24 0135)   magnesium sulfate 2 g/50 mL IVPB (premix) 2 g (0 g Intravenous Stopped 2/9/24 0840)   potassium chloride 20 mEq IVPB (premix) (0 mEq Intravenous Stopped 2/9/24 0840)   potassium chloride (Klor-Con M20) CR tablet 40 mEq (40 mEq Oral Given 2/9/24 1244)     Diagnostic Studies  Results Reviewed       Procedure Component Value Units Date/Time    HS Troponin I 4hr [[id number]]  (Normal) Collected: 02/07/24 1955    Lab Status: Final result Specimen: Blood from Arm, Left Updated: 02/07/24 2033     hs TnI 4hr 12 ng/L      Delta 4hr hsTnI 1 ng/L     Prealbumin [[id number]]  (Abnormal) Collected: 02/07/24 1524    Lab Status: Final result Specimen: Blood from Central Venous Line Updated: 02/07/24 1934     Prealbumin 9.5 mg/dL     HS Troponin I 2hr [[id number]]  (Normal) Collected: 02/07/24 1810    Lab Status: Final result Specimen: Blood from Central Venous Line Updated: 02/07/24 1842     hs TnI 2hr 11 ng/L      Delta 2hr hsTnI 0 ng/L     RBC Morphology Reflex Test [[id number]] Collected: 02/07/24 1524    Lab Status: Final result Specimen: Blood from Central Venous Line Updated: 02/07/24 1701    CBC and differential [[id number]]  (Abnormal) Collected: 02/07/24 1524    Lab Status: Final result Specimen: Blood from Central Venous Line Updated: 02/07/24 1626     WBC 9.11 Thousand/uL      RBC 3.04 Million/uL      Hemoglobin 10.5 g/dL      Hematocrit 31.0 %       fL      MCH 34.5 pg      MCHC 33.9 g/dL      RDW 18.1 %      MPV 9.2 fL      Platelets 269 Thousands/uL     Narrative:      This is an appended report.  These results have been appended to a previously verified report.    Manual Differential(PHLEBS Do Not  Order) [[id number]]  (Abnormal) Collected: 02/07/24 1524    Lab Status: Final result Specimen: Blood from Central Venous Line Updated: 02/07/24 1626     Segmented % 77 %      Bands % 2 %      Lymphocytes % 7 %      Monocytes % 13 %      Eosinophils, % 0 %      Basophils % 0 %      Atypical Lymphocytes % 1 %      Absolute Neutrophils 7.20 Thousand/uL      Lymphocytes Absolute 0.73 Thousand/uL      Monocytes Absolute 1.18 Thousand/uL      Eosinophils Absolute 0.00 Thousand/uL      Basophils Absolute 0.00 Thousand/uL      Total Counted --     RBC Morphology Present     Platelet Estimate Adequate     Anisocytosis Present    TSH, 3rd generation with Free T4 reflex [[id number]]  (Normal) Collected: 02/07/24 1524    Lab Status: Final result Specimen: Blood from Central Venous Line Updated: 02/07/24 1617     TSH 3RD GENERATON 1.038 uIU/mL     Basic metabolic panel [[id number]]  (Abnormal) Collected: 02/07/24 1524    Lab Status: Final result Specimen: Blood from Central Venous Line Updated: 02/07/24 1612     Sodium 129 mmol/L      Potassium 3.8 mmol/L      Chloride 93 mmol/L      CO2 26 mmol/L      ANION GAP 10 mmol/L      BUN 8 mg/dL      Creatinine 0.66 mg/dL      Glucose 88 mg/dL      Calcium 9.3 mg/dL      eGFR 105 ml/min/1.73sq m     Narrative:      [employer] guidelines for Chronic Kidney Disease (CKD):     Stage 1 with normal or high GFR (GFR > 90 mL/min/1.73 square meters)    Stage 2 Mild CKD (GFR = 60-89 mL/min/1.73 square meters)    Stage 3A Moderate CKD (GFR = 45-59 mL/min/1.73 square meters)    Stage 3B Moderate CKD (GFR = 30-44 mL/min/1.73 square meters)    Stage 4 Severe CKD (GFR = 15-29 mL/min/1.73 square meters)    Stage 5 End Stage CKD (GFR <15 mL/min/1.73 square meters)  Note: GFR calculation is accurate only with a steady state creatinine    Hepatic function panel [[id number]]  (Abnormal) Collected: 02/07/24 1524    Lab Status: Final result Specimen: Blood from Central Venous Line  Updated: 02/07/24 1612     Total Bilirubin 0.51 mg/dL      Bilirubin, Direct 0.13 mg/dL      Alkaline Phosphatase 104 U/L      AST 12 U/L      ALT 9 U/L      Total Protein 6.8 g/dL      Albumin 3.6 g/dL     Magnesium [[id number]]  (Abnormal) Collected: 02/07/24 1524    Lab Status: Final result Specimen: Blood from Central Venous Line Updated: 02/07/24 1612     Magnesium 1.8 mg/dL     Phosphorus [[id number]]  (Normal) Collected: 02/07/24 1524    Lab Status: Final result Specimen: Blood from Central Venous Line Updated: 02/07/24 1612     Phosphorus 3.9 mg/dL     HS Troponin 0hr (reflex protocol) [[id number]]  (Normal) Collected: 02/07/24 1524    Lab Status: Final result Specimen: Blood from Central Venous Line Updated: 02/07/24 1608     hs TnI 0hr 11 ng/L     Protime-INR [[id number]]  (Normal) Collected: 02/07/24 1524    Lab Status: Final result Specimen: Blood from Central Venous Line Updated: 02/07/24 1556     Protime 13.2 seconds      INR 0.94    APTT [[id number]]  (Normal) Collected: 02/07/24 1524    Lab Status: Final result Specimen: Blood from Central Venous Line Updated: 02/07/24 1556     PTT 30 seconds             Procedures  Procedures    ED Course     A/P: This is a 58 y.o. male who presents to the ED for evaluation of nausea. He has a benign exam. Cannot tolerate his tube feeds, feels dizzy. Suspect dehydration. No acute neuro deficits to suspect stroke. Will get labs for electrolyte disturbance, IVF hydration. Reevaluate and dispo accordingly.    NOTE: this chart was completed after admission, multiple results have been imported that occurred not under my care.    Medical Decision Making  Amount and/or Complexity of Data Reviewed  Labs: ordered.    Risk  Prescription drug management.  Decision regarding hospitalization.           Disposition  Final diagnoses:   Throat cancer (HCC)   Nausea   Inadequate oral intake     Time reflects when diagnosis was documented in both MDM as applicable and the Disposition  within this note       Time User Action Codes Description Comment    2/7/2024  6:25 PM Burak Jacobson Add [C14.0] Throat cancer (HCC)     2/7/2024  6:25 PM Burak Jacobson Add [R11.0] Nausea     2/7/2024  6:25 PM Burak Jacobson Modify [C14.0] Throat cancer (HCC)     2/7/2024  6:25 PM Burak Jacobson Modify [R11.0] Nausea     2/7/2024  6:25 PM Burak Jacobson Add [R63.8] Inadequate oral intake     2/8/2024  1:20 AM Siena Riosabi Madison Add [E44.0] Moderate protein-calorie malnutrition (HCC)     2/8/2024  6:51 AM Siena Riollandreina Madison Add [K62.5] Bright red blood per rectum     2/8/2024  3:23 PM Kayla Vance Add [K62.5] Rectal bleeding     2/9/2024  2:19 AM Norm Coley Add [K62.5] Gastrointestinal hemorrhage associated with anorectal source           ED Disposition       ED Disposition   Admit    Condition   Stable    Date/Time   Wed Feb 7, 2024  6:24 PM    Comment   Case was discussed with CARISSA and the patient's admission status was agreed to be Admission Status: observation status to the service of Dr. Christine.               Follow-up Information    None         Current Discharge Medication List        CONTINUE these medications which have NOT CHANGED    Details   Aspirin Low Dose 81 MG chewable tablet CHEW 1 TABLET BY MOUTH DAILY  Qty: 90 tablet, Refills: 0    Associated Diagnoses: Right-sided lacunar stroke (HCC)      atorvastatin (LIPITOR) 40 mg tablet TAKE 1 TABLET BY MOUTH EVERY DAY IN THE EVENING  Qty: 90 tablet, Refills: 1    Associated Diagnoses: Right-sided lacunar stroke (HCC)      oxyCODONE (ROXICODONE) 20 MG TABS Take 1 tablet (20 mg total) by mouth every 4 (four) hours as needed for moderate pain Max Daily Amount: 120 mg  Qty: 90 tablet, Refills: 0    Comments: May fill immediately for continued cancer related pain since starting chemoRT. G89.3  Associated Diagnoses: Cancer-related pain; Mucositis due to radiation therapy; Pain in oral cavity; Oral cancer  (HCC)      chlorhexidine (PERIDEX) 0.12 % solution       clopidogrel (Plavix) 75 mg tablet Take 1 tablet (75 mg total) by mouth daily for 21 days  Qty: 21 tablet, Refills: 0    Associated Diagnoses: Right-sided lacunar stroke (HCC)      dexamethasone 0.5 MG/5ML elixir Take 10 mL (1 mg total) by mouth 4 (four) times a day  Qty: 400 mL, Refills: 0    Associated Diagnoses: Cancer-related pain; Mucositis due to radiation therapy; Pain in oral cavity      diphenhydrAMINE (BENADRYL) 12.5 mg/5 mL elixir       diphenhydramine, lidocaine, Al/Mg hydroxide, simethicone (Magic Mouthwash) SUSP Swish and swallow 10 mL every 4 (four) hours as needed for mouth pain or discomfort  Qty: 237 mL, Refills: 7    Comments: Please mix solution 1:1:1:1, this patient should be covered under the [employer] discussed with Ayse Rg. Please call if there are any issues [phone] thanks.  Associated Diagnoses: Oral cancer (HCC); Pain with swallowing      ibuprofen (MOTRIN) 600 mg tablet Take 1 tablet (600 mg total) by mouth every 6 (six) hours as needed for mild pain  Qty: 30 tablet, Refills: 0    Associated Diagnoses: Dental caries      Magnesium Oxide -Mg Supplement 200 MG TABS Take 1 tablet (200 mg total) by mouth 2 (two) times a day  Qty: 60 tablet, Refills: 0    Associated Diagnoses: Hypomagnesemia; Malignant neoplasm of oral cavity ([facility])      naloxone (NARCAN) 4 mg/0.1 mL nasal spray Administer 1 spray into a nostril. If no response after 2-3 minutes, give another dose in the other nostril using a new spray.  Qty: 1 each, Refills: 1    Associated Diagnoses: At risk for respiratory depression due to opioid      polyethylene glycol (MIRALAX) 17 g packet Take 17 g by mouth daily  Qty: 30 each, Refills: 0    Associated Diagnoses: Drug induced constipation      senna-docusate sodium (SENOKOT-S) 8.6-50 mg per tablet Take 1 tablet by mouth daily  Qty: 60 tablet, Refills: 2    Associated Diagnoses: Drug induced constipation       silver sulfadiazine (SILVADENE,SSD) 1 % cream Apply topically 2 (two) times a day  Qty: 400 g, Refills: 1    Associated Diagnoses: Oral cancer (HCC); Acute radiation dermatitis             No discharge procedures on file.    PDMP Review         Value Time User    PDMP Reviewed  Yes 1/29/2024  3:35 PM Stu Whitehead MD            ED Provider  Electronically Signed by             Burak Jacobson MD  02/09/24 3575

## 2024-02-08 ENCOUNTER — APPOINTMENT (INPATIENT)
Dept: CT IMAGING | Facility: HOSPITAL | Age: 59
DRG: 254 | End: 2024-02-08
Payer: COMMERCIAL

## 2024-02-08 PROBLEM — E43 SEVERE PROTEIN-CALORIE MALNUTRITION (HCC): Status: ACTIVE | Noted: 2024-02-08

## 2024-02-08 PROBLEM — D62 ACUTE BLOOD LOSS ANEMIA: Status: ACTIVE | Noted: 2024-02-08

## 2024-02-08 PROBLEM — E44.0 MODERATE PROTEIN-CALORIE MALNUTRITION (HCC): Status: RESOLVED | Noted: 2023-11-04 | Resolved: 2024-02-08

## 2024-02-08 PROBLEM — K62.5 RECTAL BLEEDING: Status: ACTIVE | Noted: 2024-02-08

## 2024-02-08 LAB
ABO GROUP BLD: NORMAL
ABO GROUP BLD: NORMAL
ANION GAP SERPL CALCULATED.3IONS-SCNC: 6 MMOL/L
BLD GP AB SCN SERPL QL: NEGATIVE
BUN SERPL-MCNC: 8 MG/DL (ref 5–25)
CALCIUM SERPL-MCNC: 8.6 MG/DL (ref 8.4–10.2)
CHLORIDE SERPL-SCNC: 95 MMOL/L (ref 96–108)
CO2 SERPL-SCNC: 30 MMOL/L (ref 21–32)
CREAT SERPL-MCNC: 0.66 MG/DL (ref 0.6–1.3)
ERYTHROCYTE [DISTWIDTH] IN BLOOD BY AUTOMATED COUNT: 18 % (ref 11.6–15.1)
GFR SERPL CREATININE-BSD FRML MDRD: 105 ML/MIN/1.73SQ M
GLUCOSE SERPL-MCNC: 111 MG/DL (ref 65–140)
HCT VFR BLD AUTO: 22.5 % (ref 36.5–49.3)
HCT VFR BLD AUTO: 22.9 % (ref 36.5–49.3)
HCT VFR BLD AUTO: 25.1 % (ref 36.5–49.3)
HCT VFR BLD AUTO: 28.7 % (ref 36.5–49.3)
HCT VFR BLD AUTO: 28.8 % (ref 36.5–49.3)
HGB BLD-MCNC: 7.5 G/DL (ref 12–17)
HGB BLD-MCNC: 7.7 G/DL (ref 12–17)
HGB BLD-MCNC: 8.6 G/DL (ref 12–17)
HGB BLD-MCNC: 9.6 G/DL (ref 12–17)
HGB BLD-MCNC: 9.7 G/DL (ref 12–17)
INR PPP: 1.06 (ref 0.84–1.19)
LACTATE SERPL-SCNC: 0.9 MMOL/L (ref 0.5–2)
MAGNESIUM SERPL-MCNC: 2.2 MG/DL (ref 1.9–2.7)
MCH RBC QN AUTO: 34.8 PG (ref 26.8–34.3)
MCHC RBC AUTO-ENTMCNC: 33.7 G/DL (ref 31.4–37.4)
MCV RBC AUTO: 103 FL (ref 82–98)
PLATELET # BLD AUTO: 244 THOUSANDS/UL (ref 149–390)
PMV BLD AUTO: 9.1 FL (ref 8.9–12.7)
POTASSIUM SERPL-SCNC: 3.6 MMOL/L (ref 3.5–5.3)
PROTHROMBIN TIME: 14.5 SECONDS (ref 11.6–14.5)
RBC # BLD AUTO: 2.79 MILLION/UL (ref 3.88–5.62)
RH BLD: POSITIVE
RH BLD: POSITIVE
SODIUM SERPL-SCNC: 131 MMOL/L (ref 135–147)
SPECIMEN EXPIRATION DATE: NORMAL
WBC # BLD AUTO: 7.97 THOUSAND/UL (ref 4.31–10.16)

## 2024-02-08 PROCEDURE — 85018 HEMOGLOBIN: CPT

## 2024-02-08 PROCEDURE — 99223 1ST HOSP IP/OBS HIGH 75: CPT | Performed by: GENERAL PRACTICE

## 2024-02-08 PROCEDURE — P9016 RBC LEUKOCYTES REDUCED: HCPCS

## 2024-02-08 PROCEDURE — 80048 BASIC METABOLIC PNL TOTAL CA: CPT

## 2024-02-08 PROCEDURE — 83735 ASSAY OF MAGNESIUM: CPT

## 2024-02-08 PROCEDURE — 86900 BLOOD TYPING SEROLOGIC ABO: CPT

## 2024-02-08 PROCEDURE — 99222 1ST HOSP IP/OBS MODERATE 55: CPT | Performed by: INTERNAL MEDICINE

## 2024-02-08 PROCEDURE — 85018 HEMOGLOBIN: CPT | Performed by: PSYCHIATRY & NEUROLOGY

## 2024-02-08 PROCEDURE — 30233N1 TRANSFUSION OF NONAUTOLOGOUS RED BLOOD CELLS INTO PERIPHERAL VEIN, PERCUTANEOUS APPROACH: ICD-10-PCS | Performed by: PSYCHIATRY & NEUROLOGY

## 2024-02-08 PROCEDURE — 85014 HEMATOCRIT: CPT | Performed by: PSYCHIATRY & NEUROLOGY

## 2024-02-08 PROCEDURE — 86901 BLOOD TYPING SEROLOGIC RH(D): CPT

## 2024-02-08 PROCEDURE — 86920 COMPATIBILITY TEST SPIN: CPT

## 2024-02-08 PROCEDURE — 83605 ASSAY OF LACTIC ACID: CPT

## 2024-02-08 PROCEDURE — 85014 HEMATOCRIT: CPT

## 2024-02-08 PROCEDURE — 86850 RBC ANTIBODY SCREEN: CPT

## 2024-02-08 PROCEDURE — 85610 PROTHROMBIN TIME: CPT | Performed by: PSYCHIATRY & NEUROLOGY

## 2024-02-08 PROCEDURE — 85027 COMPLETE CBC AUTOMATED: CPT

## 2024-02-08 PROCEDURE — 74178 CT ABD&PLV WO CNTR FLWD CNTR: CPT

## 2024-02-08 PROCEDURE — G1004 CDSM NDSC: HCPCS

## 2024-02-08 RX ORDER — POLYETHYLENE GLYCOL 3350 17 G/17G
17 POWDER, FOR SOLUTION ORAL 3 TIMES DAILY
Status: DISCONTINUED | OUTPATIENT
Start: 2024-02-08 | End: 2024-02-12

## 2024-02-08 RX ORDER — BISACODYL 10 MG
10 SUPPOSITORY, RECTAL RECTAL ONCE
Status: DISCONTINUED | OUTPATIENT
Start: 2024-02-08 | End: 2024-02-08

## 2024-02-08 RX ORDER — SODIUM CHLORIDE, SODIUM GLUCONATE, SODIUM ACETATE, POTASSIUM CHLORIDE, MAGNESIUM CHLORIDE, SODIUM PHOSPHATE, DIBASIC, AND POTASSIUM PHOSPHATE .53; .5; .37; .037; .03; .012; .00082 G/100ML; G/100ML; G/100ML; G/100ML; G/100ML; G/100ML; G/100ML
1000 INJECTION, SOLUTION INTRAVENOUS ONCE
Status: COMPLETED | OUTPATIENT
Start: 2024-02-08 | End: 2024-02-08

## 2024-02-08 RX ADMIN — SODIUM CHLORIDE 75 ML/HR: 0.9 INJECTION, SOLUTION INTRAVENOUS at 01:40

## 2024-02-08 RX ADMIN — OXYCODONE HYDROCHLORIDE 20 MG: 10 TABLET ORAL at 22:11

## 2024-02-08 RX ADMIN — SODIUM CHLORIDE 75 ML/HR: 0.9 INJECTION, SOLUTION INTRAVENOUS at 17:47

## 2024-02-08 RX ADMIN — ATORVASTATIN CALCIUM 40 MG: 40 TABLET, FILM COATED ORAL at 18:15

## 2024-02-08 RX ADMIN — SODIUM CHLORIDE, SODIUM GLUCONATE, SODIUM ACETATE, POTASSIUM CHLORIDE, MAGNESIUM CHLORIDE, SODIUM PHOSPHATE, DIBASIC, AND POTASSIUM PHOSPHATE 1000 ML: .53; .5; .37; .037; .03; .012; .00082 INJECTION, SOLUTION INTRAVENOUS at 08:52

## 2024-02-08 RX ADMIN — POLYETHYLENE GLYCOL 3350, SODIUM SULFATE ANHYDROUS, SODIUM BICARBONATE, SODIUM CHLORIDE, POTASSIUM CHLORIDE 4000 ML: 236; 22.74; 6.74; 5.86; 2.97 POWDER, FOR SOLUTION ORAL at 15:39

## 2024-02-08 NOTE — MALNUTRITION/BMI
This medical record reflects one or more clinical indicators suggestive of malnutrition and/or morbid obesity.    Malnutrition Findings:   Adult Malnutrition type: Acute illness (in the setting of chronic illness)  Adult Degree of Malnutrition: Other severe protein calorie malnutrition  Malnutrition Characteristics: Fat loss, Muscle loss, Weight loss                  360 Statement: Severe malnutrition related to inadequate oral and enteral intake, catabolic illness, dysphagia as evidenced by severe loss of fat and muscle, temples, extremities, clavicle, 22.6 % weight decrease x < 1 year, BMI 15.0. Currently treated with nutrition consult, enteral feeding recomendations provided.    BMI Findings:  Adult BMI Classifications: Underweight < 18.5        Body mass index is 15 kg/m².     See Nutrition note dated 2/8/2024 for additional details.  Completed nutrition assessment is viewable in the nutrition documentation.

## 2024-02-08 NOTE — ASSESSMENT & PLAN NOTE
Malnutrition Findings:   Adult Malnutrition type: Acute illness (in the setting of chronic illness)  Adult Degree of Malnutrition: Other severe protein calorie malnutrition  Malnutrition Characteristics: Fat loss, Muscle loss, Weight loss                  360 Statement: Severe malnutrition related to inadequate oral and enteral intake, catabolic illness, dysphagia as evidenced by severe loss of fat and muscle, temples, extremities, clavicle, 22.6 % weight decrease x < 1 year. Currently treated with nutrition consult, enteral feeding recomendations provided.    BMI Findings:  Adult BMI Classifications: Underweight < 18.5        Body mass index is 15 kg/m².     PEG tube in place 2/2 oral SCC limiting oral intake  Patient not using his tube feedings 2/2 inducing nausea and unable to tolerate said feedings  No nausea or vomiting at admission    Plan:  Resume tube feedings once cleared by GI  Nutrition consulted, patient at high risk for refeeding syndrome. recs appreciated.  Per nutrition: When indicated; Initiate Jevity 1.5 at 10 ml/hr continuous x 24 hours monitoring electrolytes. Continue IVF, flush 30 ml q 4. Advance by 10 ml q 12 hours to goal rate of 60 ml/hr providing 2160 kcal, 92 g protein, 1094 ml free water. When closer to goal rate, d/c IVF and flush 125 ml q 4 hours for additional 750 ml water. Once tolerating goal, can discuss bolus feedings as appropriate

## 2024-02-08 NOTE — ASSESSMENT & PLAN NOTE
Chronic hyponatremia (low 130s) per chart review  POA with weakness and dizziness, worsening with standing up  Hyponatremia of 129  TSH WNL  Hypovolemic on exam, dry mucosa  Likely due to significant decreased PO and PEG tube intake and dehydration    Plan:  Encourage nutrient intake  Follow serum osmolality, urine osmolality, urine sodium  IV NS 75 mL/hr  Monitor BMP  Avoid overcorrection to prevent ODS  Goal sodium no higher than 4-6 meqL in 24 hours

## 2024-02-08 NOTE — ASSESSMENT & PLAN NOTE
Patient not providing clear answers as to last time he had alcohol intake  History of alcohol dependence for pain control and intoxication  AOX4, no confusion, incoordination, slurred speech or any other symptoms suggestive of alcohol intoxication at this time    Plan:  DULCE protocol

## 2024-02-08 NOTE — H&P
[facility]  H&P  Name: Eliceo Garcia 58 y.o. male I MRN: [id number]  Unit/Bed#: S -01 I Date of Admission: 2/7/2024   Date of Service: 2/7/2024 I Hospital Day: 0      Assessment/Plan   * Weakness  Assessment & Plan  POA with weakness and dizziness for the past week; increased weight loss and constipation  Not tolerating tube feedings via PEG tube due to nausea  Physical exam significant for   Likely 2/2 dehydration and lack of PO/PEG tube intake    See plans below    Drug induced constipation  Assessment & Plan  Patient with last bowel movement approximately 5 weeks ago  Refractory to senokot  Abdomen soft and nontender, however, 2/2 significant constipation and mediastinal adenopathy noticed in prior CT scans. Will rule out obstruction    Plan:  Miralax  Follow CT abdomen  Dulcolax suppository  Consider enema if refractory to medication    Hyponatremia  Assessment & Plan  Chronic hyponatremia (low 130s) per chart review  POA with weakness and dizziness, worsening with standing up  Hyponatremia of 129  TSH WNL  Hypovolemic on exam, dry mucosa  Likely due to significant decreased PO and PEG tube intake and dehydration    Plan:  Encourage nutrient intake  Follow serum osmolality, urine osmolality, urine sodium  IV NS 75 mL/hr  Monitor BMP  Avoid overcorrection to prevent ODS  Goal sodium no higher than 4-6 meqL in 24 hours    Moderate protein-calorie malnutrition (HCC)  Assessment & Plan  Malnutrition Findings:                                 BMI Findings:           There is no height or weight on file to calculate BMI.   BMI of 15 kg/m2    PEG tube in place 2/2 oral SCC limiting oral intake  Patient not using his tube feedings 2/2 inducing nausea and unable to tolerate said feedings  No nausea or vomiting at admission    Plan:  Resume tube feedings once SBO has been ruled out  Nutrition consulted, recs appreciated      Cancer-related pain  Assessment & Plan  Continue home roxicodone  20 mg   Narcan ordered    Alcohol abuse  Assessment & Plan  Patient not providing clear answers as to last time he had alcohol intake  History of alcohol dependence for pain control and intoxication  AOX4, no confusion, incoordination, slurred speech or any other symptoms suggestive of alcohol intoxication at this time    Plan:  CIWA protocol    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 Hu    History of CVA (cerebrovascular accident)  Assessment & Plan  History right pontine infarct CVA  Currently on ASA and Lipitor  No longer on Plavix per patient    Continue home medications           VTE Pharmacologic Prophylaxis: VTE Score: 6 High Risk (Score >/= 5) - Pharmacological DVT Prophylaxis Ordered: enoxaparin (Lovenox). Sequential Compression Devices Ordered.  Code Status: Level 1 - Full Code patient  Discussion with family: Patient declined call to .     Anticipated Length of Stay: Patient will be admitted on an inpatient basis with an anticipated length of stay of greater than 2 midnights secondary to dizziness and hyponatremia.    Chief Complaint: Dizziness and weakness    History of Present Illness:  Eliceo Garcia is a 58 y.o. male with a PMH of stage IV oral SCC, CVA, opoid dependence who presents with complaints of dizziness and weakness for a couple of days.  Patient reports not being able to use PEG tube as he becomes nauseous. He reports losing weight and only tolerating yogurt occasionally. His las RT was on 1/10/24. Patient also reports not having a bowel movement approximately for 5 weeks. He has noticed liquid stool leaking out but no actual BM. Denies CP, SOB, abdominal pain, lightheadedness.   In the ED patient received IVF and reglan. His cardiac workup was  overall unremarkable, but he was found to have hyponatremia.  In my evaluation, patient was HD stable but visibly dehydrated and cachetic. He denied CP, SOB, N/V, fever, chills. Patient was [facility] to [facility] for further management.    Review of Systems:  Review of Systems   Constitutional:  Positive for appetite change and unexpected weight change. Negative for fatigue and fever.   HENT:  Positive for trouble swallowing.    Eyes:  Negative for visual disturbance.   Respiratory:  Negative for cough, chest tightness and shortness of breath.    Cardiovascular:  Negative for chest pain, palpitations and leg swelling.   Gastrointestinal:  Positive for constipation. Negative for abdominal pain, diarrhea, nausea and vomiting.   Genitourinary:  Negative for decreased urine volume, difficulty urinating and urgency.   Neurological:  Positive for dizziness and weakness. Negative for speech difficulty, light-headedness, numbness and headaches.       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 Authorizing Provider   Aspirin Low Dose 81 MG chewable tablet CHEW 1 TABLET BY MOUTH DAILY  Patient not taking: Reported on 1/26/2024 1/2/24   Yoel Yoon MD   atorvastatin (LIPITOR) 40 mg tablet TAKE 1 TABLET BY MOUTH EVERY DAY IN THE EVENING 1/24/24   Elias Schoen, MD   chlorhexidine (PERIDEX) 0.12 % solution  11/10/23    Historical Provider, MD   clopidogrel (Plavix) 75 mg tablet Take 1 tablet (75 mg total) by mouth daily for 21 days  Patient not taking: Reported on 11/22/2023 11/6/23 11/27/23  Yoel Yoon MD   dexamethasone 0.5 MG/5ML elixir Take 10 mL (1 mg total) by mouth 4 (four) times a day  Patient not taking: Reported on 1/26/2024 1/11/24   Mitra Lima MD   diphenhydrAMINE (BENADRYL) 12.5 mg/5 mL elixir  11/17/23   Historical Provider, MD   diphenhydramine, lidocaine, Al/Mg hydroxide, simethicone (Magic Mouthwash) SUSP Swish and swallow 10 mL every 4 (four) hours as needed for mouth pain or discomfort  Patient not taking: Reported on 11/24/2023 11/16/23   Ernestina Mohr MD   ibuprofen (MOTRIN) 600 mg tablet Take 1 tablet (600 mg total) by mouth every 6 (six) hours as needed for mild pain  Patient not taking: Reported on 11/24/2023 10/16/23   Humble Otero DMD   Magnesium Oxide -Mg Supplement 200 MG TABS Take 1 tablet (200 mg total) by mouth 2 (two) times a day  Patient not taking: Reported on 1/26/2024 12/13/23   Roosevelt Hu MD   naloxone (NARCAN) 4 mg/0.1 mL nasal spray Administer 1 spray into a nostril. If no response after 2-3 minutes, give another dose in the other nostril using a new spray.  Patient not taking: Reported on 11/22/2023 11/2/23 11/1/24  Mitra Lima MD   oxyCODONE (ROXICODONE) 20 MG TABS Take 1 tablet (20 mg total) by mouth every 4 (four) hours as needed for moderate pain Max Daily Amount: 120 mg 1/29/24   Stu Whitehead MD   polyethylene glycol (MIRALAX) 17 g packet Take 17 g by mouth daily  Patient not taking: Reported on 11/24/2023 11/13/23   Jr Fatima DO   senna-docusate sodium (SENOKOT-S) 8.6-50 mg per tablet Take 1 tablet by mouth daily 11/2/23   Mitra Lima MD   silver sulfadiazine (SILVADENE,SSD) 1 % cream Apply topically 2 (two) times a day  Patient not taking: Reported on 1/11/2024 12/27/23   Ernestina Mohr MD     I have reviewed home medications with patient  personally.    Allergies:   Allergies   Allergen Reactions    Bee Venom Hives    Other      bees       Social History:  Marital Status: Single   Occupation: Unknown  Patient Pre-hospital Living Situation: Home  Patient Pre-hospital Level of Mobility: walks  Patient Pre-hospital Diet Restrictions: PEG tube  Substance Use History:   Social History     Substance and Sexual Activity   Alcohol Use Yes    Comment: 3-4 beers/day 24 oz beer     Social History     Tobacco Use   Smoking Status Every Day    Current packs/day: 0.25    Types: Cigarettes   Smokeless Tobacco Never     Social History     Substance and Sexual Activity   Drug Use No       Family History:  Family History   Problem Relation Age of Onset    Breast cancer Mother     Cancer Father      Physical Exam:     Vitals:   Blood Pressure: 159/98 (02/07/24 2031)  Pulse: 96 (02/07/24 2031)  Temperature: 97.6 °F (36.4 °C) (02/07/24 2031)  Temp Source: (S) Axillary (02/07/24 1422)  Respirations: 18 (02/07/24 2031)  SpO2: 96 % (02/07/24 2031)    Physical Exam  Constitutional:       General: He is not in acute distress.     Appearance: He is underweight. He is ill-appearing (Chronically).      Comments: Irritable   HENT:      Head: Normocephalic and atraumatic.      Mouth/Throat:      Mouth: Mucous membranes are dry.      Pharynx: Posterior oropharyngeal erythema (Slight erythema) present.      Comments: Tumor on right lower area  Unable to stick tongue out (baseline)  Eyes:      Pupils: Pupils are equal, round, and reactive to light.   Cardiovascular:      Rate and Rhythm: Normal rate and regular rhythm.   Pulmonary:      Effort: Pulmonary effort is normal. No respiratory distress.      Breath sounds: Normal breath sounds.   Abdominal:      General: Abdomen is flat.      Palpations: Abdomen is soft.      Tenderness: There is no abdominal tenderness.       Musculoskeletal:      Right lower leg: No edema.      Left lower leg: No edema.   Neurological:      General: No  focal deficit present.      Mental Status: He is alert and oriented to person, place, and time.        Additional Data:     Lab Results:  Results from last 7 days   Lab Units 02/07/24  1524   WBC Thousand/uL 9.11   HEMOGLOBIN g/dL 10.5*   HEMATOCRIT % 31.0*   PLATELETS Thousands/uL 269   BANDS PCT % 2   LYMPHO PCT % 7*   MONO PCT % 13*   EOS PCT % 0     Results from last 7 days   Lab Units 02/07/24  1524   SODIUM mmol/L 129*   POTASSIUM mmol/L 3.8   CHLORIDE mmol/L 93*   CO2 mmol/L 26   BUN mg/dL 8   CREATININE mg/dL 0.66   ANION GAP mmol/L 10   CALCIUM mg/dL 9.3   ALBUMIN g/dL 3.6   TOTAL BILIRUBIN mg/dL 0.51   ALK PHOS U/L 104   ALT U/L 9   AST U/L 12*   GLUCOSE RANDOM mg/dL 88     Results from last 7 days   Lab Units 02/07/24  1524   INR  0.94                   Lines/Drains:  Invasive Devices       Central Venous Catheter Line  Duration             Port A Cath 11/07/23 Left Internal jugular 92 days              Drain  Duration             Gastrostomy/Enterostomy Percutaneous Interventional Gastrostomy 16 Fr. LUQ 58 days                    Central Line:  Goal for removal:  Chronic port access         Imaging: No pertinent imaging reviewed.  CT abdomen pelvis w contrast    (Results Pending)       EKG and Other Studies Reviewed on Admission:   EKG: NSR. HR 73.    ** Please Note: This note has been constructed using a voice recognition system. **

## 2024-02-08 NOTE — ASSESSMENT & PLAN NOTE
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 Hu

## 2024-02-08 NOTE — ASSESSMENT & PLAN NOTE
Patient with last bowel movement approximately 5 weeks ago  Refractory to senokot  Abdomen soft and nontender, however, 2/2 significant constipation and mediastinal adenopathy noticed in prior CT scans. Will rule out obstruction  CT A/P: Fluid-filled hyperenhancing small bowel loops in the left mid abdomen suggesting enteritis. There is associated mesenteric edema. Moderate fecal impaction at the rectum with the rectum dilated up to 7 cm.     Plan:  GI consulted. Recommendations appreciated. Soapsuds enema and tapwater enema, will also augment bowel regimen and give MiraLAX through PEG tube 3 times daily   Clear liquid diet, hold tube feeds for now

## 2024-02-08 NOTE — UTILIZATION REVIEW
Initial Clinical Review  OBSERVATION  2/7/24 @ 1826 CONVERTED TO INPATIENT ADMISSION 2/8/24 @ 0932 DUE TO CONTINUED STAY REQUIRED TO EVALUATE AND TREAT PATIENT WITH ACTIVE BLEEDING . GI FOLLOWING.      Admission: Date/Time/Statement:   Admission Orders (From admission, onward)       Ordered        02/08/24 0932  Inpatient Admission  Once            02/07/24 1826  Place in Observation  Once                          Orders Placed This Encounter   Procedures    Inpatient Admission     Standing Status:   Standing     Number of Occurrences:   1     Order Specific Question:   Level of Care     Answer:   Med Surg [16]     Order Specific Question:   Estimated length of stay     Answer:   More than 2 Midnights     Order Specific Question:   Certification     Answer:   I certify that inpatient services are medically necessary for this patient for a duration of greater than two midnights. See H&P and MD Progress Notes for additional information about the patient's course of treatment.     ED Arrival Information       Expected   2/7/2024     Arrival   2/7/2024 14:19    Acuity   Emergent              Means of arrival   Ambulance    Escorted by   [facility]    Service   Hospitalist    Admission type   Emergency              Arrival complaint   Weakness/Dizziness             Chief Complaint   Patient presents with    Dizziness     Pt reports dizziness since yesterday. Worsening today. H/o throat CA. Finished last dose of radiation and chemo last week. Denies HA. Worse upon standing.        Initial Presentation: 58 y.o. male  with hx stage IV oral SCC, CVA, opoid dependence , alcohol abuse- unclear as to last alcohol intake who presents with complaints of dizziness and weakness for a couple of days.  Patient reports not being able to use PEG tube as he becomes nauseous. He reports losing weight and only tolerating yogurt occasionally. His las RT was on 1/10/24. Patient also reports not having a bowel movement  approximately for 5 weeks. He has noticed liquid stool leaking out but no actual BM. On exam, visibly dehydrated, cachectic. Abdomen soft, non tender.  PEG tube in place .  Has  slight posterior oropharyngeal erythema . Has tumor on right lower area . Unable to stick tongue out (baseline). Pt irritable .  Labs - Low Na 129. CT A/P suggests enteritis and shows fecal impaction at rectum . Pt given IVF, IV Reglan in ED. Admitted as OBS with weakness, drug induced constipation , hyponatremia, mod protein, calorie malnutrition . Plan - Bowel regime , consider enema, IVF- NSS. Monitor BMP . Goal sodium no higher than 4-6 meqL in 24 hours . Resume tube feedings once SBO ruled out on CT . Stool studies . Nutrition consult. CIWA monitoring       Date: 2/8  CONVERTED TO IP     Medicine- Noted to have large blood clots and bright red blood coming from rectum this morning. Patient's SBP went from 156 to 107 overnight. Patient is symptomatic with presyncope . Hgb down to  7.5 from 9.6 earlier today, was 10.5 yesterday.  Ordered 2 U PRBC transfusd  .Hgb/INR 2 hours post transfusion- if INR >1.7 in the setting of bleed, FFP .   GI consult placed today .  Ordered enteric stool studies.  Pt  reports that his pain starts in the rectum and radiates up to the belly button   .  CIWA 3 today - anxiety .. Na 131 . IVF NSS infusing .Goal sodium no higher than 8-12 meqL in 24 hours    Tube feed infusing 10 ml/hr.  .   GI consult- Bright red blood per rectum, appears to be most likely secondary to stercoral ulceration in the setting of severe constipation, itself in the setting of chronic opioid dependence and oral cancer.  Cannot exclude more proximal bleeding source however.Pt has never had colonoscopy . Plan - SSE and TWE, augment bowel regime with Miralax via PEG TID . CL diet . Hold tube feeding for now. . Monito hgb closely . Recommend non urgent C scope after resolution fecal impaction , sooner if signif bleeding after resolution  impaction . PEG tube in place does not appear displaced, indurated or tender; abdomen non-tender, non-distended; normal bowel sounds.    GI update- Suspect bleeding may be diverticular bleed versus hemorrhoidal versus bleeding from stercoral ulcer given fecal impaction noted on imaging. Recommend bowel prep this afternoon via PEG tube followed by colonoscopy tomorrow. Monitor H&H and transfuse if needed. CL diet today, NPO after MN.  Nutrition - Daily PO intake 400 kcal or less, has not administered his TF  in 2 weeks or more.  Very high risk for refeeding  . When indicated; Initiate Jevity 1.5 at 10 ml/hr continuous x 24 hours monitoring electrolytes. Continue IVF, flush 30 ml q 4 . Advance by 10 ml q 12 hours to goal rate of 60 ml/hr providing 2160 kcal, 92 g protein, 1094 ml free water. When closer to goal rate, d/c IVF and flush 125 ml q 4 hours for additional 750 ml water.         Date: 2/9 day 2 Upgraded ICU/Level 1 SD  Medicine-  Overnight, abdomen distended, having abdominal cramping. Pt experiencing excessive bright rectal bleeding. Pt became hypotensive with increased abdominal pain , rapid response called  Stat H/H ordered and GI contacted. Hgb 6.9- ordered 2 U PRBC , I L Isolyte.  CT A/P revealed active extrav in rectum.   Pt remained hypotensive and was upgraded to ICU/ level 1 SD.Plan for emergent C scope.    GI- experienced rebleeding with bright red blood passage, active extravasation in the rectum on CT bleeding scan,  hypotension and tachycardia. Pt underwent flexible sigmoidoscopy 2/9 @ 0248  with findings of single large, superficial ulcer in the rectum with hematin covered flat spot.   Critical care / ICU- Rectal bleeding . Acute blood loss anemia Continue to monitor Hgb levels Transfuse for acute drop or if hemoglobin is less than 7 . Monitor stools ,monitor hemodynamics. Will require another flexible sigmoidoscopy later today w/ sedation to allow disimpaction and more thorough eval  . Continue  IVF . NPO .   Hgb up to 10.1 @ 0452 this am , another hgb ordered x 1 this am . Mag and K repleted today . Telemetry - sinus rhythm .Abdominal pain 10/10 this am . BM this am, large amt, watery loose, brown . Pt irritable .       ED Triage Vitals   Temperature Pulse Respirations Blood Pressure SpO2   02/07/24 1422 02/07/24 1422 02/07/24 1422 02/07/24 1422 02/07/24 1422   97.6 °F (36.4 °C) 78 18 (!) 182/90 98 %      Temp Source Heart Rate Source Patient Position - Orthostatic VS BP Location FiO2 (%)   02/07/24 1422 02/07/24 1422 02/07/24 1422 02/07/24 1422 --   (S) Axillary Monitor Sitting Right arm       Pain Score       02/07/24 2227       10 - Worst Possible Pain          Wt Readings from Last 1 Encounters:   01/26/24 54.4 kg (120 lb)     Additional Vital Signs:   ate/Time Temp Pulse Resp BP MAP (mmHg) SpO2   02/09/24 0728 98 °F (36.7 °C) 82 17 141/84 107 98 %   02/09/24 0530 98.7 °F (37.1 °C) 78 14 142/93 -- 97 %   02/09/24 0400 -- 86 21 148/96 117 98 %   02/09/24 0330 -- 81 14 121/75 90 98 %   02/09/24 0300 -- 86 21 149/114 Abnormal  127 99 %   02/09/24 0255 -- 96 28 Abnormal  148/112 Abnormal  128 98 %   02/09/24 0250 -- 93 25 Abnormal  146/70 102 99 %   02/09/24 0248 -- 90 20 -- -- 100 %   02/09/24 0247 -- 93 17 -- -- 99 %   02/09/24 0246 -- 92 16 -- -- 99 %   02/09/24 0245 -- 93 21 146/88 110 99 %   02/09/24 0244 -- 92 21 -- -- 99 %   02/09/24 0243 -- 90 17 -- -- 100 %   02/09/24 0242 -- 90 24 Abnormal  -- -- 100 %   02/09/24 0241 -- 91 20 -- -- 99 %   02/09/24 0240 -- 92 20 141/85 108 99 %   02/09/24 0239 -- 93 35 Abnormal  -- -- 99 %   02/09/24 0238 -- 89 22 -- -- 100 %   02/09/24 0237 -- 86 16 -- -- 100 %   02/09/24 0236 -- 87 20 -- -- 99 %   02/09/24 0235 -- 86 15 128/80 100 99 %   02/09/24 0234 -- 86 23 Abnormal  -- -- 100 %   02/09/24 0230 -- 88 17 143/90 111 99 %   02/09/24 0225 -- 84 15 135/84 103 99 %   02/09/24 0210 98.4 °F (36.9 °C) 87 15 128/79 99 78 % Abnormal    02/09/24 0201 98.3 °F (36.8 °C)  104 14 116/76 91 94 %   02/09/24 0200 -- 104 19 116/76 91 100 %   02/09/24 0151 -- -- 18 -- -- --   02/09/24 01:49:36 98 °F (36.7 °C) 56 -- 119/76 90 100 %   02/09/24 01:45:06 -- 98 -- 119/76 90 100 %   02/09/24 0145 -- 98 -- 119/76 90 100 %   02/09/24 01:40:09 -- 102 -- 96/64 75 100 %   02/09/24 0140 -- 102 -- 96/64 75 100 %   02/09/24 0130 -- 106 Abnormal  -- 76/49 Abnormal  58 Abnormal  99 %   02/09/24 0125 -- 115 Abnormal  -- 66/48 Abnormal  54 Abnormal  100 %   02/09/24 01:21:32 97.6 °F (36.4 °C) 109 Abnormal  -- 66/48 Abnormal  54 Abnormal  100 %   02/09/24 01:20:23 -- 101 -- 66/48 Abnormal  54 Abnormal  98 %   02/08/24 22:59:47 -- 120 Abnormal  -- 128/89 102 99 %   02/08/24 22:59:40 -- 120 Abnormal  -- 128/89 102 99 %   02/08/24 22:41:41 97.7 °F (36.5 °C) 90 -- 130/88 102 97 %   02/08/24 2241 97.7 °F (36.5 °C) 92 16 130/88 -- --   02/08/24 2118 -- -- -- -- -- 98 %   02/08/24 2041 97.9 °F (36.6 °C) -- 16 130/75 -- 97 %   02/08/24 20:40:21 97.9 °F (36.6 °C) 77 -- 130/75 93 97 %   02/08/24 2030 -- -- -- -- -- 96 %   02/08/24 2024 -- -- 16 -- -- --   02/08/24 20:13:29 98.1 °F (36.7 °C) 81 -- 137/81 100 98 %   02/08/24 1743 -- -- 18 -- -- --   02/08/24 17:42:43 98.1 °F (36.7 °C) 88 -- 108/64 79 97 %   02/08/24 17:41:57 98.1 °F (36.7 °C) 86 18 90/61 71 98 %   02/08/24 1617 -- 76 -- -- -- --   02/08/24 15:11:15 98.1 °F (36.7 °C) 42 Abnormal  18 114/69 84 99 %   02/08/24 1511 -- -- 18 -- -- --   02/08/24 14:51:37 98.2 °F (36.8 °C) 81 18 105/69 81 98 %   02/08/24 14:28:23 98.4 °F (36.9 °C) 82 18 111/72 85 98 %   02/08/24 14:20:29 -- 87 -- 104/69 81 97 %   02/08/24 12:13:20 -- 96 -- 133/89 104 96 %   02/08/24 11:08:15 -- 92 -- 153/93 113 100 %   02/08/24 10:43:53 -- 80 -- 100/67 78 98 %   02/08/24 10:27:06 -- 79 -- 106/66 79 95 %   02/08/24 10:25:29 -- 82 -- 101/71 81 99 %     Date/Time Temp Pulse Resp BP MAP (mmHg) SpO2   02/08/24 07:04:57 96.9 °F (36.1 °C) Abnormal  106 Abnormal  -- 152/96 115 96 %   02/08/24 0300 --  77 -- -- -- --   02/07/24 2331 -- -- -- -- -- 96 %   02/07/24 20:31:50 97.6 °F (36.4 °C) 96 18 159/98 118 96 %   02/07/24 2015 -- 92 18 110/70 83 95 %   02/07/24 1810 -- 86 18 146/66 -- 99 %       Row Name 02/08/24 12:13:20 02/08/24 11:08:15 02/08/24 10:43:53 02/08/24 10:27:06 02/08/24 10:25:29   CIWA-Ar   /89  -/93  -/67  -/66  -/71  -DI   Pulse 96  -DI 92  -DI 80  -DI 79  -DI 82  -DI   Nausea and Vomiting -- 0  -AC -- -- --   Tactile Disturbances -- 0  -AC -- -- --   Tremor -- 0  -AC -- -- --   Auditory Disturbances -- 0  -AC -- -- --   Paroxysmal Sweats -- 0  -AC -- -- --   Visual Disturbances -- 0  -AC -- -- --   Anxiety -- 1  -AC -- -- --   Headache, Fullness in Head -- 0  -AC -- -- --   Agitation -- 0  -AC -- -- --   Orientation and Clouding of Sensorium -- 0  -AC -- -- --   CIWA-Ar Total -- 1  -AC -- -- --   Row Name 02/08/24 09:27:50 02/08/24 08:40:18 02/08/24 08:21:38 02/08/24 07:04:57 02/08/24 0300   CIWA-Ar   /84  -/81  -/85  -/96  -DI --   Pulse 82  -DI 94  -  - Abnormal   -DI 77  -ANGEL   Nausea and Vomiting -- -- -- 0  -ANGEL 0  -ANGEL   Tactile Disturbances -- -- -- 0  -ANGEL 0  -ANGEL   Tremor -- -- -- 0  -ANGEL 0  -ANGEL   Auditory Disturbances -- -- -- 0  -ANGEL 0  -ANGEL   Paroxysmal Sweats -- -- -- 0  -ANGEL 0  -ANGEL   Visual Disturbances -- -- -- 0  -ANGEL 0  -ANGEL   Anxiety -- -- -- 3  -ANGEL 1  -ANGEL   Headache, Fullness in Head -- -- -- 0  -ANGEL 0  -ANGEL   Agitation -- -- -- 0  -ANGEL 0  -ANGEL   Orientation and Clouding of Sensorium -- -- -- 0  -ANGEL 0  -ANGEL   CIWA-Ar Total -- -- -- 3  -ANGEL 1  -ANGEL   Row Name 02/07/24 2230 02/07/24 20:31:50 02/07/24 2015 02/07/24 1810 02/07/24 1422   CIWA-Ar   BP -- 159/98  -/70  - 146/66  -/90 Abnormal   -DB   Pulse -- 96  -DI 92  -MH 86  -LRA 78  -DB   Nausea and Vomiting 0  -ANGEL -- -- -- --   Tactile Disturbances 0  -ANGEL -- -- -- --   Tremor 0  -ANGEL -- -- -- --   Auditory Disturbances 0  -ANGEL -- -- -- --    Paroxysmal Sweats 0  -ANGEL -- -- -- --   Visual Disturbances 0  -ANGEL -- -- -- --   Anxiety 1  -ANGEL -- -- -- --   Headache, Fullness in Head 0  -ANGEL -- -- -- --   Agitation 0  -ANGEL -- -- -- --   Orientation and Clouding of Sensorium 0  -ANGEL -- -- -- --   CIWA-Ar Total 1  -ANGEL -- -- -- --           Pertinent Labs/Diagnostic Test Results:    2/7 ECG- NSR, HR 73      CT high volume bleeding scan abdomen pelvis   Final Result by Cherie Strong MD (02/09 0056)      Active contrast extravasation at the 5 to 6 o'clock position of the rectum      Constipation with rectal fecal impaction. .         Workstation performed: [id number]         CT abdomen pelvis w contrast   Final Result by Damir Phillips DO (02/08 0056)      Fluid-filled hyperenhancing small bowel loops in the left mid abdomen suggesting enteritis. There is associated mesenteric edema      Moderate fecal impaction at the rectum with the rectum dilated up to 7 cm            Workstation performed: [id number]               Results from last 7 days   Lab Units 02/09/24  0452 02/09/24  0114 02/08/24  2310 02/08/24  1946 02/08/24  1154 02/08/24  0720 02/08/24  0522 02/07/24  1524   WBC Thousand/uL 10.56*  --   --   --   --   --  7.97 9.11   HEMOGLOBIN g/dL 10.1* 6.9* 8.6* 7.7* 7.5*   < > 9.7* 10.5*   HEMATOCRIT % 29.0* 20.4* 25.1* 22.9* 22.5*   < > 28.8* 31.0*   PLATELETS Thousands/uL 120*  --   --   --   --   --  244 269   NEUTROS ABS Thousands/µL 8.44*  --   --   --   --   --   --   --    BANDS PCT %  --   --   --   --   --   --   --  2    < > = values in this interval not displayed.         Results from last 7 days   Lab Units 02/09/24  0452 02/08/24  0522 02/07/24  1524   SODIUM mmol/L 132* 131* 129*   POTASSIUM mmol/L 3.2* 3.6 3.8   CHLORIDE mmol/L 100 95* 93*   CO2 mmol/L 27 30 26   ANION GAP mmol/L 5 6 10   BUN mg/dL 18 8 8   CREATININE mg/dL 0.59* 0.66 0.66   EGFR ml/min/1.73sq m 110 105 105   CALCIUM mg/dL 7.3* 8.6 9.3   MAGNESIUM mg/dL 1.7* 2.2 1.8*    PHOSPHORUS mg/dL 3.2  --  3.9     Results from last 7 days   Lab Units 02/07/24  1524   AST U/L 12*   ALT U/L 9   ALK PHOS U/L 104   TOTAL PROTEIN g/dL 6.8   ALBUMIN g/dL 3.6   TOTAL BILIRUBIN mg/dL 0.51   BILIRUBIN DIRECT mg/dL 0.13         Results from last 7 days   Lab Units 02/09/24  0452 02/08/24  0522 02/07/24  1524   GLUCOSE RANDOM mg/dL 115 111 88                   Results from last 7 days   Lab Units 02/07/24  1955 02/07/24  1810 02/07/24  1524   HS TNI 0HR ng/L  --   --  11   HS TNI 2HR ng/L  --  11  --    HSTNI D2 ng/L  --  0  --    HS TNI 4HR ng/L 12  --   --    HSTNI D4 ng/L 1  --   --          Results from last 7 days   Lab Units 02/09/24  0452 02/08/24  1946 02/07/24  1524   PROTIME seconds 15.1* 14.5 13.2   INR  1.13 1.06 0.94   PTT seconds  --   --  30     Results from last 7 days   Lab Units 02/07/24  1524   TSH 3RD GENERATON uIU/mL 1.038         Results from last 7 days   Lab Units 02/08/24  0720   LACTIC ACID mmol/L 0.9                 ED Treatment:   Medication Administration from 02/07/2024 1419 to 02/07/2024 2026         Date/Time Order Dose Route Action     02/07/2024 1811 EST multi-electrolyte (PLASMALYTE-A/ISOLYTE-S PH 7.4) IV solution 1,000 mL 0 mL Intravenous Stopped     02/07/2024 1523 EST multi-electrolyte (PLASMALYTE-A/ISOLYTE-S PH 7.4) IV solution 1,000 mL 1,000 mL Intravenous New Bag     02/07/2024 1521 EST metoclopramide (REGLAN) injection 10 mg 10 mg Intravenous Given          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    Stroke (HCC)      Present on Admission:   Hyponatremia   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)   Drug induced constipation   Cancer-related pain   Alcohol abuse   Weakness   Severe protein-calorie malnutrition (HCC)   Rectal bleeding   Acute blood loss anemia      Admitting Diagnosis: Dizziness [R42]  Nausea [R11.0]  Throat  cancer (HCC) [C14.0]  Inadequate oral intake [R63.8]  Age/Sex: 58 y.o. male  Admission Orders:  Scheduled Medications:  atorvastatin, 40 mg, Oral, QPM  polyethylene glycol, 17 g, Per PEG Tube, TID    magnesium sulfate 2 g/50 mL IVPB (premix) 2 g  Dose: 2 g  Freq: Once Route: IV  Last Dose: 2 g (02/07/24 2321)  Start: 02/07/24 2300 End: 02/08/24 0121   enoxaparin (LOVENOX) subcutaneous injection 40 mg  Dose: 40 mg  Freq: Daily Route: SC  Start: 02/08/24 0900 End: 02/08/24 0652   multi-electrolyte (ISOLYTE-S PH 7.4) bolus 1,000 mL  Dose: 1,000 mL  Freq: Once Route: IV  Start: 02/08/24 0845   polyethylene glycol (MIRALAX) packet 17 g  Dose: 17 g  Freq: Daily Route: PO  Start: 02/07/24 2145 End: 02/08/24 0957   bisacodyl (DULCOLAX) rectal suppository 10 mg  Dose: 10 mg  Freq: Once Route: RE  Indications of Use: BOWEL EVACUATION,CONSTIPATION  Start: 02/08/24 0645 End: 02/08/24 0954   polyethylene glycol (GOLYTELY) bowel prep 4,000 mL  Dose: 4,000 mL  Freq: Once Route: PEG TUBE  Indications of Use: COLONOSCOPY  Start: 02/08/24 1530 End: 02/08/24 1539     sodium chloride 0.9 % bolus  Freq: Code/trauma/sedation continuous med   x1 2/9 @ 0135   multi-electrolyte (ISOLYTE-S PH 7.4) bolus 1,000 mL  Dose: 1,000 mL  Freq: Once Route: IV  Start: 02/09/24 0145 End: 02/09/24 0215   aspirin chewable tablet 81 mg  Dose: 81 mg  Freq: Daily Route: PO  Start: 02/08/24 0900 End: 02/08/24 2034   potassium chloride 20 mEq IVPB (premix)  Dose: 20 mEq  Freq: Once Route: IV  Last Dose: Stopped (02/09/24 0840)  Start: 02/09/24 0530 End: 02/09/24 0840   magnesium sulfate 2 g/50 mL IVPB (premix) 2 g  Dose: 2 g  Freq: Once Route: IV  Last Dose: Stopped (02/09/24 0840)  Start: 02/09/24 0530 End: 02/09/24 0840       Continuous IV Infusions:  sodium chloride, 75 mL/hr, Intravenous, Continuous      PRN Meds:  acetaminophen, 650 mg, Oral, Q4H PRN  bisacodyl, 10 mg, Rectal, Daily PRN  oxyCODONE, 20 mg, Oral, Q4H PRN x1 2/7  x1 2/8 x1 2/9     Enteral  tube feed- jevity 1.5 , continuous 10 ml/hr 60 ml water flush q 6h   Dysphagia 1- purred diet w/ thin liquids --->CL diet 2/8 ---->NPO    OOB as kristin   SCD   CIWA   continuous pulse ox    IP CONSULT TO NUTRITION SERVICES  IP CONSULT TO GASTROENTEROLOGY    Network Utilization Review Department  ATTENTION: Please call with any questions or concerns to [phone] and carefully listen to the prompts so that you are directed to the right person. All voicemails are confidential.   For Discharge needs, contact Care Management DC Support Team at [phone] opt. 2  Send all requests for admission clinical reviews, approved or denied determinations and any other requests to dedicated fax number below belonging to the campus where the patient is receiving treatment. List of dedicated fax numbers for the Facilities:  FACILITY NAME UR FAX NUMBER   ADMISSION DENIALS (Administrative/Medical Necessity) [phone]   DISCHARGE SUPPORT TEAM (NETWORK) [phone]   PARENT CHILD HEALTH (Maternity/NICU/Pediatrics) [phone]   [facility] [phone]   [facility] [phone]   [facility] [phone]   [facility] [phone]   [facility] [phone]   [facility] [phone]   [facility] [phone]   [facility] [phone]   [facility] [phone]   [facility] [phone]   [facility] [phone]   [facility] [phone]

## 2024-02-08 NOTE — ASSESSMENT & PLAN NOTE
Malnutrition Findings:   Adult Malnutrition type: Acute illness (in the setting of chronic illness)  Adult Degree of Malnutrition: Other severe protein calorie malnutrition  Malnutrition Characteristics: Fat loss, Muscle loss, Weight loss                  360 Statement: Severe malnutrition related to inadequate oral and enteral intake, catabolic illness, dysphagia as evidenced by severe loss of fat and muscle, temples, extremities, clavicle, 22.6 % weight decrease x < 1 year. Currently treated with nutrition consult, enteral feeding recomendations provided.    BMI Findings:  Adult BMI Classifications: Underweight < 18.5        Body mass index is 15 kg/m².   BMI of 15 kg/m2

## 2024-02-08 NOTE — ASSESSMENT & PLAN NOTE
POA with weakness and dizziness for the past week; increased weight loss and constipation  Not tolerating tube feedings via PEG tube due to nausea  Physical exam significant for   Likely 2/2 dehydration and lack of PO/PEG tube intake vs GI bleed    See other problems for individual plans

## 2024-02-08 NOTE — ASSESSMENT & PLAN NOTE
Patient with last bowel movement approximately 5 weeks ago  Refractory to senokot  Abdomen soft and nontender, however, 2/2 significant constipation and mediastinal adenopathy noticed in prior CT scans. Will rule out obstruction    Plan:  Miralax  Follow CT abdomen  Dulcolax suppository  Consider enema if refractory to medication

## 2024-02-08 NOTE — CONSULTS
Consultation -  [facility]  Eliceo Garcia 58 y.o. male MRN: [id number]  Unit/Bed#: S -01 Encounter: [id number]    ASSESSMENT/PLAN:     #1.  Bright red blood per rectum, appears to be most likely secondary to stercoral ulceration in the setting of severe constipation, itself in the setting of chronic opioid dependence and oral cancer.  Cannot exclude more proximal bleeding source however, he has never had colonoscopy    -Spoke with nursing and with [facility] attending, administer soapsuds enema and tapwater enema, will also augment bowel regimen and give MiraLAX through PEG tube 3 times daily    -Clear liquid diet for now, will continue to hold tube feeds for the time being    -If patient does not produce significant formed stool from this regimen we may attempt manual disimpaction    -Monitor hemoglobin closely, transfuse as needed    -Would recommend colonoscopy nonurgently after resolution of underlying fecal impaction; can consider performing procedure sooner if he is still experiencing significant bleeding after resolution of impaction    Inpatient consult to gastroenterology  Consult performed by: Kayla Vance PA-C  Consult ordered by: Madison Bowen MD          Reason for Consult / Principal Problem: Rectal bleeding    HPI: Eliceo Garcia is a 58 y.o. year old male with history of stage IV oral squamous cell carcinoma, patient reports he completed radiation and chemotherapy about 2 weeks ago, also with history of alcohol use, chronic opioid dependence, has PEG tube in place.  Patient reports he came to the emergency room yesterday with concerns for ongoing issues with generalized weakness and dizziness over the last couple of days, he says he has also been having difficulties with eating and drinking due to mouth pain, and he has also had difficulty with his PEG tube feedings, sometimes having regurgitation or stomach discomfort with administering feeds.  Patient says he  does take pleasure feeds by mouth.  Additionally, the patient tells me he has not had a solid bowel movement for about 5 weeks, he said he was having some liquid bowel movements in the last couple of days, and this morning had a bowel movement with clots and bright red blood.  His CT scan here showed what appeared to be 7 cm distention of his rectum with stool impaction.      REVIEW OF SYSTEMS:    CONSTITUTIONAL: Denies any fever, chills, or rigors. Good appetite, and no recent weight loss.  HEENT: No earache or tinnitus. Denies hearing loss or visual disturbances.  CARDIOVASCULAR: No chest pain or palpitations.   RESPIRATORY: Denies any cough, hemoptysis, shortness of breath or dyspnea on exertion.  GASTROINTESTINAL: As noted in the History of Present Illness.   GENITOURINARY: No problems with urination. Denies any hematuria or dysuria.  NEUROLOGIC: No dizziness or vertigo, denies headaches.   MUSCULOSKELETAL: Denies any muscle or joint pain.   SKIN: Denies skin rashes or itching.   ENDOCRINE: Denies excessive thirst. Denies intolerance to heat or cold.  PSYCHOSOCIAL: Denies depression or anxiety. Denies any recent memory loss.       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    Stroke (HCC)      Past Surgical History:   Procedure Laterality Date    EGD      IR GASTROSTOMY TUBE PLACEMENT  12/11/2023    IR PORT PLACEMENT  11/7/2023    KNEE ARTHROSCOPY Left     MULTIPLE TOOTH EXTRACTIONS N/A 10/16/2023    Procedure: EXTRACTION OF ALL REMAINING TEETH;  Surgeon: Humble Otero DMD;  Location: [facility];  Service: Oral Surgery     Social History   Social History     Substance and Sexual Activity   Alcohol Use Yes    Comment: 3-4 beers/day 24 oz beer     Social History     Substance and Sexual Activity   Drug Use No     Social History     Tobacco Use   Smoking  Status Every Day    Current packs/day: 0.25    Types: Cigarettes   Smokeless Tobacco Never     Family History   Problem Relation Age of Onset    Breast cancer Mother     Cancer Father        Meds/Allergies     Medications Prior to Admission   Medication    Aspirin Low Dose 81 MG chewable tablet    atorvastatin (LIPITOR) 40 mg tablet    oxyCODONE (ROXICODONE) 20 MG TABS    chlorhexidine (PERIDEX) 0.12 % solution    clopidogrel (Plavix) 75 mg tablet    dexamethasone 0.5 MG/5ML elixir    diphenhydrAMINE (BENADRYL) 12.5 mg/5 mL elixir    diphenhydramine, lidocaine, Al/Mg hydroxide, simethicone (Magic Mouthwash) SUSP    ibuprofen (MOTRIN) 600 mg tablet    Magnesium Oxide -Mg Supplement 200 MG TABS    naloxone (NARCAN) 4 mg/0.1 mL nasal spray    polyethylene glycol (MIRALAX) 17 g packet    senna-docusate sodium (SENOKOT-S) 8.6-50 mg per tablet    silver sulfadiazine (SILVADENE,SSD) 1 % cream     Current Facility-Administered Medications   Medication Dose Route Frequency    acetaminophen (TYLENOL) tablet 650 mg  650 mg Oral Q4H PRN    aspirin chewable tablet 81 mg  81 mg Oral Daily    atorvastatin (LIPITOR) tablet 40 mg  40 mg Oral QPM    bisacodyl (DULCOLAX) rectal suppository 10 mg  10 mg Rectal Daily PRN    bisacodyl (DULCOLAX) rectal suppository 10 mg  10 mg Rectal Once    multi-electrolyte (ISOLYTE-S PH 7.4) bolus 1,000 mL  1,000 mL Intravenous Once    oxyCODONE (ROXICODONE) immediate release tablet 20 mg  20 mg Oral Q4H PRN    polyethylene glycol (MIRALAX) packet 17 g  17 g Oral Daily    sodium chloride 0.9 % infusion  75 mL/hr Intravenous Continuous       Allergies   Allergen Reactions    Bee Venom Hives    Other      bees           Objective     Blood pressure 117/81, pulse 94, temperature (!) 96.9 °F (36.1 °C), resp. rate 18, SpO2 90%.      Intake/Output Summary (Last 24 hours) at 2/8/2024 0915  Last data filed at 2/8/2024 0205  Gross per 24 hour   Intake 1030 ml   Output --   Net 1030 ml         PHYSICAL EXAM      General Appearance:   Alert, cooperative, no distress, appears stated age    HEENT:   Normocephalic, atraumatic, anicteric.     Neck:  Supple, symmetrical, trachea midline, no adenopathy;    thyroid: no enlargement/tenderness/nodules; no carotid  bruit or JVD    Lungs:   Clear to auscultation bilaterally; no rales, rhonchi or wheezing; respirations unlabored    Heart::   S1 and S2 normal; regular rate and rhythm; no murmur, rub, or gallop.   Abdomen:   Soft, PEG tube in place does not appear displaced, indurated or tender; abdomen non-tender, non-distended; normal bowel sounds; no masses, no organomegaly    Genitalia:   Deferred    Rectal:   Deferred    Extremities:  No cyanosis, clubbing or edema    Pulses:  2+ and symmetric all extremities    Skin:  Skin color, texture, turgor normal, no rashes or lesions    Lymph nodes:  No palpable cervical, axillary or inguinal lymphadenopathy        Lab Results:   Admission on 02/07/2024   Component Date Value    Ventricular Rate 02/07/2024 73     Atrial Rate 02/07/2024 73     PA Interval 02/07/2024 118     QRSD Interval 02/07/2024 88     QT Interval 02/07/2024 388     QTC Interval 02/07/2024 427     P [place] 02/07/2024 89     QRS Axis 02/07/2024 86     T Wave Axis 02/07/2024 92     WBC 02/07/2024 9.11     RBC 02/07/2024 3.04 (L)     Hemoglobin 02/07/2024 10.5 (L)     Hematocrit 02/07/2024 31.0 (L)     MCV 02/07/2024 102 (H)     MCH 02/07/2024 34.5 (H)     MCHC 02/07/2024 33.9     RDW 02/07/2024 18.1 (H)     MPV 02/07/2024 9.2     Platelets 02/07/2024 269     Sodium 02/07/2024 129 (L)     Potassium 02/07/2024 3.8     Chloride 02/07/2024 93 (L)     CO2 02/07/2024 26     ANION GAP 02/07/2024 10     BUN 02/07/2024 8     Creatinine 02/07/2024 0.66     Glucose 02/07/2024 88     Calcium 02/07/2024 9.3     eGFR 02/07/2024 105     Total Bilirubin 02/07/2024 0.51     Bilirubin, Direct 02/07/2024 0.13     Alkaline Phosphatase 02/07/2024 104     AST 02/07/2024 12 (L)     ALT 02/07/2024  9     Total Protein 02/07/2024 6.8     Albumin 02/07/2024 3.6     TSH 3RD GENERATON 02/07/2024 1.038     Magnesium 02/07/2024 1.8 (L)     Phosphorus 02/07/2024 3.9     hs TnI 0hr 02/07/2024 11     Protime 02/07/2024 13.2     INR 02/07/2024 0.94     PTT 02/07/2024 30     Prealbumin 02/07/2024 9.5 (L)     hs TnI 2hr 02/07/2024 11     Delta 2hr hsTnI 02/07/2024 0     hs TnI 4hr 02/07/2024 12     Delta 4hr hsTnI 02/07/2024 1     Segmented % 02/07/2024 77 (H)     Bands % 02/07/2024 2     Lymphocytes % 02/07/2024 7 (L)     Monocytes % 02/07/2024 13 (H)     Eosinophils, % 02/07/2024 0     Basophils % 02/07/2024 0     Atypical Lymphocytes % 02/07/2024 1 (H)     Absolute Neutrophils 02/07/2024 7.20     Lymphocytes Absolute 02/07/2024 0.73     Monocytes Absolute 02/07/2024 1.18     Eosinophils Absolute 02/07/2024 0.00     Basophils Absolute 02/07/2024 0.00     RBC Morphology 02/07/2024 Present     Platelet Estimate 02/07/2024 Adequate     Anisocytosis 02/07/2024 Present     Sodium 02/08/2024 131 (L)     Potassium 02/08/2024 3.6     Chloride 02/08/2024 95 (L)     CO2 02/08/2024 30     ANION GAP 02/08/2024 6     BUN 02/08/2024 8     Creatinine 02/08/2024 0.66     Glucose 02/08/2024 111     Calcium 02/08/2024 8.6     eGFR 02/08/2024 105     Magnesium 02/08/2024 2.2     WBC 02/08/2024 7.97     RBC 02/08/2024 2.79 (L)     Hemoglobin 02/08/2024 9.7 (L)     Hematocrit 02/08/2024 28.8 (L)     MCV 02/08/2024 103 (H)     MCH 02/08/2024 34.8 (H)     MCHC 02/08/2024 33.7     RDW 02/08/2024 18.0 (H)     Platelets 02/08/2024 244     MPV 02/08/2024 9.1     LACTIC ACID 02/08/2024 0.9     Hemoglobin 02/08/2024 9.6 (L)     Hematocrit 02/08/2024 28.7 (L)        Imaging Studies: I have personally reviewed pertinent reports.      CT ABDOMEN AND PELVIS WITH IV CONTRAST     INDICATION: Diarrhea  Nausea/vomiting  severe constipation, hx ca.     COMPARISON: Multiple priors most recently 11/5/2023     TECHNIQUE: CT examination of the abdomen and  pelvis was performed. Multiplanar 2D reformatted images were created from the source data.     This examination, like all CT scans performed in the [facility], was performed utilizing techniques to minimize radiation dose exposure, including the use of iterative reconstruction and automated exposure control. Radiation dose length   product (DLP) for this visit: 272 mGy-cm     IV Contrast: 100 mL of iohexol (OMNIPAQUE)  Enteric Contrast: Not administered.     FINDINGS:     ABDOMEN     LOWER CHEST: No clinically significant abnormality in the visualized lower chest.     LIVER/BILIARY TREE: Unremarkable.     GALLBLADDER: No calcified gallstones. No pericholecystic inflammatory change.     SPLEEN: Calcified granulomata. No suspicious mass.     PANCREAS: Unremarkable.     ADRENAL GLANDS: Unremarkable.     KIDNEYS/URETERS: Simple renal cyst(s). Otherwise unremarkable kidneys. No hydronephrosis.     STOMACH AND BOWEL: Moderate fecal impaction at the rectum with the rectum dilated up to 7 cm. Fluid-filled hyperenhancing small bowel loops in the left mid abdomen. Gastrostomy tube in appropriate position.     APPENDIX: No findings to suggest appendicitis.     ABDOMINOPELVIC CAVITY: No discrete ascites but there is diffuse mesenteric edema.. No pneumoperitoneum. No lymphadenopathy.     VESSELS: Atherosclerosis without abdominal aortic aneurysm.     PELVIS     REPRODUCTIVE ORGANS: Unremarkable for patient's age.     URINARY BLADDER: Unremarkable.     ABDOMINAL WALL/INGUINAL REGIONS: Unremarkable.     BONES: No acute fracture or suspicious osseous lesion.     IMPRESSION:     Fluid-filled hyperenhancing small bowel loops in the left mid abdomen suggesting enteritis. There is associated mesenteric edema     Moderate fecal impaction at the rectum with the rectum dilated up to 7 cm                The patient was seen and examined by Dr. Fabian, all griffith medical decisions were made with Dr. Fabina.  Thank you for  allowing us to participate in the care of this pleasant patient.  We will follow up with you closely.

## 2024-02-08 NOTE — ASSESSMENT & PLAN NOTE
History right pontine infarct CVA  Currently on ASA and Lipitor  No longer on Plavix per patient    Continue home medications

## 2024-02-08 NOTE — PLAN OF CARE
Problem: Prexisting or High Potential for Compromised Skin Integrity  Goal: Skin integrity is maintained or improved  Description: INTERVENTIONS:  - Identify patients at risk for skin breakdown  - Assess and monitor skin integrity  - Assess and monitor nutrition and hydration status  - Monitor labs   - Assess for incontinence   - Turn and reposition patient  - Assist with mobility/ambulation  - Relieve pressure over bony prominences  - Avoid friction and shearing  - Provide appropriate hygiene as needed including keeping skin clean and dry  - Evaluate need for skin moisturizer/barrier cream  - Collaborate with interdisciplinary team   - Patient/family teaching  - Consider wound care consult   Outcome: Progressing     Problem: PAIN - ADULT  Goal: Verbalizes/displays adequate comfort level or baseline comfort level  Description: Interventions:  - Encourage patient to monitor pain and request assistance  - Assess pain using appropriate pain scale  - Administer analgesics based on type and severity of pain and evaluate response  - Implement non-pharmacological measures as appropriate and evaluate response  - Consider cultural and social influences on pain and pain management  - Notify physician/advanced practitioner if interventions unsuccessful or patient reports new pain  Outcome: Progressing     Problem: INFECTION - ADULT  Goal: Absence or prevention of progression during hospitalization  Description: INTERVENTIONS:  - Assess and monitor for signs and symptoms of infection  - Monitor lab/diagnostic results  - Monitor all insertion sites, i.e. indwelling lines, tubes, and drains  - Monitor endotracheal if appropriate and nasal secretions for changes in amount and color  - [place] appropriate cooling/warming therapies per order  - Administer medications as ordered  - Instruct and encourage patient and family to use good hand hygiene technique  - Identify and instruct in appropriate isolation precautions for  identified infection/condition  Outcome: Progressing  Goal: Absence of fever/infection during neutropenic period  Description: INTERVENTIONS:  - Monitor WBC    Outcome: Progressing     Problem: DISCHARGE PLANNING  Goal: Discharge to home or other facility with appropriate resources  Description: INTERVENTIONS:  - Identify barriers to discharge w/patient and caregiver  - Arrange for needed discharge resources and transportation as appropriate  - Identify discharge learning needs (meds, wound care, etc.)  - Arrange for interpretive services to assist at discharge as needed  - Refer to Case Management Department for coordinating discharge planning if the patient needs post-hospital services based on physician/advanced practitioner order or complex needs related to functional status, cognitive ability, or social support system  Outcome: Progressing     Problem: Knowledge Deficit  Goal: Patient/family/caregiver demonstrates understanding of disease process, treatment plan, medications, and discharge instructions  Description: Complete learning assessment and assess knowledge base.  Interventions:  - Provide teaching at level of understanding  - Provide teaching via preferred learning methods  Outcome: Progressing

## 2024-02-08 NOTE — PROGRESS NOTES
[facility]  Progress Note  Name: Eliceo Garcia I  MRN: [id number]  Unit/Bed#: S -01 I Date of Admission: 2/7/2024   Date of Service: 2/8/2024 I Hospital Day: 0    Assessment/Plan   Rectal bleeding  Assessment & Plan  Patient noted to have large blood clots and bright red blood coming from rectum this morning. Patient's SBP went from 156 to 107 overnight. Patient is symptomatic with presyncope.     Unclear source: ulcer vs diverticulitis vs fissure    Gastroenterology is on board and believes this may be due to stercoral ulcer seen on imaging. Recommend treating constipation and a colonoscopy nonurgently after resolution of underlying fecal impaction; can consider performing procedure sooner if he is still experiencing significant bleeding after resolution of impaction     Plan:  Transfusion consent obtained  Trend Hgb, transfuse < 9 due to active bleed  Monitor blood pressures- if SBP <100 consider transfusion  Transfuse 2 units of PRBCs for Hgb 7.5 @ 1213, (dropped from 9.6 @7:20)  Hgb/INR 2 hours post transfusion- if INR >1.7 in the setting of bleed, FFP     * Weakness  Assessment & Plan  POA with weakness and dizziness for the past week; increased weight loss and constipation  Not tolerating tube feedings via PEG tube due to nausea  Physical exam significant for   Likely 2/2 dehydration and lack of PO/PEG tube intake vs GI bleed    See other problems for individual plans    Drug induced constipation  Assessment & Plan  Patient with last bowel movement approximately 5 weeks ago  Refractory to senokot  Abdomen soft and nontender, however, 2/2 significant constipation and mediastinal adenopathy noticed in prior CT scans. Will rule out obstruction  CT A/P: Fluid-filled hyperenhancing small bowel loops in the left mid abdomen suggesting enteritis. There is associated mesenteric edema. Moderate fecal impaction at the rectum with the rectum dilated up to 7 cm.     Plan:  GI consulted.  Recommendations appreciated. Soapsuds enema and tapwater enema, will also augment bowel regimen and give MiraLAX through PEG tube 3 times daily   Clear liquid diet, hold tube feeds for now    Hyponatremia  Assessment & Plan  Chronic hyponatremia (low 130s) per chart review  POA with weakness and dizziness, worsening with standing up  Hyponatremia of 129  TSH WNL  Hypovolemic on exam, dry mucosa  Likely due to significant decreased PO and PEG tube intake and dehydration    Plan:  Encourage nutrient intake  Follow serum osmolality, urine osmolality, urine sodium  IV NS 75 mL/hr  Monitor BMP  Avoid overcorrection to prevent ODS  Goal sodium no higher than 8-12 meqL in 24 hours    Severe protein-calorie malnutrition (HCC)  Assessment & Plan  Malnutrition Findings:   Adult Malnutrition type: Acute illness (in the setting of chronic illness)  Adult Degree of Malnutrition: Other severe protein calorie malnutrition  Malnutrition Characteristics: Fat loss, Muscle loss, Weight loss                  360 Statement: Severe malnutrition related to inadequate oral and enteral intake, catabolic illness, dysphagia as evidenced by severe loss of fat and muscle, temples, extremities, clavicle, 22.6 % weight decrease x < 1 year. Currently treated with nutrition consult, enteral feeding recomendations provided.    BMI Findings:  Adult BMI Classifications: Underweight < 18.5        Body mass index is 15 kg/m².     PEG tube in place 2/2 oral SCC limiting oral intake  Patient not using his tube feedings 2/2 inducing nausea and unable to tolerate said feedings  No nausea or vomiting at admission    Plan:  Resume tube feedings once cleared by GI  Nutrition consulted, patient at high risk for refeeding syndrome. recs appreciated.  Per nutrition: When indicated; Initiate Jevity 1.5 at 10 ml/hr continuous x 24 hours monitoring electrolytes. Continue IVF, flush 30 ml q 4. Advance by 10 ml q 12 hours to goal rate of 60 ml/hr providing 2160 kcal,  92 g protein, 1094 ml free water. When closer to goal rate, d/c IVF and flush 125 ml q 4 hours for additional 750 ml water. 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 Hu         VTE Pharmacologic Prophylaxis: VTE Score: 6 High Risk (Score >/= 5) - Pharmacological DVT Prophylaxis Contraindicated. Sequential Compression Devices Ordered. Actively bleeding    Mobility:   Basic Mobility Inpatient Raw Score: 22  JH-HLM Goal: 7: Walk 25 feet or more  JH-HLM Achieved: 6: Walk 10 steps or more  HLM Goal NOT achieved. Continue with multidisciplinary rounding and encourage appropriate mobility to improve upon HLM goals.    Patient Centered Rounds: I performed bedside rounds with nursing staff today.  Discussions with Specialists or Other Care Team Provider: gastroenterology    Education and Discussions with Family / Patient: Patient declined call to .     Current Length of Stay: 0 day(s)  Current Patient Status: Inpatient   Discharge Plan: Anticipate discharge in 48 hrs to home.    Code Status: Level 1 - Full Code    Subjective:   Patient seen evaluated at bedside.  Patient reports that he came to the hospital due to weakness.  He reports he has been constipated for the last 5 weeks.  He said that he was passed  some liquid stools, but no real bowel movement.   Patient reports that he woke up this morning and went to the bathroom to have a bowel movement and noted a lot of blood in the toilet.  Patient reports that his pain starts in the rectum and radiates up to the belly button. He denies history of hemorrhoids.    Objective:     Vitals:   Temp (24hrs), Av.3 °F (36.3 °C), Min:96.9 °F (36.1 °C), Max:97.6 °F (36.4 °C)    Temp:  [96.9 °F (36.1 °C)-97.6 °F (36.4 °C)] 96.9 °F (36.1 °C)  HR:  [] 87  Resp:  [18] 18  BP: (100-159)/(66-98) 104/69  SpO2:  [90 %-100 %] 97 %  Body mass index is 15 kg/m².     Input and Output Summary (last 24 hours):     Intake/Output Summary (Last 24 hours) at 2024 1425  Last data filed at 2024 0205  Gross per 24 hour   Intake 1030 ml   Output --   Net 1030 ml       Physical Exam:   Physical Exam  Vitals reviewed.   Constitutional:       General: He is not in acute distress.     Appearance: He is cachectic. He is ill-appearing.   HENT:      Head: Normocephalic and atraumatic.      Right Ear: External ear normal.      Left Ear: External ear normal.      Mouth/Throat:      Mouth: Mucous membranes are dry.   Eyes:      General: No scleral icterus.     Conjunctiva/sclera: Conjunctivae normal.   Cardiovascular:      Rate and Rhythm: Regular rhythm. Tachycardia present.      Heart sounds: Normal heart sounds. No murmur heard.  Pulmonary:      Effort: Pulmonary effort is normal. No respiratory distress.      Breath sounds: Normal breath sounds. No wheezing, rhonchi or rales.   Abdominal:      Palpations: Abdomen is soft. There is no mass.      Tenderness: There is generalized abdominal tenderness and tenderness in the left lower quadrant. There is no guarding or rebound.      Comments: Rectal exam: large blood clots on bed with copious bright red blood, no external hemorrhoids noted   Musculoskeletal:      Right lower leg: No edema.      Left lower leg: No edema.   Skin:     General: Skin  is warm and dry.      Coloration: Skin is pale.   Neurological:      General: No focal deficit present.      Mental Status: He is alert and oriented to person, place, and time.   Psychiatric:         Mood and Affect: Mood normal.         Additional Data:     Labs:  Results from last 7 days   Lab Units 02/08/24  1154 02/08/24  0720 02/08/24  0522 02/07/24  1524   WBC Thousand/uL  --   --  7.97 9.11   HEMOGLOBIN g/dL 7.5*   < > 9.7* 10.5*   HEMATOCRIT % 22.5*   < > 28.8* 31.0*   PLATELETS Thousands/uL  --   --  244 269   BANDS PCT %  --   --   --  2   LYMPHO PCT %  --   --   --  7*   MONO PCT %  --   --   --  13*   EOS PCT %  --   --   --  0    < > = values in this interval not displayed.     Results from last 7 days   Lab Units 02/08/24  0522 02/07/24  1524   SODIUM mmol/L 131* 129*   POTASSIUM mmol/L 3.6 3.8   CHLORIDE mmol/L 95* 93*   CO2 mmol/L 30 26   BUN mg/dL 8 8   CREATININE mg/dL 0.66 0.66   ANION GAP mmol/L 6 10   CALCIUM mg/dL 8.6 9.3   ALBUMIN g/dL  --  3.6   TOTAL BILIRUBIN mg/dL  --  0.51   ALK PHOS U/L  --  104   ALT U/L  --  9   AST U/L  --  12*   GLUCOSE RANDOM mg/dL 111 88     Results from last 7 days   Lab Units 02/07/24  1524   INR  0.94             Results from last 7 days   Lab Units 02/08/24  0720   LACTIC ACID mmol/L 0.9       Lines/Drains:  Invasive Devices       Central Venous Catheter Line  Duration             Port A Cath 11/07/23 Left Internal jugular 93 days              Drain  Duration             Gastrostomy/Enterostomy Percutaneous Interventional Gastrostomy 16 Fr. LUQ 59 days                    Central Line:  Goal for removal: Port accessed. Will de-access as appropriate.             Imaging: Reviewed radiology reports from this admission including: abdominal/pelvic CT    Recent Cultures (last 7 days):         Last 24 Hours Medication List:   Current Facility-Administered Medications   Medication Dose Route Frequency Provider Last Rate    acetaminophen  650 mg Oral Q4H PRN Madison  Siena Bowen MD      aspirin  81 mg Oral Daily Madison Bowen MD      atorvastatin  40 mg Oral QPM Madison Bowen MD      bisacodyl  10 mg Rectal Daily PRN Madison Bowen MD      oxyCODONE  20 mg Oral Q4H PRN Madison Bowen MD      polyethylene glycol  17 g Per PEG Tube TID Kayla Vance PA-C      sodium chloride  75 mL/hr Intravenous Continuous Madison Bowen MD 75 mL/hr (02/08/24 0140)        Today, Patient Was Seen By: Sabina Snyder DO    **Please Note: This note may have been constructed using a voice recognition system.**

## 2024-02-08 NOTE — H&P (VIEW-ONLY)
Consultation -  [facility]  Eliceo Garcia 58 y.o. male MRN: [id number]  Unit/Bed#: S -01 Encounter: [id number]    ASSESSMENT/PLAN:     #1.  Bright red blood per rectum, appears to be most likely secondary to stercoral ulceration in the setting of severe constipation, itself in the setting of chronic opioid dependence and oral cancer.  Cannot exclude more proximal bleeding source however, he has never had colonoscopy    -Spoke with nursing and with [facility] attending, administer soapsuds enema and tapwater enema, will also augment bowel regimen and give MiraLAX through PEG tube 3 times daily    -Clear liquid diet for now, will continue to hold tube feeds for the time being    -If patient does not produce significant formed stool from this regimen we may attempt manual disimpaction    -Monitor hemoglobin closely, transfuse as needed    -Would recommend colonoscopy nonurgently after resolution of underlying fecal impaction; can consider performing procedure sooner if he is still experiencing significant bleeding after resolution of impaction    Inpatient consult to gastroenterology  Consult performed by: Kayla Vance PA-C  Consult ordered by: Madison Bowen MD          Reason for Consult / Principal Problem: Rectal bleeding    HPI: Eliceo Garcia is a 58 y.o. year old male with history of stage IV oral squamous cell carcinoma, patient reports he completed radiation and chemotherapy about 2 weeks ago, also with history of alcohol use, chronic opioid dependence, has PEG tube in place.  Patient reports he came to the emergency room yesterday with concerns for ongoing issues with generalized weakness and dizziness over the last couple of days, he says he has also been having difficulties with eating and drinking due to mouth pain, and he has also had difficulty with his PEG tube feedings, sometimes having regurgitation or stomach discomfort with administering feeds.  Patient says he  does take pleasure feeds by mouth.  Additionally, the patient tells me he has not had a solid bowel movement for about 5 weeks, he said he was having some liquid bowel movements in the last couple of days, and this morning had a bowel movement with clots and bright red blood.  His CT scan here showed what appeared to be 7 cm distention of his rectum with stool impaction.      REVIEW OF SYSTEMS:    CONSTITUTIONAL: Denies any fever, chills, or rigors. Good appetite, and no recent weight loss.  HEENT: No earache or tinnitus. Denies hearing loss or visual disturbances.  CARDIOVASCULAR: No chest pain or palpitations.   RESPIRATORY: Denies any cough, hemoptysis, shortness of breath or dyspnea on exertion.  GASTROINTESTINAL: As noted in the History of Present Illness.   GENITOURINARY: No problems with urination. Denies any hematuria or dysuria.  NEUROLOGIC: No dizziness or vertigo, denies headaches.   MUSCULOSKELETAL: Denies any muscle or joint pain.   SKIN: Denies skin rashes or itching.   ENDOCRINE: Denies excessive thirst. Denies intolerance to heat or cold.  PSYCHOSOCIAL: Denies depression or anxiety. Denies any recent memory loss.       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    Stroke (HCC)      Past Surgical History:   Procedure Laterality Date    EGD      IR GASTROSTOMY TUBE PLACEMENT  12/11/2023    IR PORT PLACEMENT  11/7/2023    KNEE ARTHROSCOPY Left     MULTIPLE TOOTH EXTRACTIONS N/A 10/16/2023    Procedure: EXTRACTION OF ALL REMAINING TEETH;  Surgeon: Humble Otero DMD;  Location: [facility];  Service: Oral Surgery     Social History   Social History     Substance and Sexual Activity   Alcohol Use Yes    Comment: 3-4 beers/day 24 oz beer     Social History     Substance and Sexual Activity   Drug Use No     Social History     Tobacco Use   Smoking  Status Every Day    Current packs/day: 0.25    Types: Cigarettes   Smokeless Tobacco Never     Family History   Problem Relation Age of Onset    Breast cancer Mother     Cancer Father        Meds/Allergies     Medications Prior to Admission   Medication    Aspirin Low Dose 81 MG chewable tablet    atorvastatin (LIPITOR) 40 mg tablet    oxyCODONE (ROXICODONE) 20 MG TABS    chlorhexidine (PERIDEX) 0.12 % solution    clopidogrel (Plavix) 75 mg tablet    dexamethasone 0.5 MG/5ML elixir    diphenhydrAMINE (BENADRYL) 12.5 mg/5 mL elixir    diphenhydramine, lidocaine, Al/Mg hydroxide, simethicone (Magic Mouthwash) SUSP    ibuprofen (MOTRIN) 600 mg tablet    Magnesium Oxide -Mg Supplement 200 MG TABS    naloxone (NARCAN) 4 mg/0.1 mL nasal spray    polyethylene glycol (MIRALAX) 17 g packet    senna-docusate sodium (SENOKOT-S) 8.6-50 mg per tablet    silver sulfadiazine (SILVADENE,SSD) 1 % cream     Current Facility-Administered Medications   Medication Dose Route Frequency    acetaminophen (TYLENOL) tablet 650 mg  650 mg Oral Q4H PRN    aspirin chewable tablet 81 mg  81 mg Oral Daily    atorvastatin (LIPITOR) tablet 40 mg  40 mg Oral QPM    bisacodyl (DULCOLAX) rectal suppository 10 mg  10 mg Rectal Daily PRN    bisacodyl (DULCOLAX) rectal suppository 10 mg  10 mg Rectal Once    multi-electrolyte (ISOLYTE-S PH 7.4) bolus 1,000 mL  1,000 mL Intravenous Once    oxyCODONE (ROXICODONE) immediate release tablet 20 mg  20 mg Oral Q4H PRN    polyethylene glycol (MIRALAX) packet 17 g  17 g Oral Daily    sodium chloride 0.9 % infusion  75 mL/hr Intravenous Continuous       Allergies   Allergen Reactions    Bee Venom Hives    Other      bees           Objective     Blood pressure 117/81, pulse 94, temperature (!) 96.9 °F (36.1 °C), resp. rate 18, SpO2 90%.      Intake/Output Summary (Last 24 hours) at 2/8/2024 0915  Last data filed at 2/8/2024 0205  Gross per 24 hour   Intake 1030 ml   Output --   Net 1030 ml         PHYSICAL EXAM      General Appearance:   Alert, cooperative, no distress, appears stated age    HEENT:   Normocephalic, atraumatic, anicteric.     Neck:  Supple, symmetrical, trachea midline, no adenopathy;    thyroid: no enlargement/tenderness/nodules; no carotid  bruit or JVD    Lungs:   Clear to auscultation bilaterally; no rales, rhonchi or wheezing; respirations unlabored    Heart::   S1 and S2 normal; regular rate and rhythm; no murmur, rub, or gallop.   Abdomen:   Soft, PEG tube in place does not appear displaced, indurated or tender; abdomen non-tender, non-distended; normal bowel sounds; no masses, no organomegaly    Genitalia:   Deferred    Rectal:   Deferred    Extremities:  No cyanosis, clubbing or edema    Pulses:  2+ and symmetric all extremities    Skin:  Skin color, texture, turgor normal, no rashes or lesions    Lymph nodes:  No palpable cervical, axillary or inguinal lymphadenopathy        Lab Results:   Admission on 02/07/2024   Component Date Value    Ventricular Rate 02/07/2024 73     Atrial Rate 02/07/2024 73     MD Interval 02/07/2024 118     QRSD Interval 02/07/2024 88     QT Interval 02/07/2024 388     QTC Interval 02/07/2024 427     P [place] 02/07/2024 89     QRS Axis 02/07/2024 86     T Wave Axis 02/07/2024 92     WBC 02/07/2024 9.11     RBC 02/07/2024 3.04 (L)     Hemoglobin 02/07/2024 10.5 (L)     Hematocrit 02/07/2024 31.0 (L)     MCV 02/07/2024 102 (H)     MCH 02/07/2024 34.5 (H)     MCHC 02/07/2024 33.9     RDW 02/07/2024 18.1 (H)     MPV 02/07/2024 9.2     Platelets 02/07/2024 269     Sodium 02/07/2024 129 (L)     Potassium 02/07/2024 3.8     Chloride 02/07/2024 93 (L)     CO2 02/07/2024 26     ANION GAP 02/07/2024 10     BUN 02/07/2024 8     Creatinine 02/07/2024 0.66     Glucose 02/07/2024 88     Calcium 02/07/2024 9.3     eGFR 02/07/2024 105     Total Bilirubin 02/07/2024 0.51     Bilirubin, Direct 02/07/2024 0.13     Alkaline Phosphatase 02/07/2024 104     AST 02/07/2024 12 (L)     ALT 02/07/2024  9     Total Protein 02/07/2024 6.8     Albumin 02/07/2024 3.6     TSH 3RD GENERATON 02/07/2024 1.038     Magnesium 02/07/2024 1.8 (L)     Phosphorus 02/07/2024 3.9     hs TnI 0hr 02/07/2024 11     Protime 02/07/2024 13.2     INR 02/07/2024 0.94     PTT 02/07/2024 30     Prealbumin 02/07/2024 9.5 (L)     hs TnI 2hr 02/07/2024 11     Delta 2hr hsTnI 02/07/2024 0     hs TnI 4hr 02/07/2024 12     Delta 4hr hsTnI 02/07/2024 1     Segmented % 02/07/2024 77 (H)     Bands % 02/07/2024 2     Lymphocytes % 02/07/2024 7 (L)     Monocytes % 02/07/2024 13 (H)     Eosinophils, % 02/07/2024 0     Basophils % 02/07/2024 0     Atypical Lymphocytes % 02/07/2024 1 (H)     Absolute Neutrophils 02/07/2024 7.20     Lymphocytes Absolute 02/07/2024 0.73     Monocytes Absolute 02/07/2024 1.18     Eosinophils Absolute 02/07/2024 0.00     Basophils Absolute 02/07/2024 0.00     RBC Morphology 02/07/2024 Present     Platelet Estimate 02/07/2024 Adequate     Anisocytosis 02/07/2024 Present     Sodium 02/08/2024 131 (L)     Potassium 02/08/2024 3.6     Chloride 02/08/2024 95 (L)     CO2 02/08/2024 30     ANION GAP 02/08/2024 6     BUN 02/08/2024 8     Creatinine 02/08/2024 0.66     Glucose 02/08/2024 111     Calcium 02/08/2024 8.6     eGFR 02/08/2024 105     Magnesium 02/08/2024 2.2     WBC 02/08/2024 7.97     RBC 02/08/2024 2.79 (L)     Hemoglobin 02/08/2024 9.7 (L)     Hematocrit 02/08/2024 28.8 (L)     MCV 02/08/2024 103 (H)     MCH 02/08/2024 34.8 (H)     MCHC 02/08/2024 33.7     RDW 02/08/2024 18.0 (H)     Platelets 02/08/2024 244     MPV 02/08/2024 9.1     LACTIC ACID 02/08/2024 0.9     Hemoglobin 02/08/2024 9.6 (L)     Hematocrit 02/08/2024 28.7 (L)        Imaging Studies: I have personally reviewed pertinent reports.      CT ABDOMEN AND PELVIS WITH IV CONTRAST     INDICATION: Diarrhea  Nausea/vomiting  severe constipation, hx ca.     COMPARISON: Multiple priors most recently 11/5/2023     TECHNIQUE: CT examination of the abdomen and  pelvis was performed. Multiplanar 2D reformatted images were created from the source data.     This examination, like all CT scans performed in the [facility], was performed utilizing techniques to minimize radiation dose exposure, including the use of iterative reconstruction and automated exposure control. Radiation dose length   product (DLP) for this visit: 272 mGy-cm     IV Contrast: 100 mL of iohexol (OMNIPAQUE)  Enteric Contrast: Not administered.     FINDINGS:     ABDOMEN     LOWER CHEST: No clinically significant abnormality in the visualized lower chest.     LIVER/BILIARY TREE: Unremarkable.     GALLBLADDER: No calcified gallstones. No pericholecystic inflammatory change.     SPLEEN: Calcified granulomata. No suspicious mass.     PANCREAS: Unremarkable.     ADRENAL GLANDS: Unremarkable.     KIDNEYS/URETERS: Simple renal cyst(s). Otherwise unremarkable kidneys. No hydronephrosis.     STOMACH AND BOWEL: Moderate fecal impaction at the rectum with the rectum dilated up to 7 cm. Fluid-filled hyperenhancing small bowel loops in the left mid abdomen. Gastrostomy tube in appropriate position.     APPENDIX: No findings to suggest appendicitis.     ABDOMINOPELVIC CAVITY: No discrete ascites but there is diffuse mesenteric edema.. No pneumoperitoneum. No lymphadenopathy.     VESSELS: Atherosclerosis without abdominal aortic aneurysm.     PELVIS     REPRODUCTIVE ORGANS: Unremarkable for patient's age.     URINARY BLADDER: Unremarkable.     ABDOMINAL WALL/INGUINAL REGIONS: Unremarkable.     BONES: No acute fracture or suspicious osseous lesion.     IMPRESSION:     Fluid-filled hyperenhancing small bowel loops in the left mid abdomen suggesting enteritis. There is associated mesenteric edema     Moderate fecal impaction at the rectum with the rectum dilated up to 7 cm                The patient was seen and examined by Dr. Fabian, all griffith medical decisions were made with Dr. Fabian.  Thank you for  allowing us to participate in the care of this pleasant patient.  We will follow up with you closely.

## 2024-02-08 NOTE — ASSESSMENT & PLAN NOTE
POA with weakness and dizziness for the past week; increased weight loss and constipation  Not tolerating tube feedings via PEG tube due to nausea  Physical exam significant for   Likely 2/2 dehydration and lack of PO/PEG tube intake    See plans below

## 2024-02-08 NOTE — ASSESSMENT & PLAN NOTE
Patient noted to have large blood clots and bright red blood coming from rectum this morning. Patient's SBP went from 156 to 107 overnight. Patient is symptomatic with presyncope.     Unclear source: ulcer vs diverticulitis vs fissure    Gastroenterology is on board and believes this may be due to stercoral ulcer seen on imaging. Recommend treating constipation and a colonoscopy nonurgently after resolution of underlying fecal impaction; can consider performing procedure sooner if he is still experiencing significant bleeding after resolution of impaction     Plan:  Transfusion consent obtained  Trend Hgb, transfuse < 9 due to active bleed  Monitor blood pressures- if SBP <100 consider transfusion  Transfuse 2 units of PRBCs for Hgb 7.5 @ 1213, (dropped from 9.6 @7:20)  Hgb/INR 2 hours post transfusion- if INR >1.7 in the setting of bleed, FFP

## 2024-02-08 NOTE — PLAN OF CARE
Problem: Prexisting or High Potential for Compromised Skin Integrity  Goal: Skin integrity is maintained or improved  Description: INTERVENTIONS:  - Identify patients at risk for skin breakdown  - Assess and monitor skin integrity  - Assess and monitor nutrition and hydration status  - Monitor labs   - Assess for incontinence   - Turn and reposition patient  - Assist with mobility/ambulation  - Relieve pressure over bony prominences  - Avoid friction and shearing  - Provide appropriate hygiene as needed including keeping skin clean and dry  - Evaluate need for skin moisturizer/barrier cream  - Collaborate with interdisciplinary team   - Patient/family teaching  - Consider wound care consult   Outcome: Progressing     Problem: PAIN - ADULT  Goal: Verbalizes/displays adequate comfort level or baseline comfort level  Description: Interventions:  - Encourage patient to monitor pain and request assistance  - Assess pain using appropriate pain scale  - Administer analgesics based on type and severity of pain and evaluate response  - Implement non-pharmacological measures as appropriate and evaluate response  - Consider cultural and social influences on pain and pain management  - Notify physician/advanced practitioner if interventions unsuccessful or patient reports new pain  Outcome: Progressing     Problem: INFECTION - ADULT  Goal: Absence or prevention of progression during hospitalization  Description: INTERVENTIONS:  - Assess and monitor for signs and symptoms of infection  - Monitor lab/diagnostic results  - Monitor all insertion sites, i.e. indwelling lines, tubes, and drains  - Monitor endotracheal if appropriate and nasal secretions for changes in amount and color  - [place] appropriate cooling/warming therapies per order  - Administer medications as ordered  - Instruct and encourage patient and family to use good hand hygiene technique  - Identify and instruct in appropriate isolation precautions for  identified infection/condition  Outcome: Progressing  Goal: Absence of fever/infection during neutropenic period  Description: INTERVENTIONS:  - Monitor WBC    Outcome: Progressing     Problem: SAFETY ADULT  Goal: Patient will remain free of falls  Description: INTERVENTIONS:  - Educate patient/family on patient safety including physical limitations  - Instruct patient to call for assistance with activity   - Consult OT/PT to assist with strengthening/mobility   - Keep Call bell within reach  - Keep bed low and locked with side rails adjusted as appropriate  - Keep care items and personal belongings within reach  - Initiate and maintain comfort rounds  - Make Fall Risk Sign visible to staff  - Obtain necessary fall risk management equipment  - Apply yellow socks and bracelet for high fall risk patients  - Consider moving patient to room near nurses station  Outcome: Progressing  Goal: Maintain or return to baseline ADL function  Description: INTERVENTIONS:  -  Assess patient's ability to carry out ADLs; assess patient's baseline for ADL function and identify physical deficits which impact ability to perform ADLs (bathing, care of mouth/teeth, toileting, grooming, dressing, etc.)  - Assess/evaluate cause of self-care deficits   - Assess range of motion  - Assess patient's mobility; develop plan if impaired  - Assess patient's need for assistive devices and provide as appropriate  - Encourage maximum independence but intervene and supervise when necessary  - Involve family in performance of ADLs  - Assess for home care needs following discharge   - Consider OT consult to assist with ADL evaluation and planning for discharge  - Provide patient education as appropriate  Outcome: Progressing  Goal: Maintains/Returns to pre admission functional level  Description: INTERVENTIONS:  - Perform AM-PAC 6 Click Basic Mobility/ Daily Activity assessment daily.  - Set and communicate daily mobility goal to care team and  patient/family/caregiver.   - Collaborate with rehabilitation services on mobility goals if consulted  - Out of bed for toileting  - Record patient progress and toleration of activity level   Outcome: Progressing     Problem: DISCHARGE PLANNING  Goal: Discharge to home or other facility with appropriate resources  Description: INTERVENTIONS:  - Identify barriers to discharge w/patient and caregiver  - Arrange for needed discharge resources and transportation as appropriate  - Identify discharge learning needs (meds, wound care, etc.)  - Arrange for interpretive services to assist at discharge as needed  - Refer to Case Management Department for coordinating discharge planning if the patient needs post-hospital services based on physician/advanced practitioner order or complex needs related to functional status, cognitive ability, or social support system  Outcome: Progressing     Problem: Knowledge Deficit  Goal: Patient/family/caregiver demonstrates understanding of disease process, treatment plan, medications, and discharge instructions  Description: Complete learning assessment and assess knowledge base.  Interventions:  - Provide teaching at level of understanding  - Provide teaching via preferred learning methods  Outcome: Progressing

## 2024-02-08 NOTE — CONSULTS
Daily PO intake 400 kcal or less, has not administered his TF  in 2 weeks or more.     Very high risk for refeeding.       When indicated; Initiate Jevity 1.5 at 10 ml/hr continuous x 24 hours monitoring electrolytes. Continue IVF, flush 30 ml q 4    Advance by 10 ml q 12 hours to goal rate of 60 ml/hr providing 2160 kcal, 92 g protein, 1094 ml free water.     When closer to goal rate, d/c IVF and flush 125 ml q 4 hours for additional 750 ml water.     Once tolerating goal, can discuss bolus feedings as appropriate.

## 2024-02-08 NOTE — ASSESSMENT & PLAN NOTE
Malnutrition Findings:                                 BMI Findings:           There is no height or weight on file to calculate BMI.   BMI of 15 kg/m2    PEG tube in place 2/2 oral SCC limiting oral intake  Patient not using his tube feedings 2/2 inducing nausea and unable to tolerate said feedings  No nausea or vomiting at admission    Plan:  Resume tube feedings once SBO has been ruled out  Nutrition consulted, recs appreciated

## 2024-02-08 NOTE — ASSESSMENT & PLAN NOTE
Chronic hyponatremia (low 130s) per chart review  POA with weakness and dizziness, worsening with standing up  Hyponatremia of 129  TSH WNL  Hypovolemic on exam, dry mucosa  Likely due to significant decreased PO and PEG tube intake and dehydration    Plan:  Encourage nutrient intake  Follow serum osmolality, urine osmolality, urine sodium  IV NS 75 mL/hr  Monitor BMP  Avoid overcorrection to prevent ODS  Goal sodium no higher than 8-12 meqL in 24 hours

## 2024-02-09 ENCOUNTER — APPOINTMENT (INPATIENT)
Dept: GASTROENTEROLOGY | Facility: HOSPITAL | Age: 59
DRG: 254 | End: 2024-02-09
Attending: INTERNAL MEDICINE
Payer: COMMERCIAL

## 2024-02-09 ENCOUNTER — ANESTHESIA (INPATIENT)
Dept: GASTROENTEROLOGY | Facility: HOSPITAL | Age: 59
DRG: 254 | End: 2024-02-09
Payer: COMMERCIAL

## 2024-02-09 ENCOUNTER — ANESTHESIA EVENT (INPATIENT)
Dept: GASTROENTEROLOGY | Facility: HOSPITAL | Age: 59
DRG: 254 | End: 2024-02-09
Payer: COMMERCIAL

## 2024-02-09 ENCOUNTER — APPOINTMENT (INPATIENT)
Dept: GASTROENTEROLOGY | Facility: HOSPITAL | Age: 59
DRG: 254 | End: 2024-02-09
Payer: COMMERCIAL

## 2024-02-09 PROBLEM — E43 SEVERE PROTEIN-CALORIE MALNUTRITION (HCC): Chronic | Status: ACTIVE | Noted: 2024-02-08

## 2024-02-09 PROBLEM — F10.10 ALCOHOL ABUSE: Chronic | Status: ACTIVE | Noted: 2023-12-11

## 2024-02-09 PROBLEM — Z86.73 HISTORY OF CVA (CEREBROVASCULAR ACCIDENT): Chronic | Status: ACTIVE | Noted: 2023-11-03

## 2024-02-09 PROBLEM — K62.5 RECTAL BLEEDING: Chronic | Status: ACTIVE | Noted: 2024-02-08

## 2024-02-09 PROBLEM — G89.3 CANCER-RELATED PAIN: Chronic | Status: ACTIVE | Noted: 2023-11-02

## 2024-02-09 LAB
ABO GROUP BLD BPU: NORMAL
ANION GAP SERPL CALCULATED.3IONS-SCNC: 5 MMOL/L
ATRIAL RATE: 73 BPM
BASOPHILS # BLD AUTO: 0.03 THOUSANDS/ÂΜL (ref 0–0.1)
BASOPHILS NFR BLD AUTO: 0 % (ref 0–1)
BPU ID: NORMAL
BUN SERPL-MCNC: 18 MG/DL (ref 5–25)
CALCIUM SERPL-MCNC: 7.3 MG/DL (ref 8.4–10.2)
CHLORIDE SERPL-SCNC: 100 MMOL/L (ref 96–108)
CO2 SERPL-SCNC: 27 MMOL/L (ref 21–32)
CREAT SERPL-MCNC: 0.59 MG/DL (ref 0.6–1.3)
CROSSMATCH: NORMAL
EOSINOPHIL # BLD AUTO: 0.01 THOUSAND/ÂΜL (ref 0–0.61)
EOSINOPHIL NFR BLD AUTO: 0 % (ref 0–6)
ERYTHROCYTE [DISTWIDTH] IN BLOOD BY AUTOMATED COUNT: 16.9 % (ref 11.6–15.1)
GFR SERPL CREATININE-BSD FRML MDRD: 110 ML/MIN/1.73SQ M
GLUCOSE SERPL-MCNC: 115 MG/DL (ref 65–140)
HCT VFR BLD AUTO: 20.4 % (ref 36.5–49.3)
HCT VFR BLD AUTO: 29 % (ref 36.5–49.3)
HGB BLD-MCNC: 10.1 G/DL (ref 12–17)
HGB BLD-MCNC: 6.9 G/DL (ref 12–17)
HGB BLD-MCNC: 9.9 G/DL (ref 12–17)
IMM GRANULOCYTES # BLD AUTO: 0.13 THOUSAND/UL (ref 0–0.2)
IMM GRANULOCYTES NFR BLD AUTO: 1 % (ref 0–2)
INR PPP: 1.13 (ref 0.84–1.19)
LYMPHOCYTES # BLD AUTO: 0.4 THOUSANDS/ÂΜL (ref 0.6–4.47)
LYMPHOCYTES NFR BLD AUTO: 4 % (ref 14–44)
MAGNESIUM SERPL-MCNC: 1.7 MG/DL (ref 1.9–2.7)
MCH RBC QN AUTO: 31.8 PG (ref 26.8–34.3)
MCHC RBC AUTO-ENTMCNC: 34.8 G/DL (ref 31.4–37.4)
MCV RBC AUTO: 92 FL (ref 82–98)
MONOCYTES # BLD AUTO: 1.55 THOUSAND/ÂΜL (ref 0.17–1.22)
MONOCYTES NFR BLD AUTO: 15 % (ref 4–12)
NEUTROPHILS # BLD AUTO: 8.44 THOUSANDS/ÂΜL (ref 1.85–7.62)
NEUTS SEG NFR BLD AUTO: 80 % (ref 43–75)
NRBC BLD AUTO-RTO: 0 /100 WBCS
P AXIS: 89 DEGREES
PHOSPHATE SERPL-MCNC: 3.2 MG/DL (ref 2.3–4.1)
PLATELET # BLD AUTO: 120 THOUSANDS/UL (ref 149–390)
PMV BLD AUTO: 9.2 FL (ref 8.9–12.7)
POTASSIUM SERPL-SCNC: 3.2 MMOL/L (ref 3.5–5.3)
PR INTERVAL: 118 MS
PROTHROMBIN TIME: 15.1 SECONDS (ref 11.6–14.5)
QRS AXIS: 86 DEGREES
QRSD INTERVAL: 88 MS
QT INTERVAL: 388 MS
QTC INTERVAL: 427 MS
RBC # BLD AUTO: 3.18 MILLION/UL (ref 3.88–5.62)
SODIUM SERPL-SCNC: 132 MMOL/L (ref 135–147)
T WAVE AXIS: 92 DEGREES
UNIT DISPENSE STATUS: NORMAL
UNIT PRODUCT CODE: NORMAL
UNIT PRODUCT VOLUME: 350 ML
UNIT RH: NORMAL
VENTRICULAR RATE: 73 BPM
WBC # BLD AUTO: 10.56 THOUSAND/UL (ref 4.31–10.16)

## 2024-02-09 PROCEDURE — 85014 HEMATOCRIT: CPT | Performed by: PSYCHIATRY & NEUROLOGY

## 2024-02-09 PROCEDURE — 0DJD8ZZ INSPECTION OF LOWER INTESTINAL TRACT, VIA NATURAL OR ARTIFICIAL OPENING ENDOSCOPIC: ICD-10-PCS | Performed by: INTERNAL MEDICINE

## 2024-02-09 PROCEDURE — 45330 DIAGNOSTIC SIGMOIDOSCOPY: CPT | Performed by: INTERNAL MEDICINE

## 2024-02-09 PROCEDURE — 85018 HEMOGLOBIN: CPT

## 2024-02-09 PROCEDURE — P9016 RBC LEUKOCYTES REDUCED: HCPCS

## 2024-02-09 PROCEDURE — 85018 HEMOGLOBIN: CPT | Performed by: PSYCHIATRY & NEUROLOGY

## 2024-02-09 PROCEDURE — 80048 BASIC METABOLIC PNL TOTAL CA: CPT | Performed by: NURSE PRACTITIONER

## 2024-02-09 PROCEDURE — 85025 COMPLETE CBC W/AUTO DIFF WBC: CPT | Performed by: NURSE PRACTITIONER

## 2024-02-09 PROCEDURE — 84100 ASSAY OF PHOSPHORUS: CPT | Performed by: NURSE PRACTITIONER

## 2024-02-09 PROCEDURE — 0DCP7ZZ EXTIRPATION OF MATTER FROM RECTUM, VIA NATURAL OR ARTIFICIAL OPENING: ICD-10-PCS | Performed by: INTERNAL MEDICINE

## 2024-02-09 PROCEDURE — 85610 PROTHROMBIN TIME: CPT | Performed by: NURSE PRACTITIONER

## 2024-02-09 PROCEDURE — 83735 ASSAY OF MAGNESIUM: CPT | Performed by: NURSE PRACTITIONER

## 2024-02-09 PROCEDURE — 99233 SBSQ HOSP IP/OBS HIGH 50: CPT | Performed by: INTERNAL MEDICINE

## 2024-02-09 PROCEDURE — NC001 PR NO CHARGE: Performed by: NURSE PRACTITIONER

## 2024-02-09 RX ORDER — MAGNESIUM SULFATE HEPTAHYDRATE 40 MG/ML
2 INJECTION, SOLUTION INTRAVENOUS ONCE
Status: DISCONTINUED | OUTPATIENT
Start: 2024-02-09 | End: 2024-02-09

## 2024-02-09 RX ORDER — SODIUM CHLORIDE, SODIUM GLUCONATE, SODIUM ACETATE, POTASSIUM CHLORIDE, MAGNESIUM CHLORIDE, SODIUM PHOSPHATE, DIBASIC, AND POTASSIUM PHOSPHATE .53; .5; .37; .037; .03; .012; .00082 G/100ML; G/100ML; G/100ML; G/100ML; G/100ML; G/100ML; G/100ML
1000 INJECTION, SOLUTION INTRAVENOUS ONCE
Status: COMPLETED | OUTPATIENT
Start: 2024-02-09 | End: 2024-02-09

## 2024-02-09 RX ORDER — PHENYLEPHRINE HCL IN 0.9% NACL 1 MG/10 ML
SYRINGE (ML) INTRAVENOUS AS NEEDED
Status: DISCONTINUED | OUTPATIENT
Start: 2024-02-09 | End: 2024-02-09

## 2024-02-09 RX ORDER — POLYETHYLENE GLYCOL 3350 17 G/17G
238 POWDER, FOR SOLUTION ORAL ONCE
Status: COMPLETED | OUTPATIENT
Start: 2024-02-10 | End: 2024-02-10

## 2024-02-09 RX ORDER — POTASSIUM CHLORIDE 14.9 MG/ML
20 INJECTION INTRAVENOUS ONCE
Status: COMPLETED | OUTPATIENT
Start: 2024-02-09 | End: 2024-02-09

## 2024-02-09 RX ORDER — MAGNESIUM SULFATE HEPTAHYDRATE 40 MG/ML
2 INJECTION, SOLUTION INTRAVENOUS ONCE
Status: COMPLETED | OUTPATIENT
Start: 2024-02-09 | End: 2024-02-09

## 2024-02-09 RX ORDER — POTASSIUM CHLORIDE 20 MEQ/1
40 TABLET, EXTENDED RELEASE ORAL
Status: COMPLETED | OUTPATIENT
Start: 2024-02-09 | End: 2024-02-09

## 2024-02-09 RX ORDER — PROPOFOL 10 MG/ML
INJECTION, EMULSION INTRAVENOUS AS NEEDED
Status: DISCONTINUED | OUTPATIENT
Start: 2024-02-09 | End: 2024-02-09

## 2024-02-09 RX ADMIN — POTASSIUM CHLORIDE 20 MEQ: 14.9 INJECTION, SOLUTION INTRAVENOUS at 06:14

## 2024-02-09 RX ADMIN — Medication 100 MCG: at 16:20

## 2024-02-09 RX ADMIN — IOHEXOL 100 ML: 350 INJECTION, SOLUTION INTRAVENOUS at 00:01

## 2024-02-09 RX ADMIN — POTASSIUM CHLORIDE 40 MEQ: 1500 TABLET, EXTENDED RELEASE ORAL at 12:44

## 2024-02-09 RX ADMIN — OXYCODONE HYDROCHLORIDE 20 MG: 10 TABLET ORAL at 09:31

## 2024-02-09 RX ADMIN — ATORVASTATIN CALCIUM 40 MG: 40 TABLET, FILM COATED ORAL at 17:40

## 2024-02-09 RX ADMIN — POTASSIUM CHLORIDE 40 MEQ: 1500 TABLET, EXTENDED RELEASE ORAL at 10:34

## 2024-02-09 RX ADMIN — SODIUM CHLORIDE 1000 ML: 0.9 INJECTION, SOLUTION INTRAVENOUS at 01:35

## 2024-02-09 RX ADMIN — MAGNESIUM SULFATE HEPTAHYDRATE 2 G: 40 INJECTION, SOLUTION INTRAVENOUS at 06:14

## 2024-02-09 RX ADMIN — POLYETHYLENE GLYCOL 3350 17 G: 17 POWDER, FOR SOLUTION ORAL at 20:03

## 2024-02-09 RX ADMIN — OXYCODONE HYDROCHLORIDE 20 MG: 10 TABLET ORAL at 20:03

## 2024-02-09 RX ADMIN — SODIUM CHLORIDE, SODIUM GLUCONATE, SODIUM ACETATE, POTASSIUM CHLORIDE, MAGNESIUM CHLORIDE, SODIUM PHOSPHATE, DIBASIC, AND POTASSIUM PHOSPHATE 1000 ML: .53; .5; .37; .037; .03; .012; .00082 INJECTION, SOLUTION INTRAVENOUS at 01:45

## 2024-02-09 RX ADMIN — PROPOFOL 30 MG: 10 INJECTION, EMULSION INTRAVENOUS at 16:17

## 2024-02-09 RX ADMIN — PROPOFOL 70 MG: 10 INJECTION, EMULSION INTRAVENOUS at 16:15

## 2024-02-09 RX ADMIN — SODIUM CHLORIDE 75 ML/HR: 0.9 INJECTION, SOLUTION INTRAVENOUS at 08:40

## 2024-02-09 NOTE — NURSING NOTE
Pt started actively bright blood per rectum with numerous clots at 2011. Resident for [facility] Med team notified. VS stable. Transfused 2 unit of PRBC.     Patient was given golytely via peg tube in progress.     2200 Pt is complained of increase abdominal pain 10/10 oxy 20 given. Abdominal distention noted. Resident at the bed side and ordered to stop the bowl prep. Stat CT ordered.     Peg tube was aspirated to check for blood. Noted w/ zero residual.     0100 am H&H collected 6.5 BP 66/48 manual bp 80/52. Pt c/o increase abdominal pain and actively bleeding.     Rapid response initiated. Given isolyte bolus and 2 units of PRBC and transferred to ICU via bed with ICU nurse -given report at bedside.

## 2024-02-09 NOTE — ASSESSMENT & PLAN NOTE
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 Hu

## 2024-02-09 NOTE — INTERVAL H&P NOTE
H&P reviewed. After examining the patient I find no changes in the patients condition since the H&P had been written.    Vitals:    02/09/24 0201   BP: 116/76   Pulse: 104   Resp: 14   Temp: 98.3 °F (36.8 °C)   SpO2: 94%

## 2024-02-09 NOTE — RAPID RESPONSE
Rapid Response Note  Eliceo Garcia 58 y.o. male MRN: [id number]  Unit/Bed#: ICU 14 Encounter: [id number]    Rapid Response Notification(s):   Response called date/time:  2/9/2024 1:26 AM  Response team arrival date/time:  2/9/2024 1:27 AM  Response end date/time:  2/9/2024 2:10 AM  Level of care:  [facility]  [facility]  Primary reason for rapid response call:  Acute significant bleed and acute change in BP    Rapid Response Intervention(s):   Airway:  None  Breathing:  None  Circulation:  None  Fluids administered:  Isolyte/plasmalyte and blood (2 units PRBC)  Medications administered:  None       Assessment:   Rectal bleed    Plan:   Transfuse 2 units PRBC stat  GI contacted needs urgent colonoscopy, they are on their way     Rapid Response Outcome:   Transfer:  Transfer to stepdown 1  Primary service notified of transfer: Yes ([facility] residents at bedside)    Code Status: Level 1 (Full Code)      Family notified of transfer: yes  Family member contacted: [facility] resident attempted to contact family but call went straight to voicemail x 2     Background/Situation:   Eliceo Garcia is a 58 y.o. male w/ pmhx stage 4 oral SCC, CVA, chronic opioid dependence who was admitted for weakness and severe constipation. GI was prepping patient for colonoscopy today after he had developed bright red blood per rectum yesterday. Overnight his bleeding became more intense and he developed hypotension. CT A/P revealed active extrav in rectum. Ordered and transfusing 2 unit PRBC. GI urgently called and coming in for emergent colonoscopy.    Review of Systems   Constitutional: Negative.    HENT: Negative.     Eyes: Negative.    Respiratory: Negative.     Cardiovascular: Negative.    Gastrointestinal:  Positive for abdominal distention and abdominal pain.   Genitourinary: Negative.    Musculoskeletal: Negative.    Skin:  Positive for pallor.   Neurological: Negative.    Psychiatric/Behavioral: Negative.    "  All other systems reviewed and are negative.      Objective:   Vitals:    02/09/24 0145 02/09/24 0149 02/09/24 0151 02/09/24 0201   BP: 119/76 119/76  116/76   BP Location:    Right arm   Pulse: 98 56  104   Resp:   18 14   Temp:  98 °F (36.7 °C)  98.3 °F (36.8 °C)   TempSrc:    Axillary   SpO2: 100% 100%  94%   Height:         Physical Exam  Vitals and nursing note reviewed.   Constitutional:       General: He is awake. He is not in acute distress.     Appearance: Normal appearance. He is well-developed and underweight. He is ill-appearing.   HENT:      Head: Normocephalic and atraumatic.      Mouth/Throat:      Mouth: Mucous membranes are moist.   Eyes:      Extraocular Movements: Extraocular movements intact.      Pupils: Pupils are equal, round, and reactive to light.   Cardiovascular:      Rate and Rhythm: Normal rate and regular rhythm.   Pulmonary:      Effort: Pulmonary effort is normal.      Breath sounds: Normal breath sounds.   Abdominal:      General: Bowel sounds are normal. There is distension.      Palpations: Abdomen is soft.      Tenderness: There is abdominal tenderness.      Comments: Active BRBPR large amount on bottom pad   Musculoskeletal:         General: Normal range of motion.      Cervical back: Normal range of motion.   Skin:     General: Skin is warm and dry.      Capillary Refill: Capillary refill takes less than 2 seconds.      Coloration: Skin is pale.   Neurological:      General: No focal deficit present.      Mental Status: He is alert and oriented to person, place, and time. Mental status is at baseline.   Psychiatric:         Behavior: Behavior is cooperative.         Portions of the record may have been created with voice recognition software.  Occasional wrong word or \"sound a like\" substitutions may have occurred due to the inherent limitations of voice recognition software.  Read the chart carefully and recognize, using context, where substitutions have occurred.    Bishop" GERARDO Virgen

## 2024-02-09 NOTE — ASSESSMENT & PLAN NOTE
POA hemoglobin 9.7, which dropped to 6.9  Patient was hypotensive as well, received 2 L fluid bolus followed by 4 unit blood transfusion and transferred to ICU  Most likely this is acute blood loss anemia in the setting of lower GI bleeding    Plan:  Maintain 2 different IV access  Continue monitoring hemoglobin  Transfuse if there is an acute drop or if hemoglobin is less than 7  Monitor hemodynamics, if blood pressure drops check stat hemoglobin

## 2024-02-09 NOTE — ASSESSMENT & PLAN NOTE
Abdomen soft and nontender, however, 2/2 significant constipation and mediastinal adenopathy noticed in prior CT scans. Will rule out obstruction  CT A/P: Fluid-filled hyperenhancing small bowel loops in the left mid abdomen suggesting enteritis. There is associated mesenteric edema. Moderate fecal impaction at the rectum with the rectum dilated up to 7 cm.     Plan:  GI consulted. Recommendations appreciated  Will require another flexible sigmoidoscopy and disimpaction

## 2024-02-09 NOTE — CASE MANAGEMENT
Case Management Assessment & Discharge Planning Note    Patient name Eliceo Garcia  Location ICU 14/ICU 14 MRN [id number]  : 1965 Date 2024       Current Admission Date: 2024  Current Admission Diagnosis:Rectal bleeding   Patient Active Problem List    Diagnosis Date Noted    Severe protein-calorie malnutrition (HCC) 2024    Rectal bleeding 2024    Acute blood loss anemia 2024    Weakness 2024    Mucositis due to radiation therapy 2024    Pain in oral cavity 2024    Palliative care encounter 2023    Alcohol abuse 2023    Port-A-Cath in place 2023    Alcoholic intoxication without complication (HCC) 2023    History of CVA (cerebrovascular accident) 2023    Drug induced constipation 2023    Cancer-related pain 2023    At risk for respiratory depression due to opioid 2023    Counseling regarding advanced care planning and goals of care 2023    Preoperative clearance 10/11/2023    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    Hypokalemia 2023    Hypomagnesemia 2023    Alcohol use disorder, severe, dependence (HCC) 2023    Hyponatremia 2023    Hypertension     Cigarette nicotine dependence without complication     Periodontitis 2023    Dental caries 2023    Cervical lymphadenopathy 2023      LOS (days): 1  Geometric Mean LOS (GMLOS) (days):   Days to GMLOS:     OBJECTIVE:    Risk of Unplanned Readmission Score: 15.56     Current admission status: Inpatient    Preferred Pharmacy:   [id number] [place] - [address]  [address]  [place] [address]  Phone: [phone] Fax: [phone]    [id number] [place] - [address]  [place] [address]  Phone: [phone] Fax: [phone]    [id number] [place] - [address]  [place]  [address]  Phone: [phone] Fax: [phone]    [facility] [place]  [place] - [address],  [address],  [address] [facility]  [place] [address]  Phone: [phone] Fax: [phone]    Primary Care Provider: Elias Schoen, MD    Primary Insurance: [facility]  Secondary Insurance:     ASSESSMENT:  Active Health Care Proxies       BRETT GRANDA Health Care Representative - Father   Primary Phone: [phone] (Mobile)  Home Phone: [phone]                 Patient Information  Admitted from:: Home  Mental Status: Alert  During Assessment patient was accompanied by: Not accompanied during assessment  Assessment information provided by:: Patient  Primary Caregiver: Self  Support Systems: Spouse/significant other, Family members  County of Residence: [place]  What [facility] do you live in?: [place]  Home entry access options. Select all that apply.: No steps to enter home  Type of Current Residence: Apartment  Floor Level: 1  Upon entering residence, is there a bedroom on the main floor (no further steps)?: Yes  Upon entering residence, is there a bathroom on the main floor (no further steps)?: Yes  Living Arrangements: Lives w/ Spouse/significant other    Activities of Daily Living Prior to Admission  Functional Status: Independent  Completes ADLs independently?: Yes  Ambulates independently?: Yes  Does patient use assisted devices?: Yes  Assisted Devices (DME) used: Straight Cane  Does patient currently own DME?: Yes  What DME does the patient currently own?: Straight Cane  Does patient have a history of Outpatient Therapy (PT/OT)?: No  Does the patient have a history of Short-Term Rehab?: No  Does patient have a history of HHC?: No  Does patient currently have HHC?: No    Patient Information Continued  Income Source: Unemployed  Does patient have prescription coverage?: Yes  Does patient receive dialysis treatments?: No  Does patient have a history of substance abuse?:  No    Means of Transportation  Means of Transport to Appts:: Drives Self (and [employer])    Housing Stability: Low Risk  (2/9/2024)    Housing Stability Vital Sign     Unable to Pay for Housing in the Last Year: No     Number of Places Lived in the Last Year: 1     Unstable Housing in the Last Year: No   Food Insecurity: No Food Insecurity (2/9/2024)    Hunger Vital Sign     Worried About Running Out of Food in the Last Year: Never true     Ran Out of Food in the Last Year: Never true   Transportation Needs: No Transportation Needs (2/9/2024)    PRAPARE - Transportation     Lack of Transportation (Medical): No     Lack of Transportation (Non-Medical): No   Utilities: Not At Risk (2/9/2024)    [facility] Utilities     Threatened with loss of utilities: No       DISCHARGE DETAILS:    Discharge planning discussed with:: pt  Freedom of Choice: Yes  Comments - Freedom of Choice: return home    Other Referral/Resources/Interventions Provided:  Interventions: Other (Specify)  Referral Comments: CM met with pt at bedside and introduced self/role with dcp. Pt reports he resides with his g/f in a first floor apt. Pt reports indpenedent and using a SPC. Pt manages his PEG tube. Unsure who supplies his tube feeds, but calls them to send supplies when low. Pt unsure who will transport him upon dc. Aware CM will f/u with any recommendations.    Pt was asking about assistance applying for disability since he has not been working. Cm reviewed that the OP CM should be able to assist him.

## 2024-02-09 NOTE — ASSESSMENT & PLAN NOTE
History right pontine infarct CVA  Continue aspirin and Lipitor  Patient does not take Plavix anymore

## 2024-02-09 NOTE — ANESTHESIA PREPROCEDURE EVALUATION
Procedure:  COLONOSCOPY    Relevant Problems   ANESTHESIA (within normal limits)      CARDIO   (+) Hypertension      GI/HEPATIC   (+) Rectal bleeding      HEMATOLOGY   (+) Acute blood loss anemia      Other   (+) Cervical lymphadenopathy        TTE 11/4/2023    Left Ventricle: Left ventricular cavity size is normal. Wall thickness is mildly increased. There is concentric remodeling. The left ventricular ejection fraction is 60%. Systolic function is normal. Global longitudinal strain is normal at -20%. Wall motion is normal. Diastolic function is normal.    Right Ventricle: Right ventricular cavity size is normal. Systolic function is normal.    Aortic Valve: There is mild to moderate regurgitation with a centrally directed jet.    Tricuspid Valve: There is mild regurgitation.     Strain was performed to quantify interventricular dyssynchrony and evaluate components of myocardial function due to pre Chemotherapy. Results from the utilization of Strain Analysis are listed in the report below.            Physical Exam    Airway    Mallampati score: IV  TM Distance: >3 FB  Neck ROM: full     Dental   Comment: Poor mouth opening. Right-side neck mass. No midline shift of tracheal noted. ROM WNL. Patient reports developing difficulty swallowing liquids within the past few days.     Cardiovascular      Pulmonary      Other Findings        Anesthesia Plan  ASA Score- 3     Anesthesia Type- IV sedation with anesthesia with ASA Monitors.         Additional Monitors:     Airway Plan:     Comment: I have seen the patient and reviewed the history.  Patient to receive IV sedation with full ASA monitors.  Risks discussed with the patient, consent signed.  .       Plan Factors-Exercise tolerance (METS): >4 METS.    Chart reviewed.   Existing labs reviewed. Patient summary reviewed.    Patient is a current smoker.              Induction- intravenous.    Postoperative Plan-     Informed Consent- Anesthetic plan and risks discussed  with patient.  I personally reviewed this patient with the CRNA. Discussed and agreed on the Anesthesia Plan with the CRNA..

## 2024-02-09 NOTE — QUICK NOTE
Notified by RN that patient abdomen appeared to be distended and he was experiencing abdominal cramps.  Evaluated patient at bedside, abdomen tender to minimal palpation and distended.  Planned to order KUB, however, notified by RN patient started to experience excessive bright rectal bleeding.  Assessed patient; vital signs and patient stable. H&H stat was ordered.  GI was contacted.

## 2024-02-09 NOTE — ANESTHESIA POSTPROCEDURE EVALUATION
Post-Op Assessment Note    CV Status:  Stable  Pain Score: 0    Pain management: adequate       Mental Status:  Arousable   Hydration Status:  Euvolemic   PONV Controlled:  Controlled   Airway Patency:  Patent     Post Op Vitals Reviewed: Yes    No anethesia notable event occurred.    Staff: CRNA               BP 97/66 (02/09/24 1627)    Temp 98 °F (36.7 °C) (02/09/24 1627)    Pulse 70 (02/09/24 1627)   Resp 18 (02/09/24 1627)    SpO2 96 % (02/09/24 1627)

## 2024-02-09 NOTE — ASSESSMENT & PLAN NOTE
Presented with dizziness, had rectal bleeding  Is PEG dependent for feeds  Has been having liquid bowel movements for the last several weeks, started having clots and red blood per rectum  Evaluated by GI, and has been undergoing bowel prep  Was transferred to the ICU because of dropping blood pressure and having several episodes of GI bleeding  CT high-volume bleeding scan positive for rectal extravasation  Sigmoidoscopy showed large rectal ball of impacted stool, and ulcer nonbleeding at that moment  Has severe constipation and stercoral ulceration most likely causing bleeding    Plan:  Continue monitoring hemoglobin levels  Repeat sigmoidoscopy per GI  Received bowel regimen  Continue monitoring for bloody stools  Continue monitoring hemodynamics

## 2024-02-09 NOTE — PROGRESS NOTES
[facility]  Progress Note  Name: Eliceo Garcia I  MRN: [id number]  Unit/Bed#: ICU 14 I Date of Admission: 2/7/2024   Date of Service: 2/9/2024 I Hospital Day: 1    Assessment/Plan   * Rectal bleeding  Assessment & Plan  Presented with dizziness, had rectal bleeding  Is PEG dependent for feeds  Has been having liquid bowel movements for the last several weeks, started having clots and red blood per rectum  Evaluated by GI, and has been undergoing bowel prep  Was transferred to the ICU because of dropping blood pressure and having several episodes of GI bleeding  CT high-volume bleeding scan positive for rectal extravasation  Sigmoidoscopy showed large rectal ball of impacted stool, and ulcer nonbleeding at that moment  Has severe constipation and stercoral ulceration most likely causing bleeding    Plan:  Continue monitoring hemoglobin levels  Repeat sigmoidoscopy per GI  Received bowel regimen  Continue monitoring for bloody stools  Continue monitoring hemodynamics    Acute blood loss anemia  Assessment & Plan  POA hemoglobin 9.7, which dropped to 6.9  Patient was hypotensive as well, received 2 L fluid bolus followed by 4 unit blood transfusion and transferred to ICU  Most likely this is acute blood loss anemia in the setting of lower GI bleeding    Plan:  Maintain 2 different IV access  Continue monitoring hemoglobin  Transfuse if there is an acute drop or if hemoglobin is less than 7  Monitor hemodynamics, if blood pressure drops check stat hemoglobin    Drug induced constipation  Assessment & Plan  Abdomen soft and nontender, however, 2/2 significant constipation and mediastinal adenopathy noticed in prior CT scans. Will rule out obstruction  CT A/P: Fluid-filled hyperenhancing small bowel loops in the left mid abdomen suggesting enteritis. There is associated mesenteric edema. Moderate fecal impaction at the rectum with the rectum dilated up to 7 cm.     Plan:  GI consulted.  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 Hu    Severe protein-calorie malnutrition (HCC)  Assessment & Plan  Malnutrition Findings:   Adult Malnutrition type: Acute illness (in the setting of chronic illness)  Adult Degree of Malnutrition: Other severe protein calorie malnutrition  Malnutrition Characteristics: Fat loss, Muscle loss, Weight loss                  360 Statement: Severe malnutrition related to inadequate oral and enteral intake, catabolic illness, dysphagia as evidenced by severe loss of fat and muscle, temples, extremities, clavicle, 22.6 % weight decrease x < 1 year. Currently treated with nutrition consult, enteral feeding recomendations provided.    BMI Findings:  Adult BMI Classifications: Underweight < 18.5        Body mass index is 15 kg/m².       Alcohol abuse  Assessment & Plan  Unknown last drink  Continue CIWA protocol    History of CVA (cerebrovascular accident)  Assessment & Plan  History right pontine infarct CVA  Continue aspirin and Lipitor  Patient does not take Plavix anymore             Disposition: Stepdown Level 1    ICU Core Measures     A: Assess, Prevent, and Manage Pain Has pain been assessed? Yes  Need for changes to pain regimen? No   B: Both SAT/SAT  N/A   C: Choice of Sedation RASS Goal: 0 Alert and Calm  Need for changes to sedation or analgesia regimen? No   D: Delirium CAM-ICU: Negative   E: Early Mobility  Plan for  early mobility? Yes   F: Family Engagement Plan for family engagement today? Yes       Review of Invasive Devices:      Central access plan:  Prior access in place      Prophylaxis:  VTE Contraindicated secondary to: GI Bleed   Stress Ulcer  not ordered         Significant 24hr Events     24hr events: Patient was rapid response overnight for bright red blood per rectum increased from prior causing hypotension.  CT A/P with active extravasation rectum.  Patient received 2 units PRBC.  GI contacted, patient underwent flexible sigmoidoscopy with findings of single large, superficial ulcer in the rectum with hematin covered flat spot.     Subjective   Review of Systems   Objective                            Vitals I/O      Most Recent Min/Max in 24hrs   Temp 98 °F (36.7 °C) Temp  Min: 97.6 °F (36.4 °C)  Max: 98.7 °F (37.1 °C)   Pulse 82 Pulse  Min: 42  Max: 120   Resp 17 Resp  Min: 14  Max: 35   /84 BP  Min: 66/48  Max: 153/93   O2 Sat 98 % SpO2  Min: 78 %  Max: 100 %      Intake/Output Summary (Last 24 hours) at 2/9/2024 0823  Last data filed at 2/9/2024 0234  Gross per 24 hour   Intake 1394.58 ml   Output --   Net 1394.58 ml       Diet NPO    Invasive Monitoring           Physical Exam   Physical Exam  Vitals and nursing note reviewed.   Eyes:      Extraocular Movements: Extraocular movements intact.      Pupils: Pupils are equal, round, and reactive to light.   Skin:     Coloration: Skin is pale.   HENT:      Mouth/Throat:      Mouth: Mucous membranes are moist.   Cardiovascular:      Rate and Rhythm: Normal rate and regular rhythm.   Musculoskeletal:         General: Normal range of motion.   Abdominal: General: Bowel sounds are normal. There is distension.     Palpations: Abdomen is soft.      Tenderness: There is abdominal tenderness.   Constitutional:       General: He is not in acute distress.     Appearance: Normal appearance. He is well-developed and underweight. He is ill-appearing.   Pulmonary:       Effort: Pulmonary effort is normal. No respiratory distress.      Breath sounds: Normal breath sounds.   Neurological:      Mental Status: He is alert and oriented to person, place and time.            Diagnostic Studies      EKG: NSR 73  Imaging: CT head reviewed bleeding scan abdomen pelvis with active contrast extravasation at the 5 to 6 o'clock position of the rectum, constipation with rectal fecal impaction.  I have personally reviewed pertinent reports.       Medications:  Scheduled PRN   atorvastatin, 40 mg, QPM  polyethylene glycol, 17 g, TID      acetaminophen, 650 mg, Q4H PRN  oxyCODONE, 20 mg, Q4H PRN       Continuous    sodium chloride, 75 mL/hr, Last Rate: 75 mL/hr (02/08/24 0847)         Labs:    CBC    Recent Labs     02/07/24  1524 02/08/24  0522 02/08/24  0720 02/09/24  0114 02/09/24  0452   WBC 9.11 7.97  --   --  10.56*   HGB 10.5* 9.7*   < > 6.9* 10.1*   HCT 31.0* 28.8*   < > 20.4* 29.0*    244  --   --  120*   BANDSPCT 2  --   --   --   --     < > = values in this interval not displayed.     BMP    Recent Labs     02/08/24  0522 02/09/24 0452   SODIUM 131* 132*   K 3.6 3.2*   CL 95* 100   CO2 30 27   AGAP 6 5   BUN 8 18   CREATININE 0.66 0.59*   CALCIUM 8.6 7.3*       Coags    Recent Labs     02/07/24  1524 02/08/24  1946 02/09/24  0452   INR 0.94 1.06 1.13   PTT 30  --   --         Additional Electrolytes  Recent Labs     02/07/24  1524 02/08/24  0522 02/09/24  0452   MG 1.8* 2.2 1.7*   PHOS 3.9  --  3.2          Blood Gas    No recent results  No recent results LFTs  Recent Labs     02/07/24  1524   ALT 9   AST 12*   ALKPHOS 104   ALB 3.6   TBILI 0.51       Infectious  No recent results  Glucose  Recent Labs     02/07/24  1524 02/08/24  0522 02/09/24  0452   GLUC 88 111 115                   Teresa Alonso, DO

## 2024-02-09 NOTE — QUICK NOTE
Patient became hypotensive and continued to have rectal bleeding with increased abdominal pain. Rapid response was called and critical care arrived at bedside. GI was contacted. Stat H&H returned with a hemoglobin of 6.9. 2 additional PRBCs were ordered and 1L isolyte bolus was given. Patient remained hypotensive. Patient was transferred to ICU.    Attempted to call , Devin (father), but it went straight to voicemail.

## 2024-02-09 NOTE — ASSESSMENT & PLAN NOTE
Malnutrition Findings:   Adult Malnutrition type: Acute illness (in the setting of chronic illness)  Adult Degree of Malnutrition: Other severe protein calorie malnutrition  Malnutrition Characteristics: Fat loss, Muscle loss, Weight loss                  360 Statement: Severe malnutrition related to inadequate oral and enteral intake, catabolic illness, dysphagia as evidenced by severe loss of fat and muscle, temples, extremities, clavicle, 22.6 % weight decrease x < 1 year. Currently treated with nutrition consult, enteral feeding recomendations provided.    BMI Findings:  Adult BMI Classifications: Underweight < 18.5        Body mass index is 15 kg/m².

## 2024-02-09 NOTE — ASSESSMENT & PLAN NOTE
Sodium 131 POA  History of chronic hyponatremia   Likely due to significant decreased PO and PEG tube intake and dehydration    Plan:  Continue IV fluids  Continue feeds

## 2024-02-10 LAB
ALBUMIN SERPL BCP-MCNC: 2.4 G/DL (ref 3.5–5)
ALP SERPL-CCNC: 57 U/L (ref 34–104)
ALT SERPL W P-5'-P-CCNC: 5 U/L (ref 7–52)
ANION GAP SERPL CALCULATED.3IONS-SCNC: 2 MMOL/L
AST SERPL W P-5'-P-CCNC: 7 U/L (ref 13–39)
BASOPHILS # BLD AUTO: 0.02 THOUSANDS/ÂΜL (ref 0–0.1)
BASOPHILS NFR BLD AUTO: 0 % (ref 0–1)
BILIRUB SERPL-MCNC: 0.57 MG/DL (ref 0.2–1)
BUN SERPL-MCNC: 17 MG/DL (ref 5–25)
CALCIUM ALBUM COR SERPL-MCNC: 8.9 MG/DL (ref 8.3–10.1)
CALCIUM SERPL-MCNC: 7.6 MG/DL (ref 8.4–10.2)
CHLORIDE SERPL-SCNC: 103 MMOL/L (ref 96–108)
CO2 SERPL-SCNC: 27 MMOL/L (ref 21–32)
CREAT SERPL-MCNC: 0.56 MG/DL (ref 0.6–1.3)
EOSINOPHIL # BLD AUTO: 0.19 THOUSAND/ÂΜL (ref 0–0.61)
EOSINOPHIL NFR BLD AUTO: 3 % (ref 0–6)
ERYTHROCYTE [DISTWIDTH] IN BLOOD BY AUTOMATED COUNT: 18.6 % (ref 11.6–15.1)
GFR SERPL CREATININE-BSD FRML MDRD: 112 ML/MIN/1.73SQ M
GLUCOSE SERPL-MCNC: 101 MG/DL (ref 65–140)
HCT VFR BLD AUTO: 25.7 % (ref 36.5–49.3)
HGB BLD-MCNC: 8.8 G/DL (ref 12–17)
IMM GRANULOCYTES # BLD AUTO: 0.07 THOUSAND/UL (ref 0–0.2)
IMM GRANULOCYTES NFR BLD AUTO: 1 % (ref 0–2)
LYMPHOCYTES # BLD AUTO: 0.52 THOUSANDS/ÂΜL (ref 0.6–4.47)
LYMPHOCYTES NFR BLD AUTO: 7 % (ref 14–44)
MAGNESIUM SERPL-MCNC: 1.8 MG/DL (ref 1.9–2.7)
MCH RBC QN AUTO: 31.3 PG (ref 26.8–34.3)
MCHC RBC AUTO-ENTMCNC: 34.2 G/DL (ref 31.4–37.4)
MCV RBC AUTO: 92 FL (ref 82–98)
MONOCYTES # BLD AUTO: 1.41 THOUSAND/ÂΜL (ref 0.17–1.22)
MONOCYTES NFR BLD AUTO: 19 % (ref 4–12)
NEUTROPHILS # BLD AUTO: 5.19 THOUSANDS/ÂΜL (ref 1.85–7.62)
NEUTS SEG NFR BLD AUTO: 70 % (ref 43–75)
NRBC BLD AUTO-RTO: 0 /100 WBCS
PHOSPHATE SERPL-MCNC: 2.9 MG/DL (ref 2.3–4.1)
PLATELET # BLD AUTO: 142 THOUSANDS/UL (ref 149–390)
PMV BLD AUTO: 9.4 FL (ref 8.9–12.7)
POTASSIUM SERPL-SCNC: 4.1 MMOL/L (ref 3.5–5.3)
PROT SERPL-MCNC: 4.4 G/DL (ref 6.4–8.4)
RBC # BLD AUTO: 2.81 MILLION/UL (ref 3.88–5.62)
SODIUM SERPL-SCNC: 132 MMOL/L (ref 135–147)
WBC # BLD AUTO: 7.4 THOUSAND/UL (ref 4.31–10.16)

## 2024-02-10 PROCEDURE — 99232 SBSQ HOSP IP/OBS MODERATE 35: CPT | Performed by: INTERNAL MEDICINE

## 2024-02-10 PROCEDURE — 83735 ASSAY OF MAGNESIUM: CPT

## 2024-02-10 PROCEDURE — 84100 ASSAY OF PHOSPHORUS: CPT

## 2024-02-10 PROCEDURE — 80053 COMPREHEN METABOLIC PANEL: CPT

## 2024-02-10 PROCEDURE — 85025 COMPLETE CBC W/AUTO DIFF WBC: CPT

## 2024-02-10 RX ORDER — ASPIRIN 81 MG/1
81 TABLET, CHEWABLE ORAL DAILY
Status: DISCONTINUED | OUTPATIENT
Start: 2024-02-10 | End: 2024-02-13 | Stop reason: HOSPADM

## 2024-02-10 RX ORDER — HEPARIN SODIUM 5000 [USP'U]/ML
5000 INJECTION, SOLUTION INTRAVENOUS; SUBCUTANEOUS EVERY 8 HOURS SCHEDULED
Status: DISCONTINUED | OUTPATIENT
Start: 2024-02-10 | End: 2024-02-13 | Stop reason: HOSPADM

## 2024-02-10 RX ORDER — MAGNESIUM SULFATE HEPTAHYDRATE 40 MG/ML
2 INJECTION, SOLUTION INTRAVENOUS ONCE
Status: COMPLETED | OUTPATIENT
Start: 2024-02-10 | End: 2024-02-10

## 2024-02-10 RX ADMIN — OXYCODONE HYDROCHLORIDE 20 MG: 10 TABLET ORAL at 19:16

## 2024-02-10 RX ADMIN — SODIUM CHLORIDE 75 ML/HR: 0.9 INJECTION, SOLUTION INTRAVENOUS at 08:36

## 2024-02-10 RX ADMIN — POLYETHYLENE GLYCOL 3350 238 G: 17 POWDER, FOR SOLUTION ORAL at 14:09

## 2024-02-10 RX ADMIN — IRON SUCROSE 200 MG: 20 INJECTION, SOLUTION INTRAVENOUS at 11:43

## 2024-02-10 RX ADMIN — OXYCODONE HYDROCHLORIDE 20 MG: 10 TABLET ORAL at 14:24

## 2024-02-10 RX ADMIN — ASPIRIN 81 MG CHEWABLE TABLET 81 MG: 81 TABLET CHEWABLE at 12:28

## 2024-02-10 RX ADMIN — ATORVASTATIN CALCIUM 40 MG: 40 TABLET, FILM COATED ORAL at 17:16

## 2024-02-10 RX ADMIN — THIAMINE HYDROCHLORIDE: 100 INJECTION, SOLUTION INTRAMUSCULAR; INTRAVENOUS at 12:28

## 2024-02-10 RX ADMIN — OXYCODONE HYDROCHLORIDE 20 MG: 10 TABLET ORAL at 09:30

## 2024-02-10 RX ADMIN — POLYETHYLENE GLYCOL 3350 17 G: 17 POWDER, FOR SOLUTION ORAL at 08:36

## 2024-02-10 RX ADMIN — MAGNESIUM SULFATE HEPTAHYDRATE 2 G: 40 INJECTION, SOLUTION INTRAVENOUS at 09:31

## 2024-02-10 RX ADMIN — HEPARIN SODIUM 5000 UNITS: 5000 INJECTION INTRAVENOUS; SUBCUTANEOUS at 14:09

## 2024-02-10 RX ADMIN — HEPARIN SODIUM 5000 UNITS: 5000 INJECTION INTRAVENOUS; SUBCUTANEOUS at 21:26

## 2024-02-10 NOTE — PLAN OF CARE
Problem: Prexisting or High Potential for Compromised Skin Integrity  Goal: Skin integrity is maintained or improved  Description: INTERVENTIONS:  - Identify patients at risk for skin breakdown  - Assess and monitor skin integrity  - Assess and monitor nutrition and hydration status  - Monitor labs   - Assess for incontinence   - Turn and reposition patient  - Assist with mobility/ambulation  - Relieve pressure over bony prominences  - Avoid friction and shearing  - Provide appropriate hygiene as needed including keeping skin clean and dry  - Evaluate need for skin moisturizer/barrier cream  - Collaborate with interdisciplinary team   - Patient/family teaching  - Consider wound care consult   Outcome: Progressing     Problem: PAIN - ADULT  Goal: Verbalizes/displays adequate comfort level or baseline comfort level  Description: Interventions:  - Encourage patient to monitor pain and request assistance  - Assess pain using appropriate pain scale  - Administer analgesics based on type and severity of pain and evaluate response  - Implement non-pharmacological measures as appropriate and evaluate response  - Consider cultural and social influences on pain and pain management  - Notify physician/advanced practitioner if interventions unsuccessful or patient reports new pain  Outcome: Progressing     Problem: INFECTION - ADULT  Goal: Absence or prevention of progression during hospitalization  Description: INTERVENTIONS:  - Assess and monitor for signs and symptoms of infection  - Monitor lab/diagnostic results  - Monitor all insertion sites, i.e. indwelling lines, tubes, and drains  - Monitor endotracheal if appropriate and nasal secretions for changes in amount and color  - [place] appropriate cooling/warming therapies per order  - Administer medications as ordered  - Instruct and encourage patient and family to use good hand hygiene technique  - Identify and instruct in appropriate isolation precautions for  identified infection/condition  Outcome: Progressing  Goal: Absence of fever/infection during neutropenic period  Description: INTERVENTIONS:  - Monitor WBC    Outcome: Progressing     Problem: SAFETY ADULT  Goal: Patient will remain free of falls  Description: INTERVENTIONS:  - Educate patient/family on patient safety including physical limitations  - Instruct patient to call for assistance with activity   - Consult OT/PT to assist with strengthening/mobility   - Keep Call bell within reach  - Keep bed low and locked with side rails adjusted as appropriate  - Keep care items and personal belongings within reach  - Initiate and maintain comfort rounds  - Make Fall Risk Sign visible to staff  - Offer Toileting every  Hours, in advance of need  - Initiate/Maintain alarm  - Obtain necessary fall risk management equipment:   - Apply yellow socks and bracelet for high fall risk patients  - Consider moving patient to room near nurses station  Outcome: Progressing  Goal: Maintain or return to baseline ADL function  Description: INTERVENTIONS:  -  Assess patient's ability to carry out ADLs; assess patient's baseline for ADL function and identify physical deficits which impact ability to perform ADLs (bathing, care of mouth/teeth, toileting, grooming, dressing, etc.)  - Assess/evaluate cause of self-care deficits   - Assess range of motion  - Assess patient's mobility; develop plan if impaired  - Assess patient's need for assistive devices and provide as appropriate  - Encourage maximum independence but intervene and supervise when necessary  - Involve family in performance of ADLs  - Assess for home care needs following discharge   - Consider OT consult to assist with ADL evaluation and planning for discharge  - Provide patient education as appropriate  Outcome: Progressing  Goal: Maintains/Returns to pre admission functional level  Description: INTERVENTIONS:  - Perform AM-PAC 6 Click Basic Mobility/ Daily Activity  assessment daily.  - Set and communicate daily mobility goal to care team and patient/family/caregiver.   - Collaborate with rehabilitation services on mobility goals if consulted  - Perform Range of Motion  times a day.  - Reposition patient every  hours.  - Dangle patient  times a day  - Stand patient  times a day  - Ambulate patient  times a day  - Out of bed to chair  times a day   - Out of bed for meals   Problem: DISCHARGE PLANNING  Goal: Discharge to home or other facility with appropriate resources  Description: INTERVENTIONS:  - Identify barriers to discharge w/patient and caregiver  - Arrange for needed discharge resources and transportation as appropriate  - Identify discharge learning needs (meds, wound care, etc.)  - Arrange for interpretive services to assist at discharge as needed  - Refer to Case Management Department for coordinating discharge planning if the patient needs post-hospital services based on physician/advanced practitioner order or complex needs related to functional status, cognitive ability, or social support system  Outcome: Progressing     Problem: Knowledge Deficit  Goal: Patient/family/caregiver demonstrates understanding of disease process, treatment plan, medications, and discharge instructions  Description: Complete learning assessment and assess knowledge base.  Interventions:  - Provide teaching at level of understanding  - Provide teaching via preferred learning methods  Outcome: Progressing     Problem: Nutrition/Hydration-ADULT  Goal: Nutrient/Hydration intake appropriate for improving, restoring or maintaining nutritional needs  Description: Monitor and assess patient's nutrition/hydration status for malnutrition. Collaborate with interdisciplinary team and initiate plan and interventions as ordered.  Monitor patient's weight and dietary intake as ordered or per policy. Utilize nutrition screening tool and intervene as necessary. Determine patient's food preferences and  provide high-protein, high-caloric foods as appropriate.     INTERVENTIONS:  - Monitor oral intake, urinary output, labs, and treatment plans  - Assess nutrition and hydration status and recommend course of action  - Evaluate amount of meals eaten  - Assist patient with eating if necessary   - Allow adequate time for meals  - Recommend/ encourage appropriate diets, oral nutritional supplements, and vitamin/mineral supplements  - Order, calculate, and assess calorie counts as needed  - Recommend, monitor, and adjust tube feedings and TPN/PPN based on assessed needs  - Assess need for intravenous fluids  - Provide specific nutrition/hydration education as appropriate  - Include patient/family/caregiver in decisions related to nutrition  Outcome: Progressing    times a day  - Out of bed for toileting  - Record patient progress and toleration of activity level   Outcome: Progressing

## 2024-02-10 NOTE — PROGRESS NOTES
[facility]  Progress Note  Name: Eliceo Garcia I  MRN: [id number]  Unit/Bed#: ICU 14 I Date of Admission: 2/7/2024   Date of Service: 2/10/2024 I Hospital Day: 2    Assessment/Plan   * Rectal bleeding  Assessment & Plan  Patient with hematochezia  Workup including abdominal imaging and sigmoidoscopy and colonoscopy revealed ulcer in the distal rectum  Monitor hemoglobin  Present on clear liquid diet and bowel prep per GI  GI plans for better bowel prep incomplete colonoscopy scheduled for Monday noted  GI inputs noted    Acute blood loss anemia  Assessment & Plan  Acute blood loss anemia due to hematochezia  S/p packed cell transfusion  Check iron studies  Iron repletion  Monitor hemoglobin      Drug induced constipation  Assessment & Plan  Patient presented with constipation  Abdominal imaging revealed moderate fecal impaction in the rectum  Patient receiving bowel regimen with some relief  S/p sigmoidoscopy and colonoscopy  Continue bowel regimen      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)  Assessment & Plan  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  S/P concurrent cisplatin + R since 11/15 (last RT 1/10/24)  Outpatient oncology follow-up    Severe protein-calorie malnutrition (HCC)  Assessment & Plan  Malnutrition Findings:   Adult Malnutrition type: Acute illness (in the setting of chronic illness)  Adult Degree of Malnutrition: Other severe protein calorie malnutrition  Malnutrition Characteristics: Fat loss, Muscle loss, Weight loss                  360 Statement: Severe malnutrition related to inadequate oral and enteral intake, catabolic illness, dysphagia as evidenced by severe loss of fat and muscle, temples, extremities, clavicle, 22.6 % weight decrease x < 1 year. Currently treated with nutrition consult, enteral feeding recomendations provided.    BMI Findings:  Adult BMI Classifications: Underweight < 18.5        Body mass index is 15.95 kg/m².     PEG  tube in place 2/2 oral SCC limiting oral intake  Patient not using his tube feedings 2/2 inducing nausea and unable to tolerate said feedings  No nausea or vomiting at admission    Plan:  Resume tube feedings once cleared by GI  Nutrition consulted, patient at high risk for refeeding syndrome. recs appreciated.  Per nutrition: When indicated; Initiate Jevity 1.5 at 10 ml/hr continuous x 24 hours monitoring electrolytes. Continue IVF, flush 30 ml q 4. Advance by 10 ml q 12 hours to goal rate of 60 ml/hr providing 2160 kcal, 92 g protein, 1094 ml free water. When closer to goal rate, d/c IVF and flush 125 ml q 4 hours for additional 750 ml water. Once tolerating goal, can discuss bolus feedings as appropriate      Weakness  Assessment & Plan  Multifactorial  Supportive cares  Physical therapy    Alcohol abuse  Assessment & Plan  History of alcohol abuse  Continue CIWA protocol  Thiamine, folic acid    History of CVA (cerebrovascular accident)  Assessment & Plan  History right pontine infarct CVA  Continue aspirin, atorvastatin    Cancer-related pain  Assessment & Plan  Continue home roxicodone 20 mg   Narcan ordered  Supportive cares    Hyponatremia  Assessment & Plan  Patient with chronic hyponatremia  Multifactorial  Monitor sodium levels closely                   VTE Pharmacologic Prophylaxis: VTE Score: 6 High Risk (Score >/= 5) - Pharmacological DVT Prophylaxis Ordered: heparin. Sequential Compression Devices Ordered.    Mobility:   Basic Mobility Inpatient Raw Score: 18  JH-HLM Goal: 6: Walk 10 steps or more  JH-HLM Achieved: 2: Bed activities/Dependent transfer  HLM Goal NOT achieved. Continue with multidisciplinary rounding and encourage appropriate mobility to improve upon HLM goals.    Patient Centered Rounds: I performed bedside rounds with nursing staff today.   Discussions with Specialists or Other Care Team Provider: Case management    Education and Discussions with Family / Patient:  Discussed with the  patient, called left a message for Father Devin.     Total Time Spent on Date of Encounter in care of patient: 31 mins. This time was spent on one or more of the following: performing physical exam; counseling and coordination of care; obtaining or reviewing history; documenting in the medical record; reviewing/ordering tests, medications or procedures; communicating with other healthcare professionals and discussing with patient's family/caregivers.    Current Length of Stay: 2 day(s)  Current Patient Status: Inpatient   Certification Statement: The patient will continue to require additional inpatient hospital stay due to as outlined  Discharge Plan:  GI plan for colonoscopy on Monday noted    Code Status: Level 1 - Full Code    Subjective:     Comfortably in bed  Reports feeling tired and fatigued  Denies abdominal pain  No episodes of hematochezia  Denies nausea  Tolerating clear liquid diet  Discussed with RN  Downgrade to [facility] floor  Encourage incentive spirometry    Objective:     Vitals:   Temp (24hrs), Av.1 °F (36.7 °C), Min:96.9 °F (36.1 °C), Max:98.9 °F (37.2 °C)    Temp:  [96.9 °F (36.1 °C)-98.9 °F (37.2 °C)] 98.5 °F (36.9 °C)  HR:  [62-76] 68  Resp:  [15-70] 21  BP: ()/(66-99) 134/82  SpO2:  [95 %-100 %] 98 %  Body mass index is 15.95 kg/m².     Input and Output Summary (last 24 hours):     Intake/Output Summary (Last 24 hours) at 2/10/2024 1144  Last data filed at 2/10/2024 0939  Gross per 24 hour   Intake 823.73 ml   Output 400 ml   Net 423.73 ml       Physical Exam:   Physical Exam     Comfortably in bed  Features of protein calorie malnutrition noted  Neck supple  Lungs emphysematous  Diminished breath sounds  Heart sounds S1 and S2 noted  Abdomen soft  Awake follows commands  No pedal edema  No rash    Additional Data:     Labs:  Results from last 7 days   Lab Units 02/10/24  0443 24  0522 24  1524   WBC Thousand/uL 7.40   < > 9.11   HEMOGLOBIN g/dL 8.8*   < > 10.5*    HEMATOCRIT % 25.7*   < > 31.0*   PLATELETS Thousands/uL 142*   < > 269   BANDS PCT %  --   --  2   NEUTROS PCT % 70   < >  --    LYMPHS PCT % 7*   < >  --    LYMPHO PCT %  --   --  7*   MONOS PCT % 19*   < >  --    MONO PCT %  --   --  13*   EOS PCT % 3   < > 0    < > = values in this interval not displayed.     Results from last 7 days   Lab Units 02/10/24  0443   SODIUM mmol/L 132*   POTASSIUM mmol/L 4.1   CHLORIDE mmol/L 103   CO2 mmol/L 27   BUN mg/dL 17   CREATININE mg/dL 0.56*   ANION GAP mmol/L 2   CALCIUM mg/dL 7.6*   ALBUMIN g/dL 2.4*   TOTAL BILIRUBIN mg/dL 0.57   ALK PHOS U/L 57   ALT U/L 5*   AST U/L 7*   GLUCOSE RANDOM mg/dL 101     Results from last 7 days   Lab Units 02/09/24  0452   INR  1.13             Results from last 7 days   Lab Units 02/08/24  0720   LACTIC ACID mmol/L 0.9       Lines/Drains:  Invasive Devices       Central Venous Catheter Line  Duration             Port A Cath 11/07/23 Left Internal jugular 95 days              Peripheral Intravenous Line  Duration             Peripheral IV 02/09/24 Left;Proximal;Ventral (anterior) Forearm 1 day    Peripheral IV 02/09/24 Right Antecubital 1 day              Drain  Duration             Gastrostomy/Enterostomy Percutaneous Interventional Gastrostomy 16 Fr. LUQ 60 days                    Central Line:  Goal for removal:  History of malignancy noted             Imaging: Reviewed radiology reports from this admission including: abdominal/pelvic CT and procedure reports    Recent Cultures (last 7 days):         Last 24 Hours Medication List:   Current Facility-Administered Medications   Medication Dose Route Frequency Provider Last Rate    acetaminophen  650 mg Oral Q4H PRN GERARDO Bhardwaj      aspirin  81 mg Oral Daily Tracie Shah MD      atorvastatin  40 mg Oral QPM GERARDO Bhardwaj      folic acid 1 mg, thiamine (VITAMIN B1) 100 mg in sodium chloride 0.9 % 100 mL IV piggyback   Intravenous Daily Tracie GOFF  MD Alyssa      iron sucrose  200 mg Intravenous Daily Tracie Shah MD      oxyCODONE  20 mg Oral Q4H PRN GERARDO Bhardwaj      polyethylene glycol  238 g Oral Once Kayla Vance PA-C      [START ON 2/11/2024] polyethylene glycol  4,000 mL Oral Once Kayla Vance PA-C      polyethylene glycol  17 g Per PEG Tube TID GERARDO Bhardwaj      sodium chloride  50 mL/hr Intravenous Continuous Tracie Shah MD 50 mL/hr (02/10/24 1134)        Today, Patient Was Seen By: Tracie Shah MD    **Please Note: This note may have been constructed using a voice recognition system.**

## 2024-02-10 NOTE — ASSESSMENT & PLAN NOTE
Malnutrition Findings:   Adult Malnutrition type: Acute illness (in the setting of chronic illness)  Adult Degree of Malnutrition: Other severe protein calorie malnutrition  Malnutrition Characteristics: Fat loss, Muscle loss, Weight loss                  360 Statement: Severe malnutrition related to inadequate oral and enteral intake, catabolic illness, dysphagia as evidenced by severe loss of fat and muscle, temples, extremities, clavicle, 22.6 % weight decrease x < 1 year. Currently treated with nutrition consult, enteral feeding recomendations provided.    BMI Findings:  Adult BMI Classifications: Underweight < 18.5        Body mass index is 15.95 kg/m².     PEG tube in place 2/2 oral SCC limiting oral intake  Patient not using his tube feedings 2/2 inducing nausea and unable to tolerate said feedings  No nausea or vomiting at admission    Plan:  Resume tube feedings once cleared by GI  Nutrition consulted, patient at high risk for refeeding syndrome. recs appreciated.  Per nutrition: When indicated; Initiate Jevity 1.5 at 10 ml/hr continuous x 24 hours monitoring electrolytes. Continue IVF, flush 30 ml q 4. Advance by 10 ml q 12 hours to goal rate of 60 ml/hr providing 2160 kcal, 92 g protein, 1094 ml free water. When closer to goal rate, d/c IVF and flush 125 ml q 4 hours for additional 750 ml water. Once tolerating goal, can discuss bolus feedings as appropriate

## 2024-02-10 NOTE — ASSESSMENT & PLAN NOTE
Patient presented with constipation  Abdominal imaging revealed moderate fecal impaction in the rectum  Patient receiving bowel regimen with some relief  S/p sigmoidoscopy and colonoscopy  Continue bowel regimen

## 2024-02-10 NOTE — PROGRESS NOTES
"Progress Note - Eliceo Garcia 58 y.o. male MRN: [id number]    Unit/Bed#: ICU 14 Encounter: [id number]    Assessment and Plan:     58-year-old male with history of stage IV oral squamous cell carcinoma with radiation and chemotherapy, PEG tube placement who presented 2/7 for rectal bleeding underwent initial emergent flex sigmoidoscopy 2/9 with single ulcer in the rectum with hematin covered flat spot, repeat colonoscopy later that day with single ulcer without active bleeding and large stool burden.    Severe fecal impaction with rectal bleeding due to rectal ulcer  Bowel movement overnight was without blood.  Hemoglobin remained stable at 8.8.  No other GI complaints at this time.    - Planning for full colonoscopy Monday, 2/12.  Patient is aware.  - He has plan for MiraLAX prep via G-tube today.  - GoLytely bowel prep via G-tube tomorrow.  - Clear liquid diet.    - Monitor bowel movements.  - Monitor hemoglobin and transfuse as needed per primary team    -Please reach out to GI if any signs of hemodynamic instability, recurrence of severe bleeding for more urgent procedure.    ----------------------------------------------------------------------------------------------------------------    Subjective:     Patient reports he is doing well.  He has some pains of his mouth due to his cancer but otherwise no specific GI complaints..  Nursing reported bowel movement overnight was nonbloody.    Objective:     Vitals: Blood pressure 151/88, pulse 71, temperature 98.5 °F (36.9 °C), temperature source Axillary, resp. rate 15, height 6' 3\" (1.905 m), weight 57.9 kg (127 lb 10.3 oz), SpO2 100%.,Body mass index is 15.95 kg/m².      Intake/Output Summary (Last 24 hours) at 2/10/2024 1029  Last data filed at 2/10/2024 0939  Gross per 24 hour   Intake 823.73 ml   Output 400 ml   Net 423.73 ml       Physical Exam:     General Appearance: Alert.  Pleasant.  Thin appearing.  Lungs: Clear to auscultation bilaterally, no rales or " "rhonchi, no labored breathing/accessory muscle use  Heart: Regular rate and rhythm, S1, S2 normal, no murmur, click, rub or gallop  Abdomen: Patient denies any tenderness with palpation throughout.  Soft.  G-tube in place.  Bowel sounds present.  Extremities: No cyanosis, clubbing, or edema    Invasive Devices       Central Venous Catheter Line  Duration             Port A Cath 11/07/23 Left Internal jugular 94 days              Peripheral Intravenous Line  Duration             Peripheral IV 02/09/24 Left;Proximal;Ventral (anterior) Forearm 1 day    Peripheral IV 02/09/24 Right Antecubital 1 day              Drain  Duration             Gastrostomy/Enterostomy Percutaneous Interventional Gastrostomy 16 Fr. LUQ 60 days                    Lab Results:  Results from last 7 days   Lab Units 02/10/24  0443   WBC Thousand/uL 7.40   HEMOGLOBIN g/dL 8.8*   HEMATOCRIT % 25.7*   PLATELETS Thousands/uL 142*   NEUTROS PCT % 70   LYMPHS PCT % 7*   MONOS PCT % 19*   EOS PCT % 3     Results from last 7 days   Lab Units 02/10/24  0443   POTASSIUM mmol/L 4.1   CHLORIDE mmol/L 103   CO2 mmol/L 27   BUN mg/dL 17   CREATININE mg/dL 0.56*   CALCIUM mg/dL 7.6*   ALK PHOS U/L 57   ALT U/L 5*   AST U/L 7*     Invalid input(s): \"BILI\"  Results from last 7 days   Lab Units 02/09/24  0452   INR  1.13           Imaging Studies: I have personally reviewed pertinent imaging studies.    Colonoscopy    Result Date: 2/9/2024  Impression: Single ulcer in the distal rectum No evidence of active bleeding seen.  Nonbleeding clean-based ulcer noted in the very distal rectum.  There was also large amount of brown stool noted in the rectum vault, manual disimpaction was performed with removal of moderate amount of stool. RECOMMENDATION:  Await pathology results Repeat colonoscopy on Monday with repeat bowel prep over the weekend, would recommend 2-day bowel prep.  Would recommend tapwater enemas from below.  Lisa Fabian MD     Flexible " Sigmoidoscopy    Result Date: 2/9/2024  Impression: Single ulcer in the rectum with hematin covered flat spot No active bleeding at this time Large stool ball in the rectum obscured visualization RECOMMENDATION:    Repeat sigmoidoscopy later today as scheduled with sedation to allow disimpaction and more thorough evaluation    Norm Coley MD     CT high volume bleeding scan abdomen pelvis    Result Date: 2/9/2024  Impression: Active contrast extravasation at the 5 to 6 o'clock position of the rectum Constipation with rectal fecal impaction. . Workstation performed: [id number]     CT abdomen pelvis w contrast    Result Date: 2/8/2024  Impression: Fluid-filled hyperenhancing small bowel loops in the left mid abdomen suggesting enteritis. There is associated mesenteric edema Moderate fecal impaction at the rectum with the rectum dilated up to 7 cm Workstation performed: [id number]

## 2024-02-10 NOTE — ASSESSMENT & PLAN NOTE
Patient with hematochezia  Workup including abdominal imaging and sigmoidoscopy and colonoscopy revealed ulcer in the distal rectum  Monitor hemoglobin  Present on clear liquid diet and bowel prep per GI  GI plans for better bowel prep incomplete colonoscopy scheduled for Monday noted  GI inputs noted

## 2024-02-10 NOTE — ASSESSMENT & PLAN NOTE
Acute blood loss anemia due to hematochezia  S/p packed cell transfusion  Check iron studies  Iron repletion  Monitor hemoglobin

## 2024-02-10 NOTE — ASSESSMENT & PLAN NOTE
History of alcohol abuse  Continue [facility] protocol  Thiamine, folic acid

## 2024-02-10 NOTE — ASSESSMENT & PLAN NOTE
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

## 2024-02-11 LAB
ABO GROUP BLD BPU: NORMAL
ABO GROUP BLD BPU: NORMAL
ANION GAP SERPL CALCULATED.3IONS-SCNC: 2 MMOL/L
BASOPHILS # BLD AUTO: 0.02 THOUSANDS/ÂΜL (ref 0–0.1)
BASOPHILS NFR BLD AUTO: 0 % (ref 0–1)
BPU ID: NORMAL
BPU ID: NORMAL
BUN SERPL-MCNC: 10 MG/DL (ref 5–25)
CALCIUM SERPL-MCNC: 7.4 MG/DL (ref 8.4–10.2)
CHLORIDE SERPL-SCNC: 104 MMOL/L (ref 96–108)
CO2 SERPL-SCNC: 26 MMOL/L (ref 21–32)
CREAT SERPL-MCNC: 0.51 MG/DL (ref 0.6–1.3)
CROSSMATCH: NORMAL
CROSSMATCH: NORMAL
EOSINOPHIL # BLD AUTO: 0.11 THOUSAND/ÂΜL (ref 0–0.61)
EOSINOPHIL NFR BLD AUTO: 2 % (ref 0–6)
ERYTHROCYTE [DISTWIDTH] IN BLOOD BY AUTOMATED COUNT: 17.9 % (ref 11.6–15.1)
GFR SERPL CREATININE-BSD FRML MDRD: 116 ML/MIN/1.73SQ M
GLUCOSE SERPL-MCNC: 88 MG/DL (ref 65–140)
HCT VFR BLD AUTO: 25.4 % (ref 36.5–49.3)
HGB BLD-MCNC: 8.6 G/DL (ref 12–17)
IMM GRANULOCYTES # BLD AUTO: 0.1 THOUSAND/UL (ref 0–0.2)
IMM GRANULOCYTES NFR BLD AUTO: 1 % (ref 0–2)
LYMPHOCYTES # BLD AUTO: 0.43 THOUSANDS/ÂΜL (ref 0.6–4.47)
LYMPHOCYTES NFR BLD AUTO: 6 % (ref 14–44)
MAGNESIUM SERPL-MCNC: 1.7 MG/DL (ref 1.9–2.7)
MCH RBC QN AUTO: 31.6 PG (ref 26.8–34.3)
MCHC RBC AUTO-ENTMCNC: 33.9 G/DL (ref 31.4–37.4)
MCV RBC AUTO: 93 FL (ref 82–98)
MONOCYTES # BLD AUTO: 1.16 THOUSAND/ÂΜL (ref 0.17–1.22)
MONOCYTES NFR BLD AUTO: 17 % (ref 4–12)
NEUTROPHILS # BLD AUTO: 5.18 THOUSANDS/ÂΜL (ref 1.85–7.62)
NEUTS SEG NFR BLD AUTO: 74 % (ref 43–75)
NRBC BLD AUTO-RTO: 0 /100 WBCS
PLATELET # BLD AUTO: 182 THOUSANDS/UL (ref 149–390)
PMV BLD AUTO: 9.4 FL (ref 8.9–12.7)
POTASSIUM SERPL-SCNC: 3.5 MMOL/L (ref 3.5–5.3)
RBC # BLD AUTO: 2.72 MILLION/UL (ref 3.88–5.62)
SODIUM SERPL-SCNC: 132 MMOL/L (ref 135–147)
UNIT DISPENSE STATUS: NORMAL
UNIT DISPENSE STATUS: NORMAL
UNIT PRODUCT CODE: NORMAL
UNIT PRODUCT CODE: NORMAL
UNIT PRODUCT VOLUME: 350 ML
UNIT PRODUCT VOLUME: 350 ML
UNIT RH: NORMAL
UNIT RH: NORMAL
WBC # BLD AUTO: 7 THOUSAND/UL (ref 4.31–10.16)

## 2024-02-11 PROCEDURE — 99232 SBSQ HOSP IP/OBS MODERATE 35: CPT | Performed by: INTERNAL MEDICINE

## 2024-02-11 PROCEDURE — 83735 ASSAY OF MAGNESIUM: CPT | Performed by: INTERNAL MEDICINE

## 2024-02-11 PROCEDURE — 80048 BASIC METABOLIC PNL TOTAL CA: CPT | Performed by: INTERNAL MEDICINE

## 2024-02-11 PROCEDURE — 85025 COMPLETE CBC W/AUTO DIFF WBC: CPT | Performed by: INTERNAL MEDICINE

## 2024-02-11 RX ORDER — LANOLIN ALCOHOL/MO/W.PET/CERES
400 CREAM (GRAM) TOPICAL 2 TIMES DAILY
Status: DISCONTINUED | OUTPATIENT
Start: 2024-02-11 | End: 2024-02-13 | Stop reason: HOSPADM

## 2024-02-11 RX ORDER — MAGNESIUM SULFATE HEPTAHYDRATE 40 MG/ML
2 INJECTION, SOLUTION INTRAVENOUS ONCE
Status: COMPLETED | OUTPATIENT
Start: 2024-02-11 | End: 2024-02-11

## 2024-02-11 RX ADMIN — OXYCODONE HYDROCHLORIDE 20 MG: 10 TABLET ORAL at 21:01

## 2024-02-11 RX ADMIN — IRON SUCROSE 200 MG: 20 INJECTION, SOLUTION INTRAVENOUS at 08:45

## 2024-02-11 RX ADMIN — HEPARIN SODIUM 5000 UNITS: 5000 INJECTION INTRAVENOUS; SUBCUTANEOUS at 06:16

## 2024-02-11 RX ADMIN — OXYCODONE HYDROCHLORIDE 20 MG: 10 TABLET ORAL at 06:16

## 2024-02-11 RX ADMIN — OXYCODONE HYDROCHLORIDE 20 MG: 10 TABLET ORAL at 14:30

## 2024-02-11 RX ADMIN — THIAMINE HYDROCHLORIDE: 100 INJECTION, SOLUTION INTRAMUSCULAR; INTRAVENOUS at 11:44

## 2024-02-11 RX ADMIN — ATORVASTATIN CALCIUM 40 MG: 40 TABLET, FILM COATED ORAL at 17:36

## 2024-02-11 RX ADMIN — POLYETHYLENE GLYCOL 3350, SODIUM SULFATE ANHYDROUS, SODIUM BICARBONATE, SODIUM CHLORIDE, POTASSIUM CHLORIDE 4000 ML: 236; 22.74; 6.74; 5.86; 2.97 POWDER, FOR SOLUTION ORAL at 14:23

## 2024-02-11 RX ADMIN — Medication 400 MG: at 17:36

## 2024-02-11 RX ADMIN — HEPARIN SODIUM 5000 UNITS: 5000 INJECTION INTRAVENOUS; SUBCUTANEOUS at 21:01

## 2024-02-11 RX ADMIN — Medication 400 MG: at 10:23

## 2024-02-11 RX ADMIN — MAGNESIUM SULFATE HEPTAHYDRATE 2 G: 40 INJECTION, SOLUTION INTRAVENOUS at 11:51

## 2024-02-11 RX ADMIN — OXYCODONE HYDROCHLORIDE 20 MG: 10 TABLET ORAL at 01:41

## 2024-02-11 RX ADMIN — ASPIRIN 81 MG CHEWABLE TABLET 81 MG: 81 TABLET CHEWABLE at 08:44

## 2024-02-11 RX ADMIN — HEPARIN SODIUM 5000 UNITS: 5000 INJECTION INTRAVENOUS; SUBCUTANEOUS at 14:23

## 2024-02-11 NOTE — PLAN OF CARE
Problem: Prexisting or High Potential for Compromised Skin Integrity  Goal: Skin integrity is maintained or improved  Description: INTERVENTIONS:  - Identify patients at risk for skin breakdown  - Assess and monitor skin integrity  - Assess and monitor nutrition and hydration status  - Monitor labs   - Assess for incontinence   - Turn and reposition patient  - Assist with mobility/ambulation  - Relieve pressure over bony prominences  - Avoid friction and shearing  - Provide appropriate hygiene as needed including keeping skin clean and dry  - Evaluate need for skin moisturizer/barrier cream  - Collaborate with interdisciplinary team   - Patient/family teaching  - Consider wound care consult   Outcome: Progressing     Problem: PAIN - ADULT  Goal: Verbalizes/displays adequate comfort level or baseline comfort level  Description: Interventions:  - Encourage patient to monitor pain and request assistance  - Assess pain using appropriate pain scale  - Administer analgesics based on type and severity of pain and evaluate response  - Implement non-pharmacological measures as appropriate and evaluate response  - Consider cultural and social influences on pain and pain management  - Notify physician/advanced practitioner if interventions unsuccessful or patient reports new pain  Outcome: Progressing     Problem: INFECTION - ADULT  Goal: Absence or prevention of progression during hospitalization  Description: INTERVENTIONS:  - Assess and monitor for signs and symptoms of infection  - Monitor lab/diagnostic results  - Monitor all insertion sites, i.e. indwelling lines, tubes, and drains  - Monitor endotracheal if appropriate and nasal secretions for changes in amount and color  - [place] appropriate cooling/warming therapies per order  - Administer medications as ordered  - Instruct and encourage patient and family to use good hand hygiene technique  - Identify and instruct in appropriate isolation precautions for  identified infection/condition  Outcome: Progressing  Goal: Absence of fever/infection during neutropenic period  Description: INTERVENTIONS:  - Monitor WBC    Outcome: Progressing     Problem: SAFETY ADULT  Goal: Patient will remain free of falls  Description: INTERVENTIONS:  - Educate patient/family on patient safety including physical limitations  - Instruct patient to call for assistance with activity   - Consult OT/PT to assist with strengthening/mobility   - Keep Call bell within reach  - Keep bed low and locked with side rails adjusted as appropriate  - Keep care items and personal belongings within reach  - Initiate and maintain comfort rounds  - Make Fall Risk Sign visible to staff  - Offer Toileting every  Hours, in advance of need  - Initiate/Maintain alarm  - Obtain necessary fall risk management equipment:   - Apply yellow socks and bracelet for high fall risk patients  - Consider moving patient to room near nurses station  Outcome: Progressing  Goal: Maintain or return to baseline ADL function  Description: INTERVENTIONS:  -  Assess patient's ability to carry out ADLs; assess patient's baseline for ADL function and identify physical deficits which impact ability to perform ADLs (bathing, care of mouth/teeth, toileting, grooming, dressing, etc.)  - Assess/evaluate cause of self-care deficits   - Assess range of motion  - Assess patient's mobility; develop plan if impaired  - Assess patient's need for assistive devices and provide as appropriate  - Encourage maximum independence but intervene and supervise when necessary  - Involve family in performance of ADLs  - Assess for home care needs following discharge   - Consider OT consult to assist with ADL evaluation and planning for discharge  - Provide patient education as appropriate  Outcome: Progressing  Goal: Maintains/Returns to pre admission functional level  Description: INTERVENTIONS:  - Perform AM-PAC 6 Click Basic Mobility/ Daily Activity  assessment daily.  - Set and communicate daily mobility goal to care team and patient/family/caregiver.   - Collaborate with rehabilitation services on mobility goals if consulted  - Perform Range of Motion  times a day.  - Reposition patient every  hours.  - Dangle patient  times a day  - Stand patient  times a day  - Ambulate patient  times a day  - Out of bed to chair  times a day   - Out of bed for meals   Problem: Knowledge Deficit  Goal: Patient/family/caregiver demonstrates understanding of disease process, treatment plan, medications, and discharge instructions  Description: Complete learning assessment and assess knowledge base.  Interventions:  - Provide teaching at level of understanding  - Provide teaching via preferred learning methods  Outcome: Progressing     Problem: Nutrition/Hydration-ADULT  Goal: Nutrient/Hydration intake appropriate for improving, restoring or maintaining nutritional needs  Description: Monitor and assess patient's nutrition/hydration status for malnutrition. Collaborate with interdisciplinary team and initiate plan and interventions as ordered.  Monitor patient's weight and dietary intake as ordered or per policy. Utilize nutrition screening tool and intervene as necessary. Determine patient's food preferences and provide high-protein, high-caloric foods as appropriate.     INTERVENTIONS:  - Monitor oral intake, urinary output, labs, and treatment plans  - Assess nutrition and hydration status and recommend course of action  - Evaluate amount of meals eaten  - Assist patient with eating if necessary   - Allow adequate time for meals  - Recommend/ encourage appropriate diets, oral nutritional supplements, and vitamin/mineral supplements  - Order, calculate, and assess calorie counts as needed  - Recommend, monitor, and adjust tube feedings and TPN/PPN based on assessed needs  - Assess need for intravenous fluids  - Provide specific nutrition/hydration education as appropriate  -  Include patient/family/caregiver in decisions related to nutrition  Outcome: Progressing    times a day  - Out of bed for toileting  - Record patient progress and toleration of activity level   Outcome: Progressing

## 2024-02-11 NOTE — ASSESSMENT & PLAN NOTE
Acute blood loss anemia due to hematochezia  S/p packed cell transfusion  Follow-up on iron studies  Iron supplementation  Monitor hemoglobin

## 2024-02-11 NOTE — ASSESSMENT & PLAN NOTE
Patient presented with constipation  Abdominal imaging revealed moderate fecal impaction in the rectum  Patient receiving bowel regimen with some relief  S/p sigmoidoscopy and colonoscopy  Continue bowel regimen  GI following

## 2024-02-11 NOTE — PROGRESS NOTES
[facility]  Progress Note  Name: Eliceo Garcia I  MRN: [id number]  Unit/Bed#: S -01 I Date of Admission: 2/7/2024   Date of Service: 2/11/2024 I Hospital Day: 3    Assessment/Plan   * Rectal bleeding  Assessment & Plan  Patient with hematochezia  Workup including abdominal imaging and sigmoidoscopy and colonoscopy revealed ulcer in the distal rectum  Monitor hemoglobin  Present on clear liquid diet and bowel prep per GI  GI plans for better bowel prep and colonoscopy on Monday noted  GI following    Acute blood loss anemia  Assessment & Plan  Acute blood loss anemia due to hematochezia  S/p packed cell transfusion  Follow-up on iron studies  Iron supplementation  Monitor hemoglobin      Drug induced constipation  Assessment & Plan  Patient presented with constipation  Abdominal imaging revealed moderate fecal impaction in the rectum  Patient receiving bowel regimen with some relief  S/p sigmoidoscopy and colonoscopy  Continue bowel regimen  GI following      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)  Assessment & Plan  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  S/P concurrent cisplatin + R since 11/15 (last RT 1/10/24)  Outpatient Oncology Follow-up    Severe protein-calorie malnutrition (HCC)  Assessment & Plan  Malnutrition Findings:   Adult Malnutrition type: Acute illness (in the setting of chronic illness)  Adult Degree of Malnutrition: Other severe protein calorie malnutrition  Malnutrition Characteristics: Fat loss, Muscle loss, Weight loss                  360 Statement: Severe malnutrition related to inadequate oral and enteral intake, catabolic illness, dysphagia as evidenced by severe loss of fat and muscle, temples, extremities, clavicle, 22.6 % weight decrease x < 1 year. Currently treated with nutrition consult, enteral feeding recomendations provided.    BMI Findings:  Adult BMI Classifications: Underweight < 18.5        Body mass index is 15.95  kg/m².     PEG tube in place 2/2 oral SCC limiting oral intake  Patient not using his tube feedings 2/2 inducing nausea and unable to tolerate said feedings  No nausea or vomiting at admission    Plan:  Resume tube feedings once cleared by GI  Nutrition consulted, patient at high risk for refeeding syndrome. recs appreciated.  Per nutrition: When indicated; Initiate Jevity 1.5 at 10 ml/hr continuous x 24 hours monitoring electrolytes. Continue IVF, flush 30 ml q 4. Advance by 10 ml q 12 hours to goal rate of 60 ml/hr providing 2160 kcal, 92 g protein, 1094 ml free water. When closer to goal rate, d/c IVF and flush 125 ml q 4 hours for additional 750 ml water. Once tolerating goal, can discuss bolus feedings as appropriate      Weakness  Assessment & Plan  Multifactorial  Supportive cares  Physical therapy    Alcohol abuse  Assessment & Plan  History of alcohol abuse  Continue CIWA protocol  Thiamine, folic acid    History of CVA (cerebrovascular accident)  Assessment & Plan  History right pontine infarct CVA  Continue aspirin, atorvastatin    Cancer-related pain  Assessment & Plan  Continue home roxicodone 20 mg   Narcan ordered  Supportive cares    Hyponatremia  Assessment & Plan  Patient with chronic hyponatremia  Multifactorial  Monitor sodium levels closely               VTE Pharmacologic Prophylaxis: VTE Score: 6 High Risk (Score >/= 5) - Pharmacological DVT Prophylaxis Ordered: heparin. Sequential Compression Devices Ordered.    Mobility:   Basic Mobility Inpatient Raw Score: 18  JH-HLM Goal: 6: Walk 10 steps or more  JH-HLM Achieved: 2: Bed activities/Dependent transfer  HLM Goal NOT achieved. Continue with multidisciplinary rounding and encourage appropriate mobility to improve upon HLM goals.    Patient Centered Rounds: I performed bedside rounds with nursing staff today.   Discussions with Specialists or Other Care Team Provider: Case management    Education and Discussions with Family / Patient:  Patient,  called updated Father Devin questions answered.     Total Time Spent on Date of Encounter in care of patient: 30 mins. This time was spent on one or more of the following: performing physical exam; counseling and coordination of care; obtaining or reviewing history; documenting in the medical record; reviewing/ordering tests, medications or procedures; communicating with other healthcare professionals and discussing with patient's family/caregivers.    Current Length of Stay: 3 day(s)  Current Patient Status: Inpatient   Certification Statement: The patient will continue to require additional inpatient hospital stay due to as outlined  Discharge Plan:  Plan for colonoscopy noted    Code Status: Level 1 - Full Code    Subjective:     Comfortably sitting up in bed  Reports feeling okay  Encourage out of bed into chair        Objective:     Vitals:   Temp (24hrs), Av.9 °F (36.6 °C), Min:97.4 °F (36.3 °C), Max:98.2 °F (36.8 °C)    Temp:  [97.4 °F (36.3 °C)-98.2 °F (36.8 °C)] 97.4 °F (36.3 °C)  HR:  [76-84] 79  Resp:  [16-20] 18  BP: (121-160)/() 160/100  SpO2:  [91 %-99 %] 99 %  Body mass index is 15.95 kg/m².     Input and Output Summary (last 24 hours):     Intake/Output Summary (Last 24 hours) at 2024 1307  Last data filed at 2/10/2024 2301  Gross per 24 hour   Intake 1390.83 ml   Output 200 ml   Net 1190.83 ml       Physical Exam:   Physical Exam     Comfortably in bed  Features of protein calorie malnutrition noted  Neck supple  Lungs emphysematous  Lungs diminished breath sounds  Heart sounds S1-S2 noted  Abdomen soft nontender  Awake follows commands  No pedal edema  No rash    Additional Data:     Labs:  Results from last 7 days   Lab Units 24  0539 24  0522 24  1524   WBC Thousand/uL 7.00   < > 9.11   HEMOGLOBIN g/dL 8.6*   < > 10.5*   HEMATOCRIT % 25.4*   < > 31.0*   PLATELETS Thousands/uL 182   < > 269   BANDS PCT %  --   --  2   NEUTROS PCT % 74   < >  --    LYMPHS PCT % 6*    < >  --    LYMPHO PCT %  --   --  7*   MONOS PCT % 17*   < >  --    MONO PCT %  --   --  13*   EOS PCT % 2   < > 0    < > = values in this interval not displayed.     Results from last 7 days   Lab Units 02/11/24  0539 02/10/24  0443   SODIUM mmol/L 132* 132*   POTASSIUM mmol/L 3.5 4.1   CHLORIDE mmol/L 104 103   CO2 mmol/L 26 27   BUN mg/dL 10 17   CREATININE mg/dL 0.51* 0.56*   ANION GAP mmol/L 2 2   CALCIUM mg/dL 7.4* 7.6*   ALBUMIN g/dL  --  2.4*   TOTAL BILIRUBIN mg/dL  --  0.57   ALK PHOS U/L  --  57   ALT U/L  --  5*   AST U/L  --  7*   GLUCOSE RANDOM mg/dL 88 101     Results from last 7 days   Lab Units 02/09/24  0452   INR  1.13             Results from last 7 days   Lab Units 02/08/24  0720   LACTIC ACID mmol/L 0.9       Lines/Drains:  Invasive Devices       Central Venous Catheter Line  Duration             Port A Cath 11/07/23 Left Internal jugular 96 days              Peripheral Intravenous Line  Duration             Peripheral IV 02/09/24 Left;Proximal;Ventral (anterior) Forearm 2 days              Drain  Duration             Gastrostomy/Enterostomy Percutaneous Interventional Gastrostomy 16 Fr. LUQ 62 days                    Central Line:  Goal for removal:  History of malignancy noted             Imaging: Reviewed radiology reports from this admission including: procedure reports    Recent Cultures (last 7 days):         Last 24 Hours Medication List:   Current Facility-Administered Medications   Medication Dose Route Frequency Provider Last Rate    acetaminophen  650 mg Oral Q4H PRN Flower Harden PA-C      aspirin  81 mg Oral Daily Kacey Coleman, DO      atorvastatin  40 mg Oral QPM Flower Harden PA-C      folic acid 1 mg, thiamine (VITAMIN B1) 100 mg in sodium chloride 0.9 % 100 mL IV piggyback   Intravenous Daily Kacey Coleman DO      heparin (porcine)  5,000 Units Subcutaneous Q8H [facility] Kacey Coleman,       iron sucrose  200 mg Intravenous Daily Kacey Peter  Jared,       magnesium Oxide  400 mg Oral BID Tracie Shah MD      magnesium sulfate  2 g Intravenous Once Tracie Shah MD 2 g (02/11/24 1151)    oxyCODONE  20 mg Oral Q4H PRN Flower Harden PA-C      polyethylene glycol  4,000 mL Oral Once Flower Harden PA-C      polyethylene glycol  17 g Per PEG Tube TID Flower Harden PA-C      sodium chloride  50 mL/hr Intravenous Continuous Flower Harden PA-C 50 mL/hr (02/10/24 1134)        Today, Patient Was Seen By: Tracie Shah MD    **Please Note: This note may have been constructed using a voice recognition system.**

## 2024-02-11 NOTE — PLAN OF CARE
Problem: Prexisting or High Potential for Compromised Skin Integrity  Goal: Skin integrity is maintained or improved  Description: INTERVENTIONS:  - Identify patients at risk for skin breakdown  - Assess and monitor skin integrity  - Assess and monitor nutrition and hydration status  - Monitor labs   - Assess for incontinence   - Turn and reposition patient  - Assist with mobility/ambulation  - Relieve pressure over bony prominences  - Avoid friction and shearing  - Provide appropriate hygiene as needed including keeping skin clean and dry  - Evaluate need for skin moisturizer/barrier cream  - Collaborate with interdisciplinary team   - Patient/family teaching  - Consider wound care consult   Outcome: Progressing     Problem: PAIN - ADULT  Goal: Verbalizes/displays adequate comfort level or baseline comfort level  Description: Interventions:  - Encourage patient to monitor pain and request assistance  - Assess pain using appropriate pain scale  - Administer analgesics based on type and severity of pain and evaluate response  - Implement non-pharmacological measures as appropriate and evaluate response  - Consider cultural and social influences on pain and pain management  - Notify physician/advanced practitioner if interventions unsuccessful or patient reports new pain  Outcome: Progressing     Problem: INFECTION - ADULT  Goal: Absence or prevention of progression during hospitalization  Description: INTERVENTIONS:  - Assess and monitor for signs and symptoms of infection  - Monitor lab/diagnostic results  - Monitor all insertion sites, i.e. indwelling lines, tubes, and drains  - Monitor endotracheal if appropriate and nasal secretions for changes in amount and color  - [place] appropriate cooling/warming therapies per order  - Administer medications as ordered  - Instruct and encourage patient and family to use good hand hygiene technique  - Identify and instruct in appropriate isolation precautions for  identified infection/condition  Outcome: Progressing  Goal: Absence of fever/infection during neutropenic period  Description: INTERVENTIONS:  - Monitor WBC    Outcome: Progressing     Problem: SAFETY ADULT  Goal: Patient will remain free of falls  Description: INTERVENTIONS:  - Educate patient/family on patient safety including physical limitations  - Instruct patient to call for assistance with activity   - Consult OT/PT to assist with strengthening/mobility   - Keep Call bell within reach  - Keep bed low and locked with side rails adjusted as appropriate  - Keep care items and personal belongings within reach  - Initiate and maintain comfort rounds  - Make Fall Risk Sign visible to staff  - Obtain necessary fall risk management equipment  - Apply yellow socks and bracelet for high fall risk patients  - Consider moving patient to room near nurses station  Outcome: Progressing  Goal: Maintain or return to baseline ADL function  Description: INTERVENTIONS:  -  Assess patient's ability to carry out ADLs; assess patient's baseline for ADL function and identify physical deficits which impact ability to perform ADLs (bathing, care of mouth/teeth, toileting, grooming, dressing, etc.)  - Assess/evaluate cause of self-care deficits   - Assess range of motion  - Assess patient's mobility; develop plan if impaired  - Assess patient's need for assistive devices and provide as appropriate  - Encourage maximum independence but intervene and supervise when necessary  - Involve family in performance of ADLs  - Assess for home care needs following discharge   - Consider OT consult to assist with ADL evaluation and planning for discharge  - Provide patient education as appropriate  Outcome: Progressing  Goal: Maintains/Returns to pre admission functional level  Description: INTERVENTIONS:  - Perform AM-PAC 6 Click Basic Mobility/ Daily Activity assessment daily.  - Set and communicate daily mobility goal to care team and  patient/family/caregiver.   - Collaborate with rehabilitation services on mobility goals if consulted  - Out of bed for toileting  - Record patient progress and toleration of activity level   Outcome: Progressing     Problem: DISCHARGE PLANNING  Goal: Discharge to home or other facility with appropriate resources  Description: INTERVENTIONS:  - Identify barriers to discharge w/patient and caregiver  - Arrange for needed discharge resources and transportation as appropriate  - Identify discharge learning needs (meds, wound care, etc.)  - Arrange for interpretive services to assist at discharge as needed  - Refer to Case Management Department for coordinating discharge planning if the patient needs post-hospital services based on physician/advanced practitioner order or complex needs related to functional status, cognitive ability, or social support system  Outcome: Progressing     Problem: Knowledge Deficit  Goal: Patient/family/caregiver demonstrates understanding of disease process, treatment plan, medications, and discharge instructions  Description: Complete learning assessment and assess knowledge base.  Interventions:  - Provide teaching at level of understanding  - Provide teaching via preferred learning methods  Outcome: Progressing     Problem: Nutrition/Hydration-ADULT  Goal: Nutrient/Hydration intake appropriate for improving, restoring or maintaining nutritional needs  Description: Monitor and assess patient's nutrition/hydration status for malnutrition. Collaborate with interdisciplinary team and initiate plan and interventions as ordered.  Monitor patient's weight and dietary intake as ordered or per policy. Utilize nutrition screening tool and intervene as necessary. Determine patient's food preferences and provide high-protein, high-caloric foods as appropriate.     INTERVENTIONS:  - Monitor oral intake, urinary output, labs, and treatment plans  - Assess nutrition and hydration status and recommend  course of action  - Evaluate amount of meals eaten  - Assist patient with eating if necessary   - Allow adequate time for meals  - Recommend/ encourage appropriate diets, oral nutritional supplements, and vitamin/mineral supplements  - Order, calculate, and assess calorie counts as needed  - Recommend, monitor, and adjust tube feedings and TPN/PPN based on assessed needs  - Assess need for intravenous fluids  - Provide specific nutrition/hydration education as appropriate  - Include patient/family/caregiver in decisions related to nutrition  Outcome: Progressing

## 2024-02-11 NOTE — ASSESSMENT & PLAN NOTE
History of alcohol abuse  Continue [facility] protocol  Thiamine, folic acid

## 2024-02-11 NOTE — ASSESSMENT & PLAN NOTE
Patient with hematochezia  Workup including abdominal imaging and sigmoidoscopy and colonoscopy revealed ulcer in the distal rectum  Monitor hemoglobin  Present on clear liquid diet and bowel prep per GI  GI plans for better bowel prep and colonoscopy on Monday noted  GI following

## 2024-02-11 NOTE — ASSESSMENT & PLAN NOTE
Encounter Date: 4/7/2018       History     Chief Complaint   Patient presents with    URI     Pt presents to ER with c/o URI symptoms for couple days, productive cough of green sputum.  Denies any nausea or vomiting has not taken anything for fever.      The history is provided by the patient. No  was used.   Cough   This is a new problem. The current episode started today. The problem occurs every few minutes. The problem has been unchanged. The cough is non-productive. The maximum temperature recorded prior to her arrival was 100 - 100.9 F. Pertinent negatives include no chest pain, no chills, no sweats, no weight loss, no ear congestion, no ear pain, no headaches, no rhinorrhea, no sore throat, no myalgias, no shortness of breath, no wheezing and no eye redness. She has tried nothing for the symptoms. She is not a smoker. Her past medical history does not include bronchitis, pneumonia, bronchiectasis, COPD, emphysema or asthma.     Review of patient's allergies indicates:  No Known Allergies  History reviewed. No pertinent past medical history.  History reviewed. No pertinent surgical history.  Family History   Problem Relation Age of Onset    Breast cancer Neg Hx     Colon cancer Neg Hx     Ovarian cancer Neg Hx      Social History   Substance Use Topics    Smoking status: Never Smoker    Smokeless tobacco: Not on file    Alcohol use No     Review of Systems   Constitutional: Positive for fever. Negative for appetite change, chills and weight loss.   HENT: Positive for congestion. Negative for dental problem, ear discharge, ear pain, hearing loss, mouth sores, rhinorrhea, sore throat and trouble swallowing.    Eyes: Negative.  Negative for pain, discharge and redness.   Respiratory: Positive for cough. Negative for shortness of breath and wheezing.    Cardiovascular: Negative.  Negative for chest pain.   Gastrointestinal: Negative.  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   Endocrine: Negative.    Genitourinary: Negative.  Negative for dyspareunia, dysuria, hematuria, vaginal bleeding, vaginal discharge and vaginal pain.   Musculoskeletal: Negative for back pain, myalgias and neck pain.   Skin: Negative.  Negative for rash.   Allergic/Immunologic: Negative.    Neurological: Negative.  Negative for facial asymmetry, speech difficulty, light-headedness and headaches.   Hematological: Negative.    Psychiatric/Behavioral: Negative.  Negative for agitation, dysphoric mood and sleep disturbance.   All other systems reviewed and are negative.      Physical Exam     Initial Vitals [04/07/18 2038]   BP Pulse Resp Temp SpO2   (!) 131/94 103 18 (!) 101.3 °F (38.5 °C) 100 %      MAP       106.33         Physical Exam    Nursing note and vitals reviewed.  Constitutional: She appears well-developed and well-nourished. She is not diaphoretic.  Non-toxic appearance. She does not appear ill. No distress.   HENT:   Head: Normocephalic and atraumatic.   Nose: Mucosal edema present. No rhinorrhea. Right sinus exhibits no maxillary sinus tenderness and no frontal sinus tenderness. Left sinus exhibits no maxillary sinus tenderness and no frontal sinus tenderness.   Mouth/Throat: Uvula is midline and oropharynx is clear and moist. No uvula swelling. No oropharyngeal exudate, posterior oropharyngeal edema, posterior oropharyngeal erythema or tonsillar abscesses.   Eyes: Conjunctivae are normal. Right eye exhibits no discharge. Left eye exhibits no discharge.   Neck: Normal range of motion.   Cardiovascular: Normal rate, regular rhythm, normal heart sounds and intact distal pulses. Exam reveals no gallop and no friction rub.    No murmur heard.  Pulmonary/Chest: Breath sounds normal. No respiratory distress. She has no wheezes. She has no rhonchi. She has no rales. She exhibits no tenderness.   Musculoskeletal: Normal range of motion.   Neurological:  She is alert and oriented to person, place, and time.   Skin: Skin is warm and dry. No rash noted.   Psychiatric: She has a normal mood and affect. Her behavior is normal. Judgment and thought content normal.         ED Course   Procedures  Labs Reviewed   POCT URINE PREGNANCY   POCT INFLUENZA A/B        Imaging Results          X-Ray Chest PA And Lateral (Final result)  Result time 04/07/18 21:21:09    Final result by Te Martinez MD (04/07/18 21:21:09)                 Impression:      1. No acute cardiopulmonary process.      Electronically signed by: Te Martinez MD  Date:    04/07/2018  Time:    21:21             Narrative:    EXAMINATION:  XR CHEST PA AND LATERAL    CLINICAL HISTORY:  Fever, unspecified    TECHNIQUE:  PA and lateral views of the chest were performed.    COMPARISON:  None    FINDINGS:  The cardiomediastinal silhouette is not enlarged.  There is no pleural effusion.  The trachea is midline.  The lungs are symmetrically expanded bilaterally without evidence of acute parenchymal process. No large focal consolidation seen.  There is no pneumothorax.  The osseous structures are unremarkable.                                   Medical Decision Making:   Initial Assessment:   Acute febrile illness, bronchitis  Differential Diagnosis:   Influenza, pneumonia  Clinical Tests:   Lab Tests: Reviewed and Ordered       <> Summary of Lab: Influenza test negative  Radiological Study: Ordered and Reviewed  ED Management:  1) the patient will be discharged home on Azithromycin pack to prevent infection and Promethazine DM for cough.  2) Pt instructed to drink plenty of fluids to loosen secretions  3) Pt instructed that he may take over-the-counter Mucinex (NOT DM) as needed  4) Pt instructed to take over-the-counter Tylenol or Motrin for fever/body aches   5) Pt instructed to return to ER as needed if symptoms worsen/fail to improve  6) Pt instructed to follow-up with primary care provider in 2 days  7)  Pt verbalized understanding of discharge instructions and treatment plan                        Clinical Impression:   The primary encounter diagnosis was Bronchitis. A diagnosis of Acute febrile illness was also pertinent to this visit.                           Toussaint Battley III, STACI  04/07/18 [id number]

## 2024-02-12 ENCOUNTER — ANESTHESIA EVENT (INPATIENT)
Dept: GASTROENTEROLOGY | Facility: HOSPITAL | Age: 59
DRG: 254 | End: 2024-02-12
Payer: COMMERCIAL

## 2024-02-12 ENCOUNTER — APPOINTMENT (INPATIENT)
Dept: GASTROENTEROLOGY | Facility: HOSPITAL | Age: 59
DRG: 254 | End: 2024-02-12
Payer: COMMERCIAL

## 2024-02-12 ENCOUNTER — ANESTHESIA (INPATIENT)
Dept: GASTROENTEROLOGY | Facility: HOSPITAL | Age: 59
DRG: 254 | End: 2024-02-12
Payer: COMMERCIAL

## 2024-02-12 PROBLEM — E87.8 ELECTROLYTE ABNORMALITY: Status: ACTIVE | Noted: 2024-02-12

## 2024-02-12 LAB
ANION GAP SERPL CALCULATED.3IONS-SCNC: 3 MMOL/L
BASOPHILS # BLD AUTO: 0.03 THOUSANDS/ÂΜL (ref 0–0.1)
BASOPHILS NFR BLD AUTO: 1 % (ref 0–1)
BUN SERPL-MCNC: 5 MG/DL (ref 5–25)
CALCIUM SERPL-MCNC: 7.2 MG/DL (ref 8.4–10.2)
CHLORIDE SERPL-SCNC: 101 MMOL/L (ref 96–108)
CO2 SERPL-SCNC: 29 MMOL/L (ref 21–32)
CREAT SERPL-MCNC: 0.49 MG/DL (ref 0.6–1.3)
EOSINOPHIL # BLD AUTO: 0.09 THOUSAND/ÂΜL (ref 0–0.61)
EOSINOPHIL NFR BLD AUTO: 2 % (ref 0–6)
ERYTHROCYTE [DISTWIDTH] IN BLOOD BY AUTOMATED COUNT: 17.1 % (ref 11.6–15.1)
GFR SERPL CREATININE-BSD FRML MDRD: 118 ML/MIN/1.73SQ M
GLUCOSE SERPL-MCNC: 87 MG/DL (ref 65–140)
HCT VFR BLD AUTO: 23.7 % (ref 36.5–49.3)
HGB BLD-MCNC: 8 G/DL (ref 12–17)
IMM GRANULOCYTES # BLD AUTO: 0.04 THOUSAND/UL (ref 0–0.2)
IMM GRANULOCYTES NFR BLD AUTO: 1 % (ref 0–2)
LYMPHOCYTES # BLD AUTO: 0.39 THOUSANDS/ÂΜL (ref 0.6–4.47)
LYMPHOCYTES NFR BLD AUTO: 8 % (ref 14–44)
MAGNESIUM SERPL-MCNC: 1.6 MG/DL (ref 1.9–2.7)
MCH RBC QN AUTO: 31.6 PG (ref 26.8–34.3)
MCHC RBC AUTO-ENTMCNC: 33.8 G/DL (ref 31.4–37.4)
MCV RBC AUTO: 94 FL (ref 82–98)
MONOCYTES # BLD AUTO: 1.07 THOUSAND/ÂΜL (ref 0.17–1.22)
MONOCYTES NFR BLD AUTO: 22 % (ref 4–12)
NEUTROPHILS # BLD AUTO: 3.34 THOUSANDS/ÂΜL (ref 1.85–7.62)
NEUTS SEG NFR BLD AUTO: 66 % (ref 43–75)
NRBC BLD AUTO-RTO: 0 /100 WBCS
PLATELET # BLD AUTO: 193 THOUSANDS/UL (ref 149–390)
PMV BLD AUTO: 9.1 FL (ref 8.9–12.7)
POTASSIUM SERPL-SCNC: 2.8 MMOL/L (ref 3.5–5.3)
RBC # BLD AUTO: 2.53 MILLION/UL (ref 3.88–5.62)
SODIUM SERPL-SCNC: 133 MMOL/L (ref 135–147)
WBC # BLD AUTO: 4.96 THOUSAND/UL (ref 4.31–10.16)

## 2024-02-12 PROCEDURE — 0DJD8ZZ INSPECTION OF LOWER INTESTINAL TRACT, VIA NATURAL OR ARTIFICIAL OPENING ENDOSCOPIC: ICD-10-PCS | Performed by: INTERNAL MEDICINE

## 2024-02-12 PROCEDURE — 83735 ASSAY OF MAGNESIUM: CPT | Performed by: INTERNAL MEDICINE

## 2024-02-12 PROCEDURE — 97166 OT EVAL MOD COMPLEX 45 MIN: CPT

## 2024-02-12 PROCEDURE — 99232 SBSQ HOSP IP/OBS MODERATE 35: CPT | Performed by: INTERNAL MEDICINE

## 2024-02-12 PROCEDURE — 45378 DIAGNOSTIC COLONOSCOPY: CPT | Performed by: INTERNAL MEDICINE

## 2024-02-12 PROCEDURE — 80048 BASIC METABOLIC PNL TOTAL CA: CPT | Performed by: INTERNAL MEDICINE

## 2024-02-12 PROCEDURE — 97163 PT EVAL HIGH COMPLEX 45 MIN: CPT

## 2024-02-12 PROCEDURE — 85025 COMPLETE CBC W/AUTO DIFF WBC: CPT | Performed by: INTERNAL MEDICINE

## 2024-02-12 RX ORDER — PROPOFOL 10 MG/ML
INJECTION, EMULSION INTRAVENOUS AS NEEDED
Status: DISCONTINUED | OUTPATIENT
Start: 2024-02-12 | End: 2024-02-12

## 2024-02-12 RX ORDER — SODIUM CHLORIDE, SODIUM LACTATE, POTASSIUM CHLORIDE, CALCIUM CHLORIDE 600; 310; 30; 20 MG/100ML; MG/100ML; MG/100ML; MG/100ML
INJECTION, SOLUTION INTRAVENOUS CONTINUOUS PRN
Status: DISCONTINUED | OUTPATIENT
Start: 2024-02-12 | End: 2024-02-12

## 2024-02-12 RX ORDER — PROPOFOL 10 MG/ML
INJECTION, EMULSION INTRAVENOUS CONTINUOUS PRN
Status: DISCONTINUED | OUTPATIENT
Start: 2024-02-12 | End: 2024-02-12

## 2024-02-12 RX ORDER — PHENYLEPHRINE HCL IN 0.9% NACL 1 MG/10 ML
SYRINGE (ML) INTRAVENOUS AS NEEDED
Status: DISCONTINUED | OUTPATIENT
Start: 2024-02-12 | End: 2024-02-12

## 2024-02-12 RX ORDER — POTASSIUM CHLORIDE 14.9 MG/ML
20 INJECTION INTRAVENOUS
Qty: 200 ML | Refills: 0 | Status: DISPENSED | OUTPATIENT
Start: 2024-02-12 | End: 2024-02-12

## 2024-02-12 RX ORDER — POTASSIUM CHLORIDE 20 MEQ/1
40 TABLET, EXTENDED RELEASE ORAL ONCE
Status: COMPLETED | OUTPATIENT
Start: 2024-02-12 | End: 2024-02-12

## 2024-02-12 RX ORDER — POLYETHYLENE GLYCOL 3350 17 G/17G
17 POWDER, FOR SOLUTION ORAL 2 TIMES DAILY
Status: DISCONTINUED | OUTPATIENT
Start: 2024-02-13 | End: 2024-02-13 | Stop reason: HOSPADM

## 2024-02-12 RX ORDER — MAGNESIUM SULFATE HEPTAHYDRATE 40 MG/ML
2 INJECTION, SOLUTION INTRAVENOUS ONCE
Status: COMPLETED | OUTPATIENT
Start: 2024-02-12 | End: 2024-02-12

## 2024-02-12 RX ORDER — LIDOCAINE HYDROCHLORIDE 10 MG/ML
INJECTION, SOLUTION EPIDURAL; INFILTRATION; INTRACAUDAL; PERINEURAL AS NEEDED
Status: DISCONTINUED | OUTPATIENT
Start: 2024-02-12 | End: 2024-02-12

## 2024-02-12 RX ADMIN — SODIUM CHLORIDE, SODIUM LACTATE, POTASSIUM CHLORIDE, AND CALCIUM CHLORIDE: .6; .31; .03; .02 INJECTION, SOLUTION INTRAVENOUS at 13:00

## 2024-02-12 RX ADMIN — OXYCODONE HYDROCHLORIDE 20 MG: 10 TABLET ORAL at 01:40

## 2024-02-12 RX ADMIN — THIAMINE HYDROCHLORIDE: 100 INJECTION, SOLUTION INTRAMUSCULAR; INTRAVENOUS at 08:09

## 2024-02-12 RX ADMIN — HEPARIN SODIUM 5000 UNITS: 5000 INJECTION INTRAVENOUS; SUBCUTANEOUS at 21:15

## 2024-02-12 RX ADMIN — IRON SUCROSE 200 MG: 20 INJECTION, SOLUTION INTRAVENOUS at 14:30

## 2024-02-12 RX ADMIN — Medication 400 MG: at 17:12

## 2024-02-12 RX ADMIN — POLYETHYLENE GLYCOL 3350 17 G: 17 POWDER, FOR SOLUTION ORAL at 08:04

## 2024-02-12 RX ADMIN — OXYCODONE HYDROCHLORIDE 20 MG: 10 TABLET ORAL at 21:14

## 2024-02-12 RX ADMIN — Medication 200 MCG: at 13:32

## 2024-02-12 RX ADMIN — OXYCODONE HYDROCHLORIDE 20 MG: 10 TABLET ORAL at 14:30

## 2024-02-12 RX ADMIN — OXYCODONE HYDROCHLORIDE 20 MG: 10 TABLET ORAL at 08:04

## 2024-02-12 RX ADMIN — POTASSIUM CHLORIDE 40 MEQ: 1500 TABLET, EXTENDED RELEASE ORAL at 09:32

## 2024-02-12 RX ADMIN — Medication 200 MCG: at 13:19

## 2024-02-12 RX ADMIN — Medication 400 MG: at 08:05

## 2024-02-12 RX ADMIN — POTASSIUM CHLORIDE 20 MEQ: 14.9 INJECTION, SOLUTION INTRAVENOUS at 09:34

## 2024-02-12 RX ADMIN — HEPARIN SODIUM 5000 UNITS: 5000 INJECTION INTRAVENOUS; SUBCUTANEOUS at 05:33

## 2024-02-12 RX ADMIN — HEPARIN SODIUM 5000 UNITS: 5000 INJECTION INTRAVENOUS; SUBCUTANEOUS at 14:31

## 2024-02-12 RX ADMIN — ASPIRIN 81 MG CHEWABLE TABLET 81 MG: 81 TABLET CHEWABLE at 08:04

## 2024-02-12 RX ADMIN — MAGNESIUM SULFATE HEPTAHYDRATE 2 G: 40 INJECTION, SOLUTION INTRAVENOUS at 09:40

## 2024-02-12 RX ADMIN — ATORVASTATIN CALCIUM 40 MG: 40 TABLET, FILM COATED ORAL at 17:12

## 2024-02-12 RX ADMIN — LIDOCAINE HYDROCHLORIDE 50 MG: 10 INJECTION, SOLUTION EPIDURAL; INFILTRATION; INTRACAUDAL; PERINEURAL at 13:17

## 2024-02-12 RX ADMIN — PROPOFOL 100 MG: 10 INJECTION, EMULSION INTRAVENOUS at 13:17

## 2024-02-12 RX ADMIN — PROPOFOL 80 MCG/KG/MIN: 10 INJECTION, EMULSION INTRAVENOUS at 13:17

## 2024-02-12 NOTE — OCCUPATIONAL THERAPY NOTE
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    Stroke (HCC)      Past Surgical History  Past Surgical History:   Procedure Laterality Date    EGD      IR GASTROSTOMY TUBE PLACEMENT  12/11/2023    IR PORT PLACEMENT  11/7/2023    KNEE ARTHROSCOPY Left     MULTIPLE TOOTH EXTRACTIONS N/A 10/16/2023    Procedure: EXTRACTION OF ALL REMAINING TEETH;  Surgeon: Humble Otero DMD;  [facility]: [facility];  Service: Oral Surgery        02/12/24 0859   OT Last Visit   OT Visit Date 02/12/24   Note Type   Note type Evaluation   Pain Assessment   Pain Assessment Tool 0-10   Pain Score No Pain   Restrictions/Precautions   Weight Bearing Precautions Per Order No   Other Precautions Cognitive;Chair Alarm;Bed Alarm;Fall Risk;Pain;Multiple lines  (IV)   Home Living   Type of Home House   Home Layout Two level;Bed/bath upstairs  (sleeps on first floor, bathroom upstairs)   Bathroom Toilet Standard   Bathroom Accessibility Accessible  (up flight of REJI)   Home Equipment   (reports someone took his cane)   Additional Comments Per last admission pt was living w/ sig other in an apartment. Reports his sig other kicked him out, has been staying w/ [employer] instead. Plans to DC to his [place]   Prior Function   Level of [place] Independent with ADLs;Independent with functional mobility;Independent with IADLS   Lives With Friend(s)  "  Receives Help From Friend(s)   IADLs Independent with medication management;Independent with meal prep   Falls in the last 6 months 1 to 4  (1 per pt)   Vocational Unemployed  (was working however is trying to get disability)   Comments Pt has been fully (I) PTA, no AD in house, uses SPC in community. Has supportive friends.   Lifestyle   Autonomy Pt is living w/ buddies in a 2 level house and is fully (I) PTA, SPC, (+) falls, (-)    Reciprocal Relationships Supportive buddies   Service to Others Currently unemployed and looking to apply for disability   Intrinsic Gratification Pt enjoys working on construction projects. Is currently working w/ his buddies to put a bathroom in on first floor of house   General   Additional Pertinent History Pt admitted w/ rectal bleeding. S/p colonoscopy, scheduled for another one today 2/12. PMH includes: squamous cell carcinoma of oral cavity, hx of CVA (pontine) w/ L sided deficits, alcohol abuse, severe protein-calorie deficiency, anemia, HTN   Family/Caregiver Present No   Subjective   Subjective \"I'll have to figure it out\" (referring to his relationship w/ sig other)   ADL   Where Assessed Chair   Eating Assistance Unable to assess   Eating Deficit NPO   Grooming Assistance 6  Modified Independent   UB Bathing Assistance 6  Modified Independent   LB Bathing Assistance 5  Supervision/Setup   UB Dressing Assistance 6  Modified independent   LB Dressing Assistance 5  Supervision/Setup   LB Dressing Deficit Setup;Thread LLE into pants;Pull up over hips;Thread RLE into pants   Toileting Assistance  4  Minimal Assistance   Toileting Deficit Setup;Perineal hygiene  (A for pad change, incontinence care)   Bed Mobility   Supine to Sit 6  Modified independent   Additional items HOB elevated   Additional Comments OOB to recliner at end of session   Transfers   Sit to Stand 6  Modified independent   Additional items Impulsive   Stand to Sit 6  Modified independent   Additional " items Impulsive   Additional Comments Impuslively transfers from EOB>recliner w/ no AD.   Functional Mobility   Functional Mobility 5  Supervision   Additional Comments Household distance w/ SPC and S, impulsive, cues for pacing   Additional items SPC   Balance   Static Sitting Normal   Dynamic Sitting Good   Static Standing Good   Dynamic Standing Fair +   Activity Tolerance   Activity Tolerance Patient tolerated treatment well   Medical Staff Made Aware Care coordinated w/ PT Patsy   Nurse Made Aware yes, RN ok to see pt   RUE Assessment   RUE Assessment WFL   RUE Strength   R Shoulder Flexion 4+/5   R Elbow Flexion 4+/5   R Elbow Extension 4+/5   LUE Assessment   LUE Assessment WFL   LUE Strength   L Elbow Extension 4/5   L Shoulder Flexion 4/5   L Elbow Flexion 4/5   Hand Function   Gross Motor Coordination Functional   Fine Motor Coordination Functional   Hand Function Comments R handed   Sensation   Light Touch No apparent deficits   Vision-Basic Assessment   Current Vision Does not wear glasses   Cognition   Overall Cognitive Status WFL   Arousal/Participation Alert;Cooperative   Attention Within functional limits   Orientation Level Oriented X4   Memory Decreased recall of precautions   Following Commands Follows multistep commands with increased time or repetition   Comments Pleasant, impulsive at times. Cues for safety, pacing.   Assessment   Limitation Decreased Safe judgement during ADL  (insight, response time)   Prognosis Good   Assessment Pt is a 58 y.o. male seen for OT evaluation s/p admit to [facility] on [facility]/7/2024 w/Rectal bleeding. Prior to admission, pt was living with friends in a 2 level house, (I) with ADLs, (I) with IADLs, (+) falls, (-) . Personal and environmental factors affecting patient at time of evaluation include difficulty completing IADLs. Personal factors supporting patient at time of evaluation include age, (I) PLOF, supportive friends, attitude towards recovery,  and accessible home environment. Based upon this evaluation, pt is functioning at/near baseline level of independence for ADLS/mobility w/ SPC. No further acute OT needs identified at this time. Recommend continued active ADL participation and mobilization with hospital staff while in the hospital to increase pt’s endurance and strength upon D/C. From OT standpoint, recommend D/C to home with social support when medically cleared. D/C pt from OT caseload at this time.   Goals   Patient Goals to go home   Plan   Treatment Interventions   (Eval only)   Discharge Recommendation   Rehab Resource Intensity Level, OT No post-acute rehabilitation needs   Additional Comments  The patient's raw score on the AM-PAC Daily Activity Inpatient Short Form is 21. A raw score of greater than or equal to 19 suggests the patient may benefit from discharge to home. Please refer to the recommendation of the Occupational Therapist for safe discharge planning.   AM-PAC Daily Activity Inpatient   Lower Body Dressing 3   Bathing 3   Toileting 3   Upper Body Dressing 4   Grooming 4   Eating 4   Daily Activity Raw Score 21   Daily Activity Standardized Score (Calc for Raw Score >=11) 44.27   AM-PAC Applied Cognition Inpatient   Following a Speech/Presentation 4   Understanding Ordinary Conversation 4   Taking Medications 3   Remembering Where Things Are Placed or Put Away 4   Remembering List of 4-5 Errands 3   Taking Care of Complicated Tasks 3   Applied Cognition Raw Score 21   Applied Cognition Standardized Score 44.3   End of Consult   Patient Position at End of Consult Bedside chair;Bed/Chair alarm activated;All needs within reach   Nurse Communication Nurse aware of consult     BOZENA Bland, OTR/L  [place] License [id number]  [place] License [id number]

## 2024-02-12 NOTE — ANESTHESIA POSTPROCEDURE EVALUATION
Post-Op Assessment Note    CV Status:  Stable    Pain management: adequate       Mental Status:  Sleepy   Hydration Status:  Euvolemic   PONV Controlled:  Controlled   Airway Patency:  Patent     Post Op Vitals Reviewed: Yes    No anethesia notable event occurred.    Staff: CRNA             BP   139/65   Temp      Pulse  53   Resp      SpO2   99

## 2024-02-12 NOTE — PLAN OF CARE
Problem: Prexisting or High Potential for Compromised Skin Integrity  Goal: Skin integrity is maintained or improved  Description: INTERVENTIONS:  - Identify patients at risk for skin breakdown  - Assess and monitor skin integrity  - Assess and monitor nutrition and hydration status  - Monitor labs   - Assess for incontinence   - Turn and reposition patient  - Assist with mobility/ambulation  - Relieve pressure over bony prominences  - Avoid friction and shearing  - Provide appropriate hygiene as needed including keeping skin clean and dry  - Evaluate need for skin moisturizer/barrier cream  - Collaborate with interdisciplinary team   - Patient/family teaching  - Consider wound care consult   Outcome: Progressing     Problem: PAIN - ADULT  Goal: Verbalizes/displays adequate comfort level or baseline comfort level  Description: Interventions:  - Encourage patient to monitor pain and request assistance  - Assess pain using appropriate pain scale  - Administer analgesics based on type and severity of pain and evaluate response  - Implement non-pharmacological measures as appropriate and evaluate response  - Consider cultural and social influences on pain and pain management  - Notify physician/advanced practitioner if interventions unsuccessful or patient reports new pain  Outcome: Progressing     Problem: INFECTION - ADULT  Goal: Absence or prevention of progression during hospitalization  Description: INTERVENTIONS:  - Assess and monitor for signs and symptoms of infection  - Monitor lab/diagnostic results  - Monitor all insertion sites, i.e. indwelling lines, tubes, and drains  - Monitor endotracheal if appropriate and nasal secretions for changes in amount and color  - [place] appropriate cooling/warming therapies per order  - Administer medications as ordered  - Instruct and encourage patient and family to use good hand hygiene technique  - Identify and instruct in appropriate isolation precautions for  identified infection/condition  Outcome: Progressing  Goal: Absence of fever/infection during neutropenic period  Description: INTERVENTIONS:  - Monitor WBC    Outcome: Progressing     Problem: SAFETY ADULT  Goal: Patient will remain free of falls  Description: INTERVENTIONS:  - Educate patient/family on patient safety including physical limitations  - Instruct patient to call for assistance with activity   - Consult OT/PT to assist with strengthening/mobility   - Keep Call bell within reach  - Keep bed low and locked with side rails adjusted as appropriate  - Keep care items and personal belongings within reach  - Initiate and maintain comfort rounds  - Make Fall Risk Sign visible to staff  - Offer Toileting every 2 Hours, in advance of need  - Initiate/Maintain bed alarm  - Obtain necessary fall risk management equipment  - Apply yellow socks and bracelet for high fall risk patients  - Consider moving patient to room near nurses station  Outcome: Progressing  Goal: Maintain or return to baseline ADL function  Description: INTERVENTIONS:  -  Assess patient's ability to carry out ADLs; assess patient's baseline for ADL function and identify physical deficits which impact ability to perform ADLs (bathing, care of mouth/teeth, toileting, grooming, dressing, etc.)  - Assess/evaluate cause of self-care deficits   - Assess range of motion  - Assess patient's mobility; develop plan if impaired  - Assess patient's need for assistive devices and provide as appropriate  - Encourage maximum independence but intervene and supervise when necessary  - Involve family in performance of ADLs  - Assess for home care needs following discharge   - Consider OT consult to assist with ADL evaluation and planning for discharge  - Provide patient education as appropriate  Outcome: Progressing  Goal: Maintains/Returns to pre admission functional level  Description: INTERVENTIONS:  - Perform AM-PAC 6 Click Basic Mobility/ Daily Activity  assessment daily.  - Set and communicate daily mobility goal to care team and patient/family/caregiver.   - Collaborate with rehabilitation services on mobility goals if consulted  - Perform Range of Motion 2 times a day.  - Reposition patient every 2 hours.  - Dangle patient 2 times a day  - Stand patient 2 times a day  - Ambulate patient 3 times a day  - Out of bed to chair 3 times a day   - Out of bed for meals 3 times a day  - Out of bed for toileting  - Record patient progress and toleration of activity level   Outcome: Progressing     Problem: DISCHARGE PLANNING  Goal: Discharge to home or other facility with appropriate resources  Description: INTERVENTIONS:  - Identify barriers to discharge w/patient and caregiver  - Arrange for needed discharge resources and transportation as appropriate  - Identify discharge learning needs (meds, wound care, etc.)  - Arrange for interpretive services to assist at discharge as needed  - Refer to Case Management Department for coordinating discharge planning if the patient needs post-hospital services based on physician/advanced practitioner order or complex needs related to functional status, cognitive ability, or social support system  Outcome: Progressing     Problem: Knowledge Deficit  Goal: Patient/family/caregiver demonstrates understanding of disease process, treatment plan, medications, and discharge instructions  Description: Complete learning assessment and assess knowledge base.  Interventions:  - Provide teaching at level of understanding  - Provide teaching via preferred learning methods  Outcome: Progressing     Problem: Nutrition/Hydration-ADULT  Goal: Nutrient/Hydration intake appropriate for improving, restoring or maintaining nutritional needs  Description: Monitor and assess patient's nutrition/hydration status for malnutrition. Collaborate with interdisciplinary team and initiate plan and interventions as ordered.  Monitor patient's weight and dietary  intake as ordered or per policy. Utilize nutrition screening tool and intervene as necessary. Determine patient's food preferences and provide high-protein, high-caloric foods as appropriate.     INTERVENTIONS:  - Monitor oral intake, urinary output, labs, and treatment plans  - Assess nutrition and hydration status and recommend course of action  - Evaluate amount of meals eaten  - Assist patient with eating if necessary   - Allow adequate time for meals  - Recommend/ encourage appropriate diets, oral nutritional supplements, and vitamin/mineral supplements  - Order, calculate, and assess calorie counts as needed  - Recommend, monitor, and adjust tube feedings and TPN/PPN based on assessed needs  - Assess need for intravenous fluids  - Provide specific nutrition/hydration education as appropriate  - Include patient/family/caregiver in decisions related to nutrition  Outcome: Progressing

## 2024-02-12 NOTE — ASSESSMENT & PLAN NOTE
Acute blood loss anemia due to hematochezia  S/p packed red blood cell transfusion  Follow-up on iron studies  Iron supplementation  Monitor hemoglobin

## 2024-02-12 NOTE — CASE MANAGEMENT
"   Case Management Progress Note    Patient name Eliceo Garcia  Location S /S -01 MRN [id number]  : 1965 Date 2024       LOS (days): 4  Geometric Mean LOS (GMLOS) (days):   Days to GMLOS:        OBJECTIVE:        Current admission status: Inpatient  Preferred Pharmacy:   [id number]  [place] - [address]  [address] [place] [address]  Phone: [phone] Fax: [phone]    [id number] [place] - [address]  [address] [place] [address]  Phone: [phone] Fax: [phone]    [id number] [place] - [address]  [address] [place] [address]  Phone: [phone] Fax: [phone]    [facility] [place]  [place] Ocampo - [address],  [address],  [address] [facility] [address]  Phone: [phone] Fax: [phone]    Primary Care Provider: Elias Schoen, MD    Primary Insurance: [facility]  Secondary Insurance:     PROGRESS NOTE:    Patient was reviewed in care coordination rounds with PT/OT this morning -- Patient was evaluated by the therapy team and was deemed to have no therapy needs at discharge. The plan is for him to return to his \"norma's\" home. He does not need HHC services arranged, per PT/OT.     NO further CM needs indicated at this time. CM will remain available.      "

## 2024-02-12 NOTE — PROGRESS NOTES
[facility]  Progress Note  Name: Eliceo Garcia I  MRN: [id number]  Unit/Bed#: S -01 I Date of Admission: 2/7/2024   Date of Service: 2/12/2024 I Hospital Day: 4    Assessment/Plan   * Rectal bleeding  Assessment & Plan  Patient with hematochezia  Workup including abdominal imaging and sigmoidoscopy and colonoscopy revealed ulcer in the distal rectum.  Due to poor prep on the first colonoscopy: Another colonoscopy was done today, 2/12, which revealed: Suboptimal prep throughout the colon. Several polyps in the proximal colon and the ascending, not removed due to inadequate prep. Poor prep in the rectum and sigmoid. Rectal ulceration was identified. Retroflexion not performed. Cannot exclude any significant pathology in the distal colon however patient can have this further workup as an outpatient  Gastroenterologist recommended MiraLAX twice daily to help maintain bowel function and a repeat colonoscopy in 3 to 6 months with a 2-day bowel prep as an outpatient.  Monitor hemoglobin  Diet advanced today.  GI following    Severe protein-calorie malnutrition (HCC)  Assessment & Plan  Malnutrition Findings:   Adult Malnutrition type: Acute illness (in the setting of chronic illness)  Adult Degree of Malnutrition: Other severe protein calorie malnutrition  Malnutrition Characteristics: Fat loss, Muscle loss, Weight loss                  360 Statement: Severe malnutrition related to inadequate oral and enteral intake, catabolic illness, dysphagia as evidenced by severe loss of fat and muscle, temples, extremities, clavicle, 22.6 % weight decrease x < 1 year. Currently treated with nutrition consult, enteral feeding recomendations provided.    BMI Findings:  Adult BMI Classifications: Underweight < 18.5        Body mass index is 15.95 kg/m².     PEG tube in place 2/2 oral SCC limiting oral intake  Patient not using his tube feedings 2/2 inducing nausea and unable to tolerate said  feedings  No nausea or vomiting at admission    Plan:  Resume tube feedings once cleared by GI  Nutrition consulted, patient at high risk for refeeding syndrome. recs appreciated.  Per nutrition: When indicated; Initiate Jevity 1.5 at 10 ml/hr continuous x 24 hours monitoring electrolytes. Continue IVF, flush 30 ml q 4. Advance by 10 ml q 12 hours to goal rate of 60 ml/hr providing 2160 kcal, 92 g protein, 1094 ml free water. When closer to goal rate, d/c IVF and flush 125 ml q 4 hours for additional 750 ml water. Once tolerating goal, can discuss bolus feedings as appropriate      Drug induced constipation  Assessment & Plan  Patient presented with constipation  Abdominal imaging revealed moderate fecal impaction in the rectum  Patient receiving bowel regimen with some relief  S/p sigmoidoscopy and colonoscopy  Continue bowel regimen  GI following      Acute blood loss anemia  Assessment & Plan  Acute blood loss anemia due to hematochezia  S/p packed red blood cell transfusion  Follow-up on iron studies  Iron supplementation  Monitor hemoglobin      Electrolyte abnormality  Assessment & Plan  With hypomagnesemia and hypokalemia.  Electrolytes were replaced.  Monitor.    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 Sequential Compression Devices Ordered.    Mobility:   Basic Mobility Inpatient Raw Score: 22  JH-HLM Goal: 7: Walk 25 feet or more  JH-HLM Achieved: 7: Walk 25 feet or more  HLM Goal NOT achieved. Continue with multidisciplinary rounding and encourage appropriate mobility to improve upon HLM goals.    Patient Centered Rounds: I performed bedside rounds with nursing staff today.   Discussions with Specialists or Other Care Team Provider: Case management.    Education and Discussions with Family / Patient: I updated the patient's parents on the phone.    Total Time Spent on Date of Encounter in care of patient: Time was spent on one or more of the following: performing physical exam; counseling and coordination of care; obtaining or reviewing history; documenting in the medical record; reviewing/ordering tests, medications or procedures; communicating with other healthcare professionals and discussing with patient's family/caregivers.    Current Length of Stay: 4 day(s)  Current Patient Status: Inpatient   Certification Statement: The patient will continue to require additional inpatient hospital stay due to above findings and plans  Discharge Plan: Anticipate discharge tomorrow to home.  According to physical therapist, patient may need outpatient PT.    Code Status: Level 1 - Full Code    Subjective:   Patient was seen and examined this morning.  Patient was upset that he was still n.p.o. and that a repeat procedure with colonoscopy still needed to be done.  Patient denied any more bleeding or any blood in his stools.  Patient admitted to occasional abdominal pains, but no other pains, and occasional dizziness most specially when he gets up.  No nausea or vomiting.  No shortness of breath.  No fever or chills.      Objective:     Vitals:   Temp (24hrs), Av.2 °F (36.8 °C),  Min:97.7 °F (36.5 °C), Max:98.9 °F (37.2 °C)    Temp:  [97.7 °F (36.5 °C)-98.9 °F (37.2 °C)] 98.6 °F (37 °C)  HR:  [58-80] 80  Resp:  [15-20] 17  BP: (104-175)/(57-95) 153/95  SpO2:  [92 %-99 %] 98 %  Body mass index is 15.95 kg/m².     Input and Output Summary (last 24 hours):     Intake/Output Summary (Last 24 hours) at 2/12/2024 1744  Last data filed at 2/12/2024 1332  Gross per 24 hour   Intake 300 ml   Output 375 ml   Net -75 ml       Physical Exam:   Physical Exam  Vitals and nursing note reviewed.   Constitutional:       General: He is not in acute distress.     Appearance: He is not ill-appearing, toxic-appearing or diaphoretic.   Cardiovascular:      Rate and Rhythm: Normal rate and regular rhythm.   Pulmonary:      Effort: Pulmonary effort is normal. No respiratory distress.      Breath sounds: Normal breath sounds.   Abdominal:      General: Bowel sounds are normal. There is no distension.      Palpations: Abdomen is soft.      Tenderness: There is no abdominal tenderness. There is no guarding or rebound.   Musculoskeletal:      Right lower leg: No edema.      Left lower leg: No edema.   Skin:     General: Skin is warm.      Coloration: Skin is not pale.      Findings: No erythema or rash.   Neurological:      Mental Status: He is alert and oriented to person, place, and time. Mental status is at baseline.   Psychiatric:      Comments: Anxious.            Additional Data:     Labs:  Results from last 7 days   Lab Units 02/12/24  0533 02/08/24  0522 02/07/24  1524   WBC Thousand/uL 4.96   < > 9.11   HEMOGLOBIN g/dL 8.0*   < > 10.5*   HEMATOCRIT % 23.7*   < > 31.0*   PLATELETS Thousands/uL 193   < > 269   BANDS PCT %  --   --  2   NEUTROS PCT % 66   < >  --    LYMPHS PCT % 8*   < >  --    LYMPHO PCT %  --   --  7*   MONOS PCT % 22*   < >  --    MONO PCT %  --   --  13*   EOS PCT % 2   < > 0    < > = values in this interval not displayed.     Results from last 7 days   Lab Units 02/12/24  0533 02/11/24  0539  02/10/24  0443   SODIUM mmol/L 133*   < > 132*   POTASSIUM mmol/L 2.8*   < > 4.1   CHLORIDE mmol/L 101   < > 103   CO2 mmol/L 29   < > 27   BUN mg/dL 5   < > 17   CREATININE mg/dL 0.49*   < > 0.56*   ANION GAP mmol/L 3   < > 2   CALCIUM mg/dL 7.2*   < > 7.6*   ALBUMIN g/dL  --   --  2.4*   TOTAL BILIRUBIN mg/dL  --   --  0.57   ALK PHOS U/L  --   --  57   ALT U/L  --   --  5*   AST U/L  --   --  7*   GLUCOSE RANDOM mg/dL 87   < > 101    < > = values in this interval not displayed.     Results from last 7 days   Lab Units 02/09/24  0452   INR  1.13             Results from last 7 days   Lab Units 02/08/24  0720   LACTIC ACID mmol/L 0.9       Lines/Drains:  Invasive Devices       Central Venous Catheter Line  Duration             Port A Cath 11/07/23 Left Internal jugular 97 days              Peripheral Intravenous Line  Duration             Peripheral IV 02/09/24 Left;Proximal;Ventral (anterior) Forearm 3 days              Drain  Duration             Gastrostomy/Enterostomy Percutaneous Interventional Gastrostomy 16 Fr. LUQ 63 days                    Central Line:  Goal for removal:  Chronic with history of malignancy.             Imaging: Reviewed radiology reports from this admission including: abdominal/pelvic CT    Recent Cultures (last 7 days):         Last 24 Hours Medication List:   Current Facility-Administered Medications   Medication Dose Route Frequency Provider Last Rate    acetaminophen  650 mg Oral Q4H PRN Braxton Gonzalez MD      aspirin  81 mg Oral Daily Braxton Gonzalez MD      atorvastatin  40 mg Oral QPM Braxton Gonzalez MD      heparin (porcine)  5,000 Units Subcutaneous Q8H [facility] Braxton Gonzalez MD      magnesium Oxide  400 mg Oral BID Braxton Gonzalez MD      oxyCODONE  20 mg Oral Q4H PRN Braxton Gonzalez MD      polyethylene glycol  17 g Per PEG Tube TID Braxton Gonzalez MD      sodium chloride  50 mL/hr Intravenous Continuous Braxton Gonzalez MD 50 mL/hr (02/10/24 1134)        Today, Patient Was Seen By: Martina Andersen  MD Hilton    **Please Note: This note may have been constructed using a voice recognition system.**

## 2024-02-12 NOTE — ANESTHESIA PREPROCEDURE EVALUATION
Procedure:  COLONOSCOPY    Hx of SCC of the throat, affecting but not preventing swallowing. Mouth opening limited, oral pharynx distorted, but no tracheal deviation or compression.    Relevant Problems   CARDIO   (+) Hypertension      GI/HEPATIC   (+) Rectal bleeding      HEMATOLOGY   (+) Acute blood loss anemia      Other   (+) Cervical lymphadenopathy        TTE 11/4/2023    Left Ventricle: Left ventricular cavity size is normal. Wall thickness is mildly increased. There is concentric remodeling. The left ventricular ejection fraction is 60%. Systolic function is normal. Global longitudinal strain is normal at -20%. Wall motion is normal. Diastolic function is normal.    Right Ventricle: Right ventricular cavity size is normal. Systolic function is normal.    Aortic Valve: There is mild to moderate regurgitation with a centrally directed jet.    Tricuspid Valve: There is mild regurgitation.     Strain was performed to quantify interventricular dyssynchrony and evaluate components of myocardial function due to pre Chemotherapy. Results from the utilization of Strain Analysis are listed in the report below.            Physical Exam    Airway    Mallampati score: IV  TM Distance: >3 FB  Neck ROM: full     Dental   Comment: Poor mouth opening. Right-side neck mass. No midline shift of tracheal noted. ROM WNL. Patient reports developing difficulty swallowing liquids within the past few days.     Cardiovascular      Pulmonary      Other Findings        Anesthesia Plan  ASA Score- 3     Anesthesia Type- IV sedation with anesthesia with ASA Monitors.         Additional Monitors:     Airway Plan:     Comment: I have seen the patient and reviewed the history.  Patient to receive IV sedation with full ASA monitors.  Risks discussed with the patient, consent signed.  .       Plan Factors-Exercise tolerance (METS): >4 METS.    Chart reviewed.   Existing labs reviewed. Patient summary reviewed.    Patient is a current smoker.               Induction- intravenous.    Postoperative Plan-     Informed Consent- Anesthetic plan and risks discussed with patient.  I personally reviewed this patient with the CRNA. Discussed and agreed on the Anesthesia Plan with the CRNA..

## 2024-02-12 NOTE — ASSESSMENT & PLAN NOTE
Patient with hematochezia  Workup including abdominal imaging and sigmoidoscopy and colonoscopy revealed ulcer in the distal rectum.  Due to poor prep on the first colonoscopy: Another colonoscopy was done today, 2/12, which revealed: Suboptimal prep throughout the colon. Several polyps in the proximal colon and the ascending, not removed due to inadequate prep. Poor prep in the rectum and sigmoid. Rectal ulceration was identified. Retroflexion not performed. Cannot exclude any significant pathology in the distal colon however patient can have this further workup as an outpatient  Gastroenterologist recommended MiraLAX twice daily to help maintain bowel function and a repeat colonoscopy in 3 to 6 months with a 2-day bowel prep as an outpatient.  Monitor hemoglobin  Diet advanced today.  GI following

## 2024-02-12 NOTE — PHYSICAL THERAPY NOTE
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)    Preoperative clearance    Drug induced constipation    Cancer-related pain    At risk for respiratory depression due to opioid    Counseling regarding advanced care planning and goals of care    History of CVA (cerebrovascular accident)    Alcoholic intoxication without complication (HCC)    Port-A-Cath in place    Alcohol abuse    Palliative care encounter    Mucositis due to radiation therapy    Pain in oral cavity    Weakness    Severe protein-calorie malnutrition (HCC)    Rectal bleeding    Acute blood loss anemia    Electrolyte abnormality       Past Medical History  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    Stroke (HCC)        Past Surgical History  Past Surgical History:   Procedure Laterality Date    EGD      IR GASTROSTOMY TUBE PLACEMENT  12/11/2023    IR PORT PLACEMENT  11/7/2023    KNEE ARTHROSCOPY Left     MULTIPLE TOOTH EXTRACTIONS N/A 10/16/2023    Procedure: EXTRACTION OF ALL REMAINING TEETH;  Surgeon: Humble Otero DMD;  Location: [facility];  Service: Oral Surgery        02/12/24 0915   PT Last Visit   PT Visit Date 02/12/24   Note Type   Note type Evaluation   Pain Assessment   Pain Assessment Tool 0-10   Pain Score No Pain   Restrictions/Precautions   Weight Bearing Precautions Per Order No   Other Precautions Impulsive;Cognitive;Bed Alarm;Chair  Alarm;Multiple lines;Fall Risk;Hard of hearing   Home Living   Type of Home House   Home Layout Two level;Bed/bath upstairs  (sleeps on 1st floor, bathroom upstairs)   Bathroom Toilet Standard   Home Equipment Cane   Prior Function   Level of [place] Independent with ADLs;Independent with functional mobility;Independent with IADLS  (I ambulation w/o AD for household mobility, Meg w/ SPC for community mobility)   Lives With Friend(s)   Receives Help From Friend(s)   IADLs Independent with medication management;Independent with meal prep   Falls in the last 6 months 1 to 4  (1)   Vocational Unemployed   General   Family/Caregiver Present No   Cognition   Arousal/Participation Alert   Orientation Level Oriented X4   Comments pleasant + cooperative, however impulsive, decreased safety awareness + insight into deficits   Subjective   Subjective Agreeable to mobilize.   RLE Assessment   RLE Assessment WFL  (5/5)   LLE Assessment   LLE Assessment   (proximally 3+/5, distally 4/5)   Coordination   Sensation WFL   Bed Mobility   Supine to Sit 6  Modified independent   Additional items HOB elevated   Sit to Supine Unable to assess   Additional Comments Pt greeted in supine.   Transfers   Sit to Stand 6  Modified independent   Additional items Impulsive   Stand to Sit 6  Modified independent   Additional items Impulsive   Additional Comments SPC   Ambulation/Elevation   Gait pattern Improper Weight shift;Decreased foot clearance  (decreased foot clearance LLE, improper use of SPC (uses every other stride))   Gait Assistance 5  Supervision   Additional items Verbal cues   Assistive Device SPC   Distance 200'   Stair Management Assistance 5  Supervision   Additional items Verbal cues   Stair Management Technique One rail R;With cane   Number of Stairs 4   Balance   Static Sitting Normal   Dynamic Sitting Good   Static Standing Good   Dynamic Standing Fair +   Ambulatory Fair +  (SPC)   Endurance Deficit   Endurance Deficit  No   Activity Tolerance   Activity Tolerance Patient tolerated treatment well   Medical Staff Made Aware TANIA Rasheed   Nurse Made Aware yes - cleared for therapy   Assessment   Assessment Pt is 58 y.o. male seen for a PT evaluation s/p admit to [facility] on 2/7/2024. Pt presenting w/ rectal bleeding, found to have an ulcer of distal rectum, plan for repeat colonoscopy today. Please see above for other active problem list / PMH.     PT now consulted to assess functional mobility and needs for safe d/c planning. Prior to admission, pt was I w/ ambulation w/o AD, use of SPC prn, lives currently w/ a friend in a house w/ stairs.     Currently pt is Meg for bed skills; Meg for functional transfers; S for ambulation w/ SPC; S for stair negotiation. Pt presents w/ overall mobility deficits 2* to: decreased LLE strength; impaired balance; gait deviations; impaired safety and judgement; limited insight into current deficits; bed/chair alarms; multiple lines.     These impairments, however, can be addressed in OPPT.     Recommend for pt to continue to mobilize w/ nsg staff.     No further skilled acute IPPT needs. Will d/c from caseload.   Goals   Patient Goals go home   Plan   PT Frequency (S)    (d/c PT)   Discharge Recommendation   Rehab Resource Intensity Level, PT III (Minimum Resource Intensity)   AM-PAC Basic Mobility Inpatient   Turning in Flat Bed Without Bedrails 4   Lying on Back to Sitting on Edge of Flat Bed Without Bedrails 4   Moving Bed to Chair 4   Standing Up From Chair Using Arms 4   Walk in Room 3   Climb 3-5 Stairs With Railing 3   Basic Mobility Inpatient Raw Score 22   Basic Mobility Standardized Score 47.4   Highest Level Of Mobility   JH-HLM Goal 7: Walk 25 feet or more   JH-HLM Achieved 7: Walk 25 feet or more   End of Consult   Patient Position at End of Consult Bedside chair;Bed/Chair alarm activated;All needs within reach  (on waffle cushion, all lines in tact)     Patsy Longoria,  PT, DPT     Tissue Cultured Epidermal Autograft Text: The defect edges were debeveled with a #15 scalpel blade.  Given the location of the defect, shape of the defect and the proximity to free margins a tissue cultured epidermal autograft was deemed most appropriate.  The graft was then trimmed to fit the size of the defect.  The graft was then placed in the primary defect and oriented appropriately.

## 2024-02-13 VITALS
TEMPERATURE: 98.5 F | BODY MASS INDEX: 15.87 KG/M2 | HEIGHT: 75 IN | WEIGHT: 127.65 LBS | OXYGEN SATURATION: 92 % | HEART RATE: 93 BPM | RESPIRATION RATE: 17 BRPM | DIASTOLIC BLOOD PRESSURE: 100 MMHG | SYSTOLIC BLOOD PRESSURE: 159 MMHG

## 2024-02-13 LAB
ANION GAP SERPL CALCULATED.3IONS-SCNC: 3 MMOL/L
BUN SERPL-MCNC: 5 MG/DL (ref 5–25)
CALCIUM SERPL-MCNC: 7.7 MG/DL (ref 8.4–10.2)
CHLORIDE SERPL-SCNC: 99 MMOL/L (ref 96–108)
CO2 SERPL-SCNC: 30 MMOL/L (ref 21–32)
CREAT SERPL-MCNC: 0.58 MG/DL (ref 0.6–1.3)
ERYTHROCYTE [DISTWIDTH] IN BLOOD BY AUTOMATED COUNT: 17 % (ref 11.6–15.1)
GFR SERPL CREATININE-BSD FRML MDRD: 110 ML/MIN/1.73SQ M
GLUCOSE SERPL-MCNC: 88 MG/DL (ref 65–140)
HCT VFR BLD AUTO: 24.8 % (ref 36.5–49.3)
HGB BLD-MCNC: 8.5 G/DL (ref 12–17)
MAGNESIUM SERPL-MCNC: 1.6 MG/DL (ref 1.9–2.7)
MCH RBC QN AUTO: 32.4 PG (ref 26.8–34.3)
MCHC RBC AUTO-ENTMCNC: 34.3 G/DL (ref 31.4–37.4)
MCV RBC AUTO: 95 FL (ref 82–98)
PLATELET # BLD AUTO: 224 THOUSANDS/UL (ref 149–390)
PMV BLD AUTO: 9.3 FL (ref 8.9–12.7)
POTASSIUM SERPL-SCNC: 3.3 MMOL/L (ref 3.5–5.3)
RBC # BLD AUTO: 2.62 MILLION/UL (ref 3.88–5.62)
SODIUM SERPL-SCNC: 132 MMOL/L (ref 135–147)
WBC # BLD AUTO: 7.31 THOUSAND/UL (ref 4.31–10.16)

## 2024-02-13 PROCEDURE — 99239 HOSP IP/OBS DSCHRG MGMT >30: CPT | Performed by: INTERNAL MEDICINE

## 2024-02-13 PROCEDURE — 85027 COMPLETE CBC AUTOMATED: CPT | Performed by: INTERNAL MEDICINE

## 2024-02-13 PROCEDURE — 80048 BASIC METABOLIC PNL TOTAL CA: CPT | Performed by: INTERNAL MEDICINE

## 2024-02-13 PROCEDURE — 83735 ASSAY OF MAGNESIUM: CPT | Performed by: INTERNAL MEDICINE

## 2024-02-13 RX ORDER — LACTULOSE 10 G/15ML
10 SOLUTION ORAL 2 TIMES DAILY
Qty: 900 ML | Refills: 0 | Status: SHIPPED | OUTPATIENT
Start: 2024-02-13 | End: 2024-03-14

## 2024-02-13 RX ORDER — POLYETHYLENE GLYCOL 3350 17 G/17G
17 POWDER, FOR SOLUTION ORAL 2 TIMES DAILY
Qty: 1020 G | Refills: 0 | Status: CANCELLED | OUTPATIENT
Start: 2024-02-13 | End: 2024-03-14

## 2024-02-13 RX ORDER — POTASSIUM CHLORIDE 20 MEQ/1
20 TABLET, EXTENDED RELEASE ORAL DAILY
Qty: 14 TABLET | Refills: 0 | Status: SHIPPED | OUTPATIENT
Start: 2024-02-13 | End: 2024-02-27

## 2024-02-13 RX ORDER — LANOLIN ALCOHOL/MO/W.PET/CERES
400 CREAM (GRAM) TOPICAL 2 TIMES DAILY
Qty: 60 TABLET | Refills: 0 | Status: SHIPPED | OUTPATIENT
Start: 2024-02-13 | End: 2024-03-14

## 2024-02-13 RX ORDER — POTASSIUM CHLORIDE 20 MEQ/1
20 TABLET, EXTENDED RELEASE ORAL ONCE
Status: COMPLETED | OUTPATIENT
Start: 2024-02-13 | End: 2024-02-13

## 2024-02-13 RX ORDER — MAGNESIUM SULFATE HEPTAHYDRATE 40 MG/ML
2 INJECTION, SOLUTION INTRAVENOUS ONCE
Status: COMPLETED | OUTPATIENT
Start: 2024-02-13 | End: 2024-02-13

## 2024-02-13 RX ADMIN — OXYCODONE HYDROCHLORIDE 20 MG: 10 TABLET ORAL at 03:19

## 2024-02-13 RX ADMIN — POTASSIUM CHLORIDE 20 MEQ: 1500 TABLET, EXTENDED RELEASE ORAL at 07:49

## 2024-02-13 RX ADMIN — HEPARIN SODIUM 5000 UNITS: 5000 INJECTION INTRAVENOUS; SUBCUTANEOUS at 05:03

## 2024-02-13 RX ADMIN — Medication 400 MG: at 09:05

## 2024-02-13 RX ADMIN — ASPIRIN 81 MG CHEWABLE TABLET 81 MG: 81 TABLET CHEWABLE at 09:05

## 2024-02-13 RX ADMIN — MAGNESIUM SULFATE HEPTAHYDRATE 2 G: 40 INJECTION, SOLUTION INTRAVENOUS at 07:49

## 2024-02-13 RX ADMIN — POLYETHYLENE GLYCOL 3350 17 G: 17 POWDER, FOR SOLUTION ORAL at 09:05

## 2024-02-13 RX ADMIN — OXYCODONE HYDROCHLORIDE 20 MG: 10 TABLET ORAL at 12:53

## 2024-02-13 NOTE — ASSESSMENT & PLAN NOTE
Recent Labs     02/11/24  0539 02/12/24  0533 02/13/24  0500   HGB 8.6* 8.0* 8.5*   MCV 93 94 95     Acute blood loss anemia due to hematochezia  S/p packed red blood cell transfusion  Hgb now stable    Plan:  CBC on an outpatient basis to be ordered by PCP  Outpatient follow up with PCP

## 2024-02-13 NOTE — ASSESSMENT & PLAN NOTE
With hypomagnesemia and hypokalemia.  Likely 2/2 GI losses  Electrolytes were replaced while inpatient     Plan:  BMP on an outpatient basis to be ordered by a PCP  Mag-Ox 400 mg BID and Potassium Chloride 20 mEq daily prescribed on discharge   Outpatient follow up with PCP

## 2024-02-13 NOTE — ASSESSMENT & PLAN NOTE
Patient with chronic hyponatremia  Multifactorial    Plan:  Outpatient follow up with PCP  BMP on an outpatient basis to be ordered by PCP

## 2024-02-13 NOTE — ASSESSMENT & PLAN NOTE
Patient presented with constipation  Abdominal imaging revealed moderate fecal impaction in the rectum  S/p sigmoidoscopy and colonoscopy    Plan:   Lactulose twice daily, a prescription was sent to his pharmacy  Outpatient follow up with GI, referral placed

## 2024-02-13 NOTE — ASSESSMENT & PLAN NOTE
He follows outpatient with Palliative Care for cancer-related pain    Plan:  Outpatient follow up with Palliative Care

## 2024-02-13 NOTE — CASE MANAGEMENT
Case Management Progress Note    Patient name Eliceo Garcia  Location S /S -01 MRN [id number]  : 1965 Date 2024       LOS (days): 5  Geometric Mean LOS (GMLOS) (days):   Days to GMLOS:        OBJECTIVE:        Current admission status: Inpatient  Preferred Pharmacy:   [id number]  [place] - [address]  [address] [facility] [place] [address]  Phone: [phone] Fax: [phone]    [id number] [place] - [address]  [address]  [place] [address]  Phone: [phone] Fax: [phone]    [id number]  [place] - [address]  [address] [place] [address]  Phone: [phone] Fax: [phone]    [facility]  [place] - [address],  [address]  [address] [facility] [address]  Phone: [phone] Fax: [phone]    [id number] [place] - [address] [address]  Phone: [phone] Fax: [phone]    Primary Care Provider: Elias Schoen, MD    Primary Insurance: [facility]  Secondary Insurance:     PROGRESS NOTE:    Cm notified by medical provider that patient would need a ride home. Cm met with patient who confirmed he needed a ride home. Cm advised it would be a little later for when roads cleared up. Patient does not want to wait and is requesting change to go by bus. Cm called pharmacy who confirmed they would have change for patient. Cm advised patients nurse that pharmacy could provide change for patient. Cm to follow if patient has additional needs.

## 2024-02-13 NOTE — ASSESSMENT & PLAN NOTE
Malnutrition Findings:   Adult Malnutrition type: Acute illness (in the setting of chronic illness)  Adult Degree of Malnutrition: Other severe protein calorie malnutrition  Malnutrition Characteristics: Fat loss, Muscle loss, Weight loss                  360 Statement: Severe malnutrition related to inadequate oral and enteral intake, catabolic illness, dysphagia as evidenced by severe loss of fat and muscle, temples, extremities, clavicle, 22.6 % weight decrease x < 1 year. Currently treated with nutrition consult, enteral feeding recomendations provided.    BMI Findings:  Adult BMI Classifications: Underweight < 18.5        Body mass index is 15.95 kg/m².     PEG tube in place 2/2 oral SCC limiting oral intake  Patient not using his tube feedings 2/2 inducing nausea and unable to tolerate said feedings  No nausea or vomiting at admission  Per nutrition: When indicated; Initiate Jevity 1.5 at 10 ml/hr continuous x 24 hours monitoring electrolytes. Continue IVF, flush 30 ml q 4. Advance by 10 ml q 12 hours to goal rate of 60 ml/hr providing 2160 kcal, 92 g protein, 1094 ml free water. When closer to goal rate, d/c IVF and flush 125 ml q 4 hours for additional 750 ml water. Once tolerating goal, can discuss bolus feedings as appropriate  Patient tolerating oral diet on day of discharge    Plan:  Patient encouraged to continue a soft PO diet as tolerated   Outpatient follow up with PCP

## 2024-02-13 NOTE — ANESTHESIA PROCEDURE NOTES
Anesthesia Notable Event    Date/Time: 2/13/2024 11:17 AM    Performed by: Wilber Weinstein MD  Authorized by: Wilber Weinstein MD

## 2024-02-13 NOTE — ASSESSMENT & PLAN NOTE
Patient presented with hematochezia  Workup including abdominal imaging and sigmoidoscopy and colonoscopy revealed ulcer in the distal rectum.  Poor prep on the first colonoscopy on 2/9  Another colonoscopy was done on 2/12, which revealed: Suboptimal prep throughout the colon. Several polyps in the proximal colon and the ascending, not removed due to inadequate prep. Poor prep in the rectum and sigmoid. Rectal ulceration was identified. Retroflexion not performed. Cannot exclude any significant pathology in the distal colon however patient can have this further workup as an outpatient  Diet advanced on 2/12, and patient tolerated soft PO diet well     Plan:   Patient is medically stable for discharge home today   Gastroenterologist recommended Lactulose twice daily to help maintain bowel function. A prescription was sent to his pharmacy.   Repeat colonoscopy in 3 to 6 months with a 2-day bowel prep as an outpatient  Outpatient follow up with GI, referral placed

## 2024-02-13 NOTE — UTILIZATION REVIEW
NOTIFICATION OF ADMISSION DISCHARGE   This is a Notification of Discharge from [facility]. Please be advised that this patient has been discharge from our facility. Below you will find the admission and discharge date and time including the patient’s disposition.   UTILIZATION REVIEW CONTACT:  Ángel Xiao  Utilization   Network Utilization Review Department  Phone: [phone] x carefully listen to the prompts. All voicemails are confidential.  Email: [email or web address]     ADMISSION INFORMATION  PRESENTATION DATE: 2/7/2024  2:19 PM  OBERVATION ADMISSION DATE:   INPATIENT ADMISSION DATE: 2/8/24  9:32 AM   DISCHARGE DATE: 2/13/2024  3:23 PM   DISPOSITION:Home/Self Care    Network Utilization Review Department  ATTENTION: Please call with any questions or concerns to [phone] and carefully listen to the prompts so that you are directed to the right person. All voicemails are confidential.   For Discharge needs, contact Care Management DC Support Team at [phone] opt. 2  Send all requests for admission clinical reviews, approved or denied determinations and any other requests to dedicated fax number below belonging to the campus where the patient is receiving treatment. List of dedicated fax numbers for the Facilities:  FACILITY NAME UR FAX NUMBER   ADMISSION DENIALS (Administrative/Medical Necessity) [phone]   DISCHARGE SUPPORT TEAM ([facility]) [phone]   PARENT CHILD HEALTH (Maternity/NICU/Pediatrics) [phone]   [facility] [phone]   [facility] [phone]   [facility] [phone]   [facility] [phone]   Counts include 234 beds at the [facility] [phone]   [facility] [phone]   [facility] [phone]   [facility] [phone]   [facility]  [facility] [phone]   [facility] [phone]   [facility] [phone]   [facility] [phone]

## 2024-02-13 NOTE — DISCHARGE SUMMARY
[facility]  Discharge- Eliceo Garcia 1965, 58 y.o. male MRN: [id number]  Unit/Bed#: S -[facility] Encounter: [id number]  Primary Care Provider: Elias Schoen, MD   Date and time admitted to hospital: 2/7/2024  2:19 PM    * Rectal bleeding  Assessment & Plan  Patient presented with hematochezia  Workup including abdominal imaging and sigmoidoscopy and colonoscopy revealed ulcer in the distal rectum.  Poor prep on the first colonoscopy on 2/9  Another colonoscopy was done on 2/12, which revealed: Suboptimal prep throughout the colon. Several polyps in the proximal colon and the ascending, not removed due to inadequate prep. Poor prep in the rectum and sigmoid. Rectal ulceration was identified. Retroflexion not performed. Cannot exclude any significant pathology in the distal colon however patient can have this further workup as an outpatient  Diet advanced on 2/12, and patient tolerated soft PO diet well     Plan:   Patient is medically stable for discharge home today   Gastroenterologist recommended Lactulose twice daily to help maintain bowel function. A prescription was sent to his pharmacy.   Repeat colonoscopy in 3 to 6 months with a 2-day bowel prep as an outpatient  Outpatient follow up with GI, referral placed    Drug induced constipation  Assessment & Plan  Patient presented with constipation  Abdominal imaging revealed moderate fecal impaction in the rectum  S/p sigmoidoscopy and colonoscopy    Plan:   Lactulose twice daily, a prescription was sent to his pharmacy  Outpatient follow up with GI, referral placed    Electrolyte abnormality  Assessment & Plan  With hypomagnesemia and hypokalemia.  Likely 2/2 GI losses  Electrolytes were replaced while inpatient     Plan:  BMP on an outpatient basis to be ordered by a PCP  Mag-Ox 400 mg BID and Potassium Chloride 20 mEq daily prescribed on discharge   Outpatient follow up with PCP    Acute blood loss anemia  Assessment & Plan  Recent  Labs     02/11/24  0539 02/12/24  0533 02/13/24  0500   HGB 8.6* 8.0* 8.5*   MCV 93 94 95     Acute blood loss anemia due to hematochezia  S/p packed red blood cell transfusion  Hgb now stable    Plan:  CBC on an outpatient basis to be ordered by PCP  Outpatient follow up with PCP        Severe protein-calorie malnutrition (HCC)  Assessment & Plan  Malnutrition Findings:   Adult Malnutrition type: Acute illness (in the setting of chronic illness)  Adult Degree of Malnutrition: Other severe protein calorie malnutrition  Malnutrition Characteristics: Fat loss, Muscle loss, Weight loss                  360 Statement: Severe malnutrition related to inadequate oral and enteral intake, catabolic illness, dysphagia as evidenced by severe loss of fat and muscle, temples, extremities, clavicle, 22.6 % weight decrease x < 1 year. Currently treated with nutrition consult, enteral feeding recomendations provided.    BMI Findings:  Adult BMI Classifications: Underweight < 18.5        Body mass index is 15.95 kg/m².     PEG tube in place 2/2 oral SCC limiting oral intake  Patient not using his tube feedings 2/2 inducing nausea and unable to tolerate said feedings  No nausea or vomiting at admission  Per nutrition: When indicated; Initiate Jevity 1.5 at 10 ml/hr continuous x 24 hours monitoring electrolytes. Continue IVF, flush 30 ml q 4. Advance by 10 ml q 12 hours to goal rate of 60 ml/hr providing 2160 kcal, 92 g protein, 1094 ml free water. When closer to goal rate, d/c IVF and flush 125 ml q 4 hours for additional 750 ml water. Once tolerating goal, can discuss bolus feedings as appropriate  Patient tolerating oral diet on day of discharge    Plan:  Patient encouraged to continue a soft PO diet as tolerated   Outpatient follow up with PCP      Weakness  Assessment & Plan  Multifactorial  Supportive care  Outpatient follow up with PCP      Alcohol abuse  Assessment & Plan  History of alcohol abuse  Outpatient follow up with  PCP    History of CVA (cerebrovascular accident)  Assessment & Plan  History right pontine infarct CVA  Continue aspirin, atorvastatin    Cancer-related pain  Assessment & Plan  He follows outpatient with Palliative Care for cancer-related pain    Plan:  Outpatient follow up with Palliative Care    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)  Assessment & Plan  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  S/P concurrent cisplatin + R since 11/15 (last RT 1/10/24)  Outpatient Oncology Follow-up    Hyponatremia  Assessment & Plan  Patient with chronic hyponatremia  Multifactorial    Plan:  Outpatient follow up with PCP  BMP on an outpatient basis to be ordered by PCP        Medical Problems       Resolved Problems  Date Reviewed: 2/12/2024   None       Discharging Resident: Ann Jansen MD  Discharging Attending: Martina Chambers*  PCP: Elias Schoen, MD  Admission Date:   Admission Orders (From admission, onward)       Ordered        02/08/24 0932  Inpatient Admission  Once            02/07/24 1826  Place in Observation  Once                          Discharge Date: 02/13/24    Consultations During Hospital Stay:  Gastroenterology     Procedures Performed:   Flexibly Sigmoidoscopy: 2/9/2024  Colonoscopy: 2/9/2024  Colonoscopy: 2/12/2024    Significant Findings / Test Results:   Colonoscopy  Result Date: 2/12/2024  Impression: Suboptimal prep throughout the colon Several polyps in the proximal colon and the ascending, not removed due to inadequate prep Poor prep in the rectum and sigmoid Rectal ulceration was identified Retroflexion not performed Cannot exclude any significant pathology in the distal colon however patient can have this further workup as an outpatient RECOMMENDATION:  Repeat colonoscopy in 1 year  Personal history of colon polyps Inadequate bowel preparation  Return to floor Diet as tolerated Recommend taking 1 capful of MiraLAX twice daily to help maintain bowel function Repeat  colonoscopy in 3 to 6 months with a 2-day bowel prep as an outpatient  Braxton Gonzalez MD     Colonoscopy  Result Date: 2/9/2024  Impression: Single ulcer in the distal rectum No evidence of active bleeding seen.  Nonbleeding clean-based ulcer noted in the very distal rectum.  There was also large amount of brown stool noted in the rectum vault, manual disimpaction was performed with removal of moderate amount of stool. RECOMMENDATION:  Await pathology results Repeat colonoscopy on Monday with repeat bowel prep over the weekend, would recommend 2-day bowel prep.  Would recommend tapwater enemas from below.  Lisa Fabian MD     Flexible Sigmoidoscopy  Result Date: 2/9/2024  Impression: Single ulcer in the rectum with hematin covered flat spot No active bleeding at this time Large stool ball in the rectum obscured visualization RECOMMENDATION:    Repeat sigmoidoscopy later today as scheduled with sedation to allow disimpaction and more thorough evaluation    Norm Coley MD     CT high volume bleeding scan abdomen pelvis  Result Date: 2/9/2024  Impression: Active contrast extravasation at the 5 to 6 o'clock position of the rectum Constipation with rectal fecal impaction. . Workstation performed: [id number]     CT abdomen pelvis w contrast  Result Date: 2/8/2024  Impression: Fluid-filled hyperenhancing small bowel loops in the left mid abdomen suggesting enteritis. There is associated mesenteric edema Moderate fecal impaction at the rectum with the rectum dilated up to 7 cm Workstation performed: [id number]     Incidental Findings:   None    Test Results Pending at Discharge (will require follow up):  None     Outpatient Tests Requested:  BMP, CBC     Complications:  None    Reason for Admission: Weakness, constipation, and Bright red blood per rectum    Hospital Course:   Eliceo Garcia is a 58 y.o. male patient with a PMHx of stage IV oral SCC s/p PEG placement, CVA, and opioid dependence who originally presented  to the hospital on 2/7/2024 due to weakness and constipation.  On presentation the patient reported not having a bowel movement for approximately 5 weeks. He has been taking opioids for his cancer pain.   He had not been tolerating tube feeds due to nausea at home, and only tolerating occasional yogurt.  He noticed occasional liquid stool leaking, but no actual bowel movements.  He was admitted for further management.  CT on admission showed moderate fecal impaction.  The next morning he was noted to have bright red blood per rectum.  His bleeding intensified and he became hypotensive, leading to a rapid response being called.  Hemoglobin dropped from 8.6 to 6.9.  2 units of PRBCs were transfused, and CT showed active extravasation from the rectum. Colonoscopy on 12/9/2024 was limited by poor bowel prep but did show a rectal ulcer. The patient did not have any further bleeding, and Hgb stabilized. He had a second colonoscopy on 12/12/2024, which showed an area of rectal ulceration, as well as several polyps which could not be removed due to poor prep. A repeat colonoscopy is recommended in 3-6 months with a 2 day bowel prep.  The patient's diet was advanced, and he tolerated a soft p.o. diet. The patient is medically stable for discharge home today.     Please see above list of diagnoses and related plan for additional information.     Condition at Discharge: stable    Discharge Day Visit / Exam:   Subjective:  The patient was seen and examined sitting up in bed this morning. He says that he is feeling hungry because he has not be able to eat for the past several days. He says he is experiencing his baseline pain from his oral cancer, but is not having any other pain today. He says he had a small BM last night and 1 this morning, both without blood.  He denies any fever, chills, chest pain, chest tightness, shortness of breath, abdominal pain, nausea, vomiting, difficulty urinating, or leg swelling.  All questions  "answered. The patient was seen again later in the morning after breakfast, and he reports that he is tolerating soft foods without difficulty.     Vitals: Blood Pressure: 159/100 (02/13/24 0728)  Pulse: 93 (02/13/24 0728)  Temperature: 98.5 °F (36.9 °C) (02/13/24 0728)  Temp Source: Axillary (02/13/24 0728)  Respirations: 17 (02/13/24 0728)  Height: 6' 3\" (190.5 cm) (02/09/24 1321)  Weight - Scale: 57.9 kg (127 lb 10.3 oz) (02/10/24 0238)  SpO2: 92 % (02/13/24 0728)  Exam:   Physical Exam  Vitals and nursing note reviewed.   Constitutional:       General: He is not in acute distress.     Appearance: He is cachectic. He is ill-appearing.   HENT:      Right Ear: External ear normal.      Left Ear: External ear normal.      Nose: Nose normal.      Mouth/Throat:      Mouth: Mucous membranes are moist. Oral lesions (oral cancer) present.   Eyes:      Conjunctiva/sclera: Conjunctivae normal.   Cardiovascular:      Rate and Rhythm: Normal rate and regular rhythm.      Pulses: Normal pulses.   Pulmonary:      Effort: Pulmonary effort is normal.      Breath sounds: Normal breath sounds.   Abdominal:      General: Bowel sounds are normal. There is no distension.      Palpations: There is no mass.      Tenderness: There is no abdominal tenderness. There is no guarding.      Comments: PEG tube in place, skin clean and dry without erythema    Musculoskeletal:      Cervical back: Neck supple.      Right lower leg: No edema.      Left lower leg: No edema.   Skin:     General: Skin is warm and dry.   Neurological:      General: No focal deficit present.      Mental Status: He is alert and oriented to person, place, and time.   Psychiatric:         Mood and Affect: Mood normal.        Discussion with Family: Patient declined call to .     Discharge instructions/Information to patient and family:   See after visit summary for information provided to patient and family.      Provisions for Follow-Up Care:  See after " visit summary for information related to follow-up care and any pertinent home health orders.      Mobility at time of Discharge:   Basic Mobility Inpatient Raw Score: 22  JH-HLM Goal: 7: Walk 25 feet or more  JH-HLM Achieved: 7: Walk 25 feet or more  HLM Goal achieved. Continue to encourage appropriate mobility.     Disposition:   Home    Planned Readmission: No    Discharge Medications:  See after visit summary for reconciled discharge medications provided to patient and/or family.      **Please Note: This note may have been constructed using a voice recognition system**

## 2024-02-13 NOTE — DISCHARGE INSTR - AVS FIRST PAGE
Dear Eliceo Garcia,     It was our pleasure to care for you here at [facility].  It is our hope that we were always able to exceed the expected standards for your care during your stay.  You were hospitalized due to Lower Gastrointestinal Bleed.  You were cared for on the 4th floor by Ann Jansen MD under the service of Martina Chambers* with the [facility] who covers for your primary care physician (PCP), Elias Schoen, MD, while you were hospitalized.  If you have any questions or concerns related to this hospitalization, you may contact us at .  For follow up as well as any medication refills, we recommend that you follow up with your primary care physician.  A registered nurse will reach out to you by phone within a few days after your discharge to answer any additional questions that you may have after going home.  However, at this time we provide for you here, the most important instructions / recommendations at discharge:     Notable Medication Adjustments -   Please start taking lactulose (Chronulac) 10g/ 15mL solution: Please take 15 mL (10 g total) by mouth 2 times per day to prevent constipation. You may decrease how much of this medication you take if you are having diarrhea.   Please start taking magnesium oxide (Mag-Ox) 400 mg tabs: Please take 1 tablet (400 mg total) 2 times a day.   Please start taking potassium chloride 20 mEq tablet: Take 1 tablet daily   Please stop using nonsteroidal anti-inflammatory medications (NSAIDS), such as ibuprofen (Motrin)  Testing Required after Discharge -   BMP, CBC  ** Please contact your PCP to request testing orders for any of the testing recommended here **  Important follow up information -   Please call to make a follow-up appointment with your primary care provider (PCP) within 1 week of discharge. Please call ([phone])  to make an appointment.   Please call to make a  follow-up appointment with gastroenterology ([phone]). A referral has been placed.   Please maintain all of your other doctors appointments   Other Instructions -   Please call 911 or return to the emergency department if you have worsening symptoms, or any symptoms that are concerning to you  You will need a repeat colonoscopy in 3 to 6 months with a 2-day bowel prep as an outpatient  Please review this entire after visit summary as additional general instructions including medication list, appointments, activity, diet, any pertinent wound care, and other additional recommendations from your care team that may be provided for you.      Sincerely,     Ann Jansen MD

## 2024-02-14 ENCOUNTER — TRANSITIONAL CARE MANAGEMENT (OUTPATIENT)
Dept: FAMILY MEDICINE CLINIC | Facility: CLINIC | Age: 59
End: 2024-02-14

## 2024-02-14 ENCOUNTER — APPOINTMENT (OUTPATIENT)
Dept: RADIATION ONCOLOGY | Facility: CLINIC | Age: 59
End: 2024-02-14
Payer: COMMERCIAL

## 2024-02-14 DIAGNOSIS — G89.3 CANCER-RELATED PAIN: ICD-10-CM

## 2024-02-14 DIAGNOSIS — C06.9 ORAL CANCER (HCC): ICD-10-CM

## 2024-02-14 DIAGNOSIS — K12.33 MUCOSITIS DUE TO RADIATION THERAPY: ICD-10-CM

## 2024-02-14 DIAGNOSIS — K13.79 PAIN IN ORAL CAVITY: ICD-10-CM

## 2024-02-14 RX ORDER — OXYCODONE HYDROCHLORIDE 10 MG/1
10 TABLET ORAL EVERY 4 HOURS PRN
Qty: 90 TABLET | Refills: 0 | Status: SHIPPED | OUTPATIENT
Start: 2024-02-14 | End: 2024-02-14

## 2024-02-14 RX ORDER — OXYCODONE HYDROCHLORIDE 20 MG/1
20 TABLET ORAL EVERY 4 HOURS PRN
Qty: 90 TABLET | Refills: 0 | Status: SHIPPED | OUTPATIENT
Start: 2024-02-14 | End: 2024-02-23 | Stop reason: SDUPTHER

## 2024-02-14 RX ORDER — OXYCODONE HYDROCHLORIDE 20 MG/1
20 TABLET ORAL EVERY 4 HOURS PRN
Qty: 90 TABLET | Refills: 0 | Status: CANCELLED | OUTPATIENT
Start: 2024-02-14

## 2024-02-14 NOTE — TELEPHONE ENCOUNTER
Last appointment: 03/26/2024    Next scheduled appointment: 01/26/2024    Pharmacy: [facility]      PDMP review:

## 2024-02-14 NOTE — TELEPHONE ENCOUNTER
Sending 10mg oxycodone as per previous discussion with patient in the office when he said he is only now using 1/2 tab of 20mg.     Mitra Lima MD  Palliative Medicine & Supportive Care  Internal Medicine  Available via [employer]  Office: [phone]  Fax: [phone]

## 2024-02-14 NOTE — TELEPHONE ENCOUNTER
Spoke to pt to inform him that Dr. Lima sent 10mg oxycodone per previous discussion. Pt stated he is taking 20mg whole tablets. States he has been in more pain, and is no longer taking 1/2 of a tablet.

## 2024-02-21 ENCOUNTER — RADIATION ONCOLOGY FOLLOW-UP (OUTPATIENT)
Dept: RADIATION ONCOLOGY | Facility: CLINIC | Age: 59
End: 2024-02-21
Payer: COMMERCIAL

## 2024-02-21 ENCOUNTER — DOCUMENTATION (OUTPATIENT)
Dept: NUTRITION | Facility: CLINIC | Age: 59
End: 2024-02-21

## 2024-02-21 VITALS
DIASTOLIC BLOOD PRESSURE: 73 MMHG | OXYGEN SATURATION: 98 % | SYSTOLIC BLOOD PRESSURE: 114 MMHG | WEIGHT: 117.5 LBS | HEART RATE: 80 BPM | BODY MASS INDEX: 14.61 KG/M2 | RESPIRATION RATE: 18 BRPM | TEMPERATURE: 97.7 F | HEIGHT: 75 IN

## 2024-02-21 DIAGNOSIS — C06.9 SQUAMOUS CELL CARCINOMA OF ORAL CAVITY (HCC): Primary | Chronic | ICD-10-CM

## 2024-02-21 PROCEDURE — 99024 POSTOP FOLLOW-UP VISIT: CPT | Performed by: INTERNAL MEDICINE

## 2024-02-21 PROCEDURE — 99211 OFF/OP EST MAY X REQ PHY/QHP: CPT | Performed by: INTERNAL MEDICINE

## 2024-02-21 NOTE — PROGRESS NOTES
Eliceo Garcia 1965 is a 58 y.o. male  With h/o Clinical Stage CHETAN (cT4a, cN2c, cMO) SCC of oral cavity.  Completed concurrent chemo/RT on 1/10/24.  Today's visit is an EOT follow-up.    Completed RT with mild to moderate acute toxicity. He has significant oral pain, dysphagia, and odynophagia related to his bulky oral cavity tumor that is now starting to shrink. He is only intermittently using his PEG tube due to inconvenience. No further oral bleeding  Exam: Improved oral cavity and cervical guru disease. No significant dermatitis. Mild-moderate mucositis. No thrush. He has several excoriations on his neck and his hands which he notes are from his cat or from his own itching.  Reviewed skin care. Discussed cortisone or Aquaphor. He feels none of these help. Reinforced moisturizer. Added Silvadene per his request.  Reviewed mouth care.  Pain management via palliative care team, he has his refills.    1/11/24  Palliative Care  Given dexamethasone liquid for mucositis.  Increase oxyIR to 20mg q4h PRN with discussion of eventual weaning depending upon treatment response    2/7/24-2/13/24  [facility]  DX: rectal bleeding, acute blood loss, malnutrition, fecal impaction  Received 2 units PRBC's.  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, p16 positive      Chemotherapy    alteplase (CATHFLO), 2 mg, Intracatheter, Every 1 Minute as needed,  0 of 7 cycles  CISplatin (PLATINOL) infusion, 40 mg/m2, Intravenous, Once, 0 of 7 cycles  aprepitant (CINVANTI) in  mL IVPB, 130 mg, Intravenous, Once, 0 of 7 cycles       11/15/2023 - 1/10/2024 Radiation      Plan ID Energy Fractions Dose per Fraction (cGy) Dose Correction (cGy) Total Dose Delivered (cGy) Elapsed Days   OralCav_BilNk 6X 35 / 35 200 0 7,000 56      Treatment dates:  C1: 11/15/2023 - 1/10/2024             Review of Systems:  Review of Systems   Constitutional: Negative.        Clinical Trial: no    Health Maintenance   Topic Date Due    Hepatitis C Screening  Never done    Pneumococcal Vaccine: Pediatrics (0 to 5 Years) and At-Risk Patients (6 to 64 Years) (1 of 2 - PCV) Never done    HIV Screening  Never done    BMI: Followup Plan  Never done    Annual Physical  Never done    Zoster Vaccine (1 of 2) Never done    Hepatitis A Vaccine (1 of 2 - Risk 2-dose series) Never done    COVID-19 Vaccine (3 - Pfizer risk series) 01/07/2022    Influenza Vaccine (1) Never done    Depression Screening  10/12/2024    BMI: Adult  02/10/2025    Colorectal Cancer Screening  02/11/2025    DTaP,Tdap,and Td Vaccines (2 - Td or Tdap) 04/25/2026    HIB Vaccine  Aged Out    IPV Vaccine  Aged Out    Meningococcal ACWY Vaccine  Aged Out    HPV Vaccine  Aged Out     Patient Active Problem List   Diagnosis    Dental caries    Cervical lymphadenopathy    Periodontitis    Alcohol use disorder, severe, dependence (HCC)    Hypertension    Cigarette nicotine dependence without complication    Hyponatremia    Hypokalemia    Hypomagnesemia    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)    Preoperative clearance    Drug induced constipation    Cancer-related pain    At risk for respiratory depression due to opioid    Counseling regarding advanced care planning and goals of care    History of CVA (cerebrovascular accident)    Alcoholic intoxication without complication (HCC)    Port-A-Cath in place    Alcohol abuse    Palliative  care encounter    Mucositis due to radiation therapy    Pain in oral cavity    Weakness    Severe protein-calorie malnutrition (HCC)    Rectal bleeding    Acute blood loss anemia    Electrolyte abnormality     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    Stroke (HCC)      Past Surgical History:   Procedure Laterality Date    EGD      IR GASTROSTOMY TUBE PLACEMENT  12/11/2023    IR PORT PLACEMENT  11/7/2023    KNEE ARTHROSCOPY Left     MULTIPLE TOOTH EXTRACTIONS N/A 10/16/2023    Procedure: EXTRACTION OF ALL REMAINING TEETH;  Surgeon: Humble Otero DMD;  [facility];  Service: Oral Surgery     Family History   Problem Relation Age of Onset    Breast cancer Mother     Cancer Father      Social History     Socioeconomic History    Marital status: Single     Spouse name: Not on file    Number of children: Not on file    Years of education: Not on file    Highest education level: Not on file   Occupational History    Not on file   Tobacco Use    Smoking status: Every Day     Current packs/day: 0.25     Types: Cigarettes    Smokeless tobacco: Never   Vaping Use    Vaping status: Never Used   Substance and Sexual Activity    Alcohol use: Yes     Comment: 3-4 beers/day 24 oz beer    Drug use: No    Sexual activity: Not on file   Other Topics Concern    Not on file   Social History Narrative    Not on file     Social Determinants of Health     Financial Resource Strain: Not on file   Food Insecurity: No Food Insecurity (2/9/2024)    Hunger Vital Sign     Worried About Running Out of Food in the Last Year: Never true     Ran Out of Food in the Last Year: Never true   Transportation Needs: No Transportation Needs (2/9/2024)    PRAPARE - Transportation     Lack of Transportation (Medical): No     Lack of Transportation (Non-Medical): No   Physical Activity: Not on file   Stress: Not on  file   Social Connections: Not on file   Intimate Partner Violence: Not on file   Housing Stability: Low Risk  (2/9/2024)    Housing Stability Vital Sign     Unable to Pay for Housing in the Last Year: No     Number of Places Lived in the Last Year: 1     Unstable Housing in the Last Year: No       Current Outpatient Medications:     Aspirin Low Dose 81 MG chewable tablet, CHEW 1 TABLET BY MOUTH DAILY, Disp: 90 tablet, Rfl: 0    atorvastatin (LIPITOR) 40 mg tablet, TAKE 1 TABLET BY MOUTH EVERY DAY IN THE EVENING, Disp: 90 tablet, Rfl: 1    chlorhexidine (PERIDEX) 0.12 % solution, , Disp: , Rfl:     dexamethasone 0.5 MG/5ML elixir, Take 10 mL (1 mg total) by mouth 4 (four) times a day (Patient not taking: Reported on 1/26/2024), Disp: 400 mL, Rfl: 0    lactulose (CHRONULAC) 10 g/15 mL solution, Take 15 mL (10 g total) by mouth 2 (two) times a day, Disp: 900 mL, Rfl: 0    magnesium Oxide (MAG-OX) 400 mg TABS, Take 1 tablet (400 mg total) by mouth 2 (two) times a day, Disp: 60 tablet, Rfl: 0    naloxone (NARCAN) 4 mg/0.1 mL nasal spray, Administer 1 spray into a nostril. If no response after 2-3 minutes, give another dose in the other nostril using a new spray., Disp: 1 each, Rfl: 1    oxyCODONE (ROXICODONE) 20 MG TABS, Take 1 tablet (20 mg total) by mouth every 4 (four) hours as needed for moderate pain Max Daily Amount: 120 mg, Disp: 90 tablet, Rfl: 0    potassium chloride (Klor-Con M20) 20 mEq tablet, Take 1 tablet (20 mEq total) by mouth daily for 14 days, Disp: 14 tablet, Rfl: 0    senna-docusate sodium (SENOKOT-S) 8.6-50 mg per tablet, Take 1 tablet by mouth daily, Disp: 60 tablet, Rfl: 2    silver sulfadiazine (SILVADENE,SSD) 1 % cream, Apply topically 2 (two) times a day (Patient not taking: Reported on 1/11/2024), Disp: 400 g, Rfl: 1  Allergies   Allergen Reactions    Bee Venom Hives    Other      bees     There were no vitals filed for this visit.

## 2024-02-21 NOTE — PROGRESS NOTES
Follow-up - Radiation Oncology   Eliceo Garcia 1965 58 y.o. 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 male current smoker and current alcohol user with [id number], Stage CHETAN oral cavity cancer, s/p oral biopsy on 8/29/23 with Dr. 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 Oral exam at the time of biopsy notable for limited ROM, some trismus, invasion along right mandible. Given extent of disease, he was not deemed surgically resectable and was discussed at MDT, with consensus for definitive chemoRT. PET/CT on 6/19/23 notable for a 3.2cm right anterior oral cavity mass with bilateral avid lymph nodes without evidence of distant disease. CT Neck on 9/9/23 showed a 3cm mass in the anterior tongue crossing midline with additional smaller nodule in the right posterior lateral tongue, with multiple pathologic nodes in the right level IB-III and left level IB.  He completed definitive intent chemo RT on 1/10/2024.    He presents for routine 1-1.5 month end of treatment follow-up visit. He has recovered from usual expected side effects of therapy. He has no further mucositis or dermatitis. He takes most soft food PO and hardly uses his PEG tube, but wants to keep this in. I advised nutrition f/u and he will be seen by their team today.   Discussed continued skin and mouth care.  Discussed continued palliative care  Discussed upcoming imaging in April for posttreatment assessment  Needs to establish care with ENT. Will route to  for assistance.  3/26/24  Palliative Care  4/3/24    PET CT  RTC in 6 months.    Ernestina Mohr MD  Department of Radiation Oncology  [facility]  Network    Total Time Spent  20 minutes spent reviewing EMR in preparation for visit, with the patient, coordination with other providers, and documentation.    No orders of the defined types were placed in this encounter.       History of Present Illness   Interval History:  Eliceo Garcia 1965 is a 58 y.o. male  With h/o Clinical Stage CHETAN (cT4a, cN2c, cMO) SCC of oral cavity.  Completed concurrent chemo/RT on 1/10/24.  Today's visit is an EOT follow-up.     Completed RT with mild to moderate acute toxicity. He has significant oral pain, dysphagia, and odynophagia related to his bulky oral cavity tumor that is now starting to shrink. He is only intermittently using his PEG tube due to inconvenience. No further oral bleeding Exam: Improved oral cavity and cervical guru disease. No significant dermatitis. Mild-moderate mucositis. No thrush. He has several excoriations on his neck and his hands which he notes are from his cat or from his own itching. Reviewed skin care. Discussed cortisone or Aquaphor. He feels none of these help. Reinforced moisturizer. Added Silvadene per his request.Reviewed mouth care. Pain management via palliative care team, he has his refills.     1/11/24  Palliative Care  Given dexamethasone liquid for mucositis.  Increase oxyIR to 20mg q4h PRN with discussion of eventual weaning depending upon treatment response     2/7/24-2/13/24  [facility]  DX: rectal bleeding, acute blood loss, malnutrition, fecal impaction  Received 2 units PRBC's.  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 Oral mucosa  (biopsy):  - Invasive keratinizing moderately differentiated squamous cell carcinoma, ulcerated  - Carcinoma present at tissue edges    HPV, p16 positive      Chemotherapy    alteplase (CATHFLO), 2 mg, Intracatheter, Every 1 Minute as needed, 0 of 7 cycles  CISplatin (PLATINOL) infusion, 40 mg/m2, Intravenous, Once, 0 of 7 cycles  aprepitant (CINVANTI) in  mL IVPB, 130 mg, Intravenous, Once, 0 of 7 cycles       11/15/2023 - 1/10/2024 Radiation      Plan ID Energy Fractions Dose per Fraction (cGy) Dose Correction (cGy) Total Dose Delivered (cGy) Elapsed Days   OralCav_BilNk 6X 35 / 35 200 0 7,000 56      Treatment dates:  C1: 11/15/2023 - 1/10/2024           Past Medical History:   Diagnosis Date   • Alcohol abuse    • Cancer (HCC)    • Hypertension    • Muscle weakness     Left leg   • 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   • Stroke (HCC)      Past Surgical History:   Procedure Laterality Date   • EGD     • IR GASTROSTOMY TUBE PLACEMENT  12/11/2023   • IR PORT PLACEMENT  11/7/2023   • KNEE ARTHROSCOPY Left    • MULTIPLE TOOTH EXTRACTIONS N/A 10/16/2023    Procedure: EXTRACTION OF ALL REMAINING TEETH;  Surgeon: Humble Otero DMD;  Location[facility];  Service: Oral Surgery     Social History   Social History     Substance and Sexual Activity   Alcohol Use Yes    Comment: 3-4 beers/day 24 oz beer     Social History     Substance and Sexual Activity   Drug Use No     Social History     Tobacco Use   Smoking Status Every Day   • Current packs/day: 0.25   • Types: Cigarettes   Smokeless Tobacco Never       Meds/Allergies     Current Outpatient Medications:   •  atorvastatin (LIPITOR) 40 mg tablet, TAKE 1 TABLET BY MOUTH EVERY DAY IN THE EVENING, Disp: 90 tablet, Rfl: 1  •  lactulose (CHRONULAC) 10 g/15 mL solution, Take 15 mL (10 g total) by mouth 2 (two) times a day, Disp: 900 mL, Rfl: 0  •  magnesium Oxide (MAG-OX) 400 mg TABS, Take 1 tablet (400 mg total) by mouth 2 (two) times a day, Disp: 60  "tablet, Rfl: 0  •  oxyCODONE (ROXICODONE) 20 MG TABS, Take 1 tablet (20 mg total) by mouth every 4 (four) hours as needed for moderate pain Max Daily Amount: 120 mg, Disp: 90 tablet, Rfl: 0  •  potassium chloride (Klor-Con M20) 20 mEq tablet, Take 1 tablet (20 mEq total) by mouth daily for 14 days, Disp: 14 tablet, Rfl: 0  •  senna-docusate sodium (SENOKOT-S) 8.6-50 mg per tablet, Take 1 tablet by mouth daily, Disp: 60 tablet, Rfl: 2  •  Aspirin Low Dose 81 MG chewable tablet, CHEW 1 TABLET BY MOUTH DAILY (Patient not taking: Reported on 2/21/2024), Disp: 90 tablet, Rfl: 0  •  chlorhexidine (PERIDEX) 0.12 % solution, , Disp: , Rfl:   •  dexamethasone 0.5 MG/5ML elixir, Take 10 mL (1 mg total) by mouth 4 (four) times a day (Patient not taking: Reported on 1/26/2024), Disp: 400 mL, Rfl: 0  •  naloxone (NARCAN) 4 mg/0.1 mL nasal spray, Administer 1 spray into a nostril. If no response after 2-3 minutes, give another dose in the other nostril using a new spray. (Patient not taking: Reported on 2/21/2024), Disp: 1 each, Rfl: 1  •  silver sulfadiazine (SILVADENE,SSD) 1 % cream, Apply topically 2 (two) times a day (Patient not taking: Reported on 1/11/2024), Disp: 400 g, Rfl: 1  Allergies   Allergen Reactions   • Bee Venom Hives   • Other      bees       Review of Systems:  Refer to nursing note    OBJECTIVE:   /73 (BP Location: Right arm, Patient Position: Sitting, Cuff Size: Standard)   Pulse 80   Temp 97.7 °F (36.5 °C) (Temporal)   Resp 18   Ht 6' 3\" (1.905 m)   Wt 53.3 kg (117 lb 8.1 oz)   SpO2 98%   BMI 14.69 kg/m²     Physical Exam:   General Appearance:  Alert, cooperative, no distress, appears stated age  HEENT: Dry skin, no further dermatitis. Neck nodules and tongue tumor smaller in appearance. No mucositis. No thrush.   Lungs: Respirations unlabored, no cyanosis, able to speak in complete sentences without dyspnea.  Skin: No generalized rash or dermatitis  Neurologic: ANOx3, speech and cognition " "intact.    Portions of the record may have been created with voice recognition software.  Occasional wrong word or \"sound a like\" substitutions may have occurred due to the inherent limitations of voice recognition software.  Read the chart carefully and recognize, using context, where substitutions have occurred.  "

## 2024-02-21 NOTE — Clinical Note
Sandoval Azar and Eliceo Wynne is recovering well from chemo RT. With Dr. Dimas's departure, he has no ENT f/u. Would you get him plugged back in for ENT f/u and medonc f/u?  Thank you, Ernestina

## 2024-02-21 NOTE — PROGRESS NOTES
"LORRIE was asked by Dr. Mohr to meet with Eliceo today briefly to discuss his nutrition. Met with Eliceo in exam room. He reports he's \"eating what he can\". When asked what he was able to eat yesterday, he did not specify. He stated again he's \"eating what he can\". RD reinforced importance of nutrition and that I'm only here to help him improve his nutrition now that he's finished with treatment. He then stated he's eating mostly softer foods and liquids- eggs, pudding, jello, ice cream, drinks. He has not been using his PEG- he last used it when he was in the hospital but it was at a low rate d/t refeeding risk. His weight is down. He was 130# about 2 months ago, and currently is 117#. Reviewed ways to add calories- encouraged using extra butter, cheese, olive oil on foods, choose cream-based soups for more calories, try fortifying mashed potatoes with heavy cream, dry milk powder, cream cheese, etc, encouraged ice cream, fortified pudding and jello. Recommended he drink Ensure shakes or generic version to help with calorie intake. Provided him with some samples and coupons today. He states he likes to drink them when he has them. Discussed aiming for ones with 300 calories or more. Also encouraged milkshakes/smoothies. He has recipe packet at home that was provided at another visit. RD asked if he would be willing to utilize PEG for nutrition. He states he might get it removed since he hasn't been using it. He states the formula doesn't go through. Encouraged him to try diluting formula with a little water to thin it out, as he reports water goes through fine. Also encouraged him to try pushing the formula through just slightly to get it moving. He has plenty of Jevity 1.5 at home. All questions/concerns answered today. Provided him with my contact information and encouraged him to reach out with any additional questions/concerns. He was agreeable to having RD call again in another week or two to check in.   "

## 2024-02-23 ENCOUNTER — TELEPHONE (OUTPATIENT)
Dept: PALLIATIVE MEDICINE | Facility: CLINIC | Age: 59
End: 2024-02-23

## 2024-02-23 DIAGNOSIS — G89.3 CANCER-RELATED PAIN: ICD-10-CM

## 2024-02-23 DIAGNOSIS — C06.9 ORAL CANCER (HCC): ICD-10-CM

## 2024-02-23 DIAGNOSIS — K13.79 PAIN IN ORAL CAVITY: ICD-10-CM

## 2024-02-23 RX ORDER — OXYCODONE HYDROCHLORIDE 20 MG/1
20 TABLET ORAL EVERY 4 HOURS PRN
Qty: 60 TABLET | Refills: 0 | Status: SHIPPED | OUTPATIENT
Start: 2024-02-23

## 2024-02-23 RX ORDER — OXYCODONE HYDROCHLORIDE 20 MG/1
20 TABLET ORAL EVERY 4 HOURS PRN
Qty: 60 TABLET | Refills: 0 | Status: SHIPPED | OUTPATIENT
Start: 2024-02-23 | End: 2024-02-23 | Stop reason: SDUPTHER

## 2024-02-23 NOTE — TELEPHONE ENCOUNTER
Pt calling stated he lost his medication Oxycodone -20mg tabs and Magnesium 400mg while waiting for [place] just noticed now while coming home and is unsure what to do without his medication is not due until next week for refills . Please advise .

## 2024-02-23 NOTE — TELEPHONE ENCOUNTER
I will allow one early fill this one time only. Please remind him that he is responsible for keeping his opioids safe. We will not allow for an early fill after this. Should this happen again or should he ask for an early fill than expected, I will no longer prescribe him further opioids and he will be dismissed from practice for violating his opioid agreement.  I will only send 60 tabs from now on. He should review the opioid agreement he signed. Please mail him a copy.     However, it is up to insurance and pharmacy to dispense early fill. We will not guarantee he will get early fill despite my script. If this is to happen, he should proceed to the ED to get assistance for pain.     Controlled Substance Review    [place] PDMP or [place]  reviewed    Filled  Written  ID  Drug  QTY  Days  Prescriber  RX #  Dispenser  Refill  Daily Dose*  Pymt Type      02/14/2024 02/14/2024 1 Oxycodone Hcl (Ir) 20 Mg Tab 90.00 15 Ti Lobo [id number] Pen (3026) 0 180.00 [place]   01/30/2024 01/29/2024 1 Oxycodone Hcl (Ir) 20 Mg Tab 90.00 15 Mi Pip [id number] Pen (4386) 0 180.00 [place]   01/15/2024 01/11/2024 1 Oxycodone Hcl (Ir) 20 Mg Tab 90.00 15 Ti Lobo [id number] Pen (4386) 0 180.00 [place]   01/02/2024 12/28/2023 1 Oxycodone Hcl (Ir) 20 Mg Tab 90.00 15 Ti Lobo [id number] Pen (4386) 0 180.00 [place]   Mitra Lima MD  Palliative Medicine & Supportive Care  Internal Medicine  Available via [employer]  Office: [phone]  Fax: [phone]

## 2024-02-23 NOTE — TELEPHONE ENCOUNTER
Last appointment:01/26/2024    Next scheduled appointment:03/26/2024    [facility]      Sent to wrong pharmacy , attatched is correct pharmacy [facility] in [place]

## 2024-02-26 ENCOUNTER — PATIENT OUTREACH (OUTPATIENT)
Dept: HEMATOLOGY ONCOLOGY | Facility: CLINIC | Age: 59
End: 2024-02-26

## 2024-02-26 NOTE — PROGRESS NOTES
I reached out to Eliceo now that he has completed Tx to review for barriers to follow and offer supportive services.      During our call Eliceo reports pain in his mouth that is uncontrolled. He is established with palliative care and was just seen in F/U with Dr. Mohr. He rates his mouth pain 10/10, nothing helps, swallow unchanged, denies SOB. I routed a message to his Palliative Care team as well as Dr. Mohr's team for assistance.     Completion of  Treatment Assessment    Do you have your FU appointments scheduled with your Oncology team?   yes    Do you know when you need to go for FU imaging?  yes    Do you know if/when you need to go for FU labs?  yes    Do you need assistance with scheduling any of these items? no      Will you require transportation assistance for these appointments?  no      Do you know who to call if you need any assistance in the future?  Yes    He was appreciative for the help today.

## 2024-02-27 ENCOUNTER — TELEPHONE (OUTPATIENT)
Dept: PALLIATIVE MEDICINE | Facility: CLINIC | Age: 59
End: 2024-02-27

## 2024-02-27 ENCOUNTER — PATIENT OUTREACH (OUTPATIENT)
Dept: HEMATOLOGY ONCOLOGY | Facility: CLINIC | Age: 59
End: 2024-02-27

## 2024-02-27 DIAGNOSIS — G89.3 CANCER-RELATED PAIN: Chronic | ICD-10-CM

## 2024-02-27 DIAGNOSIS — G89.3 CANCER-RELATED PAIN: Primary | Chronic | ICD-10-CM

## 2024-02-27 RX ORDER — DIPHENHYDRAMINE HYDROCHLORIDE AND LIDOCAINE HYDROCHLORIDE AND ALUMINUM HYDROXIDE AND MAGNESIUM HYDRO
10 KIT EVERY 4 HOURS PRN
Qty: 237 ML | Refills: 0 | Status: SHIPPED | OUTPATIENT
Start: 2024-02-27 | End: 2024-02-29

## 2024-02-27 NOTE — PROGRESS NOTES
I do not recommend an increase in his opioid. He is raising red flags by asking for sooner script and now telling another provider about increase in pain. No changes in pain meds.

## 2024-02-27 NOTE — PROGRESS NOTES
"Received incoming call from Eliceo. He states that his girlfriend is out of town and so he is staying with a friend in [place]. While staying in [place] he only brought a small number of pain pills for the time he planned to be away. His stay in [place] has extended longer than he planned so he took a bus to his home in [place] to get more clothes, food and medication. He states that he placed the pain pills in his pocket and lost them en route and has since run out of pain medication. He is feeling frustrated with his pain levels at this time. I reviewed the telephone communications with him from earlier today and let him know that it is up to his insurance to approve the early refill that his physician approved for this one time. He is wonders if there is any alternative that he may do to help decrease his pain while he waits. He informed me that he was advised that by his provider that he could go to the ER for his uncontrolled pain.He states that this is not something he wants to do \"because they will just send him home.\" I informed him that I will share his concerns with his provider.   "

## 2024-02-27 NOTE — TELEPHONE ENCOUNTER
I already sent the script and allowed a one time early fill. It is now up to his insurance and pharmacy like discussed previously. I do not have any other advise at this point.

## 2024-02-27 NOTE — PROGRESS NOTES
Magic mouthwash will be a good alternate. And tylenol 1000mg PO q6H prn. Max of 4000mg in 24H. I can send the MMW to his listed pharmacy. ER if still not controlled

## 2024-02-27 NOTE — TELEPHONE ENCOUNTER
Pt called in regards to oxycodone again and I explained everything to him that was explained to him on Friday. He wants to know when he can get his medication. I went over everything that Dr Lima stated on Friday. Please Advise

## 2024-02-28 NOTE — PROGRESS NOTES
I have nothing more to recommend at this point about his pain. I have already sent the one time early script for opioid. I already sent MMW. We can wait for prior auth approval. I dont know what else to do. He should proceed to the ED if pain is uncontrolled. No further recommendations.

## 2024-02-28 NOTE — TELEPHONE ENCOUNTER
Prior Authorization COMPLETED for First-Mouthwash BLM Suspension    KEY#[id number]    [facility]  Phone: [phone]  Fax: [phone]

## 2024-02-28 NOTE — PROGRESS NOTES
"Received VM this morning from Eliceo:    \"Yeah. Good morning. This is Eliceo, [phone]. I was wondering if you heard anything for me today because I'm in a lot of pain, something's got to be done here. Thank you. Give me a call please.\"  "

## 2024-02-28 NOTE — PROGRESS NOTES
I contacted Eliceo and reiterated the directions provided by Dr. Jj, including Tylenol and Magic Mouth wash. He states that he can not afford the magic mouth wash and he was told he is not allowed to take Tylenol previously. I reminded him that he may proceed to the ER if pain level is severe so that they can evaluated and help manage. He was not satisfied with this. He would like a call from the office. I will let them know.

## 2024-02-29 ENCOUNTER — TELEPHONE (OUTPATIENT)
Dept: PALLIATIVE MEDICINE | Facility: CLINIC | Age: 59
End: 2024-02-29

## 2024-02-29 RX ORDER — DIPHENHYDRAMINE HYDROCHLORIDE AND LIDOCAINE HYDROCHLORIDE AND ALUMINUM HYDROXIDE AND MAGNESIUM HYDRO
10 KIT EVERY 4 HOURS PRN
Refills: 0 | OUTPATIENT
Start: 2024-02-29

## 2024-02-29 NOTE — TELEPHONE ENCOUNTER
Received communication that Prior Auth for (First-Mouthwash BLM Suspension] was DENIED.    Per insurance, Prior Auth was denied due to the drug is made by a company who is not in the Medicaid Drug Rebate Program. These drugs are not a covered benefit.          Document scanned to media.

## 2024-03-04 ENCOUNTER — TELEPHONE (OUTPATIENT)
Dept: NUTRITION | Facility: CLINIC | Age: 59
End: 2024-03-04

## 2024-03-04 NOTE — TELEPHONE ENCOUNTER
"LORRIE reached out to Eliceo to [facility] after last brief visit on 2/21/24. Discussed reason for call. He states he isn't doing any better. He c/o pain in his mouth that is making it difficult to talk and to eat. He has pain medication but reports it's not helping. He did drink the Ensure complete shakes that were provided last week. He reports he's also eating some pudding. Discussed other food/liquids that are calorie and protein dense. Encouraged him to  more Ensure with coupons that were provided, or he can try generic options that are less expensive. He stated he did not want to pay for any of it. Discussed importance of adequate nutrition. He understood but stated he needs the pain controlled and then he should be able to eat. Encouraged him to start using his feeding tube again but he stated \"I want nothing to do with that thing\". All questions/concerns addressed today. Eliceo stated he will reach out to LORRIE as needed.   "

## 2024-03-06 ENCOUNTER — TELEPHONE (OUTPATIENT)
Dept: NUTRITION | Facility: CLINIC | Age: 59
End: 2024-03-06

## 2024-03-06 ENCOUNTER — TELEPHONE (OUTPATIENT)
Dept: RADIATION ONCOLOGY | Facility: CLINIC | Age: 59
End: 2024-03-06

## 2024-03-06 ENCOUNTER — HOSPITAL ENCOUNTER (INPATIENT)
Facility: HOSPITAL | Age: 59
LOS: 8 days | Discharge: HOME HEALTH | DRG: 252 | End: 2024-03-14
Attending: EMERGENCY MEDICINE | Admitting: GENERAL PRACTICE
Payer: COMMERCIAL

## 2024-03-06 DIAGNOSIS — K13.79 PAIN IN ORAL CAVITY: ICD-10-CM

## 2024-03-06 DIAGNOSIS — E87.8 ELECTROLYTE ABNORMALITY: ICD-10-CM

## 2024-03-06 DIAGNOSIS — R63.4 WEIGHT LOSS: ICD-10-CM

## 2024-03-06 DIAGNOSIS — G89.3 CANCER-RELATED PAIN: Chronic | ICD-10-CM

## 2024-03-06 DIAGNOSIS — G89.3 CANCER-RELATED PAIN: ICD-10-CM

## 2024-03-06 DIAGNOSIS — Z51.5 PALLIATIVE CARE ENCOUNTER: ICD-10-CM

## 2024-03-06 DIAGNOSIS — C06.9 SQUAMOUS CELL CARCINOMA OF ORAL CAVITY (HCC): Chronic | ICD-10-CM

## 2024-03-06 DIAGNOSIS — E46 PROTEIN CALORIE MALNUTRITION (HCC): ICD-10-CM

## 2024-03-06 DIAGNOSIS — F10.10 ALCOHOL ABUSE: Chronic | ICD-10-CM

## 2024-03-06 DIAGNOSIS — D64.9 ANEMIA: ICD-10-CM

## 2024-03-06 DIAGNOSIS — K94.20 COMPLICATION OF FEEDING TUBE (HCC): ICD-10-CM

## 2024-03-06 DIAGNOSIS — E43 SEVERE PROTEIN-CALORIE MALNUTRITION (HCC): Chronic | ICD-10-CM

## 2024-03-06 DIAGNOSIS — F17.210 CIGARETTE NICOTINE DEPENDENCE WITHOUT COMPLICATION: ICD-10-CM

## 2024-03-06 DIAGNOSIS — E83.42 HYPOMAGNESEMIA: ICD-10-CM

## 2024-03-06 DIAGNOSIS — C06.9 ORAL CANCER (HCC): ICD-10-CM

## 2024-03-06 DIAGNOSIS — E87.1 HYPONATREMIA: ICD-10-CM

## 2024-03-06 DIAGNOSIS — K94.23 PEG TUBE MALFUNCTION (HCC): Primary | ICD-10-CM

## 2024-03-06 LAB
ALBUMIN SERPL BCP-MCNC: 3 G/DL (ref 3.5–5)
ALP SERPL-CCNC: 86 U/L (ref 34–104)
ALT SERPL W P-5'-P-CCNC: 6 U/L (ref 7–52)
ANION GAP SERPL CALCULATED.3IONS-SCNC: 6 MMOL/L
AST SERPL W P-5'-P-CCNC: 9 U/L (ref 13–39)
BASOPHILS # BLD AUTO: 0.04 THOUSANDS/ÂΜL (ref 0–0.1)
BASOPHILS NFR BLD AUTO: 1 % (ref 0–1)
BILIRUB SERPL-MCNC: 0.2 MG/DL (ref 0.2–1)
BUN SERPL-MCNC: 10 MG/DL (ref 5–25)
CALCIUM ALBUM COR SERPL-MCNC: 9.7 MG/DL (ref 8.3–10.1)
CALCIUM SERPL-MCNC: 8.9 MG/DL (ref 8.4–10.2)
CHLORIDE SERPL-SCNC: 96 MMOL/L (ref 96–108)
CO2 SERPL-SCNC: 30 MMOL/L (ref 21–32)
CREAT SERPL-MCNC: 0.6 MG/DL (ref 0.6–1.3)
EOSINOPHIL # BLD AUTO: 0.07 THOUSAND/ÂΜL (ref 0–0.61)
EOSINOPHIL NFR BLD AUTO: 1 % (ref 0–6)
ERYTHROCYTE [DISTWIDTH] IN BLOOD BY AUTOMATED COUNT: 16.3 % (ref 11.6–15.1)
GFR SERPL CREATININE-BSD FRML MDRD: 109 ML/MIN/1.73SQ M
GLUCOSE SERPL-MCNC: 83 MG/DL (ref 65–140)
HCT VFR BLD AUTO: 29.2 % (ref 36.5–49.3)
HGB BLD-MCNC: 9.7 G/DL (ref 12–17)
IMM GRANULOCYTES # BLD AUTO: 0.13 THOUSAND/UL (ref 0–0.2)
IMM GRANULOCYTES NFR BLD AUTO: 2 % (ref 0–2)
LIPASE SERPL-CCNC: <6 U/L (ref 11–82)
LYMPHOCYTES # BLD AUTO: 0.61 THOUSANDS/ÂΜL (ref 0.6–4.47)
LYMPHOCYTES NFR BLD AUTO: 7 % (ref 14–44)
MAGNESIUM SERPL-MCNC: 1.8 MG/DL (ref 1.9–2.7)
MCH RBC QN AUTO: 32.6 PG (ref 26.8–34.3)
MCHC RBC AUTO-ENTMCNC: 33.2 G/DL (ref 31.4–37.4)
MCV RBC AUTO: 98 FL (ref 82–98)
MONOCYTES # BLD AUTO: 1.18 THOUSAND/ÂΜL (ref 0.17–1.22)
MONOCYTES NFR BLD AUTO: 14 % (ref 4–12)
NEUTROPHILS # BLD AUTO: 6.54 THOUSANDS/ÂΜL (ref 1.85–7.62)
NEUTS SEG NFR BLD AUTO: 75 % (ref 43–75)
NRBC BLD AUTO-RTO: 0 /100 WBCS
PHOSPHATE SERPL-MCNC: 3 MG/DL (ref 2.3–4.1)
PLATELET # BLD AUTO: 264 THOUSANDS/UL (ref 149–390)
PLATELET # BLD AUTO: 301 THOUSANDS/UL (ref 149–390)
PMV BLD AUTO: 9 FL (ref 8.9–12.7)
PMV BLD AUTO: 9.1 FL (ref 8.9–12.7)
POTASSIUM SERPL-SCNC: 4.3 MMOL/L (ref 3.5–5.3)
PROT SERPL-MCNC: 6 G/DL (ref 6.4–8.4)
RBC # BLD AUTO: 2.98 MILLION/UL (ref 3.88–5.62)
SODIUM SERPL-SCNC: 132 MMOL/L (ref 135–147)
TSH SERPL DL<=0.05 MIU/L-ACNC: 1.08 UIU/ML (ref 0.45–4.5)
WBC # BLD AUTO: 8.57 THOUSAND/UL (ref 4.31–10.16)

## 2024-03-06 PROCEDURE — 84100 ASSAY OF PHOSPHORUS: CPT

## 2024-03-06 PROCEDURE — 80053 COMPREHEN METABOLIC PANEL: CPT

## 2024-03-06 PROCEDURE — 83690 ASSAY OF LIPASE: CPT

## 2024-03-06 PROCEDURE — 99285 EMERGENCY DEPT VISIT HI MDM: CPT | Performed by: EMERGENCY MEDICINE

## 2024-03-06 PROCEDURE — 85049 AUTOMATED PLATELET COUNT: CPT

## 2024-03-06 PROCEDURE — 96374 THER/PROPH/DIAG INJ IV PUSH: CPT

## 2024-03-06 PROCEDURE — 85025 COMPLETE CBC W/AUTO DIFF WBC: CPT

## 2024-03-06 PROCEDURE — 99283 EMERGENCY DEPT VISIT LOW MDM: CPT

## 2024-03-06 PROCEDURE — 83735 ASSAY OF MAGNESIUM: CPT

## 2024-03-06 PROCEDURE — 84443 ASSAY THYROID STIM HORMONE: CPT

## 2024-03-06 RX ORDER — FOLIC ACID 1 MG/1
1 TABLET ORAL DAILY
Status: DISCONTINUED | OUTPATIENT
Start: 2024-03-07 | End: 2024-03-14 | Stop reason: HOSPADM

## 2024-03-06 RX ORDER — NICOTINE 21 MG/24HR
1 PATCH, TRANSDERMAL 24 HOURS TRANSDERMAL DAILY
Status: DISCONTINUED | OUTPATIENT
Start: 2024-03-07 | End: 2024-03-14 | Stop reason: HOSPADM

## 2024-03-06 RX ORDER — ACETAMINOPHEN 325 MG/1
650 TABLET ORAL EVERY 6 HOURS PRN
Status: DISCONTINUED | OUTPATIENT
Start: 2024-03-06 | End: 2024-03-14 | Stop reason: HOSPADM

## 2024-03-06 RX ORDER — LANOLIN ALCOHOL/MO/W.PET/CERES
100 CREAM (GRAM) TOPICAL DAILY
Status: DISCONTINUED | OUTPATIENT
Start: 2024-03-07 | End: 2024-03-07

## 2024-03-06 RX ORDER — ONDANSETRON 2 MG/ML
4 INJECTION INTRAMUSCULAR; INTRAVENOUS EVERY 6 HOURS PRN
Status: DISCONTINUED | OUTPATIENT
Start: 2024-03-06 | End: 2024-03-11

## 2024-03-06 RX ORDER — OXYCODONE HYDROCHLORIDE 20 MG/1
20 TABLET ORAL EVERY 4 HOURS PRN
Qty: 60 TABLET | Refills: 0 | Status: SHIPPED | OUTPATIENT
Start: 2024-03-09 | End: 2024-03-14

## 2024-03-06 RX ORDER — HEPARIN SODIUM 5000 [USP'U]/ML
5000 INJECTION, SOLUTION INTRAVENOUS; SUBCUTANEOUS EVERY 8 HOURS SCHEDULED
Status: DISCONTINUED | OUTPATIENT
Start: 2024-03-06 | End: 2024-03-14 | Stop reason: HOSPADM

## 2024-03-06 RX ORDER — AMOXICILLIN 250 MG
1 CAPSULE ORAL DAILY
Status: DISCONTINUED | OUTPATIENT
Start: 2024-03-07 | End: 2024-03-14 | Stop reason: HOSPADM

## 2024-03-06 RX ORDER — OXYCODONE HYDROCHLORIDE 10 MG/1
20 TABLET ORAL EVERY 4 HOURS PRN
Status: DISCONTINUED | OUTPATIENT
Start: 2024-03-06 | End: 2024-03-06

## 2024-03-06 RX ORDER — AMOXICILLIN 250 MG
1 CAPSULE ORAL DAILY
Status: CANCELLED | OUTPATIENT
Start: 2024-03-07

## 2024-03-06 RX ORDER — MAGNESIUM SULFATE HEPTAHYDRATE 40 MG/ML
2 INJECTION, SOLUTION INTRAVENOUS ONCE
Status: COMPLETED | OUTPATIENT
Start: 2024-03-06 | End: 2024-03-06

## 2024-03-06 RX ORDER — ONDANSETRON 2 MG/ML
4 INJECTION INTRAMUSCULAR; INTRAVENOUS ONCE
Status: DISCONTINUED | OUTPATIENT
Start: 2024-03-06 | End: 2024-03-11

## 2024-03-06 RX ORDER — ATORVASTATIN CALCIUM 40 MG/1
40 TABLET, FILM COATED ORAL EVERY EVENING
Status: CANCELLED | OUTPATIENT
Start: 2024-03-06

## 2024-03-06 RX ORDER — OXYCODONE HYDROCHLORIDE 10 MG/1
20 TABLET ORAL ONCE
Status: COMPLETED | OUTPATIENT
Start: 2024-03-06 | End: 2024-03-06

## 2024-03-06 RX ORDER — ATORVASTATIN CALCIUM 40 MG/1
40 TABLET, FILM COATED ORAL EVERY EVENING
Status: DISCONTINUED | OUTPATIENT
Start: 2024-03-06 | End: 2024-03-14 | Stop reason: HOSPADM

## 2024-03-06 RX ORDER — CALCIUM CARBONATE 500 MG/1
1000 TABLET, CHEWABLE ORAL DAILY PRN
Status: DISCONTINUED | OUTPATIENT
Start: 2024-03-06 | End: 2024-03-14 | Stop reason: HOSPADM

## 2024-03-06 RX ORDER — OXYCODONE HYDROCHLORIDE 10 MG/1
20 TABLET ORAL EVERY 4 HOURS PRN
Status: DISCONTINUED | OUTPATIENT
Start: 2024-03-07 | End: 2024-03-11

## 2024-03-06 RX ADMIN — MAGNESIUM SULFATE HEPTAHYDRATE 2 G: 2 INJECTION, SOLUTION INTRAVENOUS at 20:52

## 2024-03-06 RX ADMIN — HEPARIN SODIUM 5000 UNITS: 5000 INJECTION INTRAVENOUS; SUBCUTANEOUS at 22:53

## 2024-03-06 RX ADMIN — OXYCODONE HYDROCHLORIDE 20 MG: 10 TABLET ORAL at 20:49

## 2024-03-06 RX ADMIN — ATORVASTATIN CALCIUM 40 MG: 40 TABLET, FILM COATED ORAL at 22:53

## 2024-03-06 NOTE — ED PROVIDER NOTES
"History  Chief Complaint   Patient presents with    Feeding Tube Problem     Pt via EMS for feeding tube issue. Pt says \"it's a pain to use the syringe\" with his feeding tube, gets nauseous, states he \"lost 85lbs\" in last 5 months. Pt wants \"to be put on a pump to regulate me\" for feeding, and be discharged home with a pump.      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  Patient reports over the past 5 months, whenever he uses the PEG tube he gets immediate nausea and abdominal pain.  Reports losing 85 pounds in the past 5 months.  Patient informed his oncologist of his issues, oncologist recommended go to the ED for an evaluation and admission for feeding tube pump, as patient is unable to tolerate bolus feeds and pt is high risk for refeeding syndrome. Pt reports nausea.  No issues with flushing the tube.  Tube is functioning appropriately.  Currently denies ab pain, CP, SOB, urinary symptoms, URI symptoms, fever or chills.        Wt Readings from Last 3 Encounters:   03/06/24 51.8 kg (114 lb 3.2 oz)   02/21/24 53.3 kg (117 lb 8.1 oz)   02/10/24 57.9 kg (127 lb 10.3 oz)         Prior to Admission Medications   Prescriptions Last Dose Informant Patient Reported? Taking?   Aspirin Low Dose 81 MG chewable tablet Not Taking  No No   Sig: CHEW 1 TABLET BY MOUTH DAILY   Patient not taking: Reported on 2/21/2024   atorvastatin (LIPITOR) 40 mg tablet 3/6/2024  No Yes   Sig: TAKE 1 TABLET BY MOUTH EVERY DAY IN THE EVENING   chlorhexidine (PERIDEX) 0.12 % solution Not Taking  Yes No   Patient not taking: Reported on 12/14/2023   dexamethasone 0.5 MG/5ML elixir Not Taking  No No   Sig: Take 10 mL (1 mg total) by mouth 4 (four) times a day   Patient not taking: Reported on 1/26/2024   lactulose (CHRONULAC) 10 g/15 mL solution Not Taking  No No   Sig: Take 15 mL (10 g total) by mouth 2 (two) times a day   Patient not taking: " Reported on 3/6/2024   magnesium Oxide (MAG-OX) 400 mg TABS Unknown  No No   Sig: Take 1 tablet (400 mg total) by mouth 2 (two) times a day   naloxone (NARCAN) 4 mg/0.1 mL nasal spray Not Taking  No No   Sig: Administer 1 spray into a nostril. If no response after 2-3 minutes, give another dose in the other nostril using a new spray.   Patient not taking: Reported on 2/21/2024   oxyCODONE (ROXICODONE) 20 MG TABS 3/6/2024  No Yes   Sig: Take 1 tablet (20 mg total) by mouth every 4 (four) hours as needed for moderate pain Max Daily Amount: 120 mg Do not start before March 9, 2024.   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    E-Cigarette/Vaping    E-Cigarette Use Never User      E-Cigarette/Vaping Substances    Nicotine Yes      Social History     Tobacco Use    Smoking status: Every Day     Current packs/day: 0.25     Types: Cigarettes    Smokeless tobacco:  Never   Vaping Use    Vaping status: Never Used   Substance Use Topics    Alcohol use: Yes     Comment: 3-4 beers/day 24 oz beer    Drug use: No        Review of Systems   Constitutional:  Negative for chills and fever.   HENT:  Negative for ear pain and sore throat.    Eyes:  Negative for pain and visual disturbance.   Respiratory:  Negative for cough and shortness of breath.    Cardiovascular:  Negative for chest pain and palpitations.   Gastrointestinal:  Positive for nausea. Negative for abdominal pain and vomiting.   Genitourinary:  Negative for dysuria and hematuria.   Musculoskeletal:  Negative for arthralgias and back pain.   Skin:  Negative for color change and rash.   Neurological:  Negative for seizures and syncope.   All other systems reviewed and are negative.      Physical Exam  ED Triage Vitals   Temperature Pulse Respirations Blood Pressure SpO2   03/06/24 1747 03/06/24 1747 03/06/24 1747 03/06/24 1747 03/06/24 1747   98.1 °F (36.7 °C) 84 18 158/88 97 %      Temp Source Heart Rate Source Patient Position - Orthostatic VS BP Location FiO2 (%)   03/06/24 1747 03/06/24 1747 03/06/24 1747 03/06/24 1747 --   Oral Monitor Lying Right arm       Pain Score       03/06/24 2049       4             Orthostatic Vital Signs  Vitals:    03/06/24 1747 03/06/24 2157   BP: 158/88 144/83   Pulse: 84    Patient Position - Orthostatic VS: Lying        Physical Exam  Vitals and nursing note reviewed.   Constitutional:       General: He is not in acute distress.     Appearance: He is well-developed. He is cachectic. He is ill-appearing.      Comments: Chronically ill-appearing, cachectic   HENT:      Head: Normocephalic and atraumatic.      Mouth/Throat:      Mouth: Mucous membranes are dry.      Pharynx: Oropharynx is clear.   Eyes:      Conjunctiva/sclera: Conjunctivae normal.   Cardiovascular:      Rate and Rhythm: Normal rate and regular rhythm.      Pulses: Normal pulses.           Radial pulses are 2+ on the right  side and 2+ on the left side.        Dorsalis pedis pulses are 2+ on the right side and 2+ on the left side.      Heart sounds: Normal heart sounds, S1 normal and S2 normal. No murmur heard.  Pulmonary:      Effort: Pulmonary effort is normal. No respiratory distress.      Breath sounds: Normal breath sounds and air entry.   Abdominal:      Palpations: Abdomen is soft.      Tenderness: There is no abdominal tenderness.      Comments: 16 Italian PEG tube in place, area is clean, dry and intact.  No surrounding skin changes.  Abdomen is soft and nontender.   Musculoskeletal:         General: No swelling.      Cervical back: Neck supple.      Right lower leg: No edema.      Left lower leg: No edema.   Skin:     General: Skin is warm and dry.      Capillary Refill: Capillary refill takes less than 2 seconds.   Neurological:      Mental Status: He is alert.      Comments: Speech is mumbled secondary to oral squamous cell carcinoma, patient can be understood, no focal neurodeficits.   Psychiatric:         Mood and Affect: Mood normal.         ED Medications  Medications   ondansetron (ZOFRAN) injection 4 mg (4 mg Intravenous Not Given 3/6/24 2046)   magnesium sulfate 2 g/50 mL IVPB (premix) 2 g (2 g Intravenous New Bag 3/6/24 2052)   atorvastatin (LIPITOR) tablet 40 mg (has no administration in time range)   senna-docusate sodium (SENOKOT S) 8.6-50 mg per tablet 1 tablet (has no administration in time range)   acetaminophen (TYLENOL) tablet 650 mg (has no administration in time range)   calcium carbonate (TUMS) chewable tablet 1,000 mg (has no administration in time range)   ondansetron (ZOFRAN) injection 4 mg (has no administration in time range)   nicotine (NICODERM CQ) 14 mg/24hr TD 24 hr patch 1 patch (has no administration in time range)   heparin (porcine) subcutaneous injection 5,000 Units (has no administration in time range)   oxyCODONE (ROXICODONE) immediate release tablet 20 mg (has no administration in time  range)   oxyCODONE (ROXICODONE) immediate release tablet 20 mg (20 mg Oral Given 3/6/24 2049)       Diagnostic Studies  Results Reviewed       Procedure Component Value Units Date/Time    TSH, 3rd generation with Free T4 reflex [[id number]]  (Normal) Collected: 03/06/24 2000    Lab Status: Final result Specimen: Blood from Central Venous Line Updated: 03/06/24 2042     TSH 3RD GENERATON 1.078 uIU/mL     Comprehensive metabolic panel [[id number]]  (Abnormal) Collected: 03/06/24 2000    Lab Status: Final result Specimen: Blood from Central Venous Line Updated: 03/06/24 2025     Sodium 132 mmol/L      Potassium 4.3 mmol/L      Chloride 96 mmol/L      CO2 30 mmol/L      ANION GAP 6 mmol/L      BUN 10 mg/dL      Creatinine 0.60 mg/dL      Glucose 83 mg/dL      Calcium 8.9 mg/dL      Corrected Calcium 9.7 mg/dL      AST 9 U/L      ALT 6 U/L      Alkaline Phosphatase 86 U/L      Total Protein 6.0 g/dL      Albumin 3.0 g/dL      Total Bilirubin 0.20 mg/dL      eGFR 109 ml/min/1.73sq m     Narrative:      National Kidney Disease Foundation guidelines for Chronic Kidney Disease (CKD):     Stage 1 with normal or high GFR (GFR > 90 mL/min/1.73 square meters)    Stage 2 Mild CKD (GFR = 60-89 mL/min/1.73 square meters)    Stage 3A Moderate CKD (GFR = 45-59 mL/min/1.73 square meters)    Stage 3B Moderate CKD (GFR = 30-44 mL/min/1.73 square meters)    Stage 4 Severe CKD (GFR = 15-29 mL/min/1.73 square meters)    Stage 5 End Stage CKD (GFR <15 mL/min/1.73 square meters)  Note: GFR calculation is accurate only with a steady state creatinine    Magnesium [[id number]]  (Abnormal) Collected: 03/06/24 2000    Lab Status: Final result Specimen: Blood from Central Venous Line Updated: 03/06/24 2025     Magnesium 1.8 mg/dL     Phosphorus [[id number]]  (Normal) Collected: 03/06/24 2000    Lab Status: Final result Specimen: Blood from Central Venous Line Updated: 03/06/24 2025     Phosphorus 3.0 mg/dL     Lipase [[id number]]  (Abnormal) Collected:  03/06/24 2000    Lab Status: Final result Specimen: Blood from Central Venous Line Updated: 03/06/24 2025     Lipase <6 u/L     CBC and differential [[id number]]  (Abnormal) Collected: 03/06/24 2000    Lab Status: Final result Specimen: Blood from Line, Venous Updated: 03/06/24 2011     WBC 8.57 Thousand/uL      RBC 2.98 Million/uL      Hemoglobin 9.7 g/dL      Hematocrit 29.2 %      MCV 98 fL      MCH 32.6 pg      MCHC 33.2 g/dL      RDW 16.3 %      MPV 9.0 fL      Platelets 264 Thousands/uL      nRBC 0 /100 WBCs      Neutrophils Relative 75 %      Immat GRANS % 2 %      Lymphocytes Relative 7 %      Monocytes Relative 14 %      Eosinophils Relative 1 %      Basophils Relative 1 %      Neutrophils Absolute 6.54 Thousands/µL      Immature Grans Absolute 0.13 Thousand/uL      Lymphocytes Absolute 0.61 Thousands/µL      Monocytes Absolute 1.18 Thousand/µL      Eosinophils Absolute 0.07 Thousand/µL      Basophils Absolute 0.04 Thousands/µL                    No orders to display         Procedures  Procedures      ED Course  ED Course as of 03/06/24 2241   Wed Mar 06, 2024   2004 Delay in obtaining labs as patient was refusing IV access.  Has a port, does not receive active chemo through this port.  Will access port for labs and medications.   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  Patient reports over the past 5 months, whenever he uses the PEG tube he gets immediate nausea and abdominal pain.  Reports losing 85 pounds in the past 5 months.  Patient informed his oncologist of his issues, oncologist recommended go to the ED for an evaluation and admission for feeding tube pump, as patient  is unable to tolerate bolus feeds and pt is high risk for refeeding syndrome    Will obtain labs, Zofran, ordered patient's home dose of Roxicodone 20 mg as he was due for it.  No indication for CT abdomen, abdomen soft nontender on exam, PEG tube is functioning appropriately.    Labs with minor hyponatremia, hypomagnesemia.  2 g of IV magnesium was given in the ED.    Case was discussed with internal medicine on-call, patient admitted.      Amount and/or Complexity of Data Reviewed  Labs: ordered. Decision-making details documented in ED Course.    Risk  Prescription drug management.  Decision regarding hospitalization.          Disposition  Final diagnoses:   PEG tube malfunction (HCC)   Hyponatremia   Hypomagnesemia   Anemia   Protein calorie malnutrition (HCC)   Weight loss     Time reflects when diagnosis was documented in both MDM as applicable and the Disposition within this note       Time User Action Codes Description Comment    3/6/2024  8:37 PM Rendano, Chani Add [K94.23] PEG tube malfunction (HCC)     3/6/2024  8:37 PM Rendano, Chani Add [E87.1] Hyponatremia     3/6/2024  8:38 PM Rendano, Chani Add [E83.42] Hypomagnesemia     3/6/2024  8:38 PM Rendano, Chani Add [D64.9] Anemia     3/6/2024  8:38 PM Rendano, Chani Add [E46] Protein calorie malnutrition (HCC)     3/6/2024  8:38 PM Rendano, Chani Add [R63.4] Weight loss     3/6/2024 10:14 PM Teresa Alonso Add [K94.20] Complication of feeding tube (HCC)     3/6/2024 10:14 PM Teresa Alonso Add [E43] Severe protein-calorie malnutrition (HCC)           ED Disposition       ED Disposition   Admit    Condition   Stable    Date/Time   Wed Mar 6, 2024  8:38 PM    Comment   Case was discussed with CARISSA and the patient's admission status was agreed to be Admission Status: inpatient status to the service of Dr. PASCUAL .               Follow-up Information    None         Current Discharge Medication List        CONTINUE these medications which have NOT  CHANGED    Details   atorvastatin (LIPITOR) 40 mg tablet TAKE 1 TABLET BY MOUTH EVERY DAY IN THE EVENING  Qty: 90 tablet, Refills: 1    Associated Diagnoses: Right-sided lacunar stroke (HCC)      oxyCODONE (ROXICODONE) 20 MG TABS Take 1 tablet (20 mg total) by mouth every 4 (four) hours as needed for moderate pain Max Daily Amount: 120 mg Do not start before March 9, 2024.  Qty: 60 tablet, Refills: 0    Comments: Pls do not fill sooner than 3/9/2024  Associated Diagnoses: Cancer-related pain; Pain in oral cavity; Oral cancer (HCC)      senna-docusate sodium (SENOKOT-S) 8.6-50 mg per tablet Take 1 tablet by mouth daily  Qty: 60 tablet, Refills: 2    Associated Diagnoses: Drug induced constipation      Aspirin Low Dose 81 MG chewable tablet CHEW 1 TABLET BY MOUTH DAILY  Qty: 90 tablet, Refills: 0    Associated Diagnoses: Right-sided lacunar stroke (HCC)      chlorhexidine (PERIDEX) 0.12 % solution       dexamethasone 0.5 MG/5ML elixir Take 10 mL (1 mg total) by mouth 4 (four) times a day  Qty: 400 mL, Refills: 0    Associated Diagnoses: Cancer-related pain; Mucositis due to radiation therapy; Pain in oral cavity      lactulose (CHRONULAC) 10 g/15 mL solution Take 15 mL (10 g total) by mouth 2 (two) times a day  Qty: 900 mL, Refills: 0    Associated Diagnoses: Bright red blood per rectum      magnesium Oxide (MAG-OX) 400 mg TABS Take 1 tablet (400 mg total) by mouth 2 (two) times a day  Qty: 60 tablet, Refills: 0    Associated Diagnoses: Hypomagnesemia      naloxone (NARCAN) 4 mg/0.1 mL nasal spray Administer 1 spray into a nostril. If no response after 2-3 minutes, give another dose in the other nostril using a new spray.  Qty: 1 each, Refills: 1    Associated Diagnoses: At risk for respiratory depression due to opioid      potassium chloride (Klor-Con M20) 20 mEq tablet Take 1 tablet (20 mEq total) by mouth daily for 14 days  Qty: 14 tablet, Refills: 0    Associated Diagnoses: Hypokalemia      silver sulfadiazine  (SILVADENE,SSD) 1 % cream Apply topically 2 (two) times a day  Qty: 400 g, Refills: 1    Associated Diagnoses: Oral cancer (HCC); Acute radiation dermatitis           No discharge procedures on file.    PDMP Review         Value Time User    PDMP Reviewed  Yes 2/23/2024 12:06 PM Mitra Lima MD             ED Provider  Attending physically available and evaluated Eliceo Garcia. I managed the patient along with the ED Attending.    Electronically Signed by           Chani Osorio MD  03/06/24 3832

## 2024-03-06 NOTE — TELEPHONE ENCOUNTER
Controlled Substance Review    [place] PDMP or [place]  reviewed: No red flags were identified; safe to proceed with prescription..    HE SHOULD HAVE ENOUGH UNTIL 3/9. WILL FILL NOW WITH THE APPROPRIATE  DATE.    PATIENT ID PRESCRIPTION # FILLED WRITTEN DRUG LABEL QTY DAYS STRENGTH MME** PRESCRIBER PHARMACY PAYMENT REFILL #/AUTH STATE DETAIL  1 [id number] 02/28/2024 02/23/2024 oxyCODONE HCL (Tablet) 60.0 10 20 .0 [facility] [place] 0 / 0 [place]   1 [id number] 02/14/2024 02/14/2024 oxyCODONE HCL (Tablet) 90.0 15 20 .0 [facility] [place] 0 [place]   1 [id number] 01/30/2024 01/29/2024 oxyCODONE HCL (Tablet) 90.0 15 20 .0 [facility] [place] / 0 [place]   1 [id number] 01/15/2024 01/11/2024 oxyCODONE HCL (Tablet) 90.0 15 20 .0 [facility] [place] 0 [place]   1 [id number] 01/02/2024 12/28/2023 oxyCODONE HCL (Tablet) 90.0 15 20 .0 [facility] [place] / [place]   1 [id number] 12/18/2023 12/14/2023 oxyCODONE HCL (Tablet) 90.0 15 20 .0 [facility] [place] / [place]   1 [id number] 11/30/2023 11/30/2023 oxyCODONE HCL (Tablet) 90.0 15 20 .0 [facility] [place] / [place]     Mitra Lima MD  Palliative Medicine & Supportive Care  Internal Medicine  Available via [facility] [employer]  Office: [phone]  Fax: [phone]

## 2024-03-06 NOTE — TELEPHONE ENCOUNTER
"Rec'd message from dietician that Eliceo is requesting a pump to initiate night time feeding as he is still eating/drinking minimal amounts.  Situation discussed with Dr. Mohr who recommended admission for malnutrition/failure to thrive.    Call to Eliceo directly to inform that we are recommending he go to ED for evaluation/admission.  Advised him to call 911 if he is unable to secure transportation.  Patient states \"I'll call them on Friday\"  When asked why he could not call now he stated that he had personal business he needs to attend to.    Strongly encouraged him and advised him that he should not wait and needs to call them now.  Eliceo acknowledged importance of calling now and not waiting an additional 2 or 3 days and then stated that he will \"try to go today.\"  "

## 2024-03-06 NOTE — TELEPHONE ENCOUNTER
Received VM from Eliceo asking RD to call him. Reached out to Eliceo to touch base. He states he is struggling with his nutrition and his weight, and he doesn't want to continue losing weight. He reports drinking some milk with ovaltine, and 2 Ensures yesterday. He is not using his feeding tube at this time. He inquired about a feeding tube pump as he feels he cannot do bolus feeds. He would be comfortable setting up the pump to run overnight.     RD reached out to Dr. Mohr and Sherrell OLSEN RN, from [facility] to discuss nutrition plan. Eliceo is at risk for refeeding syndrome d/t weight loss, poor PO intake and low BMI. It was determined that he should be monitored inpatient d/t risk of refeeding syndrome. Discussed this with Eliceo and he was in agreement. RN will reach out to him to discuss this as well. Plan is for him to go to the ED and for tube feeding advancement to be monitored closely.

## 2024-03-07 LAB
ALBUMIN SERPL BCP-MCNC: 2.9 G/DL (ref 3.5–5)
ALP SERPL-CCNC: 85 U/L (ref 34–104)
ALT SERPL W P-5'-P-CCNC: 5 U/L (ref 7–52)
ANION GAP SERPL CALCULATED.3IONS-SCNC: 6 MMOL/L
AST SERPL W P-5'-P-CCNC: 9 U/L (ref 13–39)
BASOPHILS # BLD AUTO: 0.06 THOUSANDS/ÂΜL (ref 0–0.1)
BASOPHILS NFR BLD AUTO: 1 % (ref 0–1)
BILIRUB SERPL-MCNC: 0.24 MG/DL (ref 0.2–1)
BUN SERPL-MCNC: 9 MG/DL (ref 5–25)
CALCIUM ALBUM COR SERPL-MCNC: 9.6 MG/DL (ref 8.3–10.1)
CALCIUM SERPL-MCNC: 8.7 MG/DL (ref 8.4–10.2)
CHLORIDE SERPL-SCNC: 97 MMOL/L (ref 96–108)
CO2 SERPL-SCNC: 29 MMOL/L (ref 21–32)
CREAT SERPL-MCNC: 0.67 MG/DL (ref 0.6–1.3)
EOSINOPHIL # BLD AUTO: 0.12 THOUSAND/ÂΜL (ref 0–0.61)
EOSINOPHIL NFR BLD AUTO: 2 % (ref 0–6)
ERYTHROCYTE [DISTWIDTH] IN BLOOD BY AUTOMATED COUNT: 16.2 % (ref 11.6–15.1)
ETHANOL SERPL-MCNC: <10 MG/DL
GFR SERPL CREATININE-BSD FRML MDRD: 104 ML/MIN/1.73SQ M
GLUCOSE SERPL-MCNC: 113 MG/DL (ref 65–140)
HCT VFR BLD AUTO: 31.5 % (ref 36.5–49.3)
HGB BLD-MCNC: 10 G/DL (ref 12–17)
IMM GRANULOCYTES # BLD AUTO: 0.12 THOUSAND/UL (ref 0–0.2)
IMM GRANULOCYTES NFR BLD AUTO: 2 % (ref 0–2)
LYMPHOCYTES # BLD AUTO: 0.53 THOUSANDS/ÂΜL (ref 0.6–4.47)
LYMPHOCYTES NFR BLD AUTO: 7 % (ref 14–44)
MAGNESIUM SERPL-MCNC: 2.1 MG/DL (ref 1.9–2.7)
MCH RBC QN AUTO: 31.6 PG (ref 26.8–34.3)
MCHC RBC AUTO-ENTMCNC: 31.7 G/DL (ref 31.4–37.4)
MCV RBC AUTO: 100 FL (ref 82–98)
MONOCYTES # BLD AUTO: 1.17 THOUSAND/ÂΜL (ref 0.17–1.22)
MONOCYTES NFR BLD AUTO: 16 % (ref 4–12)
NEUTROPHILS # BLD AUTO: 5.39 THOUSANDS/ÂΜL (ref 1.85–7.62)
NEUTS SEG NFR BLD AUTO: 72 % (ref 43–75)
NRBC BLD AUTO-RTO: 0 /100 WBCS
PHOSPHATE SERPL-MCNC: 3.1 MG/DL (ref 2.3–4.1)
PLATELET # BLD AUTO: 291 THOUSANDS/UL (ref 149–390)
PMV BLD AUTO: 9.2 FL (ref 8.9–12.7)
POTASSIUM SERPL-SCNC: 4 MMOL/L (ref 3.5–5.3)
PROT SERPL-MCNC: 6 G/DL (ref 6.4–8.4)
RBC # BLD AUTO: 3.16 MILLION/UL (ref 3.88–5.62)
SODIUM SERPL-SCNC: 132 MMOL/L (ref 135–147)
WBC # BLD AUTO: 7.39 THOUSAND/UL (ref 4.31–10.16)

## 2024-03-07 PROCEDURE — 99222 1ST HOSP IP/OBS MODERATE 55: CPT | Performed by: GENERAL PRACTICE

## 2024-03-07 PROCEDURE — 82077 ASSAY SPEC XCP UR&BREATH IA: CPT

## 2024-03-07 PROCEDURE — 80053 COMPREHEN METABOLIC PANEL: CPT

## 2024-03-07 PROCEDURE — 83735 ASSAY OF MAGNESIUM: CPT

## 2024-03-07 PROCEDURE — 85025 COMPLETE CBC W/AUTO DIFF WBC: CPT

## 2024-03-07 PROCEDURE — 84100 ASSAY OF PHOSPHORUS: CPT

## 2024-03-07 RX ADMIN — OXYCODONE HYDROCHLORIDE 20 MG: 10 TABLET ORAL at 13:06

## 2024-03-07 RX ADMIN — HEPARIN SODIUM 5000 UNITS: 5000 INJECTION INTRAVENOUS; SUBCUTANEOUS at 13:07

## 2024-03-07 RX ADMIN — MULTIPLE VITAMINS W/ MINERALS TAB 1 TABLET: TAB ORAL at 09:12

## 2024-03-07 RX ADMIN — HEPARIN SODIUM 5000 UNITS: 5000 INJECTION INTRAVENOUS; SUBCUTANEOUS at 05:00

## 2024-03-07 RX ADMIN — Medication 100 MG: at 09:12

## 2024-03-07 RX ADMIN — ATORVASTATIN CALCIUM 40 MG: 40 TABLET, FILM COATED ORAL at 17:16

## 2024-03-07 RX ADMIN — OXYCODONE HYDROCHLORIDE 20 MG: 10 TABLET ORAL at 22:06

## 2024-03-07 RX ADMIN — OXYCODONE HYDROCHLORIDE 20 MG: 10 TABLET ORAL at 17:16

## 2024-03-07 RX ADMIN — HEPARIN SODIUM 5000 UNITS: 5000 INJECTION INTRAVENOUS; SUBCUTANEOUS at 22:06

## 2024-03-07 RX ADMIN — FOLIC ACID 1 MG: 1 TABLET ORAL at 09:12

## 2024-03-07 RX ADMIN — OXYCODONE HYDROCHLORIDE 20 MG: 10 TABLET ORAL at 09:12

## 2024-03-07 RX ADMIN — SENNOSIDES, DOCUSATE SODIUM 1 TABLET: 8.6; 5 TABLET ORAL at 09:12

## 2024-03-07 RX ADMIN — OXYCODONE HYDROCHLORIDE 20 MG: 10 TABLET ORAL at 04:58

## 2024-03-07 NOTE — PLAN OF CARE
Problem: PAIN - ADULT  Goal: Verbalizes/displays adequate comfort level or baseline comfort level  Description: Interventions:  - Encourage patient to monitor pain and request assistance  - Assess pain using appropriate pain scale  - Administer analgesics based on type and severity of pain and evaluate response  - Implement non-pharmacological measures as appropriate and evaluate response  - Consider cultural and social influences on pain and pain management  - Notify physician/advanced practitioner if interventions unsuccessful or patient reports new pain  Outcome: Progressing     Problem: INFECTION - ADULT  Goal: Absence or prevention of progression during hospitalization  Description: INTERVENTIONS:  - Assess and monitor for signs and symptoms of infection  - Monitor lab/diagnostic results  - Monitor all insertion sites, i.e. indwelling lines, tubes, and drains  - Monitor endotracheal if appropriate and nasal secretions for changes in amount and color  - Forrest appropriate cooling/warming therapies per order  - Administer medications as ordered  - Instruct and encourage patient and family to use good hand hygiene technique  - Identify and instruct in appropriate isolation precautions for identified infection/condition  Outcome: Progressing  Goal: Absence of fever/infection during neutropenic period  Description: INTERVENTIONS:  - Monitor WBC    Outcome: Progressing     Problem: SAFETY ADULT  Goal: Patient will remain free of falls  Description: INTERVENTIONS:  - Educate patient/family on patient safety including physical limitations  - Instruct patient to call for assistance with activity   - Consult OT/PT to assist with strengthening/mobility   - Keep Call bell within reach  - Keep bed low and locked with side rails adjusted as appropriate  - Keep care items and personal belongings within reach  - Initiate and maintain comfort rounds  - Make Fall Risk Sign visible to staff  - Apply yellow socks and bracelet  for high fall risk patients  - Consider moving patient to room near nurses station  Outcome: Progressing  Goal: Maintain or return to baseline ADL function  Description: INTERVENTIONS:  -  Assess patient's ability to carry out ADLs; assess patient's baseline for ADL function and identify physical deficits which impact ability to perform ADLs (bathing, care of mouth/teeth, toileting, grooming, dressing, etc.)  - Assess/evaluate cause of self-care deficits   - Assess range of motion  - Assess patient's mobility; develop plan if impaired  - Assess patient's need for assistive devices and provide as appropriate  - Encourage maximum independence but intervene and supervise when necessary  - Involve family in performance of ADLs  - Assess for home care needs following discharge   - Consider OT consult to assist with ADL evaluation and planning for discharge  - Provide patient education as appropriate  Outcome: Progressing  Goal: Maintains/Returns to pre admission functional level  Description: INTERVENTIONS:  - Perform AM-PAC 6 Click Basic Mobility/ Daily Activity assessment daily.  - Set and communicate daily mobility goal to care team and patient/family/caregiver.   - Collaborate with rehabilitation services on mobility goals if consulted  - Out of bed for toileting  - Record patient progress and toleration of activity level   Outcome: Progressing     Problem: DISCHARGE PLANNING  Goal: Discharge to home or other facility with appropriate resources  Description: INTERVENTIONS:  - Identify barriers to discharge w/patient and caregiver  - Arrange for needed discharge resources and transportation as appropriate  - Identify discharge learning needs (meds, wound care, etc.)  - Arrange for interpretive services to assist at discharge as needed  - Refer to Case Management Department for coordinating discharge planning if the patient needs post-hospital services based on physician/advanced practitioner order or complex needs  related to functional status, cognitive ability, or social support system  Outcome: Progressing     Problem: Knowledge Deficit  Goal: Patient/family/caregiver demonstrates understanding of disease process, treatment plan, medications, and discharge instructions  Description: Complete learning assessment and assess knowledge base.  Interventions:  - Provide teaching at level of understanding  - Provide teaching via preferred learning methods  Outcome: Progressing

## 2024-03-07 NOTE — ED ATTENDING ATTESTATION
3/6/2024  I, Manny Ross MD, saw and evaluated the patient. I have discussed the patient with the resident/non-physician practitioner and agree with the resident's/non-physician practitioner's findings, Plan of Care, and MDM as documented in the resident's/non-physician practitioner's note, except where noted. All available labs and Radiology studies were reviewed.  I was present for key portions of any procedure(s) performed by the resident/non-physician practitioner and I was immediately available to provide assistance.       At this point I agree with the current assessment done in the Emergency Department.  I have conducted an independent evaluation of this patient a history and physical is as follows:    59 y/o male with hx of oral cancer s/p PEG due to poor oral intake presented at the request of his oncologist for admission due to poor feeding and difficulty with bolus tube feeds via PEG. Recommending 24 hour tube feeds via pump being initiated in the hospital. Patient has no other complaints. No fever, chills, abdominal pain. Has nausea with bolus feeds but not currently.    ED Course  ED Course as of 03/11/24 1136   Wed Mar 06, 2024   2105 Patient admitted to medical service for continued management.         Critical Care Time  Procedures

## 2024-03-07 NOTE — ASSESSMENT & PLAN NOTE
Patient with previously documented hx of alcohol abuse, drinking 3-4 beers par day.  States last drink was approximately 72 hours prior to the time of initial assessment on 3/6.  Previously documented to drink up to 14 beers daily, however patient recently has been reporting vomiting of all PO intake.    [facility] protocol ordered  Recommend monitoring on telemetry and radha at this time, patient currently refusing  Blood ethanol level ordered  Thiamine, folate, multivitamin ordered

## 2024-03-07 NOTE — ASSESSMENT & PLAN NOTE
Malnutrition Findings:      Temporal wasting, clavicular prominence, cold intolerance  Diffuse loss of subcutaneous fat  Low BMI  Hypoalbuminemia, hyponatremia likely 2/2 poor PO intake    BMI Findings:      Body mass index is 14.27 kg/m².     Patient reports decreased PO intake for months, noted to have approx a 25lb weight loss since the start of the calendar year.  Has been seen multiple times by nutrition in conjunction with oncology but continues to struggle with meeting calorie goals, worsened recently in the setting of chronic nausea/vomiting with nearly all PO intake.    Nutrition consulted, appreciate recs  Will initiate trickle tube feeds and advance as tolerated  PRN medication for nausea/vomiting ordered

## 2024-03-07 NOTE — PROGRESS NOTES
[facility]  Progress Note  Name: Eliceo Garcia I  MRN: [id number]  Unit/Bed#: S -01 I Date of Admission: 3/6/2024   Date of Service: 3/7/2024 I Hospital Day: 1    Assessment/Plan   * Complication of feeding tube (HCC)  Assessment & Plan  Patient presented to ED at the suggestion of oncology team in the setting of high likelihood for refeeding syndrome; reports unable to tolerate PO intake or tube feeds for months.    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  He states he has been favoring PO intake.  Previously documented he does not like to use PEG tube.    Monitor CMP, CBC, Mg, Phos with a.m. labs; replete PRN  Nutrition consulted, appreciate recs  Suggest initiate Jevity 1.5 @ 15 ml/hr x initial 24 hours.   Pending electrolytes, advance TF by 10 ml every 12 hours to goal rate of 60 ml/hr.   Goal rate provides 2160 kcal, 92 g protein, 1094 ml free water. At goal flush 80 ml q 4 hours.   100 mg IV thiamin for 5-7 days to assist with refeeding. Patient at very high risk.   Zofran ordered for nausea/vomiting, no prior history of QTc prolongation    Electrolyte abnormality  Assessment & Plan  Patient admitted due to high risk for refeeding syndrome.    Potassium   Date Value Ref Range Status   03/07/2024 4.0 3.5 - 5.3 mmol/L Final     Magnesium   Date Value Ref Range Status   03/07/2024 2.1 1.9 - 2.7 mg/dL Final     Phosphorus   Date Value Ref Range Status   03/07/2024 3.1 2.3 - 4.1 mg/dL Final     Monitor closely for concern over José feeding syndrome - patient at a high risk    Cancer-related pain  Assessment & Plan  Patient states he takes Roxicodone 20mg q4h around the clock for cancer-related pain    Continue home Roxicodone 20mg q4h for pain  IV Narcan PRN for respiratory depression  Heat pack ordered for anterior jaw pain per patient request    Severe protein-calorie malnutrition (HCC)  Assessment & Plan  Malnutrition Findings:   Adult Malnutrition type:  Acute illness (in the setting of chronic illness)  Temporal wasting, clavicular prominence, cold intolerance  Diffuse loss of subcutaneous fat  Low BMI  Hypoalbuminemia, hyponatremia likely 2/2 poor PO intake    BMI Findings:      Body mass index is 14.27 kg/m².     Patient reports decreased PO intake for months, noted to have approx a 25lb weight loss since the start of the calendar year.  Has been seen multiple times by nutrition in conjunction with oncology but continues to struggle with meeting calorie goals, worsened recently in the setting of chronic nausea/vomiting with nearly all PO intake.    Nutrition consulted, appreciate recs  Will initiate trickle tube feeds and advance as discussed above  PRN medication for nausea/vomiting ordered    Alcohol use disorder, severe, dependence (HCC)  Assessment & Plan  Patient with previously documented hx of alcohol abuse, drinking 3-4 beers par day.  States last drink was approximately 72 hours prior to the time of initial assessment on 3/6.  Previously documented to drink up to 14 beers daily, however patient recently has been reporting vomiting of all PO intake.    [facility] protocol ordered  Recommend monitoring on telemetry and radha at this time, patient currently refusing  Blood ethanol level ordered  Thiamine, folate, multivitamin ordered    Cigarette nicotine dependence without complication  Assessment & Plan  NRT ordered, 14mg/d in place of 1/2 PPD    Hypertension  Assessment & Plan  Temp:  [97.6 °F (36.4 °C)-98.1 °F (36.7 °C)] 97.6 °F (36.4 °C)  HR:  [69-84] 69  Resp:  [17-18] 17  BP: (122-158)/(71-88) 122/71    Patient reports he does not take any medications at home.  Noted to have previously been on amlodipine 5mg daily per chart review    Vitals per unit routine  Will order PRN medication if SBP >180, not currently ordered as patient is currently refusing BP monitoring.         VTE Pharmacologic Prophylaxis: VTE Score: 5 Moderate Risk (Score 3-4) -  Pharmacological DVT Prophylaxis Ordered: heparin.    Mobility:   Basic Mobility Inpatient Raw Score: 24  -HLM Goal: 8: Walk 250 feet or more  -HLM Achieved: 1: Laying in bed    Patient Centered Rounds: I performed bedside rounds with nursing staff today.  Discussions with Specialists or Other Care Team Provider: Nutrition    Education and Discussions with Family / Patient: Patient declined call to .     Current Length of Stay: 1 day(s)  Current Patient Status: Inpatient   Discharge Plan: Anticipate discharge in >72 hrs to home.    Code Status: Level 1 - Full Code    Subjective:   Patient seen at bedside today.  No acute events overnight.  Patient states that he is feeling better compared to yesterday.  And feels he is tolerating the tube feeds well at this time.  Patient denies any fevers, chills, headaches, chest pain, shortness of breath, abdominal pain, constipation, diarrhea.  Patient has no acute or significant concerns or complaints.  Patient is doing well overall.      Objective:     Vitals:   Temp (24hrs), Av.8 °F (36.6 °C), Min:97.6 °F (36.4 °C), Max:98.1 °F (36.7 °C)    Temp:  [97.6 °F (36.4 °C)-98.1 °F (36.7 °C)] 97.6 °F (36.4 °C)  HR:  [69-84] 69  Resp:  [17-18] 17  BP: (122-158)/(71-88) 122/71  SpO2:  [97 %-98 %] 98 %  Body mass index is 14.27 kg/m².     Input and Output Summary (last 24 hours):     Intake/Output Summary (Last 24 hours) at 3/7/2024 1402  Last data filed at 3/7/2024 0800  Gross per 24 hour   Intake 30 ml   Output --   Net 30 ml       Physical Exam:   Physical Exam  Vitals and nursing note reviewed.   Constitutional:       Appearance: He is cachectic. He is ill-appearing (chronically).      Comments: Body mass index is 14.27 kg/m².   HENT:      Nose: Nose normal.   Eyes:      Extraocular Movements: Extraocular movements intact.      Conjunctiva/sclera: Conjunctivae normal.   Cardiovascular:      Rate and Rhythm: Normal rate and regular rhythm.      Pulses: Normal  pulses.   Pulmonary:      Effort: Pulmonary effort is normal. No respiratory distress.      Breath sounds: Normal breath sounds.   Abdominal:      General: Abdomen is flat. There is no distension.      Palpations: Abdomen is soft.      Tenderness: There is no abdominal tenderness.      Comments: PEG tube is in place  Patient able to express discharge with palpation of the area adjacent to the PEG tube    Musculoskeletal:         General: No swelling or signs of injury. Normal range of motion.      Right lower leg: No edema.      Left lower leg: No edema.   Skin:     General: Skin is warm and dry.      Capillary Refill: Capillary refill takes less than 2 seconds.   Neurological:      Mental Status: He is alert and oriented to person, place, and time. Mental status is at baseline.      Motor: No weakness.   Psychiatric:         Mood and Affect: Mood normal.         Behavior: Behavior normal. Behavior is cooperative.         Additional Data:     Labs:  Results from last 7 days   Lab Units 03/07/24  0517   WBC Thousand/uL 7.39   HEMOGLOBIN g/dL 10.0*   HEMATOCRIT % 31.5*   PLATELETS Thousands/uL 291   NEUTROS PCT % 72   LYMPHS PCT % 7*   MONOS PCT % 16*   EOS PCT % 2     Results from last 7 days   Lab Units 03/07/24  0517   SODIUM mmol/L 132*   POTASSIUM mmol/L 4.0   CHLORIDE mmol/L 97   CO2 mmol/L 29   BUN mg/dL 9   CREATININE mg/dL 0.67   ANION GAP mmol/L 6   CALCIUM mg/dL 8.7   ALBUMIN g/dL 2.9*   TOTAL BILIRUBIN mg/dL 0.24   ALK PHOS U/L 85   ALT U/L 5*   AST U/L 9*   GLUCOSE RANDOM mg/dL 113                       Lines/Drains:  Invasive Devices       Central Venous Catheter Line  Duration             Port A Cath 11/07/23 Left Internal jugular 121 days              Drain  Duration             Gastrostomy/Enterostomy Percutaneous Interventional Gastrostomy 16 Fr. LUQ 87 days                    Central Line:  Goal for removal:  For cancer treatment                  No orders to display       Imaging: No pertinent  imaging reviewed.    Recent Cultures (last 7 days):         Last 24 Hours Medication List:   Current Facility-Administered Medications   Medication Dose Route Frequency Provider Last Rate    acetaminophen  650 mg Oral Q6H PRN Teresa Alonso, DO      atorvastatin  40 mg Oral QPM Teresa Alonso, DO      calcium carbonate  1,000 mg Oral Daily PRN Teresa Alonso, DO      folic acid  1 mg Oral Daily Teresa Alonso, DO      heparin (porcine)  5,000 Units Subcutaneous Q8H [facility] Teresa Alonso, DO      multivitamin-minerals  1 tablet Oral Daily Teresa Alonso, DO      naloxone  0.04 mg Intravenous Q1MIN PRN Teresa Alonso, DO      nicotine  1 patch Transdermal Daily Teresa Alonso, DO      ondansetron  4 mg Intravenous Once Teresa Alonso, DO      ondansetron  4 mg Intravenous Q6H PRN Teresa Alonso, DO      oxyCODONE  20 mg Oral Q4H PRN Teresa Alonso, DO      senna-docusate sodium  1 tablet Oral Daily Teresa Alonso DO      [START ON 3/8/2024] thiamine  100 mg Intravenous Daily Amos Diehl MD          Today, Patient Was Seen By: Amos Diehl MD    **Please Note: This note may have been constructed using a voice recognition system.**

## 2024-03-07 NOTE — ASSESSMENT & PLAN NOTE
Patient states he takes Roxicodone 20mg q4h around the clock for cancer-related pain    Continue home Roxicodone 20mg q4h for pain  IV Narcan PRN for respiratory depression  Heat pack ordered for anterior jaw pain per patient request

## 2024-03-07 NOTE — ASSESSMENT & PLAN NOTE
Temp:  [97.6 °F (36.4 °C)-98.1 °F (36.7 °C)] 97.6 °F (36.4 °C)  HR:  [69-84] 69  Resp:  [17-18] 17  BP: (122-158)/(71-88) 122/71    Patient reports he does not take any medications at home.  Noted to have previously been on amlodipine 5mg daily per chart review    Vitals per unit routine  Will order PRN medication if SBP >180, not currently ordered as patient is currently refusing BP monitoring.

## 2024-03-07 NOTE — ASSESSMENT & PLAN NOTE
Malnutrition Findings:   Adult Malnutrition type: Acute illness (in the setting of chronic illness)  Temporal wasting, clavicular prominence, cold intolerance  Diffuse loss of subcutaneous fat  Low BMI  Hypoalbuminemia, hyponatremia likely 2/2 poor PO intake    BMI Findings:      Body mass index is 14.27 kg/m².     Patient reports decreased PO intake for months, noted to have approx a 25lb weight loss since the start of the calendar year.  Has been seen multiple times by nutrition in conjunction with oncology but continues to struggle with meeting calorie goals, worsened recently in the setting of chronic nausea/vomiting with nearly all PO intake.    Nutrition consulted, appreciate recs  Will initiate trickle tube feeds and advance as discussed above  PRN medication for nausea/vomiting ordered

## 2024-03-07 NOTE — ASSESSMENT & PLAN NOTE
Patient presented to ED at the suggestion of oncology team in the setting of high likelihood for refeeding syndrome; reports unable to tolerate PO intake or tube feeds for months.    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  He states he has been favoring PO intake.  Previously documented he does not like to use PEG tube.    Monitor CMP, CBC, Mg, Phos with a.m. labs; replete PRN  Plan to restart tube feeds at trickle rate 10cc/hr, increase by 10cc/hr until goal of 60cc/hr as tolerated.  Nutrition consulted, appreciate recs  Zofran ordered for nausea/vomiting, no prior history of QTc prolongation

## 2024-03-07 NOTE — ASSESSMENT & PLAN NOTE
Patient admitted due to high risk for refeeding syndrome.    Potassium   Date Value Ref Range Status   03/07/2024 4.0 3.5 - 5.3 mmol/L Final     Magnesium   Date Value Ref Range Status   03/07/2024 2.1 1.9 - 2.7 mg/dL Final     Phosphorus   Date Value Ref Range Status   03/07/2024 3.1 2.3 - 4.1 mg/dL Final     Monitor closely for concern over José feeding syndrome - patient at a high risk

## 2024-03-07 NOTE — NURSING NOTE
"Patient is refusing to be on telemetry or masimo, he states \"there is nothing wrong with my heart\". SLIM made aware.   "

## 2024-03-07 NOTE — CONSULTS
Suggest initiate Jevity 1.5 @ 15 ml/hr x initial 24 hours.     Pending electrolytes, advance TF by 10 ml every 12 hours to goal rate of 60 ml/hr.       Goal rate provides 2160 kcal, 92 g protein, 1094 ml free water. At goal flush 80 ml q 4 hours.     Suggest 100 mg IV thiamin for 5-7 days to assist with refeeding. Patient at very high risk.       Patient has been unable to tolerate bolus feeds, would benefit from continuous nocturnal  pump feeds at discharge.

## 2024-03-07 NOTE — ASSESSMENT & PLAN NOTE
Patient presented to ED at the suggestion of oncology team in the setting of high likelihood for refeeding syndrome; reports unable to tolerate PO intake or tube feeds for months.    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  He states he has been favoring PO intake.  Previously documented he does not like to use PEG tube.    Monitor CMP, CBC, Mg, Phos with a.m. labs; replete PRN  Nutrition consulted, appreciate recs  Suggest initiate Jevity 1.5 @ 15 ml/hr x initial 24 hours.   Pending electrolytes, advance TF by 10 ml every 12 hours to goal rate of 60 ml/hr.   Goal rate provides 2160 kcal, 92 g protein, 1094 ml free water. At goal flush 80 ml q 4 hours.   100 mg IV thiamin for 5-7 days to assist with refeeding. Patient at very high risk.   Zofran ordered for nausea/vomiting, no prior history of QTc prolongation

## 2024-03-07 NOTE — ASSESSMENT & PLAN NOTE
Patient admitted due to high risk for refeeding syndrome.    Potassium   Date Value Ref Range Status   03/06/2024 4.3 3.5 - 5.3 mmol/L Final     Magnesium   Date Value Ref Range Status   03/06/2024 1.8 (L) 1.9 - 2.7 mg/dL Final     Phosphorus   Date Value Ref Range Status   03/06/2024 3.0 2.3 - 4.1 mg/dL Final     Magnesium repleted in ED with 2g IVPB  Recheck with a.m. labs, replete PRN

## 2024-03-07 NOTE — UTILIZATION REVIEW
"Initial Clinical Review    Admission: Date/Time/Statement:   Admission Orders (From admission, onward)       Ordered        03/06/24 2101  INPATIENT ADMISSION  Once                          Orders Placed This Encounter   Procedures    INPATIENT ADMISSION     Standing Status:   Standing     Number of Occurrences:   1     Order Specific Question:   Level of Care     Answer:   Med Surg [16]     Order Specific Question:   Estimated length of stay     Answer:   More than 2 Midnights     Order Specific Question:   Certification     Answer:   I certify that inpatient services are medically necessary for this patient for a duration of greater than two midnights. See H&P and MD Progress Notes for additional information about the patient's course of treatment.     ED Arrival Information       Expected   -    Arrival   3/6/2024 17:40    Acuity   Urgent              Means of arrival   Ambulance    Escorted by   [facility]    Service   Hospitalist    Admission type   Emergency              Arrival complaint   EMS             Chief Complaint   Patient presents with    Feeding Tube Problem     Pt via EMS for feeding tube issue. Pt says \"it's a pain to use the syringe\" with his feeding tube, gets nauseous, states he \"lost 85lbs\" in last 5 months. Pt wants \"to be put on a pump to regulate me\" for feeding, and be discharged home with a pump.        Initial Presentation: 58 y.o. 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 He has primarily been attempting to take PO intake.Reports he tends to not use PEG tube .  He was previously recommended " by dietary to try to increase his caloric intake with Ensure shakes, but states those are also causing him to have nausea.Reports 25 lb wt loss since beginning of 2024 . Pt states N/V  causes his jaw pain to become worse, currently requiring 20 mg Roxicodone every 4 hours for pain. Pt also reports he has been w/o Roxicodone x 1 week  On exam, PEG tube is in place Patient able to express discharge with palpation of the area adjacent to the PEG tube . BMI 14.27, pt cachectic . Labs: Na 132, low albumin , hgb 9.7, Mag 1.8 . Blood ethanol level <10. Pt given IV Mag, IV antiemetic , po Roxicodone in ED. Pt  admitted as Inpatient to telemetry with complication of feeding tube, electrolyte abnormality- high risk refeeding syndrome  .  Severe malnutrition . Alcohol use disorder . Plan - Monitor CMP, CBC, Mg, Phos with a.m. labs; replete PRN . restart tube feeds at trickle rate 10cc/hr, increase by 10cc/hr until goal of 60cc/hr as tolerated. Nutrition consult. PRN Zofran . CIWA monitoring. Telemetry. Continuous pulse ox . Thiamine, folate, multivitamin . Pain control.     Date: 3/7   Day 2:     Pt feels he is tolerating the tube feeds well at this time . Nutrition consulted- Suggest initiate Jevity 1.5 @ 15 ml/hr x initial 24 hours . Pending electrolytes, advance TF by 10 ml every 12 hours to goal rate of 60 ml/hr.   100 mg IV thiamin for 5-7 days to assist with refeeding. Patient at very high risk for refeeding syndrome . . PRN Zofran for N/V .   CIWA 3 this am .   Monitor CMP, CBC, Mg, Phos with a.m. labs; replete PRN      Date 3/8 day 3   Complains of some nausea when he lays flat, more comfortable in chair. PRN Zofran x 1 so far today . Started po Famotidine .   Trickle tube feeds  Jevity 1.5 @ 15 ml/hr x initial 24 hours and advance  by 10 ml every 12 hours to goal rate of 60 ml/hr.  Per nutritionist-recommend increase rate to 25 ml/hr today continuous, flush 30 ml Q4 hours.  Total fluid from formula and flushes 636 ml  free water. Currently Na 130.   PO intake of fluids 8oz per meal per patient.  Suggest d/c PO fluids if sodium continues to drop.    Mag 1.8 today, IV repletion .    Receiving prn Oxycodone for cancer related pain . Continue IV Thiamine. CIWA monitoring. Ongoing lyte monitoring. High risk for refeeding syndrome . CBC,  BMP,.Mag , phos in am .     ED Triage Vitals   Temperature Pulse Respirations Blood Pressure SpO2   03/06/24 1747 03/06/24 1747 03/06/24 1747 03/06/24 1747 03/06/24 1747   98.1 °F (36.7 °C) 84 18 158/88 97 %      Temp Source Heart Rate Source Patient Position - Orthostatic VS BP Location FiO2 (%)   03/06/24 1747 03/06/24 1747 03/06/24 1747 03/06/24 1747 --   Oral Monitor Lying Right arm       Pain Score       03/06/24 2049       4          Wt Readings from Last 1 Encounters:   03/06/24 51.8 kg (114 lb 3.2 oz)     Additional Vital Signs:   Date/Time Temp Pulse Resp BP MAP (mmHg) SpO2 O2 Device Patient Position - Orthostatic VS   03/08/24 07:46:17 97.5 °F (36.4 °C) 80 15 140/76 97 98 % -- --   03/08/24 00:16:05 97.1 °F (36.2 °C) Abnormal  -- 17 103/67 79 -- -- --   03/07/24 20:22:57 97.6 °F (36.4 °C) -- 17 126/76 93 -- -- --   03/07/24 15:11:43 97.8 °F (36.6 °C) -- 20 103/63 76 --       Date/Time Temp Pulse Resp BP MAP (mmHg) SpO2   03/07/24 08:07:31 97.6 °F (36.4 °C) 69 17 122/71 88 98 %   03/07/24 00:49:31 97.8 °F (36.6 °C) -- 18 129/75 93 --   03/06/24 21:57:10 -- -- 18 144/83 103 --   CIWA-Ar Score    Row Name 03/08/24 07:46:17 03/08/24 00:16:05 03/07/24 20:22:57 03/07/24 15:11:43   CIWA-Ar   /76  -/67  -/76  -/63  -DI   Pulse 80  -MG -- -- --   Nausea and Vomiting -- -- -- 0  -DB   Tactile Disturbances -- -- -- 0  -DB   Tremor -- -- -- 0  -DB   Auditory Disturbances -- -- -- 0  -DB   Paroxysmal Sweats -- -- -- 0  -DB   Visual Disturbances -- -- -- 0  -DB   Anxiety -- -- -- 0  -DB   Headache, Fullness in Head -- -- -- 1  -DB   Agitation -- -- -- 1  -DB   Orientation and  Clouding of Sensorium -- -- -- 0  -DB   CIWA-Ar Total -- -- -- 2  -DB       CIWA-Ar Score    Row Name 03/07/24 08:07:31 03/07/24 0526 03/07/24 00:49:31 03/06/24 2300   CIWA-Ar   /71  -DI -- 129/75  -DI --   Pulse 69  -MM -- -- --   Nausea and Vomiting -- 0  -CC -- 0  -DR   Tactile Disturbances -- 0  -CC -- 0  -DR   Tremor -- 0  -CC -- 0  -DR   Auditory Disturbances -- 0  -CC -- 0  -DR   Paroxysmal Sweats -- 0  -CC -- 0  -DR   Visual Disturbances -- 0  -CC -- 0  -DR   Anxiety -- 0  -CC -- 0  -DR   Headache, Fullness in Head -- 2  -CC -- 2  -DR   Agitation -- 1  -CC -- 1  -DR   Orientation and Clouding of Sensorium -- 0  -CC -- --   CIWA-Ar Total -- 3  -CC -- --               Pertinent Labs/Diagnostic Test Results:   No orders to display         Results from last 7 days   Lab Units 03/08/24  0602 03/07/24  0517 03/06/24 2302 03/06/24 2000   WBC Thousand/uL 8.65 7.39  --  8.57   HEMOGLOBIN g/dL 9.4* 10.0*  --  9.7*   HEMATOCRIT % 29.2* 31.5*  --  29.2*   PLATELETS Thousands/uL 289 291 301 264   NEUTROS ABS Thousands/µL 6.74 5.39  --  6.54         Results from last 7 days   Lab Units 03/08/24  0602 03/07/24  0517 03/06/24 2000   SODIUM mmol/L 130* 132* 132*   POTASSIUM mmol/L 4.2 4.0 4.3   CHLORIDE mmol/L 96 97 96   CO2 mmol/L 31 29 30   ANION GAP mmol/L 3 6 6   BUN mg/dL 12 9 10   CREATININE mg/dL 0.69 0.67 0.60   EGFR ml/min/1.73sq m 103 104 109   CALCIUM mg/dL 8.5 8.7 8.9   MAGNESIUM mg/dL 1.8* 2.1 1.8*   PHOSPHORUS mg/dL 3.4 3.1 3.0     Results from last 7 days   Lab Units 03/07/24  0517 03/06/24 2000   AST U/L 9* 9*   ALT U/L 5* 6*   ALK PHOS U/L 85 86   TOTAL PROTEIN g/dL 6.0* 6.0*   ALBUMIN g/dL 2.9* 3.0*   TOTAL BILIRUBIN mg/dL 0.24 0.20         Results from last 7 days   Lab Units 03/08/24  0602 03/07/24  0517 03/06/24 2000   GLUCOSE RANDOM mg/dL 114 113 83                   Results from last 7 days   Lab Units 03/06/24 2000   TSH 3RD GENERATON uIU/mL 1.078               Results from last 7 days    Lab Units 03/06/24  2000   LIPASE u/L <6*               Results from last 7 days   Lab Units 03/07/24  0010   ETHANOL LVL mg/dL <10             ED Treatment:   Medication Administration from 03/06/2024 1740 to 03/06/2024 2146         Date/Time Order Dose Route Action     03/06/2024 2046 EST ondansetron (ZOFRAN) injection 4 mg 4 mg Intravenous Not Given     03/06/2024 2049 EST oxyCODONE (ROXICODONE) immediate release tablet 20 mg 20 mg Oral Given     03/06/2024 2052 EST magnesium sulfate 2 g/50 mL IVPB (premix) 2 g 2 g Intravenous New Bag          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 male  Admission Orders:  Scheduled Medications:  atorvastatin, 40 mg, Oral, QPM  folic acid, 1 mg, Oral, Daily  heparin (porcine), 5,000 Units, Subcutaneous, Q8H MANDY  multivitamin-minerals, 1 tablet, Oral, Daily  nicotine, 1 patch, Transdermal, Daily  ondansetron, 4 mg, Intravenous, Once  senna-docusate sodium, 1 tablet, Oral, Daily  thiamine, 100 mg, IV-  Daily  start 3/8     Famotidine (PEPCID) tablet 20 mg  Dose: 20 mg  Freq: 2 times daily Route: PO  Start: 03/08/24 1200   magnesium sulfate 4 g/100 mL IVPB (premix) 4 g  Dose: 4 g  Freq: Once Route: IV  x1 3/8 @ 1115    thiamine tablet 100 mg  Dose: 100 mg  Freq: Daily Route: PO  Start: 03/07/24 0900 End: 03/07/24 1358   Continuous IV Infusions:     PRN  Meds:  acetaminophen, 650 mg, Oral, Q6H PRN  calcium carbonate, 1,000 mg, Oral, Daily PRN  naloxone, 0.04 mg, Intravenous, Q1MIN PRN  ondansetron, 4 mg, Intravenous, Q6H PRN x1 3/8   oxyCODONE, 20 mg, Oral, Q4H PRN x5 3/7 , x3 3/8     Tube feeding- jevity 1.5 continuous 60 ml/hr, 30 ml watre flush q4h. Start at 10 ml/hr .   Reg diet   Telemetry   CIWA    Continuous pulse ox   OOB as kristin   SCD    IP CONSULT TO NUTRITION SERVICES    Network Utilization Review Department  ATTENTION: Please call with any questions or concerns to [phone] and carefully listen to the prompts so that you are directed to the right person. All voicemails are confidential.   For Discharge needs, contact Care Management DC Support Team at [phone] opt. 2  Send all requests for admission clinical reviews, approved or denied determinations and any other requests to dedicated fax number below belonging to the [facility] where the patient is receiving treatment. List of dedicated fax numbers for the Facilities:  FACILITY NAME UR FAX NUMBER   ADMISSION DENIALS (Administrative/Medical Necessity) [phone]   DISCHARGE SUPPORT TEAM (NETWORK) [phone]   PARENT CHILD HEALTH (Maternity/NICU/Pediatrics) [phone]   [facility] [phone]   [facility] [phone]   [facility] [phone]   [facility] [phone]   [facility] [phone]   [facility] [phone]   [facility] [phone]   [facility] [phone]   [facility] [phone]   [facility] [phone]   [facility] [phone]   [facility] [place] [phone]

## 2024-03-07 NOTE — ASSESSMENT & PLAN NOTE
Patient states he takes Roxicodone 20mg q4h around the clock for cancer-related pain    Reordered home Roxicodone 20mg q4h for pain  IV Narcan PRN for respiratory depression  Heat pack ordered for anterior jaw pain per patient request

## 2024-03-07 NOTE — H&P
[facility]  H&P  Name: Eliceo Garcia 58 y.o. male I MRN: [id number]  Unit/Bed#: S -01 I Date of Admission: 3/6/2024   Date of Service: 3/6/2024 I Hospital Day: 0      Assessment/Plan   * Complication of feeding tube (HCC)  Assessment & Plan  Patient presented to ED at the suggestion of oncology team in the setting of high likelihood for refeeding syndrome; reports unable to tolerate PO intake or tube feeds for months.    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  He states he has been favoring PO intake.  Previously documented he does not like to use PEG tube.    Monitor CMP, CBC, Mg, Phos with a.m. labs; replete PRN  Plan to restart tube feeds at trickle rate 10cc/hr, increase by 10cc/hr until goal of 60cc/hr as tolerated.  Nutrition consulted, appreciate recs  Zofran ordered for nausea/vomiting, no prior history of QTc prolongation    Severe protein-calorie malnutrition (HCC)  Assessment & Plan  Malnutrition Findings:      Temporal wasting, clavicular prominence, cold intolerance  Diffuse loss of subcutaneous fat  Low BMI  Hypoalbuminemia, hyponatremia likely 2/2 poor PO intake    BMI Findings:      Body mass index is 14.27 kg/m².     Patient reports decreased PO intake for months, noted to have approx a 25lb weight loss since the start of the calendar year.  Has been seen multiple times by nutrition in conjunction with oncology but continues to struggle with meeting calorie goals, worsened recently in the setting of chronic nausea/vomiting with nearly all PO intake.    Nutrition consulted, appreciate recs  Will initiate trickle tube feeds and advance as tolerated  PRN medication for nausea/vomiting ordered    Alcohol use disorder, severe, dependence (HCC)  Assessment & Plan  Patient with previously documented hx of alcohol abuse, drinking 3-4 beers par day.  States last drink was approximately 72 hours prior to the time of initial assessment on  3/6.  Previously documented to drink up to 14 beers daily, however patient recently has been reporting vomiting of all PO intake.    [facility] protocol ordered  Recommend monitoring on telemetry and radha at this time, patient currently refusing  Blood ethanol level ordered  Thiamine, folate, multivitamin ordered    Electrolyte abnormality  Assessment & Plan  Patient admitted due to high risk for refeeding syndrome.    Potassium   Date Value Ref Range Status   03/06/2024 4.3 3.5 - 5.3 mmol/L Final     Magnesium   Date Value Ref Range Status   03/06/2024 1.8 (L) 1.9 - 2.7 mg/dL Final     Phosphorus   Date Value Ref Range Status   03/06/2024 3.0 2.3 - 4.1 mg/dL Final     Magnesium repleted in ED with 2g IVPB  Recheck with a.m. labs, replete PRN    Cancer-related pain  Assessment & Plan  Patient states he takes Roxicodone 20mg q4h around the clock for cancer-related pain    Reordered home Roxicodone 20mg q4h for pain  IV Narcan PRN for respiratory depression  Heat pack ordered for anterior jaw pain per patient request    Cigarette nicotine dependence without complication  Assessment & Plan  NRT ordered, 14mg/d in place of 1/2 PPD    Hypertension  Assessment & Plan  Temp:  [98.1 °F (36.7 °C)] 98.1 °F (36.7 °C)  HR:  [84] 84  Resp:  [18] 18  BP: (144-158)/(83-88) 144/83    Patient reports he does not take any medications at home.  Noted to have previously been on amlodipine 5mg daily per chart review    Vitals per unit routine  Will order PRN medication if SBP >180, not currently ordered as patient is currently refusing BP monitoring.         VTE Pharmacologic Prophylaxis: VTE Score: 5 High Risk (Score >/= 5) - Pharmacological DVT Prophylaxis Ordered: heparin. Sequential Compression Devices Ordered.  Code Status: Level 1 - Full Code discussed with patient  Discussion with family: Patient declined call to .     Anticipated Length of Stay: Patient will be admitted on an inpatient basis with an anticipated length  of stay of greater than 2 midnights secondary to intractable nausea/vomiting, severe malnutrition, high risk for refeeding syndrome, complication of feeding tube.    Chief Complaint: nausea/vomiting, poor PO intake    History of Present Illness:  Eliceo Garcia is a 58 y.o. 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  He says for the past month he has had nausea and abdominal pain when trying to use the tube or taking PO intake.  He has primarily been attempting to take PO intake.  He was previously recommended by dietary to try to increase his caloric intake with Ensure shakes, but states those are also causing him to have nausea.  He reached out to oncology and due to high risk for refeeding syndrome was recommended to come to the ER for lab work.    Upon arrival to the ED, he was found to be hyponatremic to 132 and anemic to 9.7, with magnesium of 1.8 which was repleted with 2g Mg IVPB.  TSH was within normal range.  Blood ethanol level <10.  Imaging and EKG were not performed.  Per my chart review, it appears Eliceo has had difficulty managing his PEG tube since it was placed on 12/11/23.  Multiple prior notes by nutrition states that he has been having issues with using his PEG tube at home and prefers to try to take PO intake instead of the PEG tube.  Several notes document he may want the PEG tube removed.  However, when I asked him today how he normally manages his PEG tube or if anyone is at home to help him, he stated he tends to not use it and has been trying to tolerate oral intake.  However, with all oral intake he has had significant nausea and associated vomiting.  He states that this causes his jaw pain to become worse, currently requiring 20 mg  Roxicodone every 4 hours for pain.  In addition to this, he reports he recently lost his Roxicodone prescription when he put it in his pocket and got on a bus.  He had difficulty repurchasing Roxicodone, as [facility] would not refill his prescription early, so he states he went a week without pain medication, which he normally requires around the clock.    He has previously been noted to be unable to tolerate bolus feeds and has needed continuous tube feeds; he is aware of this and asks when he will be placed on a pump for his tube feeds.  He endorses recent weight loss; around 25lb since the beginning of this calendar year.  He appears to have previously been on Jevity 1.5 up to 60cc/hr, same ordered by ED, started at trickle feeds with plan to increase by 10cc/hr until goal of 60cc/hr, with free water flushes 30cc q4h.  Per ED provider, PEG tube is flushing well.  He is admitted to [facility] as an inpatient in the setting of intractable nausea and vomiting with PEG tube in place with plan to monitor electrolytes due to high risk for refeeding syndrome and optimize PEG tube use.      Review of Systems:  Review of Systems   Constitutional:  Positive for appetite change (decreased). Negative for chills and fever.   HENT:  Negative for congestion and rhinorrhea.         Jaw pain   Respiratory:  Negative for cough, shortness of breath and wheezing.    Cardiovascular:  Negative for chest pain, palpitations and leg swelling.   Gastrointestinal:  Positive for nausea and vomiting. Negative for abdominal distention, abdominal pain (prior, denies currently) and constipation.   Allergic/Immunologic: Negative for environmental allergies and food allergies.   Neurological:  Positive for dizziness, weakness and light-headedness.   All other systems reviewed and are negative.      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 Authorizing Provider   Aspirin Low Dose 81 MG chewable tablet CHEW 1 TABLET BY MOUTH DAILY  Patient not taking: Reported on 2/21/2024 1/2/24   Yoel Yoon MD   atorvastatin (LIPITOR) 40 mg tablet TAKE 1 TABLET BY MOUTH EVERY DAY IN THE EVENING 1/24/24   Elias Schoen, MD   chlorhexidine (PERIDEX) 0.12 % solution  11/10/23   Historical Provider, MD   dexamethasone 0.5 MG/5ML elixir Take 10 mL (1 mg total) by mouth 4 (four) times a day  Patient not taking: Reported on 1/26/2024 1/11/24   Mitra Lima MD   lactulose (CHRONULAC) 10 g/15 mL solution Take 15 mL (10 g total) by mouth 2 (two) times a day 2/13/24 3/14/24  Ann Jansen MD   magnesium Oxide (MAG-OX) 400 mg TABS Take 1 tablet (400 mg total) by mouth 2 (two) times a day 2/13/24 3/14/24  Ann Jansen MD   naloxone (NARCAN) 4 mg/0.1 mL nasal spray Administer 1 spray into a nostril. If no response after 2-3 minutes, give another dose in the other nostril using a new spray.  Patient not taking: Reported on 2/21/2024 11/2/23 11/1/24  Mitra Lima MD   oxyCODONE (ROXICODONE) 20 MG TABS Take 1 tablet (20 mg total) by mouth every 4 (four) hours as needed for moderate pain Max Daily Amount: 120 mg Do not start before March 9, 2024. 3/9/24   Mitra Lima MD   potassium chloride (Klor-Con M20) 20 mEq tablet Take 1 tablet (20 mEq total) by mouth daily for 14 days 2/13/24 2/27/24  Ann Jansen MD   senna-docusate sodium (SENOKOT-S) 8.6-50 mg per tablet Take  1 tablet by mouth daily 11/2/23   Mitra Lima MD   silver sulfadiazine (SILVADENE,SSD) 1 % cream Apply topically 2 (two) times a day  Patient not taking: Reported on 1/11/2024 12/27/23   Ernestina Mohr MD   oxyCODONE (ROXICODONE) 20 MG TABS Take 1 tablet (20 mg total) by mouth every 4 (four) hours as needed for moderate pain Max Daily Amount: 120 mg 2/23/24 3/6/24  Mitra Lima MD     I have reviewed home medications with patient personally.    Allergies:   Allergies   Allergen Reactions    Bee Venom Hives    Other      bees       Social History:  Marital Status: Single   Occupation: associate  Patient Pre-hospital Living Situation: Home  Patient Pre-hospital Level of Mobility: walks with cane  Patient Pre-hospital Diet Restrictions: none  Substance Use History:   Social History     Substance and Sexual Activity   Alcohol Use Yes    Comment: 3-4 beers/day 24 oz beer     Social History     Tobacco Use   Smoking Status Every Day    Current packs/day: 0.25    Types: Cigarettes   Smokeless Tobacco Never     Social History     Substance and Sexual Activity   Drug Use No       Family History:  Family History   Problem Relation Age of Onset    Breast cancer Mother     Cancer Father        Physical Exam:     Vitals:   Blood Pressure: 129/75 (03/07/24 0049)  Pulse: 84 (03/06/24 1747)  Temperature: 97.8 °F (36.6 °C) (03/07/24 0049)  Temp Source: Oral (03/06/24 1747)  Respirations: 18 (03/07/24 0049)  Weight - Scale: 51.8 kg (114 lb 3.2 oz) (03/06/24 1903)  SpO2: 97 % (03/06/24 1747)    Physical Exam  Vitals and nursing note reviewed.   Constitutional:       Appearance: He is cachectic. He is ill-appearing (chronically).      Comments: Body mass index is 14.27 kg/m².   HENT:      Nose: Nose normal.   Eyes:      Extraocular Movements: Extraocular movements intact.      Conjunctiva/sclera: Conjunctivae normal.   Cardiovascular:      Rate and Rhythm: Normal rate and regular rhythm.      Pulses: Normal pulses.    Pulmonary:      Effort: Pulmonary effort is normal. No respiratory distress.      Breath sounds: Normal breath sounds.   Abdominal:      General: Abdomen is flat. There is no distension.      Palpations: Abdomen is soft.      Tenderness: There is no abdominal tenderness.      Comments: PEG tube is in place  Patient able to express discharge with palpation of the area adjacent to the PEG tube    Musculoskeletal:         General: No swelling or signs of injury. Normal range of motion.      Right lower leg: No edema.      Left lower leg: No edema.   Skin:     General: Skin is warm and dry.      Capillary Refill: Capillary refill takes less than 2 seconds.   Neurological:      Mental Status: He is alert and oriented to person, place, and time. Mental status is at baseline.      Motor: No weakness.   Psychiatric:         Mood and Affect: Mood normal.         Behavior: Behavior normal. Behavior is cooperative.        Additional Data:     Lab Results:  Results from last 7 days   Lab Units 03/06/24  2302 03/06/24 2000   WBC Thousand/uL  --  8.57   HEMOGLOBIN g/dL  --  9.7*   HEMATOCRIT %  --  29.2*   PLATELETS Thousands/uL 301 264   NEUTROS PCT %  --  75   LYMPHS PCT %  --  7*   MONOS PCT %  --  14*   EOS PCT %  --  1     Results from last 7 days   Lab Units 03/06/24 2000   SODIUM mmol/L 132*   POTASSIUM mmol/L 4.3   CHLORIDE mmol/L 96   CO2 mmol/L 30   BUN mg/dL 10   CREATININE mg/dL 0.60   ANION GAP mmol/L 6   CALCIUM mg/dL 8.9   ALBUMIN g/dL 3.0*   TOTAL BILIRUBIN mg/dL 0.20   ALK PHOS U/L 86   ALT U/L 6*   AST U/L 9*   GLUCOSE RANDOM mg/dL 83               Lines/Drains:  Invasive Devices       Central Venous Catheter Line  Duration             Port A Cath 11/07/23 Left Internal jugular 120 days              Drain  Duration             Gastrostomy/Enterostomy Percutaneous Interventional Gastrostomy 16 Fr. LUQ 86 days                    Central Line:  Goal for removal: N/A           Imaging: No pertinent imaging  reviewed.  No orders to display       EKG and Other Studies Reviewed on Admission:   EKG: No EKG obtained.    ** Please Note: This note has been constructed using a voice recognition system. **

## 2024-03-07 NOTE — MALNUTRITION/BMI
This medical record reflects one or more clinical indicators suggestive of malnutrition and/or morbid obesity.    Malnutrition Findings:   Adult Malnutrition type: Acute illness (in the setting of chronic illness)  Adult Degree of Malnutrition: Other severe protein calorie malnutrition  Malnutrition Characteristics: Fat loss, Muscle loss, Weight loss                  360 Statement: Severe malnutrition related to inadequate oral intake, enteral intolerance, catabolic illness as evidenced by severe loss of subcutaneous fat and muscle, temples, extremities, scapula, osseous, 26.5% weight decrease x 12 months. Currently treated with nutrition consult, enteral pump feeds.    BMI Findings:           Body mass index is 14.27 kg/m².     See Nutrition note dated 3/7/2024 for additional details.  Completed nutrition assessment is viewable in the nutrition documentation.

## 2024-03-07 NOTE — ASSESSMENT & PLAN NOTE
Temp:  [98.1 °F (36.7 °C)] 98.1 °F (36.7 °C)  HR:  [84] 84  Resp:  [18] 18  BP: (144-158)/(83-88) 144/83    Patient reports he does not take any medications at home.  Noted to have previously been on amlodipine 5mg daily per chart review    Vitals per unit routine  Will order PRN medication if SBP >180, not currently ordered as patient is currently refusing BP monitoring.

## 2024-03-07 NOTE — ASSESSMENT & PLAN NOTE
Patient with previously documented hx of alcohol abuse, drinking 3-4 beers par day.  States last drink was approximately 72 hours prior to the time of initial assessment on 3/6.  Previously documented to drink up to 14 beers daily, however patient recently has been reporting vomiting of all PO intake.    [facility] protocol ordered  Recommend monitoring on telemetry and radha at this time, patient currently refusing  Blood ethanol level ordered  Thiamine, folate, multivitamin ordered

## 2024-03-07 NOTE — PLAN OF CARE
Problem: PAIN - ADULT  Goal: Verbalizes/displays adequate comfort level or baseline comfort level  Description: Interventions:  - Encourage patient to monitor pain and request assistance  - Assess pain using appropriate pain scale  - Administer analgesics based on type and severity of pain and evaluate response  - Implement non-pharmacological measures as appropriate and evaluate response  - Consider cultural and social influences on pain and pain management  - Notify physician/advanced practitioner if interventions unsuccessful or patient reports new pain  Outcome: Progressing     Problem: INFECTION - ADULT  Goal: Absence or prevention of progression during hospitalization  Description: INTERVENTIONS:  - Assess and monitor for signs and symptoms of infection  - Monitor lab/diagnostic results  - Monitor all insertion sites, i.e. indwelling lines, tubes, and drains  - Monitor endotracheal if appropriate and nasal secretions for changes in amount and color  - [place] appropriate cooling/warming therapies per order  - Administer medications as ordered  - Instruct and encourage patient and family to use good hand hygiene technique  - Identify and instruct in appropriate isolation precautions for identified infection/condition  Outcome: Progressing  Goal: Absence of fever/infection during neutropenic period  Description: INTERVENTIONS:  - Monitor WBC    Outcome: Progressing     Problem: SAFETY ADULT  Goal: Patient will remain free of falls  Description: INTERVENTIONS:  - Educate patient/family on patient safety including physical limitations  - Instruct patient to call for assistance with activity   - Consult OT/PT to assist with strengthening/mobility   - Keep Call bell within reach  - Keep bed low and locked with side rails adjusted as appropriate  - Keep care items and personal belongings within reach  - Initiate and maintain comfort rounds  - Make Fall Risk Sign visible to staff  - Offer Toileting every  Hours,  in advance of need  - Initiate/Maintain alarm  - Obtain necessary fall risk management equipment:   - Apply yellow socks and bracelet for high fall risk patients  - Consider moving patient to room near nurses station  Outcome: Progressing  Goal: Maintain or return to baseline ADL function  Description: INTERVENTIONS:  -  Assess patient's ability to carry out ADLs; assess patient's baseline for ADL function and identify physical deficits which impact ability to perform ADLs (bathing, care of mouth/teeth, toileting, grooming, dressing, etc.)  - Assess/evaluate cause of self-care deficits   - Assess range of motion  - Assess patient's mobility; develop plan if impaired  - Assess patient's need for assistive devices and provide as appropriate  - Encourage maximum independence but intervene and supervise when necessary  - Involve family in performance of ADLs  - Assess for home care needs following discharge   - Consider OT consult to assist with ADL evaluation and planning for discharge  - Provide patient education as appropriate  Outcome: Progressing  Goal: Maintains/Returns to pre admission functional level  Description: INTERVENTIONS:  - Perform AM-PAC 6 Click Basic Mobility/ Daily Activity assessment daily.  - Set and communicate daily mobility goal to care team and patient/family/caregiver.   - Collaborate with rehabilitation services on mobility goals if consulted  - Perform Range of Motion  times a day.  - Reposition patient every  hours.  - Dangle patient  times a day  - Stand patient  times a day  - Ambulate patient  times a day  - Out of bed to chair  times a day   - Out of bed for meals  times a day  - Out of bed for toileting  - Record patient progress and toleration of activity level   Outcome: Progressing     Problem: DISCHARGE PLANNING  Goal: Discharge to home or other facility with appropriate resources  Description: INTERVENTIONS:  - Identify barriers to discharge w/patient and caregiver  - Arrange for  needed discharge resources and transportation as appropriate  - Identify discharge learning needs (meds, wound care, etc.)  - Arrange for interpretive services to assist at discharge as needed  - Refer to Case Management Department for coordinating discharge planning if the patient needs post-hospital services based on physician/advanced practitioner order or complex needs related to functional status, cognitive ability, or social support system  Outcome: Progressing     Problem: Knowledge Deficit  Goal: Patient/family/caregiver demonstrates understanding of disease process, treatment plan, medications, and discharge instructions  Description: Complete learning assessment and assess knowledge base.  Interventions:  - Provide teaching at level of understanding  - Provide teaching via preferred learning methods  Outcome: Progressing

## 2024-03-08 LAB
ANION GAP SERPL CALCULATED.3IONS-SCNC: 3 MMOL/L
BASOPHILS # BLD AUTO: 0.05 THOUSANDS/ÂΜL (ref 0–0.1)
BASOPHILS NFR BLD AUTO: 1 % (ref 0–1)
BUN SERPL-MCNC: 12 MG/DL (ref 5–25)
CALCIUM SERPL-MCNC: 8.5 MG/DL (ref 8.4–10.2)
CHLORIDE SERPL-SCNC: 96 MMOL/L (ref 96–108)
CO2 SERPL-SCNC: 31 MMOL/L (ref 21–32)
CREAT SERPL-MCNC: 0.69 MG/DL (ref 0.6–1.3)
EOSINOPHIL # BLD AUTO: 0.11 THOUSAND/ÂΜL (ref 0–0.61)
EOSINOPHIL NFR BLD AUTO: 1 % (ref 0–6)
ERYTHROCYTE [DISTWIDTH] IN BLOOD BY AUTOMATED COUNT: 16 % (ref 11.6–15.1)
GFR SERPL CREATININE-BSD FRML MDRD: 103 ML/MIN/1.73SQ M
GLUCOSE SERPL-MCNC: 114 MG/DL (ref 65–140)
HCT VFR BLD AUTO: 29.2 % (ref 36.5–49.3)
HGB BLD-MCNC: 9.4 G/DL (ref 12–17)
IMM GRANULOCYTES # BLD AUTO: 0.09 THOUSAND/UL (ref 0–0.2)
IMM GRANULOCYTES NFR BLD AUTO: 1 % (ref 0–2)
LYMPHOCYTES # BLD AUTO: 0.49 THOUSANDS/ÂΜL (ref 0.6–4.47)
LYMPHOCYTES NFR BLD AUTO: 6 % (ref 14–44)
MAGNESIUM SERPL-MCNC: 1.8 MG/DL (ref 1.9–2.7)
MCH RBC QN AUTO: 32.4 PG (ref 26.8–34.3)
MCHC RBC AUTO-ENTMCNC: 32.2 G/DL (ref 31.4–37.4)
MCV RBC AUTO: 101 FL (ref 82–98)
MONOCYTES # BLD AUTO: 1.17 THOUSAND/ÂΜL (ref 0.17–1.22)
MONOCYTES NFR BLD AUTO: 14 % (ref 4–12)
NEUTROPHILS # BLD AUTO: 6.74 THOUSANDS/ÂΜL (ref 1.85–7.62)
NEUTS SEG NFR BLD AUTO: 77 % (ref 43–75)
NRBC BLD AUTO-RTO: 0 /100 WBCS
PHOSPHATE SERPL-MCNC: 3.4 MG/DL (ref 2.3–4.1)
PLATELET # BLD AUTO: 289 THOUSANDS/UL (ref 149–390)
PMV BLD AUTO: 9.7 FL (ref 8.9–12.7)
POTASSIUM SERPL-SCNC: 4.2 MMOL/L (ref 3.5–5.3)
RBC # BLD AUTO: 2.9 MILLION/UL (ref 3.88–5.62)
SODIUM SERPL-SCNC: 130 MMOL/L (ref 135–147)
WBC # BLD AUTO: 8.65 THOUSAND/UL (ref 4.31–10.16)

## 2024-03-08 PROCEDURE — 83735 ASSAY OF MAGNESIUM: CPT

## 2024-03-08 PROCEDURE — 85025 COMPLETE CBC W/AUTO DIFF WBC: CPT

## 2024-03-08 PROCEDURE — 80048 BASIC METABOLIC PNL TOTAL CA: CPT

## 2024-03-08 PROCEDURE — 84100 ASSAY OF PHOSPHORUS: CPT

## 2024-03-08 PROCEDURE — 99232 SBSQ HOSP IP/OBS MODERATE 35: CPT | Performed by: GENERAL PRACTICE

## 2024-03-08 RX ORDER — FAMOTIDINE 20 MG/1
20 TABLET, FILM COATED ORAL 2 TIMES DAILY
Status: DISCONTINUED | OUTPATIENT
Start: 2024-03-08 | End: 2024-03-14 | Stop reason: HOSPADM

## 2024-03-08 RX ORDER — LACTULOSE 10 G/15ML
20 SOLUTION ORAL 2 TIMES DAILY PRN
Status: DISCONTINUED | OUTPATIENT
Start: 2024-03-08 | End: 2024-03-08

## 2024-03-08 RX ORDER — LACTULOSE 10 G/15ML
15 SOLUTION ORAL 2 TIMES DAILY PRN
Status: DISCONTINUED | OUTPATIENT
Start: 2024-03-08 | End: 2024-03-14 | Stop reason: HOSPADM

## 2024-03-08 RX ORDER — MAGNESIUM SULFATE HEPTAHYDRATE 40 MG/ML
4 INJECTION, SOLUTION INTRAVENOUS ONCE
Status: COMPLETED | OUTPATIENT
Start: 2024-03-08 | End: 2024-03-08

## 2024-03-08 RX ADMIN — OXYCODONE HYDROCHLORIDE 20 MG: 10 TABLET ORAL at 07:10

## 2024-03-08 RX ADMIN — HEPARIN SODIUM 5000 UNITS: 5000 INJECTION INTRAVENOUS; SUBCUTANEOUS at 13:21

## 2024-03-08 RX ADMIN — FOLIC ACID 1 MG: 1 TABLET ORAL at 09:29

## 2024-03-08 RX ADMIN — THIAMINE HYDROCHLORIDE 100 MG: 100 INJECTION, SOLUTION INTRAMUSCULAR; INTRAVENOUS at 09:29

## 2024-03-08 RX ADMIN — ONDANSETRON 4 MG: 2 INJECTION INTRAMUSCULAR; INTRAVENOUS at 07:23

## 2024-03-08 RX ADMIN — ATORVASTATIN CALCIUM 40 MG: 40 TABLET, FILM COATED ORAL at 17:14

## 2024-03-08 RX ADMIN — OXYCODONE HYDROCHLORIDE 20 MG: 10 TABLET ORAL at 11:47

## 2024-03-08 RX ADMIN — MAGNESIUM SULFATE HEPTAHYDRATE 4 G: 40 INJECTION, SOLUTION INTRAVENOUS at 11:15

## 2024-03-08 RX ADMIN — MULTIPLE VITAMINS W/ MINERALS TAB 1 TABLET: TAB ORAL at 09:29

## 2024-03-08 RX ADMIN — FAMOTIDINE 20 MG: 20 TABLET ORAL at 13:14

## 2024-03-08 RX ADMIN — OXYCODONE HYDROCHLORIDE 20 MG: 10 TABLET ORAL at 17:14

## 2024-03-08 RX ADMIN — FAMOTIDINE 20 MG: 20 TABLET ORAL at 17:14

## 2024-03-08 RX ADMIN — OXYCODONE HYDROCHLORIDE 20 MG: 10 TABLET ORAL at 22:39

## 2024-03-08 RX ADMIN — HEPARIN SODIUM 5000 UNITS: 5000 INJECTION INTRAVENOUS; SUBCUTANEOUS at 05:09

## 2024-03-08 RX ADMIN — OXYCODONE HYDROCHLORIDE 20 MG: 10 TABLET ORAL at 03:22

## 2024-03-08 NOTE — PROGRESS NOTES
[facility]  Progress Note  Name: Eliceo Garcia I  MRN: [id number]  Unit/Bed#: S -01 I Date of Admission: 3/6/2024   Date of Service: 3/8/2024 I Hospital Day: 2    Assessment/Plan   * Complication of feeding tube (HCC)  Assessment & Plan  Patient presented to ED at the suggestion of oncology team in the setting of high likelihood for refeeding syndrome; reports unable to tolerate PO intake or tube feeds for months.    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  He states he has been favoring PO intake.  Previously documented he does not like to use PEG tube.    Monitor CMP, CBC, Mg, Phos with a.m. labs; replete PRN  Nutrition consulted, appreciate recs  Suggest initiate Jevity 1.5 @ 15 ml/hr x initial 24 hours.   Pending electrolytes, advance TF by 10 ml every 12 hours to goal rate of 60 ml/hr.   Goal rate provides 2160 kcal, 92 g protein, 1094 ml free water. At goal flush 80 ml q 4 hours.   100 mg IV thiamin for 5-7 days to assist with refeeding. Patient at very high risk.   Zofran ordered for nausea/vomiting, no prior history of QTc prolongation    Electrolyte abnormality  Assessment & Plan  Patient admitted due to high risk for refeeding syndrome.    Potassium   Date Value Ref Range Status   03/08/2024 4.2 3.5 - 5.3 mmol/L Final     Magnesium   Date Value Ref Range Status   03/08/2024 1.8 (L) 1.9 - 2.7 mg/dL Final     Phosphorus   Date Value Ref Range Status   03/08/2024 3.4 2.3 - 4.1 mg/dL Final     Monitor closely for concern over José feeding syndrome - patient at a high risk    Cancer-related pain  Assessment & Plan  Patient states he takes Roxicodone 20mg q4h around the clock for cancer-related pain    Continue home Roxicodone 20mg q4h for pain  IV Narcan PRN for respiratory depression  Heat pack ordered for anterior jaw pain per patient request    Severe protein-calorie malnutrition (HCC)  Assessment & Plan  Malnutrition Findings:   Adult Malnutrition  type: Acute illness (in the setting of chronic illness)  Temporal wasting, clavicular prominence, cold intolerance  Diffuse loss of subcutaneous fat  Low BMI  Hypoalbuminemia, hyponatremia likely 2/2 poor PO intake    BMI Findings:      Body mass index is 14.27 kg/m².     Patient reports decreased PO intake for months, noted to have approx a 25lb weight loss since the start of the calendar year.  Has been seen multiple times by nutrition in conjunction with oncology but continues to struggle with meeting calorie goals, worsened recently in the setting of chronic nausea/vomiting with nearly all PO intake.    Nutrition consulted, appreciate recs  Will initiate trickle tube feeds and advance as discussed above  PRN medication for nausea/vomiting ordered    Alcohol use disorder, severe, dependence (HCC)  Assessment & Plan  Patient with previously documented hx of alcohol abuse, drinking 3-4 beers par day.  States last drink was approximately 72 hours prior to the time of initial assessment on 3/6.  Previously documented to drink up to 14 beers daily, however patient recently has been reporting vomiting of all PO intake.    [facility] protocol ordered  Recommend monitoring on telemetry and radha at this time, patient currently refusing  Blood ethanol level ordered  Thiamine, folate, multivitamin ordered    Cigarette nicotine dependence without complication  Assessment & Plan  NRT ordered, 14mg/d in place of 1/2 PPD    Hypertension  Assessment & Plan  Temp:  [97.1 °F (36.2 °C)-97.8 °F (36.6 °C)] 97.1 °F (36.2 °C)  HR:  [69] 69  Resp:  [17-20] 17  BP: (103-126)/(63-76) 103/67    Patient reports he does not take any medications at home.  Noted to have previously been on amlodipine 5mg daily per chart review    Vitals per unit routine  Will order PRN medication if SBP >180, not currently ordered as patient is currently refusing BP monitoring.         VTE Pharmacologic Prophylaxis: VTE Score: 5 Moderate Risk (Score 3-4) -  Pharmacological DVT Prophylaxis Ordered: heparin.    Mobility:   Basic Mobility Inpatient Raw Score: 24  JH-HLM Goal: 8: Walk 250 feet or more  JH-HLM Achieved: 7: Walk 25 feet or more    Patient Centered Rounds: I performed bedside rounds with nursing staff today.  Discussions with Specialists or Other Care Team Provider: Nutrition    Education and Discussions with Family / Patient: Patient declined call to .     Current Length of Stay: 2 day(s)  Current Patient Status: Inpatient   Discharge Plan: Anticipate discharge in >72 hrs to home.    Code Status: Level 1 - Full Code    Subjective:   Patient seen at bedside today.  No acute events overnight.  Patient states that he is feeling better compared to yesterday.  And feels he is tolerating the tube feeds well at this time.  Complains of some nausea when he lays flat, more comfortable in chair.  Patient denies any fevers, chills, headaches, chest pain, shortness of breath, abdominal pain, constipation, diarrhea.  Patient has no acute or significant concerns or complaints.  Patient is doing well overall.      Objective:     Vitals:   Temp (24hrs), Av.5 °F (36.4 °C), Min:97.1 °F (36.2 °C), Max:97.8 °F (36.6 °C)    Temp:  [97.1 °F (36.2 °C)-97.8 °F (36.6 °C)] 97.5 °F (36.4 °C)  HR:  [80] 80  Resp:  [15-20] 15  BP: (103-140)/(63-76) 140/76  SpO2:  [98 %] 98 %  Body mass index is 14.27 kg/m².     Input and Output Summary (last 24 hours):     Intake/Output Summary (Last 24 hours) at 3/8/2024 0821  Last data filed at 3/7/2024 1832  Gross per 24 hour   Intake 240 ml   Output --   Net 240 ml       Physical Exam:   Physical Exam  Vitals and nursing note reviewed.   Constitutional:       Appearance: He is cachectic. He is ill-appearing (chronically).      Comments: Body mass index is 14.27 kg/m².   HENT:      Nose: Nose normal.   Eyes:      Extraocular Movements: Extraocular movements intact.      Conjunctiva/sclera: Conjunctivae normal.   Cardiovascular:       Rate and Rhythm: Normal rate and regular rhythm.      Pulses: Normal pulses.   Pulmonary:      Effort: Pulmonary effort is normal. No respiratory distress.      Breath sounds: Normal breath sounds.   Abdominal:      General: Abdomen is flat. There is no distension.      Palpations: Abdomen is soft.      Tenderness: There is no abdominal tenderness.      Comments: PEG tube is in place  Patient able to express discharge with palpation of the area adjacent to the PEG tube    Musculoskeletal:         General: No swelling or signs of injury. Normal range of motion.      Right lower leg: No edema.      Left lower leg: No edema.   Skin:     General: Skin is warm and dry.      Capillary Refill: Capillary refill takes less than 2 seconds.   Neurological:      Mental Status: He is alert and oriented to person, place, and time. Mental status is at baseline.      Motor: No weakness.   Psychiatric:         Mood and Affect: Mood normal.         Behavior: Behavior normal. Behavior is cooperative.         Additional Data:     Labs:  Results from last 7 days   Lab Units 03/08/24  0602   WBC Thousand/uL 8.65   HEMOGLOBIN g/dL 9.4*   HEMATOCRIT % 29.2*   PLATELETS Thousands/uL 289   NEUTROS PCT % 77*   LYMPHS PCT % 6*   MONOS PCT % 14*   EOS PCT % 1     Results from last 7 days   Lab Units 03/08/24  0602 03/07/24  0517   SODIUM mmol/L 130* 132*   POTASSIUM mmol/L 4.2 4.0   CHLORIDE mmol/L 96 97   CO2 mmol/L 31 29   BUN mg/dL 12 9   CREATININE mg/dL 0.69 0.67   ANION GAP mmol/L 3 6   CALCIUM mg/dL 8.5 8.7   ALBUMIN g/dL  --  2.9*   TOTAL BILIRUBIN mg/dL  --  0.24   ALK PHOS U/L  --  85   ALT U/L  --  5*   AST U/L  --  9*   GLUCOSE RANDOM mg/dL 114 113                       Lines/Drains:  Invasive Devices       Central Venous Catheter Line  Duration             Port A Cath 11/07/23 Left Internal jugular 121 days              Drain  Duration             Gastrostomy/Enterostomy Percutaneous Interventional Gastrostomy 16 Fr. LUQ 87 days                     Central Line:  Goal for removal:  For cancer treatment                  No orders to display       Imaging: No pertinent imaging reviewed.    Recent Cultures (last 7 days):         Last 24 Hours Medication List:   Current Facility-Administered Medications   Medication Dose Route Frequency Provider Last Rate    acetaminophen  650 mg Oral Q6H PRN Teresa Alonso, DO      atorvastatin  40 mg Oral QPM Teresa Alonso, DO      calcium carbonate  1,000 mg Oral Daily PRN Teresa Alonso, DO      folic acid  1 mg Oral Daily Teresa Alonso, DO      heparin (porcine)  5,000 Units Subcutaneous Q8H [facility] Teresa Alonso, DO      magnesium sulfate  4 g Intravenous Once Amos Diehl MD      multivitamin-minerals  1 tablet Oral Daily Teresa Alonso, DO      naloxone  0.04 mg Intravenous Q1MIN PRN Teresa Alonso, DO      nicotine  1 patch Transdermal Daily Teresa Alonso, DO      ondansetron  4 mg Intravenous Once Teresa Alonso, DO      ondansetron  4 mg Intravenous Q6H PRN Teresa Alonso, DO      oxyCODONE  20 mg Oral Q4H PRN Teresa Alonso, DO      senna-docusate sodium  1 tablet Oral Daily Teresa Alonso, DO      thiamine  100 mg Intravenous Daily Amos Diehl MD          Today, Patient Was Seen By: Amos Diehl MD    **Please Note: This note may have been constructed using a voice recognition system.**

## 2024-03-08 NOTE — NUTRITION
Replete magnesium, increase Jevity 1.5 to 25 ml/hr continuous, flush 30 ml Q4 hours.    Continue to closely monitor electrolytes.     Total fluid from formula and flushes 636 ml free water. Currently Na 130.     PO intake of fluids 8oz per meal per patient.     Suggest d/c PO fluids if sodium continues to drop.     No oral Ensure at this time.     Defer fluid management to physician.

## 2024-03-08 NOTE — PLAN OF CARE
Problem: PAIN - ADULT  Goal: Verbalizes/displays adequate comfort level or baseline comfort level  Description: Interventions:  - Encourage patient to monitor pain and request assistance  - Assess pain using appropriate pain scale  - Administer analgesics based on type and severity of pain and evaluate response  - Implement non-pharmacological measures as appropriate and evaluate response  - Consider cultural and social influences on pain and pain management  - Notify physician/advanced practitioner if interventions unsuccessful or patient reports new pain  Outcome: Progressing     Problem: INFECTION - ADULT  Goal: Absence or prevention of progression during hospitalization  Description: INTERVENTIONS:  - Assess and monitor for signs and symptoms of infection  - Monitor lab/diagnostic results  - Monitor all insertion sites, i.e. indwelling lines, tubes, and drains  - Monitor endotracheal if appropriate and nasal secretions for changes in amount and color  - [place] appropriate cooling/warming therapies per order  - Administer medications as ordered  - Instruct and encourage patient and family to use good hand hygiene technique  - Identify and instruct in appropriate isolation precautions for identified infection/condition  Outcome: Progressing  Goal: Absence of fever/infection during neutropenic period  Description: INTERVENTIONS:  - Monitor WBC    Outcome: Progressing     Problem: SAFETY ADULT  Goal: Patient will remain free of falls  Description: INTERVENTIONS:  - Educate patient/family on patient safety including physical limitations  - Instruct patient to call for assistance with activity   - Consult OT/PT to assist with strengthening/mobility   - Keep Call bell within reach  - Keep bed low and locked with side rails adjusted as appropriate  - Keep care items and personal belongings within reach  - Initiate and maintain comfort rounds  - Make Fall Risk Sign visible to staff  - Apply yellow socks and bracelet  for high fall risk patients  - Consider moving patient to room near nurses station  Outcome: Progressing  Goal: Maintain or return to baseline ADL function  Description: INTERVENTIONS:  -  Assess patient's ability to carry out ADLs; assess patient's baseline for ADL function and identify physical deficits which impact ability to perform ADLs (bathing, care of mouth/teeth, toileting, grooming, dressing, etc.)  - Assess/evaluate cause of self-care deficits   - Assess range of motion  - Assess patient's mobility; develop plan if impaired  - Assess patient's need for assistive devices and provide as appropriate  - Encourage maximum independence but intervene and supervise when necessary  - Involve family in performance of ADLs  - Assess for home care needs following discharge   - Consider OT consult to assist with ADL evaluation and planning for discharge  - Provide patient education as appropriate  Outcome: Progressing  Goal: Maintains/Returns to pre admission functional level  Description: INTERVENTIONS:  - Perform AM-PAC 6 Click Basic Mobility/ Daily Activity assessment daily.  - Set and communicate daily mobility goal to care team and patient/family/caregiver.   - Collaborate with rehabilitation services on mobility goals if consulted  - Out of bed for toileting  - Record patient progress and toleration of activity level   Outcome: Progressing     Problem: DISCHARGE PLANNING  Goal: Discharge to home or other facility with appropriate resources  Description: INTERVENTIONS:  - Identify barriers to discharge w/patient and caregiver  - Arrange for needed discharge resources and transportation as appropriate  - Identify discharge learning needs (meds, wound care, etc.)  - Arrange for interpretive services to assist at discharge as needed  - Refer to Case Management Department for coordinating discharge planning if the patient needs post-hospital services based on physician/advanced practitioner order or complex needs  related to functional status, cognitive ability, or social support system  Outcome: Progressing     Problem: Knowledge Deficit  Goal: Patient/family/caregiver demonstrates understanding of disease process, treatment plan, medications, and discharge instructions  Description: Complete learning assessment and assess knowledge base.  Interventions:  - Provide teaching at level of understanding  - Provide teaching via preferred learning methods  Outcome: Progressing     Problem: Nutrition/Hydration-ADULT  Goal: Nutrient/Hydration intake appropriate for improving, restoring or maintaining nutritional needs  Description: Monitor and assess patient's nutrition/hydration status for malnutrition. Collaborate with interdisciplinary team and initiate plan and interventions as ordered.  Monitor patient's weight and dietary intake as ordered or per policy. Utilize nutrition screening tool and intervene as necessary. Determine patient's food preferences and provide high-protein, high-caloric foods as appropriate.     INTERVENTIONS:  - Monitor oral intake, urinary output, labs, and treatment plans  - Assess nutrition and hydration status and recommend course of action  - Evaluate amount of meals eaten  - Assist patient with eating if necessary   - Allow adequate time for meals  - Recommend/ encourage appropriate diets, oral nutritional supplements, and vitamin/mineral supplements  - Order, calculate, and assess calorie counts as needed  - Recommend, monitor, and adjust tube feedings and TPN/PPN based on assessed needs  - Assess need for intravenous fluids  - Provide specific nutrition/hydration education as appropriate  - Include patient/family/caregiver in decisions related to nutrition  Outcome: Progressing

## 2024-03-08 NOTE — ASSESSMENT & PLAN NOTE
Temp:  [97.1 °F (36.2 °C)-97.8 °F (36.6 °C)] 97.1 °F (36.2 °C)  HR:  [69] 69  Resp:  [17-20] 17  BP: (103-126)/(63-76) 103/67    Patient reports he does not take any medications at home.  Noted to have previously been on amlodipine 5mg daily per chart review    Vitals per unit routine  Will order PRN medication if SBP >180, not currently ordered as patient is currently refusing BP monitoring.

## 2024-03-08 NOTE — ASSESSMENT & PLAN NOTE
Patient with previously documented hx of alcohol abuse, drinking 3-4 beers par day.  States last drink was approximately 72 hours prior to the time of initial assessment on 3/6.  Previously documented to drink up to 14 beers daily, however patient recently has been reporting vomiting of all PO intake.    [facility] protocol ordered  Recommend monitoring on telemetry and radha at this time, patient currently refusing  Blood ethanol level ordered  Thiamine, folate, multivitamin ordered

## 2024-03-08 NOTE — ASSESSMENT & PLAN NOTE
Patient admitted due to high risk for refeeding syndrome.    Potassium   Date Value Ref Range Status   03/08/2024 4.2 3.5 - 5.3 mmol/L Final     Magnesium   Date Value Ref Range Status   03/08/2024 1.8 (L) 1.9 - 2.7 mg/dL Final     Phosphorus   Date Value Ref Range Status   03/08/2024 3.4 2.3 - 4.1 mg/dL Final     Monitor closely for concern over José feeding syndrome - patient at a high risk

## 2024-03-09 LAB
ANION GAP SERPL CALCULATED.3IONS-SCNC: 3 MMOL/L
BASOPHILS # BLD AUTO: 0.05 THOUSANDS/ÂΜL (ref 0–0.1)
BASOPHILS NFR BLD AUTO: 1 % (ref 0–1)
BUN SERPL-MCNC: 11 MG/DL (ref 5–25)
CALCIUM SERPL-MCNC: 8.5 MG/DL (ref 8.4–10.2)
CHLORIDE SERPL-SCNC: 94 MMOL/L (ref 96–108)
CO2 SERPL-SCNC: 33 MMOL/L (ref 21–32)
CREAT SERPL-MCNC: 0.69 MG/DL (ref 0.6–1.3)
EOSINOPHIL # BLD AUTO: 0.11 THOUSAND/ÂΜL (ref 0–0.61)
EOSINOPHIL NFR BLD AUTO: 2 % (ref 0–6)
ERYTHROCYTE [DISTWIDTH] IN BLOOD BY AUTOMATED COUNT: 16 % (ref 11.6–15.1)
GFR SERPL CREATININE-BSD FRML MDRD: 103 ML/MIN/1.73SQ M
GLUCOSE SERPL-MCNC: 102 MG/DL (ref 65–140)
HCT VFR BLD AUTO: 29.3 % (ref 36.5–49.3)
HGB BLD-MCNC: 9.2 G/DL (ref 12–17)
IMM GRANULOCYTES # BLD AUTO: 0.07 THOUSAND/UL (ref 0–0.2)
IMM GRANULOCYTES NFR BLD AUTO: 1 % (ref 0–2)
LYMPHOCYTES # BLD AUTO: 0.5 THOUSANDS/ÂΜL (ref 0.6–4.47)
LYMPHOCYTES NFR BLD AUTO: 7 % (ref 14–44)
MAGNESIUM SERPL-MCNC: 2.1 MG/DL (ref 1.9–2.7)
MCH RBC QN AUTO: 31.6 PG (ref 26.8–34.3)
MCHC RBC AUTO-ENTMCNC: 31.4 G/DL (ref 31.4–37.4)
MCV RBC AUTO: 101 FL (ref 82–98)
MONOCYTES # BLD AUTO: 1.24 THOUSAND/ÂΜL (ref 0.17–1.22)
MONOCYTES NFR BLD AUTO: 17 % (ref 4–12)
NEUTROPHILS # BLD AUTO: 5.24 THOUSANDS/ÂΜL (ref 1.85–7.62)
NEUTS SEG NFR BLD AUTO: 72 % (ref 43–75)
NRBC BLD AUTO-RTO: 0 /100 WBCS
PHOSPHATE SERPL-MCNC: 3.5 MG/DL (ref 2.3–4.1)
PLATELET # BLD AUTO: 287 THOUSANDS/UL (ref 149–390)
PMV BLD AUTO: 9.7 FL (ref 8.9–12.7)
POTASSIUM SERPL-SCNC: 4.5 MMOL/L (ref 3.5–5.3)
RBC # BLD AUTO: 2.91 MILLION/UL (ref 3.88–5.62)
SODIUM SERPL-SCNC: 130 MMOL/L (ref 135–147)
WBC # BLD AUTO: 7.21 THOUSAND/UL (ref 4.31–10.16)

## 2024-03-09 PROCEDURE — 83735 ASSAY OF MAGNESIUM: CPT

## 2024-03-09 PROCEDURE — 99232 SBSQ HOSP IP/OBS MODERATE 35: CPT | Performed by: GENERAL PRACTICE

## 2024-03-09 PROCEDURE — 85025 COMPLETE CBC W/AUTO DIFF WBC: CPT

## 2024-03-09 PROCEDURE — 84100 ASSAY OF PHOSPHORUS: CPT

## 2024-03-09 PROCEDURE — 80048 BASIC METABOLIC PNL TOTAL CA: CPT

## 2024-03-09 RX ORDER — SODIUM CHLORIDE 1 G/1
1 TABLET ORAL 2 TIMES DAILY WITH MEALS
Status: DISCONTINUED | OUTPATIENT
Start: 2024-03-09 | End: 2024-03-14 | Stop reason: HOSPADM

## 2024-03-09 RX ADMIN — FAMOTIDINE 20 MG: 20 TABLET ORAL at 09:32

## 2024-03-09 RX ADMIN — ONDANSETRON 4 MG: 2 INJECTION INTRAMUSCULAR; INTRAVENOUS at 01:17

## 2024-03-09 RX ADMIN — THIAMINE HYDROCHLORIDE 100 MG: 100 INJECTION, SOLUTION INTRAMUSCULAR; INTRAVENOUS at 09:37

## 2024-03-09 RX ADMIN — OXYCODONE HYDROCHLORIDE 20 MG: 10 TABLET ORAL at 07:46

## 2024-03-09 RX ADMIN — HEPARIN SODIUM 5000 UNITS: 5000 INJECTION INTRAVENOUS; SUBCUTANEOUS at 21:33

## 2024-03-09 RX ADMIN — HEPARIN SODIUM 5000 UNITS: 5000 INJECTION INTRAVENOUS; SUBCUTANEOUS at 13:59

## 2024-03-09 RX ADMIN — ATORVASTATIN CALCIUM 40 MG: 40 TABLET, FILM COATED ORAL at 17:00

## 2024-03-09 RX ADMIN — ONDANSETRON 4 MG: 2 INJECTION INTRAMUSCULAR; INTRAVENOUS at 16:56

## 2024-03-09 RX ADMIN — SODIUM CHLORIDE 1 G: 1 TABLET ORAL at 11:56

## 2024-03-09 RX ADMIN — OXYCODONE HYDROCHLORIDE 20 MG: 10 TABLET ORAL at 11:56

## 2024-03-09 RX ADMIN — SODIUM CHLORIDE 1 G: 1 TABLET ORAL at 16:44

## 2024-03-09 RX ADMIN — OXYCODONE HYDROCHLORIDE 20 MG: 10 TABLET ORAL at 21:33

## 2024-03-09 RX ADMIN — MULTIPLE VITAMINS W/ MINERALS TAB 1 TABLET: TAB ORAL at 09:32

## 2024-03-09 RX ADMIN — ONDANSETRON 4 MG: 2 INJECTION INTRAMUSCULAR; INTRAVENOUS at 07:46

## 2024-03-09 RX ADMIN — OXYCODONE HYDROCHLORIDE 20 MG: 10 TABLET ORAL at 03:43

## 2024-03-09 RX ADMIN — FAMOTIDINE 20 MG: 20 TABLET ORAL at 17:00

## 2024-03-09 RX ADMIN — OXYCODONE HYDROCHLORIDE 20 MG: 10 TABLET ORAL at 16:43

## 2024-03-09 NOTE — ASSESSMENT & PLAN NOTE
Patient admitted due to high risk for refeeding syndrome.    Potassium   Date Value Ref Range Status   03/09/2024 4.5 3.5 - 5.3 mmol/L Final     Magnesium   Date Value Ref Range Status   03/09/2024 2.1 1.9 - 2.7 mg/dL Final     Phosphorus   Date Value Ref Range Status   03/09/2024 3.5 2.3 - 4.1 mg/dL Final     Monitor closely for concern over re-feeding syndrome - patient at a high risk

## 2024-03-09 NOTE — ASSESSMENT & PLAN NOTE
Patient presented to ED at the suggestion of oncology team in the setting of high likelihood for refeeding syndrome; reports unable to tolerate PO intake or tube feeds for months.    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  He states he has been favoring PO intake.  Previously documented he does not like to use PEG tube.    Monitor CMP, CBC, Mg, Phos with a.m. labs; replete PRN  Nutrition consulted, appreciate recs  Suggest initiate Jevity 1.5 @ 15 ml/hr x initial 24 hours. Current Rate: 55  Pending electrolytes, advance TF by 10 ml every 12 hours to goal rate of 30 ml/hr.   Goal rate provides 2160 kcal, 92 g protein, 1094 ml free water. At goal flush 80 ml q 4 hours.   100 mg IV thiamin for 5-7 days to assist with refeeding. Patient at very high risk.   Zofran ordered for nausea/vomiting, no prior history of QTc prolongation

## 2024-03-09 NOTE — ASSESSMENT & PLAN NOTE
Patient presented to ED at the suggestion of oncology team in the setting of high likelihood for refeeding syndrome; reports unable to tolerate PO intake or tube feeds for months.    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  He states he has been favoring PO intake.  Previously documented he does not like to use PEG tube.    Monitor CMP, CBC, Mg, Phos with a.m. labs; replete PRN  Nutrition consulted, appreciate recs  Suggest initiate Jevity 1.5 @ 15 ml/hr x initial 24 hours. Current Rate: 35  Pending electrolytes, advance TF by 10 ml every 12 hours to goal rate of 60 ml/hr.   Goal rate provides 2160 kcal, 92 g protein, 1094 ml free water. At goal flush 80 ml q 4 hours.   100 mg IV thiamin for 5-7 days to assist with refeeding. Patient at very high risk.   Zofran ordered for nausea/vomiting, no prior history of QTc prolongation   Never smoker

## 2024-03-09 NOTE — PROGRESS NOTES
[facility]  Progress Note  Name: Eliceo Garcia I  MRN: [id number]  Unit/Bed#: S -01 I Date of Admission: 3/6/2024   Date of Service: 3/9/2024 I Hospital Day: 3    Assessment/Plan   * Complication of feeding tube (HCC)  Assessment & Plan  Patient presented to ED at the suggestion of oncology team in the setting of high likelihood for refeeding syndrome; reports unable to tolerate PO intake or tube feeds for months.    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  He states he has been favoring PO intake.  Previously documented he does not like to use PEG tube.    Monitor CMP, CBC, Mg, Phos with a.m. labs; replete PRN  Nutrition consulted, appreciate recs  Suggest initiate Jevity 1.5 @ 15 ml/hr x initial 24 hours. Current Rate: 35  Pending electrolytes, advance TF by 10 ml every 12 hours to goal rate of 60 ml/hr.   Goal rate provides 2160 kcal, 92 g protein, 1094 ml free water. At goal flush 80 ml q 4 hours.   100 mg IV thiamin for 5-7 days to assist with refeeding. Patient at very high risk.   Zofran ordered for nausea/vomiting, no prior history of QTc prolongation    Electrolyte abnormality  Assessment & Plan  Patient admitted due to high risk for refeeding syndrome.    Potassium   Date Value Ref Range Status   03/09/2024 4.5 3.5 - 5.3 mmol/L Final     Magnesium   Date Value Ref Range Status   03/09/2024 2.1 1.9 - 2.7 mg/dL Final     Phosphorus   Date Value Ref Range Status   03/09/2024 3.5 2.3 - 4.1 mg/dL Final     Monitor closely for concern over re-feeding syndrome - patient at a high risk    Cancer-related pain  Assessment & Plan  Patient states he takes Roxicodone 20mg q4h around the clock for cancer-related pain    Continue home Roxicodone 20mg q4h for pain  IV Narcan PRN for respiratory depression  Heat pack ordered for anterior jaw pain per patient request    Severe protein-calorie malnutrition (HCC)  Assessment & Plan  Malnutrition Findings:   Adult  Malnutrition type: Acute illness (in the setting of chronic illness)  Temporal wasting, clavicular prominence, cold intolerance  Diffuse loss of subcutaneous fat  Low BMI  Hypoalbuminemia, hyponatremia likely 2/2 poor PO intake    BMI Findings:      Body mass index is 14.27 kg/m².     Patient reports decreased PO intake for months, noted to have approx a 25lb weight loss since the start of the calendar year.  Has been seen multiple times by nutrition in conjunction with oncology but continues to struggle with meeting calorie goals, worsened recently in the setting of chronic nausea/vomiting with nearly all PO intake.    Nutrition consulted, appreciate recs  Will initiate trickle tube feeds and advance as discussed above  PRN medication for nausea/vomiting ordered    Alcohol use disorder, severe, dependence (HCC)  Assessment & Plan  Patient with previously documented hx of alcohol abuse, drinking 3-4 beers par day.  States last drink was approximately 72 hours prior to the time of initial assessment on 3/6.  Previously documented to drink up to 14 beers daily, however patient recently has been reporting vomiting of all PO intake.    [facility] protocol ordered  Recommend monitoring on telemetry and radha at this time, patient currently refusing  Blood ethanol level ordered  Thiamine, folate, multivitamin ordered    Hyponatremia  Assessment & Plan  Recent Labs     03/07/24  0517 03/08/24  0602 03/09/24  0525   SODIUM 132* 130* 130*     Patient with chronic hyponatremia  Multifactorial  Suspected etiology: SIADH +/- poor solute intake    Plan:  Continue to monitor  Consider salt tabs VS p.o. fluid restriction if not improving    Cigarette nicotine dependence without complication  Assessment & Plan  NRT ordered, 14mg/d in place of 1/2 PPD    Hypertension  Assessment & Plan  Temp:  [97.3 °F (36.3 °C)-97.6 °F (36.4 °C)] 97.3 °F (36.3 °C)  HR:  [73-92] 73  Resp:  [16-18] 18  BP: (126-147)/(73-80) 127/73    Patient reports he  does not take any medications at home.  Noted to have previously been on amlodipine 5mg daily per chart review    Vitals per unit routine  Will order PRN medication if SBP >180, not currently ordered as patient is currently refusing BP monitoring.         VTE Pharmacologic Prophylaxis: VTE Score: 5 Moderate Risk (Score 3-4) - Pharmacological DVT Prophylaxis Ordered: heparin.    Mobility:   Basic Mobility Inpatient Raw Score: 24  JH-HLM Goal: 8: Walk 250 feet or more  JH-HLM Achieved: 8: Walk 250 feet ot more    Patient Centered Rounds: I performed bedside rounds with nursing staff today.  Discussions with Specialists or Other Care Team Provider: Nutrition    Education and Discussions with Family / Patient: Patient declined call to .     Current Length of Stay: 3 day(s)  Current Patient Status: Inpatient   Discharge Plan: Anticipate discharge in >72 hrs to home.    Code Status: Level 1 - Full Code    Subjective:   Patient seen at bedside today.  No acute events overnight.  He had some mild nausea requiring 5 cc/h tube slowdown and Zofran.  Nausea resolved and tube feeds were resumed at previous level of 35 cc/h.  Patient is frustrated with his nursing staff today and presents dejected.  Patient denies any fevers, chills, headaches, chest pain, shortness of breath, abdominal pain, constipation, diarrhea.  Patient has no acute or significant concerns or complaints.  Patient is doing well overall.      Objective:     Vitals:   Temp (24hrs), Av.4 °F (36.3 °C), Min:97.3 °F (36.3 °C), Max:97.6 °F (36.4 °C)    Temp:  [97.3 °F (36.3 °C)-97.6 °F (36.4 °C)] 97.3 °F (36.3 °C)  HR:  [73-92] 73  Resp:  [16-18] 18  BP: (126-147)/(73-80) 127/73  SpO2:  [94 %-98 %] 98 %  Body mass index is 14.27 kg/m².     Input and Output Summary (last 24 hours):     Intake/Output Summary (Last 24 hours) at 3/9/2024 0908  Last data filed at 3/9/2024 0301  Gross per 24 hour   Intake 65 ml   Output --   Net 65 ml       Physical  Exam:   Physical Exam  Vitals and nursing note reviewed.   Constitutional:       Appearance: He is cachectic. He is ill-appearing (chronically).      Comments: Body mass index is 14.27 kg/m².   HENT:      Nose: Nose normal.   Eyes:      Extraocular Movements: Extraocular movements intact.      Conjunctiva/sclera: Conjunctivae normal.   Cardiovascular:      Rate and Rhythm: Normal rate and regular rhythm.      Pulses: Normal pulses.   Pulmonary:      Effort: Pulmonary effort is normal. No respiratory distress.      Breath sounds: Normal breath sounds.   Abdominal:      General: Abdomen is flat. There is no distension.      Palpations: Abdomen is soft.      Tenderness: There is no abdominal tenderness.      Comments: PEG tube is in place  Patient able to express discharge with palpation of the area adjacent to the PEG tube    Musculoskeletal:         General: No swelling or signs of injury. Normal range of motion.      Right lower leg: No edema.      Left lower leg: No edema.   Skin:     General: Skin is warm and dry.      Capillary Refill: Capillary refill takes less than 2 seconds.   Neurological:      Mental Status: He is alert and oriented to person, place, and time. Mental status is at baseline.      Motor: No weakness.   Psychiatric:         Mood and Affect: Mood normal.         Behavior: Behavior normal. Behavior is cooperative.         Additional Data:     Labs:  Results from last 7 days   Lab Units 03/09/24  0525   WBC Thousand/uL 7.21   HEMOGLOBIN g/dL 9.2*   HEMATOCRIT % 29.3*   PLATELETS Thousands/uL 287   NEUTROS PCT % 72   LYMPHS PCT % 7*   MONOS PCT % 17*   EOS PCT % 2     Results from last 7 days   Lab Units 03/09/24  0525 03/08/24  0602 03/07/24  0517   SODIUM mmol/L 130*   < > 132*   POTASSIUM mmol/L 4.5   < > 4.0   CHLORIDE mmol/L 94*   < > 97   CO2 mmol/L 33*   < > 29   BUN mg/dL 11   < > 9   CREATININE mg/dL 0.69   < > 0.67   ANION GAP mmol/L 3   < > 6   CALCIUM mg/dL 8.5   < > 8.7   ALBUMIN g/dL   --   --  2.9*   TOTAL BILIRUBIN mg/dL  --   --  0.24   ALK PHOS U/L  --   --  85   ALT U/L  --   --  5*   AST U/L  --   --  9*   GLUCOSE RANDOM mg/dL 102   < > 113    < > = values in this interval not displayed.                       Lines/Drains:  Invasive Devices       Central Venous Catheter Line  Duration             Port A Cath 11/07/23 Left Internal jugular 122 days              Drain  Duration             Gastrostomy/Enterostomy Percutaneous Interventional Gastrostomy 16 Fr. LUQ 88 days                    Central Line:  Goal for removal:  For cancer treatment                  No orders to display       Imaging: No pertinent imaging reviewed.    Recent Cultures (last 7 days):         Last 24 Hours Medication List:   Current Facility-Administered Medications   Medication Dose Route Frequency Provider Last Rate    acetaminophen  650 mg Oral Q6H PRN Teresa Alonso, DO      atorvastatin  40 mg Oral QPM Teresa Alonso, DO      calcium carbonate  1,000 mg Oral Daily PRN Teresa Alonso, DO      famotidine  20 mg Oral BID Amos Diehl MD      folic acid  1 mg Oral Daily Teresa Alonso, DO      heparin (porcine)  5,000 Units Subcutaneous Q8H MANDY Teresa Alonso, DO      lactulose  15 g Oral BID PRN Amos Diehl MD      multivitamin-minerals  1 tablet Oral Daily Teresa Alonso, DO      naloxone  0.04 mg Intravenous Q1MIN PRN Teresa Alonso, DO      nicotine  1 patch Transdermal Daily Teresa Alonso, DO      ondansetron  4 mg Intravenous Once Teresa Alonso, DO      ondansetron  4 mg Intravenous Q6H PRN Teresa Alonso, DO      oxyCODONE  20 mg Oral Q4H PRN Teersa Alonso, DO      senna-docusate sodium  1 tablet Oral Daily Teresa Alonso, DO      thiamine  100 mg Intravenous Daily Amos Diehl  mg (03/08/24 6606)        Today, Patient Was Seen By: Amos Diehl MD    **Please Note: This note may have been constructed using a voice recognition  system.**

## 2024-03-09 NOTE — ASSESSMENT & PLAN NOTE
Temp:  [97.3 °F (36.3 °C)-97.6 °F (36.4 °C)] 97.3 °F (36.3 °C)  HR:  [73-92] 73  Resp:  [16-18] 18  BP: (126-147)/(73-80) 127/73    Patient reports he does not take any medications at home.  Noted to have previously been on amlodipine 5mg daily per chart review    Vitals per unit routine  Will order PRN medication if SBP >180, not currently ordered as patient is currently refusing BP monitoring.

## 2024-03-09 NOTE — ASSESSMENT & PLAN NOTE
Malnutrition Findings:   Adult Malnutrition type: Acute illness (in the setting of chronic illness)  Temporal wasting, clavicular prominence, cold intolerance  Diffuse loss of subcutaneous fat  Low BMI  Hypoalbuminemia, hyponatremia likely 2/2 poor PO intake    BMI Findings:      Body mass index is 14.27 kg/m².     Patient reports decreased PO intake for months, noted to have approx a 25lb weight loss since the start of the calendar year.  Has been seen multiple times by nutrition in conjunction with oncology but continues to struggle with meeting calorie goals, worsened recently in the setting of chronic nausea/vomiting with nearly all PO intake.    Nutrition consulted, appreciate recs  Advance TF until goal   PRN medication for nausea/vomiting ordered

## 2024-03-09 NOTE — PLAN OF CARE
Problem: PAIN - ADULT  Goal: Verbalizes/displays adequate comfort level or baseline comfort level  Description: Interventions:  - Encourage patient to monitor pain and request assistance  - Assess pain using appropriate pain scale  - Administer analgesics based on type and severity of pain and evaluate response  - Implement non-pharmacological measures as appropriate and evaluate response  - Consider cultural and social influences on pain and pain management  - Notify physician/advanced practitioner if interventions unsuccessful or patient reports new pain  Outcome: Progressing     Problem: INFECTION - ADULT  Goal: Absence or prevention of progression during hospitalization  Description: INTERVENTIONS:  - Assess and monitor for signs and symptoms of infection  - Monitor lab/diagnostic results  - Monitor all insertion sites, i.e. indwelling lines, tubes, and drains  - Monitor endotracheal if appropriate and nasal secretions for changes in amount and color  - [place] appropriate cooling/warming therapies per order  - Administer medications as ordered  - Instruct and encourage patient and family to use good hand hygiene technique  - Identify and instruct in appropriate isolation precautions for identified infection/condition  Outcome: Progressing  Goal: Absence of fever/infection during neutropenic period  Description: INTERVENTIONS:  - Monitor WBC    Outcome: Progressing     Problem: SAFETY ADULT  Goal: Patient will remain free of falls  Description: INTERVENTIONS:  - Educate patient/family on patient safety including physical limitations  - Instruct patient to call for assistance with activity   - Consult OT/PT to assist with strengthening/mobility   - Keep Call bell within reach  - Keep bed low and locked with side rails adjusted as appropriate  - Keep care items and personal belongings within reach  - Initiate and maintain comfort rounds  - Make Fall Risk Sign visible to staff  - Offer Toileting every 2 Hours,  in advance of need  - Initiate/Maintain bed/chair alarm  - Obtain necessary fall risk management equipment  - Apply yellow socks and bracelet for high fall risk patients  - Consider moving patient to room near nurses station  Outcome: Progressing  Goal: Maintain or return to baseline ADL function  Description: INTERVENTIONS:  -  Assess patient's ability to carry out ADLs; assess patient's baseline for ADL function and identify physical deficits which impact ability to perform ADLs (bathing, care of mouth/teeth, toileting, grooming, dressing, etc.)  - Assess/evaluate cause of self-care deficits   - Assess range of motion  - Assess patient's mobility; develop plan if impaired  - Assess patient's need for assistive devices and provide as appropriate  - Encourage maximum independence but intervene and supervise when necessary  - Involve family in performance of ADLs  - Assess for home care needs following discharge   - Consider OT consult to assist with ADL evaluation and planning for discharge  - Provide patient education as appropriate  Outcome: Progressing  Goal: Maintains/Returns to pre admission functional level  Description: INTERVENTIONS:  - Perform AM-PAC 6 Click Basic Mobility/ Daily Activity assessment daily.  - Set and communicate daily mobility goal to care team and patient/family/caregiver.   - Collaborate with rehabilitation services on mobility goals if consulted  - Perform Range of Motion 3 times a day.  - Reposition patient every 2 hours.  - Dangle patient 3 times a day  - Stand patient 3 times a day  - Ambulate patient 3 times a day  - Out of bed to chair 3 times a day   - Out of bed for meals 3 times a day  - Out of bed for toileting  - Record patient progress and toleration of activity level   Outcome: Progressing     Problem: DISCHARGE PLANNING  Goal: Discharge to home or other facility with appropriate resources  Description: INTERVENTIONS:  - Identify barriers to discharge w/patient and  caregiver  - Arrange for needed discharge resources and transportation as appropriate  - Identify discharge learning needs (meds, wound care, etc.)  - Arrange for interpretive services to assist at discharge as needed  - Refer to Case Management Department for coordinating discharge planning if the patient needs post-hospital services based on physician/advanced practitioner order or complex needs related to functional status, cognitive ability, or social support system  Outcome: Progressing     Problem: Knowledge Deficit  Goal: Patient/family/caregiver demonstrates understanding of disease process, treatment plan, medications, and discharge instructions  Description: Complete learning assessment and assess knowledge base.  Interventions:  - Provide teaching at level of understanding  - Provide teaching via preferred learning methods  Outcome: Progressing     Problem: Nutrition/Hydration-ADULT  Goal: Nutrient/Hydration intake appropriate for improving, restoring or maintaining nutritional needs  Description: Monitor and assess patient's nutrition/hydration status for malnutrition. Collaborate with interdisciplinary team and initiate plan and interventions as ordered.  Monitor patient's weight and dietary intake as ordered or per policy. Utilize nutrition screening tool and intervene as necessary. Determine patient's food preferences and provide high-protein, high-caloric foods as appropriate.     INTERVENTIONS:  - Monitor oral intake, urinary output, labs, and treatment plans  - Assess nutrition and hydration status and recommend course of action  - Evaluate amount of meals eaten  - Assist patient with eating if necessary   - Allow adequate time for meals  - Recommend/ encourage appropriate diets, oral nutritional supplements, and vitamin/mineral supplements  - Order, calculate, and assess calorie counts as needed  - Recommend, monitor, and adjust tube feedings and TPN/PPN based on assessed needs  - Assess need for  intravenous fluids  - Provide specific nutrition/hydration education as appropriate  - Include patient/family/caregiver in decisions related to nutrition  Outcome: Progressing

## 2024-03-09 NOTE — ASSESSMENT & PLAN NOTE
Recent Labs     03/07/24  0517 03/08/24  0602 03/09/24  0525   SODIUM 132* 130* 130*     Patient with chronic hyponatremia  Multifactorial  Suspected etiology: SIADH +/- poor solute intake    Plan:  Continue to monitor  Consider salt tabs VS p.o. fluid restriction if not improving

## 2024-03-09 NOTE — ASSESSMENT & PLAN NOTE
Patient with previously documented hx of alcohol abuse, drinking 3-4 beers par day.  States last drink was approximately 72 hours prior to the time of initial assessment on 3/6.  Previously documented to drink up to 14 beers daily, however patient recently has been reporting vomiting of all PO intake.    [facility] protocol ordered  Recommend monitoring on telemetry and radha at this time, patient currently refusing  Blood ethanol level ordered  Thiamine, folate, multivitamin ordered

## 2024-03-10 PROBLEM — D64.9 ANEMIA: Status: ACTIVE | Noted: 2024-03-10

## 2024-03-10 LAB
ANION GAP SERPL CALCULATED.3IONS-SCNC: 4 MMOL/L
BASOPHILS # BLD AUTO: 0.04 THOUSANDS/ÂΜL (ref 0–0.1)
BASOPHILS NFR BLD AUTO: 1 % (ref 0–1)
BUN SERPL-MCNC: 14 MG/DL (ref 5–25)
CALCIUM SERPL-MCNC: 8.6 MG/DL (ref 8.4–10.2)
CHLORIDE SERPL-SCNC: 96 MMOL/L (ref 96–108)
CO2 SERPL-SCNC: 32 MMOL/L (ref 21–32)
CREAT SERPL-MCNC: 0.69 MG/DL (ref 0.6–1.3)
EOSINOPHIL # BLD AUTO: 0.12 THOUSAND/ÂΜL (ref 0–0.61)
EOSINOPHIL NFR BLD AUTO: 2 % (ref 0–6)
ERYTHROCYTE [DISTWIDTH] IN BLOOD BY AUTOMATED COUNT: 15.9 % (ref 11.6–15.1)
FERRITIN SERPL-MCNC: 451 NG/ML (ref 24–336)
FOLATE SERPL-MCNC: 9.2 NG/ML
GFR SERPL CREATININE-BSD FRML MDRD: 103 ML/MIN/1.73SQ M
GLUCOSE SERPL-MCNC: 119 MG/DL (ref 65–140)
HCT VFR BLD AUTO: 28.5 % (ref 36.5–49.3)
HGB BLD-MCNC: 9.1 G/DL (ref 12–17)
IMM GRANULOCYTES # BLD AUTO: 0.06 THOUSAND/UL (ref 0–0.2)
IMM GRANULOCYTES NFR BLD AUTO: 1 % (ref 0–2)
IRON SATN MFR SERPL: 23 % (ref 15–50)
IRON SERPL-MCNC: 36 UG/DL (ref 50–212)
LYMPHOCYTES # BLD AUTO: 0.53 THOUSANDS/ÂΜL (ref 0.6–4.47)
LYMPHOCYTES NFR BLD AUTO: 7 % (ref 14–44)
MAGNESIUM SERPL-MCNC: 1.7 MG/DL (ref 1.9–2.7)
MCH RBC QN AUTO: 32.3 PG (ref 26.8–34.3)
MCHC RBC AUTO-ENTMCNC: 31.9 G/DL (ref 31.4–37.4)
MCV RBC AUTO: 101 FL (ref 82–98)
MONOCYTES # BLD AUTO: 1.48 THOUSAND/ÂΜL (ref 0.17–1.22)
MONOCYTES NFR BLD AUTO: 18 % (ref 4–12)
NEUTROPHILS # BLD AUTO: 5.85 THOUSANDS/ÂΜL (ref 1.85–7.62)
NEUTS SEG NFR BLD AUTO: 71 % (ref 43–75)
NRBC BLD AUTO-RTO: 0 /100 WBCS
PHOSPHATE SERPL-MCNC: 2.8 MG/DL (ref 2.3–4.1)
PLATELET # BLD AUTO: 279 THOUSANDS/UL (ref 149–390)
PMV BLD AUTO: 9.3 FL (ref 8.9–12.7)
POTASSIUM SERPL-SCNC: 4.2 MMOL/L (ref 3.5–5.3)
RBC # BLD AUTO: 2.82 MILLION/UL (ref 3.88–5.62)
SODIUM SERPL-SCNC: 132 MMOL/L (ref 135–147)
TIBC SERPL-MCNC: 156 UG/DL (ref 250–450)
UIBC SERPL-MCNC: 120 UG/DL (ref 155–355)
VIT B12 SERPL-MCNC: 248 PG/ML (ref 180–914)
WBC # BLD AUTO: 8.08 THOUSAND/UL (ref 4.31–10.16)

## 2024-03-10 PROCEDURE — 83735 ASSAY OF MAGNESIUM: CPT | Performed by: GENERAL PRACTICE

## 2024-03-10 PROCEDURE — 99232 SBSQ HOSP IP/OBS MODERATE 35: CPT | Performed by: GENERAL PRACTICE

## 2024-03-10 PROCEDURE — 84100 ASSAY OF PHOSPHORUS: CPT | Performed by: GENERAL PRACTICE

## 2024-03-10 PROCEDURE — 80048 BASIC METABOLIC PNL TOTAL CA: CPT | Performed by: GENERAL PRACTICE

## 2024-03-10 PROCEDURE — 83540 ASSAY OF IRON: CPT

## 2024-03-10 PROCEDURE — 82746 ASSAY OF FOLIC ACID SERUM: CPT

## 2024-03-10 PROCEDURE — 83550 IRON BINDING TEST: CPT

## 2024-03-10 PROCEDURE — 85025 COMPLETE CBC W/AUTO DIFF WBC: CPT | Performed by: GENERAL PRACTICE

## 2024-03-10 PROCEDURE — 82728 ASSAY OF FERRITIN: CPT

## 2024-03-10 PROCEDURE — 82607 VITAMIN B-12: CPT

## 2024-03-10 RX ORDER — MAGNESIUM SULFATE HEPTAHYDRATE 40 MG/ML
4 INJECTION, SOLUTION INTRAVENOUS ONCE
Status: COMPLETED | OUTPATIENT
Start: 2024-03-10 | End: 2024-03-10

## 2024-03-10 RX ADMIN — OXYCODONE HYDROCHLORIDE 20 MG: 10 TABLET ORAL at 17:42

## 2024-03-10 RX ADMIN — LACTULOSE 15 G: 20 SOLUTION ORAL at 18:51

## 2024-03-10 RX ADMIN — HEPARIN SODIUM 5000 UNITS: 5000 INJECTION INTRAVENOUS; SUBCUTANEOUS at 13:31

## 2024-03-10 RX ADMIN — OXYCODONE HYDROCHLORIDE 20 MG: 10 TABLET ORAL at 22:44

## 2024-03-10 RX ADMIN — OXYCODONE HYDROCHLORIDE 20 MG: 10 TABLET ORAL at 13:31

## 2024-03-10 RX ADMIN — OXYCODONE HYDROCHLORIDE 20 MG: 10 TABLET ORAL at 03:38

## 2024-03-10 RX ADMIN — ONDANSETRON 4 MG: 2 INJECTION INTRAMUSCULAR; INTRAVENOUS at 08:43

## 2024-03-10 RX ADMIN — FAMOTIDINE 20 MG: 20 TABLET ORAL at 08:43

## 2024-03-10 RX ADMIN — OXYCODONE HYDROCHLORIDE 20 MG: 10 TABLET ORAL at 08:43

## 2024-03-10 RX ADMIN — MAGNESIUM SULFATE HEPTAHYDRATE 4 G: 40 INJECTION, SOLUTION INTRAVENOUS at 07:12

## 2024-03-10 RX ADMIN — MULTIPLE VITAMINS W/ MINERALS TAB 1 TABLET: TAB ORAL at 08:44

## 2024-03-10 RX ADMIN — ATORVASTATIN CALCIUM 40 MG: 40 TABLET, FILM COATED ORAL at 17:42

## 2024-03-10 RX ADMIN — SODIUM CHLORIDE 1 G: 1 TABLET ORAL at 16:19

## 2024-03-10 RX ADMIN — HEPARIN SODIUM 5000 UNITS: 5000 INJECTION INTRAVENOUS; SUBCUTANEOUS at 05:04

## 2024-03-10 RX ADMIN — THIAMINE HYDROCHLORIDE 100 MG: 100 INJECTION, SOLUTION INTRAMUSCULAR; INTRAVENOUS at 13:31

## 2024-03-10 RX ADMIN — SODIUM CHLORIDE 1 G: 1 TABLET ORAL at 08:46

## 2024-03-10 RX ADMIN — HEPARIN SODIUM 5000 UNITS: 5000 INJECTION INTRAVENOUS; SUBCUTANEOUS at 22:45

## 2024-03-10 RX ADMIN — FAMOTIDINE 20 MG: 20 TABLET ORAL at 17:42

## 2024-03-10 NOTE — PLAN OF CARE
Problem: PAIN - ADULT  Goal: Verbalizes/displays adequate comfort level or baseline comfort level  Description: Interventions:  - Encourage patient to monitor pain and request assistance  - Assess pain using appropriate pain scale  - Administer analgesics based on type and severity of pain and evaluate response  - Implement non-pharmacological measures as appropriate and evaluate response  - Consider cultural and social influences on pain and pain management  - Notify physician/advanced practitioner if interventions unsuccessful or patient reports new pain  Outcome: Progressing     Problem: INFECTION - ADULT  Goal: Absence or prevention of progression during hospitalization  Description: INTERVENTIONS:  - Assess and monitor for signs and symptoms of infection  - Monitor lab/diagnostic results  - Monitor all insertion sites, i.e. indwelling lines, tubes, and drains  - Monitor endotracheal if appropriate and nasal secretions for changes in amount and color  - [place] appropriate cooling/warming therapies per order  - Administer medications as ordered  - Instruct and encourage patient and family to use good hand hygiene technique  - Identify and instruct in appropriate isolation precautions for identified infection/condition  Outcome: Progressing  Goal: Absence of fever/infection during neutropenic period  Description: INTERVENTIONS:  - Monitor WBC    Outcome: Progressing     Problem: SAFETY ADULT  Goal: Patient will remain free of falls  Description: INTERVENTIONS:  - Educate patient/family on patient safety including physical limitations  - Instruct patient to call for assistance with activity   - Consult OT/PT to assist with strengthening/mobility   - Keep Call bell within reach  - Keep bed low and locked with side rails adjusted as appropriate  - Keep care items and personal belongings within reach  - Initiate and maintain comfort rounds  - Make Fall Risk Sign visible to staff  - Offer Toileting every 2 Hours,  in advance of need  - Initiate/Maintain bed/chair alarm  - Obtain necessary fall risk management equipment  - Apply yellow socks and bracelet for high fall risk patients  - Consider moving patient to room near nurses station  Outcome: Progressing  Goal: Maintain or return to baseline ADL function  Description: INTERVENTIONS:  -  Assess patient's ability to carry out ADLs; assess patient's baseline for ADL function and identify physical deficits which impact ability to perform ADLs (bathing, care of mouth/teeth, toileting, grooming, dressing, etc.)  - Assess/evaluate cause of self-care deficits   - Assess range of motion  - Assess patient's mobility; develop plan if impaired  - Assess patient's need for assistive devices and provide as appropriate  - Encourage maximum independence but intervene and supervise when necessary  - Involve family in performance of ADLs  - Assess for home care needs following discharge   - Consider OT consult to assist with ADL evaluation and planning for discharge  - Provide patient education as appropriate  Outcome: Progressing  Goal: Maintains/Returns to pre admission functional level  Description: INTERVENTIONS:  - Perform AM-PAC 6 Click Basic Mobility/ Daily Activity assessment daily.  - Set and communicate daily mobility goal to care team and patient/family/caregiver.   - Collaborate with rehabilitation services on mobility goals if consulted  - Perform Range of Motion 4 times a day.  - Reposition patient every 2 hours.  - Dangle patient 3 times a day  - Stand patient 3 times a day  - Ambulate patient 3 times a day  - Out of bed to chair 3 times a day   - Out of bed for meals 3 times a day  - Out of bed for toileting  - Record patient progress and toleration of activity level   Outcome: Progressing     Problem: DISCHARGE PLANNING  Goal: Discharge to home or other facility with appropriate resources  Description: INTERVENTIONS:  - Identify barriers to discharge w/patient and  caregiver  - Arrange for needed discharge resources and transportation as appropriate  - Identify discharge learning needs (meds, wound care, etc.)  - Arrange for interpretive services to assist at discharge as needed  - Refer to Case Management Department for coordinating discharge planning if the patient needs post-hospital services based on physician/advanced practitioner order or complex needs related to functional status, cognitive ability, or social support system  Outcome: Progressing     Problem: Knowledge Deficit  Goal: Patient/family/caregiver demonstrates understanding of disease process, treatment plan, medications, and discharge instructions  Description: Complete learning assessment and assess knowledge base.  Interventions:  - Provide teaching at level of understanding  - Provide teaching via preferred learning methods  Outcome: Progressing     Problem: Nutrition/Hydration-ADULT  Goal: Nutrient/Hydration intake appropriate for improving, restoring or maintaining nutritional needs  Description: Monitor and assess patient's nutrition/hydration status for malnutrition. Collaborate with interdisciplinary team and initiate plan and interventions as ordered.  Monitor patient's weight and dietary intake as ordered or per policy. Utilize nutrition screening tool and intervene as necessary. Determine patient's food preferences and provide high-protein, high-caloric foods as appropriate.     INTERVENTIONS:  - Monitor oral intake, urinary output, labs, and treatment plans  - Assess nutrition and hydration status and recommend course of action  - Evaluate amount of meals eaten  - Assist patient with eating if necessary   - Allow adequate time for meals  - Recommend/ encourage appropriate diets, oral nutritional supplements, and vitamin/mineral supplements  - Order, calculate, and assess calorie counts as needed  - Recommend, monitor, and adjust tube feedings and TPN/PPN based on assessed needs  - Assess need for  intravenous fluids  - Provide specific nutrition/hydration education as appropriate  - Include patient/family/caregiver in decisions related to nutrition  Outcome: Progressing

## 2024-03-10 NOTE — ASSESSMENT & PLAN NOTE
Patient with previously documented hx of alcohol abuse, drinking 3-4 beers par day.  States last drink was approximately 72 hours prior to the time of initial assessment on 3/6.  Previously documented to drink up to 14 beers daily, however patient recently has been reporting vomiting of all PO intake.    [facility] protocol ordered  Recommend monitoring on telemetry and radha at this time, patient currently refusing  Blood ethanol level ordered  Thiamine, folate, multivitamin ordered

## 2024-03-10 NOTE — ASSESSMENT & PLAN NOTE
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 fluid restriction if not improving

## 2024-03-10 NOTE — ASSESSMENT & PLAN NOTE
Baseline appears to be around 8-10  Hemoglobin today 9.1    Plan-  Follow-up with iron panel  B12  Folate

## 2024-03-10 NOTE — ASSESSMENT & PLAN NOTE
Temp:  [96.6 °F (35.9 °C)-97.9 °F (36.6 °C)] 97.8 °F (36.6 °C)  HR:  [78-85] 78  Resp:  [18] 18  BP: (113-135)/(62-87) 135/87    Patient reports he does not take any medications at home.  Noted to have previously been on amlodipine 5mg daily per chart review    Vitals per unit routine  Will order PRN medication if SBP >180, not currently ordered as patient is currently refusing BP monitoring.

## 2024-03-10 NOTE — PROGRESS NOTES
[facility]  Progress Note  Name: Eliceo Garcia I  MRN: [id number]  Unit/Bed#: S -01 I Date of Admission: 3/6/2024   Date of Service: 3/10/2024 I Hospital Day: 4    Assessment/Plan   * Complication of feeding tube (HCC)  Assessment & Plan  Patient presented to ED at the suggestion of oncology team in the setting of high likelihood for refeeding syndrome; reports unable to tolerate PO intake or tube feeds for months.    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  He states he has been favoring PO intake.  Previously documented he does not like to use PEG tube.    Monitor CMP, CBC, Mg, Phos with a.m. labs; replete PRN  Nutrition consulted, appreciate recs  Suggest initiate Jevity 1.5 @ 15 ml/hr x initial 24 hours. Current Rate: 55  Pending electrolytes, advance TF by 10 ml every 12 hours to goal rate of 30 ml/hr.   Goal rate provides 2160 kcal, 92 g protein, 1094 ml free water. At goal flush 80 ml q 4 hours.   100 mg IV thiamin for 5-7 days to assist with refeeding. Patient at very high risk.   Zofran ordered for nausea/vomiting, no prior history of QTc prolongation    Severe protein-calorie malnutrition (HCC)  Assessment & Plan  Malnutrition Findings:   Adult Malnutrition type: Acute illness (in the setting of chronic illness)  Temporal wasting, clavicular prominence, cold intolerance  Diffuse loss of subcutaneous fat  Low BMI  Hypoalbuminemia, hyponatremia likely 2/2 poor PO intake    BMI Findings:      Body mass index is 14.27 kg/m².     Patient reports decreased PO intake for months, noted to have approx a 25lb weight loss since the start of the calendar year.  Has been seen multiple times by nutrition in conjunction with oncology but continues to struggle with meeting calorie goals, worsened recently in the setting of chronic nausea/vomiting with nearly all PO intake.    Nutrition consulted, appreciate recs  Advance TF until goal   PRN medication for  nausea/vomiting ordered    Anemia  Assessment & Plan  Baseline appears to be around 8-10  Hemoglobin today 9.1    Plan-  Follow-up with iron panel  B12  Folate    Cancer-related pain  Assessment & Plan  Patient states he takes Roxicodone 20mg q4h around the clock for cancer-related pain    Continue home Roxicodone 20mg q4h for pain  IV Narcan PRN for respiratory depression  Heat pack ordered for anterior jaw pain per patient request    Hyponatremia  Assessment & Plan  Recent Labs     03/08/24  0602 03/09/24  0525 03/10/24  0404   SODIUM 130* 130* 132*     Patient with chronic hyponatremia  Multifactorial  Suspected etiology: SIADH +/- poor solute intake    Plan:  Continue to monitor  Consider salt tabs VS p.o. fluid restriction if not improving    Cigarette nicotine dependence without complication  Assessment & Plan  NRT ordered, 14mg/d in place of 1/2 PPD    Hypertension  Assessment & Plan  Temp:  [96.6 °F (35.9 °C)-97.9 °F (36.6 °C)] 97.8 °F (36.6 °C)  HR:  [78-85] 78  Resp:  [18] 18  BP: (113-135)/(62-87) 135/87    Patient reports he does not take any medications at home.  Noted to have previously been on amlodipine 5mg daily per chart review    Vitals per unit routine  Will order PRN medication if SBP >180, not currently ordered as patient is currently refusing BP monitoring.    Alcohol use disorder, severe, dependence (HCC)  Assessment & Plan  Patient with previously documented hx of alcohol abuse, drinking 3-4 beers par day.  States last drink was approximately 72 hours prior to the time of initial assessment on 3/6.  Previously documented to drink up to 14 beers daily, however patient recently has been reporting vomiting of all PO intake.    [facility] protocol ordered  Recommend monitoring on telemetry and radha at this time, patient currently refusing  Blood ethanol level ordered  Thiamine, folate, multivitamin ordered               VTE Pharmacologic Prophylaxis: VTE Score: 5 High Risk (Score >/= 5) -  Pharmacological DVT Prophylaxis Ordered: heparin. Sequential Compression Devices Ordered.    Mobility:   Basic Mobility Inpatient Raw Score: 24  JH-HLM Goal: 8: Walk 250 feet or more  JH-HLM Achieved: 8: Walk 250 feet ot more  HLM Goal achieved. Continue to encourage appropriate mobility.    Patient Centered Rounds: I performed bedside rounds with nursing staff today.   Discussions with Specialists or Other Care Team Provider: Nutrition    Education and Discussions with Family / Patient: Patient declined call to .     Total Time Spent on Date of Encounter in care of patient: 25 mins. This time was spent on one or more of the following: performing physical exam; counseling and coordination of care; obtaining or reviewing history; documenting in the medical record; reviewing/ordering tests, medications or procedures; communicating with other healthcare professionals and discussing with patient's family/caregivers.    Current Length of Stay: 4 day(s)  Current Patient Status: Inpatient   Certification Statement: The patient will continue to require additional inpatient hospital stay due to feeding tube complication  Discharge Plan: Anticipate discharge in 24-48 hrs to home with home services.    Code Status: Level 1 - Full Code    Subjective:   Patient was seen and examined at the bedside.  Patient was resting comfortably in the chair in no overt acute distress.  Patient endorses that he was having a bit of nausea in the morning however it is usually resolved with Zofran.  Patient denies any headache, dizziness, chest pain, shortness of breath, dysphagia, abdominal pain, diarrhea/constipation, leg pain, or any other symptoms at this time.    Objective:     Vitals:   Temp (24hrs), Av.4 °F (36.3 °C), Min:96.6 °F (35.9 °C), Max:97.9 °F (36.6 °C)    Temp:  [96.6 °F (35.9 °C)-97.9 °F (36.6 °C)] 97.8 °F (36.6 °C)  HR:  [78-85] 78  Resp:  [18] 18  BP: (113-135)/(62-87) 135/87  SpO2:  [94 %] 94 %  Body mass  index is 14.27 kg/m².     Input and Output Summary (last 24 hours):     Intake/Output Summary (Last 24 hours) at 3/10/2024 1217  Last data filed at 3/10/2024 0748  Gross per 24 hour   Intake 55 ml   Output --   Net 55 ml       Physical Exam:   Physical Exam  Vitals and nursing note reviewed.   Constitutional:       General: He is not in acute distress.     Appearance: He is well-developed. He is ill-appearing. He is not toxic-appearing or diaphoretic.      Comments: Cachectic with temporal wasting   HENT:      Head: Normocephalic and atraumatic.      Mouth/Throat:      Mouth: Mucous membranes are dry.      Dentition: Abnormal dentition.   Eyes:      Conjunctiva/sclera: Conjunctivae normal.   Cardiovascular:      Rate and Rhythm: Normal rate and regular rhythm.      Pulses: Normal pulses.      Heart sounds: Normal heart sounds. No murmur heard.     No friction rub. No gallop.   Pulmonary:      Effort: Pulmonary effort is normal. No respiratory distress.      Breath sounds: Normal breath sounds. No wheezing or rhonchi.   Chest:      Chest wall: No tenderness.   Abdominal:      General: Bowel sounds are normal. There is no distension.      Palpations: Abdomen is soft.      Tenderness: There is no abdominal tenderness. There is no guarding or rebound.   Musculoskeletal:         General: No tenderness. Normal range of motion.      Cervical back: Normal range of motion and neck supple.      Right lower leg: No edema.      Left lower leg: No edema.   Skin:     General: Skin is warm and dry.      Capillary Refill: Capillary refill takes less than 2 seconds.   Neurological:      General: No focal deficit present.      Mental Status: He is alert and oriented to person, place, and time.      Cranial Nerves: No cranial nerve deficit.      Sensory: No sensory deficit.      Motor: No weakness.      Coordination: Coordination normal.      Gait: Gait normal.   Psychiatric:         Mood and Affect: Mood normal.         Behavior:  Behavior normal.         Thought Content: Thought content normal.          Additional Data:     Labs:  Results from last 7 days   Lab Units 03/10/24  0404   WBC Thousand/uL 8.08   HEMOGLOBIN g/dL 9.1*   HEMATOCRIT % 28.5*   PLATELETS Thousands/uL 279   NEUTROS PCT % 71   LYMPHS PCT % 7*   MONOS PCT % 18*   EOS PCT % 2     Results from last 7 days   Lab Units 03/10/24  0404 03/08/24  0602 03/07/24  0517   SODIUM mmol/L 132*   < > 132*   POTASSIUM mmol/L 4.2   < > 4.0   CHLORIDE mmol/L 96   < > 97   CO2 mmol/L 32   < > 29   BUN mg/dL 14   < > 9   CREATININE mg/dL 0.69   < > 0.67   ANION GAP mmol/L 4   < > 6   CALCIUM mg/dL 8.6   < > 8.7   ALBUMIN g/dL  --   --  2.9*   TOTAL BILIRUBIN mg/dL  --   --  0.24   ALK PHOS U/L  --   --  85   ALT U/L  --   --  5*   AST U/L  --   --  9*   GLUCOSE RANDOM mg/dL 119   < > 113    < > = values in this interval not displayed.                       Lines/Drains:  Invasive Devices       Central Venous Catheter Line  Duration             Port A Cath 11/07/23 Left Internal jugular 124 days              Drain  Duration             Gastrostomy/Enterostomy Percutaneous Interventional Gastrostomy 16 Fr. LUQ 89 days                    Central Line:  Goal for removal: N/A - Chronic PICC             Imaging: Reviewed radiology reports from this admission including: NA this admission     Recent Cultures (last 7 days):         Last 24 Hours Medication List:   Current Facility-Administered Medications   Medication Dose Route Frequency Provider Last Rate    acetaminophen  650 mg Oral Q6H PRN Teresa Alonso DO      atorvastatin  40 mg Oral QPM Teresa Alonso DO      calcium carbonate  1,000 mg Oral Daily PRN Teresa Alonso DO      famotidine  20 mg Oral BID Amos Diehl MD      folic acid  1 mg Oral Daily Teresa Alonso DO      heparin (porcine)  5,000 Units Subcutaneous Q8H [facility] Teresa Alonso DO      lactulose  15 g Oral BID PRN Amos Diehl MD       multivitamin-minerals  1 tablet Oral Daily Teresa Alonso, DO      naloxone  0.04 mg Intravenous Q1MIN PRN Teresa Alonso, DO      nicotine  1 patch Transdermal Daily Teresa Alonso, DO      ondansetron  4 mg Intravenous Once Teresa Alonso, DO      ondansetron  4 mg Intravenous Q6H PRN Teresa Alonso, DO      oxyCODONE  20 mg Oral Q4H PRN Teresa Alonso, DO      senna-docusate sodium  1 tablet Oral Daily Teresa Alonso, DO      sodium chloride  1 g Oral BID With Meals Cody Pedersen, DO      thiamine  100 mg Intravenous Daily Amos Diehl  mg (03/09/24 6378)        Today, Patient Was Seen By: Madalyn Peter MD    **Please Note: This note may have been constructed using a voice recognition system.**

## 2024-03-11 PROBLEM — Z51.5 PALLIATIVE CARE PATIENT: Status: ACTIVE | Noted: 2024-03-11

## 2024-03-11 LAB
ANION GAP SERPL CALCULATED.3IONS-SCNC: 2 MMOL/L
BUN SERPL-MCNC: 12 MG/DL (ref 5–25)
CALCIUM SERPL-MCNC: 8.5 MG/DL (ref 8.4–10.2)
CHLORIDE SERPL-SCNC: 93 MMOL/L (ref 96–108)
CO2 SERPL-SCNC: 34 MMOL/L (ref 21–32)
CREAT SERPL-MCNC: 0.61 MG/DL (ref 0.6–1.3)
ERYTHROCYTE [DISTWIDTH] IN BLOOD BY AUTOMATED COUNT: 15.8 % (ref 11.6–15.1)
GFR SERPL CREATININE-BSD FRML MDRD: 108 ML/MIN/1.73SQ M
GLUCOSE SERPL-MCNC: 94 MG/DL (ref 65–140)
HCT VFR BLD AUTO: 27.5 % (ref 36.5–49.3)
HGB BLD-MCNC: 8.8 G/DL (ref 12–17)
MAGNESIUM SERPL-MCNC: 1.9 MG/DL (ref 1.9–2.7)
MCH RBC QN AUTO: 32.2 PG (ref 26.8–34.3)
MCHC RBC AUTO-ENTMCNC: 32 G/DL (ref 31.4–37.4)
MCV RBC AUTO: 101 FL (ref 82–98)
PHOSPHATE SERPL-MCNC: 3 MG/DL (ref 2.3–4.1)
PLATELET # BLD AUTO: 272 THOUSANDS/UL (ref 149–390)
PMV BLD AUTO: 9.4 FL (ref 8.9–12.7)
POTASSIUM SERPL-SCNC: 4.3 MMOL/L (ref 3.5–5.3)
RBC # BLD AUTO: 2.73 MILLION/UL (ref 3.88–5.62)
SODIUM SERPL-SCNC: 129 MMOL/L (ref 135–147)
WBC # BLD AUTO: 7.21 THOUSAND/UL (ref 4.31–10.16)

## 2024-03-11 PROCEDURE — 80048 BASIC METABOLIC PNL TOTAL CA: CPT

## 2024-03-11 PROCEDURE — 85027 COMPLETE CBC AUTOMATED: CPT

## 2024-03-11 PROCEDURE — 99254 IP/OBS CNSLTJ NEW/EST MOD 60: CPT | Performed by: STUDENT IN AN ORGANIZED HEALTH CARE EDUCATION/TRAINING PROGRAM

## 2024-03-11 PROCEDURE — 84100 ASSAY OF PHOSPHORUS: CPT

## 2024-03-11 PROCEDURE — 99232 SBSQ HOSP IP/OBS MODERATE 35: CPT | Performed by: GENERAL PRACTICE

## 2024-03-11 PROCEDURE — 83735 ASSAY OF MAGNESIUM: CPT

## 2024-03-11 RX ORDER — METOCLOPRAMIDE HYDROCHLORIDE 5 MG/ML
10 INJECTION INTRAMUSCULAR; INTRAVENOUS EVERY 6 HOURS PRN
Status: DISCONTINUED | OUTPATIENT
Start: 2024-03-11 | End: 2024-03-11

## 2024-03-11 RX ORDER — OXYCODONE HYDROCHLORIDE 10 MG/1
20 TABLET ORAL EVERY 4 HOURS PRN
Status: DISCONTINUED | OUTPATIENT
Start: 2024-03-11 | End: 2024-03-14 | Stop reason: HOSPADM

## 2024-03-11 RX ORDER — DIPHENHYDRAMINE HYDROCHLORIDE AND LIDOCAINE HYDROCHLORIDE AND ALUMINUM HYDROXIDE AND MAGNESIUM HYDRO
10 KIT EVERY 4 HOURS PRN
Status: DISCONTINUED | OUTPATIENT
Start: 2024-03-11 | End: 2024-03-14 | Stop reason: HOSPADM

## 2024-03-11 RX ORDER — OXYCODONE HYDROCHLORIDE 10 MG/1
10 TABLET ORAL EVERY 4 HOURS PRN
Status: DISCONTINUED | OUTPATIENT
Start: 2024-03-11 | End: 2024-03-14 | Stop reason: HOSPADM

## 2024-03-11 RX ORDER — ONDANSETRON 2 MG/ML
4 INJECTION INTRAMUSCULAR; INTRAVENOUS EVERY 4 HOURS PRN
Status: DISCONTINUED | OUTPATIENT
Start: 2024-03-11 | End: 2024-03-14 | Stop reason: HOSPADM

## 2024-03-11 RX ORDER — ONDANSETRON 4 MG/1
4 TABLET, ORALLY DISINTEGRATING ORAL EVERY 8 HOURS SCHEDULED
Status: DISCONTINUED | OUTPATIENT
Start: 2024-03-11 | End: 2024-03-11

## 2024-03-11 RX ADMIN — HEPARIN SODIUM 5000 UNITS: 5000 INJECTION INTRAVENOUS; SUBCUTANEOUS at 21:43

## 2024-03-11 RX ADMIN — FAMOTIDINE 20 MG: 20 TABLET ORAL at 09:19

## 2024-03-11 RX ADMIN — ONDANSETRON 4 MG: 2 INJECTION INTRAMUSCULAR; INTRAVENOUS at 02:20

## 2024-03-11 RX ADMIN — DIPHENHYDRAMINE HYDROCHLORIDE AND LIDOCAINE HYDROCHLORIDE AND ALUMINUM HYDROXIDE AND MAGNESIUM HYDRO 10 ML: KIT at 15:35

## 2024-03-11 RX ADMIN — OXYCODONE HYDROCHLORIDE 20 MG: 10 TABLET ORAL at 07:39

## 2024-03-11 RX ADMIN — SODIUM CHLORIDE 1 G: 1 TABLET ORAL at 09:26

## 2024-03-11 RX ADMIN — HEPARIN SODIUM 5000 UNITS: 5000 INJECTION INTRAVENOUS; SUBCUTANEOUS at 06:00

## 2024-03-11 RX ADMIN — MULTIPLE VITAMINS W/ MINERALS TAB 1 TABLET: TAB ORAL at 09:19

## 2024-03-11 RX ADMIN — SODIUM CHLORIDE 1 G: 1 TABLET ORAL at 15:31

## 2024-03-11 RX ADMIN — ATORVASTATIN CALCIUM 40 MG: 40 TABLET, FILM COATED ORAL at 17:43

## 2024-03-11 RX ADMIN — CYANOCOBALAMIN TAB 500 MCG 1000 MCG: 500 TAB at 11:12

## 2024-03-11 RX ADMIN — FAMOTIDINE 20 MG: 20 TABLET ORAL at 17:43

## 2024-03-11 RX ADMIN — OXYCODONE HYDROCHLORIDE 10 MG: 10 TABLET ORAL at 21:43

## 2024-03-11 RX ADMIN — HEPARIN SODIUM 5000 UNITS: 5000 INJECTION INTRAVENOUS; SUBCUTANEOUS at 15:31

## 2024-03-11 RX ADMIN — DIPHENHYDRAMINE HYDROCHLORIDE AND LIDOCAINE HYDROCHLORIDE AND ALUMINUM HYDROXIDE AND MAGNESIUM HYDRO 10 ML: KIT at 21:43

## 2024-03-11 RX ADMIN — THIAMINE HYDROCHLORIDE 100 MG: 100 INJECTION, SOLUTION INTRAMUSCULAR; INTRAVENOUS at 09:27

## 2024-03-11 NOTE — PLAN OF CARE
Problem: PAIN - ADULT  Goal: Verbalizes/displays adequate comfort level or baseline comfort level  Description: Interventions:  - Encourage patient to monitor pain and request assistance  - Assess pain using appropriate pain scale  - Administer analgesics based on type and severity of pain and evaluate response  - Implement non-pharmacological measures as appropriate and evaluate response  - Consider cultural and social influences on pain and pain management  - Notify physician/advanced practitioner if interventions unsuccessful or patient reports new pain  Outcome: Progressing     Problem: INFECTION - ADULT  Goal: Absence or prevention of progression during hospitalization  Description: INTERVENTIONS:  - Assess and monitor for signs and symptoms of infection  - Monitor lab/diagnostic results  - Monitor all insertion sites, i.e. indwelling lines, tubes, and drains  - Monitor endotracheal if appropriate and nasal secretions for changes in amount and color  - [place] appropriate cooling/warming therapies per order  - Administer medications as ordered  - Instruct and encourage patient and family to use good hand hygiene technique  - Identify and instruct in appropriate isolation precautions for identified infection/condition  Outcome: Progressing     Problem: SAFETY ADULT  Goal: Patient will remain free of falls  Description: INTERVENTIONS:  - Educate patient/family on patient safety including physical limitations  - Instruct patient to call for assistance with activity   - Consult OT/PT to assist with strengthening/mobility   - Keep Call bell within reach  - Keep bed low and locked with side rails adjusted as appropriate  - Keep care items and personal belongings within reach  - Initiate and maintain comfort rounds  - Make Fall Risk Sign visible to staff  - Apply yellow socks and bracelet for high fall risk patients  - Consider moving patient to room near nurses station  Outcome: Progressing  Goal: Maintain or  return to baseline ADL function  Description: INTERVENTIONS:  -  Assess patient's ability to carry out ADLs; assess patient's baseline for ADL function and identify physical deficits which impact ability to perform ADLs (bathing, care of mouth/teeth, toileting, grooming, dressing, etc.)  - Assess/evaluate cause of self-care deficits   - Assess range of motion  - Assess patient's mobility; develop plan if impaired  - Assess patient's need for assistive devices and provide as appropriate  - Encourage maximum independence but intervene and supervise when necessary  - Involve family in performance of ADLs  - Assess for home care needs following discharge   - Consider OT consult to assist with ADL evaluation and planning for discharge  - Provide patient education as appropriate  Outcome: Progressing  Goal: Maintains/Returns to pre admission functional level  Description: INTERVENTIONS:  - Perform AM-PAC 6 Click Basic Mobility/ Daily Activity assessment daily.  - Set and communicate daily mobility goal to care team and patient/family/caregiver.   - Collaborate with rehabilitation services on mobility goals if consulted  - Perform Range of Motion   - Reposition patient   - Dangle patient   - Stand patient   - Ambulate patient  - Out of bed to chair   - Out of bed for meals   - Out of bed for toileting  - Record patient progress and toleration of activity level   Outcome: Progressing     Problem: Knowledge Deficit  Goal: Patient/family/caregiver demonstrates understanding of disease process, treatment plan, medications, and discharge instructions  Description: Complete learning assessment and assess knowledge base.  Interventions:  - Provide teaching at level of understanding  - Provide teaching via preferred learning methods  Outcome: Progressing     Problem: Nutrition/Hydration-ADULT  Goal: Nutrient/Hydration intake appropriate for improving, restoring or maintaining nutritional needs  Description: Monitor and assess  patient's nutrition/hydration status for malnutrition. Collaborate with interdisciplinary team and initiate plan and interventions as ordered.  Monitor patient's weight and dietary intake as ordered or per policy. Utilize nutrition screening tool and intervene as necessary. Determine patient's food preferences and provide high-protein, high-caloric foods as appropriate.     INTERVENTIONS:  - Monitor oral intake, urinary output, labs, and treatment plans  - Assess nutrition and hydration status and recommend course of action  - Evaluate amount of meals eaten  - Assist patient with eating if necessary   - Allow adequate time for meals  - Recommend/ encourage appropriate diets, oral nutritional supplements, and vitamin/mineral supplements  - Order, calculate, and assess calorie counts as needed  - Recommend, monitor, and adjust tube feedings and TPN/PPN based on assessed needs  - Assess need for intravenous fluids  - Provide specific nutrition/hydration education as appropriate  - Include patient/family/caregiver in decisions related to nutrition  Outcome: Progressing

## 2024-03-11 NOTE — PLAN OF CARE
Problem: PAIN - ADULT  Goal: Verbalizes/displays adequate comfort level or baseline comfort level  Description: Interventions:  - Encourage patient to monitor pain and request assistance  - Assess pain using appropriate pain scale  - Administer analgesics based on type and severity of pain and evaluate response  - Implement non-pharmacological measures as appropriate and evaluate response  - Consider cultural and social influences on pain and pain management  - Notify physician/advanced practitioner if interventions unsuccessful or patient reports new pain  Outcome: Progressing     Problem: INFECTION - ADULT  Goal: Absence or prevention of progression during hospitalization  Description: INTERVENTIONS:  - Assess and monitor for signs and symptoms of infection  - Monitor lab/diagnostic results  - Monitor all insertion sites, i.e. indwelling lines, tubes, and drains  - Monitor endotracheal if appropriate and nasal secretions for changes in amount and color  - [place] appropriate cooling/warming therapies per order  - Administer medications as ordered  - Instruct and encourage patient and family to use good hand hygiene technique  - Identify and instruct in appropriate isolation precautions for identified infection/condition  Outcome: Progressing  Goal: Absence of fever/infection during neutropenic period  Description: INTERVENTIONS:  - Monitor WBC    Outcome: Progressing     Problem: SAFETY ADULT  Goal: Patient will remain free of falls  Description: INTERVENTIONS:  - Educate patient/family on patient safety including physical limitations  - Instruct patient to call for assistance with activity   - Consult OT/PT to assist with strengthening/mobility   - Keep Call bell within reach  - Keep bed low and locked with side rails adjusted as appropriate  - Keep care items and personal belongings within reach  - Initiate and maintain comfort rounds  - Make Fall Risk Sign visible to staff  - Offer Toileting every 2 Hours,  in advance of need  - Initiate/Maintain bed/chair alarm  - Obtain necessary fall risk management equipment  - Apply yellow socks and bracelet for high fall risk patients  - Consider moving patient to room near nurses station  Outcome: Progressing  Goal: Maintain or return to baseline ADL function  Description: INTERVENTIONS:  -  Assess patient's ability to carry out ADLs; assess patient's baseline for ADL function and identify physical deficits which impact ability to perform ADLs (bathing, care of mouth/teeth, toileting, grooming, dressing, etc.)  - Assess/evaluate cause of self-care deficits   - Assess range of motion  - Assess patient's mobility; develop plan if impaired  - Assess patient's need for assistive devices and provide as appropriate  - Encourage maximum independence but intervene and supervise when necessary  - Involve family in performance of ADLs  - Assess for home care needs following discharge   - Consider OT consult to assist with ADL evaluation and planning for discharge  - Provide patient education as appropriate  Outcome: Progressing  Goal: Maintains/Returns to pre admission functional level  Description: INTERVENTIONS:  - Perform AM-PAC 6 Click Basic Mobility/ Daily Activity assessment daily.  - Set and communicate daily mobility goal to care team and patient/family/caregiver.   - Collaborate with rehabilitation services on mobility goals if consulted  - Perform Range of Motion 3 times a day.  - Reposition patient every 2 hours.  - Dangle patient 3 times a day  - Stand patient 3 times a day  - Ambulate patient 3 times a day  - Out of bed to chair 3 times a day   - Out of bed for meals 3 times a day  - Out of bed for toileting  - Record patient progress and toleration of activity level   Outcome: Progressing     Problem: DISCHARGE PLANNING  Goal: Discharge to home or other facility with appropriate resources  Description: INTERVENTIONS:  - Identify barriers to discharge w/patient and  caregiver  - Arrange for needed discharge resources and transportation as appropriate  - Identify discharge learning needs (meds, wound care, etc.)  - Arrange for interpretive services to assist at discharge as needed  - Refer to Case Management Department for coordinating discharge planning if the patient needs post-hospital services based on physician/advanced practitioner order or complex needs related to functional status, cognitive ability, or social support system  Outcome: Progressing     Problem: DISCHARGE PLANNING  Goal: Discharge to home or other facility with appropriate resources  Description: INTERVENTIONS:  - Identify barriers to discharge w/patient and caregiver  - Arrange for needed discharge resources and transportation as appropriate  - Identify discharge learning needs (meds, wound care, etc.)  - Arrange for interpretive services to assist at discharge as needed  - Refer to Case Management Department for coordinating discharge planning if the patient needs post-hospital services based on physician/advanced practitioner order or complex needs related to functional status, cognitive ability, or social support system  Outcome: Progressing     Problem: Knowledge Deficit  Goal: Patient/family/caregiver demonstrates understanding of disease process, treatment plan, medications, and discharge instructions  Description: Complete learning assessment and assess knowledge base.  Interventions:  - Provide teaching at level of understanding  - Provide teaching via preferred learning methods  Outcome: Progressing     Problem: Nutrition/Hydration-ADULT  Goal: Nutrient/Hydration intake appropriate for improving, restoring or maintaining nutritional needs  Description: Monitor and assess patient's nutrition/hydration status for malnutrition. Collaborate with interdisciplinary team and initiate plan and interventions as ordered.  Monitor patient's weight and dietary intake as ordered or per policy. Utilize  nutrition screening tool and intervene as necessary. Determine patient's food preferences and provide high-protein, high-caloric foods as appropriate.     INTERVENTIONS:  - Monitor oral intake, urinary output, labs, and treatment plans  - Assess nutrition and hydration status and recommend course of action  - Evaluate amount of meals eaten  - Assist patient with eating if necessary   - Allow adequate time for meals  - Recommend/ encourage appropriate diets, oral nutritional supplements, and vitamin/mineral supplements  - Order, calculate, and assess calorie counts as needed  - Recommend, monitor, and adjust tube feedings and TPN/PPN based on assessed needs  - Assess need for intravenous fluids  - Provide specific nutrition/hydration education as appropriate  - Include patient/family/caregiver in decisions related to nutrition  Outcome: Progressing

## 2024-03-11 NOTE — ASSESSMENT & PLAN NOTE
Patient presented to ED at the suggestion of oncology team in the setting of high likelihood for refeeding syndrome; reports unable to tolerate PO intake or tube feeds for months.    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  He states he has been favoring PO intake.  Previously documented he does not like to use PEG tube.    Monitor CMP, CBC, Mg, Phos with a.m. labs; replete PRN  Patient requested to turn off tube feeds during the previous night due to nausea.   Patient requests to advance TF by 5 ml instead of 10 as it makes him feel nauseous.   Will restart at 45 ml/hr.   Nutrition consulted, appreciate recs  Suggest initiate Jevity 1.5 @ 15 ml/hr x initial 24 hours. Current Rate: 0  Pending electrolytes, advance TF by 10 ml every 12 hours to goal rate of 30 ml/hr.   Goal rate provides 2160 kcal, 92 g protein, 1094 ml free water. At goal flush 80 ml q 4 hours.   100 mg IV thiamin for 5-7 days to assist with refeeding. Patient at very high risk.   Zofran ordered for nausea/vomiting, no prior history of QTc prolongation  Will also consult palliative for continued nausea and pain control as patient is now complaining that his nausea might be due to his pain medications.

## 2024-03-11 NOTE — ASSESSMENT & PLAN NOTE
Baseline appears to be around 8-10  Hemoglobin today 9.1  Iron panel shows decreased iron, decreased TIBC, and increased ferritin. Likely due to anemia of chronic disease vs iron deficiency anemia. B12 low normal and folate normal.    Plan-  Continue to monitor Hgb.   Transfuse below 7 PRN  Will replete b12.

## 2024-03-11 NOTE — PROGRESS NOTES
[facility]  Progress Note  Name: Eliceo Garcia I  MRN: [id number]  Unit/Bed#: S -01 I Date of Admission: 3/6/2024   Date of Service: 3/11/2024 I Hospital Day: 5    Assessment/Plan   * Complication of feeding tube (HCC)  Assessment & Plan  Patient presented to ED at the suggestion of oncology team in the setting of high likelihood for refeeding syndrome; reports unable to tolerate PO intake or tube feeds for months.    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  He states he has been favoring PO intake.  Previously documented he does not like to use PEG tube.    Monitor CMP, CBC, Mg, Phos with a.m. labs; replete PRN  Patient requested to turn off tube feeds during the previous night due to nausea.   Patient requests to advance TF by 5 ml instead of 10 as it makes him feel nauseous.   Will restart at 45 ml/hr.   Nutrition consulted, appreciate recs  Suggest initiate Jevity 1.5 @ 15 ml/hr x initial 24 hours. Current Rate: 0  Pending electrolytes, advance TF by 10 ml every 12 hours to goal rate of 30 ml/hr.   Goal rate provides 2160 kcal, 92 g protein, 1094 ml free water. At goal flush 80 ml q 4 hours.   100 mg IV thiamin for 5-7 days to assist with refeeding. Patient at very high risk.   Zofran ordered for nausea/vomiting, no prior history of QTc prolongation  Will also consult palliative for continued nausea and pain control as patient is now complaining that his nausea might be due to his pain medications.     Severe protein-calorie malnutrition (HCC)  Assessment & Plan  Malnutrition Findings:   Adult Malnutrition type: Acute illness (in the setting of chronic illness)  Temporal wasting, clavicular prominence, cold intolerance  Diffuse loss of subcutaneous fat  Low BMI  Hypoalbuminemia, hyponatremia likely 2/2 poor PO intake    BMI Findings:      Body mass index is 14.27 kg/m².     Patient reports decreased PO intake for months, noted to have approx a 25lb  weight loss since the start of the calendar year.  Has been seen multiple times by nutrition in conjunction with oncology but continues to struggle with meeting calorie goals, worsened recently in the setting of chronic nausea/vomiting with nearly all PO intake.    Nutrition consulted, appreciate recs  Advance TF until goal   PRN medication for nausea/vomiting ordered    Electrolyte abnormality  Assessment & Plan  Patient admitted due to high risk for refeeding syndrome.    Potassium   Date Value Ref Range Status   03/11/2024 4.3 3.5 - 5.3 mmol/L Final     Magnesium   Date Value Ref Range Status   03/11/2024 1.9 1.9 - 2.7 mg/dL Final     Phosphorus   Date Value Ref Range Status   03/11/2024 3.0 2.3 - 4.1 mg/dL Final     Monitor closely for concern over re-feeding syndrome - patient at a high risk  Normal today. Repleting as needed    Alcohol use disorder, severe, dependence (HCC)  Assessment & Plan  Patient with previously documented hx of alcohol abuse, drinking 3-4 beers par day.  States last drink was approximately 72 hours prior to the time of initial assessment on 3/6.  Previously documented to drink up to 14 beers daily, however patient recently has been reporting vomiting of all PO intake.    [facility] protocol ordered-  Recommend monitoring on telemetry and radha at this time, patient currently refusing  Blood ethanol level ordered--normal  Thiamine, folate, multivitamin ordered    Anemia  Assessment & Plan  Baseline appears to be around 8-10  Hemoglobin today 9.1  Iron panel shows decreased iron, decreased TIBC, and increased ferritin. Likely due to anemia of chronic disease vs iron deficiency anemia. B12 low normal and folate normal.    Plan-  Continue to monitor Hgb.   Transfuse below 7 PRN  Will replete b12.     Cancer-related pain  Assessment & Plan  Patient states he takes Roxicodone 20mg q4h around the clock for cancer-related pain    Continue home Roxicodone 20mg q4h for pain  IV Narcan PRN for  respiratory depression  Heat pack ordered for anterior jaw pain per patient request    Hyponatremia  Assessment & Plan  Recent Labs     03/09/24  0525 03/10/24  0404 03/11/24  0601   SODIUM 130* 132* 129*       Patient with chronic hyponatremia  Multifactorial  Suspected etiology: SIADH +/- poor solute intake  Worsened this AM due to discontinuation of tube feeds    Plan:  Continue to monitor  Consider salt tabs VS p.o. fluid restriction if not improving    Cigarette nicotine dependence without complication  Assessment & Plan  NRT ordered, 14mg/d in place of 1/2 PPD    Hypertension  Assessment & Plan  Temp:  [97.1 °F (36.2 °C)-98.8 °F (37.1 °C)] 97.5 °F (36.4 °C)  HR:  [76-89] 80  Resp:  [17-18] 18  BP: (117-150)/(67-89) 150/89    Patient reports he does not take any medications at home.  Noted to have previously been on amlodipine 5mg daily per chart review    Vitals per unit routine  Will order PRN medication if SBP >180, not currently ordered as patient is currently refusing BP monitoring.         VTE Pharmacologic Prophylaxis: VTE Score: 5 High Risk (Score >/= 5) - Pharmacological DVT Prophylaxis Ordered: heparin. Sequential Compression Devices Ordered.    Mobility:   Basic Mobility Inpatient Raw Score: 24  JH-HLM Goal: 8: Walk 250 feet or more  JH-HLM Achieved: 8: Walk 250 feet ot more  HLM Goal achieved. Continue to encourage appropriate mobility.    Patient Centered Rounds: I performed bedside rounds with nursing staff today.  Discussions with Specialists or Other Care Team Provider: None    Education and Discussions with Family / Patient: Patient declined call to .     Current Length of Stay: 5 day(s)  Current Patient Status: Inpatient   Discharge Plan: Anticipate discharge in 48-72 hrs to home with home services.    Code Status: Level 1 - Full Code    Subjective:   Patient seen at bedside today.  Patient requested to turn off tube feeds overnight because he was feeling nauseous. This AM, he  "states that he has \"a weird feeling/discomfort\" but he is not able to further describe it. He denies any fevers, chills, headaches, chest pain, shortness of breath, abdominal pain, vomiting, constipation, diarrhea. Patient would like to resume tube feeds slowly and advance them 5 cc instead of 10.       Objective:     Vitals:   Temp (24hrs), Av.6 °F (36.4 °C), Min:97.1 °F (36.2 °C), Max:98.8 °F (37.1 °C)    Temp:  [97.1 °F (36.2 °C)-98.8 °F (37.1 °C)] 98 °F (36.7 °C)  HR:  [76-93] 78  Resp:  [16-18] 18  BP: (117-169)/(67-89) 169/83  SpO2:  [93 %-97 %] 94 %  Body mass index is 14.27 kg/m².     Input and Output Summary (last 24 hours):     Intake/Output Summary (Last 24 hours) at 3/11/2024 1214  Last data filed at 3/10/2024 1630  Gross per 24 hour   Intake 60 ml   Output 325 ml   Net -265 ml       Physical Exam:   Physical Exam  Vitals and nursing note reviewed. Exam conducted with a chaperone present.   Constitutional:       General: He is not in acute distress.     Appearance: Normal appearance. He is ill-appearing (cachectic). He is not toxic-appearing or diaphoretic.   HENT:      Head: Atraumatic.      Mouth/Throat:      Mouth: Mucous membranes are dry.   Eyes:      General: No scleral icterus.     Extraocular Movements: Extraocular movements intact.      Conjunctiva/sclera: Conjunctivae normal.   Cardiovascular:      Rate and Rhythm: Normal rate and regular rhythm.      Pulses: Normal pulses.      Heart sounds: Normal heart sounds. No murmur heard.     No friction rub. No gallop.   Pulmonary:      Effort: Pulmonary effort is normal. No respiratory distress.      Breath sounds: No wheezing, rhonchi or rales.   Abdominal:      General: Abdomen is flat. There is no distension.      Tenderness: There is no abdominal tenderness. There is no guarding or rebound.   Musculoskeletal:         General: Normal range of motion.      Cervical back: Normal range of motion.      Right lower leg: No edema.      Left lower " leg: No edema.   Skin:     General: Skin is warm.      Coloration: Skin is not jaundiced.   Neurological:      General: No focal deficit present.      Mental Status: He is alert and oriented to person, place, and time.   Psychiatric:         Judgment: Judgment normal.          Additional Data:     Labs:  Results from last 7 days   Lab Units 03/11/24  0601 03/10/24  0404   WBC Thousand/uL 7.21 8.08   HEMOGLOBIN g/dL 8.8* 9.1*   HEMATOCRIT % 27.5* 28.5*   PLATELETS Thousands/uL 272 279   NEUTROS PCT %  --  71   LYMPHS PCT %  --  7*   MONOS PCT %  --  18*   EOS PCT %  --  2     Results from last 7 days   Lab Units 03/11/24  0601 03/08/24  0602 03/07/24  0517   SODIUM mmol/L 129*   < > 132*   POTASSIUM mmol/L 4.3   < > 4.0   CHLORIDE mmol/L 93*   < > 97   CO2 mmol/L 34*   < > 29   BUN mg/dL 12   < > 9   CREATININE mg/dL 0.61   < > 0.67   ANION GAP mmol/L 2   < > 6   CALCIUM mg/dL 8.5   < > 8.7   ALBUMIN g/dL  --   --  2.9*   TOTAL BILIRUBIN mg/dL  --   --  0.24   ALK PHOS U/L  --   --  85   ALT U/L  --   --  5*   AST U/L  --   --  9*   GLUCOSE RANDOM mg/dL 94   < > 113    < > = values in this interval not displayed.                       Lines/Drains:  Invasive Devices       Central Venous Catheter Line  Duration             Port A Cath 11/07/23 Left Internal jugular 125 days              Drain  Duration             Gastrostomy/Enterostomy Percutaneous Interventional Gastrostomy 16 Fr. LUQ 90 days                    Central Line:  Goal for removal: N/A - Chronic PICC             Imaging: No pertinent imaging reviewed.    Recent Cultures (last 7 days):         Last 24 Hours Medication List:   Current Facility-Administered Medications   Medication Dose Route Frequency Provider Last Rate    acetaminophen  650 mg Oral Q6H PRN Teresa Alonso, DO      atorvastatin  40 mg Oral QPM Teresa Alonso, DO      calcium carbonate  1,000 mg Oral Daily PRN Teresa Alonso, DO      cyanocobalamin  1,000 mcg Oral Daily Madalyn  MD [place]      diphenhydramine, lidocaine, Al/Mg hydroxide, simethicone  10 mL Swish & Spit Q4H PRN Braulio Lowry, DO      famotidine  20 mg Oral BID Amos Diehl MD      folic acid  1 mg Oral Daily Teresa Alonso, DO      heparin (porcine)  5,000 Units Subcutaneous Q8H [facility] Teresa Alonso, DO      lactulose  15 g Oral BID PRN Amos Diehl MD      multivitamin-minerals  1 tablet Oral Daily Teresa Alonso, DO      naloxone  0.04 mg Intravenous Q1MIN PRN Teresa Alonso DO      nicotine  1 patch Transdermal Daily Teresa Alonso, DO      ondansetron  4 mg Intravenous Q4H PRN Madalyn Peter MD      oxyCODONE  10 mg Oral Q4H PRN Braulio Lowry, DO      Or    oxyCODONE  20 mg Oral Q4H PRN Braulio Deeu, DO      senna-docusate sodium  1 tablet Oral Daily Teresa Alonso, DO      sodium chloride  1 g Oral BID With Meals Cody Pedersen,       thiamine  100 mg Intravenous Daily Amos Diehl  mg (03/11/24 7579)        Today, Patient Was Seen By: Tobi Monson DO    **Please Note: This note may have been constructed using a voice recognition system.**

## 2024-03-11 NOTE — NURSING NOTE
Patient started complaining of nausea and stated he wanted the TF to be stopped. Patient was given PRN zofran and the TF was placed on hold. Per patients request he wanted the TF to be disconnected. TT Night team MD. Will continue to monitor

## 2024-03-11 NOTE — ASSESSMENT & PLAN NOTE
Temp:  [97.1 °F (36.2 °C)-98.8 °F (37.1 °C)] 97.5 °F (36.4 °C)  HR:  [76-89] 80  Resp:  [17-18] 18  BP: (117-150)/(67-89) 150/89    Patient reports he does not take any medications at home.  Noted to have previously been on amlodipine 5mg daily per chart review    Vitals per unit routine  Will order PRN medication if SBP >180, not currently ordered as patient is currently refusing BP monitoring.

## 2024-03-11 NOTE — ASSESSMENT & PLAN NOTE
Patient with previously documented hx of alcohol abuse, drinking 3-4 beers par day.  States last drink was approximately 72 hours prior to the time of initial assessment on 3/6.  Previously documented to drink up to 14 beers daily, however patient recently has been reporting vomiting of all PO intake.    [facility] protocol ordered-  Recommend monitoring on telemetry and radha at this time, patient currently refusing  Blood ethanol level ordered--normal  Thiamine, folate, multivitamin ordered

## 2024-03-11 NOTE — CONSULTS
[facility]  Consult  Name: Eliceo Garcia 58 y.o. male I MRN: [id number]  Unit/Bed#: S -01 I Date of Admission: 3/6/2024   Date of Service: 3/11/2024 I Hospital Day: 5    Inpatient consult to Palliative Care  Consult performed by: Braulio Lowry DO  Consult ordered by: Madalyn Peter MD      Assessment/Plan   Palliative care patient  Assessment & Plan  Follows [facility] for Palliative Care - Dr. Jj outpatient.    Cancer-related pain  Assessment & Plan  Follows [facility] Outpatient - last seen by Dr. Jj  Home regimen of OxyIR 20 mg q4 hours PRN  24 hour Oxy Use - 100 mg  24 hour OME Needs: 150 mg OME     Plan:  1) Patient would like trial of lower OxyIR dosing due to concerns this may be contributing to his nausea.  -patient has been on Oxycodone since October and did not have nausea with this medication. Likely more related to his feeding tube.  -Will adjust pain regimen to as below   A) OxyIR 10 mg q4 hours PRN for moderate pain   B) OxyIR 20 mg q4 hours PRN for severe pain   -overall goal is to ultimately taper as able from opioids    Discussed with SLIM and patient's RN    -Of note, patient has been refusing a bowel regimen despite recommendations.  -Patient has also refused Zofran despite complaints of nausea.  -Compliance has been of concern.    Alcohol use disorder, severe, dependence (HCC)  Assessment & Plan       * Complication of feeding tube (HCC)  Assessment & Plan  Adjustments currently being made to restart.         Social Support:  Supportive listening provided  Normalized experience of patient/family  Provided anxiety containment    Care Coordination  Case discussed w/ CARISSA and patient's RN    Follow-up  We appreciate the opportunity to participate in this patient's care.   We will continue to follow. [facility] to follow up.  Please do not hesitate to contact our on-call provider through our clinic answering service at [phone] should there be an acute change or other  symptom control concerns.    Code status: Level 1 - Full Code   Decisional apparatus:  Patient does have capacity to make medical decisions on my exam today. If such capacity is lost, patient's substitute decision maker would default to father by [place] Act 169.   Advance Directive / Living Will / POLST:  None on file    I have reviewed the patient's controlled substance dispensing history in the Prescription Drug Monitoring Program in compliance with the [facility] regulations before prescribing any controlled substances.    IDENTIFICATION:  Reason for Consult / Principal Problem: Pain management    HISTORY OF PRESENT ILLNESS:    Eliceo Garcia is a 58 y.o. 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 Patient presented to [facility] due to issues with his feeding tube and nausea. Patient was admitted for further evaluation and management of his symptoms associated with the aforementioned. Palliative care is consulted for assistance with medication management. Patient had concerns that his oxycodone is causing him to be nauseous, though, he has been on this since October 2023. Patient states he would like to see if he can try other options and we discussed a lower dose of OxyIR to see if this can still control his oral pain while also limit his nausea.   Patient seen and examined sitting up in chair without acute distress or discomfort.  Patient is awake, alert, oriented, and pleasantly conversant on exam.  Patient states he has had on and off pain, however, he is concerned with his oxycodone because of his nausea.  Patient also states he has had trouble with his feeding tube and is now being adjusted while he is in the hospital.  We discussed options to help with his pain.  The majority of his pain is particularly in his mouth which is consistent with his history of squamous cell carcinoma of the oral cavity.  We discussed options including Magic mouthwash, however, patient  states he was unable to obtain this medication.  Will trial this while he is in the hospital to see if this can help him provide some relief.  We did also discuss adjustments with his oxycodone and we can trial 10 mg every 4 hours as needed to see if this can help him control his pain while also see if this is associated with his nausea.  I feel his nausea is most likely related with his feeding tube at this time.    Interview and exam limited by: None    Review of Systems   Constitutional:  Positive for appetite change. Negative for chills and fever.   HENT:  Negative for trouble swallowing.    Eyes:  Negative for pain and visual disturbance.   Respiratory:  Negative for cough and shortness of breath.    Cardiovascular:  Negative for chest pain and palpitations.   Gastrointestinal:  Negative for abdominal pain and vomiting.   Genitourinary:  Negative for dysuria and hematuria.   Musculoskeletal:  Negative for arthralgias and back pain.   Skin:  Negative for color change and rash.   Neurological:  Negative for seizures and syncope.   All other systems reviewed and are negative.      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    Stroke (HCC)      Past Surgical History:   Procedure Laterality Date    EGD      IR GASTROSTOMY TUBE PLACEMENT  12/11/2023    IR PORT PLACEMENT  11/7/2023    KNEE ARTHROSCOPY Left     MULTIPLE TOOTH EXTRACTIONS N/A 10/16/2023    Procedure: EXTRACTION OF ALL REMAINING TEETH;  Surgeon: Humble Otero DMD;  Location: [facility];  Service: Oral Surgery     Social History     Socioeconomic History    Marital status: Single     Spouse name: Not on file    Number of children: Not on file    Years of education: Not on file    Highest education level: Not on file   Occupational History    Not on file   Tobacco Use    Smoking status: Every Day     Current packs/day: 0.25      Types: Cigarettes    Smokeless tobacco: Never   Vaping Use    Vaping status: Never Used   Substance and Sexual Activity    Alcohol use: Yes     Comment: 3-4 beers/day 24 oz beer    Drug use: No    Sexual activity: Not on file   Other Topics Concern    Not on file   Social History Narrative    Not on file     Social Determinants of Health     Financial Resource Strain: Not on file   Food Insecurity: No Food Insecurity (3/11/2024)    Hunger Vital Sign     Worried About Running Out of Food in the Last Year: Never true     Ran Out of Food in the Last Year: Never true   Transportation Needs: No Transportation Needs (3/11/2024)    PRAPARE - Transportation     Lack of Transportation (Medical): No     Lack of Transportation (Non-Medical): No   Physical Activity: Not on file   Stress: Not on file   Social Connections: Not on file   Intimate Partner Violence: Not on file   Housing Stability: Low Risk  (3/11/2024)    Housing Stability Vital Sign     Unable to Pay for Housing in the Last Year: No     Number of Places Lived in the Last Year: 2     Unstable Housing in the Last Year: No     Family History   Problem Relation Age of Onset    Breast cancer Mother     Cancer Father        MEDICATIONS / ALLERGIES:  all current active meds have been reviewed, current meds:   Current Facility-Administered Medications   Medication Dose Route Frequency    acetaminophen (TYLENOL) tablet 650 mg  650 mg Oral Q6H PRN    atorvastatin (LIPITOR) tablet 40 mg  40 mg Oral QPM    calcium carbonate (TUMS) chewable tablet 1,000 mg  1,000 mg Oral Daily PRN    cyanocobalamin (VITAMIN B-12) tablet 1,000 mcg  1,000 mcg Oral Daily    diphenhydramine, lidocaine, Al/Mg hydroxide, simethicone (Magic Mouthwash) oral solution 10 mL  10 mL Swish & Spit Q4H PRN    famotidine (PEPCID) tablet 20 mg  20 mg Oral BID    folic acid (FOLVITE) tablet 1 mg  1 mg Oral Daily    heparin (porcine) subcutaneous injection 5,000 Units  5,000 Units Subcutaneous Q8H MANDY     lactulose (CHRONULAC) oral solution 15 g  15 g Oral BID PRN    multivitamin-minerals (CENTRUM) tablet 1 tablet  1 tablet Oral Daily    naloxone (NARCAN) 0.04 mg/mL syringe 0.04 mg  0.04 mg Intravenous Q1MIN PRN    nicotine (NICODERM CQ) 14 mg/24hr TD 24 hr patch 1 patch  1 patch Transdermal Daily    ondansetron (ZOFRAN) injection 4 mg  4 mg Intravenous Q4H PRN    oxyCODONE (ROXICODONE) immediate release tablet 10 mg  10 mg Oral Q4H PRN    Or    oxyCODONE (ROXICODONE) immediate release tablet 20 mg  20 mg Oral Q4H PRN    senna-docusate sodium (SENOKOT S) 8.6-50 mg per tablet 1 tablet  1 tablet Oral Daily    sodium chloride tablet 1 g  1 g Oral BID With Meals    thiamine (VITAMIN B1) 100 mg in sodium chloride 0.9 % 50 mL IVPB  100 mg Intravenous Daily   , and PTA meds:   Prior to Admission Medications   Prescriptions Last Dose Informant Patient Reported? Taking?   Aspirin Low Dose 81 MG chewable tablet Not Taking  No No   Sig: CHEW 1 TABLET BY MOUTH DAILY   Patient not taking: Reported on 2/21/2024   atorvastatin (LIPITOR) 40 mg tablet 3/6/2024  No Yes   Sig: TAKE 1 TABLET BY MOUTH EVERY DAY IN THE EVENING   chlorhexidine (PERIDEX) 0.12 % solution Not Taking  Yes No   Patient not taking: Reported on 12/14/2023   dexamethasone 0.5 MG/5ML elixir Not Taking  No No   Sig: Take 10 mL (1 mg total) by mouth 4 (four) times a day   Patient not taking: Reported on 1/26/2024   lactulose (CHRONULAC) 10 g/15 mL solution Not Taking  No No   Sig: Take 15 mL (10 g total) by mouth 2 (two) times a day   Patient not taking: Reported on 3/6/2024   magnesium Oxide (MAG-OX) 400 mg TABS Unknown  No No   Sig: Take 1 tablet (400 mg total) by mouth 2 (two) times a day   naloxone (NARCAN) 4 mg/0.1 mL nasal spray Not Taking  No No   Sig: Administer 1 spray into a nostril. If no response after 2-3 minutes, give another dose in the other nostril using a new spray.   Patient not taking: Reported on 2/21/2024   oxyCODONE (ROXICODONE) 20 MG TABS  3/6/2024  No Yes   Sig: Take 1 tablet (20 mg total) by mouth every 4 (four) hours as needed for moderate pain Max Daily Amount: 120 mg Do not start before March 9, 2024.   potassium chloride (Klor-Con M20) 20 mEq tablet   No No   Sig: Take 1 tablet (20 mEq total) by mouth daily for 14 days   senna-docusate sodium (SENOKOT-S) 8.6-50 mg per tablet 3/6/2024  No Yes   Sig: Take 1 tablet by mouth daily   silver sulfadiazine (SILVADENE,SSD) 1 % cream Not Taking  No No   Sig: Apply topically 2 (two) times a day   Patient not taking: Reported on 1/11/2024      Facility-Administered Medications: None       Allergies   Allergen Reactions    Bee Venom Hives    Other      bees       OBJECTIVE:  /83   Pulse 78   Temp 98 °F (36.7 °C)   Resp 18   Wt 51.8 kg (114 lb 3.2 oz)   SpO2 94%   BMI 14.27 kg/m²   Nursing notes reviewed.  Physical Exam  Vitals and nursing note reviewed.   Constitutional:       General: He is not in acute distress.     Appearance: He is well-developed. He is ill-appearing (Chronically). He is not toxic-appearing or diaphoretic.      Comments: Cachectic, no acute distress or discomfort on examination.  Pleasantly conversant.   HENT:      Head: Normocephalic and atraumatic.      Nose: Nose normal.      Mouth/Throat:      Mouth: Mucous membranes are moist.   Eyes:      General:         Right eye: No discharge.         Left eye: No discharge.   Cardiovascular:      Rate and Rhythm: Normal rate.   Pulmonary:      Effort: Pulmonary effort is normal. No respiratory distress.   Abdominal:      General: Abdomen is flat. There is no distension.   Musculoskeletal:         General: No swelling.      Right lower leg: No edema.      Left lower leg: No edema.   Skin:     General: Skin is warm and dry.      Coloration: Skin is not jaundiced or pale.   Neurological:      General: No focal deficit present.      Mental Status: He is alert and oriented to person, place, and time.   Psychiatric:         Mood and  Affect: Mood normal.         Behavior: Behavior normal.         Thought Content: Thought content normal.         Judgment: Judgment normal.         Lab Results: I have personally reviewed pertinent labs.    HEMATOLOGY PROFILE:  Results from last 7 days   Lab Units 03/11/24  0601 03/10/24  0404 03/09/24  0525 03/08/24  0602   WBC Thousand/uL 7.21 8.08 7.21 8.65   HEMOGLOBIN g/dL 8.8* 9.1* 9.2* 9.4*   HEMATOCRIT % 27.5* 28.5* 29.3* 29.2*   PLATELETS Thousands/uL 272 279 287 289   NEUTROS PCT %  --  71 72 77*   MONOS PCT %  --  18* 17* 14*   EOS PCT %  --  2 2 1       CHEMISTRY PROFILE:  Results from last 7 days   Lab Units 03/11/24  0601 03/10/24  0404 03/09/24  0525 03/08/24  0602 03/07/24  0517 03/06/24  2000   SODIUM mmol/L 129* 132* 130*   < > 132* 132*   POTASSIUM mmol/L 4.3 4.2 4.5   < > 4.0 4.3   CHLORIDE mmol/L 93* 96 94*   < > 97 96   CO2 mmol/L 34* 32 33*   < > 29 30   BUN mg/dL 12 14 11   < > 9 10   CREATININE mg/dL 0.61 0.69 0.69   < > 0.67 0.60   ALBUMIN g/dL  --   --   --   --  2.9* 3.0*   CALCIUM mg/dL 8.5 8.6 8.5   < > 8.7 8.9   ALK PHOS U/L  --   --   --   --  85 86   ALT U/L  --   --   --   --  5* 6*   AST U/L  --   --   --   --  9* 9*    < > = values in this interval not displayed.       Albumin:  0   Lab Value Date/Time    ALB 2.9 (L) 03/07/2024 0517     Imaging Studies: I have personally reviewed pertinent reports.  EKG, Pathology, and Other Studies: I have personally reviewed pertinent reports.    Counseling / Coordination of Care:  Total floor / unit time spent today 65 minutes. Greater than 50% of total time was spent with the patient and / or family counseling and / or coordination of care. A description of the counseling / coordination of care: symptom assessment and management, medication review, medication adjustment, psychosocial support, chart review, imaging review, lab review, goals of care, opioid titration, supportive listening, and anticipatory guidance.    DO Yanira [facility]  "Palliative and Supportive Care  [phone]    Portions of this document may have been created using dictation software and as such some \"sound alike\" terms may have been generated by the system. Do not hesitate to contact me with any questions or clarifications.      "

## 2024-03-11 NOTE — ASSESSMENT & PLAN NOTE
Patient admitted due to high risk for refeeding syndrome.    Potassium   Date Value Ref Range Status   03/11/2024 4.3 3.5 - 5.3 mmol/L Final     Magnesium   Date Value Ref Range Status   03/11/2024 1.9 1.9 - 2.7 mg/dL Final     Phosphorus   Date Value Ref Range Status   03/11/2024 3.0 2.3 - 4.1 mg/dL Final     Monitor closely for concern over re-feeding syndrome - patient at a high risk  Normal today. Repleting as needed

## 2024-03-11 NOTE — ASSESSMENT & PLAN NOTE
Recent Labs     03/09/24  0525 03/10/24  0404 03/11/24  0601   SODIUM 130* 132* 129*       Patient with chronic hyponatremia  Multifactorial  Suspected etiology: SIADH +/- poor solute intake  Worsened this AM due to discontinuation of tube feeds    Plan:  Continue to monitor  Consider salt tabs VS p.o. fluid restriction if not improving   Detail Level: Simple Render Risk Assessment In Note?: no Additional Notes: Per Dr. Senior pt will be charged $250 per tx\\nStarted at 50 mJ/mB first pass 61 pulses then 37 pulses at 60 mJ/mB.

## 2024-03-11 NOTE — ASSESSMENT & PLAN NOTE
Follows [facility] Outpatient - last seen by Dr. Jj  Home regimen of OxyIR 20 mg q4 hours PRN  24 hour Oxy Use - 100 mg  24 hour OME Needs: 150 mg OME     Plan:  1) Patient would like trial of lower OxyIR dosing due to concerns this may be contributing to his nausea.  -patient has been on Oxycodone since October and did not have nausea with this medication. Likely more related to his feeding tube.  -Will adjust pain regimen to as below   A) OxyIR 10 mg q4 hours PRN for moderate pain   B) OxyIR 20 mg q4 hours PRN for severe pain   -overall goal is to ultimately taper as able from opioids    Discussed with SLIM and patient's RN    -Of note, patient has been refusing a bowel regimen despite recommendations.  -Patient has also refused Zofran despite complaints of nausea.  -Compliance has been of concern.

## 2024-03-12 LAB
ANION GAP SERPL CALCULATED.3IONS-SCNC: 4 MMOL/L
BUN SERPL-MCNC: 13 MG/DL (ref 5–25)
CALCIUM SERPL-MCNC: 8.7 MG/DL (ref 8.4–10.2)
CHLORIDE SERPL-SCNC: 93 MMOL/L (ref 96–108)
CO2 SERPL-SCNC: 33 MMOL/L (ref 21–32)
CREAT SERPL-MCNC: 0.6 MG/DL (ref 0.6–1.3)
ERYTHROCYTE [DISTWIDTH] IN BLOOD BY AUTOMATED COUNT: 15.8 % (ref 11.6–15.1)
GFR SERPL CREATININE-BSD FRML MDRD: 109 ML/MIN/1.73SQ M
GLUCOSE SERPL-MCNC: 114 MG/DL (ref 65–140)
HCT VFR BLD AUTO: 27.5 % (ref 36.5–49.3)
HGB BLD-MCNC: 8.8 G/DL (ref 12–17)
MAGNESIUM SERPL-MCNC: 1.6 MG/DL (ref 1.9–2.7)
MCH RBC QN AUTO: 32 PG (ref 26.8–34.3)
MCHC RBC AUTO-ENTMCNC: 32 G/DL (ref 31.4–37.4)
MCV RBC AUTO: 100 FL (ref 82–98)
PHOSPHATE SERPL-MCNC: 3.6 MG/DL (ref 2.3–4.1)
PLATELET # BLD AUTO: 292 THOUSANDS/UL (ref 149–390)
PMV BLD AUTO: 9.5 FL (ref 8.9–12.7)
POTASSIUM SERPL-SCNC: 4.1 MMOL/L (ref 3.5–5.3)
RBC # BLD AUTO: 2.75 MILLION/UL (ref 3.88–5.62)
SODIUM SERPL-SCNC: 130 MMOL/L (ref 135–147)
WBC # BLD AUTO: 7.59 THOUSAND/UL (ref 4.31–10.16)

## 2024-03-12 PROCEDURE — 85027 COMPLETE CBC AUTOMATED: CPT

## 2024-03-12 PROCEDURE — 99232 SBSQ HOSP IP/OBS MODERATE 35: CPT | Performed by: STUDENT IN AN ORGANIZED HEALTH CARE EDUCATION/TRAINING PROGRAM

## 2024-03-12 PROCEDURE — 80048 BASIC METABOLIC PNL TOTAL CA: CPT

## 2024-03-12 PROCEDURE — 83735 ASSAY OF MAGNESIUM: CPT

## 2024-03-12 PROCEDURE — 99232 SBSQ HOSP IP/OBS MODERATE 35: CPT | Performed by: INTERNAL MEDICINE

## 2024-03-12 PROCEDURE — 84100 ASSAY OF PHOSPHORUS: CPT

## 2024-03-12 RX ORDER — MAGNESIUM SULFATE HEPTAHYDRATE 40 MG/ML
4 INJECTION, SOLUTION INTRAVENOUS ONCE
Status: DISCONTINUED | OUTPATIENT
Start: 2024-03-12 | End: 2024-03-12

## 2024-03-12 RX ORDER — LANOLIN ALCOHOL/MO/W.PET/CERES
6 CREAM (GRAM) TOPICAL
Status: DISCONTINUED | OUTPATIENT
Start: 2024-03-12 | End: 2024-03-14 | Stop reason: HOSPADM

## 2024-03-12 RX ORDER — LANOLIN ALCOHOL/MO/W.PET/CERES
400 CREAM (GRAM) TOPICAL 2 TIMES DAILY
Status: DISCONTINUED | OUTPATIENT
Start: 2024-03-12 | End: 2024-03-14 | Stop reason: HOSPADM

## 2024-03-12 RX ADMIN — OXYCODONE HYDROCHLORIDE 10 MG: 10 TABLET ORAL at 03:33

## 2024-03-12 RX ADMIN — OXYCODONE HYDROCHLORIDE 10 MG: 10 TABLET ORAL at 09:21

## 2024-03-12 RX ADMIN — Medication 6 MG: at 23:29

## 2024-03-12 RX ADMIN — SODIUM CHLORIDE 1 G: 1 TABLET ORAL at 08:46

## 2024-03-12 RX ADMIN — THIAMINE HYDROCHLORIDE 100 MG: 100 INJECTION, SOLUTION INTRAMUSCULAR; INTRAVENOUS at 08:51

## 2024-03-12 RX ADMIN — DIPHENHYDRAMINE HYDROCHLORIDE AND LIDOCAINE HYDROCHLORIDE AND ALUMINUM HYDROXIDE AND MAGNESIUM HYDRO 10 ML: KIT at 22:38

## 2024-03-12 RX ADMIN — HEPARIN SODIUM 5000 UNITS: 5000 INJECTION INTRAVENOUS; SUBCUTANEOUS at 22:35

## 2024-03-12 RX ADMIN — Medication 400 MG: at 09:20

## 2024-03-12 RX ADMIN — CYANOCOBALAMIN TAB 500 MCG 1000 MCG: 500 TAB at 08:37

## 2024-03-12 RX ADMIN — OXYCODONE HYDROCHLORIDE 20 MG: 10 TABLET ORAL at 22:34

## 2024-03-12 RX ADMIN — MULTIPLE VITAMINS W/ MINERALS TAB 1 TABLET: TAB ORAL at 08:37

## 2024-03-12 RX ADMIN — DIPHENHYDRAMINE HYDROCHLORIDE AND LIDOCAINE HYDROCHLORIDE AND ALUMINUM HYDROXIDE AND MAGNESIUM HYDRO 10 ML: KIT at 06:38

## 2024-03-12 RX ADMIN — LACTULOSE 15 G: 20 SOLUTION ORAL at 14:45

## 2024-03-12 RX ADMIN — FOLIC ACID 1 MG: 1 TABLET ORAL at 08:37

## 2024-03-12 RX ADMIN — OXYCODONE HYDROCHLORIDE 10 MG: 10 TABLET ORAL at 14:44

## 2024-03-12 RX ADMIN — FAMOTIDINE 20 MG: 20 TABLET ORAL at 08:38

## 2024-03-12 NOTE — ASSESSMENT & PLAN NOTE
Temp:  [97.5 °F (36.4 °C)-98.2 °F (36.8 °C)] 97.5 °F (36.4 °C)  Resp:  [15-18] 18  BP: (109-152)/(69-83) 109/69    Patient reports he does not take any medications at home.  Noted to have previously been on amlodipine 5mg daily per chart review    Vitals per unit routine  Will order PRN medication if SBP >180, not currently ordered as patient is currently refusing BP monitoring.

## 2024-03-12 NOTE — ASSESSMENT & PLAN NOTE
Baseline appears to be around 8-10  Hemoglobin today 8.7  Iron panel shows decreased iron, decreased TIBC, and increased ferritin. Likely due to anemia of chronic disease vs iron deficiency anemia. B12 low normal and folate normal.    Plan-  Continue to monitor Hgb.   Transfuse below 7 PRN  Will replete b12.

## 2024-03-12 NOTE — PLAN OF CARE
Problem: PAIN - ADULT  Goal: Verbalizes/displays adequate comfort level or baseline comfort level  Description: Interventions:  - Encourage patient to monitor pain and request assistance  - Assess pain using appropriate pain scale  - Administer analgesics based on type and severity of pain and evaluate response  - Implement non-pharmacological measures as appropriate and evaluate response  - Consider cultural and social influences on pain and pain management  - Notify physician/advanced practitioner if interventions unsuccessful or patient reports new pain  Outcome: Not Progressing     Problem: INFECTION - ADULT  Goal: Absence or prevention of progression during hospitalization  Description: INTERVENTIONS:  - Assess and monitor for signs and symptoms of infection  - Monitor lab/diagnostic results  - Monitor all insertion sites, i.e. indwelling lines, tubes, and drains  - Monitor endotracheal if appropriate and nasal secretions for changes in amount and color  - [place] appropriate cooling/warming therapies per order  - Administer medications as ordered  - Instruct and encourage patient and family to use good hand hygiene technique  - Identify and instruct in appropriate isolation precautions for identified infection/condition  Outcome: Progressing  Goal: Absence of fever/infection during neutropenic period  Description: INTERVENTIONS:  - Monitor WBC    Outcome: Progressing     Problem: SAFETY ADULT  Goal: Patient will remain free of falls  Description: INTERVENTIONS:  - Educate patient/family on patient safety including physical limitations  - Instruct patient to call for assistance with activity   - Consult OT/PT to assist with strengthening/mobility   - Keep Call bell within reach  - Keep bed low and locked with side rails adjusted as appropriate  - Keep care items and personal belongings within reach  - Initiate and maintain comfort rounds  - Make Fall Risk Sign visible to staff  - Apply yellow socks and  bracelet for high fall risk patients  - Consider moving patient to room near nurses station  Outcome: Progressing  Goal: Maintain or return to baseline ADL function  Description: INTERVENTIONS:  -  Assess patient's ability to carry out ADLs; assess patient's baseline for ADL function and identify physical deficits which impact ability to perform ADLs (bathing, care of mouth/teeth, toileting, grooming, dressing, etc.)  - Assess/evaluate cause of self-care deficits   - Assess range of motion  - Assess patient's mobility; develop plan if impaired  - Assess patient's need for assistive devices and provide as appropriate  - Encourage maximum independence but intervene and supervise when necessary  - Involve family in performance of ADLs  - Assess for home care needs following discharge   - Consider OT consult to assist with ADL evaluation and planning for discharge  - Provide patient education as appropriate  Outcome: Progressing  Goal: Maintains/Returns to pre admission functional level  Description: INTERVENTIONS:  - Perform AM-PAC 6 Click Basic Mobility/ Daily Activity assessment daily.  - Set and communicate daily mobility goal to care team and patient/family/caregiver.   - Collaborate with rehabilitation services on mobility goals if consulted  - Perform Range of Motion   - Reposition patient .  - Dangle patient   - Stand patient   - Ambulate patient   - Out of bed to chair   - Out of bed for meals  - Out of bed for toileting  - Record patient progress and toleration of activity level   Outcome: Progressing     Problem: DISCHARGE PLANNING  Goal: Discharge to home or other facility with appropriate resources  Description: INTERVENTIONS:  - Identify barriers to discharge w/patient and caregiver  - Arrange for needed discharge resources and transportation as appropriate  - Identify discharge learning needs (meds, wound care, etc.)  - Arrange for interpretive services to assist at discharge as needed  - Refer to Case  Management Department for coordinating discharge planning if the patient needs post-hospital services based on physician/advanced practitioner order or complex needs related to functional status, cognitive ability, or social support system  Outcome: Progressing     Problem: GASTROINTESTINAL - ADULT  Goal: Minimal or absence of nausea and/or vomiting  Description: INTERVENTIONS:  - Administer IV fluids if ordered to ensure adequate hydration  - Maintain NPO status until nausea and vomiting are resolved  - Nasogastric tube if ordered  - Administer ordered antiemetic medications as needed  - Provide nonpharmacologic comfort measures as appropriate  - Advance diet as tolerated, if ordered  - Consider nutrition services referral to assist patient with adequate nutrition and appropriate food choices  Outcome: Not Progressing     Problem: Nutrition/Hydration-ADULT  Goal: Nutrient/Hydration intake appropriate for improving, restoring or maintaining nutritional needs  Description: Monitor and assess patient's nutrition/hydration status for malnutrition. Collaborate with interdisciplinary team and initiate plan and interventions as ordered.  Monitor patient's weight and dietary intake as ordered or per policy. Utilize nutrition screening tool and intervene as necessary. Determine patient's food preferences and provide high-protein, high-caloric foods as appropriate.     INTERVENTIONS:  - Monitor oral intake, urinary output, labs, and treatment plans  - Assess nutrition and hydration status and recommend course of action  - Evaluate amount of meals eaten  - Assist patient with eating if necessary   - Allow adequate time for meals  - Recommend/ encourage appropriate diets, oral nutritional supplements, and vitamin/mineral supplements  - Order, calculate, and assess calorie counts as needed  - Recommend, monitor, and adjust tube feedings and TPN/PPN based on assessed needs  - Assess need for intravenous fluids  - Provide  specific nutrition/hydration education as appropriate  - Include patient/family/caregiver in decisions related to nutrition  Outcome: Progressing

## 2024-03-12 NOTE — ASSESSMENT & PLAN NOTE
Recent Labs     03/11/24  0601 03/12/24  0436 03/13/24  0551   SODIUM 129* 130* 132*     Patient with chronic hyponatremia  Multifactorial  Suspected etiology: SIADH +/- poor solute intake  Worsened this AM due to discontinuation of tube feeds    Plan:  Continue to monitor  Consider salt tabs VS p.o. fluid restriction if not improving

## 2024-03-12 NOTE — ASSESSMENT & PLAN NOTE
Patient admitted due to high risk for refeeding syndrome.    Potassium   Date Value Ref Range Status   03/12/2024 4.1 3.5 - 5.3 mmol/L Final     Magnesium   Date Value Ref Range Status   03/12/2024 1.6 (L) 1.9 - 2.7 mg/dL Final     Phosphorus   Date Value Ref Range Status   03/12/2024 3.6 2.3 - 4.1 mg/dL Final     Monitor closely for concern over re-feeding syndrome - patient at a high risk  Mag decreased at 1.6. Patient agreed to IV mag.

## 2024-03-12 NOTE — ASSESSMENT & PLAN NOTE
Patient presented to ED at the suggestion of oncology team in the setting of high likelihood for refeeding syndrome; reports unable to tolerate PO intake or tube feeds for months.    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  He states he has been favoring PO intake.  Previously documented he does not like to use PEG tube.    Monitor CMP, CBC, Mg, Phos with a.m. labs; replete PRN  Tube feeds have been running at 55 mL/h without any concerns or complaints.  Will continue at thi rate   Nutrition consulted, appreciate recs  Zofran ordered for nausea/vomiting, no prior history of QTc prolongation  Palliative helping manage pain medications as well as anti-emetics.  Patient's barrier for discharge at this point is tube feed machine and visiting nurse setup at home. Unsure of patient compliance at this point which may result in a readmission shortly after discharge.   Will continue to stress importance of compliance and will speak with case management on obtaining his supplies.

## 2024-03-12 NOTE — ASSESSMENT & PLAN NOTE
Patient admitted due to high risk for refeeding syndrome.    Potassium   Date Value Ref Range Status   03/12/2024 4.1 3.5 - 5.3 mmol/L Final     Magnesium   Date Value Ref Range Status   03/12/2024 1.6 (L) 1.9 - 2.7 mg/dL Final     Phosphorus   Date Value Ref Range Status   03/12/2024 3.6 2.3 - 4.1 mg/dL Final     Monitor closely for concern over re-feeding syndrome - patient at a high risk  Mag decreased at 1.6. Patient refused IV mag. Ordered PO Mag oxide.

## 2024-03-12 NOTE — ASSESSMENT & PLAN NOTE
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 fluid restriction if not improving

## 2024-03-12 NOTE — ASSESSMENT & PLAN NOTE
Patient with previously documented hx of alcohol abuse, drinking 3-4 beers par day.  States last drink was approximately 72 hours prior to the time of initial assessment on 3/6.  Previously documented to drink up to 14 beers daily, however patient recently has been reporting vomiting of all PO intake.    [facility] protocol ordered  Recommend monitoring on telemetry and radha at this time, patient currently refusing  Blood ethanol level ordered--normal  Thiamine, folate, multivitamin ordered

## 2024-03-12 NOTE — ASSESSMENT & PLAN NOTE
Follows [facility] - last seen by Dr. Jj  Plan:  1) Pain well-controlled with the OxyIR 10mg on my discussion with patient today. He would like to continue with this dosing as he feels it has remained effective with controlling his pain and while resolving his nausea.  -Continue current regimen as below   A) OxyIR 10 mg q4 hours PRN for moderate pain   B) OxyIR 20 mg q4 hours PRN for severe pain   -overall goal is to ultimately taper as able from opioids   -If patient remains tolerant of the OxyIR 10mg, may be discharged with new instructions of OxyIR 10mg q4 hours PRN for pain.

## 2024-03-12 NOTE — ASSESSMENT & PLAN NOTE
Patient presented to ED at the suggestion of oncology team in the setting of high likelihood for refeeding syndrome; reports unable to tolerate PO intake or tube feeds for months.    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  He states he has been favoring PO intake.  Previously documented he does not like to use PEG tube.    Monitor CMP, CBC, Mg, Phos with a.m. labs; replete PRN  Tube feeds have been running at 45 mL/h without any concerns or complaints.  Will try to advance to goal today.  Nutrition consulted, appreciate recs  Suggest initiate Jevity 1.5 @ 15 ml/hr x initial 24 hours. Current Rate: 0  Pending electrolytes, advance TF by 10 ml every 12 hours to goal rate of 60 ml/hr.   100 mg IV thiamin for 5-7 days to assist with refeeding. Patient at very high risk.   Zofran ordered for nausea/vomiting, no prior history of QTc prolongation  Palliative helping manage pain medications as well as anti-emetics.

## 2024-03-12 NOTE — CASE MANAGEMENT
Case Management Discharge Planning Note    Patient name Eliceo Garcia  Location S /S -01 MRN [id number]  : 1965 Date 3/12/2024       Current Admission Date: 3/6/2024  Current Admission Diagnosis:Complication of feeding tube (HCC)   Patient Active Problem List    Diagnosis Date Noted    Palliative care patient 2024    Anemia 03/10/2024    Complication of feeding tube (HCC) 2024    Electrolyte abnormality 2024    Severe protein-calorie malnutrition (HCC) 2024    Rectal bleeding 2024    Acute blood loss anemia 2024    Weakness 2024    Pain in oral cavity 2024    Palliative care encounter 2023    Alcohol abuse 2023    Port-A-Cath in place 2023    Alcoholic intoxication without complication (HCC) 2023    History of CVA (cerebrovascular accident) 2023    Drug induced constipation 2023    Cancer-related pain 2023    At risk for respiratory depression due to opioid 2023    Counseling regarding advanced care planning and goals of care 2023    Preoperative clearance 10/11/2023    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    Hypokalemia 2023    Hypomagnesemia 2023    Alcohol use disorder, severe, dependence (HCC) 2023    Hyponatremia 2023    Hypertension     Cigarette nicotine dependence without complication     Periodontitis 2023    Dental caries 2023    Cervical lymphadenopathy 2023      LOS (days): 6  Geometric Mean LOS (GMLOS) (days):   Days to GMLOS:     OBJECTIVE:  Risk of Unplanned Readmission Score: 27.09         Current admission status: Inpatient   Preferred Pharmacy:   [id number]  AL[place]REA VALIENTE - [address]  [address] [place] [address]  Phone: [phone] Fax: [phone]    [id number] [place] - [address] [facility] [address] [facility] [address] [facility]  [address]  [place] [address]  Phone: [phone] Fax:  [phone]    [id number] [place] - [address] [facility]  [address]  [place] [place] [address]  Phone: [phone] Fax: [phone]    [facility] - ALEXANDREA Ocampo - [address]  [facility],  [address]  [facility] [address] [facility],  First [address] [facility] [place] [address]  Phone: [phone] Fax: [phone]    [id number] - [address] [place] [address]  Phone: [phone] Fax: [phone]    Primary Care Provider: Elias Schoen, MD    Primary Insurance: [facility] FOR [facility]  Secondary Insurance:     DISCHARGE DETAILS:                                     DME Referral Provided  Referral made for DME?: Yes  DME referral completed for the following items:: Other (Tube feeds)  DME Supplier Name:: [employer]    Other Referral/Resources/Interventions Provided:  Interventions: DME       CM placed a [place] request to [facility] for his tube feeds after speaking with nutrition to confirm his needs.    CM department will continue to follow to assist with discharge coordination.

## 2024-03-12 NOTE — PLAN OF CARE
Problem: PAIN - ADULT  Goal: Verbalizes/displays adequate comfort level or baseline comfort level  Description: Interventions:  - Encourage patient to monitor pain and request assistance  - Assess pain using appropriate pain scale  - Administer analgesics based on type and severity of pain and evaluate response  - Implement non-pharmacological measures as appropriate and evaluate response  - Consider cultural and social influences on pain and pain management  - Notify physician/advanced practitioner if interventions unsuccessful or patient reports new pain  Outcome: Progressing     Problem: INFECTION - ADULT  Goal: Absence or prevention of progression during hospitalization  Description: INTERVENTIONS:  - Assess and monitor for signs and symptoms of infection  - Monitor lab/diagnostic results  - Monitor all insertion sites, i.e. indwelling lines, tubes, and drains  - Monitor endotracheal if appropriate and nasal secretions for changes in amount and color  - [place] appropriate cooling/warming therapies per order  - Administer medications as ordered  - Instruct and encourage patient and family to use good hand hygiene technique  - Identify and instruct in appropriate isolation precautions for identified infection/condition  Outcome: Progressing  Goal: Absence of fever/infection during neutropenic period  Description: INTERVENTIONS:  - Monitor WBC    Outcome: Progressing     Problem: SAFETY ADULT  Goal: Patient will remain free of falls  Description: INTERVENTIONS:  - Educate patient/family on patient safety including physical limitations  - Instruct patient to call for assistance with activity   - Consult OT/PT to assist with strengthening/mobility   - Keep Call bell within reach  - Keep bed low and locked with side rails adjusted as appropriate  - Keep care items and personal belongings within reach  - Initiate and maintain comfort rounds  - Make Fall Risk Sign visible to staff  - Obtain necessary fall risk  management equipment  - Apply yellow socks and bracelet for high fall risk patients  - Consider moving patient to room near nurses station  Outcome: Progressing  Goal: Maintain or return to baseline ADL function  Description: INTERVENTIONS:  -  Assess patient's ability to carry out ADLs; assess patient's baseline for ADL function and identify physical deficits which impact ability to perform ADLs (bathing, care of mouth/teeth, toileting, grooming, dressing, etc.)  - Assess/evaluate cause of self-care deficits   - Assess range of motion  - Assess patient's mobility; develop plan if impaired  - Assess patient's need for assistive devices and provide as appropriate  - Encourage maximum independence but intervene and supervise when necessary  - Involve family in performance of ADLs  - Assess for home care needs following discharge   - Consider OT consult to assist with ADL evaluation and planning for discharge  - Provide patient education as appropriate  Outcome: Progressing  Goal: Maintains/Returns to pre admission functional level  Description: INTERVENTIONS:  - Perform AM-PAC 6 Click Basic Mobility/ Daily Activity assessment daily.  - Set and communicate daily mobility goal to care team and patient/family/caregiver.   - Collaborate with rehabilitation services on mobility goals if consulted  - Out of bed for toileting  - Record patient progress and toleration of activity level   Outcome: Progressing     Problem: DISCHARGE PLANNING  Goal: Discharge to home or other facility with appropriate resources  Description: INTERVENTIONS:  - Identify barriers to discharge w/patient and caregiver  - Arrange for needed discharge resources and transportation as appropriate  - Identify discharge learning needs (meds, wound care, etc.)  - Arrange for interpretive services to assist at discharge as needed  - Refer to Case Management Department for coordinating discharge planning if the patient needs post-hospital services based on  physician/advanced practitioner order or complex needs related to functional status, cognitive ability, or social support system  Outcome: Progressing     Problem: Knowledge Deficit  Goal: Patient/family/caregiver demonstrates understanding of disease process, treatment plan, medications, and discharge instructions  Description: Complete learning assessment and assess knowledge base.  Interventions:  - Provide teaching at level of understanding  - Provide teaching via preferred learning methods  Outcome: Progressing     Problem: Nutrition/Hydration-ADULT  Goal: Nutrient/Hydration intake appropriate for improving, restoring or maintaining nutritional needs  Description: Monitor and assess patient's nutrition/hydration status for malnutrition. Collaborate with interdisciplinary team and initiate plan and interventions as ordered.  Monitor patient's weight and dietary intake as ordered or per policy. Utilize nutrition screening tool and intervene as necessary. Determine patient's food preferences and provide high-protein, high-caloric foods as appropriate.     INTERVENTIONS:  - Monitor oral intake, urinary output, labs, and treatment plans  - Assess nutrition and hydration status and recommend course of action  - Evaluate amount of meals eaten  - Assist patient with eating if necessary   - Allow adequate time for meals  - Recommend/ encourage appropriate diets, oral nutritional supplements, and vitamin/mineral supplements  - Order, calculate, and assess calorie counts as needed  - Recommend, monitor, and adjust tube feedings and TPN/PPN based on assessed needs  - Assess need for intravenous fluids  - Provide specific nutrition/hydration education as appropriate  - Include patient/family/caregiver in decisions related to nutrition  Outcome: Progressing     Problem: GASTROINTESTINAL - ADULT  Goal: Minimal or absence of nausea and/or vomiting  Description: INTERVENTIONS:  - Administer IV fluids if ordered to ensure  adequate hydration  - Maintain NPO status until nausea and vomiting are resolved  - Nasogastric tube if ordered  - Administer ordered antiemetic medications as needed  - Provide nonpharmacologic comfort measures as appropriate  - Advance diet as tolerated, if ordered  - Consider nutrition services referral to assist patient with adequate nutrition and appropriate food choices  Outcome: Progressing

## 2024-03-12 NOTE — PROGRESS NOTES
[facility]  Progress Note  Name: Eliceo Garcia I  MRN: [id number]  Unit/Bed#: S -01 I Date of Admission: 3/6/2024   Date of Service: 3/12/2024 I Hospital Day: 6    Assessment/Plan   * Complication of feeding tube (HCC)  Assessment & Plan  Patient presented to ED at the suggestion of oncology team in the setting of high likelihood for refeeding syndrome; reports unable to tolerate PO intake or tube feeds for months.    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  He states he has been favoring PO intake.  Previously documented he does not like to use PEG tube.    Monitor CMP, CBC, Mg, Phos with a.m. labs; replete PRN  Tube feeds have been running at 45 mL/h without any concerns or complaints.  Will advance by 5 cc/hr today and if patient tolerates well, will advance to 55 cc/hr, goal rate, in evening.   Nutrition consulted, appreciate recs  Suggest initiate Jevity 1.5 @ 15 ml/hr x initial 24 hours. Current Rate: 0  Pending electrolytes, advance TF by 10 ml every 12 hours to goal rate of 60 ml/hr.   100 mg IV thiamin for 5-7 days to assist with refeeding. Patient at very high risk.   Zofran ordered for nausea/vomiting, no prior history of QTc prolongation  Palliative helping manage pain medications as well as anti-emetics.    Severe protein-calorie malnutrition (HCC)  Assessment & Plan  Malnutrition Findings:   Adult Malnutrition type: Acute illness (in the setting of chronic illness)  Temporal wasting, clavicular prominence, cold intolerance  Diffuse loss of subcutaneous fat  Low BMI  Hypoalbuminemia, hyponatremia likely 2/2 poor PO intake    BMI Findings:      Body mass index is 14.27 kg/m².     Patient reports decreased PO intake for months, noted to have approx a 25lb weight loss since the start of the calendar year.  Has been seen multiple times by nutrition in conjunction with oncology but continues to struggle with meeting calorie goals, worsened recently  in the setting of chronic nausea/vomiting with nearly all PO intake.    Nutrition consulted, appreciate recs  Advance TF until goal   PRN medication for nausea/vomiting ordered    Electrolyte abnormality  Assessment & Plan  Patient admitted due to high risk for refeeding syndrome.    Potassium   Date Value Ref Range Status   03/12/2024 4.1 3.5 - 5.3 mmol/L Final     Magnesium   Date Value Ref Range Status   03/12/2024 1.6 (L) 1.9 - 2.7 mg/dL Final     Phosphorus   Date Value Ref Range Status   03/12/2024 3.6 2.3 - 4.1 mg/dL Final     Monitor closely for concern over re-feeding syndrome - patient at a high risk  Mag decreased at 1.6. Patient refused IV mag. Ordered PO Mag oxide.     Alcohol use disorder, severe, dependence (HCC)  Assessment & Plan  Patient with previously documented hx of alcohol abuse, drinking 3-4 beers par day.  States last drink was approximately 72 hours prior to the time of initial assessment on 3/6.  Previously documented to drink up to 14 beers daily, however patient recently has been reporting vomiting of all PO intake.    [facility] protocol ordered  Recommend monitoring on telemetry and radha at this time, patient currently refusing  Blood ethanol level ordered--normal  Thiamine, folate, multivitamin ordered    Anemia  Assessment & Plan  Baseline appears to be around 8-10  Hemoglobin today 8.8  Iron panel shows decreased iron, decreased TIBC, and increased ferritin. Likely due to anemia of chronic disease vs iron deficiency anemia. B12 low normal and folate normal.    Plan-  Continue to monitor Hgb.   Transfuse below 7 PRN  Will replete b12.     Cancer-related pain  Assessment & Plan  Patient states he takes Roxicodone 20mg q4h around the clock for cancer-related pain    Continue home Roxicodone 20mg q4h for pain  IV Narcan PRN for respiratory depression  Heat pack ordered for anterior jaw pain per patient request    Hyponatremia  Assessment & Plan  Recent Labs     03/10/24  0404  03/11/24  0601 03/12/24  0436   SODIUM 132* 129* 130*       Patient with chronic hyponatremia  Multifactorial  Suspected etiology: SIADH +/- poor solute intake  Worsened this AM due to discontinuation of tube feeds    Plan:  Continue to monitor  Consider salt tabs VS p.o. fluid restriction if not improving    Cigarette nicotine dependence without complication  Assessment & Plan  NRT ordered, 14mg/d in place of 1/2 PPD    Hypertension  Assessment & Plan  Temp:  [97.4 °F (36.3 °C)-97.6 °F (36.4 °C)] 97.6 °F (36.4 °C)  HR:  [74-77] 74  Resp:  [16-18] 18  BP: (129-141)/(71-87) 136/87    Patient reports he does not take any medications at home.  Noted to have previously been on amlodipine 5mg daily per chart review    Vitals per unit routine  Will order PRN medication if SBP >180, not currently ordered as patient is currently refusing BP monitoring.         VTE Pharmacologic Prophylaxis: VTE Score: 5 High Risk (Score >/= 5) - Pharmacological DVT Prophylaxis Ordered: heparin. Sequential Compression Devices Ordered.    Mobility:   Basic Mobility Inpatient Raw Score: 24  JH-HLM Goal: 8: Walk 250 feet or more  JH-HLM Achieved: 7: Walk 25 feet or more  HLM Goal achieved. Continue to encourage appropriate mobility.    Patient Centered Rounds: I performed bedside rounds with nursing staff today.  Discussions with Specialists or Other Care Team Provider: None    Education and Discussions with Family / Patient: Attempted to update  (father) via phone. Left voicemail.     Current Length of Stay: 6 day(s)  Current Patient Status: Inpatient   Discharge Plan: Anticipate discharge in 24-48 hrs to home.    Code Status: Level 1 - Full Code    Subjective:   Patient seen at bedside today.  No acute events overnight.  Patient states that he is currently feeling better compared to yesterday.  He is not in any pain despite having his pain medications reduced as per palliative yesterday.  He still wants to increase his tube  feeds just 5 cc/h instead of 10 cc/h.  He is afraid that if he increases too quickly, he will become nauseous and extend his hospital stay.      Objective:     Vitals:   Temp (24hrs), Av.5 °F (36.4 °C), Min:97.4 °F (36.3 °C), Max:97.6 °F (36.4 °C)    Temp:  [97.4 °F (36.3 °C)-97.6 °F (36.4 °C)] 97.6 °F (36.4 °C)  HR:  [74-77] 74  Resp:  [16-18] 18  BP: (129-141)/(71-87) 136/87  SpO2:  [94 %-96 %] 94 %  Body mass index is 14.27 kg/m².     Input and Output Summary (last 24 hours):     Intake/Output Summary (Last 24 hours) at 3/12/2024 1327  Last data filed at 3/12/2024 1201  Gross per 24 hour   Intake 375 ml   Output 400 ml   Net -25 ml       Physical Exam:   Physical Exam  Vitals and nursing note reviewed. Exam conducted with a chaperone present.   Constitutional:       General: He is not in acute distress.     Appearance: Normal appearance. He is ill-appearing (cachectic). He is not toxic-appearing or diaphoretic.   HENT:      Head: Atraumatic.      Mouth/Throat:      Comments: Muffled voice  Eyes:      General: No scleral icterus.     Extraocular Movements: Extraocular movements intact.      Conjunctiva/sclera: Conjunctivae normal.   Cardiovascular:      Rate and Rhythm: Normal rate and regular rhythm.      Pulses: Normal pulses.      Heart sounds: Normal heart sounds. No murmur heard.     No friction rub. No gallop.   Pulmonary:      Effort: Pulmonary effort is normal. No respiratory distress.      Breath sounds: No wheezing, rhonchi or rales.   Abdominal:      General: Abdomen is flat. There is no distension.      Tenderness: There is no abdominal tenderness. There is no guarding or rebound.      Comments: PEG tube in place   Musculoskeletal:         General: Normal range of motion.      Cervical back: Normal range of motion.      Right lower leg: No edema.      Left lower leg: No edema.   Skin:     General: Skin is warm.      Coloration: Skin is not jaundiced.   Neurological:      General: No focal deficit  present.      Mental Status: He is alert and oriented to person, place, and time.   Psychiatric:         Judgment: Judgment normal.          Additional Data:     Labs:  Results from last 7 days   Lab Units 03/12/24  0436 03/11/24  0601 03/10/24  0404   WBC Thousand/uL 7.59   < > 8.08   HEMOGLOBIN g/dL 8.8*   < > 9.1*   HEMATOCRIT % 27.5*   < > 28.5*   PLATELETS Thousands/uL 292   < > 279   NEUTROS PCT %  --   --  71   LYMPHS PCT %  --   --  7*   MONOS PCT %  --   --  18*   EOS PCT %  --   --  2    < > = values in this interval not displayed.     Results from last 7 days   Lab Units 03/12/24  0436 03/08/24  0602 03/07/24  0517   SODIUM mmol/L 130*   < > 132*   POTASSIUM mmol/L 4.1   < > 4.0   CHLORIDE mmol/L 93*   < > 97   CO2 mmol/L 33*   < > 29   BUN mg/dL 13   < > 9   CREATININE mg/dL 0.60   < > 0.67   ANION GAP mmol/L 4   < > 6   CALCIUM mg/dL 8.7   < > 8.7   ALBUMIN g/dL  --   --  2.9*   TOTAL BILIRUBIN mg/dL  --   --  0.24   ALK PHOS U/L  --   --  85   ALT U/L  --   --  5*   AST U/L  --   --  9*   GLUCOSE RANDOM mg/dL 114   < > 113    < > = values in this interval not displayed.                       Lines/Drains:  Invasive Devices       Central Venous Catheter Line  Duration             Port A Cath 11/07/23 Left Internal jugular 126 days              Drain  Duration             Gastrostomy/Enterostomy Percutaneous Interventional Gastrostomy 16 Fr. LUQ 92 days                    Central Line:  Goal for removal: N/A - Chronic PICC             Imaging: No pertinent imaging reviewed.    Recent Cultures (last 7 days):         Last 24 Hours Medication List:   Current Facility-Administered Medications   Medication Dose Route Frequency Provider Last Rate    acetaminophen  650 mg Oral Q6H PRN Teresa Alonso, DO      atorvastatin  40 mg Oral QPM Teresa Alonso, DO      calcium carbonate  1,000 mg Oral Daily PRN Teresa Alonso, DO      cyanocobalamin  1,000 mcg Oral Daily Madalyn Peter MD       diphenhydramine, lidocaine, Al/Mg hydroxide, simethicone  10 mL Swish & Spit Q4H PRN Braulio Deeu, DO      famotidine  20 mg Oral BID Amos Diehl MD      folic acid  1 mg Oral Daily Teresa Alonso,       heparin (porcine)  5,000 Units Subcutaneous Q8H [facility] Teresa Alnoso, DO      lactulose  15 g Oral BID PRN Amos Diehl MD      magnesium Oxide  400 mg Oral BID Tobi Monson DO      multivitamin-minerals  1 tablet Oral Daily Teresa Alonso, DO      naloxone  0.04 mg Intravenous Q1MIN PRN Teresa Alonso, DO      nicotine  1 patch Transdermal Daily Teresa Alonso, DO      ondansetron  4 mg Intravenous Q4H PRN Madalyn Peter MD      oxyCODONE  10 mg Oral Q4H PRN Braulio Shiu, DO      Or    oxyCODONE  20 mg Oral Q4H PRN Braulio Shiu, DO      senna-docusate sodium  1 tablet Oral Daily Teresa Alonso, DO      sodium chloride  1 g Oral BID With Meals Cody Pedersen,       thiamine  100 mg Intravenous Daily Amos Diehl  mg (03/12/24 9118)        Today, Patient Was Seen By: Tobi Monson DO    **Please Note: This note may have been constructed using a voice recognition system.**

## 2024-03-12 NOTE — PROGRESS NOTES
[facility]  Progress Note  Name: lEiceo Garcia I  MRN: [id number]  Unit/Bed#: S -01 I Date of Admission: 3/6/2024   Date of Service: 3/12/2024 I Hospital Day: 6    Assessment/Plan   Palliative care patient  Assessment & Plan  Follows [facility] for Palliative Care - Dr. Jj outpatient.    Cancer-related pain  Assessment & Plan  Follows [facility] Outpatient - last seen by Dr. Jj  Plan:  1) Pain well-controlled with the OxyIR 10mg on my discussion with patient today. He would like to continue with this dosing as he feels it has remained effective with controlling his pain and while resolving his nausea.  -Continue current regimen as below   A) OxyIR 10 mg q4 hours PRN for moderate pain   B) OxyIR 20 mg q4 hours PRN for severe pain   -overall goal is to ultimately taper as able from opioids   -If patient remains tolerant of the OxyIR 10mg, may be discharged with new instructions of OxyIR 10mg q4 hours PRN for pain.      Alcohol use disorder, severe, dependence (HCC)  Assessment & Plan       * Complication of feeding tube (HCC)  Assessment & Plan  Adjustments currently being made to restart.         Social Support:  Supportive listening provided  Normalized experience of patient/family  Provided anxiety containment    Follow-up  We appreciate the opportunity to participate in this patient's care.   We will continue to follow. [facility] to follow up.  Please do not hesitate to contact our on-call provider through our clinic answering service at [phone] should there be an acute change or other symptom control concerns.    Code status: Level 1 - Full Code   Decisional apparatus:  Patient does have capacity to make medical decisions on my exam today. If such capacity is lost, patient's substitute decision maker would default to father by [place] Act 169.   Advance Directive / Living Will / POLST:  No    We appreciate the opportunity to participate in this patient's care. We will continue to follow.  Please do not hesitate to contact our on-call provider through our clinic answering service at [phone] should you have acute symptom control concerns.      Subjective  Sitting up in bed with no acute distress or discomfort.  States nausea has resolved since the change of OxyIR to 10mg.  States he would like to stay on the dosing of OxyIR 10mg for now.  Patient feeling the need to have a bowel movement today. Still passing flatus without issues or discomfort.    Review of Systems   Constitutional:  Negative for appetite change, chills, fatigue and fever.   HENT:  Negative for trouble swallowing.    Eyes:  Negative for visual disturbance.   Respiratory:  Negative for cough and shortness of breath.    Cardiovascular:  Negative for chest pain.   Gastrointestinal:  Negative for abdominal pain, constipation, diarrhea, nausea and vomiting.   Genitourinary:  Negative for difficulty urinating and dysuria.   Musculoskeletal:  Negative for back pain and myalgias.   Psychiatric/Behavioral:  Negative for sleep disturbance.    All other systems reviewed and are negative.      MEDICATIONS / ALLERGIES:  all current active meds have been reviewed, current meds:   Current Facility-Administered Medications   Medication Dose Route Frequency    acetaminophen (TYLENOL) tablet 650 mg  650 mg Oral Q6H PRN    atorvastatin (LIPITOR) tablet 40 mg  40 mg Oral QPM    calcium carbonate (TUMS) chewable tablet 1,000 mg  1,000 mg Oral Daily PRN    cyanocobalamin (VITAMIN B-12) tablet 1,000 mcg  1,000 mcg Oral Daily    diphenhydramine, lidocaine, Al/Mg hydroxide, simethicone (Magic Mouthwash) oral solution 10 mL  10 mL Swish & Spit Q4H PRN    famotidine (PEPCID) tablet 20 mg  20 mg Oral BID    folic acid (FOLVITE) tablet 1 mg  1 mg Oral Daily    heparin (porcine) subcutaneous injection 5,000 Units  5,000 Units Subcutaneous Q8H MANDY    lactulose (CHRONULAC) oral solution 15 g  15 g Oral BID PRN    magnesium Oxide (MAG-OX) tablet 400 mg  400 mg  Oral BID    multivitamin-minerals (CENTRUM) tablet 1 tablet  1 tablet Oral Daily    naloxone (NARCAN) 0.04 mg/mL syringe 0.04 mg  0.04 mg Intravenous Q1MIN PRN    nicotine (NICODERM CQ) 14 mg/24hr TD 24 hr patch 1 patch  1 patch Transdermal Daily    ondansetron (ZOFRAN) injection 4 mg  4 mg Intravenous Q4H PRN    oxyCODONE (ROXICODONE) immediate release tablet 10 mg  10 mg Oral Q4H PRN    Or    oxyCODONE (ROXICODONE) immediate release tablet 20 mg  20 mg Oral Q4H PRN    senna-docusate sodium (SENOKOT S) 8.6-50 mg per tablet 1 tablet  1 tablet Oral Daily    sodium chloride tablet 1 g  1 g Oral BID With Meals    thiamine (VITAMIN B1) 100 mg in sodium chloride 0.9 % 50 mL IVPB  100 mg Intravenous Daily   , and PTA meds:   Prior to Admission Medications   Prescriptions Last Dose Informant Patient Reported? Taking?   Aspirin Low Dose 81 MG chewable tablet Not Taking  No No   Sig: CHEW 1 TABLET BY MOUTH DAILY   Patient not taking: Reported on 2/21/2024   atorvastatin (LIPITOR) 40 mg tablet 3/6/2024  No Yes   Sig: TAKE 1 TABLET BY MOUTH EVERY DAY IN THE EVENING   chlorhexidine (PERIDEX) 0.12 % solution Not Taking  Yes No   Patient not taking: Reported on 12/14/2023   dexamethasone 0.5 MG/5ML elixir Not Taking  No No   Sig: Take 10 mL (1 mg total) by mouth 4 (four) times a day   Patient not taking: Reported on 1/26/2024   lactulose (CHRONULAC) 10 g/15 mL solution Not Taking  No No   Sig: Take 15 mL (10 g total) by mouth 2 (two) times a day   Patient not taking: Reported on 3/6/2024   magnesium Oxide (MAG-OX) 400 mg TABS Unknown  No No   Sig: Take 1 tablet (400 mg total) by mouth 2 (two) times a day   naloxone (NARCAN) 4 mg/0.1 mL nasal spray Not Taking  No No   Sig: Administer 1 spray into a nostril. If no response after 2-3 minutes, give another dose in the other nostril using a new spray.   Patient not taking: Reported on 2/21/2024   oxyCODONE (ROXICODONE) 20 MG TABS 3/6/2024  No Yes   Sig: Take 1 tablet (20 mg total) by  mouth every 4 (four) hours as needed for moderate pain Max Daily Amount: 120 mg Do not start before March 9, 2024.   potassium chloride (Klor-Con M20) 20 mEq tablet   No No   Sig: Take 1 tablet (20 mEq total) by mouth daily for 14 days   senna-docusate sodium (SENOKOT-S) 8.6-50 mg per tablet 3/6/2024  No Yes   Sig: Take 1 tablet by mouth daily   silver sulfadiazine (SILVADENE,SSD) 1 % cream Not Taking  No No   Sig: Apply topically 2 (two) times a day   Patient not taking: Reported on 1/11/2024      Facility-Administered Medications: None       Allergies   Allergen Reactions    Bee Venom Hives    Other      bees       OBJECTIVE:  /87   Pulse 74   Temp 97.6 °F (36.4 °C)   Resp 18   Wt 51.8 kg (114 lb 3.2 oz)   SpO2 94%   BMI 14.27 kg/m²   Nursing notes reviewed.  Physical Exam  Vitals reviewed.   Constitutional:       General: He is not in acute distress.     Appearance: He is well-developed. He is not ill-appearing, toxic-appearing or diaphoretic.   HENT:      Head: Normocephalic and atraumatic.      Nose: Nose normal.      Mouth/Throat:      Mouth: Mucous membranes are moist.   Eyes:      General:         Right eye: No discharge.         Left eye: No discharge.   Cardiovascular:      Rate and Rhythm: Normal rate.   Pulmonary:      Effort: Pulmonary effort is normal. No respiratory distress.   Abdominal:      General: Abdomen is flat. There is no distension.   Musculoskeletal:         General: No swelling.   Skin:     General: Skin is warm and dry.      Coloration: Skin is not jaundiced or pale.   Neurological:      General: No focal deficit present.      Mental Status: He is alert.   Psychiatric:         Mood and Affect: Mood normal.         Behavior: Behavior normal.         Thought Content: Thought content normal.         Judgment: Judgment normal.         Lab Results: I have personally reviewed pertinent labs.    HEMATOLOGY PROFILE:  Results from last 7 days   Lab Units 03/12/24  0436 03/11/24  0601  "03/10/24  0404 03/09/24  0525 03/08/24  0602   WBC Thousand/uL 7.59 7.21 8.08 7.21 8.65   HEMOGLOBIN g/dL 8.8* 8.8* 9.1* 9.2* 9.4*   HEMATOCRIT % 27.5* 27.5* 28.5* 29.3* 29.2*   PLATELETS Thousands/uL 292 272 279 287 289   NEUTROS PCT %  --   --  71 72 77*   MONOS PCT %  --   --  18* 17* 14*   EOS PCT %  --   --  2 2 1       CHEMISTRY PROFILE:  Results from last 7 days   Lab Units 03/12/24  0436 03/11/24  0601 03/10/24  0404 03/08/24  0602 03/07/24  0517 03/06/24  2000   SODIUM mmol/L 130* 129* 132*   < > 132* 132*   POTASSIUM mmol/L 4.1 4.3 4.2   < > 4.0 4.3   CHLORIDE mmol/L 93* 93* 96   < > 97 96   CO2 mmol/L 33* 34* 32   < > 29 30   BUN mg/dL 13 12 14   < > 9 10   CREATININE mg/dL 0.60 0.61 0.69   < > 0.67 0.60   ALBUMIN g/dL  --   --   --   --  2.9* 3.0*   CALCIUM mg/dL 8.7 8.5 8.6   < > 8.7 8.9   ALK PHOS U/L  --   --   --   --  85 86   ALT U/L  --   --   --   --  5* 6*   AST U/L  --   --   --   --  9* 9*    < > = values in this interval not displayed.       Albumin:  0   Lab Value Date/Time    ALB 2.9 (L) 03/07/2024 0517     Imaging Studies: I have personally reviewed pertinent reports.  EKG, Pathology, and Other Studies: I have personally reviewed pertinent reports.    Counseling / Coordination of Care:  Total floor / unit time spent today 15 minutes. Greater than 50% of total time was spent with the patient and / or family counseling and / or coordination of care. A description of the counseling / coordination of care: symptom assessment and management, medication review, psychosocial support, chart review, imaging review, lab review, supportive listening, and anticipatory guidance.    Braulio Lowry DO  [facility] Palliative and Supportive Care  [phone]    Portions of this document may have been created using dictation software and as such some \"sound alike\" terms may have been generated by the system. Do not hesitate to contact me with any questions or clarifications.      "

## 2024-03-12 NOTE — ASSESSMENT & PLAN NOTE
Temp:  [97.4 °F (36.3 °C)-98 °F (36.7 °C)] 97.6 °F (36.4 °C)  HR:  [74-78] 74  Resp:  [16-18] 18  BP: (129-169)/(71-87) 136/87    Patient reports he does not take any medications at home.  Noted to have previously been on amlodipine 5mg daily per chart review    Vitals per unit routine  Will order PRN medication if SBP >180, not currently ordered as patient is currently refusing BP monitoring.

## 2024-03-12 NOTE — ASSESSMENT & PLAN NOTE
Patient with previously documented hx of alcohol abuse, drinking 3-4 beers par day.  States last drink was approximately 72 hours prior to the time of initial assessment on 3/6.  Previously documented to drink up to 14 beers daily, however patient recently has been reporting vomiting of all PO intake.    [facility] protocol ordered  Recommend monitoring on telemetry and radha at this time, patient currently refusing  Blood ethanol level ordered--normal  Thiamine, folate, multivitamin ordered

## 2024-03-13 LAB
ANION GAP SERPL CALCULATED.3IONS-SCNC: 3 MMOL/L (ref 4–13)
BUN SERPL-MCNC: 15 MG/DL (ref 5–25)
CALCIUM SERPL-MCNC: 8.6 MG/DL (ref 8.4–10.2)
CHLORIDE SERPL-SCNC: 96 MMOL/L (ref 96–108)
CO2 SERPL-SCNC: 33 MMOL/L (ref 21–32)
CREAT SERPL-MCNC: 0.54 MG/DL (ref 0.6–1.3)
DME PARACHUTE DELIVERY DATE ACTUAL: NORMAL
DME PARACHUTE DELIVERY DATE EXPECTED: NORMAL
DME PARACHUTE DELIVERY DATE REQUESTED: NORMAL
DME PARACHUTE ITEM DESCRIPTION: NORMAL
DME PARACHUTE ORDER STATUS: NORMAL
DME PARACHUTE SUPPLIER NAME: NORMAL
DME PARACHUTE SUPPLIER PHONE: NORMAL
ERYTHROCYTE [DISTWIDTH] IN BLOOD BY AUTOMATED COUNT: 15.8 % (ref 11.6–15.1)
GFR SERPL CREATININE-BSD FRML MDRD: 114 ML/MIN/1.73SQ M
GLUCOSE SERPL-MCNC: 109 MG/DL (ref 65–140)
HCT VFR BLD AUTO: 27.1 % (ref 36.5–49.3)
HGB BLD-MCNC: 8.7 G/DL (ref 12–17)
MAGNESIUM SERPL-MCNC: 1.6 MG/DL (ref 1.9–2.7)
MCH RBC QN AUTO: 32 PG (ref 26.8–34.3)
MCHC RBC AUTO-ENTMCNC: 32.1 G/DL (ref 31.4–37.4)
MCV RBC AUTO: 100 FL (ref 82–98)
PHOSPHATE SERPL-MCNC: 3.5 MG/DL (ref 2.3–4.1)
PLATELET # BLD AUTO: 302 THOUSANDS/UL (ref 149–390)
PMV BLD AUTO: 9.4 FL (ref 8.9–12.7)
POTASSIUM SERPL-SCNC: 4.3 MMOL/L (ref 3.5–5.3)
RBC # BLD AUTO: 2.72 MILLION/UL (ref 3.88–5.62)
SODIUM SERPL-SCNC: 132 MMOL/L (ref 135–147)
WBC # BLD AUTO: 7.63 THOUSAND/UL (ref 4.31–10.16)

## 2024-03-13 PROCEDURE — 83735 ASSAY OF MAGNESIUM: CPT

## 2024-03-13 PROCEDURE — 85027 COMPLETE CBC AUTOMATED: CPT

## 2024-03-13 PROCEDURE — 99232 SBSQ HOSP IP/OBS MODERATE 35: CPT | Performed by: INTERNAL MEDICINE

## 2024-03-13 PROCEDURE — 80048 BASIC METABOLIC PNL TOTAL CA: CPT

## 2024-03-13 PROCEDURE — 84100 ASSAY OF PHOSPHORUS: CPT

## 2024-03-13 RX ORDER — MAGNESIUM SULFATE HEPTAHYDRATE 40 MG/ML
2 INJECTION, SOLUTION INTRAVENOUS ONCE
Status: COMPLETED | OUTPATIENT
Start: 2024-03-13 | End: 2024-03-13

## 2024-03-13 RX ADMIN — DIPHENHYDRAMINE HYDROCHLORIDE AND LIDOCAINE HYDROCHLORIDE AND ALUMINUM HYDROXIDE AND MAGNESIUM HYDRO 10 ML: KIT at 06:08

## 2024-03-13 RX ADMIN — LACTULOSE 15 G: 20 SOLUTION ORAL at 21:46

## 2024-03-13 RX ADMIN — OXYCODONE HYDROCHLORIDE 20 MG: 10 TABLET ORAL at 16:00

## 2024-03-13 RX ADMIN — OXYCODONE HYDROCHLORIDE 10 MG: 10 TABLET ORAL at 06:05

## 2024-03-13 RX ADMIN — OXYCODONE HYDROCHLORIDE 10 MG: 10 TABLET ORAL at 20:20

## 2024-03-13 RX ADMIN — FAMOTIDINE 20 MG: 20 TABLET ORAL at 17:59

## 2024-03-13 RX ADMIN — SODIUM CHLORIDE 1 G: 1 TABLET ORAL at 17:59

## 2024-03-13 RX ADMIN — MAGNESIUM SULFATE HEPTAHYDRATE 2 G: 2 INJECTION, SOLUTION INTRAVENOUS at 08:36

## 2024-03-13 RX ADMIN — Medication 400 MG: at 17:59

## 2024-03-13 RX ADMIN — DIPHENHYDRAMINE HYDROCHLORIDE AND LIDOCAINE HYDROCHLORIDE AND ALUMINUM HYDROXIDE AND MAGNESIUM HYDRO 10 ML: KIT at 20:24

## 2024-03-13 RX ADMIN — ATORVASTATIN CALCIUM 40 MG: 40 TABLET, FILM COATED ORAL at 17:59

## 2024-03-13 NOTE — QUICK NOTE
3/13/2024 3:54 PM -  Eliceo CHEN Garcia's chart and case were reviewed by Braulio Lowry DO.  Mode of review included electronic chart check.  Per review, symptoms remain controlled on current regimen and no changes are made at this time.  Please continue the regimen in place, and review our last note for details.  For dispo plan, please review Case Management notes.     Palliative care will return on 3/14/2024.  Patient's last script from 3/9/2024 for Oxycodone 20 mg with #60 tabs. Patient should have a good supply at this time.    New instructions for OxyIR 10 mg q4 hours PRN once safe for discharge (as patient felt he tolerates this lower dose better compared to the 20 mg).    Patient is appropriate for outpatient [facility] follow-up.  We will make arrangements through our office.      For urgent issues or any questions/concerns, please notify on-call provider via [employer].  You may also call our answering service 24/7 at [phone].    Braulio Lowry DO  Palliative and Supportive Care  Clinic/Answering Service: [phone]  You can find me on [employer]!

## 2024-03-13 NOTE — PLAN OF CARE
Problem: PAIN - ADULT  Goal: Verbalizes/displays adequate comfort level or baseline comfort level  Description: Interventions:  - Encourage patient to monitor pain and request assistance  - Assess pain using appropriate pain scale  - Administer analgesics based on type and severity of pain and evaluate response  - Implement non-pharmacological measures as appropriate and evaluate response  - Consider cultural and social influences on pain and pain management  - Notify physician/advanced practitioner if interventions unsuccessful or patient reports new pain  Outcome: Progressing     Problem: INFECTION - ADULT  Goal: Absence or prevention of progression during hospitalization  Description: INTERVENTIONS:  - Assess and monitor for signs and symptoms of infection  - Monitor lab/diagnostic results  - Monitor all insertion sites, i.e. indwelling lines, tubes, and drains  - Monitor endotracheal if appropriate and nasal secretions for changes in amount and color  - [place] appropriate cooling/warming therapies per order  - Administer medications as ordered  - Instruct and encourage patient and family to use good hand hygiene technique  - Identify and instruct in appropriate isolation precautions for identified infection/condition  Outcome: Progressing  Goal: Absence of fever/infection during neutropenic period  Description: INTERVENTIONS:  - Monitor WBC    Outcome: Progressing     Problem: SAFETY ADULT  Goal: Patient will remain free of falls  Description: INTERVENTIONS:  - Educate patient/family on patient safety including physical limitations  - Instruct patient to call for assistance with activity   - Consult OT/PT to assist with strengthening/mobility   - Keep Call bell within reach  - Keep bed low and locked with side rails adjusted as appropriate  - Keep care items and personal belongings within reach  - Initiate and maintain comfort rounds  - Make Fall Risk Sign visible to staff  - Obtain necessary fall risk  management equipment  - Apply yellow socks and bracelet for high fall risk patients  - Consider moving patient to room near nurses station  Outcome: Progressing  Goal: Maintain or return to baseline ADL function  Description: INTERVENTIONS:  -  Assess patient's ability to carry out ADLs; assess patient's baseline for ADL function and identify physical deficits which impact ability to perform ADLs (bathing, care of mouth/teeth, toileting, grooming, dressing, etc.)  - Assess/evaluate cause of self-care deficits   - Assess range of motion  - Assess patient's mobility; develop plan if impaired  - Assess patient's need for assistive devices and provide as appropriate  - Encourage maximum independence but intervene and supervise when necessary  - Involve family in performance of ADLs  - Assess for home care needs following discharge   - Consider OT consult to assist with ADL evaluation and planning for discharge  - Provide patient education as appropriate  Outcome: Progressing  Goal: Maintains/Returns to pre admission functional level  Description: INTERVENTIONS:  - Perform AM-PAC 6 Click Basic Mobility/ Daily Activity assessment daily.  - Set and communicate daily mobility goal to care team and patient/family/caregiver.   - Collaborate with rehabilitation services on mobility goals if consulted  - - Ambulate patient 3 times a day  - Out of bed for meals 3 times a day  - Out of bed for toileting  - Record patient progress and toleration of activity level   Outcome: Progressing     Problem: DISCHARGE PLANNING  Goal: Discharge to home or other facility with appropriate resources  Description: INTERVENTIONS:  - Identify barriers to discharge w/patient and caregiver  - Arrange for needed discharge resources and transportation as appropriate  - Identify discharge learning needs (meds, wound care, etc.)  - Arrange for interpretive services to assist at discharge as needed  - Refer to Case Management Department for coordinating  discharge planning if the patient needs post-hospital services based on physician/advanced practitioner order or complex needs related to functional status, cognitive ability, or social support system  Outcome: Progressing     Problem: Knowledge Deficit  Goal: Patient/family/caregiver demonstrates understanding of disease process, treatment plan, medications, and discharge instructions  Description: Complete learning assessment and assess knowledge base.  Interventions:  - Provide teaching at level of understanding  - Provide teaching via preferred learning methods  Outcome: Progressing     Problem: Nutrition/Hydration-ADULT  Goal: Nutrient/Hydration intake appropriate for improving, restoring or maintaining nutritional needs  Description: Monitor and assess patient's nutrition/hydration status for malnutrition. Collaborate with interdisciplinary team and initiate plan and interventions as ordered.  Monitor patient's weight and dietary intake as ordered or per policy. Utilize nutrition screening tool and intervene as necessary. Determine patient's food preferences and provide high-protein, high-caloric foods as appropriate.     INTERVENTIONS:  - Monitor oral intake, urinary output, labs, and treatment plans  - Assess nutrition and hydration status and recommend course of action  - Evaluate amount of meals eaten  - Assist patient with eating if necessary   - Allow adequate time for meals  - Recommend/ encourage appropriate diets, oral nutritional supplements, and vitamin/mineral supplements  - Order, calculate, and assess calorie counts as needed  - Recommend, monitor, and adjust tube feedings and TPN/PPN based on assessed needs  - Assess need for intravenous fluids  - Provide specific nutrition/hydration education as appropriate  - Include patient/family/caregiver in decisions related to nutrition  Outcome: Progressing     Problem: GASTROINTESTINAL - ADULT  Goal: Minimal or absence of nausea and/or  vomiting  Description: INTERVENTIONS:  - Administer IV fluids if ordered to ensure adequate hydration  - Maintain NPO status until nausea and vomiting are resolved  - Nasogastric tube if ordered  - Administer ordered antiemetic medications as needed  - Provide nonpharmacologic comfort measures as appropriate  - Advance diet as tolerated, if ordered  - Consider nutrition services referral to assist patient with adequate nutrition and appropriate food choices  Outcome: Progressing     Problem: Prexisting or High Potential for Compromised Skin Integrity  Goal: Skin integrity is maintained or improved  Description: INTERVENTIONS:  - Identify patients at risk for skin breakdown  - Assess and monitor skin integrity  - Assess and monitor nutrition and hydration status  - Monitor labs   - Assess for incontinence   - Turn and reposition patient  - Assist with mobility/ambulation  - Relieve pressure over bony prominences  - Avoid friction and shearing  - Provide appropriate hygiene as needed including keeping skin clean and dry  - Evaluate need for skin moisturizer/barrier cream  - Collaborate with interdisciplinary team   - Patient/family teaching  - Consider wound care consult   Outcome: Progressing

## 2024-03-13 NOTE — PROGRESS NOTES
03/11/24 1351   Readmission Root Cause   30 Day Readmission Yes   Who directed you to return to the hospital? Specialist   Did you understand whom to contact if you had questions or problems? Yes   Did you get your prescriptions before you left the hospital? Yes   Were you able to get your prescriptions filled when you left the hospital? Yes   Did you take your medications as prescribed? Yes   Were you able to get to your follow-up appointments? Yes   During previous admission, was a post-acute recommendation made? No   Patient Information   Admitted from: Home   Mental Status Alert   During Assessment patient was accompanied by Not accompanied during assessment   Assessment information provided by: Patient   Primary Caregiver Self   Support Systems Self;Family members;Friend   Home entry access options. Select all that apply. No steps to enter home   Type of Current Residence 2 story home   Upon entering residence, is there a bedroom on the main floor (no further steps)? Yes   Upon entering residence, is there a bathroom on the main floor (no further steps)? Yes   Living Arrangements Lives w/ Friend   Activities of Daily Living Prior to Admission   Functional Status Independent   Completes ADLs independently? Yes   Ambulates independently? Yes   Does patient use assisted devices? Yes   Assisted Devices (DME) used Straight Cane   Does patient currently own DME? Yes   What DME does the patient currently own? Straight Cane   Does patient have a history of Outpatient Therapy (PT/OT)? No   Does the patient have a history of Short-Term Rehab? No   Does patient have a history of HHC? No   Does patient currently have HHC? No   Patient Information Continued   Income Source Food stamps   Does patient have prescription coverage? Yes   Does patient receive dialysis treatments? No   Does patient have a history of substance abuse? No   Does patient have a history of Mental Health Diagnosis? No   PHQ 2/9 Screening    Reviewed  PHQ 2/9 Depression Screening Score? No   Means of Transportation   Means of Transport to Appts: Other (Comment)  ([employer])        03/11/24 1351   Readmission Root Cause   30 Day Readmission Yes   Who directed you to return to the hospital? Specialist   Did you understand whom to contact if you had questions or problems? Yes   Did you get your prescriptions before you left the hospital? Yes   Were you able to get your prescriptions filled when you left the hospital? Yes   Did you take your medications as prescribed? Yes   Were you able to get to your follow-up appointments? Yes   During previous admission, was a post-acute recommendation made? No   Patient Information   Admitted from: Home   Mental Status Alert   During Assessment patient was accompanied by Not accompanied during assessment   Assessment information provided by: Patient   Primary Caregiver Self   Support Systems Self;Family members;Friend   Home entry access options. Select all that apply. No steps to enter home   Type of Current Residence 2 story home   Upon entering residence, is there a bedroom on the main floor (no further steps)? Yes   Upon entering residence, is there a bathroom on the main floor (no further steps)? Yes   Living Arrangements Lives w/ Friend   Activities of Daily Living Prior to Admission   Functional Status Independent   Completes ADLs independently? Yes   Ambulates independently? Yes   Does patient use assisted devices? Yes   Assisted Devices (DME) used Straight Cane   Does patient currently own DME? Yes   What DME does the patient currently own? Straight Cane   Does patient have a history of Outpatient Therapy (PT/OT)? No   Does the patient have a history of Short-Term Rehab? No   Does patient have a history of HHC? No   Does patient currently have HHC? No   Patient Information Continued   Income Source Food stamps   Does patient have prescription coverage? Yes   Does patient receive dialysis treatments? No   Does  patient have a history of substance abuse? No   Does patient have a history of Mental Health Diagnosis? No   PHQ 2/9 Screening    Reviewed PHQ 2/9 Depression Screening Score? No   Means of Transportation   Means of Transport to Appts: Other (Comment)  ([employer])

## 2024-03-13 NOTE — PROGRESS NOTES
Patient:    MRN:  [id number]    Benja Request ID:  [id number]    Level of care reserved:  Home Health Agency    Partner Reserved:  [facility], [place] [address] [phone]    Clinical needs requested:    Geography searched:  [phone]    Start of Service:    Request sent:  2:10pm EDT on 3/11/2024 by Thuy Skelton    Partner reserved:  2:55pm EDT on 3/13/2024 by Thuy Skelton    Choice list shared:  1:03pm EDT on 3/13/2024 by Thuy Skelton

## 2024-03-13 NOTE — PROGRESS NOTES
[facility]  Progress Note  Name: Eliceo Garcia I  MRN: [id number]  Unit/Bed#: S -01 I Date of Admission: 3/6/2024   Date of Service: 3/13/2024 I Hospital Day: 7    Assessment/Plan   * Complication of feeding tube (HCC)  Assessment & Plan  Patient presented to ED at the suggestion of oncology team in the setting of high likelihood for refeeding syndrome; reports unable to tolerate PO intake or tube feeds for months.    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  He states he has been favoring PO intake.  Previously documented he does not like to use PEG tube.    Monitor CMP, CBC, Mg, Phos with a.m. labs; replete PRN  Tube feeds have been running at 55 mL/h without any concerns or complaints.  Will continue at thi rate   Nutrition consulted, appreciate recs  Zofran ordered for nausea/vomiting, no prior history of QTc prolongation  Palliative helping manage pain medications as well as anti-emetics.  Patient's barrier for discharge at this point is tube feed machine and visiting nurse setup at home. Unsure of patient compliance at this point which may result in a readmission shortly after discharge.   Will continue to stress importance of compliance and will speak with case management on obtaining his supplies.     Severe protein-calorie malnutrition (HCC)  Assessment & Plan  Malnutrition Findings:   Adult Malnutrition type: Acute illness (in the setting of chronic illness)  Temporal wasting, clavicular prominence, cold intolerance  Diffuse loss of subcutaneous fat  Low BMI  Hypoalbuminemia, hyponatremia likely 2/2 poor PO intake    BMI Findings:      Body mass index is 14.27 kg/m².     Patient reports decreased PO intake for months, noted to have approx a 25lb weight loss since the start of the calendar year.  Has been seen multiple times by nutrition in conjunction with oncology but continues to struggle with meeting calorie goals, worsened recently in the  setting of chronic nausea/vomiting with nearly all PO intake.    Nutrition consulted, appreciate recs  Advance TF until goal   PRN medication for nausea/vomiting ordered    Electrolyte abnormality  Assessment & Plan  Patient admitted due to high risk for refeeding syndrome.    Potassium   Date Value Ref Range Status   03/12/2024 4.1 3.5 - 5.3 mmol/L Final     Magnesium   Date Value Ref Range Status   03/12/2024 1.6 (L) 1.9 - 2.7 mg/dL Final     Phosphorus   Date Value Ref Range Status   03/12/2024 3.6 2.3 - 4.1 mg/dL Final     Monitor closely for concern over re-feeding syndrome - patient at a high risk  Mag decreased at 1.6. Patient agreed to IV mag.     Alcohol use disorder, severe, dependence (HCC)  Assessment & Plan  Patient with previously documented hx of alcohol abuse, drinking 3-4 beers par day.  States last drink was approximately 72 hours prior to the time of initial assessment on 3/6.  Previously documented to drink up to 14 beers daily, however patient recently has been reporting vomiting of all PO intake.    [facility] protocol ordered  Recommend monitoring on telemetry and radha at this time, patient currently refusing  Blood ethanol level ordered--normal  Thiamine, folate, multivitamin ordered    Anemia  Assessment & Plan  Baseline appears to be around 8-10  Hemoglobin today 8.7  Iron panel shows decreased iron, decreased TIBC, and increased ferritin. Likely due to anemia of chronic disease vs iron deficiency anemia. B12 low normal and folate normal.    Plan-  Continue to monitor Hgb.   Transfuse below 7 PRN  Will replete b12.     Cancer-related pain  Assessment & Plan  Patient states he takes Roxicodone 20mg q4h around the clock for cancer-related pain    Continue home Roxicodone 20mg q4h for pain  IV Narcan PRN for respiratory depression  Heat pack ordered for anterior jaw pain per patient request     Hyponatremia  Assessment & Plan  Recent Labs     03/11/24  0601 03/12/24  0436 03/13/24  0551   SODIUM  129* 130* 132*     Patient with chronic hyponatremia  Multifactorial  Suspected etiology: SIADH +/- poor solute intake  Worsened this AM due to discontinuation of tube feeds    Plan:  Continue to monitor  Consider salt tabs VS p.o. fluid restriction if not improving    Cigarette nicotine dependence without complication  Assessment & Plan  NRT ordered, 14mg/d in place of 1/2 PPD    Hypertension  Assessment & Plan  Temp:  [97.5 °F (36.4 °C)-98.2 °F (36.8 °C)] 97.5 °F (36.4 °C)  Resp:  [15-18] 18  BP: (109-152)/(69-83) 109/69    Patient reports he does not take any medications at home.  Noted to have previously been on amlodipine 5mg daily per chart review    Vitals per unit routine  Will order PRN medication if SBP >180, not currently ordered as patient is currently refusing BP monitoring.         VTE Pharmacologic Prophylaxis: VTE Score: 5 High Risk (Score >/= 5) - Pharmacological DVT Prophylaxis Ordered: heparin. Sequential Compression Devices Ordered.    Mobility:   Basic Mobility Inpatient Raw Score: 24  JH-HLM Goal: 8: Walk 250 feet or more  JH-HLM Achieved: 7: Walk 25 feet or more  HLM Goal achieved. Continue to encourage appropriate mobility.    Patient Centered Rounds: I performed bedside rounds with nursing staff today.  Discussions with Specialists or Other Care Team Provider: None    Education and Discussions with Family / Patient: Attempted to update  (father) via phone. Left voicemail.     Current Length of Stay: 7 day(s)  Current Patient Status: Inpatient   Discharge Plan: Anticipate discharge in 24-48 hrs to home.    Code Status: Level 1 - Full Code    Subjective:   Patient seen at bedside today.  No acute events overnight.  Patient states that he is currently feeling better compared to yesterday. He is now almost at goal rate without any nausea. He is eager to be discharged and expressed understanding in regards to discharge being delayed by 24-48 hours until VNA services could be  arranged.       Objective:     Vitals:   Temp (24hrs), Av.9 °F (36.6 °C), Min:97.5 °F (36.4 °C), Max:98.2 °F (36.8 °C)    Temp:  [97.5 °F (36.4 °C)-98.2 °F (36.8 °C)] 97.5 °F (36.4 °C)  Resp:  [15-18] 18  BP: (109-152)/(69-83) 109/69  SpO2:  [94 %] 94 %  Body mass index is 14.27 kg/m².     Input and Output Summary (last 24 hours):     Intake/Output Summary (Last 24 hours) at 3/13/2024 1126  Last data filed at 3/13/2024 0035  Gross per 24 hour   Intake 155 ml   Output --   Net 155 ml       Physical Exam:   Physical Exam  Vitals and nursing note reviewed. Exam conducted with a chaperone present.   Constitutional:       General: He is not in acute distress.     Appearance: Normal appearance. He is ill-appearing (cachectic). He is not toxic-appearing or diaphoretic.   HENT:      Head: Atraumatic.      Mouth/Throat:      Comments: Muffled voice  Eyes:      General: No scleral icterus.     Extraocular Movements: Extraocular movements intact.      Conjunctiva/sclera: Conjunctivae normal.   Cardiovascular:      Rate and Rhythm: Normal rate and regular rhythm.      Pulses: Normal pulses.      Heart sounds: Normal heart sounds. No murmur heard.     No friction rub. No gallop.   Pulmonary:      Effort: Pulmonary effort is normal. No respiratory distress.      Breath sounds: No wheezing, rhonchi or rales.   Abdominal:      General: Abdomen is flat. There is no distension.      Tenderness: There is no abdominal tenderness. There is no guarding or rebound.      Comments: PEG tube in place   Musculoskeletal:         General: Normal range of motion.      Cervical back: Normal range of motion.      Right lower leg: No edema.      Left lower leg: No edema.   Skin:     General: Skin is warm.      Coloration: Skin is not jaundiced.   Neurological:      General: No focal deficit present.      Mental Status: He is alert and oriented to person, place, and time.   Psychiatric:         Judgment: Judgment normal.          Additional  Data:     Labs:  Results from last 7 days   Lab Units 03/13/24  0551 03/11/24  0601 03/10/24  0404   WBC Thousand/uL 7.63   < > 8.08   HEMOGLOBIN g/dL 8.7*   < > 9.1*   HEMATOCRIT % 27.1*   < > 28.5*   PLATELETS Thousands/uL 302   < > 279   NEUTROS PCT %  --   --  71   LYMPHS PCT %  --   --  7*   MONOS PCT %  --   --  18*   EOS PCT %  --   --  2    < > = values in this interval not displayed.     Results from last 7 days   Lab Units 03/13/24  0551 03/08/24  0602 03/07/24  0517   SODIUM mmol/L 132*   < > 132*   POTASSIUM mmol/L 4.3   < > 4.0   CHLORIDE mmol/L 96   < > 97   CO2 mmol/L 33*   < > 29   BUN mg/dL 15   < > 9   CREATININE mg/dL 0.54*   < > 0.67   ANION GAP mmol/L 3*   < > 6   CALCIUM mg/dL 8.6   < > 8.7   ALBUMIN g/dL  --   --  2.9*   TOTAL BILIRUBIN mg/dL  --   --  0.24   ALK PHOS U/L  --   --  85   ALT U/L  --   --  5*   AST U/L  --   --  9*   GLUCOSE RANDOM mg/dL 109   < > 113    < > = values in this interval not displayed.                       Lines/Drains:  Invasive Devices       Central Venous Catheter Line  Duration             Port A Cath 11/07/23 Left Internal jugular 126 days              Drain  Duration             Gastrostomy/Enterostomy Percutaneous Interventional Gastrostomy 16 Fr. LUQ 92 days                    Central Line:  Goal for removal: N/A - Chronic PICC             Imaging: No pertinent imaging reviewed.    Recent Cultures (last 7 days):         Last 24 Hours Medication List:   Current Facility-Administered Medications   Medication Dose Route Frequency Provider Last Rate    acetaminophen  650 mg Oral Q6H PRN Teresa Alonso, DO      atorvastatin  40 mg Oral QPM Teresa Alonso, DO      calcium carbonate  1,000 mg Oral Daily PRN Teresa Alonso, DO      cyanocobalamin  1,000 mcg Oral Daily Madalyn Peter MD      diphenhydramine, lidocaine, Al/Mg hydroxide, simethicone  10 mL Swish & Spit Q4H PRN Braulio Lowry,       famotidine  20 mg Oral BID Amos Diehl MD      folic  acid  1 mg Oral Daily Teresa Alonso DO      heparin (porcine)  5,000 Units Subcutaneous Q8H MANDY Teresa Alonso DO      lactulose  15 g Oral BID PRN Amos Diehl MD      magnesium Oxide  400 mg Oral BID Tobi Monson DO      melatonin  6 mg Oral HS Abbe Schwab MD      multivitamin-minerals  1 tablet Oral Daily Teresa Alonso DO      naloxone  0.04 mg Intravenous Q1MIN PRN Teresa Alonso DO      nicotine  1 patch Transdermal Daily Teresa Alonso DO      ondansetron  4 mg Intravenous Q4H PRN Madalyn Peter MD      oxyCODONE  10 mg Oral Q4H PRN Braulio Lowry DO      Or    oxyCODONE  20 mg Oral Q4H PRN Braulio Lowry DO      senna-docusate sodium  1 tablet Oral Daily Teresa Alonso DO      sodium chloride  1 g Oral BID With Meals Cody Pedersen DO          Today, Patient Was Seen By: Tobi Monson DO    **Please Note: This note may have been constructed using a voice recognition system.**

## 2024-03-14 ENCOUNTER — HOSPITAL ENCOUNTER (INPATIENT)
Facility: HOSPITAL | Age: 59
LOS: 1 days | Discharge: LEFT BEFORE DISCHARGE | DRG: 252 | End: 2024-03-15
Attending: EMERGENCY MEDICINE | Admitting: INTERNAL MEDICINE
Payer: COMMERCIAL

## 2024-03-14 VITALS
WEIGHT: 114.2 LBS | DIASTOLIC BLOOD PRESSURE: 76 MMHG | TEMPERATURE: 97.4 F | RESPIRATION RATE: 15 BRPM | HEART RATE: 75 BPM | OXYGEN SATURATION: 94 % | BODY MASS INDEX: 14.27 KG/M2 | SYSTOLIC BLOOD PRESSURE: 136 MMHG

## 2024-03-14 VITALS
OXYGEN SATURATION: 98 % | BODY MASS INDEX: 14.69 KG/M2 | SYSTOLIC BLOOD PRESSURE: 177 MMHG | TEMPERATURE: 97.2 F | RESPIRATION RATE: 16 BRPM | HEART RATE: 108 BPM | WEIGHT: 117.5 LBS | DIASTOLIC BLOOD PRESSURE: 107 MMHG

## 2024-03-14 DIAGNOSIS — K94.23 PEG TUBE MALFUNCTION (HCC): Primary | ICD-10-CM

## 2024-03-14 LAB
ANION GAP SERPL CALCULATED.3IONS-SCNC: 5 MMOL/L (ref 4–13)
BUN SERPL-MCNC: 15 MG/DL (ref 5–25)
CALCIUM SERPL-MCNC: 8.8 MG/DL (ref 8.4–10.2)
CHLORIDE SERPL-SCNC: 97 MMOL/L (ref 96–108)
CO2 SERPL-SCNC: 31 MMOL/L (ref 21–32)
CREAT SERPL-MCNC: 0.52 MG/DL (ref 0.6–1.3)
GFR SERPL CREATININE-BSD FRML MDRD: 115 ML/MIN/1.73SQ M
GLUCOSE SERPL-MCNC: 104 MG/DL (ref 65–140)
MAGNESIUM SERPL-MCNC: 1.7 MG/DL (ref 1.9–2.7)
PHOSPHATE SERPL-MCNC: 4.1 MG/DL (ref 2.3–4.1)
POTASSIUM SERPL-SCNC: 4.4 MMOL/L (ref 3.5–5.3)
SODIUM SERPL-SCNC: 133 MMOL/L (ref 135–147)

## 2024-03-14 PROCEDURE — 99239 HOSP IP/OBS DSCHRG MGMT >30: CPT | Performed by: INTERNAL MEDICINE

## 2024-03-14 PROCEDURE — 84100 ASSAY OF PHOSPHORUS: CPT

## 2024-03-14 PROCEDURE — 99283 EMERGENCY DEPT VISIT LOW MDM: CPT

## 2024-03-14 PROCEDURE — 80048 BASIC METABOLIC PNL TOTAL CA: CPT

## 2024-03-14 PROCEDURE — 83735 ASSAY OF MAGNESIUM: CPT

## 2024-03-14 RX ORDER — FOLIC ACID 1 MG/1
1 TABLET ORAL DAILY
Qty: 30 TABLET | Refills: 1 | Status: SHIPPED | OUTPATIENT
Start: 2024-03-14

## 2024-03-14 RX ORDER — OXYCODONE HYDROCHLORIDE 10 MG/1
10 TABLET ORAL EVERY 4 HOURS PRN
Start: 2024-03-14 | End: 2024-03-20 | Stop reason: SDUPTHER

## 2024-03-14 RX ORDER — ONDANSETRON 4 MG/1
4 TABLET, FILM COATED ORAL EVERY 8 HOURS PRN
Qty: 30 TABLET | Refills: 2 | Status: SHIPPED | OUTPATIENT
Start: 2024-03-14

## 2024-03-14 RX ORDER — FAMOTIDINE 20 MG/1
20 TABLET, FILM COATED ORAL 2 TIMES DAILY
Qty: 30 TABLET | Refills: 1 | Status: SHIPPED | OUTPATIENT
Start: 2024-03-14

## 2024-03-14 RX ORDER — NICOTINE 21 MG/24HR
1 PATCH, TRANSDERMAL 24 HOURS TRANSDERMAL DAILY
Qty: 28 PATCH | Refills: 0 | Status: SHIPPED | OUTPATIENT
Start: 2024-03-14

## 2024-03-14 RX ORDER — SODIUM CHLORIDE 1 G/1
1 TABLET ORAL 2 TIMES DAILY WITH MEALS
Qty: 60 TABLET | Refills: 2 | Status: SHIPPED | OUTPATIENT
Start: 2024-03-14

## 2024-03-14 RX ORDER — DIPHENHYDRAMINE HYDROCHLORIDE AND LIDOCAINE HYDROCHLORIDE AND ALUMINUM HYDROXIDE AND MAGNESIUM HYDRO
10 KIT EVERY 4 HOURS PRN
Qty: 237 ML | Refills: 2 | Status: SHIPPED | OUTPATIENT
Start: 2024-03-14

## 2024-03-14 RX ORDER — MAGNESIUM SULFATE HEPTAHYDRATE 40 MG/ML
2 INJECTION, SOLUTION INTRAVENOUS ONCE
Status: COMPLETED | OUTPATIENT
Start: 2024-03-14 | End: 2024-03-14

## 2024-03-14 RX ADMIN — Medication 400 MG: at 09:09

## 2024-03-14 RX ADMIN — FAMOTIDINE 20 MG: 20 TABLET ORAL at 09:09

## 2024-03-14 RX ADMIN — SODIUM CHLORIDE 1 G: 1 TABLET ORAL at 09:14

## 2024-03-14 RX ADMIN — CYANOCOBALAMIN TAB 500 MCG 1000 MCG: 500 TAB at 09:09

## 2024-03-14 RX ADMIN — ACETAMINOPHEN 650 MG: 325 TABLET ORAL at 05:17

## 2024-03-14 RX ADMIN — FOLIC ACID 1 MG: 1 TABLET ORAL at 09:09

## 2024-03-14 RX ADMIN — MULTIPLE VITAMINS W/ MINERALS TAB 1 TABLET: TAB ORAL at 09:10

## 2024-03-14 RX ADMIN — DIPHENHYDRAMINE HYDROCHLORIDE AND LIDOCAINE HYDROCHLORIDE AND ALUMINUM HYDROXIDE AND MAGNESIUM HYDRO 10 ML: KIT at 02:03

## 2024-03-14 RX ADMIN — OXYCODONE HYDROCHLORIDE 20 MG: 10 TABLET ORAL at 02:04

## 2024-03-14 RX ADMIN — MAGNESIUM SULFATE HEPTAHYDRATE 2 G: 40 INJECTION, SOLUTION INTRAVENOUS at 09:16

## 2024-03-14 NOTE — ASSESSMENT & PLAN NOTE
Patient presented to ED at the suggestion of oncology team in the setting of high likelihood for refeeding syndrome; reports unable to tolerate PO intake or tube feeds for months.    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  He states he has been favoring PO intake.  Previously documented he does not like to use PEG tube.    Monitor CMP, CBC, Mg, Phos with a.m. labs; replete PRN  Tube feeds have been running at 55 mL/h without any concerns or complaints.  Will continue at thi rate   Nutrition consulted, appreciate recs  Zofran ordered for nausea/vomiting, no prior history of QTc prolongation  Palliative helping manage pain medications as well as anti-emetics.  Patient's tube feed machine and visiting nurse is nowsetup at home. Unsure of patient compliance at this point which may result in a readmission shortly after discharge.   Will continue to stress importance of compliance.

## 2024-03-14 NOTE — ASSESSMENT & PLAN NOTE
Temp:  [96.9 °F (36.1 °C)-97.4 °F (36.3 °C)] 97.4 °F (36.3 °C)  HR:  [75] 75  Resp:  [15-18] 15  BP: (118-168)/(75-94) 136/76    Patient reports he does not take any medications at home.  Noted to have previously been on amlodipine 5mg daily per chart review    Follow up with PCP outpatient

## 2024-03-14 NOTE — ASSESSMENT & PLAN NOTE
Recent Labs     03/12/24  0436 03/13/24  0551 03/14/24  0625   SODIUM 130* 132* 133*       Patient with chronic hyponatremia  Multifactorial  Suspected etiology: SIADH +/- poor solute intake  Worsened this AM due to discontinuation of tube feeds    Plan:  Continue to monitor  Continue salt tabs on discharge

## 2024-03-14 NOTE — ASSESSMENT & PLAN NOTE
Malnutrition Findings:   Adult Malnutrition type: Acute illness (in the setting of chronic illness)  Temporal wasting, clavicular prominence, cold intolerance  Diffuse loss of subcutaneous fat  Low BMI  Hypoalbuminemia, hyponatremia likely 2/2 poor PO intake    BMI Findings:      Body mass index is 14.27 kg/m².     Patient reports decreased PO intake for months, noted to have approx a 25lb weight loss since the start of the calendar year.  Has been seen multiple times by nutrition in conjunction with oncology but continues to struggle with meeting calorie goals, worsened recently in the setting of chronic nausea/vomiting with nearly all PO intake.    Nutrition consulted, appreciate recs  Continue TF until goal upon discharge  PRN medication for nausea/vomiting ordered

## 2024-03-14 NOTE — ASSESSMENT & PLAN NOTE
Baseline appears to be around 8-10  Hemoglobin today 8.7  Iron panel shows decreased iron, decreased TIBC, and increased ferritin. Likely due to anemia of chronic disease vs iron deficiency anemia. B12 low normal and folate normal.    Plan-  F/u with oncologist upon discharge

## 2024-03-14 NOTE — DISCHARGE SUMMARY
Critical access hospital  Discharge- Eliceo Garcia 1965, 58 y.o. male MRN: [id number]  Unit/Bed#: S -01 Encounter: [id number]  Primary Care Provider: Elias Schoen, MD   Date and time admitted to hospital: 3/6/2024  5:40 PM    * Complication of feeding tube (HCC)  Assessment & Plan  Patient presented to ED at the suggestion of oncology team in the setting of high likelihood for refeeding syndrome; reports unable to tolerate PO intake or tube feeds for months.    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  He states he has been favoring PO intake.  Previously documented he does not like to use PEG tube.    Monitor CMP, CBC, Mg, Phos with a.m. labs; replete PRN  Tube feeds have been running at 55 mL/h without any concerns or complaints.  Will continue at thi rate   Nutrition consulted, appreciate recs  Zofran ordered for nausea/vomiting, no prior history of QTc prolongation  Palliative helping manage pain medications as well as anti-emetics.  Patient's tube feed machine and visiting nurse is nowsetup at home. Unsure of patient compliance at this point which may result in a readmission shortly after discharge.   Will continue to stress importance of compliance.    Severe protein-calorie malnutrition (HCC)  Assessment & Plan  Malnutrition Findings:   Adult Malnutrition type: Acute illness (in the setting of chronic illness)  Temporal wasting, clavicular prominence, cold intolerance  Diffuse loss of subcutaneous fat  Low BMI  Hypoalbuminemia, hyponatremia likely 2/2 poor PO intake    BMI Findings:      Body mass index is 14.27 kg/m².     Patient reports decreased PO intake for months, noted to have approx a 25lb weight loss since the start of the calendar year.  Has been seen multiple times by nutrition in conjunction with oncology but continues to struggle with meeting calorie goals, worsened recently in the setting of chronic nausea/vomiting with nearly all PO  intake.    Nutrition consulted, appreciate recs  Continue TF until goal upon discharge  PRN medication for nausea/vomiting ordered    Electrolyte abnormality  Assessment & Plan  Patient admitted due to high risk for refeeding syndrome.    Potassium   Date Value Ref Range Status   03/14/2024 4.4 3.5 - 5.3 mmol/L Final     Magnesium   Date Value Ref Range Status   03/14/2024 1.7 (L) 1.9 - 2.7 mg/dL Final     Phosphorus   Date Value Ref Range Status   03/14/2024 4.1 2.3 - 4.1 mg/dL Final     Monitor closely for concern over re-feeding syndrome - patient at a high risk  Mag decreased at 1.7. Patient agreed to IV mag prior to discharge.     Alcohol use disorder, severe, dependence (HCC)  Assessment & Plan  Patient with previously documented hx of alcohol abuse, drinking 3-4 beers par day.  States last drink was approximately 72 hours prior to the time of initial assessment on 3/6.  Previously documented to drink up to 14 beers daily, however patient recently has been reporting vomiting of all PO intake.    Blood ethanol level ordered--normal  folate, multivitamin ordered    Anemia  Assessment & Plan  Baseline appears to be around 8-10  Hemoglobin today 8.7  Iron panel shows decreased iron, decreased TIBC, and increased ferritin. Likely due to anemia of chronic disease vs iron deficiency anemia. B12 low normal and folate normal.    Plan-  F/u with oncologist upon discharge    Cancer-related pain  Assessment & Plan  Patient states he takes Roxicodone 20mg q4h around the clock for cancer-related pain    Oxycodone ordered for pain relief  F/u with palliative outpatient    Hyponatremia  Assessment & Plan  Recent Labs     03/12/24  0436 03/13/24  0551 03/14/24  0625   SODIUM 130* 132* 133*       Patient with chronic hyponatremia  Multifactorial  Suspected etiology: SIADH +/- poor solute intake  Worsened this AM due to discontinuation of tube feeds    Plan:  Continue to monitor  Continue salt tabs on discharge    Cigarette  nicotine dependence without complication  Assessment & Plan  Continue nicotine abstinence.  Use NRT as necessary    Hypertension  Assessment & Plan  Temp:  [96.9 °F (36.1 °C)-97.4 °F (36.3 °C)] 97.4 °F (36.3 °C)  HR:  [75] 75  Resp:  [15-18] 15  BP: (118-168)/(75-94) 136/76    Patient reports he does not take any medications at home.  Noted to have previously been on amlodipine 5mg daily per chart review    Follow up with PCP outpatient        Medical Problems       Resolved Problems  Date Reviewed: 3/14/2024   None       Discharging Resident: Tobi Monson DO  Discharging Attending: Martina Chambers*  PCP: Elias Schoen, MD  Admission Date:   Admission Orders (From admission, onward)       Ordered        03/06/24 2101  INPATIENT ADMISSION  Once                          Discharge Date: 03/14/24    Consultations During Hospital Stay:  Palliative, nutrition    Procedures Performed:   None    Significant Findings / Test Results:   Hyponatremia 129    Incidental Findings:   None    Test Results Pending at Discharge (will require follow up):  None     Outpatient Tests Requested:  As per PCP or oncologist    Complications: None    Reason for Admission: Feeding tube complications    Hospital Course:   Eliceo Garcia is a 58 y.o. male patient who originally presented to the hospital on 3/6/2024 due to complications with his feeding tube.  Patient had significant nausea on bolus feeds at home and eventually came to the ED upon suggestion of his oncologist due to concerns of refeeding syndrome.  Patient was started on continuous tube feeds after nutrition was consulted.  Patient reported significant nausea when the rate of tube feeds was escalated at 10 cc an hour.  Palliative was consulted for his nausea/pain medications as he thought that his nausea was due to his pain medications.  The doses for his pain medications was lowered which improved his nausea without significantly worsening his pain.  During his hospital  stay, patient stopped his tube feeds completely overnight and had to be restarted at a lower rate.  Patient then was eventually brought up to 55 cc an hour with an escalation of 5 cc an hour every 12 hours.  With this regimen, patient tolerated tube feeds without any nausea.  Patient also had hyponatremia for which she was started on salt tabs.  Patient had no other complaints and denied any nausea on day of discharge.  Patient's supplies including feeding tubes and feeding materials were delivered to his house yesterday and nursing visit was set up today.  Patient understands reason for compliance and is stable for discharge at this point.        Please see above list of diagnoses and related plan for additional information.     Condition at Discharge: stable    Discharge Day Visit / Exam:   Subjective: Patient seen at bedside today.  No acute events overnight.  Patient has been tolerating his feeds well.  Patient denied any nausea or discomfort.  Patient is doing well and understands importance of compliance.  Vitals: Blood Pressure: 136/76 (03/14/24 0728)  Pulse: 75 (03/14/24 0728)  Temperature: (!) 97.4 °F (36.3 °C) (03/14/24 0728)  Temp Source: Axillary (03/13/24 0716)  Respirations: 15 (03/14/24 0728)  Weight - Scale: 51.8 kg (114 lb 3.2 oz) (03/06/24 1903)  SpO2: 94 % (03/12/24 1337)  Exam:   Physical Exam  Vitals and nursing note reviewed. Exam conducted with a chaperone present.   Constitutional:       General: He is not in acute distress.     Appearance: Normal appearance. He is ill-appearing (cachectic). He is not toxic-appearing or diaphoretic.   HENT:      Head: Atraumatic.      Mouth/Throat:      Comments: Muffled voice  Eyes:      General: No scleral icterus.     Extraocular Movements: Extraocular movements intact.      Conjunctiva/sclera: Conjunctivae normal.   Cardiovascular:      Rate and Rhythm: Normal rate and regular rhythm.      Pulses: Normal pulses.      Heart sounds: Normal heart sounds. No  murmur heard.     No friction rub. No gallop.   Pulmonary:      Effort: Pulmonary effort is normal. No respiratory distress.      Breath sounds: No wheezing, rhonchi or rales.   Abdominal:      General: Abdomen is flat. There is no distension.      Tenderness: There is no abdominal tenderness. There is no guarding or rebound.      Comments: PEG tube in place   Musculoskeletal:         General: Normal range of motion.      Cervical back: Normal range of motion.      Right lower leg: No edema.      Left lower leg: No edema.   Skin:     General: Skin is warm.      Coloration: Skin is not jaundiced.   Neurological:      General: No focal deficit present.      Mental Status: He is alert and oriented to person, place, and time.   Psychiatric:         Judgment: Judgment normal.          Discussion with Family: Patient declined call to .     Discharge instructions/Information to patient and family:   See after visit summary for information provided to patient and family.      Provisions for Follow-Up Care:  See after visit summary for information related to follow-up care and any pertinent home health orders.      Mobility at time of Discharge:   Basic Mobility Inpatient Raw Score: 24  JH-HLM Goal: 8: Walk 250 feet or more  JH-HLM Achieved: 2: Bed activities/Dependent transfer  HLM Goal NOT achieved. Continue to encourage mobility in post discharge setting.     Disposition:   Home with [facility]NA Services (Reminder: Complete face to face encounter)    Planned Readmission: None    Discharge Medications:  See after visit summary for reconciled discharge medications provided to patient and/or family.      **Please Note: This note may have been constructed using a voice recognition system**

## 2024-03-14 NOTE — ASSESSMENT & PLAN NOTE
Patient with previously documented hx of alcohol abuse, drinking 3-4 beers par day.  States last drink was approximately 72 hours prior to the time of initial assessment on 3/6.  Previously documented to drink up to 14 beers daily, however patient recently has been reporting vomiting of all PO intake.    Blood ethanol level ordered--normal  folate, multivitamin ordered

## 2024-03-14 NOTE — ASSESSMENT & PLAN NOTE
Patient states he takes Roxicodone 20mg q4h around the clock for cancer-related pain    Oxycodone ordered for pain relief  F/u with palliative outpatient

## 2024-03-14 NOTE — DISCHARGE INSTR - AVS FIRST PAGE
Dear Eliceo Garcia,     It was our pleasure to care for you here at [facility].  It is our hope that we were always able to exceed the expected standards for your care during your stay.  You were hospitalized due to complications with your feeding tube.  You were cared for on the fourth floor by Tobi Monson DO under the service of Martina Chambers* with the [facility] who covers for your primary care physician (PCP), Elias Schoen, MD, while you were hospitalized.  If you have any questions or concerns related to this hospitalization, you may contact us at .  For follow up as well as any medication refills, we recommend that you follow up with your primary care physician.  A registered nurse will reach out to you by phone within a few days after your discharge to answer any additional questions that you may have after going home.  However, at this time we provide for you here, the most important instructions / recommendations at discharge:     Notable Medication Adjustments -   Please split your current Oxycodone tablets in half and take 10 mg every 4 hours as needed for pain control.   Please take Ondansetron/Zofran as needed for nausea.   Please take your Jevity (feeding tube) continuously at 60 mL/hour.   Testing Required after Discharge -   As per PCP  Important follow up information -   Please follow up with your primary care in 1 week.  Please follow up with palliative care for further refills of your pain medications.  Please follow up with oncologist at next scheduled appointment.   Other Instructions -   If any altered mental status or sudden worsening weakness, please report to ER.   Please review this entire after visit summary as additional general instructions including medication list, appointments, activity, diet, any pertinent wound care, and other additional recommendations from your care team that may be provided for  you.      Sincerely,     Tobi Monson, DO

## 2024-03-14 NOTE — ASSESSMENT & PLAN NOTE
Patient admitted due to high risk for refeeding syndrome.    Potassium   Date Value Ref Range Status   03/14/2024 4.4 3.5 - 5.3 mmol/L Final     Magnesium   Date Value Ref Range Status   03/14/2024 1.7 (L) 1.9 - 2.7 mg/dL Final     Phosphorus   Date Value Ref Range Status   03/14/2024 4.1 2.3 - 4.1 mg/dL Final     Monitor closely for concern over re-feeding syndrome - patient at a high risk  Mag decreased at 1.7. Patient agreed to IV mag prior to discharge.

## 2024-03-15 ENCOUNTER — APPOINTMENT (EMERGENCY)
Dept: RADIOLOGY | Facility: HOSPITAL | Age: 59
DRG: 252 | End: 2024-03-15
Payer: COMMERCIAL

## 2024-03-15 ENCOUNTER — TRANSITIONAL CARE MANAGEMENT (OUTPATIENT)
Dept: FAMILY MEDICINE CLINIC | Facility: CLINIC | Age: 59
End: 2024-03-15

## 2024-03-15 LAB
ABO GROUP BLD: NORMAL
ALBUMIN SERPL BCP-MCNC: 3.2 G/DL (ref 3.5–5)
ALP SERPL-CCNC: 62 U/L (ref 34–104)
ALT SERPL W P-5'-P-CCNC: 13 U/L (ref 7–52)
ANION GAP SERPL CALCULATED.3IONS-SCNC: 8 MMOL/L (ref 4–13)
APTT PPP: 30 SECONDS (ref 23–37)
AST SERPL W P-5'-P-CCNC: 13 U/L (ref 13–39)
BASOPHILS # BLD AUTO: 0.04 THOUSANDS/ÂΜL (ref 0–0.1)
BASOPHILS NFR BLD AUTO: 1 % (ref 0–1)
BILIRUB SERPL-MCNC: 0.23 MG/DL (ref 0.2–1)
BLD GP AB SCN SERPL QL: NEGATIVE
BUN SERPL-MCNC: 15 MG/DL (ref 5–25)
CALCIUM ALBUM COR SERPL-MCNC: 9.7 MG/DL (ref 8.3–10.1)
CALCIUM SERPL-MCNC: 9.1 MG/DL (ref 8.4–10.2)
CHLORIDE SERPL-SCNC: 94 MMOL/L (ref 96–108)
CO2 SERPL-SCNC: 28 MMOL/L (ref 21–32)
CREAT SERPL-MCNC: 0.56 MG/DL (ref 0.6–1.3)
EOSINOPHIL # BLD AUTO: 0.08 THOUSAND/ÂΜL (ref 0–0.61)
EOSINOPHIL NFR BLD AUTO: 1 % (ref 0–6)
ERYTHROCYTE [DISTWIDTH] IN BLOOD BY AUTOMATED COUNT: 15.9 % (ref 11.6–15.1)
GFR SERPL CREATININE-BSD FRML MDRD: 112 ML/MIN/1.73SQ M
GLUCOSE SERPL-MCNC: 88 MG/DL (ref 65–140)
HCT VFR BLD AUTO: 28.4 % (ref 36.5–49.3)
HGB BLD-MCNC: 9.1 G/DL (ref 12–17)
IMM GRANULOCYTES # BLD AUTO: 0.06 THOUSAND/UL (ref 0–0.2)
IMM GRANULOCYTES NFR BLD AUTO: 1 % (ref 0–2)
INR PPP: 1.03 (ref 0.84–1.19)
LYMPHOCYTES # BLD AUTO: 0.59 THOUSANDS/ÂΜL (ref 0.6–4.47)
LYMPHOCYTES NFR BLD AUTO: 9 % (ref 14–44)
MCH RBC QN AUTO: 31.6 PG (ref 26.8–34.3)
MCHC RBC AUTO-ENTMCNC: 32 G/DL (ref 31.4–37.4)
MCV RBC AUTO: 99 FL (ref 82–98)
MONOCYTES # BLD AUTO: 1.15 THOUSAND/ÂΜL (ref 0.17–1.22)
MONOCYTES NFR BLD AUTO: 18 % (ref 4–12)
NEUTROPHILS # BLD AUTO: 4.41 THOUSANDS/ÂΜL (ref 1.85–7.62)
NEUTS SEG NFR BLD AUTO: 70 % (ref 43–75)
NRBC BLD AUTO-RTO: 0 /100 WBCS
PLATELET # BLD AUTO: 311 THOUSANDS/UL (ref 149–390)
PMV BLD AUTO: 9 FL (ref 8.9–12.7)
POTASSIUM SERPL-SCNC: 4.2 MMOL/L (ref 3.5–5.3)
PROT SERPL-MCNC: 6.6 G/DL (ref 6.4–8.4)
PROTHROMBIN TIME: 14.1 SECONDS (ref 11.6–14.5)
RBC # BLD AUTO: 2.88 MILLION/UL (ref 3.88–5.62)
RH BLD: POSITIVE
SODIUM SERPL-SCNC: 130 MMOL/L (ref 135–147)
SPECIMEN EXPIRATION DATE: NORMAL
WBC # BLD AUTO: 6.33 THOUSAND/UL (ref 4.31–10.16)

## 2024-03-15 PROCEDURE — 86850 RBC ANTIBODY SCREEN: CPT | Performed by: EMERGENCY MEDICINE

## 2024-03-15 PROCEDURE — 85730 THROMBOPLASTIN TIME PARTIAL: CPT | Performed by: EMERGENCY MEDICINE

## 2024-03-15 PROCEDURE — 85025 COMPLETE CBC W/AUTO DIFF WBC: CPT | Performed by: EMERGENCY MEDICINE

## 2024-03-15 PROCEDURE — 80053 COMPREHEN METABOLIC PANEL: CPT | Performed by: EMERGENCY MEDICINE

## 2024-03-15 PROCEDURE — 99284 EMERGENCY DEPT VISIT MOD MDM: CPT | Performed by: EMERGENCY MEDICINE

## 2024-03-15 PROCEDURE — 86900 BLOOD TYPING SEROLOGIC ABO: CPT | Performed by: EMERGENCY MEDICINE

## 2024-03-15 PROCEDURE — 85610 PROTHROMBIN TIME: CPT | Performed by: EMERGENCY MEDICINE

## 2024-03-15 PROCEDURE — 86901 BLOOD TYPING SEROLOGIC RH(D): CPT | Performed by: EMERGENCY MEDICINE

## 2024-03-15 RX ORDER — SODIUM CHLORIDE 1 G/1
1 TABLET ORAL 2 TIMES DAILY WITH MEALS
Status: CANCELLED | OUTPATIENT
Start: 2024-03-15

## 2024-03-15 RX ORDER — DIPHENHYDRAMINE HYDROCHLORIDE AND LIDOCAINE HYDROCHLORIDE AND ALUMINUM HYDROXIDE AND MAGNESIUM HYDRO
10 KIT EVERY 4 HOURS PRN
Status: CANCELLED | OUTPATIENT
Start: 2024-03-15

## 2024-03-15 RX ORDER — FAMOTIDINE 20 MG/1
20 TABLET, FILM COATED ORAL 2 TIMES DAILY
Status: CANCELLED | OUTPATIENT
Start: 2024-03-15

## 2024-03-15 RX ORDER — HEPARIN SODIUM 5000 [USP'U]/ML
5000 INJECTION, SOLUTION INTRAVENOUS; SUBCUTANEOUS EVERY 8 HOURS SCHEDULED
Status: CANCELLED | OUTPATIENT
Start: 2024-03-15

## 2024-03-15 RX ORDER — POTASSIUM CHLORIDE 20 MEQ/1
20 TABLET, EXTENDED RELEASE ORAL DAILY
Status: CANCELLED | OUTPATIENT
Start: 2024-03-15

## 2024-03-15 RX ORDER — OXYCODONE HYDROCHLORIDE 10 MG/1
10 TABLET ORAL EVERY 4 HOURS PRN
Status: CANCELLED | OUTPATIENT
Start: 2024-03-15 | End: 2024-03-24

## 2024-03-15 RX ORDER — NICOTINE 21 MG/24HR
1 PATCH, TRANSDERMAL 24 HOURS TRANSDERMAL DAILY
Status: CANCELLED | OUTPATIENT
Start: 2024-03-15

## 2024-03-15 RX ORDER — ONDANSETRON 4 MG/1
4 TABLET, ORALLY DISINTEGRATING ORAL EVERY 8 HOURS PRN
Status: CANCELLED | OUTPATIENT
Start: 2024-03-15

## 2024-03-15 RX ORDER — AMOXICILLIN 250 MG
1 CAPSULE ORAL DAILY
Status: CANCELLED | OUTPATIENT
Start: 2024-03-15

## 2024-03-15 RX ORDER — FOLIC ACID 1 MG/1
1 TABLET ORAL DAILY
Status: CANCELLED | OUTPATIENT
Start: 2024-03-15

## 2024-03-15 RX ORDER — ATORVASTATIN CALCIUM 40 MG/1
40 TABLET, FILM COATED ORAL EVERY EVENING
Status: CANCELLED | OUTPATIENT
Start: 2024-03-15

## 2024-03-15 NOTE — DISCHARGE SUMMARY
[facility]  Discharge- Eliceo Garcia 1965, 58 y.o. male MRN: [id number]  Unit/Bed#: ED-21 Encounter: [id number]  Primary Care Provider: Elias Schoen, MD   Date and time admitted to hospital: 3/14/2024  9:28 PM    Complication of feeding tube (HCC)  Assessment & Plan  Patient presents to emergency department one day after being discharged due to dislodgment of PEG tube  Stable on arrival to emergency department, replacement of tube not able to be done at bedside - admission with IR consult recommended  Patient requested to leave AMA to retrieve his phone from home and then return to restart admission  Risks of leaving prior to tube replacement explained to patient, he is in understanding of these risks and accepts them.    AMA papers signed and patient discharged         Medical Problems       Resolved Problems  Date Reviewed: 3/15/2024   None       Discharging Resident: Panfilo Valente MD  Discharging Attending: No att. providers found  PCP: Elias Schoen, MD  Admission Date:   Admission Orders (From admission, onward)       Ordered        03/15/24 0318  INPATIENT ADMISSION  Once                          Discharge Date: 03/15/24    Consultations During Hospital Stay:  None    Procedures Performed:   None    Significant Findings / Test Results:   None    Incidental Findings:   None     Test Results Pending at Discharge (will require follow up):   None     Outpatient Tests Requested:  None    Complications:  left AMA    Reason for Admission: PEG tube complication    Hospital Course:   Eliceo Garcia is a 58 y.o. male patient who originally presented to the hospital on 3/14/2024 due to dislodgement of his PEG tube.  Patient was discharged the morning prior with care instructions on PEG tube management but returned back to ED after tube became disconnected from abdomen.  Patient advised to get admitted for replacement of tube but he insisted that he return home to retrieve his cell phone  first. Patient was already admitted at this point and informed he would have to leave AMA if he wished to get his phone.  Patient was informed of the risks of leaving and he chose to do so in full understanding of those risks. He says he will return to the ED as soon as he gets his cell phone.      The patient, initially admitted to the hospital as inpatient, was discharged earlier than expected given the following: left AMA.    Please see above list of diagnoses and related plan for additional information.     Condition at Discharge: stable    Discharge Day Visit / Exam:   * Please refer to separate progress note for these details *    Discussion with Family: Patient declined call to .     Discharge instructions/Information to patient and family:   See after visit summary for information provided to patient and family.      Provisions for Follow-Up Care:  See after visit summary for information related to follow-up care and any pertinent home health orders.         Disposition:   Home, left AMA    Discharge Medications:  See after visit summary for reconciled discharge medications provided to patient and/or family.      **Please Note: This note may have been constructed using a voice recognition system**

## 2024-03-15 NOTE — ED PROVIDER NOTES
History  Chief Complaint   Patient presents with    Feeding Tube Problem     Pt arrives via EMS from home. Pt reports feeding tube came out today. Feeding tube at bedside. Pt reporting no pain, site intact.      58-year-old male comes in after he was discharged home today and while getting prepared to be at home he accidentally pulled his gastric tube.  The patient reports he had no pain or bleeding or discharge from the area, he brought the G-tube in with him, reports he has had no associated nausea, vomiting, headache, dizziness, fevers, or other complaints.  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        Prior to Admission Medications   Prescriptions Last Dose Informant Patient Reported? Taking?   Aspirin Low Dose 81 MG chewable tablet   No No   Sig: CHEW 1 TABLET BY MOUTH DAILY   Patient not taking: Reported on 2/21/2024   atorvastatin (LIPITOR) 40 mg tablet   No No   Sig: TAKE 1 TABLET BY MOUTH EVERY DAY IN THE EVENING   chlorhexidine (PERIDEX) 0.12 % solution   Yes No   Patient not taking: Reported on 12/14/2023   cyanocobalamin (VITAMIN B-12) 1000 MCG tablet   No No   Sig: Take 1 tablet (1,000 mcg total) by mouth daily   dexamethasone 0.5 MG/5ML elixir   No No   Sig: Take 10 mL (1 mg total) by mouth 4 (four) times a day   Patient not taking: Reported on 1/26/2024   diphenhydramine, lidocaine, Al/Mg hydroxide, simethicone (Magic Mouthwash) SUSP   No No   Sig: Swish and spit 10 mL every 4 (four) hours as needed for mouth pain or discomfort   famotidine (PEPCID) 20 mg tablet   No No   Sig: Take 1 tablet (20 mg total) by mouth 2 (two) times a day   folic acid (FOLVITE) 1 mg tablet   No No   Sig: Take 1 tablet (1 mg total) by mouth daily   lactulose (CHRONULAC) 10 g/15 mL solution   No No   Sig: Take 15 mL (10 g total) by mouth 2 (two) times a day   Patient not taking: Reported on 3/6/2024   magnesium Oxide (MAG-OX) 400 mg TABS   No No    Sig: Take 1 tablet (400 mg total) by mouth 2 (two) times a day   naloxone (NARCAN) 4 mg/0.1 mL nasal spray   No No   Sig: Administer 1 spray into a nostril. If no response after 2-3 minutes, give another dose in the other nostril using a new spray.   Patient not taking: Reported on 2/21/2024   nicotine (NICODERM CQ) 14 mg/24hr TD 24 hr patch   No No   Sig: Place 1 patch on the skin over 24 hours daily   ondansetron (ZOFRAN) 4 mg tablet   No No   Sig: Take 1 tablet (4 mg total) by mouth every 8 (eight) hours as needed for nausea or vomiting   oxyCODONE (ROXICODONE) 10 MG TABS   No No   Sig: Take 1 tablet (10 mg total) by mouth every 4 (four) hours as needed for moderate pain for up to 10 days Max Daily Amount: 60 mg   potassium chloride (Klor-Con M20) 20 mEq tablet   No No   Sig: Take 1 tablet (20 mEq total) by mouth daily for 14 days   senna-docusate sodium (SENOKOT-S) 8.6-50 mg per tablet   No No   Sig: Take 1 tablet by mouth daily   silver sulfadiazine (SILVADENE,SSD) 1 % cream   No No   Sig: Apply topically 2 (two) times a day   Patient not taking: Reported on 1/11/2024   sodium chloride 1 g tablet   No No   Sig: Take 1 tablet (1 g total) by mouth 2 (two) times a day with meals      Facility-Administered Medications: None       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    E-Cigarette/Vaping    E-Cigarette Use Never User      E-Cigarette/Vaping Substances    Nicotine Yes      Social History     Tobacco Use    Smoking status: Every Day     Current packs/day: 0.25     Types: Cigarettes    Smokeless tobacco: Never   Vaping Use    Vaping status: Never Used   Substance Use Topics    Alcohol use: Yes     Comment: 3-4 beers/day 24 oz beer    Drug use: No       Review of Systems   Constitutional:  Negative for fever.   HENT:  Negative for congestion.    Eyes:  Negative for visual disturbance.   Respiratory:  Negative for cough and shortness of breath.    Cardiovascular:  Negative for chest pain.   Gastrointestinal:  Negative for abdominal pain, diarrhea and vomiting.   Endocrine: Negative for polyuria.   Genitourinary:  Negative for dysuria and hematuria.   Musculoskeletal:  Negative for myalgias.   Neurological:  Negative for dizziness and headaches.       Physical Exam  Physical Exam  Vitals and nursing note reviewed.   Constitutional:       General: He is not in acute distress.     Appearance: Normal appearance.   HENT:      Head: Normocephalic and atraumatic.      Right Ear: External ear normal.      Left Ear: External ear normal.      Mouth/Throat:      Mouth: Mucous membranes are moist.      Pharynx: Oropharynx is clear.   Eyes:      Conjunctiva/sclera: Conjunctivae normal.      Pupils: Pupils are equal, round, and reactive to light.   Cardiovascular:      Rate and Rhythm: Normal rate and regular rhythm.      Heart sounds: Normal heart sounds.   Pulmonary:      Effort: Pulmonary effort is normal. No respiratory distress.      Breath sounds: Normal breath sounds.   Abdominal:      General: Abdomen is flat. There is no distension.      Palpations: Abdomen is soft.      Tenderness: There is no abdominal tenderness.      Comments: G-tube stoma is present in the left upper quadrant of the abdomen, clean dry and intact, it appears still open but very small.   Musculoskeletal:          General: No deformity. Normal range of motion.      Cervical back: Normal range of motion.   Skin:     General: Skin is warm and dry.   Neurological:      General: No focal deficit present.      Mental Status: He is alert and oriented to person, place, and time.   Psychiatric:         Mood and Affect: Mood normal.         Behavior: Behavior normal.         Vital Signs  ED Triage Vitals [03/14/24 2133]   Temperature Pulse Respirations Blood Pressure SpO2   (!) 97.2 °F (36.2 °C) (!) 108 16 (!) 177/107 98 %      Temp Source Heart Rate Source Patient Position - Orthostatic VS BP Location FiO2 (%)   Axillary Monitor Sitting Right arm --      Pain Score       --           Vitals:    03/14/24 2133   BP: (!) 177/107   Pulse: (!) 108   Patient Position - Orthostatic VS: Sitting         Visual Acuity      ED Medications  Medications - No data to display    Diagnostic Studies  Results Reviewed       Procedure Component Value Units Date/Time    Protime-INR [[id number]]  (Normal) Collected: 03/15/24 0128    Lab Status: Final result Specimen: Blood from Central Venous Line Updated: 03/15/24 0150     Protime 14.1 seconds      INR 1.03    APTT [[id number]]  (Normal) Collected: 03/15/24 0128    Lab Status: Final result Specimen: Blood from Central Venous Line Updated: 03/15/24 0150     PTT 30 seconds     Comprehensive metabolic panel [[id number]]  (Abnormal) Collected: 03/15/24 0128    Lab Status: Final result Specimen: Blood from Central Venous Line Updated: 03/15/24 0149     Sodium 130 mmol/L      Potassium 4.2 mmol/L      Chloride 94 mmol/L      CO2 28 mmol/L      ANION GAP 8 mmol/L      BUN 15 mg/dL      Creatinine 0.56 mg/dL      Glucose 88 mg/dL      Calcium 9.1 mg/dL      Corrected Calcium 9.7 mg/dL      AST 13 U/L      ALT 13 U/L      Alkaline Phosphatase 62 U/L      Total Protein 6.6 g/dL      Albumin 3.2 g/dL      Total Bilirubin 0.23 mg/dL      eGFR 112 ml/min/1.73sq m     Narrative:      National Kidney Disease  Foundation guidelines for Chronic Kidney Disease (CKD):     Stage 1 with normal or high GFR (GFR > 90 mL/min/1.73 square meters)    Stage 2 Mild CKD (GFR = 60-89 mL/min/1.73 square meters)    Stage 3A Moderate CKD (GFR = 45-59 mL/min/1.73 square meters)    Stage 3B Moderate CKD (GFR = 30-44 mL/min/1.73 square meters)    Stage 4 Severe CKD (GFR = 15-29 mL/min/1.73 square meters)    Stage 5 End Stage CKD (GFR <15 mL/min/1.73 square meters)  Note: GFR calculation is accurate only with a steady state creatinine    CBC and differential [[id number]]  (Abnormal) Collected: 03/15/24 0128    Lab Status: Final result Specimen: Blood from Central Venous Line Updated: 03/15/24 0139     WBC 6.33 Thousand/uL      RBC 2.88 Million/uL      Hemoglobin 9.1 g/dL      Hematocrit 28.4 %      MCV 99 fL      MCH 31.6 pg      MCHC 32.0 g/dL      RDW 15.9 %      MPV 9.0 fL      Platelets 311 Thousands/uL      nRBC 0 /100 WBCs      Neutrophils Relative 70 %      Immature Grans % 1 %      Lymphocytes Relative 9 %      Monocytes Relative 18 %      Eosinophils Relative 1 %      Basophils Relative 1 %      Neutrophils Absolute 4.41 Thousands/µL      Absolute Immature Grans 0.06 Thousand/uL      Absolute Lymphocytes 0.59 Thousands/µL      Absolute Monocytes 1.15 Thousand/µL      Eosinophils Absolute 0.08 Thousand/µL      Basophils Absolute 0.04 Thousands/µL                    No orders to display              Procedures  Procedures         ED Course                               SBIRT 22yo+      Flowsheet Row Most Recent Value   Initial Alcohol Screen: US AUDIT-C     1. How often do you have a drink containing alcohol? 0 Filed at: 03/14/2024 2133   2. How many drinks containing alcohol do you have on a typical day you are drinking?  0 Filed at: 03/14/2024 2133   3a. Male UNDER 65: How often do you have five or more drinks on one occasion? 0 Filed at: 03/14/2024 2133   3b. FEMALE Any Age, or MALE 65+: How often do you have 4 or more drinks on one  occassion? 0 Filed at: 03/14/2024 2133   Audit-C Score 0 Filed at: 03/14/2024 2133   CHHAYA: How many times in the past year have you...    Used an illegal drug or used a prescription medication for non-medical reasons? Never Filed at: 03/14/2024 2133                      Medical Decision Making  4-year-old male presents after his G-tube was dislodged.  He will be attempted to be reinserted and will be evaluated with contrast and an abdominal x-ray to ensure proper placement.    Attempts at replacement of the G-tube were unsuccessful, with the 16 Ethiopian that he came in with or with a neck smaller size, the 14 Ethiopian, the patient will be admitted to the hospital for IR assistance and replacement of the G-tube.    Amount and/or Complexity of Data Reviewed  Labs: ordered.  Radiology: ordered.             Disposition  Final diagnoses:   PEG tube malfunction (HCC)     Time reflects when diagnosis was documented in both MDM as applicable and the Disposition within this note       Time User Action Codes Description Comment    3/15/2024  2:11 AM Pito Thomas Add [K94.23] PEG tube malfunction (HCC)           ED Disposition       ED Disposition   AMA    Condition   --    Date/Time   Fri Mar 15, 2024 0356    Comment   AMA discussed with Dr. Valente.              Follow-up Information    None         Discharge Medication List as of 3/15/2024  4:03 AM        CONTINUE these medications which have NOT CHANGED    Details   Aspirin Low Dose 81 MG chewable tablet CHEW 1 TABLET BY MOUTH DAILY, Starting Tue 1/2/2024, Normal      atorvastatin (LIPITOR) 40 mg tablet TAKE 1 TABLET BY MOUTH EVERY DAY IN THE EVENING, Starting Wed 1/24/2024, Normal      chlorhexidine (PERIDEX) 0.12 % solution Historical Med      cyanocobalamin (VITAMIN B-12) 1000 MCG tablet Take 1 tablet (1,000 mcg total) by mouth daily, Starting Thu 3/14/2024, Normal      dexamethasone 0.5 MG/5ML elixir Take 10 mL (1 mg total) by mouth 4 (four) times a day, Starting Thu  1/11/2024, Normal      diphenhydramine, lidocaine, Al/Mg hydroxide, simethicone (Magic Mouthwash) SUSP Swish and spit 10 mL every 4 (four) hours as needed for mouth pain or discomfort, Starting Thu 3/14/2024, Normal      famotidine (PEPCID) 20 mg tablet Take 1 tablet (20 mg total) by mouth 2 (two) times a day, Starting Thu 3/14/2024, Normal      folic acid (FOLVITE) 1 mg tablet Take 1 tablet (1 mg total) by mouth daily, Starting Thu 3/14/2024, Normal      lactulose (CHRONULAC) 10 g/15 mL solution Take 15 mL (10 g total) by mouth 2 (two) times a day, Starting Tue 2/13/2024, Until Thu 3/14/2024, Normal      magnesium Oxide (MAG-OX) 400 mg TABS Take 1 tablet (400 mg total) by mouth 2 (two) times a day, Starting Tue 2/13/2024, Until Thu 3/14/2024, Normal      naloxone (NARCAN) 4 mg/0.1 mL nasal spray Administer 1 spray into a nostril. If no response after 2-3 minutes, give another dose in the other nostril using a new spray., Normal      nicotine (NICODERM CQ) 14 mg/24hr TD 24 hr patch Place 1 patch on the skin over 24 hours daily, Starting Thu 3/14/2024, Normal      ondansetron (ZOFRAN) 4 mg tablet Take 1 tablet (4 mg total) by mouth every 8 (eight) hours as needed for nausea or vomiting, Starting Thu 3/14/2024, Normal      oxyCODONE (ROXICODONE) 10 MG TABS Take 1 tablet (10 mg total) by mouth every 4 (four) hours as needed for moderate pain for up to 10 days Max Daily Amount: 60 mg, Starting Thu 3/14/2024, Until Sun 3/24/2024 at 2359, No Print      potassium chloride (Klor-Con M20) 20 mEq tablet Take 1 tablet (20 mEq total) by mouth daily for 14 days, Starting Tue 2/13/2024, Until Tue 2/27/2024, Normal      senna-docusate sodium (SENOKOT-S) 8.6-50 mg per tablet Take 1 tablet by mouth daily, Starting Thu 11/2/2023, Normal      silver sulfadiazine (SILVADENE,SSD) 1 % cream Apply topically 2 (two) times a day, Starting Wed 12/27/2023, Normal      sodium chloride 1 g tablet Take 1 tablet (1 g total) by mouth 2 (two) times  a day with meals, Starting Thu 3/14/2024, Normal             No discharge procedures on file.    PDMP Review         Value Time User    PDMP Reviewed  Yes 3/14/2024  3:26 PM Martina Canela MD            ED Provider  Electronically Signed by             Pito Thomas MD  03/15/24 0756

## 2024-03-15 NOTE — UTILIZATION REVIEW
NOTIFICATION OF ADMISSION DISCHARGE   This is a Notification of Discharge from [facility]. Please be advised that this patient has been discharge from our facility. Below you will find the admission and discharge date and time including the patient’s disposition.   UTILIZATION REVIEW CONTACT:  Joshua Madrigal  Utilization   Network Utilization Review Department  Phone: [phone] x carefully listen to the prompts. All voicemails are confidential.  Email: [email or web address]     ADMISSION INFORMATION  PRESENTATION DATE: 3/6/2024  5:40 PM  OBERVATION ADMISSION DATE:   INPATIENT ADMISSION DATE: 3/6/24  9:01 PM   DISCHARGE DATE: 3/14/2024 11:50 AM   DISPOSITION:Left against medical advice or discontinued care    Network Utilization Review Department  ATTENTION: Please call with any questions or concerns to [phone] and carefully listen to the prompts so that you are directed to the right person. All voicemails are confidential.   For Discharge needs, contact Care Management DC Support Team at [phone] opt. 2  Send all requests for admission clinical reviews, approved or denied determinations and any other requests to dedicated fax number below belonging to the campus where the patient is receiving treatment. List of dedicated fax numbers for the Facilities:  FACILITY NAME UR FAX NUMBER   ADMISSION DENIALS (Administrative/Medical Necessity) [phone]   DISCHARGE SUPPORT TEAM ([facility]) [phone]   PARENT CHILD HEALTH (Maternity/NICU/Pediatrics) [phone]   [facility] [phone]   General acute hospital [phone]   Critical access hospital [phone]   [facility] [phone]   [facility] [phone]   [facility] [phone]   [facility] [phone]   [facility]  [facility] [phone]   [facility] [phone]   [facility] [phone]   [facility] [phone]   [facility] [phone]

## 2024-03-15 NOTE — UTILIZATION REVIEW
NOTIFICATION OF ADMISSION DISCHARGE   This is a Notification of Discharge from [facility]. Please be advised that this patient has been discharge from our facility. Below you will find the admission and discharge date and time including the patient’s disposition.   UTILIZATION REVIEW CONTACT:  Joshua Madrigal  Utilization   Network Utilization Review Department  Phone: [phone] x carefully listen to the prompts. All voicemails are confidential.  Email: [email or web address]     ADMISSION INFORMATION  PRESENTATION DATE: 3/14/2024  9:28 PM  OBERVATION ADMISSION DATE:   INPATIENT ADMISSION DATE: 3/15/24  1:26 AM   DISCHARGE DATE: 3/15/2024  4:11 AM   DISPOSITION:Left against medical advice or discontinued care    Network Utilization Review Department  ATTENTION: Please call with any questions or concerns to [phone] and carefully listen to the prompts so that you are directed to the right person. All voicemails are confidential.   For Discharge needs, contact Care Management DC Support Team at [phone] opt. 2  Send all requests for admission clinical reviews, approved or denied determinations and any other requests to dedicated fax number below belonging to the campus where the patient is receiving treatment. List of dedicated fax numbers for the Facilities:  FACILITY NAME UR FAX NUMBER   ADMISSION DENIALS (Administrative/Medical Necessity) [phone]   DISCHARGE SUPPORT TEAM ([facility]) [phone]   PARENT CHILD HEALTH (Maternity/NICU/Pediatrics) [phone]   [facility] [phone]   [facility] [phone]   Critical access hospital [phone]   [facility] [phone]   [facility] [phone]   [facility] [phone]   [facility] [phone]   [facility]  [facility] [phone]   [facility] [phone]   [facility] [phone]   [facility] [phone]   [facility] [phone]

## 2024-03-15 NOTE — UTILIZATION REVIEW
Initial Clinical Review    Admission: Date/Time/Statement:   Admission Orders (From admission, onward)       Ordered        03/15/24 0318  INPATIENT ADMISSION  Once                          Orders Placed This Encounter   Procedures    INPATIENT ADMISSION     Standing Status:   Standing     Number of Occurrences:   1     Order Specific Question:   Level of Care     Answer:   Med Surg [16]     Order Specific Question:   Estimated length of stay     Answer:   More than 2 Midnights     Order Specific Question:   Certification     Answer:   I certify that inpatient services are medically necessary for this patient for a duration of greater than two midnights. See H&P and MD Progress Notes for additional information about the patient's course of treatment.     ED Arrival Information       Expected   -    Arrival   3/14/2024 21:28    Acuity   Urgent              Means of arrival   Ambulance    Escorted by   [facility]    Service   Hospitalist    Admission type   Emergency              Arrival complaint   ems/ feeding tube problem             Chief Complaint   Patient presents with    Feeding Tube Problem     Pt arrives via EMS from home. Pt reports feeding tube came out today. Feeding tube at bedside. Pt reporting no pain, site intact.        Initial Presentation: 58 y.o. male  male to ED via EMS from home.    Admitted to inpatient with Dx: feeding tube dysfunction.  Presented to ED with complaint of feeding tube came out.  Was discharged earlier in day.     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 On exam: hypertensive and tachycardic. Na 130.   Albumin 3.2.  H&H  9.1/28.4   . ED treatment: bedside attempt feeding tube  .    Plan includes:  IR as bedside replacement of feeding tube not able to be done.   Patient left AMA - wants to go home for phone then return.      Date: 3/15/24     Day 2:  0411:  The patient, initially admitted to the hospital as inpatient, was discharged earlier than expected given the following: left AMA     ED Triage Vitals [03/14/24 2133]   Temperature Pulse Respirations Blood Pressure SpO2   (!) 97.2 °F (36.2 °C) (!) 108 16 (!) 177/107 98 %      Temp Source Heart Rate Source Patient Position - Orthostatic VS BP Location FiO2 (%)   Axillary Monitor Sitting Right arm --      Pain Score       --          Wt Readings from Last 1 Encounters:   03/14/24 53.3 kg (117 lb 8.1 oz)     Additional Vital Signs: none     Pertinent Labs/Diagnostic Test Results:  none   No orders to display         Results from last 7 days   Lab Units 03/15/24  0128 03/13/24  0551 03/12/24  0436 03/11/24  0601 03/10/24  0404 03/09/24  0525   WBC Thousand/uL 6.33 7.63 7.59 7.21 8.08 7.21   HEMOGLOBIN g/dL 9.1* 8.7* 8.8* 8.8* 9.1* 9.2*   HEMATOCRIT % 28.4* 27.1* 27.5* 27.5* 28.5* 29.3*   PLATELETS Thousands/uL 311 302 292 272 279 287   NEUTROS ABS Thousands/µL 4.41  --   --   --  5.85 5.24     Results from last 7 days   Lab Units 03/15/24  0128 03/14/24  0625 03/13/24  0551 03/12/24  0436 03/11/24  0601 03/10/24  0404   SODIUM mmol/L 130* 133* 132* 130* 129* 132*   POTASSIUM mmol/L 4.2 4.4 4.3 4.1 4.3 4.2   CHLORIDE mmol/L 94* 97 96 93* 93* 96   CO2 mmol/L 28 31 33* 33* 34* 32   ANION GAP mmol/L 8 5 3* 4 2 4   BUN mg/dL 15 15 15 13 12 14   CREATININE mg/dL 0.56* 0.52* 0.54* 0.60 0.61 0.69   EGFR ml/min/1.73sq m 112 115 114 109 108 103   CALCIUM mg/dL 9.1 8.8 8.6 8.7 8.5 8.6   MAGNESIUM mg/dL  --  1.7* 1.6* 1.6* 1.9 1.7*   PHOSPHORUS mg/dL  --  4.1 3.5 3.6 3.0 2.8     Results from last 7 days   Lab Units 03/15/24  0128   AST U/L 13   ALT U/L 13   ALK PHOS U/L 62   TOTAL PROTEIN g/dL 6.6   ALBUMIN g/dL 3.2*   TOTAL BILIRUBIN mg/dL 0.23     Results from last 7 days   Lab Units 03/15/24  0128 03/14/24  0625 03/13/24  0551 03/12/24  0436 03/11/24  0601 03/10/24  0404 03/09/24  0525 03/08/24  0602   GLUCOSE  RANDOM mg/dL 88 104 109 114 94 119 102 114     Results from last 7 days   Lab Units 03/15/24  0128   PROTIME seconds 14.1   INR  1.03   PTT seconds 30     Results from last 7 days   Lab Units 03/10/24  0404   FERRITIN ng/mL 451*   IRON SATURATION % 23   IRON ug/dL 36*   TIBC ug/dL 156*       ED Treatment:   Medication Administration from 03/14/2024 2128 to 03/15/2024 0656       None          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  Age/Sex: 58 y.o. male  Admission Orders:  3/15/24 0318 inpatient   Scheduled Medications:  left ama prior to medications administered      acetaminophen  650 mg Oral Q6H PRN      atorvastatin  40 mg Oral QPM      calcium carbonate  1,000 mg Oral Daily PRN      cyanocobalamin  1,000 mcg Oral Daily      diphenhydramine, lidocaine, Al/Mg hydroxide, simethicone  10 mL Swish & Spit Q4H PRN      famotidine  20 mg Oral BID      folic acid  1 mg Oral Daily      heparin (porcine)  5,000 Units Subcutaneous Q8H MANDY      lactulose  15 g Oral BID PRN      magnesium Oxide  400 mg Oral BID      melatonin  6 mg Oral HS      multivitamin-minerals  1 tablet Oral Daily      naloxone  0.04 mg Intravenous Q1MIN PRN      nicotine  1 patch Transdermal Daily      ondansetron  4 mg Intravenous Q4H PRN      oxyCODONE  10 mg Oral Q4H PRN       Or    oxyCODONE  20 mg Oral Q4H PRN      senna-docusate sodium  1 tablet Oral Daily      sodium chloride  1 g Oral BID With Meals        None    Network Utilization Review Department  ATTENTION: Please call with any questions or concerns to [phone] and carefully listen to the prompts so that you are directed to the right person. All voicemails are confidential.   For Discharge  needs, contact Care Management DC Support Team at [phone] opt. 2  Send all requests for admission clinical reviews, approved or denied determinations and any other requests to dedicated fax number below belonging to the campus where the patient is receiving treatment. List of dedicated fax numbers for the Facilities:  FACILITY NAME UR FAX NUMBER   ADMISSION DENIALS (Administrative/Medical Necessity) [phone]   DISCHARGE SUPPORT TEAM (NETWORK) [phone]   PARENT CHILD HEALTH (Maternity/NICU/Pediatrics) [phone]   [facility] [phone]   [facility] [phone]   [facility] [phone]   [facility] [phone]   [facility] [phone]   [facility] [phone]   [facility] [phone]   [facility] [phone]   [facility] [phone]   [facility] [phone]   [facility] [phone]   [facility] [phone]

## 2024-03-15 NOTE — ASSESSMENT & PLAN NOTE
Patient presents to emergency department one day after being discharged due to dislodgment of PEG tube  Stable on arrival to emergency department, replacement of tube not able to be done at bedside - admission with IR consult recommended  Patient requested to leave AMA to retrieve his phone from home and then return to restart admission  Risks of leaving prior to tube replacement explained to patient, he is in understanding of these risks and accepts them.    AMA papers signed and patient discharged

## 2024-03-19 ENCOUNTER — TELEPHONE (OUTPATIENT)
Dept: FAMILY MEDICINE CLINIC | Facility: CLINIC | Age: 59
End: 2024-03-19

## 2024-03-19 NOTE — TELEPHONE ENCOUNTER
PCP SIGNATURE NEEDED FOR [facility]  FORM RECEIVED VIA FAX AND PLACED IN PCP FOLDER TO BE DELIVERED AT ASSIGNED TIMES.      Order#[id number]

## 2024-03-20 DIAGNOSIS — Z51.5 PALLIATIVE CARE ENCOUNTER: ICD-10-CM

## 2024-03-20 DIAGNOSIS — K13.79 PAIN IN ORAL CAVITY: ICD-10-CM

## 2024-03-20 DIAGNOSIS — G89.3 CANCER-RELATED PAIN: Chronic | ICD-10-CM

## 2024-03-20 RX ORDER — OXYCODONE HYDROCHLORIDE 10 MG/1
10 TABLET ORAL EVERY 4 HOURS PRN
Qty: 60 TABLET | Refills: 0 | Status: SHIPPED | OUTPATIENT
Start: 2024-03-20 | End: 2024-03-28 | Stop reason: SDUPTHER

## 2024-03-20 NOTE — TELEPHONE ENCOUNTER
Controlled Substance Review    [place] or [place]  reviewed: No red flags were identified; safe to proceed with prescription..    Filled  Written  ID  Drug  QTY  Days  Prescriber  RX #  Dispenser  Refill  Daily Dose*  Pymt Type     03/09/2024 03/06/2024 1 Oxycodone Hcl (Ir) 20 Mg Tab 60.00 10 Ti Lobo [id number] Pen (3026) 0 180.00 MME Comm Ins [place]  02/28/2024 02/23/2024 1 Oxycodone Hcl (Ir) 20 Mg Tab 60.00 10 Ti Lobo [id number] Pen (3026) 0 180.00 [place]  02/14/2024 02/14/2024 1 Oxycodone Hcl (Ir) 20 Mg Tab 90.00 15 Ti Lobo [id number] Pen (3026) 0 180.00 [place]    Will decrease dose to 10mg PO q4H prn. #60    Mitra Lima MD  Palliative Medicine & Supportive Care  Internal Medicine  Available via [employer] Text  Office: [phone]  Fax: [phone]

## 2024-03-21 ENCOUNTER — TELEPHONE (OUTPATIENT)
Dept: NUTRITION | Facility: CLINIC | Age: 59
End: 2024-03-21

## 2024-03-21 NOTE — TELEPHONE ENCOUNTER
Called Eliceo to touch base about his TF since being discharged from the hospital. No answer. Left VM explaining reason for call and encouraging him to call back.

## 2024-03-22 ENCOUNTER — TELEPHONE (OUTPATIENT)
Dept: FAMILY MEDICINE CLINIC | Facility: CLINIC | Age: 59
End: 2024-03-22

## 2024-03-22 NOTE — TELEPHONE ENCOUNTER
PCP SIGNATURE NEEDED FOR Home Health Certification and Plan Care FORM RECEIVED VIA FAX AND PLACED IN PCP FOLDER TO BE DELIVERED AT ASSIGNED TIMES.     Order# [id number]

## 2024-03-22 NOTE — TELEPHONE ENCOUNTER
Received call back from [place]. He states he has been tolerating his continuous TF. He is running it at around 40 ml/hr, and his goal is 60 ml/hr. He reports he's been eating PO as well. Encouraged him to increase to goal of 60 ml/hr to prevent further weight loss and to assist with weight gain. RD then asked if he had any questions which he did not. He stated he is in the process of transferring care to [facility]. Encouraged him to get in touch with a dietitian from [facility] to assist with TF management. He verbalized understanding. Will be available he if decides to continue care with [facility].

## 2024-03-22 NOTE — TELEPHONE ENCOUNTER
Received VM from [place] returning this RD's call. Called Eliceo back. No answer. Left  encouraging him to call back when he gets a chance.

## 2024-03-26 ENCOUNTER — TELEPHONE (OUTPATIENT)
Dept: GASTROENTEROLOGY | Facility: AMBULARY SURGERY CENTER | Age: 59
End: 2024-03-26

## 2024-03-26 NOTE — TELEPHONE ENCOUNTER
Dr. Gonzalez,  I'm working on Pending sale to [facility]'s recalls for February 2024 and I need clarification for the colonoscopy performed on this patient for 2/12/24. Just some confusion on my end as far as the repeat colon. If you can review this procedure note and let me know if you would like me to put in a 1 year colon recall or a 3-6 month with 2-day bowel prep? Please, advise. Thank you!

## 2024-03-27 ENCOUNTER — TELEPHONE (OUTPATIENT)
Dept: HEMATOLOGY ONCOLOGY | Facility: CLINIC | Age: 59
End: 2024-03-27

## 2024-03-27 ENCOUNTER — HOSPITAL ENCOUNTER (OUTPATIENT)
Dept: INFUSION CENTER | Facility: CLINIC | Age: 59
Discharge: HOME | End: 2024-03-27

## 2024-03-28 DIAGNOSIS — G89.3 CANCER-RELATED PAIN: Chronic | ICD-10-CM

## 2024-03-28 DIAGNOSIS — Z51.5 PALLIATIVE CARE ENCOUNTER: ICD-10-CM

## 2024-03-28 DIAGNOSIS — K13.79 PAIN IN ORAL CAVITY: ICD-10-CM

## 2024-03-28 RX ORDER — OXYCODONE HYDROCHLORIDE 10 MG/1
10 TABLET ORAL EVERY 4 HOURS PRN
Qty: 60 TABLET | Refills: 0 | Status: SHIPPED | OUTPATIENT
Start: 2024-03-31

## 2024-03-28 NOTE — TELEPHONE ENCOUNTER
He is not due for at least a few more days. Will send script but cannot be picked up sooner than 3/31.    Cont[place] Sub[place]    [place]: No red flags were identified; safe to proceed with prescription..    Filled  Written  ID  Drug  QTY  Days  Prescriber  RX #  Dispenser  Refill  Daily Dose*  Pymt Type     03/22/2024 03/22/2024 2 Oxycodone Hcl (Ir) 20 Mg Tab 60.00 10 Ja Per [id number] Leh (3509) 0 180.00 MME Private Pay [place]  03/20/2024 03/20/2024 1 Oxycodone Hcl (Ir) 10 Mg Tab 60.00 10 Ti Lobo [id number] Pen (3026) 0 90.00 [place]  03/20/2024 03/20/2024 2 Morphine Sulf 100 Mg/5 Ml Conc 15.00 5 Ja Per [id number] Leh (5499) 0 60.00 MME [place]  03/20/2024 03/20/2024 2 Lorazepam 0.5 Mg Tablet 15.00 3 Ja Per [id number] Le (3509) 0 2.50 LME Private Pay ALEXANDREA Lima MD  Palliative Medicine & Supportive Care  Internal Medicine  Available via [employer] Text  Office: [phone]  Fax: [phone]

## 2024-04-05 ENCOUNTER — TELEPHONE (OUTPATIENT)
Dept: HEMATOLOGY ONCOLOGY | Facility: CLINIC | Age: 59
End: 2024-04-05

## 2024-04-05 DIAGNOSIS — C06.9 MALIGNANT NEOPLASM OF ORAL CAVITY (HCC): ICD-10-CM

## 2024-04-05 DIAGNOSIS — C06.9 SQUAMOUS CELL CARCINOMA OF ORAL CAVITY (HCC): Primary | ICD-10-CM

## 2024-04-05 NOTE — TELEPHONE ENCOUNTER
Patient Call    Who are you speaking with? Patsy from [facility]     If it is not the patient, are they listed on an active communication consent form? N/A   What is the reason for this call? Patsy is calling in regards to pt starting hospice with their organization. During a discussion today with goals of care he had some questions about treatment. Patsy would like to speak to someone regarding the pt and his diagnosis and possible treatment options. Please call Patsy back regarding this.    Does this require a call back? Yes   If a call back is required, please list [facility] call back number [phone]   If a call back is required, advise that a message will be forwarded to their care team and someone will return their call as soon as possible.   Did you relay this information to the patient? Yes

## 2024-04-05 NOTE — TELEPHONE ENCOUNTER
Patient has been on service over 2 weeks or so. Patient has difficult living situation and he is at a facility. Goals of care was discussed during visit today. Patient told Patsy thought he was going to be cured and he would receive further treatment. Patient became upset with Hospice RN and they are asking of further treatment is recommended. Patient is ambulatory at this time.

## 2024-04-08 ENCOUNTER — PATIENT OUTREACH (OUTPATIENT)
Dept: CASE MANAGEMENT | Facility: HOSPITAL | Age: 59
End: 2024-04-08

## 2024-04-08 NOTE — TELEPHONE ENCOUNTER
"Call out to patient, patient is curious if treatment plan worked, if he still has cancer, if it was \"Cured\" and or if further treatment is recommended. Reviewed patient would need an appointment with Dr. Hu or any other medical oncologist. Patient reviewed he would like an appointment with Dr. Hu, soonest opening on 5/29.      referral placed to assist with transportation needs [facility]   "

## 2024-04-08 NOTE — PROGRESS NOTES
OSW received referral for patient, OSW will outreach to [facility] to inquire about transport.

## 2024-04-09 ENCOUNTER — PATIENT OUTREACH (OUTPATIENT)
Dept: CASE MANAGEMENT | Facility: HOSPITAL | Age: 59
End: 2024-04-09

## 2024-04-09 NOTE — PROGRESS NOTES
OSW placed call to [facility], [phone] to inquire about whether or not they will transport patient to his appointments. OSW was informed that they will arrange transport.     OSW provided appointment details for for appointments currently listed on patient's schedule.   Dr. Hu on 5/29/24  Dr. Mohr on 8/12/24      Any additional appointments can be scheduled by calling [facility] at [phone]

## 2024-04-12 ENCOUNTER — TELEPHONE (OUTPATIENT)
Dept: HEMATOLOGY ONCOLOGY | Facility: CLINIC | Age: 59
End: 2024-04-12

## 2024-04-12 NOTE — TELEPHONE ENCOUNTER
Spoke to pt and provided earlier appt for 4/17 @ 3PM w/ Dr Hu. Pt accepted that appt and advised me to cancel the appt in 5/29

## 2024-04-17 ENCOUNTER — OFFICE VISIT (OUTPATIENT)
Dept: HEMATOLOGY ONCOLOGY | Facility: CLINIC | Age: 59
End: 2024-04-17
Payer: COMMERCIAL

## 2024-04-17 VITALS
HEART RATE: 93 BPM | RESPIRATION RATE: 18 BRPM | HEIGHT: 75 IN | SYSTOLIC BLOOD PRESSURE: 118 MMHG | TEMPERATURE: 97.5 F | WEIGHT: 120.5 LBS | DIASTOLIC BLOOD PRESSURE: 72 MMHG | OXYGEN SATURATION: 93 % | BODY MASS INDEX: 14.98 KG/M2

## 2024-04-17 DIAGNOSIS — E43 SEVERE PROTEIN-CALORIE MALNUTRITION (HCC): Chronic | ICD-10-CM

## 2024-04-17 DIAGNOSIS — F10.10 ALCOHOL ABUSE: Chronic | ICD-10-CM

## 2024-04-17 DIAGNOSIS — Z71.6 ENCOUNTER FOR COUNSELING FOR TOBACCO USE DISORDER: ICD-10-CM

## 2024-04-17 DIAGNOSIS — C06.9 SQUAMOUS CELL CARCINOMA OF ORAL CAVITY (HCC): Primary | Chronic | ICD-10-CM

## 2024-04-17 DIAGNOSIS — K59.03 DRUG INDUCED CONSTIPATION: ICD-10-CM

## 2024-04-17 PROCEDURE — 99214 OFFICE O/P EST MOD 30 MIN: CPT | Performed by: INTERNAL MEDICINE

## 2024-04-17 PROCEDURE — 99406 BEHAV CHNG SMOKING 3-10 MIN: CPT | Performed by: INTERNAL MEDICINE

## 2024-04-17 NOTE — PROGRESS NOTES
Hematology Outpatient Office Note    Date of Service: 4/17/2024    [facility] HEMATOLOGY/MEDICAL ONCOLOGY SPECIALISTS [address] [place] [address]  [phone]    Reason for Consultation:   Chief Complaint   Patient presents with    Follow-up     Stage of cancer: CHETAN  Treatment goal is cure    Referral Physician: No ref. provider found    Primary Care Physician:  Elias Schoen, MD       ASSESSMENT & PLAN      Diagnosis ICD-10-CM Associated Orders   1. 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 Drug induced constipation  K59.03       3. Alcohol abuse  F10.10       4. Severe protein-calorie malnutrition (HCC)  E43       5. Encounter for counseling for tobacco use disorder  Z71.6               This is a 59 y.o. 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 None of the visualized lymph nodes appear enlarged in size. No suspicious mass visualized on noncontrast imaging.      6/19/2023 PET/CT: Hypermetabolic right anterior oral cavity mass most concerning for malignancy. Bilateral hypermetabolic cervical adenopathy compatible with metastases. No hypermetabolic metastases in the chest abdomen pelvis. Multiple healing left rib fractures.    Supportive care: Zofran, EMLA    I discussed the risks of ongoing cigarette smoking and reviewed the benefits of quitting and risk of new lung primary, heart disease, stroke, among other risks and cancers. Reviewed options including support, quit date, medications, and family support. Patient voiced understanding and willingness to try to quit. Patient was told to notify their family practitioner for additional management. Greater than  3 minutes were needed for discussion of tobacco dependence and quitting counselling.      Discussion of decision making    I personally reviewed the following lab results, the image studies, pathology, other specialty/physicians consult notes and recommendations, and outside medical records. I had a lengthy discussion with the patient and shared the work-up findings. We discussed the diagnosis and management plan as below. I spent 31 minutes reviewing the records (labs, clinician notes, outside records, medical history, ordering medicine/tests/procedures, interpreting the imaging/labs previously done) and coordination of care as well as direct time with the patient today, of which greater than 50% of the time was spent in counseling and coordination of care with the patient/family.    Plan/Labs  F/u ENT, surgery not being pursued upfront  F/u Rad Onc  CT Neck ordered  Pt completed 7 cycles of Cisplatin   Restaging PET/CT after RT/Chemo ordered and pending      Follow Up: 6 weeks to review PET/CT/CT    All questions were answered to the patient's satisfaction during this encounter. The patient knows the contact information for our office and knows to reach out for any relevant concerns related to this encounter. They are to call for any temperature 100.4 or higher, new symptoms including but not restricted to shaking chills, decreased appetite, nausea, vomiting, diarrhea, increased fatigue, shortness of breath or chest pain, confusion, and not feeling the strength to come to the clinic. For all other listed problems and medical diagnosis in their chart - they are managed by PCP and/or other specialists, which the patient acknowledges. Thank you very much for your consultation and making us a part of this patient's care. We are continuing to follow closely with you. Please do not hesitate to reach out to me with any additional questions or concerns.    Roosevelt Hu MD  Hematology & Medical Oncology Staff  Physician             Disclaimer: This document was prepared using [employer] Direct technology. If a word or phrase is confusing, or does not make sense, this is likely due to recognition error which was not discovered during this clinician's review. If you believe an error has occurred, please contact me through [employer] service line for yamini?cation.      ONCOLOGIC HISTORY OF PRESENT ILLNESS     His general dentist saw him on May 16 and appreciated a large oral cavity lesion which was concerning for malignancy and he is thus referred to us for further management.    Clotting History None   Bleeding History None   Cancer History Oral cavity suspected   Family Cancer History Dad (skin), Mom (breast)   H/O Blood/Plt Transfusion None   Tobacco/etoh/drug abuse 1 PPD x  45 years, beer (6 pack a day), no rec drug use       Cancer Screening history No colon cancer screening   Occupation Landscape work         SUBJECTIVE  (INTERVAL HISTORY)        I have reviewed the relevant past medical, surgical, social and family history. I have also reviewed allergies and medications for this patient.    He's having trouble swallowing/eating, pain in the mouth.    He is still smoking and less on some days.    He has preserved strength and energy. He has intermittent LH increasing, nausea.  Hasn't been able to work.    Review of Systems    Baseline weight: 145-150 lbs    Denies F/C, N/V, SOB, CP, HA, rash, itching, gen weakness, melena, hematuria, hematochezia, falls, diarrhea, or constipation       A 10-point review of system was performed, pertinent positive and negative were detailed as above. Otherwise, the 10-point review of system was negative.      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    Stroke (HCC)        Past Surgical History:   Procedure Laterality Date    EGD      IR GASTROSTOMY TUBE  PLACEMENT  12/11/2023    IR PORT PLACEMENT  11/7/2023    KNEE ARTHROSCOPY Left     MULTIPLE TOOTH EXTRACTIONS N/A 10/16/2023    Procedure: EXTRACTION OF ALL REMAINING TEETH;  Surgeon: Humble Otero DMD;  Location: [facility];  Service: Oral Surgery       Family History   Problem Relation Age of Onset    Breast cancer Mother     Cancer Father        Social History     Socioeconomic History    Marital status: Single     Spouse name: Not on file    Number of children: Not on file    Years of education: Not on file    Highest education level: Not on file   Occupational History    Not on file   Tobacco Use    Smoking status: Every Day     Current packs/day: 0.25     Types: Cigarettes    Smokeless tobacco: Never   Vaping Use    Vaping status: Never Used   Substance and Sexual Activity    Alcohol use: Yes     Comment: 3-4 beers/day 24 oz beer    Drug use: No    Sexual activity: Not on file   Other Topics Concern    Not on file   Social History Narrative    Not on file     Social Determinants of Health     Financial Resource Strain: Patient Declined (3/15/2024)    Received from [facility]    Overall Financial Resource Strain (CARDIA)     Difficulty of Paying Living Expenses: Patient declined   Food Insecurity: Patient Declined (3/15/2024)    Received from [facility]    Hunger Vital Sign     Worried About Running Out of Food in the Last Year: Patient declined     Ran Out of Food in the Last Year: Patient declined   Transportation Needs: Patient Declined (3/15/2024)    Received from [facility]    PRAPARE - Transportation     Lack of Transportation (Medical): Patient declined     Lack of Transportation (Non-Medical): Patient declined   Physical Activity: Not on file   Stress: Not on file   Social Connections: Not on file   Intimate Partner Violence: Patient Declined (3/15/2024)    Received from [facility]    Humiliation, Afraid, Rape, and Kick  questionnaire     Fear of Current or Ex-Partner: Patient declined     Emotionally Abused: Patient declined     Physically Abused: Patient declined     Sexually Abused: Patient declined   Housing Stability: Patient Declined (3/15/2024)    Received from [facility]    Housing Stability Vital Sign     Unable to Pay for Housing in the Last Year: Patient declined     Number of Places Lived in the Last Year: 1     Unstable Housing in the Last Year: Patient declined       Allergies   Allergen Reactions    Bee Venom Hives    Other      bees       Current Outpatient Medications   Medication Sig Dispense Refill    Acetaminophen Extra Strength 500 MG TABS       al mag oxide-diphenhydramine-lidocaine viscous (MAGIC MOUTHWASH) 1:1:1 suspension Take 5 mL by mouth 4 (four) times a day      al mag oxide-diphenhydramine-lidocaine viscous (MAGIC MOUTHWASH) 1:1:1 suspension Take 5 mL by mouth 4 (four) times a day      aluminum-magnesium hydroxide-simethicone (MAALOX) 8044-8751-288 mg/30 mL suspension       atorvastatin (LIPITOR) 40 mg tablet TAKE 1 TABLET BY MOUTH EVERY DAY IN THE EVENING 90 tablet 1    bisacodyl (DULCOLAX) 10 mg suppository       cyanocobalamin (VITAMIN B-12) 1000 MCG tablet Take 1 tablet (1,000 mcg total) by mouth daily 30 tablet 1    diphenhydramine, lidocaine, Al/Mg hydroxide, simethicone (Magic Mouthwash) SUSP Swish and spit 10 mL every 4 (four) hours as needed for mouth pain or discomfort 237 mL 2    famotidine (PEPCID) 20 mg tablet Take 1 tablet (20 mg total) by mouth 2 (two) times a day 30 tablet 1    folic acid (FOLVITE) 1 mg tablet Take 1 tablet (1 mg total) by mouth daily 30 tablet 1    haloperidol (HALDOL) oral concentrated solution       hyoscyamine (LEVSIN/SL) 0.125 mg SL tablet       LORazepam (ATIVAN) 0.5 mg tablet       morphine sulfate, concentrate, 100 mg/5mL concentrated solution       nicotine (NICODERM CQ) 14 mg/24hr TD 24 hr patch Place 1 patch on the skin over 24 hours daily 28  patch 0    Nutritional Supplements (JEVITY 1.5 FRED PO) 50 mL/hr by Enteral route daily      ondansetron (ZOFRAN) 4 mg tablet Take 1 tablet (4 mg total) by mouth every 8 (eight) hours as needed for nausea or vomiting 30 tablet 2    oxyCODONE (ROXICODONE) 10 MG TABS Take 1 tablet (10 mg total) by mouth every 4 (four) hours as needed for moderate pain Max Daily Amount: 60 mg Do not start before March 31, 2024. 60 tablet 0    oxyCODONE (ROXICODONE) 20 MG TABS       polyethylene glycol (MIRALAX) 17 g packet Take 17 g by mouth daily      senna-docusate sodium (SENOKOT-S) 8.6-50 mg per tablet Take 1 tablet by mouth daily 60 tablet 2    silver sulfadiazine (SILVADENE,SSD) 1 % cream Apply topically 2 (two) times a day 400 g 1    sodium chloride 1 g tablet Take 1 tablet (1 g total) by mouth 2 (two) times a day with meals 60 tablet 2    Aspirin Low Dose 81 MG chewable tablet CHEW 1 TABLET BY MOUTH DAILY (Patient not taking: Reported on 2/21/2024) 90 tablet 0    chlorhexidine (PERIDEX) 0.12 % solution  (Patient not taking: Reported on 12/14/2023)      dexamethasone 0.5 MG/5ML elixir Take 10 mL (1 mg total) by mouth 4 (four) times a day (Patient not taking: Reported on 1/26/2024) 400 mL 0    lactulose (CHRONULAC) 10 g/15 mL solution Take 15 mL (10 g total) by mouth 2 (two) times a day (Patient not taking: Reported on 3/6/2024) 900 mL 0    magnesium Oxide (MAG-OX) 400 mg TABS Take 1 tablet (400 mg total) by mouth 2 (two) times a day 60 tablet 0    naloxone (NARCAN) 4 mg/0.1 mL nasal spray Administer 1 spray into a nostril. If no response after 2-3 minutes, give another dose in the other nostril using a new spray. (Patient not taking: Reported on 2/21/2024) 1 each 1    potassium chloride (Klor-Con M20) 20 mEq tablet Take 1 tablet (20 mEq total) by mouth daily for 14 days 14 tablet 0     No current facility-administered medications for this visit.       (Not in a hospital admission)        Objective:     24 Hour Vitals  Assessment:     Vitals:    04/17/24 1059   Pulse: 93   Resp: 18   Temp: 97.5 °F (36.4 °C)   SpO2: 93%       Weight today: not obtained as this was a televisit    PHYSICIAN EXAM:    General: Appearance: alert, cooperative, no distress.  HEENT: Normocephalic, atraumatic. No scleral icterus. conjunctivae clear. EOMI. Posterior tongue mass noted, poor dentition  Chest: No tenderness to palpation. No open wound noted.  Lungs: Clear to auscultation bilaterally, Respirations unlabored.  Cardiac: Regular rate and rhythm, +S1and S2  Abdomen: Soft, non-tender, non-distended. Bowel sounds are normal  Extremities:  No edema, cyanosis, clubbing.  Skin: Skin color, turgor are normal. No rashes.  Lymphatics: no palpable axillary, or inguinal adenopathy, palpable R sub-mandibular LN noted  Neurologic: Awake, Alert, and oriented, no gross focal deficits noted b/l.       DATA REVIEW:    Pathology Result:    Final Diagnosis   Date Value Ref Range Status   08/29/2023   Final    A. 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, ulcerated  - Carcinoma present at tissue edges    Comment:  - HPV CISH and p16 ordered.   - Dr. Dimas was notified of the diagnosis via Epic messaging on 9/1/23 at 9:02 am.     Interpretation performed at [address] [address]              Image Results:   Image result are reviewed and documented in Hematology/Oncology history. I personally reviewed these images.    Colonoscopy  Narrative: Table formatting from the original result was not included.  Formerly [facility]  [address] [facility] [id number] [phone]    DATE OF SERVICE:  2/12/24    PHYSICIAN(S):  Attending:   Braxton Gonzalez MD     Fellow:   No Staff Documented     INDICATION:  Rectal bleeding    POST-OP DIAGNOSIS:  See the impression below.    HISTORY:  Prior colonoscopy: Less than 3 years ago. It is being repeated at an   interval of  less than 3 years because: Last colonoscopy had inadequate   bowel prep    BOWEL PREPARATION:  Golytely/Colyte/Trilyte    PREPROCEDURE:  Informed consent was obtained for the procedure, including sedation. Risks   including but not limited to bleeding, infection, perforation, adverse   drug reaction and aspiration were explained in detail. Also explained   about less than 100% sensitivity with the exam and other alternatives. The   patient was placed in the left lateral decubitus position.    Procedure: Colonoscopy     DETAILS OF PROCEDURE:   Patient was taken to the procedure room where a time out was performed to   confirm correct patient and correct procedure. The patient underwent   monitored anesthesia care, which was administered by an anesthesia   professional. The patient's blood pressure, heart rate, level of   consciousness, oxygen, respirations and ECG were monitored throughout the   procedure. A digital rectal exam was performed. The scope was introduced   through the anus and advanced to the cecum. Retroflexion was performed in   the rectum. The quality of bowel preparation was evaluated using the   [place] Bowel Preparation Scale with scores of: right colon = 2, transverse   colon = 2, left colon = 0. The total BBPS score was 4. Bowel prep was not   adequate. The patient experienced no blood loss. The procedure was not   difficult. The patient tolerated the procedure well. There were no   apparent adverse events.     ANESTHESIA INFORMATION:  ASA: ASA status not filed in the log.  Anesthesia Type: Anesthesia type not filed in the log.    MEDICATIONS:  No administrations occurring from 1256 to 1332 on 02/12/24     FINDINGS:  Single cratered ulcer in the rectum  Suboptimal prep throughout the colon, due to this polyps in the proximal   colon were not removed.  They were left intact.  Will need interval   colonoscopy for future polypectomy  Otherwise normal colon    EVENTS:  Procedure Events   Event Event  "Time   ENDO CECUM REACHED 2/12/2024  1:26 PM   ENDO SCOPE OUT TIME 2/12/2024  1:31 PM     SPECIMENS:  * No specimens in log *    EQUIPMENT:  Colonoscope -[id number]   Impression: Suboptimal prep throughout the colon  Several polyps in the proximal colon and the ascending, not removed due to   inadequate prep  Poor prep in the rectum and sigmoid  Rectal ulceration was identified  Retroflexion not performed  Cannot exclude any significant pathology in the distal colon however   patient can have this further workup as an outpatient    RECOMMENDATION:   Repeat colonoscopy in 1 year    Personal history of colon polyps  Inadequate bowel preparation       Return to floor  Diet as tolerated  Recommend taking 1 capful of MiraLAX twice daily to help maintain bowel   function  Repeat colonoscopy in 3 to 6 months with a 2-day bowel prep as an   outpatient         Braxton Gonzalez MD       LABS:  Lab data are reviewed and documented in [facility] history.       Lab Results   Component Value Date    HGB 9.1 (L) 03/15/2024    HCT 28.4 (L) 03/15/2024    MCV 99 (H) 03/15/2024     03/15/2024    WBC 6.33 03/15/2024    NRBC 0 03/15/2024    BANDSPCT 2 02/07/2024    ATYLMPCT 1 (H) 02/07/2024     Lab Results   Component Value Date    K 4.1 03/18/2024    CL 98 (L) 03/18/2024    CO2 28 03/18/2024    BUN 11 03/18/2024    CREATININE 0.60 03/18/2024    GLUCOSE 93 12/11/2016    GLUF 87 10/16/2023    CALCIUM 8.9 03/18/2024    CORRECTEDCA 9.7 03/15/2024    AST 18 03/15/2024    ALT 11 03/15/2024    ALKPHOS 68 03/15/2024    EGFR 110 03/18/2024       Lab Results   Component Value Date    IRON 36 (L) 03/10/2024    TIBC 156 (L) 03/10/2024    FERRITIN 451 (H) 03/10/2024       Lab Results   Component Value Date    [id number] 248 03/10/2024       No results for input(s): \"WBC\", \"CREAT\", \"PLT\" in the last 72 hours.         By:  Roosevelt Hu MD, 4/17/2024, 11:01 AM                                  "

## 2024-04-17 NOTE — PROGRESS NOTES
Nursing home paperwork reviewed and filled out by Dr. Hu and given to patient upon completion of appointment,.

## 2024-05-08 ENCOUNTER — HOSPITAL ENCOUNTER (OUTPATIENT)
Dept: NUCLEAR MEDICINE | Facility: HOSPITAL | Age: 59
Discharge: HOME | End: 2024-05-08
Attending: INTERNAL MEDICINE
Payer: COMMERCIAL

## 2024-05-08 DIAGNOSIS — C06.9 SQUAMOUS CELL CARCINOMA OF ORAL CAVITY (HCC): Chronic | ICD-10-CM

## 2024-05-08 LAB — GLUCOSE SERPL-MCNC: 93 MG/DL (ref 65–140)

## 2024-05-08 PROCEDURE — A9552 F18 FDG: HCPCS

## 2024-05-08 PROCEDURE — 78815 PET IMAGE W/CT SKULL-THIGH: CPT

## 2024-05-08 PROCEDURE — 82948 REAGENT STRIP/BLOOD GLUCOSE: CPT

## 2024-05-10 ENCOUNTER — DOCUMENTATION (OUTPATIENT)
Dept: HEMATOLOGY ONCOLOGY | Facility: CLINIC | Age: 59
End: 2024-05-10

## 2024-05-10 NOTE — PROGRESS NOTES
In-basket message received from Liz Sprague PA-C to add patient to the head and neck MDCC on 5/13/2024. Chart reviewed and prep completed.

## 2024-05-10 NOTE — PROGRESS NOTES
Chart reviewed in preparation of Multidisciplinary Head and Neck Tumor Conference presentation by Dr. White on 5/13/24.  Patient with history of Stage CHETAN oral cavity cancer, s/p oral biopsy on 8/29/23 with Dr. 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  Given extent of disease, he was not deemed surgically resectable and was discussed at MDT, with consensus for definitive chemo/RT.  He completed definitive intent chemo RT on 1/10/2024.  Recent PET/CT and physical exam concerning for residual disease.

## 2024-05-13 ENCOUNTER — DOCUMENTATION (OUTPATIENT)
Dept: HEMATOLOGY ONCOLOGY | Facility: CLINIC | Age: 59
End: 2024-05-13

## 2024-05-13 NOTE — PROGRESS NOTES
HEAD AND NECK MULTIDISCIPLINARY CASE REVIEW    DATE:  5/13/24      PRESENTING DOCTOR:  Dr. Jas Souza     DIAGNOSIS:  Hx of squamous cell carcinoma of oral cavity, HPV+   STAGING:  Stage CHETAN     Eliceo Garcia is a 59 y.o. male who was presented at the Head and Neck Oncology Multidisciplinary Tumor Conference today.  He has a history of Stage CHETAN oral cavity cancer, s/p oral biopsy on 8/29/23 with Dr. 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  Given extent of disease, he was not deemed surgically resectable and was discussed at MDT, with consensus for definitive chemo/RT.  He completed definitive intent chemo RT on 1/10/2024.  Recent PET/CT and physical exam concerning for residual disease.          PHYSICIAN RECOMMENDED PLAN:  -Obtain biopsy   -Follow-up with Medical Oncology    Imaging reviewed:   5/8/24 PET/CT  6/19/23 PET/CT    Pathology reviewed:  No     Referrals:  None needed at this time.     Procedures:  biopsy     Team agreed to plan.  NCCN guidelines were readily available for review at this discussion    The final treatment plan will be left to the discretion of the patient and the treating physician.     DISCLAIMERS:  TO THE TREATING PHYSICIAN:  This conference is a meeting of clinicians from various specialty areas who evaluate and discuss patients for whom a multidisciplinary treatment approach is being considered. Please note that the above opinion was a consensus of the conference attendees and is intended only to assist in quality care of the discussed patient.  The responsibility for follow up on the input given during the conference, along with any final decisions regarding plan of care, is that of the patient and the patient's provider. Accordingly, appointments have only been recommended based on this information and have NOT been scheduled unless otherwise noted.      TO THE PATIENT:  This summary is a brief record of major aspects of  your cancer treatment. You may choose to share a copy with any of your doctors or nurses. However, this is not a detailed or comprehensive record of your care.

## 2024-05-17 PROCEDURE — 88341 IMHCHEM/IMCYTCHM EA ADD ANTB: CPT | Performed by: STUDENT IN AN ORGANIZED HEALTH CARE EDUCATION/TRAINING PROGRAM

## 2024-05-17 PROCEDURE — 88342 IMHCHEM/IMCYTCHM 1ST ANTB: CPT | Performed by: STUDENT IN AN ORGANIZED HEALTH CARE EDUCATION/TRAINING PROGRAM

## 2024-05-17 PROCEDURE — 88309 TISSUE EXAM BY PATHOLOGIST: CPT | Performed by: STUDENT IN AN ORGANIZED HEALTH CARE EDUCATION/TRAINING PROGRAM

## 2024-05-20 NOTE — PROGRESS NOTES
Hematology Outpatient Office Note    Date of Service: 5/29/2024    [facility] [place]  [place] [address]  [phone]    Reason for Consultation:   Chief Complaint   Patient presents with    Follow-up     F/U W/CT FROM 5/8 & LABS FROM MARCH.     Stage of cancer: CHETAN  Treatment goal is cure    Referral Physician: No ref. provider found    Primary Care Physician:  Elias Schoen, MD       ASSESSMENT & PLAN      Diagnosis ICD-10-CM Associated Orders   1. 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 Drug induced constipation  K59.03       3. Encounter for counseling for tobacco use disorder  Z71.6       4. Cancer-related pain  G89.3               This is a 59 y.o. c PMHx notable for HTN, nicotine abuse, alcohol abuse being seen in consultation for an oral mouth lesion detected by his general dentist; now s/p systemic treatment for locally advanced SCC of the oral cavity      6/2/22022 CT Head w/o c: No acute hemorrhage, mass or ischemia identified  Neck w/o c: Scattered cervical chain lymph nodes. None of the visualized lymph nodes appear enlarged in size. No suspicious mass visualized on noncontrast imaging.      6/19/2023 PET/CT: Hypermetabolic right anterior oral cavity mass most concerning for malignancy. Bilateral hypermetabolic cervical adenopathy compatible with metastases. No hypermetabolic metastases in the chest abdomen pelvis. Multiple healing left rib fractures.    Supportive care: Zofran, EMLA    I discussed the risks of ongoing cigarette smoking and reviewed the benefits of quitting and risk of new lung primary, heart disease, stroke, among other risks and cancers. Reviewed options including support, quit date, medications, and family support. Patient voiced understanding and willingness to try to quit. Patient was told to notify their family practitioner for additional management. Greater than 3 minutes  "were needed for discussion of tobacco dependence and quitting counselling.    5/8/2024 PET/CT: Mixed findings for response to therapy. Partially decreased radiotracer uptake at the right anterior oral cavity lesion however significant activity remains concerning for residual disease. Scattered FDG-avid lymph nodes in the bilateral neck involving the submandibular and cervical regions. Some of the foci have improved while others appear larger, although activity has decreased somewhat, progression is not excluded.New mild patchy radiotracer uptake in the right middle posteriorly, SUV max of 2.4, with mild consolidation on CT probably inflammatory. Also new partial opacification of right lower lobe bronchi with mild associated atelectasis.    5/17/2024 Squamous mucosa, \"tongue\"; biopsy: Invasive squamous cell carcinoma, conventional (keratinizing), poorly differentiated. Perineural invasion: Present, extensive (0.125 mm in greatest diameter)  PDL1 CPS 70-80%      Discussion of decision making    I personally reviewed the following lab results, the image studies, pathology, other specialty/physicians consult notes and recommendations, and outside medical records. I had a lengthy discussion with the patient and shared the work-up findings. We discussed the diagnosis and management plan as below. I spent 41 minutes reviewing the records (labs, clinician notes, outside records, medical history, ordering medicine/tests/procedures, interpreting the imaging/labs previously done) and coordination of care as well as direct time with the patient today, of which greater than 50% of the time was spent in counseling and coordination of care with the patient/family.    Plan/Labs  F/u ENT  F/u Rad Onc  Infusion chairs ordered for Keytruda 400 mg q 6 weeks, C1 coming up with preceding labs  Will do CARIS NGS and Guardant NGS as recurrence was shown off the 5/17/2024  Restaging PET/CT needed November       Follow Up: q6 weeks    All " questions were answered to the patient's satisfaction during this encounter. The patient knows the contact information for our office and knows to reach out for any relevant concerns related to this encounter. They are to call for any temperature 100.4 or higher, new symptoms including but not restricted to shaking chills, decreased appetite, nausea, vomiting, diarrhea, increased fatigue, shortness of breath or chest pain, confusion, and not feeling the strength to come to the clinic. For all other listed problems and medical diagnosis in their chart - they are managed by PCP and/or other specialists, which the patient acknowledges. Thank you very much for your consultation and making us a part of this patient's care. We are continuing to follow closely with you. Please do not hesitate to reach out to me with any additional questions or concerns.    Roosevelt Hu MD  Hematology & Medical Oncology Staff Physician             Disclaimer: This document was prepared using [employer] Direct technology. If a word or phrase is confusing, or does not make sense, this is likely due to recognition error which was not discovered during this clinician's review. If you believe an error has occurred, please contact me through [facility] service line for yamini?cation.      ONCOLOGIC HISTORY OF PRESENT ILLNESS     His general dentist saw him on May 16 and appreciated a large oral cavity lesion which was concerning for malignancy and he is thus referred to us for further management.  Pt completed 7 cycles of Cisplatin   Clotting History None   Bleeding History None   Cancer History Oral cavity suspected   Family Cancer History Dad (skin), Mom (breast)   H/O Blood/Plt Transfusion None   Tobacco/etoh/drug abuse 1 PPD x  45 years, beer (6 pack a day), no rec drug use       Cancer Screening history No colon cancer screening   Occupation Landscape work         SUBJECTIVE  (INTERVAL HISTORY)        I have reviewed the relevant past  medical, surgical, social and family history. I have also reviewed allergies and medications for this patient.    Drinking 3-4 beers a day. Trying to cut down on smoking, and now down to 3 cigs/day.    He's having trouble swallowing/eating, pain in the mouth.    He has preserved strength and energy. He has intermittent LH increasing, nausea.  Hasn't been able to work.    Review of Systems    Baseline weight: 145-150 lbs    Denies F/C, N/V, SOB, CP, HA, rash, itching, gen weakness, melena, hematuria, hematochezia, falls, diarrhea, or constipation       A 10-point review of system was performed, pertinent positive and negative were detailed as above. Otherwise, the 10-point review of system was negative.      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    Stroke (HCC)        Past Surgical History:   Procedure Laterality Date    EGD      IR GASTROSTOMY TUBE PLACEMENT  12/11/2023    IR PORT PLACEMENT  11/7/2023    KNEE ARTHROSCOPY Left     MULTIPLE TOOTH EXTRACTIONS N/A 10/16/2023    Procedure: EXTRACTION OF ALL REMAINING TEETH;  Surgeon: Humble Otero DMD;  Location: [facility];  Service: Oral Surgery       Family History   Problem Relation Age of Onset    Breast cancer Mother     Cancer Father        Social History     Socioeconomic History    Marital status: Single     Spouse name: Not on file    Number of children: Not on file    Years of education: Not on file    Highest education level: Not on file   Occupational History    Not on file   Tobacco Use    Smoking status: Every Day     Current packs/day: 0.25     Types: Cigarettes    Smokeless tobacco: Never   Vaping Use    Vaping status: Never Used   Substance and Sexual Activity    Alcohol use: Yes     Comment: 3-4 beers/day 24 oz beer    Drug use: No    Sexual activity: Not on file   Other Topics Concern    Not on file   Social History  Narrative    Not on file     Social Determinants of Health     Financial Resource Strain: Patient Declined (3/15/2024)    [facility]    Overall Financial Resource Strain (CARDIA)     Difficulty of Paying Living Expenses: Patient declined   Food Insecurity: Patient Declined (3/15/2024)    Received from [facility]    Hunger Vital Sign     Worried About Running Out of Food in the Last Year: Patient declined     Ran Out of Food in the Last Year: Patient declined   Transportation Needs: Patient Declined (3/15/2024)    [facility]    PRAPARE - Transportation     Lack of Transportation (Medical): Patient declined     Lack of Transportation (Non-Medical): Patient declined   Physical Activity: Not on file   Stress: Not on file   Social Connections: Not on file   Intimate Partner Violence: Patient Declined (3/15/2024)    [facility]    Humiliation, Afraid, Rape, and Kick questionnaire     Fear of Current or Ex-Partner: Patient declined     Emotionally Abused: Patient declined     Physically Abused: Patient declined     Sexually Abused: Patient declined   Housing Stability: Patient Declined (3/15/2024)    [facility]    Housing Stability Vital Sign     Unable to Pay for Housing in the Last Year: Patient declined     Number of Places Lived in the Last Year: 1     Unstable Housing in the Last Year: Patient declined       Allergies   Allergen Reactions    Bee Venom Hives    Other      bees       Current Outpatient Medications   Medication Sig Dispense Refill    aluminum-magnesium hydroxide-simethicone (MAALOX) 6893-2772-305 mg/30 mL suspension       atorvastatin (LIPITOR) 40 mg tablet TAKE 1 TABLET BY MOUTH EVERY DAY IN THE EVENING 90 tablet  1    bisacodyl (DULCOLAX) 10 mg suppository       cyanocobalamin (VITAMIN B-12) 1000 MCG tablet Take 1 tablet (1,000 mcg total) by mouth daily 30 tablet 1    diphenhydramine, lidocaine, Al/Mg hydroxide, simethicone (Magic Mouthwash) SUSP Swish and spit 10 mL every 4 (four) hours as needed for mouth pain or discomfort 237 mL 2    famotidine (PEPCID) 20 mg tablet Take 1 tablet (20 mg total) by mouth 2 (two) times a day 30 tablet 1    gabapentin (Neurontin) 300 mg capsule Take 1 capsule (300 mg total) by mouth 3 (three) times a day 90 capsule 2    lactulose (CHRONULAC) 10 g/15 mL solution Take 15 mL by mouth every 24 hours As needed for constipation      LORazepam (ATIVAN) 0.5 mg tablet       nicotine (NICODERM CQ) 14 mg/24hr TD 24 hr patch Place 1 patch on the skin over 24 hours daily 28 patch 0    Nutritional Supplements (JEVITY 1.5 FRED PO) 50 mL/hr by Enteral route daily      oxyCODONE (ROXICODONE) 10 MG TABS Take 1 tablet (10 mg total) by mouth every 4 (four) hours as needed for moderate pain Max Daily Amount: 60 mg 60 tablet 0    polyethylene glycol (MIRALAX) 17 g packet Take 17 g by mouth every 24 hours      senna-docusate sodium (SENOKOT-S) 8.6-50 mg per tablet Take 1 tablet by mouth daily 60 tablet 2    Acetaminophen Extra Strength 500 MG TABS  (Patient not taking: Reported on 5/24/2024)      al mag oxide-diphenhydramine-lidocaine viscous (MAGIC MOUTHWASH) 1:1:1 suspension Take 5 mL by mouth 4 (four) times a day (Patient not taking: Reported on 5/24/2024)      al mag oxide-diphenhydramine-lidocaine viscous (MAGIC MOUTHWASH) 1:1:1 suspension Take 5 mL by mouth 4 (four) times a day (Patient not taking: Reported on 5/22/2024)      Aspirin Low Dose 81 MG chewable tablet CHEW 1 TABLET BY MOUTH DAILY (Patient not taking: Reported on 2/21/2024) 90 tablet 0    chlorhexidine (PERIDEX) 0.12 % solution  (Patient not taking: Reported on 5/22/2024)      dexamethasone 0.5 MG/5ML elixir Take 10 mL (1 mg total) by mouth 4  (four) times a day (Patient not taking: Reported on 1/26/2024) 400 mL 0    folic acid (FOLVITE) 1 mg tablet Take 1 tablet (1 mg total) by mouth daily (Patient not taking: Reported on 5/24/2024) 30 tablet 1    haloperidol (HALDOL) oral concentrated solution  (Patient not taking: Reported on 5/24/2024)      hyoscyamine (LEVSIN/SL) 0.125 mg SL tablet  (Patient not taking: Reported on 5/24/2024)      lactulose (CHRONULAC) 10 g/15 mL solution Take 15 mL (10 g total) by mouth 2 (two) times a day (Patient not taking: Reported on 3/6/2024) 900 mL 0    magnesium Oxide (MAG-OX) 400 mg TABS Take 1 tablet (400 mg total) by mouth 2 (two) times a day 60 tablet 0    naloxone (NARCAN) 4 mg/0.1 mL nasal spray Administer 1 spray into a nostril. If no response after 2-3 minutes, give another dose in the other nostril using a new spray. (Patient not taking: Reported on 2/21/2024) 1 each 1    ondansetron (ZOFRAN) 4 mg tablet Take 1 tablet (4 mg total) by mouth every 8 (eight) hours as needed for nausea or vomiting (Patient not taking: Reported on 5/24/2024) 30 tablet 2    potassium chloride (Klor-Con M20) 20 mEq tablet Take 1 tablet (20 mEq total) by mouth daily for 14 days 14 tablet 0    silver sulfadiazine (SILVADENE,SSD) 1 % cream Apply topically 2 (two) times a day (Patient not taking: Reported on 5/24/2024) 400 g 1    sodium chloride 1 g tablet Take 1 tablet (1 g total) by mouth 2 (two) times a day with meals (Patient not taking: Reported on 5/24/2024) 60 tablet 2     No current facility-administered medications for this visit.       (Not in a hospital admission)        Objective:     24 Hour Vitals Assessment:     Vitals:    05/29/24 1049   BP: 130/84   Pulse: 77   Resp: 18   Temp: 98.6 °F (37 °C)   SpO2: 98%         PHYSICIAN EXAM:    General: Appearance: alert, cooperative, no distress.  HEENT: Normocephalic, atraumatic. No scleral icterus. conjunctivae clear. EOMI. Posterior tongue mass noted, poor dentition  Chest: No  "tenderness to palpation. No open wound noted.  Lungs: Clear to auscultation bilaterally, Respirations unlabored.  Cardiac: Regular rate and rhythm, +S1and S2  Abdomen: Soft, non-tender, non-distended. Bowel sounds are normal  Extremities:  No edema, cyanosis, clubbing.  Skin: Skin color, turgor are normal. No rashes.  Lymphatics: no palpable axillary, or inguinal adenopathy, palpable R sub-mandibular LN noted  Neurologic: Awake, Alert, and oriented, no gross focal deficits noted b/l.       DATA REVIEW:    Pathology Result:    Final Diagnosis   Date Value Ref Range Status   05/17/2024   Final    A. Squamous mucosa, \"tongue\"; biopsy:       - Invasive squamous cell carcinoma, conventional (keratinizing), poorly differentiated, see note      - Perineural invasion: Present, extensive (0.125 mm in greatest diameter)     08/29/2023   Final    A. 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, ulcerated  - Carcinoma present at tissue edges    Comment:  - HPV CISH and p16 ordered.   - Dr. Dimas was notified of the diagnosis via Epic messaging on 9/1/23 at 9:02 am.     Interpretation performed at [facility] [place] [address]              Image Results:   Image result are reviewed and documented in Hematology/Oncology history. I personally reviewed these images.    NM PET CT skull base to mid thigh  Narrative: WHOLE-BODY PET/CT SCAN    INDICATION: Restaging of squamous cell carcinoma of the oral cavity. Status post chemoradiation. C06.9: Malignant neoplasm of mouth, unspecified    MODIFIER: PS    COMPARISON: PET/CT 6/19/2023, CT abdomen pelvis 2/8/2024 and additional priors    CELL TYPE: Squamous cell carcinoma    TECHNIQUE:  Following the intravenous administration of 8.8 mCi F-18-FDG, dedicated head and neck images were obtained from the skull vertex to the thoracic inlet with the patient's arms at their side 60 minutes post injection. " Subsequently, whole body images were   obtained from the thoracic inlet through the proximal femurs with the patients arms above their head.  Multiplanar attenuation corrected and non attenuation corrected PET images are available for interpretation. Contiguous, low dose, axial CT sections   were also obtained from the head through the thoracic inlet and from the thoracic inlet through the proximal femurs. Intravenous contrast material was not utilized.  Additional coronal and sagittal images are available as well as fused PET/CT images.    This examination, like all CT scans performed in the [facility], was performed utilizing techniques to minimize radiation dose exposure, including the use of iterative reconstruction and automated exposure control.    Fasting serum glucose: 93 mg/dl    FINDINGS:    VISUALIZED BRAIN:  No acute abnormalities are seen.    HEAD/NECK:  Right anterior cavity lesion again noted with heterogeneous uptake now SUV max of 9.3, decreased, previously 25. Dominant focus noted more superiorly at the level of the tongue, previously activity was more extensive at the floor of the mouth region.   Based on PET images, dominant focus now measures up to 2.6 cm in size. Further associated erosion noted of the mandible compared to prior PET/CT.    Scattered FDG-avid lymph nodes in the bilateral neck involving the submandibular region and cervical chains. Some of these regions have improved while others appear to have progressed.    More prominent left submandibular focus demonstrates SUV max of 11, decreased, previously 19. However this appears larger in size now measuring closer to 2.3 cm in size based on PET images, previously 1.5 cm.    Decreased radiotracer uptake at a right level 2 lymph node, SUV max of 3.0, previously 16. Small residual calcified lymph node remaining in this region measuring up to 1 cm short axis image 54 series 9, previously closer to 1.9 cm.    Right level 3  focus now SUV max of 9.8, previously 15. This now measures on the order of 1.6 cm short axis, larger, previously closer to 1.0 cm.    CT images: Partial opacification of right mastoid air cells.    CHEST:  Mild FDG uptake related to partially calcified lymph nodes of the mediastinum and right hilar region probably inflammatory. Right hilar focus demonstrates SUV max of 2.7, decreased previously 4.1. Left hilar focus demonstrates SUV max of 1.7, decreased   previously 3.2. Precarinal lymph node now demonstrates SUV max of 3.0, decreased previously 3.6.    Mild radiotracer uptake related to linear scarring in the right upper lobe, stable.    New mild patchy radiotracer uptake in the right middle posteriorly, SUV max of 2.4 with mild consolidation on CT probably inflammatory. Also new partial opacification of right lower lobe bronchi with mild associated atelectasis.    CT images: Emphysematous and bullous changes of the lung fields with scarring in the right upper lobe. Scattered calcified granuloma. Left-sided Mediport line tip terminates at the cavoatrial junction.    ABDOMEN:  No FDG avid soft tissue lesions are seen.    CT images: Calcified granuloma in the liver and spleen. Right renal cyst. Gastrostomy tube in position.    PELVIS:  No FDG avid soft tissue lesions are seen.    CT images: Unremarkable.    OSSEOUS STRUCTURES:  No FDG avid lesions are seen.    Resolution of activity at the left lateral rib fractures.    CT images:  No significant findings.  Impression: 1. Mixed findings for response to therapy.  2. Partially decreased radiotracer uptake at the right anterior oral cavity lesion however significant activity remains concerning for residual disease.  3. Scattered FDG-avid lymph nodes in the bilateral neck involving the submandibular and cervical regions. Some of the foci have improved while others appear larger, although activity has decreased somewhat, progression is not excluded.  4. New mild patchy  "radiotracer uptake in the right middle posteriorly, SUV max of 2.4, with mild consolidation on CT probably inflammatory. Also new partial opacification of right lower lobe bronchi with mild associated atelectasis.  5. Otherwise, no findings for hypermetabolic metastasis to the chest, abdomen or pelvis.    Workstation performed: [id number]      LABS:  Lab data are reviewed and documented in HemOnc history.       Lab Results   Component Value Date    HGB 9.1 (L) 03/15/2024    HCT 28.4 (L) 03/15/2024    MCV 99 (H) 03/15/2024     03/15/2024    WBC 6.33 03/15/2024    NRBC 0 03/15/2024    BANDSPCT 2 02/07/2024    ATYLMPCT 1 (H) 02/07/2024     Lab Results   Component Value Date    K 4.1 03/18/2024    CL 98 (L) 03/18/2024    CO2 28 03/18/2024    BUN 11 03/18/2024    CREATININE 0.60 03/18/2024    GLUCOSE 93 12/11/2016    GLUF 87 10/16/2023    CALCIUM 8.9 03/18/2024    CORRECTEDCA 9.7 03/15/2024    AST 18 03/15/2024    ALT 11 03/15/2024    ALKPHOS 68 03/15/2024    EGFR 110 03/18/2024       Lab Results   Component Value Date    IRON 36 (L) 03/10/2024    TIBC 156 (L) 03/10/2024    FERRITIN 451 (H) 03/10/2024       Lab Results   Component Value Date    APURHIBR02 248 03/10/2024       No results for input(s): \"WBC\", \"CREAT\", \"PLT\" in the last 72 hours.         By:  Roosevelt Hu MD, 5/29/2024, 11:08 AM                                  "

## 2024-05-22 ENCOUNTER — TELEPHONE (OUTPATIENT)
Dept: PALLIATIVE MEDICINE | Facility: CLINIC | Age: 59
End: 2024-05-22

## 2024-05-22 ENCOUNTER — OFFICE VISIT (OUTPATIENT)
Dept: PALLIATIVE MEDICINE | Facility: CLINIC | Age: 59
End: 2024-05-22

## 2024-05-22 VITALS
HEART RATE: 88 BPM | DIASTOLIC BLOOD PRESSURE: 90 MMHG | WEIGHT: 120 LBS | HEIGHT: 75 IN | TEMPERATURE: 96.3 F | BODY MASS INDEX: 14.92 KG/M2 | OXYGEN SATURATION: 95 % | SYSTOLIC BLOOD PRESSURE: 160 MMHG

## 2024-05-22 DIAGNOSIS — K13.79 PAIN IN ORAL CAVITY: ICD-10-CM

## 2024-05-22 DIAGNOSIS — Z51.5 PALLIATIVE CARE PATIENT: ICD-10-CM

## 2024-05-22 DIAGNOSIS — F10.20 ALCOHOL USE DISORDER, SEVERE, DEPENDENCE (HCC): ICD-10-CM

## 2024-05-22 DIAGNOSIS — C06.9 SQUAMOUS CELL CARCINOMA OF ORAL CAVITY (HCC): Primary | Chronic | ICD-10-CM

## 2024-05-22 DIAGNOSIS — G89.3 CANCER-RELATED PAIN: Chronic | ICD-10-CM

## 2024-05-22 DIAGNOSIS — F17.210 CIGARETTE NICOTINE DEPENDENCE WITHOUT COMPLICATION: ICD-10-CM

## 2024-05-22 DIAGNOSIS — Z71.89 GOALS OF CARE, COUNSELING/DISCUSSION: ICD-10-CM

## 2024-05-22 DIAGNOSIS — C06.9 SQUAMOUS CELL CARCINOMA OF ORAL CAVITY (HCC): Chronic | ICD-10-CM

## 2024-05-22 RX ORDER — GABAPENTIN 300 MG/1
300 CAPSULE ORAL 3 TIMES DAILY
Qty: 90 CAPSULE | Refills: 2 | Status: SHIPPED | OUTPATIENT
Start: 2024-05-22

## 2024-05-22 RX ORDER — GABAPENTIN 300 MG/1
300 CAPSULE ORAL 3 TIMES DAILY
Qty: 90 CAPSULE | Refills: 2 | Status: SHIPPED | OUTPATIENT
Start: 2024-05-22 | End: 2024-05-22 | Stop reason: SDUPTHER

## 2024-05-22 NOTE — PROGRESS NOTES
"Follow-up with Palliative and Supportive Care  Eliceo Garcia 59 y.o. male [id number]    ASSESSMENT & PLAN:    1. 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 Patient has revoked hospice and wishes to pursue disease directed cares. See \"goals of care\" for further details.  Orders:  -     oxyCODONE ER (Xtampza) 18 mg extended release capsule; Take 1 tablet (18 mg total) by mouth every 12 (twelve) hours Max Daily Amount: 36 mg  -     gabapentin (Neurontin) 300 mg capsule; Take 1 capsule (300 mg total) by mouth 3 (three) times a day  2. Alcohol use disorder, severe, dependence (HCC)  Assessment & Plan:  See details documented and \"cancer related pain\". UDS collected today.  3. Cigarette nicotine dependence without complication  Assessment & Plan:  Encourage smoking cessation. If patient is ready to quit, recommend he talk with his team at the nursing facility.  4. Cancer-related pain  Assessment & Plan:  Patient states his chronic cancer-related pain had been managed on oxycodone IR 20 mg tablets, but another provider had decreased it to 10 mg, which has been frustrating for him. He states that 10 mg tabs of oxycodone IR resolved his pain, but only for approximately half hour.  Based on this we'll prescribe long-acting opioid (Xtampza seems to be covered best by his insurance). Xtampza 18 mg q.12 hours ATC.  Anticipate patient will need a prior authorization for (initiation of opioid therapy or change to an existing opioid therapy)  Is the patient currently taking Opioids? yes  No long-acting morphine on formulary. BMI of 15 precludes fentanyl patches. Current OME/MME needs preclude buprenorphine. Xtampza on formulary.  [facility] will perform a UDS if patient is suspected of abuse.  This patient is a patient of [facility] with a goal of symptom management and " "improved quality of life.  Patient will benefit from the use of this particular medication over other medications because oxyIR relieves his pain but only for a short period of time (30 min) - will benefit from long acting opioid therapy.  Continue oxycodone IR 10 mg q.4 hours PRN. Patient does not yet need a refill but can call us when he does.  Start gabapentin 300 mg q.8 hours ATC. Patient had been prescribed gabapentin in the past but it was discontinued in 2023 as he was not taking it. Now the patient is in a nursing facility we will have a better chance for regular medication administration. Multimodal analgesia recommended for this patient.  Patient has a long history of alcohol abuse and dependence, and as recently as April 2024 was reported to be drinking multiple beers per day. Patient states he has not had any alcohol in \"I don't know, maybe a month and a half\" and states that he cannot get alcohol in his nursing facility.  Urine drug screen collected today considering substance abuse history. Patient counseled to not mix alcohol and narcotics.  Orders:  -     oxyCODONE ER (Xtampza) 18 mg extended release capsule; Take 1 tablet (18 mg total) by mouth every 12 (twelve) hours Max Daily Amount: 36 mg  -     gabapentin (Neurontin) 300 mg capsule; Take 1 capsule (300 mg total) by mouth 3 (three) times a day  5. Palliative care patient  Assessment & Plan:  Patient last seen by [facility] in the hospital in a March 2024 admission. In the past he had followed with Dr. Jj in our [place] location. Now that he is residing in [facility] this is the most convenient location for him.  Patient with significant chronic bilateral hearing loss, providers need to speak loudly when addressing patient otherwise he will not be able to hear.  Reviewed notes (Otolaryngology, Medical Oncology, DC Summary), labs (see below), imaging + procedures (5/8/24 PET CT showing mixed response to treatment). See below for more " "data.  Return in about 1 month (around 6/22/2024).  Medication safety issues addressed - no driving under the influence of narcotics (including opioids), watch for adverse effects including AMS or respiratory depression (slowed breathing), keep medications stored in a safe/locked environment, do not use alcohol while opioids or other narcotics are in one's system, do not travel with more than the minimum number of tablets or capsules required for the trip.  I have personally queried the patient's controlled substance dispensing history in the Prescription Drug Monitoring Program in compliance with regulations before I have prescribed any controlled substances. The prescription history is consistent with prescribed therapy and our practice policies.  Orders:  -     oxyCODONE ER (Xtampza) 18 mg extended release capsule; Take 1 tablet (18 mg total) by mouth every 12 (twelve) hours Max Daily Amount: 36 mg  -     gabapentin (Neurontin) 300 mg capsule; Take 1 capsule (300 mg total) by mouth 3 (three) times a day  6. Goals of care, counseling/discussion  Assessment & Plan:  Per chart review patient has been enrolled with [facility], and has received medications from their hospice team as recently as 5/10/24. I called [facility] at [phone] who state the patient revoked hospice yesterday. He states he is discussed with Otolaryngology and will be following up with them, and he has appointments scheduled with [facility] Medical Oncology for later this month, and [facility] Radiation Oncology in August 2024. He wants to pursue any offered treatments which might extend his life or reduce symptom burden's. Per 5/17/24 Otolaryngology note:  \"Options include hospice, chemotherapy, PD1 immunotherapy,  and surgery which would require near total glossectomy, possible mandibulectomy with recon need for tracheostomy. Gtube ... We discussed progression of disease, risk of recurrence, end of life care. Discussed expectations of " "surgery and impact of quality of life as well as swallow.\"        40+ minutes were spent in this ambulatory visit with greater than 50% of the time spent face to face with patient and in phone calls to [facility], in counseling or coordination of care including symptom assessment and management, medication review, medication adjustment, chart review, imaging review, lab review, goals of care, hospice services, opioid titration, supportive listening, and anticipatory guidance. All of the patient's questions were answered during this discussion.    SUBJECTIVE:  Chief Complaint   Patient presents with    Follow-up    Medication Refill    Goals    Counseling    Cancer    Pain        HPI    Eliceo Garcia is a 59 y.o. male w/ stage IV SCC of the oral cavity s/p chemoRT; EtOH abuse and dependence, nicotine dependence, HTN, dysphagia s/p feeding tube. He follows w/ Dr White (Otolaryngology), Dr Hu (Medical Oncology).    Patient currently residing in [facility]. Prior to that per chart review he was in a \"difficult living situation\". He confirms that he has had a significant alcohol history, but states today that he has not had any alcohol\" I don't know, maybe a month and a half ago\". He states he does not have access to alcohol in the nursing facility. He is smoking cigarettes, \"a couple per day\". He states that his goal is to \"moved to [place] when all of this is done\". He accepts counseling that he would need to find new providers in the [place] area, if he moves.    Patient states that he never agreed to sign onto hospice, he had only gone to [facility] to get help with a feeding tube issue. He becomes angry when discussing hospice. This provider called [facility] today and confirmed that the patient was enrolled with her service, and revoked yesterday (5/21/24 per the records). On 5/17/24 the patient saw Dr. White of [facility] Otolaryngology who discussed treatment options with the patient. " "Patient wishes to resume disease-directed cares and has appointment set up with Medical Oncology for next week, Radiation Oncology for August 2024. Patient quite clearly states he has no desire to resume hospice cares.    Patient states the provider in the nursing facility decreased his oxycodone IR from 20 mg per dose to 10 mg per dose. He states that 10 mg doses to relieve his pain totally but only for \"about 1/2-hour\". He asks to resume the higher dose of opioids. He denies nausea, denies vomiting. He states he is able to tolerate tablets by mouth, though sometimes they will be given to him in \"pudding\". He is using over-the-counter products for constipation. He has lost weight in recent months. BMI 15.    The following portions of the medical history were reviewed: past medical history, surgical history, problem list, medication list, family history, and social history.      Current Outpatient Medications:     al mag oxide-diphenhydramine-lidocaine viscous (MAGIC MOUTHWASH) 1:1:1 suspension, Take 5 mL by mouth 4 (four) times a day, Disp: , Rfl:     famotidine (PEPCID) 20 mg tablet, Take 1 tablet (20 mg total) by mouth 2 (two) times a day, Disp: 30 tablet, Rfl: 1    gabapentin (Neurontin) 300 mg capsule, Take 1 capsule (300 mg total) by mouth 3 (three) times a day, Disp: 90 capsule, Rfl: 2    oxyCODONE (ROXICODONE) 10 MG TABS, Take 1 tablet (10 mg total) by mouth every 4 (four) hours as needed for moderate pain Max Daily Amount: 60 mg, Disp: 60 tablet, Rfl: 0    oxyCODONE ER (Xtampza) 18 mg extended release capsule, Take 1 tablet (18 mg total) by mouth every 12 (twelve) hours Max Daily Amount: 36 mg, Disp: 60 tablet, Rfl: 0    senna-docusate sodium (SENOKOT-S) 8.6-50 mg per tablet, Take 1 tablet by mouth daily, Disp: 60 tablet, Rfl: 2    Acetaminophen Extra Strength 500 MG TABS, , Disp: , Rfl:     al mag oxide-diphenhydramine-lidocaine viscous (MAGIC MOUTHWASH) 1:1:1 suspension, Take 5 mL by mouth 4 (four) times " a day (Patient not taking: Reported on 5/22/2024), Disp: , Rfl:     aluminum-magnesium hydroxide-simethicone (MAALOX) 2565-3511-995 mg/30 mL suspension, , Disp: , Rfl:     Aspirin Low Dose 81 MG chewable tablet, CHEW 1 TABLET BY MOUTH DAILY (Patient not taking: Reported on 2/21/2024), Disp: 90 tablet, Rfl: 0    atorvastatin (LIPITOR) 40 mg tablet, TAKE 1 TABLET BY MOUTH EVERY DAY IN THE EVENING, Disp: 90 tablet, Rfl: 1    bisacodyl (DULCOLAX) 10 mg suppository, , Disp: , Rfl:     chlorhexidine (PERIDEX) 0.12 % solution, , Disp: , Rfl:     cyanocobalamin (VITAMIN B-12) 1000 MCG tablet, Take 1 tablet (1,000 mcg total) by mouth daily, Disp: 30 tablet, Rfl: 1    dexamethasone 0.5 MG/5ML elixir, Take 10 mL (1 mg total) by mouth 4 (four) times a day (Patient not taking: Reported on 1/26/2024), Disp: 400 mL, Rfl: 0    diphenhydramine, lidocaine, Al/Mg hydroxide, simethicone (Magic Mouthwash) SUSP, Swish and spit 10 mL every 4 (four) hours as needed for mouth pain or discomfort (Patient not taking: Reported on 5/22/2024), Disp: 237 mL, Rfl: 2    folic acid (FOLVITE) 1 mg tablet, Take 1 tablet (1 mg total) by mouth daily, Disp: 30 tablet, Rfl: 1    haloperidol (HALDOL) oral concentrated solution, , Disp: , Rfl:     hyoscyamine (LEVSIN/SL) 0.125 mg SL tablet, , Disp: , Rfl:     lactulose (CHRONULAC) 10 g/15 mL solution, Take 15 mL (10 g total) by mouth 2 (two) times a day (Patient not taking: Reported on 3/6/2024), Disp: 900 mL, Rfl: 0    LORazepam (ATIVAN) 0.5 mg tablet, , Disp: , Rfl:     magnesium Oxide (MAG-OX) 400 mg TABS, Take 1 tablet (400 mg total) by mouth 2 (two) times a day, Disp: 60 tablet, Rfl: 0    naloxone (NARCAN) 4 mg/0.1 mL nasal spray, Administer 1 spray into a nostril. If no response after 2-3 minutes, give another dose in the other nostril using a new spray. (Patient not taking: Reported on 2/21/2024), Disp: 1 each, Rfl: 1    nicotine (NICODERM CQ) 14 mg/24hr TD 24 hr patch, Place 1 patch on the skin over  "24 hours daily, Disp: 28 patch, Rfl: 0    Nutritional Supplements (JEVITY 1.5 FRED PO), 50 mL/hr by Enteral route daily, Disp: , Rfl:     ondansetron (ZOFRAN) 4 mg tablet, Take 1 tablet (4 mg total) by mouth every 8 (eight) hours as needed for nausea or vomiting, Disp: 30 tablet, Rfl: 2    potassium chloride (Klor-Con M20) 20 mEq tablet, Take 1 tablet (20 mEq total) by mouth daily for 14 days, Disp: 14 tablet, Rfl: 0    silver sulfadiazine (SILVADENE,SSD) 1 % cream, Apply topically 2 (two) times a day, Disp: 400 g, Rfl: 1    sodium chloride 1 g tablet, Take 1 tablet (1 g total) by mouth 2 (two) times a day with meals, Disp: 60 tablet, Rfl: 2    OBJECTIVE:  /90 (BP Location: Left arm, Patient Position: Sitting, Cuff Size: Standard)   Pulse 88   Temp (!) 96.3 °F (35.7 °C)   Ht 6' 3\" (1.905 m)   Wt 54.4 kg (120 lb)   SpO2 95%   BMI 15.00 kg/m²   Physical Exam  Vitals reviewed.   Constitutional:       General: He is not in acute distress.     Appearance: He is well-groomed. He is cachectic. He is ill-appearing (chronically). He is not toxic-appearing.   HENT:      Head: Normocephalic and atraumatic.      Right Ear: External ear normal.      Left Ear: External ear normal.   Eyes:      General: No scleral icterus.        Right eye: No discharge.         Left eye: No discharge.      Extraocular Movements: Extraocular movements intact.      Conjunctiva/sclera: Conjunctivae normal.      Pupils: Pupils are equal, round, and reactive to light.   Cardiovascular:      Rate and Rhythm: Normal rate.   Pulmonary:      Effort: Pulmonary effort is normal. No tachypnea, bradypnea, accessory muscle usage or respiratory distress.      Comments: Able to speak comfortably in complete sentences on room air at rest.  Abdominal:      General: There is no distension.      Tenderness: There is no guarding.   Musculoskeletal:      Cervical back: Normal range of motion.      Right lower leg: No edema.      Left lower leg: No edema. "   Skin:     General: Skin is dry.      Coloration: Skin is not pale.   Neurological:      Mental Status: He is alert and oriented to person, place, and time.      Cranial Nerves: Dysarthria present.      Gait: Gait abnormal (using 1PC).   Psychiatric:         Attention and Perception: Attention normal.         Mood and Affect: Affect is angry (when discussing hospice).         Speech: Speech normal.         Behavior: Behavior is cooperative.         Thought Content: Thought content is not paranoid or delusional.         Cognition and Memory: Cognition and memory normal.          Recent labs:  Lab Results   Component Value Date/Time    SODIUM 129 (L) 03/18/2024 06:13 AM    K 4.1 03/18/2024 06:13 AM    BUN 11 03/18/2024 06:13 AM    CREATININE 0.60 03/18/2024 06:13 AM    GLUC 120 (H) 03/18/2024 06:13 AM    CALCIUM 8.9 03/18/2024 06:13 AM    AST 18 03/15/2024 08:04 PM    ALT 11 03/15/2024 08:04 PM    ALB 3.4 (L) 03/15/2024 08:04 PM    TP 6.9 03/15/2024 08:04 PM    EGFR 110 03/18/2024 06:13 AM     Lab Results   Component Value Date/Time    HGB 9.1 (L) 03/15/2024 01:28 AM    WBC 6.33 03/15/2024 01:28 AM     03/15/2024 01:28 AM    INR 1.0 03/15/2024 08:04 PM    PTT 30 03/15/2024 01:28 AM     Lab Results   Component Value Date/Time    HPZ6LWQVVPAM 1.078 03/06/2024 08:00 PM       Recent Imaging:  Procedure: NM PET CT skull base to mid thigh    Result Date: 5/8/2024  Narrative: WHOLE-BODY PET/CT SCAN INDICATION: Restaging of squamous cell carcinoma of the oral cavity. Status post chemoradiation. C06.9: Malignant neoplasm of mouth, unspecified MODIFIER: PS COMPARISON: PET/CT 6/19/2023, CT abdomen pelvis 2/8/2024 and additional priors CELL TYPE: Squamous cell carcinoma TECHNIQUE: Following the intravenous administration of 8.8 mCi F-18-FDG, dedicated head and neck images were obtained from the skull vertex to the thoracic inlet with the patient's arms at their side 60 minutes post injection. Subsequently, whole body  images were obtained from the thoracic inlet through the proximal femurs with the patients arms above their head.  Multiplanar attenuation corrected and non attenuation corrected PET images are available for interpretation. Contiguous, low dose, axial CT sections were also obtained from the head through the thoracic inlet and from the thoracic inlet through the proximal femurs. Intravenous contrast material was not utilized.  Additional coronal and sagittal images are available as well as fused PET/CT images.  This examination, like all CT scans performed in the [facility], was performed utilizing techniques to minimize radiation dose exposure, including the use of iterative reconstruction and automated exposure control. Fasting serum glucose: 93 mg/dl FINDINGS: VISUALIZED BRAIN: No acute abnormalities are seen. HEAD/NECK: Right anterior cavity lesion again noted with heterogeneous uptake now SUV max of 9.3, decreased, previously 25. Dominant focus noted more superiorly at the level of the tongue, previously activity was more extensive at the floor of the mouth region. Based on PET images, dominant focus now measures up to 2.6 cm in size. Further associated erosion noted of the mandible compared to prior PET/CT. Scattered FDG-avid lymph nodes in the bilateral neck involving the submandibular region and cervical chains. Some of these regions have improved while others appear to have progressed. More prominent left submandibular focus demonstrates SUV max of 11, decreased, previously 19. However this appears larger in size now measuring closer to 2.3 cm in size based on PET images, previously 1.5 cm. Decreased radiotracer uptake at a right level 2 lymph node, SUV max of 3.0, previously 16. Small residual calcified lymph node remaining in this region measuring up to 1 cm short axis image 54 series 9, previously closer to 1.9 cm. Right level 3 focus now SUV max of 9.8, previously 15. This now measures  on the order of 1.6 cm short axis, larger, previously closer to 1.0 cm. CT images: Partial opacification of right mastoid air cells. CHEST: Mild FDG uptake related to partially calcified lymph nodes of the mediastinum and right hilar region probably inflammatory. Right hilar focus demonstrates SUV max of 2.7, decreased previously 4.1. Left hilar focus demonstrates SUV max of 1.7, decreased previously 3.2. Precarinal lymph node now demonstrates SUV max of 3.0, decreased previously 3.6. Mild radiotracer uptake related to linear scarring in the right upper lobe, stable. New mild patchy radiotracer uptake in the right middle posteriorly, SUV max of 2.4 with mild consolidation on CT probably inflammatory. Also new partial opacification of right lower lobe bronchi with mild associated atelectasis. CT images: Emphysematous and bullous changes of the lung fields with scarring in the right upper lobe. Scattered calcified granuloma. Left-sided Mediport line tip terminates at the cavoatrial junction. ABDOMEN: No FDG avid soft tissue lesions are seen. CT images: Calcified granuloma in the liver and spleen. Right renal cyst. Gastrostomy tube in position. PELVIS: No FDG avid soft tissue lesions are seen. CT images: Unremarkable. OSSEOUS STRUCTURES: No FDG avid lesions are seen. Resolution of activity at the left lateral rib fractures. CT images:  No significant findings.     Impression: 1. Mixed findings for response to therapy. 2. Partially decreased radiotracer uptake at the right anterior oral cavity lesion however significant activity remains concerning for residual disease. 3. Scattered FDG-avid lymph nodes in the bilateral neck involving the submandibular and cervical regions. Some of the foci have improved while others appear larger, although activity has decreased somewhat, progression is not excluded. 4. New mild patchy radiotracer uptake in the right middle posteriorly, SUV max of 2.4, with mild consolidation on CT  "probably inflammatory. Also new partial opacification of right lower lobe bronchi with mild associated atelectasis. 5. Otherwise, no findings for hypermetabolic metastasis to the chest, abdomen or pelvis. Workstation performed: [id number]     Procedure: IR GASTROSTOMY TUBE CHANGE BEDSIDE WO FL    Result Date: 3/18/2024  Narrative: EXAM TYPE:  IR GASTROSTOMY TUBE CHANGE BEDSIDE WO FL EXAM DATE AND TIME:  3/17/2024 3:25 PM CLINICAL HISTORY PROVIDED: \"Dislodged gastrostomy tube\" INDICATION: Accidental catheter dislodgement. Antibiotics/medications given: None Sedation time (minutes): 0 Versed (mg): 0 fentanyl (mcg): 0 Estimated Blood Loss (mL): < 5 Fluoroscopy time (minutes): 0 CAK Dose (mGy): 0 Contrast volume (mL): 0 Informed consent for the procedure was obtained. The patient was examined and is in satisfactory condition for planned procedure. After the procedure the patient was recovered and returned to their baseline status, and was deemed fit for discharge from . A time-out was performed with all team members present and in agreement, confirming the correct patient, correct procedure, and correct side/site. The patient was placed in the supine position. The upper abdomen including prior gastrostomy tube tract was prepped using ChloraPrep, and allowed to dry before sterile draping applied in the usual sterile fashion. The combination of a stiff Glidewire and 4 Libyan Kumpe catheter were placed through the patient's existing gastrostomy tube tract. The Kumpe catheter was exchanged for a 16 Libyan dilator. A 16 Libyan gastrostomy tube was placed over the wire. The securing balloon was filled with 7 mL normal saline and the bumper secured to the skin. All guidewires were removed intact. FINDINGS: Initial evaluation of the patient's abdomen demonstrate a potential patent tract from prior gastrostomy tube in the epigastric abdomen. Access through the tract was easily obtained with a Kumpe catheter and stiff " Glidewire. The tract was dilated and a new 16 Nepali gastrostomy tube was placed. Imaging correlation of the tube placement will be obtained. COMPLICATIONS: The patient tolerated the procedure well without immediate complication..    Impression: IMPRESSION: Replacement of a 16 Nepali gastrostomy tube via the existing prior tract. Confirm positioning of tube placement with imaging prior to gastrostomy tube utilization. The findings were discussed with JENN AGRCIA  via [employer] secure messaging with replied acknowledgement of findings on 3/16/2024 1649 by Juan Schwab. Workstation:[id number]    Procedure: FL barium swallow video w speech    Result Date: 3/17/2024  Narrative: History: Dysphagia Comparison: None Technique: Fluoroscopic guided video speech swallowing study was performed in conjunction with speech pathology. Study performed utilizing food products of variable consistency mixed with barium solution. Fluoroscopic time: 2.5 minutes Fluoroscopy dose: 11.02 mGy (CAK) Findings: Thin consistency liquid by spoon: On one of 2 swallows there is deep penetration to the level the cord signal clear with coughing. No tracheal aspiration was seen. Thin consistency liquid by cup: Transient shallow penetration was noted, but no tracheal aspiration. Nectar consistency liquid by spoon and cup: No aspiration or penetration. Puree consistency liquid by spoon: No aspiration or penetration. Thin consistency liquid by straw: There was recurrent penetration to at least the level of cords when swallowing both independently and when used as a liquid wash. Mixed soft solid with thin liquid (right cereal): No aspiration or penetration.    Impression: Impression: Recurrent laryngeal penetration, sometimes deep to the vocal cords, was observed with thin consistency liquid. Please refer to separately dictated speech pathology report for further evaluation. Workstation:[id number]    Procedure: FLUOROSCOPY G/J TUBE INJECTION    Result  Date: 3/17/2024  Narrative: History: G-tube placement Comparison studies: None available Fluoroscopic time: 0.5 minutes Fluoroscopy Dose: 14.68 mGy (CAK) Technique: First, a  view of the abdomen was obtained. Then a single contrast problem oriented fluoroscopic examination was performed. Contrast was administered via the indwelling gastrostomy tube under direct fluoroscopic observation. : The percutaneous gastrostomy balloon projects over the medial left upper quadrant. Findings: With administration of contrast there is prompt opacification of the gastric lumen and margins of the gastrostomy balloon. The gastrostomy and balloon are located at the mid to distal gastric body. There is unobstructed emptying of contrast into nondilated small intestinal loops. No extraluminal contrast or reflux contrast is seen..    Impression: Impression: In situ percutaneous gastrostomy without evidence of contrast leak, reflux, or gastric outlet obstruction. Workstation:[id number]    Procedure: Colonoscopy    Result Date: 2/12/2024  Narrative: Table formatting from the original result was not included. [facility] [address] [facility] [address] [phone] DATE OF SERVICE: 2/12/24 PHYSICIAN(S): Attending: Braxton Gonzalez MD Fellow: No Staff Documented INDICATION: Rectal bleeding POST-OP DIAGNOSIS: See the impression below. HISTORY: Prior colonoscopy: Less than 3 years ago. It is being repeated at an interval of less than 3 years because: Last colonoscopy had inadequate bowel prep BOWEL PREPARATION: Golytely/Colyte/Trilyte PREPROCEDURE: Informed consent was obtained for the procedure, including sedation. Risks including but not limited to bleeding, infection, perforation, adverse drug reaction and aspiration were explained in detail. Also explained about less than 100% sensitivity with the exam and other alternatives. The patient was placed in the left lateral decubitus position. Procedure:  Colonoscopy DETAILS OF PROCEDURE: Patient was taken to the procedure room where a time out was performed to confirm correct patient and correct procedure. The patient underwent monitored anesthesia care, which was administered by an anesthesia professional. The patient's blood pressure, heart rate, level of consciousness, oxygen, respirations and ECG were monitored throughout the procedure. A digital rectal exam was performed. The scope was introduced through the anus and advanced to the cecum. Retroflexion was performed in the rectum. The quality of bowel preparation was evaluated using the [place] Bowel Preparation Scale with scores of: right colon = 2, transverse colon = 2, left colon = 0. The total BBPS score was 4. Bowel prep was not adequate. The patient experienced no blood loss. The procedure was not difficult. The patient tolerated the procedure well. There were no apparent adverse events. ANESTHESIA INFORMATION: ASA: ASA status not filed in the log. Anesthesia Type: Anesthesia type not filed in the log. MEDICATIONS: No administrations occurring from 1256 to 1332 on 02/12/24 FINDINGS: Single cratered ulcer in the rectum Suboptimal prep throughout the colon, due to this polyps in the proximal colon were not removed.  They were left intact.  Will need interval colonoscopy for future polypectomy Otherwise normal colon EVENTS: Procedure Events Event Event Time ENDO CECUM REACHED 2/12/2024  1:26 PM ENDO SCOPE OUT TIME 2/12/2024  1:31 PM SPECIMENS: * No specimens in log * EQUIPMENT: Colonoscope -[id number]     Impression: Suboptimal prep throughout the colon Several polyps in the proximal colon and the ascending, not removed due to inadequate prep Poor prep in the rectum and sigmoid Rectal ulceration was identified Retroflexion not performed Cannot exclude any significant pathology in the distal colon however patient can have this further workup as an outpatient RECOMMENDATION:  Repeat colonoscopy in 1 year   Personal history of colon polyps Inadequate bowel preparation  Return to floor Diet as tolerated Recommend taking 1 capful of MiraLAX twice daily to help maintain bowel function Repeat colonoscopy in 3 to 6 months with a 2-day bowel prep as an outpatient  Braxton Gonzalez MD     Procedure: Colonoscopy    Result Date: 2/9/2024  Narrative: Table formatting from the original result was not included. [facility] [address] [facility] [address] [phone] DATE OF SERVICE: 2/09/24 PHYSICIAN(S): Attending: Lisa Fabian MD Fellow: No Staff Documented INDICATION: Rectal bleeding POST-OP DIAGNOSIS: See the impression below. HISTORY: Prior colonoscopy: No prior colonoscopy. BOWEL PREPARATION:  PREPROCEDURE: Informed consent was obtained for the procedure, including sedation. Risks including but not limited to bleeding, infection, perforation, adverse drug reaction and aspiration were explained in detail. Also explained about less than 100% sensitivity with the exam and other alternatives. The patient was placed in the left lateral decubitus position. Procedure: Colonoscopy DETAILS OF PROCEDURE: Patient was taken to the procedure room where a time out was performed to confirm correct patient and correct procedure. The patient underwent monitored anesthesia care, which was administered by an anesthesia professional. The patient's blood pressure, heart rate, level of consciousness, oxygen, respirations and ECG were monitored throughout the procedure. A digital rectal exam was performed. The scope was introduced through the anus and advanced to the rectosigmoid. The quality of bowel preparation was evaluated using the [place] Bowel Preparation Scale with scores of: right colon = not assessed, transverse colon = not assessed, left colon = 0. The total BBPS score was 0. Bowel prep was not adequate. The patient experienced no blood loss. The procedure was not difficult. The patient tolerated the  procedure well. There were no apparent adverse events. ANESTHESIA INFORMATION: ASA: III Anesthesia Type: IV Sedation with Anesthesia MEDICATIONS: No administrations occurring from 1532 to 1621 on 02/09/24 FINDINGS: Single 10 mm cratered, round ulcer in the distal rectum with no hemorrhage. Large amount of stool noted in the rectum vault, manual disimpaction was performed with removal of moderate amount of stool. EVENTS: Procedure Events Event Event Time ENDO SCOPE OUT TIME 2/9/2024  4:21 PM SPECIMENS: * No specimens in log * EQUIPMENT: Colonoscope -[id number]     Impression: Single ulcer in the distal rectum No evidence of active bleeding seen.  Nonbleeding clean-based ulcer noted in the very distal rectum.  There was also large amount of brown stool noted in the rectum vault, manual disimpaction was performed with removal of moderate amount of stool. RECOMMENDATION:  Await pathology results Repeat colonoscopy on Monday with repeat bowel prep over the weekend, would recommend 2-day bowel prep.  Would recommend tapwater enemas from below.  Lisa Fabian MD     Procedure: Flexible Sigmoidoscopy    Result Date: 2/9/2024  Narrative: Table formatting from the original result was not included. [facility] [address] [facility] [address] [phone] DATE OF SERVICE: 2/09/24 PHYSICIAN(S): Attending: Norm Coley MD Fellow: No Staff Documented INDICATION: Rectal bleeding POST-OP DIAGNOSIS: See the impression below. HISTORY: Prior colonoscopy: No prior colonoscopy. BOWEL PREPARATION:  PREPROCEDURE: Informed consent was obtained for the procedure, including sedation. Risks including but not limited to bleeding, infection, perforation, adverse drug reaction and aspiration were explained in detail. Also explained about less than 100% sensitivity with the exam and other alternatives. The patient was placed in the left lateral decubitus position. Procedure: Colonoscopy DETAILS OF  PROCEDURE: Patient was taken to the procedure room where a time out was performed to confirm correct patient and correct procedure. The patient underwent no sedation. The patient's blood pressure, heart rate, level of consciousness, oxygen and respirations were monitored throughout the procedure. A digital rectal exam was performed. The scope was introduced through the anus and advanced to the rectum. Retroflexion was performed in the rectum. The quality of bowel preparation was evaluated using the [place] Bowel Preparation Scale with scores of: right colon = not assessed, transverse colon = not assessed, left colon = 1. Bowel prep was not adequate. The patient experienced no blood loss. The procedure was moderately difficult due to poor preparation and Patient discomfort. The patient tolerated the procedure well. There were no apparent adverse events. The total BBPS score was 1. ANESTHESIA INFORMATION: ASA: ASA status not filed in the log. Anesthesia Type: Anesthesia type not filed in the log. MEDICATIONS: No administrations occurring from 0234 to 0250 on 02/09/24 FINDINGS: Single large, superficial ulcer in the rectum with hematin covered flat spot EVENTS: Procedure Events Event Event Time ENDO SCOPE OUT TIME 2/9/2024  2:47 AM SPECIMENS: * No specimens in log * EQUIPMENT: Colonoscope -[id number]     Impression: Single ulcer in the rectum with hematin covered flat spot No active bleeding at this time Large stool ball in the rectum obscured visualization RECOMMENDATION:    Repeat sigmoidoscopy later today as scheduled with sedation to allow disimpaction and more thorough evaluation    Norm Coley MD     Procedure: CT high volume bleeding scan abdomen pelvis    Result Date: 2/9/2024  Narrative: CT ABDOMEN AND PELVIS - WITHOUT AND WITH IV CONTRAST INDICATION:   rectal bleed. COMPARISON: 2/7/24. TECHNIQUE: CT examination of the abdomen and pelvis was performed both prior to and after the administration of  intravenous contrast. Multiplanar 2D reformatted images were created from the source data. Radiation dose length product (DLP) for this visit: 921 mGy-cm . This examination, like all CT scans performed in the [facility], was performed utilizing techniques to minimize radiation dose exposure, including the use of iterative reconstruction and automated exposure control. IV Contrast: 100 mL of iohexol (OMNIPAQUE) Enteric Contrast: Not administered. FINDINGS: ABDOMEN BOWEL: Active contrast extravasation at the 5 to 6 o'clock position of the rectum (303/149, 304/149) with pooling of intravenous contrast seen on portal venous phase images. Prominent stool retention. Gastrostomy tube in place LOWER CHEST: No clinically significant abnormality in the visualized lower chest. LIVER/BILIARY TREE: Unremarkable. GALLBLADDER: No calcified gallstones. No pericholecystic inflammatory change. SPLEEN: Unremarkable. PANCREAS: Unremarkable. ADRENAL GLANDS: Unremarkable. KIDNEYS/URETERS: Right parapelvic cyst. No hydronephrosis. APPENDIX: No findings to suggest appendicitis. ABDOMINOPELVIC CAVITY: Presacral inflammatory change.. No pneumoperitoneum. No lymphadenopathy. VESSELS: Atherosclerosis without abdominal aortic aneurysm. PELVIS REPRODUCTIVE ORGANS: Unremarkable for patient's age. URINARY BLADDER: Unremarkable. ABDOMINAL WALL/INGUINAL REGIONS: Unremarkable. BONES: No acute fracture or suspicious osseous lesion.     Impression: Active contrast extravasation at the 5 to 6 o'clock position of the rectum Constipation with rectal fecal impaction. . Workstation performed: [id number]     Procedure: CT abdomen pelvis w contrast    Result Date: 2/8/2024  Narrative: CT ABDOMEN AND PELVIS WITH IV CONTRAST INDICATION: Diarrhea Nausea/vomiting severe constipation, hx ca. COMPARISON: Multiple priors most recently 11/5/2023 TECHNIQUE: CT examination of the abdomen and pelvis was performed. Multiplanar 2D reformatted images were  "created from the source data. This examination, like all CT scans performed in the [facility], was performed utilizing techniques to minimize radiation dose exposure, including the use of iterative reconstruction and automated exposure control. Radiation dose length product (DLP) for this visit: 272 mGy-cm IV Contrast: 100 mL of iohexol (OMNIPAQUE) Enteric Contrast: Not administered. FINDINGS: ABDOMEN LOWER CHEST: No clinically significant abnormality in the visualized lower chest. LIVER/BILIARY TREE: Unremarkable. GALLBLADDER: No calcified gallstones. No pericholecystic inflammatory change. SPLEEN: Calcified granulomata. No suspicious mass. PANCREAS: Unremarkable. ADRENAL GLANDS: Unremarkable. KIDNEYS/URETERS: Simple renal cyst(s). Otherwise unremarkable kidneys. No hydronephrosis. STOMACH AND BOWEL: Moderate fecal impaction at the rectum with the rectum dilated up to 7 cm. Fluid-filled hyperenhancing small bowel loops in the left mid abdomen. Gastrostomy tube in appropriate position. APPENDIX: No findings to suggest appendicitis. ABDOMINOPELVIC CAVITY: No discrete ascites but there is diffuse mesenteric edema.. No pneumoperitoneum. No lymphadenopathy. VESSELS: Atherosclerosis without abdominal aortic aneurysm. PELVIS REPRODUCTIVE ORGANS: Unremarkable for patient's age. URINARY BLADDER: Unremarkable. ABDOMINAL WALL/INGUINAL REGIONS: Unremarkable. BONES: No acute fracture or suspicious osseous lesion.     Impression: Fluid-filled hyperenhancing small bowel loops in the left mid abdomen suggesting enteritis. There is associated mesenteric edema Moderate fecal impaction at the rectum with the rectum dilated up to 7 cm Workstation performed: [id number]      Deniz Wells MD  [facility]  [phone]    Portions of this document may have been created using dictation software and as such some \"sound alike\" terms may have been generated by the system. Do not hesitate to " contact me with any questions or clarifications.     This note was not shared with the patient due to reasonable likelihood of causing patient harm.

## 2024-05-22 NOTE — ASSESSMENT & PLAN NOTE
Patient last seen by [facility] in the hospital in a March 2024 admission. In the past he had followed with Dr. Jj in our [place] location. Now that he is residing in [facility] this is the most convenient location for him.  Patient with significant chronic bilateral hearing loss, providers need to speak loudly when addressing patient otherwise he will not be able to hear.  Reviewed notes (Otolaryngology, Medical Oncology, DC Summary), labs (see below), imaging + procedures (5/8/24 PET CT showing mixed response to treatment). See below for more data.  Return in about 1 month (around 6/22/2024).  Medication safety issues addressed - no driving under the influence of narcotics (including opioids), watch for adverse effects including AMS or respiratory depression (slowed breathing), keep medications stored in a safe/locked environment, do not use alcohol while opioids or other narcotics are in one's system, do not travel with more than the minimum number of tablets or capsules required for the trip.  I have personally queried the patient's controlled substance dispensing history in the Prescription Drug Monitoring Program in compliance with regulations before I have prescribed any controlled substances. The prescription history is consistent with prescribed therapy and our practice policies.

## 2024-05-22 NOTE — PATIENT INSTRUCTIONS
It was good to see you today. Thank you for coming in.    If you do not wish to enroll again in hospice, you will need to see ENT, Medical Oncology, Radiation Oncology regularly.  The best thing you can do for your health is to quit smoking - talk with the team at your nursing facility if you would like to quit.  Continue oxycodone IR 10mg every 4 hours as needed for breakthrough pain.  Start long-acting pain medication - Xtampza (oxycodone ER) 18mg every 12 hours around the clock. This will reduce your background pain throughout the day and night.  Frequently, medications require a 'prior authorization' by your insurance company. When that happens, your medicine will not be covered at all by your insurer. If you are charged an unacceptable amount for a new medication or refill, please DO NOT PAY OUT OF POCKET. Ask your pharmacy to send the prior authorization to our office (phone - [phone], fax - [phone]). The prior authorization process may take up to a week (5 business days). We appreciate your patience during this time.  When using opioids for pain control, constipation is common and patients should act to prevent it:  Drink PLENTY of water. This is important to keep the gut moving.  Some people have success w/ using prunes, prune juice, certain fruits or vegetables (apples, bananas, prunes, pears, raspberries, and vegetables like string beans, broccoli, spinach, kale, squash, lentils, peas, and beans), or fiber gummies.  Try a probiotic. This could be yogurt or kefir, or fermented beverages such as kombucha, but probiotics are also available in capsule form. Aim for 10-15 billion colony-forming-units, w/ bacteria such as Lactobacillus / Saccharomyces / Actinomyces.  Osmotic laxatives (Miralax, magnesium citrate, Milk of Magnesia) can be very useful for opioid-induced constipation (OIC); take daily to prevent OIC.  Bulk laxatives (Citrucel, Metamucil, Fibercon, Benefiber, wheat germ) are useful for  "constipation in patients who are not taking opioids, but are not recommended if you are taking opioids.  Colace is good for softening hard stools, or preventing constipation when opioids are being used - but does not stimulate the bowel to move things along once constipation has occurred.  You can use senna, 1 to 2 tabs, once or twice daily as needed for constipation. Use as directed on the box/bottle. Senna is also available in a tea (\"Smooth Move\"). Should that not be enough for your constipation, you can try Dulcolax.  Should that not be enough, consider an enema.  All of these medications are available over-the-counter.  Return in about 1 month (around 6/22/2024).  Call us for refills on medications that we supply, as needed.   If something changes and you need to come in sooner, please call our office.    PRESCRIPTION REFILL REMINDER:  All medication refills should be requested prior to Noon on Friday. Any refill requests after noon on Friday would be addressed the following Monday.    MEDICATION SAFETY ISSUES:   Do not drive under the influence of narcotics (including opioids), watch for adverse effects including confusion / altered mental status / respiratory depression (slowed breathing), keep medications stored in a safe/locked environment, do not use alcohol while opioids or other narcotics are in your system. Do not travel with more than the minimum number of tablets or capsules required for the trip.   "

## 2024-05-22 NOTE — TELEPHONE ENCOUNTER
I am not comfortable sending Xtampza to a mail order pharmacy in [facility]. Declining that request. Patient should pick that up locally at [facility] or have a family member pick it up.    Sending gabapentin to mail order pharmacy as requested.

## 2024-05-22 NOTE — ASSESSMENT & PLAN NOTE
"Per chart review patient has been enrolled with [facility], and has received medications from their hospice team as recently as 5/10/24. I called [facility] at [phone] who state the patient revoked hospice yesterday. He states he is discussed with Otolaryngology and will be following up with them, and he has appointments scheduled with [facility] Medical Oncology for later this month, and [facility] Radiation Oncology in August 2024. He wants to pursue any offered treatments which might extend his life or reduce symptom burden's. Per 5/17/24 Otolaryngology note:  \"Options include hospice, chemotherapy, PD1 immunotherapy,  and surgery which would require near total glossectomy, possible mandibulectomy with recon need for tracheostomy. Gtube ... We discussed progression of disease, risk of recurrence, end of life care. Discussed expectations of surgery and impact of quality of life as well as swallow.\"  "

## 2024-05-22 NOTE — ASSESSMENT & PLAN NOTE
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 Patient has revoked hospice and wishes to pursue disease directed cares. See \"goals of care\" for further details.  "

## 2024-05-22 NOTE — ASSESSMENT & PLAN NOTE
Encourage smoking cessation. If patient is ready to quit, recommend he talk with his team at the nursing facility.

## 2024-05-22 NOTE — TELEPHONE ENCOUNTER
Nurse requesting change of pharmacy due to patient staying in [facility] in [place] . Confirmed with pharmacy medication was not picked up .

## 2024-05-22 NOTE — TELEPHONE ENCOUNTER
Prior Authorization COMPLETED for oxyCODONE ER (Xtampza) 18 mg extended release capsule     KEY [id number]    COVER MY MEDS

## 2024-05-22 NOTE — ASSESSMENT & PLAN NOTE
"Patient states his chronic cancer-related pain had been managed on oxycodone IR 20 mg tablets, but another provider had decreased it to 10 mg, which has been frustrating for him. He states that 10 mg tabs of oxycodone IR resolved his pain, but only for approximately half hour.  Based on this we'll prescribe long-acting opioid (Xtampza seems to be covered best by his insurance). Xtampza 18 mg q.12 hours ATC.  Anticipate patient will need a prior authorization for (initiation of opioid therapy or change to an existing opioid therapy)  Is the patient currently taking Opioids? yes  No long-acting morphine on formulary. BMI of 15 precludes fentanyl patches. Current OME/MME needs preclude buprenorphine. Xtampza on formulary.  [facility] will perform a UDS if patient is suspected of abuse.  This patient is a patient of [facility] with a goal of symptom management and improved quality of life.  Patient will benefit from the use of this particular medication over other medications because oxyIR relieves his pain but only for a short period of time (30 min) - will benefit from long acting opioid therapy.  Continue oxycodone IR 10 mg q.4 hours PRN. Patient does not yet need a refill but can call us when he does.  Start gabapentin 300 mg q.8 hours ATC. Patient had been prescribed gabapentin in the past but it was discontinued in 2023 as he was not taking it. Now the patient is in a nursing facility we will have a better chance for regular medication administration. Multimodal analgesia recommended for this patient.  Patient has a long history of alcohol abuse and dependence, and as recently as April 2024 was reported to be drinking multiple beers per day. Patient states he has not had any alcohol in \"I don't know, maybe a month and a half\" and states that he cannot get alcohol in his nursing facility.  Urine drug screen collected today considering substance abuse " history. Patient counseled to not mix alcohol and narcotics.

## 2024-05-23 PROCEDURE — 88342 IMHCHEM/IMCYTCHM 1ST ANTB: CPT | Performed by: STUDENT IN AN ORGANIZED HEALTH CARE EDUCATION/TRAINING PROGRAM

## 2024-05-23 PROCEDURE — 88309 TISSUE EXAM BY PATHOLOGIST: CPT | Performed by: STUDENT IN AN ORGANIZED HEALTH CARE EDUCATION/TRAINING PROGRAM

## 2024-05-23 PROCEDURE — 88341 IMHCHEM/IMCYTCHM EA ADD ANTB: CPT | Performed by: STUDENT IN AN ORGANIZED HEALTH CARE EDUCATION/TRAINING PROGRAM

## 2024-05-23 NOTE — TELEPHONE ENCOUNTER
Received prior authorization [APPROVAL] for [Xtampza ER 18mg Capsule] through [5/22/24 through 5/22/25].    Faxed results to Pharmacy. Pharmacy has been asked to inform pt of outcome.

## 2024-05-24 LAB
2-METHYL AP-237 QUANTIFICATION: NEGATIVE NG/ML
4OH-XYLAZINE UR QL CFM: NEGATIVE NG/ML
6MAM UR QL CFM: NEGATIVE NG/ML
7AMINOCLONAZEPAM UR QL CFM: NEGATIVE NG/ML
8-AMINOCLONAZOLAM QUANTIFICATION: NEGATIVE NG/ML
A-OH ALPRAZ UR QL CFM: NEGATIVE NG/ML
ACCEPTABLE CREAT UR QL: NORMAL MG/DL
ACCEPTIBLE SP GR UR QL: NORMAL
ALPHA-HYDROXYETIZOLAM QUANTIFICATION: NEGATIVE NG/ML
AMPHET UR QL CFM: NEGATIVE NG/ML
BRORPHINE QUANTIFICATION: NEGATIVE NG/ML
BUPRENORPHINE UR QL CFM: NEGATIVE NG/ML
BUTALBITAL UR QL CFM: NEGATIVE NG/ML
BZE UR QL CFM: NEGATIVE NG/ML
CODEINE UR QL CFM: NEGATIVE NG/ML
EDDP UR QL CFM: NEGATIVE NG/ML
ETHANOL UR QL SCN: NEGATIVE MG/DL
ETHYL GLUCURONIDE UR QL CFM: ABNORMAL NG/ML
ETHYL SULFATE UR QL SCN: ABNORMAL NG/ML
ETIZOLAM QUANTIFICATION: NEGATIVE NG/ML
EUTYLONE UR QL: NEGATIVE NG/ML
FENTANYL UR QL CFM: NEGATIVE NG/ML
FLUALPRAZOLAM QUANTIFICATION: NEGATIVE NG/ML
FLUBROMAZOLAM QUANTIFICATION: NEGATIVE NG/ML
GLIADIN IGG SER IA-ACNC: NEGATIVE NG/ML
HYDROCODONE UR QL CFM: NEGATIVE NG/ML
HYDROMORPHONE UR QL CFM: ABNORMAL NG/ML
KETAMINE UR QL CFM: NEGATIVE NG/ML
LORAZEPAM UR QL CFM: ABNORMAL NG/ML
MDMA UR QL CFM: NEGATIVE NG/ML
ME-PHENIDATE UR QL CFM: NEGATIVE NG/ML
MEPERIDINE UR QL CFM: NEGATIVE NG/ML
METHADONE UR QL CFM: NEGATIVE NG/ML
METHAMPHET UR QL CFM: NEGATIVE NG/ML
METONITAZENE QUANTIFICATION: NEGATIVE NG/ML
MORPHINE UR QL CFM: ABNORMAL NG/ML
NALTREXONE UR QL CFM: NEGATIVE NG/ML
NITRITE UR QL: NORMAL UG/ML
NORBUPRENORPHINE UR QL CFM: NEGATIVE NG/ML
NORDIAZEPAM UR QL CFM: NEGATIVE NG/ML
NORFENTANYL UR QL CFM: NEGATIVE NG/ML
NORHYDROCODONE UR QL CFM: NEGATIVE NG/ML
NORMEPERIDINE UR QL CFM: NEGATIVE NG/ML
NOROXYCODONE UR QL CFM: NORMAL NG/ML
OXAZEPAM UR QL CFM: NEGATIVE NG/ML
OXYCODONE UR QL CFM: NORMAL NG/ML
OXYMORPHONE UR QL CFM: NORMAL NG/ML
PARA-FLUOROFENTANYL QUANTIFICATION: NORMAL NG/ML
PCP UR QL CFM: NEGATIVE NG/ML
PENTOBARB UR QL CFM: NEGATIVE NG/ML
PHENOBARB UR QL CFM: NEGATIVE NG/ML
SECOBARBITAL UR QL CFM: NEGATIVE NG/ML
SL AMB 4-ANPP QUANTIFICATION: NORMAL NG/ML
SL AMB 5F-ADB-M7 METABOLITE QUANTIFICATION: NEGATIVE NG/ML
SL AMB 7-OH-MITRAGYNINE (KRATOM ALKALOID) QUANTIFICATION: NEGATIVE NG/ML
SL AMB AB-FUBINACA-M3 METABOLITE QUANTIFICATION: NEGATIVE NG/ML
SL AMB ACETYL FENTANYL QUANTIFICATION: NORMAL NG/ML
SL AMB ACETYL NORFENTANYL QUANTIFICATION: NORMAL NG/ML
SL AMB ACRYL FENTANYL QUANTIFICATION: NORMAL NG/ML
SL AMB CARFENTANIL QUANTIFICATION: NORMAL NG/ML
SL AMB CTHC (MARIJUANA METABOLITE) QUANTIFICATION: NEGATIVE NG/ML
SL AMB CZOLPIDEM (ZOLPIDEM METABOLITE) QUANTIFICATION: NEGATIVE NG/ML
SL AMB DEXTRORPHAN (DEXTROMETHORPHAN METABOLITE) QUANT: NEGATIVE NG/ML
SL AMB HYDROMORPHONE QUANTIFICATION: NEGATIVE NG/ML
SL AMB JWH018 METABOLITE QUANTIFICATION: NEGATIVE NG/ML
SL AMB JWH073 METABOLITE QUANTIFICATION: NEGATIVE NG/ML
SL AMB MDMB-FUBINACA-M1 METABOLITE QUANTIFICATION: NEGATIVE NG/ML
SL AMB METHYLONE QUANTIFICATION: NEGATIVE NG/ML
SL AMB N-DESMETHYL-TRAMADOL QUANTIFICATION: NEGATIVE NG/ML
SL AMB NALOXONE QUANTIFICATION: ABNORMAL NG/ML
SL AMB PHENTERMINE QUANTIFICATION: NEGATIVE NG/ML
SL AMB RCS4 METABOLITE QUANTIFICATION: NEGATIVE NG/ML
SL AMB RITALINIC ACID QUANTIFICATION: NEGATIVE NG/ML
SMOOTH MUSCLE AB TITR SER IF: NEGATIVE NG/ML
SPECIMEN DRAWN SERPL: NEGATIVE NG/ML
SPECIMEN PH ACCEPTABLE UR: NORMAL
TAPENTADOL UR QL CFM: NEGATIVE NG/ML
TEMAZEPAM UR QL CFM: NEGATIVE NG/ML
TRAMADOL UR QL CFM: NEGATIVE NG/ML
URATE/CREAT 24H UR: NEGATIVE NG/ML
XYLAZINE UR QL CFM: NEGATIVE NG/ML

## 2024-05-24 NOTE — TELEPHONE ENCOUNTER
Lilian calling requesting Xtampza 18mg ER to be sent to [facility] due to patient currently in rehab long term .

## 2024-05-28 DIAGNOSIS — G89.3 CANCER-RELATED PAIN: Chronic | ICD-10-CM

## 2024-05-28 DIAGNOSIS — C06.9 SQUAMOUS CELL CARCINOMA OF ORAL CAVITY (HCC): Chronic | ICD-10-CM

## 2024-05-28 DIAGNOSIS — Z51.5 PALLIATIVE CARE PATIENT: ICD-10-CM

## 2024-05-28 NOTE — TELEPHONE ENCOUNTER
[facility] calling in regards to the medication and want to know how he is going to get the medication. Patient went to pharmacy and said they had to send the script back to you in regards to something being wrong. Facility always uses [facility] for all their residents there and it is tracked to get the medication there and it is in a sealed bag and put right in a lock drawer.

## 2024-05-29 ENCOUNTER — TELEPHONE (OUTPATIENT)
Dept: HEMATOLOGY ONCOLOGY | Facility: CLINIC | Age: 59
End: 2024-05-29

## 2024-05-29 ENCOUNTER — OFFICE VISIT (OUTPATIENT)
Dept: HEMATOLOGY ONCOLOGY | Facility: CLINIC | Age: 59
End: 2024-05-29
Payer: COMMERCIAL

## 2024-05-29 VITALS
DIASTOLIC BLOOD PRESSURE: 84 MMHG | HEIGHT: 75 IN | WEIGHT: 124 LBS | BODY MASS INDEX: 15.42 KG/M2 | RESPIRATION RATE: 18 BRPM | TEMPERATURE: 98.6 F | SYSTOLIC BLOOD PRESSURE: 130 MMHG | OXYGEN SATURATION: 98 % | HEART RATE: 77 BPM

## 2024-05-29 DIAGNOSIS — K59.03 DRUG INDUCED CONSTIPATION: ICD-10-CM

## 2024-05-29 DIAGNOSIS — G89.3 CANCER-RELATED PAIN: Chronic | ICD-10-CM

## 2024-05-29 DIAGNOSIS — Z71.6 ENCOUNTER FOR COUNSELING FOR TOBACCO USE DISORDER: ICD-10-CM

## 2024-05-29 DIAGNOSIS — C06.9 SQUAMOUS CELL CARCINOMA OF ORAL CAVITY (HCC): Primary | Chronic | ICD-10-CM

## 2024-05-29 PROCEDURE — 99215 OFFICE O/P EST HI 40 MIN: CPT | Performed by: INTERNAL MEDICINE

## 2024-05-29 PROCEDURE — 99406 BEHAV CHNG SMOKING 3-10 MIN: CPT | Performed by: INTERNAL MEDICINE

## 2024-05-29 NOTE — RESULT ENCOUNTER NOTE
"On 5/22/24 patient stated he had not had any alcohol in \"I don't know, maybe a month and a half\" and states that he cannot get alcohol in his nursing facility. UDS on 5/22/24 positive for EtOH.    This note was not shared with the patient due to reasonable likelihood of causing patient harm."

## 2024-05-29 NOTE — PROGRESS NOTES
Reviewed [facility] printout for keytruda to patient. Reviewed possible side effects and lab recommendations.    Patient has port.     Patient resides at [facility].     Reviewed office handouts with HOPELINE Number.     Consent obtained, copy given to patient and uploaded into patient chart.

## 2024-05-29 NOTE — Clinical Note
Eliceo Mayfield expressed interest in medical marijuana for his cancer associated pain. Is that a possibility for him?  [place]

## 2024-05-30 ENCOUNTER — TELEPHONE (OUTPATIENT)
Dept: HEMATOLOGY ONCOLOGY | Facility: CLINIC | Age: 59
End: 2024-05-30

## 2024-05-30 DIAGNOSIS — C06.9 SQUAMOUS CELL CARCINOMA OF ORAL CAVITY (HCC): Primary | ICD-10-CM

## 2024-05-30 DIAGNOSIS — C06.9 ORAL CANCER (HCC): ICD-10-CM

## 2024-05-30 NOTE — TELEPHONE ENCOUNTER
Patient Call    Who are you speaking with? [facility] CT Department     If it is not the patient, are they listed on an active communication consent form? N/A   What is the reason for this call? Pt has an order for a CT soft tissue neck to be done. Order states it is with contrast, but the order details say without contrast. They need that order fixed in order to do the test. Please fix order.    Does this require a call back? No   If a call back is required, please list best call back number N/A   If a call back is required, advise that a message will be forwarded to their care team and someone will return their call as soon as possible.   Did you relay this information to the patient? N/A

## 2024-06-03 ENCOUNTER — HOSPITAL ENCOUNTER (OUTPATIENT)
Dept: CT IMAGING | Facility: HOSPITAL | Age: 59
Discharge: HOME | End: 2024-06-03
Attending: INTERNAL MEDICINE
Payer: COMMERCIAL

## 2024-06-03 ENCOUNTER — APPOINTMENT (OUTPATIENT)
Dept: LAB | Facility: HOSPITAL | Age: 59
End: 2024-06-03
Attending: INTERNAL MEDICINE
Payer: COMMERCIAL

## 2024-06-03 DIAGNOSIS — C06.9 SQUAMOUS CELL CARCINOMA OF ORAL CAVITY (HCC): ICD-10-CM

## 2024-06-03 DIAGNOSIS — C06.9 ORAL CANCER (HCC): ICD-10-CM

## 2024-06-03 PROCEDURE — 70491 CT SOFT TISSUE NECK W/DYE: CPT

## 2024-06-03 RX ADMIN — IOHEXOL 80 ML: 350 INJECTION, SOLUTION INTRAVENOUS at 14:13

## 2024-06-04 RX ORDER — SODIUM CHLORIDE 9 MG/ML
20 INJECTION, SOLUTION INTRAVENOUS ONCE
OUTPATIENT
Start: 2024-07-23

## 2024-06-04 RX ORDER — SODIUM CHLORIDE 9 MG/ML
20 INJECTION, SOLUTION INTRAVENOUS ONCE
OUTPATIENT
Start: 2024-06-11

## 2024-06-07 ENCOUNTER — APPOINTMENT (OUTPATIENT)
Dept: LAB | Facility: HOSPITAL | Age: 59
End: 2024-06-07
Payer: COMMERCIAL

## 2024-06-07 LAB
ALBUMIN SERPL BCP-MCNC: 3.4 G/DL (ref 3.5–5)
ALP SERPL-CCNC: 130 U/L (ref 34–104)
ALT SERPL W P-5'-P-CCNC: 8 U/L (ref 7–52)
ANION GAP SERPL CALCULATED.3IONS-SCNC: 8 MMOL/L (ref 4–13)
AST SERPL W P-5'-P-CCNC: 15 U/L (ref 13–39)
BASOPHILS # BLD MANUAL: 0 THOUSAND/UL (ref 0–0.1)
BASOPHILS NFR MAR MANUAL: 0 % (ref 0–1)
BILIRUB SERPL-MCNC: 0.21 MG/DL (ref 0.2–1)
BUN SERPL-MCNC: 9 MG/DL (ref 5–25)
CALCIUM ALBUM COR SERPL-MCNC: 10 MG/DL (ref 8.3–10.1)
CALCIUM SERPL-MCNC: 9.5 MG/DL (ref 8.4–10.2)
CHLORIDE SERPL-SCNC: 90 MMOL/L (ref 96–108)
CO2 SERPL-SCNC: 33 MMOL/L (ref 21–32)
CREAT SERPL-MCNC: 0.75 MG/DL (ref 0.6–1.3)
EOSINOPHIL # BLD MANUAL: 0.19 THOUSAND/UL (ref 0–0.4)
EOSINOPHIL NFR BLD MANUAL: 2 % (ref 0–6)
ERYTHROCYTE [DISTWIDTH] IN BLOOD BY AUTOMATED COUNT: 13.7 % (ref 11.6–15.1)
GFR SERPL CREATININE-BSD FRML MDRD: 99 ML/MIN/1.73SQ M
GLUCOSE P FAST SERPL-MCNC: 104 MG/DL (ref 65–99)
HCT VFR BLD AUTO: 32.9 % (ref 36.5–49.3)
HGB BLD-MCNC: 10.4 G/DL (ref 12–17)
LYMPHOCYTES # BLD AUTO: 0.68 THOUSAND/UL (ref 0.6–4.47)
LYMPHOCYTES # BLD AUTO: 7 % (ref 14–44)
MCH RBC QN AUTO: 28.5 PG (ref 26.8–34.3)
MCHC RBC AUTO-ENTMCNC: 31.6 G/DL (ref 31.4–37.4)
MCV RBC AUTO: 90 FL (ref 82–98)
METAMYELOCYTE ABSOLUTE CT: 0.1 THOUSAND/UL (ref 0–0.1)
METAMYELOCYTES NFR BLD MANUAL: 1 % (ref 0–1)
MONOCYTES # BLD AUTO: 1.46 THOUSAND/UL (ref 0–1.22)
MONOCYTES NFR BLD: 15 % (ref 4–12)
MYELOCYTE ABSOLUTE CT: 0.19 THOUSAND/UL (ref 0–0.1)
MYELOCYTES NFR BLD MANUAL: 2 % (ref 0–1)
NEUTROPHILS # BLD MANUAL: 7.1 THOUSAND/UL (ref 1.85–7.62)
NEUTS BAND NFR BLD MANUAL: 15 % (ref 0–8)
NEUTS SEG NFR BLD AUTO: 58 % (ref 43–75)
PLATELET # BLD AUTO: 531 THOUSANDS/UL (ref 149–390)
PLATELET BLD QL SMEAR: ABNORMAL
PMV BLD AUTO: 8.7 FL (ref 8.9–12.7)
POLYCHROMASIA BLD QL SMEAR: PRESENT
POTASSIUM SERPL-SCNC: 3.6 MMOL/L (ref 3.5–5.3)
PROT SERPL-MCNC: 7 G/DL (ref 6.4–8.4)
RBC # BLD AUTO: 3.65 MILLION/UL (ref 3.88–5.62)
RBC MORPH BLD: PRESENT
SODIUM SERPL-SCNC: 131 MMOL/L (ref 135–147)
T3FREE SERPL-MCNC: 2.81 PG/ML (ref 2.5–3.9)
TSH SERPL DL<=0.05 MIU/L-ACNC: 1.39 UIU/ML (ref 0.45–4.5)
WBC # BLD AUTO: 9.73 THOUSAND/UL (ref 4.31–10.16)

## 2024-06-07 PROCEDURE — 36415 COLL VENOUS BLD VENIPUNCTURE: CPT

## 2024-06-07 PROCEDURE — 84481 FREE ASSAY (FT-3): CPT

## 2024-06-07 PROCEDURE — 85027 COMPLETE CBC AUTOMATED: CPT

## 2024-06-07 PROCEDURE — 80053 COMPREHEN METABOLIC PANEL: CPT

## 2024-06-07 PROCEDURE — 84443 ASSAY THYROID STIM HORMONE: CPT

## 2024-06-07 PROCEDURE — 85007 BL SMEAR W/DIFF WBC COUNT: CPT

## 2024-06-11 ENCOUNTER — HOSPITAL ENCOUNTER (OUTPATIENT)
Dept: INFUSION CENTER | Facility: CLINIC | Age: 59
Discharge: HOME | End: 2024-06-11
Payer: COMMERCIAL

## 2024-06-11 VITALS
DIASTOLIC BLOOD PRESSURE: 77 MMHG | SYSTOLIC BLOOD PRESSURE: 121 MMHG | RESPIRATION RATE: 18 BRPM | HEART RATE: 101 BPM | TEMPERATURE: 97.7 F

## 2024-06-11 DIAGNOSIS — C06.9 SQUAMOUS CELL CARCINOMA OF ORAL CAVITY (HCC): Primary | ICD-10-CM

## 2024-06-11 PROCEDURE — 96413 CHEMO IV INFUSION 1 HR: CPT

## 2024-06-11 RX ORDER — SODIUM CHLORIDE 9 MG/ML
20 INJECTION, SOLUTION INTRAVENOUS ONCE
Status: COMPLETED | OUTPATIENT
Start: 2024-06-11 | End: 2024-06-11

## 2024-06-11 RX ADMIN — SODIUM CHLORIDE 400 MG: 9 INJECTION, SOLUTION INTRAVENOUS at 13:33

## 2024-06-11 RX ADMIN — SODIUM CHLORIDE 20 ML/HR: 0.9 INJECTION, SOLUTION INTRAVENOUS at 13:33

## 2024-06-11 NOTE — PROGRESS NOTES
Eliceo Garcia  tolerated treatment well with no complications. Reported alk phos of 130, Dr Gay ok with treating today.     Eliceo Garcia is aware of future appt on 7/23/24  at 0930 .     AVS printed and given to Eliceo Garcia & calendar sent with patient.  Yes

## 2024-06-19 ENCOUNTER — TELEPHONE (OUTPATIENT)
Dept: PALLIATIVE MEDICINE | Facility: CLINIC | Age: 59
End: 2024-06-19

## 2024-06-19 NOTE — TELEPHONE ENCOUNTER
I called spoke to patient he stated he is aware he didn't come to appointment. He doesn't feel the need to continue with palliative care since we don't prescribe anything for him per patient. Declined to reschedule.

## 2024-06-20 ENCOUNTER — DOCUMENTATION (OUTPATIENT)
Dept: RADIATION ONCOLOGY | Facility: CLINIC | Age: 59
End: 2024-06-20

## 2024-07-23 ENCOUNTER — TELEPHONE (OUTPATIENT)
Dept: HEMATOLOGY ONCOLOGY | Facility: CLINIC | Age: 59
End: 2024-07-23

## 2024-07-23 ENCOUNTER — HOSPITAL ENCOUNTER (OUTPATIENT)
Dept: INFUSION CENTER | Facility: CLINIC | Age: 59
Discharge: HOME | End: 2024-07-23

## 2024-07-23 NOTE — TELEPHONE ENCOUNTER
Please cancel all infusion appointments.    Call out to patient to see if patient wanted to continue with treatments as scheduled or continue to be under hospice care. Patient confirmed that he wants appointments to be cancelled and shortly after phone call disconnected.

## 2024-12-13 LAB

## 2025-02-07 NOTE — PROGRESS NOTES
TIME OUT: Spoke with Kathie Ware RN via telephone and patient will receive a total of 4 grams of Magnesium today. 2 grams of Magnesium with pre hydration and 2 grams of Magnesium with post hydration. Pharmacy notified.   Quality 226: Preventive Care And Screening: Tobacco Use: Screening And Cessation Intervention: Patient screened for tobacco use and is an ex/non-smoker Quality 130: Documentation Of Current Medications In The Medical Record: Current Medications Documented Detail Level: Detailed

## (undated) DEVICE — DENTAL BURR SIDE WHITE

## (undated) DEVICE — INTENDED FOR TISSUE SEPARATION, AND OTHER PROCEDURES THAT REQUIRE A SHARP SURGICAL BLADE TO PUNCTURE OR CUT.: Brand: BARD-PARKER ® CARBON RIB-BACK BLADES

## (undated) DEVICE — NEEDLE 25G X 1 1/2

## (undated) DEVICE — GLOVE SRG BIOGEL 7

## (undated) DEVICE — STERILE MANDIBLE PACK: Brand: CARDINAL HEALTH